# Patient Record
Sex: MALE | Race: WHITE | NOT HISPANIC OR LATINO | Employment: OTHER | URBAN - METROPOLITAN AREA
[De-identification: names, ages, dates, MRNs, and addresses within clinical notes are randomized per-mention and may not be internally consistent; named-entity substitution may affect disease eponyms.]

---

## 2018-06-25 ENCOUNTER — OFFICE VISIT (OUTPATIENT)
Dept: FAMILY MEDICINE CLINIC | Facility: CLINIC | Age: 63
End: 2018-06-25
Payer: COMMERCIAL

## 2018-06-25 VITALS
HEIGHT: 70 IN | BODY MASS INDEX: 45.1 KG/M2 | WEIGHT: 315 LBS | OXYGEN SATURATION: 95 % | HEART RATE: 77 BPM | TEMPERATURE: 97.1 F | DIASTOLIC BLOOD PRESSURE: 80 MMHG | SYSTOLIC BLOOD PRESSURE: 140 MMHG

## 2018-06-25 DIAGNOSIS — N28.89 KIDNEY MASS: ICD-10-CM

## 2018-06-25 DIAGNOSIS — I10 ESSENTIAL HYPERTENSION: ICD-10-CM

## 2018-06-25 DIAGNOSIS — N20.0 KIDNEY STONES: Primary | ICD-10-CM

## 2018-06-25 PROBLEM — M10.9 GOUT: Status: ACTIVE | Noted: 2018-06-25

## 2018-06-25 PROBLEM — E78.6 HDL DEFICIENCY: Status: ACTIVE | Noted: 2018-06-25

## 2018-06-25 PROBLEM — J30.9 ALLERGIC RHINITIS: Status: ACTIVE | Noted: 2018-06-25

## 2018-06-25 PROBLEM — N18.9 CRF (CHRONIC RENAL FAILURE): Status: ACTIVE | Noted: 2018-06-25

## 2018-06-25 PROBLEM — G47.33 OSA (OBSTRUCTIVE SLEEP APNEA): Status: ACTIVE | Noted: 2018-06-25

## 2018-06-25 PROCEDURE — 3008F BODY MASS INDEX DOCD: CPT | Performed by: FAMILY MEDICINE

## 2018-06-25 PROCEDURE — 99214 OFFICE O/P EST MOD 30 MIN: CPT | Performed by: FAMILY MEDICINE

## 2018-06-25 RX ORDER — LISINOPRIL 40 MG/1
40 TABLET ORAL
COMMUNITY
Start: 2017-08-17 | End: 2018-10-29 | Stop reason: SDUPTHER

## 2018-06-25 RX ORDER — FUROSEMIDE 20 MG/1
20 TABLET ORAL DAILY PRN
COMMUNITY
Start: 2017-09-11 | End: 2018-10-29 | Stop reason: SDUPTHER

## 2018-06-25 RX ORDER — METOPROLOL SUCCINATE 100 MG/1
100 TABLET, EXTENDED RELEASE ORAL
COMMUNITY
Start: 2017-07-19 | End: 2018-08-21 | Stop reason: SDUPTHER

## 2018-06-25 RX ORDER — TAMSULOSIN HYDROCHLORIDE 0.4 MG/1
0.4 CAPSULE ORAL EVERY 24 HOURS
COMMUNITY
Start: 2018-03-20 | End: 2019-04-05 | Stop reason: SDUPTHER

## 2018-06-25 RX ORDER — TRAVOPROST 0.004 %
1 DROPS OPHTHALMIC (EYE)
COMMUNITY
Start: 2018-06-01

## 2018-06-25 NOTE — PROGRESS NOTES
Assessment/Plan:    No problem-specific Assessment & Plan notes found for this encounter  Diagnoses and all orders for this visit:    Kidney stones    Kidney mass    Other orders  -     tamsulosin (FLOMAX) 0 4 mg; Take 0 4 mg by mouth every 24 hours  -     furosemide (LASIX) 20 mg tablet; Take 20 mg by mouth daily as needed  -     lisinopril (ZESTRIL) 40 mg tablet; Take 40 mg by mouth  -     metoprolol succinate (TOPROL-XL) 100 mg 24 hr tablet; Take 100 mg by mouth  -     aspirin 81 MG tablet; Take 81 mg by mouth once  -     TRAVATAN Z 0 004 % ophthalmic solution; 0 004 drops once          Subjective:      Patient ID: Schuyler Malone is a 59 y o  male  Here for f/u kidney / routine       Hypertension   Pertinent negatives include no headaches  The following portions of the patient's history were reviewed and updated as appropriate: allergies, current medications, past family history, past medical history, past social history, past surgical history and problem list     Review of Systems   Constitutional: Negative for activity change, appetite change, fatigue, fever and unexpected weight change  HENT: Negative for congestion, dental problem and sneezing  Eyes: Negative for discharge and visual disturbance  Respiratory: Negative for cough and wheezing  Gastrointestinal: Negative for abdominal pain, constipation, diarrhea, nausea and vomiting  Endocrine: Negative for polydipsia and polyuria  Genitourinary: Negative for dysuria and frequency  Musculoskeletal: Negative for arthralgias  Skin: Negative for rash  Allergic/Immunologic: Negative for environmental allergies and food allergies  Neurological: Negative for headaches  Hematological: Negative for adenopathy  Psychiatric/Behavioral: Negative for behavioral problems and sleep disturbance           Objective:      /80 (BP Location: Left arm, Patient Position: Sitting, Cuff Size: Large)   Pulse 77   Temp (!) 97 1 °F (36 2 °C)   Ht 5' 10" (1 778 m)   Wt (!) 171 kg (377 lb)   SpO2 95%   BMI 54 09 kg/m²          Physical Exam   Constitutional: He appears well-developed and well-nourished  HENT:   Head: Normocephalic and atraumatic  Eyes: Conjunctivae are normal    Neck: Neck supple  No thyromegaly present  Cardiovascular: Normal rate, regular rhythm, normal heart sounds and intact distal pulses  No murmur heard  Pulmonary/Chest: Effort normal and breath sounds normal  No respiratory distress  Lymphadenopathy:     He has no cervical adenopathy  Skin: Skin is warm and dry  Psychiatric: He has a normal mood and affect   His behavior is normal

## 2018-07-11 ENCOUNTER — TELEPHONE (OUTPATIENT)
Dept: FAMILY MEDICINE CLINIC | Facility: CLINIC | Age: 63
End: 2018-07-11

## 2018-08-21 DIAGNOSIS — I10 ESSENTIAL HYPERTENSION: Primary | ICD-10-CM

## 2018-08-21 RX ORDER — METOPROLOL SUCCINATE 100 MG/1
TABLET, EXTENDED RELEASE ORAL
Qty: 90 TABLET | Refills: 2 | Status: SHIPPED | OUTPATIENT
Start: 2018-08-21 | End: 2019-05-20 | Stop reason: SDUPTHER

## 2018-10-29 DIAGNOSIS — I10 ESSENTIAL HYPERTENSION: Primary | ICD-10-CM

## 2018-10-30 RX ORDER — LISINOPRIL 40 MG/1
TABLET ORAL
Qty: 90 TABLET | Refills: 3 | Status: SHIPPED | OUTPATIENT
Start: 2018-10-30 | End: 2019-10-23 | Stop reason: SDUPTHER

## 2018-10-30 RX ORDER — FUROSEMIDE 20 MG/1
TABLET ORAL
Qty: 90 TABLET | Refills: 2 | Status: SHIPPED | OUTPATIENT
Start: 2018-10-30 | End: 2019-07-30 | Stop reason: SDUPTHER

## 2018-12-27 ENCOUNTER — OFFICE VISIT (OUTPATIENT)
Dept: FAMILY MEDICINE CLINIC | Facility: CLINIC | Age: 63
End: 2018-12-27
Payer: COMMERCIAL

## 2018-12-27 VITALS
HEIGHT: 70 IN | BODY MASS INDEX: 45.1 KG/M2 | OXYGEN SATURATION: 96 % | SYSTOLIC BLOOD PRESSURE: 110 MMHG | DIASTOLIC BLOOD PRESSURE: 74 MMHG | HEART RATE: 82 BPM | WEIGHT: 315 LBS | TEMPERATURE: 97.3 F

## 2018-12-27 DIAGNOSIS — N20.0 KIDNEY STONES: ICD-10-CM

## 2018-12-27 DIAGNOSIS — IMO0001 COLD: ICD-10-CM

## 2018-12-27 DIAGNOSIS — I10 ESSENTIAL HYPERTENSION: ICD-10-CM

## 2018-12-27 DIAGNOSIS — N18.9 CHRONIC RENAL FAILURE, UNSPECIFIED CKD STAGE: ICD-10-CM

## 2018-12-27 DIAGNOSIS — C64.2 RENAL CELL CARCINOMA OF LEFT KIDNEY (HCC): Primary | Chronic | ICD-10-CM

## 2018-12-27 DIAGNOSIS — M10.9 GOUT, UNSPECIFIED CAUSE, UNSPECIFIED CHRONICITY, UNSPECIFIED SITE: ICD-10-CM

## 2018-12-27 PROCEDURE — 3074F SYST BP LT 130 MM HG: CPT | Performed by: FAMILY MEDICINE

## 2018-12-27 PROCEDURE — 90686 IIV4 VACC NO PRSV 0.5 ML IM: CPT

## 2018-12-27 PROCEDURE — 3078F DIAST BP <80 MM HG: CPT | Performed by: FAMILY MEDICINE

## 2018-12-27 PROCEDURE — 99214 OFFICE O/P EST MOD 30 MIN: CPT | Performed by: FAMILY MEDICINE

## 2018-12-27 PROCEDURE — 90471 IMMUNIZATION ADMIN: CPT

## 2018-12-27 RX ORDER — ALLOPURINOL 100 MG/1
100 TABLET ORAL DAILY
COMMUNITY
Start: 2018-12-04

## 2018-12-27 RX ORDER — POTASSIUM CITRATE 15 MEQ/1
1 TABLET, EXTENDED RELEASE ORAL DAILY
COMMUNITY
Start: 2018-12-04

## 2018-12-27 NOTE — ASSESSMENT & PLAN NOTE
Patient is status post cryoablation of left kidney mass in July of 2018  Subsequent CT scan in September demonstrated no change in the size  He continues to follow up with the various renal specialists

## 2018-12-27 NOTE — PROGRESS NOTES
Assessment/Plan:    Renal cell carcinoma of left kidney [facility])  Patient is status post cryoablation of left kidney mass in July of 2018  Subsequent CT scan in September demonstrated no change in the size  He continues to follow up with the various renal specialists  Diagnoses and all orders for this visit:    Renal cell carcinoma of left kidney (HCC)    Chronic renal failure, unspecified CKD stage  -     influenza vaccine, [id number], quadrivalent, 0 5 mL, preservative-free (SYRINGE, SINGLE-DOSE VIAL), for adult and pediatric patients 3 yr+ (AFLURIA, FLUARIX, FLULAVAL, FLUZONE)    Kidney stones    Essential hypertension    Gout, unspecified cause, unspecified chronicity, unspecified site    Cold    Other orders  -     allopurinol (ZYLOPRIM) 100 mg tablet;   -     Potassium Citrate ER 15 MEQ (1620 MG) TBCR;           Subjective:      Patient ID: Benigno Cade is a 59 y o  male  PATIENT RETURNS FOR FOLLOW-UP OF CHRONIC MEDICAL CONDITIONS  NO HOSPITAL STAYS OR EMERGENCY VISITS RECENTLY  MEDS WERE REVIEWED AND NO SIDE EFFECTS  NO NEW ISSUES  UNLESS NOTED BELOW  NO NEW MEDICAL PROVIDER REPORTED  The following portions of the patient's history were reviewed and updated as appropriate: allergies, current medications, past family history, past medical history, past social history, past surgical history and problem list     Review of Systems   Constitutional: Negative for activity change and appetite change  HENT: Negative for trouble swallowing  Eyes: Negative for visual disturbance  Respiratory: Negative for cough and shortness of breath  Cardiovascular: Negative for chest pain, palpitations and leg swelling  Gastrointestinal: Negative for abdominal pain and blood in stool  Endocrine: Negative for polyuria  Genitourinary: Negative for difficulty urinating and hematuria  Musculoskeletal: Positive for arthralgias and back pain  Skin: Negative for rash     Neurological: Negative for dizziness  Psychiatric/Behavioral: Negative for behavioral problems  Objective:  Vitals:    12/27/18 0931   BP: 110/74   BP Location: Left arm   Patient Position: Sitting   Cuff Size: Large   Pulse: 82   Temp: (!) 97 3 °F (36 3 °C)   TempSrc: Temporal   SpO2: 96%   Weight: (!) 174 kg (384 lb 1 6 oz)   Height: 5' 10" (1 778 m)      Physical Exam   Constitutional: He appears well-developed and well-nourished  HENT:   Head: Normocephalic and atraumatic  Eyes: Conjunctivae are normal    Neck: Neck supple  No thyromegaly present  Cardiovascular: Normal rate, regular rhythm, normal heart sounds and intact distal pulses  No murmur heard  Pulmonary/Chest: Effort normal and breath sounds normal  No respiratory distress  Musculoskeletal: He exhibits no edema  Lymphadenopathy:     He has no cervical adenopathy  Skin: Skin is warm and dry  Psychiatric: He has a normal mood and affect  His behavior is normal          Patient's chronic problems that were reviewed today are stable  Meds reviewed and no changes made  Appropriate labs and imaging were ordered  Preventive measures appropriate for age and sex were reviewed with patient  Immunizations were updated as appropriate

## 2019-04-05 DIAGNOSIS — N18.9 CHRONIC RENAL FAILURE, UNSPECIFIED CKD STAGE: Primary | ICD-10-CM

## 2019-04-05 RX ORDER — TAMSULOSIN HYDROCHLORIDE 0.4 MG/1
0.4 CAPSULE ORAL EVERY 24 HOURS
Qty: 90 CAPSULE | Refills: 3 | Status: SHIPPED | OUTPATIENT
Start: 2019-04-05 | End: 2020-04-03 | Stop reason: SDUPTHER

## 2019-05-20 DIAGNOSIS — I10 ESSENTIAL HYPERTENSION: ICD-10-CM

## 2019-05-20 RX ORDER — METOPROLOL SUCCINATE 100 MG/1
TABLET, EXTENDED RELEASE ORAL
Qty: 90 TABLET | Refills: 2 | Status: SHIPPED | OUTPATIENT
Start: 2019-05-20 | End: 2020-02-23

## 2019-06-27 ENCOUNTER — OFFICE VISIT (OUTPATIENT)
Dept: FAMILY MEDICINE CLINIC | Facility: CLINIC | Age: 64
End: 2019-06-27
Payer: COMMERCIAL

## 2019-06-27 VITALS
DIASTOLIC BLOOD PRESSURE: 80 MMHG | OXYGEN SATURATION: 92 % | TEMPERATURE: 97.5 F | HEART RATE: 74 BPM | SYSTOLIC BLOOD PRESSURE: 130 MMHG | HEIGHT: 70 IN | BODY MASS INDEX: 45.1 KG/M2 | WEIGHT: 315 LBS

## 2019-06-27 DIAGNOSIS — E78.6 HDL DEFICIENCY: ICD-10-CM

## 2019-06-27 DIAGNOSIS — N20.0 KIDNEY STONES: ICD-10-CM

## 2019-06-27 DIAGNOSIS — Z12.11 ENCOUNTER FOR SCREENING COLONOSCOPY: Primary | ICD-10-CM

## 2019-06-27 DIAGNOSIS — N18.9 CHRONIC RENAL FAILURE, UNSPECIFIED CKD STAGE: ICD-10-CM

## 2019-06-27 DIAGNOSIS — I10 ESSENTIAL HYPERTENSION: ICD-10-CM

## 2019-06-27 DIAGNOSIS — M10.9 GOUT, UNSPECIFIED CAUSE, UNSPECIFIED CHRONICITY, UNSPECIFIED SITE: ICD-10-CM

## 2019-06-27 DIAGNOSIS — C64.2 RENAL CELL CARCINOMA OF LEFT KIDNEY (HCC): Chronic | ICD-10-CM

## 2019-06-27 PROBLEM — IMO0001 COLD: Status: RESOLVED | Noted: 2018-12-27 | Resolved: 2019-06-27

## 2019-06-27 PROCEDURE — 3079F DIAST BP 80-89 MM HG: CPT | Performed by: FAMILY MEDICINE

## 2019-06-27 PROCEDURE — 3008F BODY MASS INDEX DOCD: CPT | Performed by: FAMILY MEDICINE

## 2019-06-27 PROCEDURE — 99214 OFFICE O/P EST MOD 30 MIN: CPT | Performed by: FAMILY MEDICINE

## 2019-06-27 PROCEDURE — 3075F SYST BP GE 130 - 139MM HG: CPT | Performed by: FAMILY MEDICINE

## 2019-07-29 DIAGNOSIS — I10 ESSENTIAL HYPERTENSION: ICD-10-CM

## 2019-07-30 ENCOUNTER — OFFICE VISIT (OUTPATIENT)
Dept: FAMILY MEDICINE CLINIC | Facility: CLINIC | Age: 64
End: 2019-07-30
Payer: COMMERCIAL

## 2019-07-30 VITALS
OXYGEN SATURATION: 96 % | SYSTOLIC BLOOD PRESSURE: 138 MMHG | HEIGHT: 70 IN | DIASTOLIC BLOOD PRESSURE: 68 MMHG | BODY MASS INDEX: 45.1 KG/M2 | TEMPERATURE: 97.3 F | WEIGHT: 315 LBS | RESPIRATION RATE: 20 BRPM | HEART RATE: 75 BPM

## 2019-07-30 DIAGNOSIS — C64.2 RENAL CELL CARCINOMA OF LEFT KIDNEY (HCC): Chronic | ICD-10-CM

## 2019-07-30 DIAGNOSIS — N18.30 CHRONIC RENAL FAILURE, STAGE 3 (MODERATE) (HCC): Primary | ICD-10-CM

## 2019-07-30 DIAGNOSIS — M10.9 GOUT, UNSPECIFIED CAUSE, UNSPECIFIED CHRONICITY, UNSPECIFIED SITE: ICD-10-CM

## 2019-07-30 DIAGNOSIS — M79.671 RIGHT FOOT PAIN: ICD-10-CM

## 2019-07-30 DIAGNOSIS — I10 ESSENTIAL HYPERTENSION: ICD-10-CM

## 2019-07-30 DIAGNOSIS — E66.01 CLASS 3 SEVERE OBESITY DUE TO EXCESS CALORIES WITH SERIOUS COMORBIDITY AND BODY MASS INDEX (BMI) OF 50.0 TO 59.9 IN ADULT (HCC): ICD-10-CM

## 2019-07-30 DIAGNOSIS — I89.0 LYMPHEDEMA OF BOTH LOWER EXTREMITIES: ICD-10-CM

## 2019-07-30 PROBLEM — E66.9 OBESITY: Status: ACTIVE | Noted: 2019-07-30

## 2019-07-30 PROBLEM — E66.813 CLASS 3 SEVERE OBESITY DUE TO EXCESS CALORIES WITH SERIOUS COMORBIDITY AND BODY MASS INDEX (BMI) OF 50.0 TO 59.9 IN ADULT (HCC): Status: ACTIVE | Noted: 2019-07-30

## 2019-07-30 PROCEDURE — 3008F BODY MASS INDEX DOCD: CPT | Performed by: FAMILY MEDICINE

## 2019-07-30 PROCEDURE — 3075F SYST BP GE 130 - 139MM HG: CPT | Performed by: FAMILY MEDICINE

## 2019-07-30 PROCEDURE — 99213 OFFICE O/P EST LOW 20 MIN: CPT | Performed by: FAMILY MEDICINE

## 2019-07-30 RX ORDER — FUROSEMIDE 20 MG/1
TABLET ORAL
Qty: 90 TABLET | Refills: 2 | Status: SHIPPED | OUTPATIENT
Start: 2019-07-30 | End: 2020-05-04

## 2019-07-30 NOTE — ASSESSMENT & PLAN NOTE
Followed by Oncology and Radiology at [facility]  Discovered on eval of kidney stones  Followed by CT scanning of the kidneys every 6 months with labs prior

## 2019-07-30 NOTE — PATIENT INSTRUCTIONS
AVOID THESE HIGH SALT FOODS:  SNACKS THAT TASTE SALTY: PRETZELS/CHIPSPOPCORN  CANNED SOUPS AND VEGGIES  FROZEN FOODS/ MICROWAVEABLE FOODS/ CANNED FOODS  AVOID ADDING EXTRA SALT TO THE MEALS     Will get an x-ray of the right foot and ankle and I will call you with those results  In order to control swelling of the lower extremities a little bit better tried elevate the legs above the level as much as possible and also avoidance of salt  For now continue present medications because this episode is not acute gouty arthritis  Can use acetaminophen for pain but the maximum should be 2000 mg over a 24 hour

## 2019-07-30 NOTE — PROGRESS NOTES
Assessment/Plan:    Chronic renal failure, stage 3 (moderate) ([facility])  1/15/19: creat = 1 5 and GFR = 49  Stable  Right foot pain  Started 4 days ago  Only hurts with weight-bearing  No injuries and no fever or chills  Has a past history of gout and remains on allopurinol 100 mg once a day  However the right ankle in the right foot has not been red or exquisitely tender  Pain is mostly on the lateral aspect of the foot both dorsally and ventrally  Physical exam is unremarkable today at on the day except for the lymphedema  Need to rule out an underlying stress fracture of the lateral aspect of the right foot  Will get an x-ray of the right foot and ankle and call him with the results  In the meantime tried elevate and rest the foot as much as possible  Renal cell carcinoma of left kidney ([facility])  Followed by Oncology and Radiology at [facility]  Discovered on eval of kidney stones  Followed by CT scanning of the kidneys every 6 months with labs prior  Gout  On allopurinol for prevention and can't remember when his last episode of acute gout occurred  This episode of right foot pain is not acute gout  Lymphedema of both lower extremities  Chronic problem  Told to avoid salt   Has stable stasis dermatitis of BLE   AVOID THESE HIGH SALT FOODS:  SNACKS THAT TASTE SALTY: PRETZELS/CHIPSPOPCORN  CANNED SOUPS AND VEGGIES  FROZEN FOODS/ MICROWAVEABLE FOODS/ CANNED FOODS  AVOID ADDING EXTRA SALT TO THE MEALS        Diagnoses and all orders for this visit:    Chronic renal failure, stage 3 (moderate) ([facility])  -     XR ankle 3+ vw right; Future  -     XR foot 3+ vw right; Future    Renal cell carcinoma of left kidney ([facility])    Essential hypertension    Class 3 severe obesity due to excess calories with serious comorbidity and body mass index (BMI) of 50 0 to 59 9 in adult ([facility])  -     XR ankle 3+ vw right; Future  -     XR foot 3+ vw right; Future    Gout, unspecified cause, unspecified chronicity, unspecified site    Lymphedema of both lower extremities    Right foot pain  -     XR ankle 3+ vw right; Future  -     XR foot 3+ vw right; Future          Subjective:     Chief Complaint   Patient presents with    Leg Swelling        Patient ID: Jaya Rothman is a 62 y o  male  HPI : swollen foot    The following portions of the patient's history were reviewed and updated as appropriate: allergies, current medications, past family history, past medical history, past social history, past surgical history and problem list     Review of Systems   Constitutional: Positive for activity change  Negative for appetite change and fever  Cardiovascular: Positive for leg swelling  Gastrointestinal: Negative for abdominal pain  Genitourinary: Negative for difficulty urinating  Musculoskeletal: Positive for gait problem  Right foot pain         Objective:  Vitals:    07/30/19 0933   BP: 138/68   BP Location: Left arm   Patient Position: Sitting   Cuff Size: Large   Pulse: 75   Resp: 20   Temp: (!) 97 3 °F (36 3 °C)   TempSrc: Temporal   SpO2: 96%   Weight: (!) 179 kg (393 lb 11 2 oz)   Height: 5' 10" (1 778 m)      Physical Exam   Constitutional: He is oriented to person, place, and time  He appears well-developed and well-nourished  HENT:   Head: Normocephalic  Cardiovascular: Normal rate and regular rhythm  Musculoskeletal: He exhibits edema and tenderness  Feet:    Neurological: He is alert and oriented to person, place, and time  Patient Instructions   AVOID THESE HIGH SALT FOODS:  SNACKS THAT TASTE SALTY: PRETZELS/CHIPSPOPCORN  CANNED SOUPS AND VEGGIES  FROZEN FOODS/ MICROWAVEABLE FOODS/ CANNED FOODS  AVOID ADDING EXTRA SALT TO THE MEALS     Will get an x-ray of the right foot and ankle and I will call you with those results  In order to control swelling of the lower extremities a little bit better tried elevate the legs above the level as much as possible and also avoidance of salt    For now continue present medications because this episode is not acute gouty arthritis  Can use acetaminophen for pain but the maximum should be 2000 mg over a 24 hour

## 2019-07-30 NOTE — ASSESSMENT & PLAN NOTE
Started 4 days ago  Only hurts with weight-bearing  No injuries and no fever or chills  Has a past history of gout and remains on allopurinol 100 mg once a day  However the right ankle in the right foot has not been red or exquisitely tender  Pain is mostly on the lateral aspect of the foot both dorsally and ventrally  Physical exam is unremarkable today at on the day except for the lymphedema  Need to rule out an underlying stress fracture of the lateral aspect of the right foot  Will get an x-ray of the right foot and ankle and call him with the results  In the meantime tried elevate and rest the foot as much as possible

## 2019-07-30 NOTE — ASSESSMENT & PLAN NOTE
On allopurinol for prevention and can't remember when his last episode of acute gout occurred  This episode of right foot pain is not acute gout

## 2019-07-30 NOTE — ASSESSMENT & PLAN NOTE
Chronic problem  Told to avoid salt   Has stable stasis dermatitis of BLE   AVOID THESE HIGH SALT FOODS:  SNACKS THAT TASTE SALTY: PRETZELS/CHIPSPOPCORN  CANNED SOUPS AND VEGGIES  FROZEN FOODS/ MICROWAVEABLE FOODS/ CANNED FOODS  AVOID ADDING EXTRA SALT TO THE MEALS

## 2019-07-31 ENCOUNTER — HOSPITAL ENCOUNTER (OUTPATIENT)
Dept: RADIOLOGY | Facility: HOSPITAL | Age: 64
Discharge: HOME | End: 2019-07-31
Payer: COMMERCIAL

## 2019-07-31 DIAGNOSIS — N18.30 CHRONIC RENAL FAILURE, STAGE 3 (MODERATE) (HCC): ICD-10-CM

## 2019-07-31 DIAGNOSIS — E66.01 CLASS 3 SEVERE OBESITY DUE TO EXCESS CALORIES WITH SERIOUS COMORBIDITY AND BODY MASS INDEX (BMI) OF 50.0 TO 59.9 IN ADULT (HCC): ICD-10-CM

## 2019-07-31 DIAGNOSIS — M79.671 RIGHT FOOT PAIN: ICD-10-CM

## 2019-07-31 PROCEDURE — 73630 X-RAY EXAM OF FOOT: CPT

## 2019-07-31 PROCEDURE — 73610 X-RAY EXAM OF ANKLE: CPT

## 2019-10-23 DIAGNOSIS — I10 ESSENTIAL HYPERTENSION: ICD-10-CM

## 2019-10-23 RX ORDER — LISINOPRIL 40 MG/1
TABLET ORAL
Qty: 90 TABLET | Refills: 0 | Status: SHIPPED | OUTPATIENT
Start: 2019-10-23 | End: 2020-01-27

## 2019-12-30 ENCOUNTER — OFFICE VISIT (OUTPATIENT)
Dept: FAMILY MEDICINE CLINIC | Facility: CLINIC | Age: 64
End: 2019-12-30
Payer: COMMERCIAL

## 2019-12-30 VITALS
DIASTOLIC BLOOD PRESSURE: 90 MMHG | HEART RATE: 73 BPM | WEIGHT: 315 LBS | HEIGHT: 69 IN | SYSTOLIC BLOOD PRESSURE: 150 MMHG | BODY MASS INDEX: 46.65 KG/M2 | TEMPERATURE: 96.7 F | OXYGEN SATURATION: 95 %

## 2019-12-30 DIAGNOSIS — M10.9 GOUT, UNSPECIFIED CAUSE, UNSPECIFIED CHRONICITY, UNSPECIFIED SITE: ICD-10-CM

## 2019-12-30 DIAGNOSIS — Z11.59 SCREENING FOR VIRAL DISEASE: ICD-10-CM

## 2019-12-30 DIAGNOSIS — Z12.11 ENCOUNTER FOR SCREENING COLONOSCOPY: Primary | ICD-10-CM

## 2019-12-30 DIAGNOSIS — E66.01 MORBID OBESITY WITH BMI OF 50.0-59.9, ADULT (HCC): ICD-10-CM

## 2019-12-30 DIAGNOSIS — Z23 IMMUNIZATION DUE: ICD-10-CM

## 2019-12-30 DIAGNOSIS — G47.33 OSA (OBSTRUCTIVE SLEEP APNEA): ICD-10-CM

## 2019-12-30 DIAGNOSIS — I89.0 LYMPHEDEMA OF BOTH LOWER EXTREMITIES: ICD-10-CM

## 2019-12-30 DIAGNOSIS — N18.30 CHRONIC RENAL FAILURE, STAGE 3 (MODERATE) (HCC): ICD-10-CM

## 2019-12-30 DIAGNOSIS — I10 ESSENTIAL HYPERTENSION: ICD-10-CM

## 2019-12-30 DIAGNOSIS — E66.01 CLASS 3 SEVERE OBESITY DUE TO EXCESS CALORIES WITH SERIOUS COMORBIDITY AND BODY MASS INDEX (BMI) OF 50.0 TO 59.9 IN ADULT (HCC): ICD-10-CM

## 2019-12-30 DIAGNOSIS — N20.0 KIDNEY STONES: ICD-10-CM

## 2019-12-30 PROCEDURE — 90682 RIV4 VACC RECOMBINANT DNA IM: CPT

## 2019-12-30 PROCEDURE — 90471 IMMUNIZATION ADMIN: CPT

## 2019-12-30 PROCEDURE — 99396 PREV VISIT EST AGE 40-64: CPT | Performed by: FAMILY MEDICINE

## 2019-12-30 NOTE — PROGRESS NOTES
Assessment/Plan:    No problem-specific Assessment & Plan notes found for this encounter  Diagnoses and all orders for this visit:    Encounter for screening colonoscopy  -     Ambulatory referral to Gastroenterology; Future    Screening for viral disease  -     Hepatitis C antibody  -     HIV 1/2 AG-AB combo    Immunization due  -     influenza vaccine, [id number], quadrivalent, recombinant, PF, 0 5 mL, for patients 18 yr+ (FLUBLOK)    Morbid obesity with BMI of 50 0-59 9, adult (HCC)    Essential hypertension    DELIA (obstructive sleep apnea)    Kidney stones    Gout, unspecified cause, unspecified chronicity, unspecified site    Class 3 severe obesity due to excess calories with serious comorbidity and body mass index (BMI) of 50 0 to 59 9 in adult [facility])    Lymphedema of both lower extremities    Chronic renal failure, stage 3 (moderate) (HCC)          Subjective:      Patient ID: Nader Cousin is a 62 y o  male  PATIENT RETURNS FOR FOLLOW-UP OF CHRONIC MEDICAL CONDITIONS  NO HOSPITAL STAYS OR EMERGENCY VISITS RECENTLY  MEDS WERE REVIEWED AND NO SIDE EFFECTS  NO NEW ISSUES  UNLESS NOTED BELOW  NO NEW MEDICAL PROVIDER REPORTED  THE CHRONIC DISEASES LISTED ABOVE ARE STABLE AND UNCHANGED/ THE PLAN OF CARE FOR THOSE WILL REMAIN UNCHANGED UNLESS NOTED BELOW  Wellness     Colon due next year   The following portions of the patient's history were reviewed and updated as appropriate: allergies, current medications, past family history, past medical history, past social history, past surgical history and problem list     Review of Systems   Constitutional: Negative for activity change and appetite change  HENT: Negative for trouble swallowing  Eyes: Negative for visual disturbance  Respiratory: Negative for cough and shortness of breath  Cardiovascular: Negative for chest pain, palpitations and leg swelling  Gastrointestinal: Negative for abdominal pain and blood in stool     Endocrine: Negative for polyuria  Genitourinary: Negative for difficulty urinating and hematuria  Skin: Negative for rash  Neurological: Negative for dizziness  Psychiatric/Behavioral: Negative for behavioral problems  Objective:  Vitals:    12/30/19 0928   BP: 150/90   BP Location: Left arm   Patient Position: Sitting   Cuff Size: Large   Pulse: 73   Temp: (!) 96 7 °F (35 9 °C)   TempSrc: Temporal   SpO2: 95%   Weight: (!) 174 kg (383 lb 8 oz)   Height: 5' 9 29" (1 76 m)      Physical Exam   Constitutional: He appears well-developed and well-nourished  HENT:   Head: Normocephalic and atraumatic  Eyes: Conjunctivae are normal    Neck: Neck supple  No thyromegaly present  Cardiovascular: Normal rate, regular rhythm, normal heart sounds and intact distal pulses  No murmur heard  Pulmonary/Chest: Effort normal and breath sounds normal  No respiratory distress  Musculoskeletal: He exhibits no edema  Lymphadenopathy:     He has no cervical adenopathy  Skin: Skin is warm and dry  Psychiatric: He has a normal mood and affect  His behavior is normal          Patient's chronic problems that were reviewed today are stable  Recent hospital stays reviewed  Recent labs and imaging reviewed  Recent visits to other providers reviewed  Meds reviewed and no changes made  Appropriate labs and imaging were ordered  Preventive measures appropriate for age and sex were reviewed with patient  Immunizations were updated as appropriate

## 2019-12-30 NOTE — PROGRESS NOTES
BMI Counseling: Body mass index is 56 16 kg/m²  The BMI is above normal  Nutrition recommendations include reducing portion sizes, decreasing overall calorie intake, 3-5 servings of fruits/vegetables daily, reducing fast food intake, consuming healthier snacks, decreasing soda and/or juice intake, moderation in carbohydrate intake, increasing intake of lean protein, reducing intake of saturated fat and trans fat and reducing intake of cholesterol  Exercise recommendations include exercising 3-5 times per week

## 2019-12-30 NOTE — PATIENT INSTRUCTIONS
Weight Management   AMBULATORY CARE:   Why it is important to manage your weight:  Being overweight increases your risk of health conditions such as heart disease, high blood pressure, type 2 diabetes, and certain types of cancer  It can also increase your risk for osteoarthritis, sleep apnea, and other respiratory problems  Aim for a slow, steady weight loss  Even a small amount of weight loss can lower your risk of health problems  How to lose weight safely:  A safe and healthy way to lose weight is to eat fewer calories and get regular exercise  You can lose up about 1 pound a week by decreasing the number of calories you eat by 500 calories each day  You can decrease calories by eating smaller portion sizes or by cutting out high-calorie foods  Read labels to find out how many calories are in the foods you eat  You can also burn calories with exercise such as walking, swimming, or biking  You will be more likely to keep weight off if you make these changes part of your lifestyle  Healthy meal plan for weight management:  A healthy meal plan includes a variety of foods, contains fewer calories, and helps you stay healthy  A healthy meal plan includes the following:  · Eat whole-grain foods more often  A healthy meal plan should contain fiber  Fiber is the part of grains, fruits, and vegetables that is not broken down by your body  Whole-grain foods are healthy and provide extra fiber in your diet  Some examples of whole-grain foods are whole-wheat breads and pastas, oatmeal, brown rice, and bulgur  · Eat a variety of vegetables every day  Include dark, leafy greens such as spinach, kale, kavya greens, and mustard greens  Eat yellow and orange vegetables such as carrots, sweet potatoes, and winter squash  · Eat a variety of fruits every day  Choose fresh or canned fruit (canned in its own juice or light syrup) instead of juice  Fruit juice has very little or no fiber  · Eat low-fat dairy foods  Drink fat-free (skim) milk or 1% milk  Eat fat-free yogurt and low-fat cottage cheese  Try low-fat cheeses such as mozzarella and other reduced-fat cheeses  · Choose meat and other protein foods that are low in fat  Choose beans or other legumes such as split peas or lentils  Choose fish, skinless poultry (chicken or turkey), or lean cuts of red meat (beef or pork)  Before you cook meat or poultry, cut off any visible fat  · Use less fat and oil  Try baking foods instead of frying them  Add less fat, such as margarine, sour cream, regular salad dressing and mayonnaise to foods  Eat fewer high-fat foods  Some examples of high-fat foods include french fries, doughnuts, ice cream, and cakes  · Eat fewer sweets  Limit foods and drinks that are high in sugar  This includes candy, cookies, regular soda, and sweetened drinks  Ways to decrease calories:   · Eat smaller portions  ¨ Use a small plate with smaller servings  ¨ Do not eat second helpings  ¨ When you eat at a restaurant, ask for a box and place half of your meal in the box before you eat  ¨ Share an entrée with someone else  · Replace high-calorie snacks with healthy, low-calorie snacks  ¨ Choose fresh fruit, vegetables, fat-free rice cakes, or air-popped popcorn instead of potato chips, nuts, or chocolate  ¨ Choose water or calorie-free drinks instead of soda or sweetened drinks  · Eat regular meals  Skipping meals can lead to overeating later in the day  Eat a healthy snack in place of a meal if you do not have time to eat a regular meal      · Do not shop for groceries when you are hungry  You may be more likely to make unhealthy food choices  Take a grocery list of healthy foods and shop after you have eaten  Exercise:  Exercise at least 30 minutes per day on most days of the week  Some examples of exercise include walking, biking, dancing, and swimming   You can also fit in more physical activity by taking the stairs instead of the elevator or parking farther away from stores  Ask your healthcare provider about the best exercise plan for you  Other things to consider as you try to lose weight:   · Be aware of situations that may give you the urge to overeat, such as eating while watching television  Find ways to avoid these situations  For example, read a book, go for a walk, or do crafts  · Meet with a weight loss support group or friends who are also trying to lose weight  This may help you stay motivated to continue working on your weight loss goals  © 2017 [facility] Information is for End User's use only and may not be sold, redistributed or otherwise used for commercial purposes  All illustrations and images included in CareNotes® are the copyrighted property of [employer]  or [employer]  The above information is an  only  It is not intended as medical advice for individual conditions or treatments  Talk to your doctor, nurse or pharmacist before following any medical regimen to see if it is safe and effective for you  Heart Healthy Diet   AMBULATORY CARE:   A heart healthy diet  is an eating plan low in total fat, unhealthy fats, and sodium (salt)  A heart healthy diet helps decrease your risk for heart disease and stroke  Limit the amount of fat you eat to 25% to 35% of your total daily calories  Limit sodium to less than 2,300 mg each day  Healthy fats:  Healthy fats can help improve cholesterol levels  The risk for heart disease is decreased when cholesterol levels are normal  Choose healthy fats, such as the following:  · Unsaturated fat  is found in foods such as soybean, canola, olive, corn, and safflower oils  It is also found in soft tub margarine that is made with liquid vegetable oil  · Omega-3 fat  is found in certain fish, such as salmon, tuna, and trout, and in walnuts and flaxseed    Unhealthy fats:  Unhealthy fats can cause unhealthy cholesterol levels in your blood and increase your risk of heart disease  Limit unhealthy fats, such as the following:  · Cholesterol  is found in animal foods, such as eggs and lobster, and in dairy products made from whole milk  Limit cholesterol to less than 300 milligrams (mg) each day  You may need to limit cholesterol to 200 mg each day if you have heart disease  · Saturated fat  is found in meats, such as roldan and hamburger  It is also found in chicken or turkey skin, whole milk, and butter  Limit saturated fat to less than 7% of your total daily calories  Limit saturated fat to less than 6% if you have heart disease or are at increased risk for it  · Trans fat  is found in packaged foods, such as potato chips and cookies  It is also in hard margarine, some fried foods, and shortening  Avoid trans fats as much as possible    Heart healthy foods and drinks to include:  Ask your dietitian or healthcare provider how many servings to have from each of the following food groups:  · Grains:      ¨ Whole-wheat breads, cereals, and pastas, and brown rice    ¨ Low-fat, low-sodium crackers and chips    · Vegetables:      ¨ Broccoli, green beans, green peas, and spinach    ¨ Collards, kale, and lima beans    ¨ Carrots, sweet potatoes, tomatoes, and peppers    ¨ Canned vegetables with no salt added    · Fruits:      ¨ Bananas, peaches, pears, and pineapple    ¨ Grapes, raisins, and dates    ¨ Oranges, tangerines, grapefruit, orange juice, and grapefruit juice    ¨ Apricots, mangoes, melons, and papaya    ¨ Raspberries and strawberries    ¨ Canned fruit with no added sugar    · Low-fat dairy products:      ¨ Nonfat (skim) milk, 1% milk, and low-fat almond, cashew, or soy milks fortified with calcium    ¨ Low-fat cheese, regular or frozen yogurt, and cottage cheese    · Meats and proteins , such as lean cuts of beef and pork (loin, leg, round), skinless chicken and turkey, legumes, soy products, egg whites, and nuts  Foods and drinks to limit or avoid:  Ask your dietitian or healthcare provider about these and other foods that are high in unhealthy fat, sodium, and sugar:  · Snack or packaged foods , such as frozen dinners, cookies, macaroni and cheese, and cereals with more than 300 mg of sodium per serving    · Canned or dry mixes  for cakes, soups, sauces, or gravies    · Vegetables with added sodium , such as instant potatoes, vegetables with added sauces, or regular canned vegetables    · Other foods high in sodium , such as ketchup, barbecue sauce, salad dressing, pickles, olives, soy sauce, and miso    · High-fat dairy foods  such as whole or 2% milk, cream cheese, or sour cream, and cheeses     · High-fat protein foods  such as high-fat cuts of beef (T-bone steaks, ribs), chicken or turkey with skin, and organ meats, such as liver    · Cured or smoked meats , such as hot dogs, roldan, and sausage    · Unhealthy fats and oils , such as butter, stick margarine, shortening, and cooking oils such as coconut or palm oil    · Food and drinks high in sugar , such as soft drinks (soda), sports drinks, sweetened tea, candy, cake, cookies, pies, and doughnuts  Other diet guidelines to follow:   · Eat more foods containing omega-3 fats  Eat fish high in omega-3 fats at least 2 times a week  · Limit alcohol  Too much alcohol can damage your heart and raise your blood pressure  Women should limit alcohol to 1 drink a day  Men should limit alcohol to 2 drinks a day  A drink of alcohol is 12 ounces of beer, 5 ounces of wine, or 1½ ounces of liquor  · Choose low-sodium foods  High-sodium foods can lead to high blood pressure  Add little or no salt to food you prepare  Use herbs and spices in place of salt  · Eat more fiber  to help lower cholesterol levels  Eat at least 5 servings of fruits and vegetables each day  Eat 3 ounces of whole-grain foods each day  Legumes (beans) are also a good source of fiber    Lifestyle guidelines: · Do not smoke  Nicotine and other chemicals in cigarettes and cigars can cause lung and heart damage  Ask your healthcare provider for information if you currently smoke and need help to quit  E-cigarettes or smokeless tobacco still contain nicotine  Talk to your healthcare provider before you use these products  · Exercise regularly  to help you maintain a healthy weight and improve your blood pressure and cholesterol levels  Ask your healthcare provider about the best exercise plan for you  Do not start an exercise program without asking your healthcare provider  Follow up with your healthcare provider as directed:  Write down your questions so you remember to ask them during your visits  © 2017 [address] [employer] Information is for End User's use only and may not be sold, redistributed or otherwise used for commercial purposes  All illustrations and images included in CareNotes® are the copyrighted property of [employer]  or [employer]  The above information is an  only  It is not intended as medical advice for individual conditions or treatments  Talk to your doctor, nurse or pharmacist before following any medical regimen to see if it is safe and effective for you  Calorie Counting Diet   WHAT YOU NEED TO KNOW:   What is a calorie counting diet? It is a meal plan based on counting calories each day to reach a healthy body weight  You will need to eat fewer calories if you are trying to lose weight  Weight loss may decrease your risk for certain health problems or improve your health if you have health problems  Some of these health problems include heart disease, high blood pressure, and diabetes  What foods should I avoid? Your dietitian will tell you if you need to avoid certain foods based on your body weight and health condition  You may need to avoid high-fat foods if you are at risk for or have heart disease   You may need to eat fewer foods from the breads and starches food group if you have diabetes  How many calories are in foods? The following is a list of foods and drinks with the approximate number of calories in each  Check the food label to find the exact number of calories  A dietitian can tell you how many calories you should have from each food group each day    · Carbohydrate:      ¨ ½ of a 3-inch bagel, 1 slice of bread, or ½ of a hamburger bun or hot dog bun (80)    ¨ 1 (8-inch) flour tortilla or ½ cup of cooked rice (100)    ¨ 1 (6-inch) corn tortilla (80)    ¨ 1 (6-inch) pancake or 1 cup of bran flakes cereal (110)    ¨ ½ cup of cooked cereal (80)    ¨ ½ cup of cooked pasta (85)    ¨ 1 ounce of pretzels (100)    ¨ 3 cups of air-popped popcorn without butter or oil (80)    · Dairy:      ¨ 1 cup of skim or 1% milk (90)    ¨ 1 cup of 2% milk (120)    ¨ 1 cup of whole milk (160)    ¨ 1 cup of 2% chocolate milk (220)    ¨ 1 ounce of low-fat cheese with 3 grams of fat per ounce (70)    ¨ 1 ounce of cheddar cheese (114)    ¨ ½ cup of 1% fat cottage cheese (80)    ¨ 1 cup of plain or sugar-free, fat-free yogurt (90)    · Protein foods:      ¨ 3 ounces of fish (not breaded or fried) (95)    ¨ 3 ounces of breaded, fried fish (195)    ¨ ¾ cup of tuna canned in water (105)    ¨ 3 ounces of chicken breast without skin (105)    ¨ 1 fried chicken breast with skin (350)    ¨ ¼ cup of fat free egg substitute (40)    ¨ 1 large egg (75)    ¨ 3 ounces of lean beef or pork (165)    ¨ 3 ounces of fried pork chop or ham (185)    ¨ ½ cup of cooked dried beans, such as kidney, grimaldo, lentils, or navy (115)    ¨ 3 ounces of bologna or lunch meat (225)    ¨ 2 links of breakfast sausage (140)    · Vegetables:      ¨ ½ cup of sliced mushrooms (10)    ¨ 1 cup of salad greens, such as lettuce, spinach, or katelyn (15)    ¨ ½ cup of steamed asparagus (20)    ¨ ½ cup of cooked summer squash, zucchini squash, or green or wax beans (25)    ¨ 1 cup of broccoli or cauliflower florets, or 1 medium tomato (25)    ¨ 1 large raw carrot or ½ cup of cooked carrots (40)    ¨ ? of a medium cucumber or 1 stalk of celery (5)    ¨ 1 small baked potato (160)    ¨ 1 cup of breaded, fried vegetables (230)    · Fruit:      ¨ 1 (6-inch) banana (55)     ¨ ½ of a 4-inch grapefruit (55)    ¨ 15 grapes (60)    ¨ 1 medium orange or apple (70)    ¨ 1 large peach (65)    ¨ 1 cup of fresh pineapple chunks (75)    ¨ 1 cup of melon cubes (50)    ¨ 1¼ cups of whole strawberries (45)    ¨ ½ cup of fruit canned in juice (55)    ¨ ½ cup of fruit canned in heavy syrup (110)    ¨ ?  cup of raisins (130)    ¨ ½ cup of unsweetened fruit juice (60)    ¨ ½ cup of grape, cranberry, or prune juice (90)    · Fat:      ¨ 10 peanuts or 2 teaspoons of peanut butter (55)    ¨ 2 tablespoons of avocado or 1 tablespoon of regular salad dressing (45)    ¨ 2 slices of roldan (90)    ¨ 1 teaspoon of oil, such as safflower, canola, corn, or olive oil (45)    ¨ 2 teaspoons of low-fat margarine, or 1 tablespoon of low-fat mayonnaise (50)    ¨ 1 teaspoon of regular margarine (40)    ¨ 1 tablespoon of regular mayonnaise (135)    ¨ 1 tablespoon of cream cheese or 2 tablespoons of low-fat cream cheese (45)    ¨ 2 tablespoons of vegetable shortening (215)    · Dessert and sweets:      ¨ 8 animal crackers or 5 vanilla wafers (80)    ¨ 1 frozen fruit juice bar (80)    ¨ ½ cup of ice milk or low-fat frozen yogurt (90)    ¨ ½ cup of sherbet or sorbet (125)    ¨ ½ cup of sugar-free pudding or custard (60)    ¨ ½ cup of ice cream (140)    ¨ ½ cup of pudding or custard (175)    ¨ 1 (2-inch) square chocolate brownie (185)    · Combination foods:      ¨ Bean burrito made with an 8-inch tortilla, without cheese (275)    ¨ Chicken breast sandwich with lettuce and tomato (325)    ¨ 1 cup of chicken noodle soup (60)    ¨ 1 beef taco (175)    ¨ Regular hamburger with lettuce and tomato (310)    ¨ Regular cheeseburger with lettuce and tomato (410)     ¨ ¼ of a 12-inch cheese pizza (280)    ¨ Fried fish sandwich with lettuce and tomato (425)    ¨ Hot dog and bun (275)    ¨ 1½ cups of macaroni and cheese (310)    ¨ Taco salad with a fried tortilla shell (870)    · Low-calorie foods:      ¨ 1 tablespoon of ketchup or 1 tablespoon of fat free sour cream (15)    ¨ 1 teaspoon of mustard (5)    ¨ ¼ cup of salsa (20)    ¨ 1 large dill pickle (15)    ¨ 1 tablespoon of fat free salad dressing (10)    ¨ 2 teaspoons of low-sugar, light jam or jelly, or 1 tablespoon of sugar-free syrup (15)    ¨ 1 sugar-free popsicle (15)    ¨ 1 cup of club soda, seltzer water, or diet soda (0)  CARE AGREEMENT:   You have the right to help plan your care  Discuss treatment options with your caregivers to decide what care you want to receive  You always have the right to refuse treatment  The above information is an  only  It is not intended as medical advice for individual conditions or treatments  Talk to your doctor, nurse or pharmacist before following any medical regimen to see if it is safe and effective for you  © 2017 [address] [employer] Information is for End User's use only and may not be sold, redistributed or otherwise used for commercial purposes  All illustrations and images included in CareNotes® are the copyrighted property of [employer]  or [employer]      IF YOU HAVE NEVER HAD A TDAP SHOT (TETANUS/DIPHTHERIA/PERTUSSIS)  TALK TO YOUR PHARMACIST ABOUT GETTING ONE : GOOD FOR 10 YRS:ESPECIALLY IMPORTATN IF YOU HAVE YOUNG CHILDREN OR GRANDCHILDREN    GET A YEARLY FLU SHOT IN THE FALL : IF OVER 65 GET "HIGH DOSE"     ASK PHARMACIST ABOUT "SHINGRIX" THE NEW SHINGLES VACCINE IF OVER AGE 50

## 2020-01-27 DIAGNOSIS — I10 ESSENTIAL HYPERTENSION: ICD-10-CM

## 2020-01-27 RX ORDER — LISINOPRIL 40 MG/1
TABLET ORAL
Qty: 90 TABLET | Refills: 0 | Status: SHIPPED | OUTPATIENT
Start: 2020-01-27 | End: 2020-04-27

## 2020-02-23 DIAGNOSIS — I10 ESSENTIAL HYPERTENSION: ICD-10-CM

## 2020-02-23 RX ORDER — METOPROLOL SUCCINATE 100 MG/1
TABLET, EXTENDED RELEASE ORAL
Qty: 90 TABLET | Refills: 4 | Status: SHIPPED | OUTPATIENT
Start: 2020-02-23 | End: 2021-03-09

## 2020-04-03 ENCOUNTER — TELEPHONE (OUTPATIENT)
Dept: FAMILY MEDICINE CLINIC | Facility: CLINIC | Age: 65
End: 2020-04-03

## 2020-04-03 ENCOUNTER — TELEMEDICINE (OUTPATIENT)
Dept: FAMILY MEDICINE CLINIC | Facility: CLINIC | Age: 65
End: 2020-04-03
Payer: COMMERCIAL

## 2020-04-03 DIAGNOSIS — N18.9 CHRONIC RENAL FAILURE, UNSPECIFIED CKD STAGE: ICD-10-CM

## 2020-04-03 DIAGNOSIS — R53.83 OTHER FATIGUE: Primary | ICD-10-CM

## 2020-04-03 PROBLEM — N39.0 URINARY TRACT INFECTION WITHOUT HEMATURIA: Status: ACTIVE | Noted: 2020-04-03

## 2020-04-03 PROCEDURE — 99213 OFFICE O/P EST LOW 20 MIN: CPT | Performed by: FAMILY MEDICINE

## 2020-04-03 RX ORDER — TAMSULOSIN HYDROCHLORIDE 0.4 MG/1
0.4 CAPSULE ORAL EVERY 24 HOURS
Qty: 180 CAPSULE | Refills: 3 | Status: SHIPPED | OUTPATIENT
Start: 2020-04-03 | End: 2020-05-08 | Stop reason: SDUPTHER

## 2020-04-08 ENCOUNTER — TREATMENT (OUTPATIENT)
Dept: FAMILY MEDICINE CLINIC | Facility: CLINIC | Age: 65
End: 2020-04-08

## 2020-04-08 ENCOUNTER — TELEPHONE (OUTPATIENT)
Dept: FAMILY MEDICINE CLINIC | Facility: CLINIC | Age: 65
End: 2020-04-08

## 2020-04-08 DIAGNOSIS — N39.0 URINARY TRACT INFECTION WITHOUT HEMATURIA, SITE UNSPECIFIED: Primary | ICD-10-CM

## 2020-04-08 RX ORDER — SULFAMETHOXAZOLE AND TRIMETHOPRIM 800; 160 MG/1; MG/1
1 TABLET ORAL EVERY 12 HOURS SCHEDULED
Qty: 20 TABLET | Refills: 0 | Status: SHIPPED | OUTPATIENT
Start: 2020-04-08 | End: 2020-04-18

## 2020-04-20 ENCOUNTER — TELEPHONE (OUTPATIENT)
Dept: FAMILY MEDICINE CLINIC | Facility: CLINIC | Age: 65
End: 2020-04-20

## 2020-04-27 DIAGNOSIS — I10 ESSENTIAL HYPERTENSION: ICD-10-CM

## 2020-04-27 RX ORDER — LISINOPRIL 40 MG/1
TABLET ORAL
Qty: 90 TABLET | Refills: 3 | Status: SHIPPED | OUTPATIENT
Start: 2020-04-27 | End: 2021-05-12

## 2020-05-04 DIAGNOSIS — I10 ESSENTIAL HYPERTENSION: ICD-10-CM

## 2020-05-04 RX ORDER — FUROSEMIDE 20 MG/1
TABLET ORAL
Qty: 90 TABLET | Refills: 3 | Status: SHIPPED | OUTPATIENT
Start: 2020-05-04 | End: 2021-05-26

## 2020-05-08 ENCOUNTER — OFFICE VISIT (OUTPATIENT)
Dept: FAMILY MEDICINE CLINIC | Facility: CLINIC | Age: 65
End: 2020-05-08
Payer: COMMERCIAL

## 2020-05-08 VITALS
SYSTOLIC BLOOD PRESSURE: 140 MMHG | BODY MASS INDEX: 46.65 KG/M2 | WEIGHT: 315 LBS | HEIGHT: 69 IN | DIASTOLIC BLOOD PRESSURE: 70 MMHG

## 2020-05-08 DIAGNOSIS — N39.0 URINARY TRACT INFECTION WITHOUT HEMATURIA, SITE UNSPECIFIED: ICD-10-CM

## 2020-05-08 DIAGNOSIS — I10 ESSENTIAL HYPERTENSION: Primary | ICD-10-CM

## 2020-05-08 DIAGNOSIS — M10.9 GOUT, UNSPECIFIED CAUSE, UNSPECIFIED CHRONICITY, UNSPECIFIED SITE: ICD-10-CM

## 2020-05-08 DIAGNOSIS — N18.30 CHRONIC RENAL FAILURE, STAGE 3 (MODERATE) (HCC): ICD-10-CM

## 2020-05-08 DIAGNOSIS — I89.0 LYMPHEDEMA OF BOTH LOWER EXTREMITIES: ICD-10-CM

## 2020-05-08 DIAGNOSIS — C64.2 RENAL CELL CARCINOMA OF LEFT KIDNEY (HCC): Chronic | ICD-10-CM

## 2020-05-08 DIAGNOSIS — E66.01 CLASS 3 SEVERE OBESITY DUE TO EXCESS CALORIES WITH SERIOUS COMORBIDITY AND BODY MASS INDEX (BMI) OF 50.0 TO 59.9 IN ADULT (HCC): ICD-10-CM

## 2020-05-08 DIAGNOSIS — N20.0 KIDNEY STONES: ICD-10-CM

## 2020-05-08 DIAGNOSIS — N18.9 CHRONIC RENAL FAILURE, UNSPECIFIED CKD STAGE: ICD-10-CM

## 2020-05-08 DIAGNOSIS — Z12.11 ENCOUNTER FOR SCREENING COLONOSCOPY: ICD-10-CM

## 2020-05-08 PROCEDURE — 3008F BODY MASS INDEX DOCD: CPT | Performed by: FAMILY MEDICINE

## 2020-05-08 PROCEDURE — 99214 OFFICE O/P EST MOD 30 MIN: CPT | Performed by: FAMILY MEDICINE

## 2020-05-08 PROCEDURE — 1036F TOBACCO NON-USER: CPT | Performed by: FAMILY MEDICINE

## 2020-05-08 PROCEDURE — 3078F DIAST BP <80 MM HG: CPT | Performed by: FAMILY MEDICINE

## 2020-05-08 PROCEDURE — 3077F SYST BP >= 140 MM HG: CPT | Performed by: FAMILY MEDICINE

## 2020-05-08 RX ORDER — TAMSULOSIN HYDROCHLORIDE 0.4 MG/1
0.4 CAPSULE ORAL 2 TIMES DAILY
Qty: 180 CAPSULE | Refills: 3 | Status: SHIPPED | OUTPATIENT
Start: 2020-05-08 | End: 2021-06-02

## 2020-06-03 ENCOUNTER — DOCUMENTATION (OUTPATIENT)
Dept: FAMILY MEDICINE CLINIC | Facility: CLINIC | Age: 65
End: 2020-06-03

## 2020-11-09 ENCOUNTER — OFFICE VISIT (OUTPATIENT)
Dept: FAMILY MEDICINE CLINIC | Facility: CLINIC | Age: 65
End: 2020-11-09
Payer: COMMERCIAL

## 2020-11-09 VITALS
HEIGHT: 69 IN | BODY MASS INDEX: 46.65 KG/M2 | SYSTOLIC BLOOD PRESSURE: 150 MMHG | DIASTOLIC BLOOD PRESSURE: 78 MMHG | TEMPERATURE: 97.8 F | HEART RATE: 78 BPM | WEIGHT: 315 LBS | OXYGEN SATURATION: 95 %

## 2020-11-09 DIAGNOSIS — Z12.11 SCREEN FOR COLON CANCER: ICD-10-CM

## 2020-11-09 DIAGNOSIS — I10 ESSENTIAL HYPERTENSION: Primary | ICD-10-CM

## 2020-11-09 DIAGNOSIS — N20.0 KIDNEY STONES: ICD-10-CM

## 2020-11-09 DIAGNOSIS — N18.31 CHRONIC RENAL FAILURE, STAGE 3A (HCC): ICD-10-CM

## 2020-11-09 DIAGNOSIS — E66.01 MORBID OBESITY WITH BMI OF 50.0-59.9, ADULT (HCC): ICD-10-CM

## 2020-11-09 DIAGNOSIS — E78.6 HDL DEFICIENCY: ICD-10-CM

## 2020-11-09 DIAGNOSIS — M10.9 GOUT, UNSPECIFIED CAUSE, UNSPECIFIED CHRONICITY, UNSPECIFIED SITE: ICD-10-CM

## 2020-11-09 DIAGNOSIS — C64.2 RENAL CELL CARCINOMA OF LEFT KIDNEY (HCC): Chronic | ICD-10-CM

## 2020-11-09 DIAGNOSIS — E66.01 CLASS 3 SEVERE OBESITY DUE TO EXCESS CALORIES WITH SERIOUS COMORBIDITY AND BODY MASS INDEX (BMI) OF 50.0 TO 59.9 IN ADULT (HCC): ICD-10-CM

## 2020-11-09 DIAGNOSIS — G47.33 OSA (OBSTRUCTIVE SLEEP APNEA): ICD-10-CM

## 2020-11-09 DIAGNOSIS — I89.0 LYMPHEDEMA OF BOTH LOWER EXTREMITIES: ICD-10-CM

## 2020-11-09 PROBLEM — N39.0 URINARY TRACT INFECTION WITHOUT HEMATURIA: Status: RESOLVED | Noted: 2020-04-03 | Resolved: 2020-11-09

## 2020-11-09 PROCEDURE — 99214 OFFICE O/P EST MOD 30 MIN: CPT | Performed by: FAMILY MEDICINE

## 2021-03-09 DIAGNOSIS — I10 ESSENTIAL HYPERTENSION: ICD-10-CM

## 2021-03-09 RX ORDER — METOPROLOL SUCCINATE 100 MG/1
TABLET, EXTENDED RELEASE ORAL
Qty: 90 TABLET | Refills: 3 | Status: SHIPPED | OUTPATIENT
Start: 2021-03-09 | End: 2022-04-21 | Stop reason: SDUPTHER

## 2021-05-07 ENCOUNTER — TELEPHONE (OUTPATIENT)
Dept: FAMILY MEDICINE CLINIC | Facility: CLINIC | Age: 66
End: 2021-05-07

## 2021-05-11 ENCOUNTER — OFFICE VISIT (OUTPATIENT)
Dept: FAMILY MEDICINE CLINIC | Facility: CLINIC | Age: 66
End: 2021-05-11
Payer: MEDICARE

## 2021-05-11 VITALS
OXYGEN SATURATION: 96 % | WEIGHT: 315 LBS | BODY MASS INDEX: 46.65 KG/M2 | SYSTOLIC BLOOD PRESSURE: 142 MMHG | HEART RATE: 62 BPM | DIASTOLIC BLOOD PRESSURE: 72 MMHG | TEMPERATURE: 97.8 F | HEIGHT: 69 IN

## 2021-05-11 DIAGNOSIS — I10 ESSENTIAL HYPERTENSION: ICD-10-CM

## 2021-05-11 DIAGNOSIS — Z23 ENCOUNTER FOR IMMUNIZATION: ICD-10-CM

## 2021-05-11 DIAGNOSIS — E66.01 MORBID OBESITY WITH BMI OF 60.0-69.9, ADULT (HCC): ICD-10-CM

## 2021-05-11 DIAGNOSIS — C64.2 RENAL CELL CARCINOMA OF LEFT KIDNEY (HCC): Primary | Chronic | ICD-10-CM

## 2021-05-11 DIAGNOSIS — N18.31 CHRONIC RENAL FAILURE, STAGE 3A (HCC): ICD-10-CM

## 2021-05-11 DIAGNOSIS — G47.33 OSA (OBSTRUCTIVE SLEEP APNEA): ICD-10-CM

## 2021-05-11 DIAGNOSIS — J30.1 ALLERGIC RHINITIS DUE TO POLLEN, UNSPECIFIED SEASONALITY: ICD-10-CM

## 2021-05-11 DIAGNOSIS — E78.6 HDL DEFICIENCY: ICD-10-CM

## 2021-05-11 DIAGNOSIS — N20.0 KIDNEY STONES: ICD-10-CM

## 2021-05-11 DIAGNOSIS — E83.10 DISORDER OF IRON METABOLISM, UNSPECIFIED: ICD-10-CM

## 2021-05-11 DIAGNOSIS — M10.9 GOUT, UNSPECIFIED CAUSE, UNSPECIFIED CHRONICITY, UNSPECIFIED SITE: ICD-10-CM

## 2021-05-11 DIAGNOSIS — E66.01 CLASS 3 SEVERE OBESITY DUE TO EXCESS CALORIES WITH SERIOUS COMORBIDITY AND BODY MASS INDEX (BMI) OF 50.0 TO 59.9 IN ADULT (HCC): ICD-10-CM

## 2021-05-11 PROBLEM — Z00.00 WELCOME TO MEDICARE PREVENTIVE VISIT: Status: ACTIVE | Noted: 2021-05-11

## 2021-05-11 LAB
CHOLEST SERPL-MCNC: 135 MG/DL (ref 50–200)
EST. AVERAGE GLUCOSE BLD GHB EST-MCNC: 114 MG/DL
HBA1C MFR BLD: 5.6 %
HDLC SERPL-MCNC: 31 MG/DL
LDLC SERPL CALC-MCNC: 62 MG/DL (ref 0–100)
NONHDLC SERPL-MCNC: 104 MG/DL
TRIGL SERPL-MCNC: 208 MG/DL

## 2021-05-11 PROCEDURE — 86803 HEPATITIS C AB TEST: CPT | Performed by: FAMILY MEDICINE

## 2021-05-11 PROCEDURE — 36415 COLL VENOUS BLD VENIPUNCTURE: CPT | Performed by: FAMILY MEDICINE

## 2021-05-11 PROCEDURE — G0403 EKG FOR INITIAL PREVENT EXAM: HCPCS | Performed by: FAMILY MEDICINE

## 2021-05-11 PROCEDURE — 99214 OFFICE O/P EST MOD 30 MIN: CPT | Performed by: FAMILY MEDICINE

## 2021-05-11 PROCEDURE — 90670 PCV13 VACCINE IM: CPT

## 2021-05-11 PROCEDURE — G0402 INITIAL PREVENTIVE EXAM: HCPCS | Performed by: FAMILY MEDICINE

## 2021-05-11 PROCEDURE — 1123F ACP DISCUSS/DSCN MKR DOCD: CPT | Performed by: FAMILY MEDICINE

## 2021-05-11 PROCEDURE — 83036 HEMOGLOBIN GLYCOSYLATED A1C: CPT | Performed by: FAMILY MEDICINE

## 2021-05-11 PROCEDURE — 80061 LIPID PANEL: CPT | Performed by: FAMILY MEDICINE

## 2021-05-11 PROCEDURE — G0009 ADMIN PNEUMOCOCCAL VACCINE: HCPCS

## 2021-05-11 NOTE — PROGRESS NOTES
Assessment/Plan:    No problem-specific Assessment & Plan notes found for this encounter  Diagnoses and all orders for this visit:    Renal cell carcinoma of left kidney (HCC)    Essential hypertension    DELIA (obstructive sleep apnea)    Kidney stones    Chronic renal failure, stage 3a (HCC)    Gout, unspecified cause, unspecified chronicity, unspecified site    HDL deficiency    Class 3 severe obesity due to excess calories with serious comorbidity and body mass index (BMI) of 50 0 to 59 9 in adult [facility])    Morbid obesity with BMI of 60 0-69 9, adult ([facility] )    Allergic rhinitis due to pollen, unspecified seasonality          Subjective:      Patient ID: Alexy Sharma is a 73 y o  male  PATIENT RETURNS FOR FOLLOW-UP OF CHRONIC MEDICAL CONDITIONS  NO HOSPITAL STAYS OR EMERGENCY VISITS RECENTLY  MEDS WERE REVIEWED AND NO SIDE EFFECTS  NO NEW ISSUES  UNLESS NOTED BELOW  NO NEW MEDICAL PROVIDER REPORTED  THE CHRONIC DISEASES LISTED ABOVE ARE STABLE AND UNCHANGED/ THE PLAN OF CARE FOR THOSE WILL REMAIN UNCHANGED UNLESS NOTED BELOW  AWV/ Welcome       The following portions of the patient's history were reviewed and updated as appropriate: allergies, current medications, past family history, past medical history, past social history, past surgical history and problem list     Review of Systems   Constitutional: Negative for activity change and appetite change  HENT: Negative for trouble swallowing  Eyes: Negative for visual disturbance  Respiratory: Negative for cough and shortness of breath  Cardiovascular: Positive for leg swelling  Negative for chest pain and palpitations  Gastrointestinal: Negative for abdominal pain and blood in stool  Endocrine: Negative for polyuria  Genitourinary: Negative for difficulty urinating and hematuria  Skin: Negative for rash  Neurological: Negative for dizziness  Psychiatric/Behavioral: Negative for behavioral problems  Objective:  Vitals:    05/11/21 0928   BP: 142/72   BP Location: Left arm   Patient Position: Sitting   Cuff Size: Large   Pulse: 62   Temp: 97 8 °F (36 6 °C)   TempSrc: Temporal   SpO2: 96%   Weight: (!) 187 kg (411 lb 9 6 oz)   Height: 5' 9" (1 753 m)      Physical Exam  Constitutional:       Appearance: He is well-developed  HENT:      Head: Normocephalic and atraumatic  Eyes:      Conjunctiva/sclera: Conjunctivae normal    Neck:      Musculoskeletal: Neck supple  Thyroid: No thyromegaly  Cardiovascular:      Rate and Rhythm: Normal rate and regular rhythm  Heart sounds: Normal heart sounds  No murmur  Pulmonary:      Effort: Pulmonary effort is normal  No respiratory distress  Breath sounds: Normal breath sounds  Musculoskeletal:      Right lower leg: Edema present  Left lower leg: Edema present  Lymphadenopathy:      Cervical: No cervical adenopathy  Skin:     General: Skin is warm and dry  Psychiatric:         Behavior: Behavior normal            Patient's chronic problems that were reviewed today are stable  Recent hospital stays reviewed  Recent labs and imaging reviewed  Recent visits to other providers reviewed  Meds reviewed and no changes made  Appropriate labs and imaging were ordered  Preventive measures appropriate for age and sex were reviewed with patient  Immunizations were updated as appropriate

## 2021-05-11 NOTE — PATIENT INSTRUCTIONS
Medicare Preventive Visit Patient Instructions  Thank you for completing your Welcome to Medicare Visit or Medicare Annual Wellness Visit today  Your next wellness visit will be due in one year (5/12/2022)  The screening/preventive services that you may require over the next 5-10 years are detailed below  Some tests may not apply to you based off risk factors and/or age  Screening tests ordered at today's visit but not completed yet may show as past due  Also, please note that scanned in results may not display below  Preventive Screenings:  Service Recommendations Previous Testing/Comments   Colorectal Cancer Screening  · Colonoscopy    · Fecal Occult Blood Test (FOBT)/Fecal Immunochemical Test (FIT)  · Fecal DNA/Cologuard Test  · Flexible Sigmoidoscopy Age: 54-65 years old   Colonoscopy: every 10 years (May be performed more frequently if at higher risk)  OR  FOBT/FIT: every 1 year  OR  Cologuard: every 3 years  OR  Sigmoidoscopy: every 5 years  Screening may be recommended earlier than age 48 if at higher risk for colorectal cancer  Also, an individualized decision between you and your healthcare provider will decide whether screening between the ages of 74-80 would be appropriate   Colonoscopy: Not on file  FOBT/FIT: Not on file  Cologuard: Not on file  Sigmoidoscopy: Not on file          Prostate Cancer Screening Individualized decision between patient and health care provider in men between ages of 53-78   Medicare will cover every 12 months beginning on the day after your 50th birthday PSA: No results in last 5 years           Hepatitis C Screening Once for adults born between [facility]  More frequently in patients at high risk for Hepatitis C Hep C Antibody: Not on file        Diabetes Screening 1-2 times per year if you're at risk for diabetes or have pre-diabetes Fasting glucose: No results in last 5 years   A1C: No results in last 5 years        Cholesterol Screening Once every 5 years if you don't have a lipid disorder  May order more often based on risk factors  Lipid panel: Not on file           Other Preventive Screenings Covered by Medicare:  1  Abdominal Aortic Aneurysm (AAA) Screening: covered once if your at risk  You're considered to be at risk if you have a family history of AAA or a male between the age of 73-68 who smoking at least 100 cigarettes in your lifetime  2  Lung Cancer Screening: covers low dose CT scan once per year if you meet all of the following conditions: (1) Age 50-69; (2) No signs or symptoms of lung cancer; (3) Current smoker or have quit smoking within the last 15 years; (4) You have a tobacco smoking history of at least 30 pack years (packs per day x number of years you smoked); (5) You get a written order from a healthcare provider  3  Glaucoma Screening: covered annually if you're considered high risk: (1) You have diabetes OR (2) Family history of glaucoma OR (3)  aged 48 and older OR (3)  American aged 72 and older  3  Osteoporosis Screening: covered every 2 years if you meet one of the following conditions: (1) Have a vertebral abnormality; (2) On glucocorticoid therapy for more than 3 months; (3) Have primary hyperparathyroidism; (4) On osteoporosis medications and need to assess response to drug therapy  5  HIV Screening: covered annually if you're between the age of 12-76  Also covered annually if you are younger than 13 and older than 72 with risk factors for HIV infection  For pregnant patients, it is covered up to 3 times per pregnancy      Immunizations:  Immunization Recommendations   Influenza Vaccine Annual influenza vaccination during flu season is recommended for all persons aged >= 6 months who do not have contraindications   Pneumococcal Vaccine (Prevnar and Pneumovax)  * Prevnar = PCV13  * Pneumovax = PPSV23 Adults 25-60 years old: 1-3 doses may be recommended based on certain risk factors  Adults 72 years old: Prevnar (PCV13) vaccine recommended followed by Pneumovax (PPSV23) vaccine  If already received PPSV23 since turning 65, then PCV13 recommended at least one year after PPSV23 dose  Hepatitis B Vaccine 3 dose series if at intermediate or high risk (ex: diabetes, end stage renal disease, liver disease)   Tetanus (Td) Vaccine - COST NOT COVERED BY MEDICARE PART B Following completion of primary series, a booster dose should be given every 10 years to maintain immunity against tetanus  Td may also be given as tetanus wound prophylaxis  Tdap Vaccine - COST NOT COVERED BY MEDICARE PART B Recommended at least once for all adults  For pregnant patients, recommended with each pregnancy  Shingles Vaccine (Shingrix) - COST NOT COVERED BY MEDICARE PART B  2 shot series recommended in those aged 48 and above     Health Maintenance Due:      Topic Date Due    Hepatitis C Screening  Never done    HIV Screening  Never done    Colorectal Cancer Screening  Never done     Immunizations Due:      Topic Date Due    COVID-19 Vaccine (1) Never done    DTaP,Tdap,and Td Vaccines (1 - Tdap) Never done    Pneumococcal Vaccine: 65+ Years (1 of 1 - PPSV23) Never done     Advance Directives   What are advance directives? Advance directives are legal documents that state your wishes and plans for medical care  These plans are made ahead of time in case you lose your ability to make decisions for yourself  Advance directives can apply to any medical decision, such as the treatments you want, and if you want to donate organs  What are the types of advance directives? There are many types of advance directives, and each state has rules about how to use them  You may choose a combination of any of the following:  · Living will: This is a written record of the treatment you want  You can also choose which treatments you do not want, which to limit, and which to stop at a certain time  This includes surgery, medicine, IV fluid, and tube feedings  · Durable power of  for healthcare [facility]): This is a written record that states who you want to make healthcare choices for you when you are unable to make them for yourself  This person, called a proxy, is usually a family member or a friend  You may choose more than 1 proxy  · Do not resuscitate (DNR) order:  A DNR order is used in case your heart stops beating or you stop breathing  It is a request not to have certain forms of treatment, such as CPR  A DNR order may be included in other types of advance directives  · Medical directive: This covers the care that you want if you are in a coma, near death, or unable to make decisions for yourself  You can list the treatments you want for each condition  Treatment may include pain medicine, surgery, blood transfusions, dialysis, IV or tube feedings, and a ventilator (breathing machine)  · Values history: This document has questions about your views, beliefs, and how you feel and think about life  This information can help others choose the care that you would choose  Why are advance directives important? An advance directive helps you control your care  Although spoken wishes may be used, it is better to have your wishes written down  Spoken wishes can be misunderstood, or not followed  Treatments may be given even if you do not want them  An advance directive may make it easier for your family to make difficult choices about your care  Weight Management   Why it is important to manage your weight:  Being overweight increases your risk of health conditions such as heart disease, high blood pressure, type 2 diabetes, and certain types of cancer  It can also increase your risk for osteoarthritis, sleep apnea, and other respiratory problems  Aim for a slow, steady weight loss  Even a small amount of weight loss can lower your risk of health problems    How to lose weight safely:  A safe and healthy way to lose weight is to eat fewer calories and get regular exercise  You can lose up about 1 pound a week by decreasing the number of calories you eat by 500 calories each day  Healthy meal plan for weight management:  A healthy meal plan includes a variety of foods, contains fewer calories, and helps you stay healthy  A healthy meal plan includes the following:  · Eat whole-grain foods more often  A healthy meal plan should contain fiber  Fiber is the part of grains, fruits, and vegetables that is not broken down by your body  Whole-grain foods are healthy and provide extra fiber in your diet  Some examples of whole-grain foods are whole-wheat breads and pastas, oatmeal, brown rice, and bulgur  · Eat a variety of vegetables every day  Include dark, leafy greens such as spinach, kale, kavya greens, and mustard greens  Eat yellow and orange vegetables such as carrots, sweet potatoes, and winter squash  · Eat a variety of fruits every day  Choose fresh or canned fruit (canned in its own juice or light syrup) instead of juice  Fruit juice has very little or no fiber  · Eat low-fat dairy foods  Drink fat-free (skim) milk or 1% milk  Eat fat-free yogurt and low-fat cottage cheese  Try low-fat cheeses such as mozzarella and other reduced-fat cheeses  · Choose meat and other protein foods that are low in fat  Choose beans or other legumes such as split peas or lentils  Choose fish, skinless poultry (chicken or turkey), or lean cuts of red meat (beef or pork)  Before you cook meat or poultry, cut off any visible fat  · Use less fat and oil  Try baking foods instead of frying them  Add less fat, such as margarine, sour cream, regular salad dressing and mayonnaise to foods  Eat fewer high-fat foods  Some examples of high-fat foods include french fries, doughnuts, ice cream, and cakes  · Eat fewer sweets  Limit foods and drinks that are high in sugar  This includes candy, cookies, regular soda, and sweetened drinks    Exercise:  Exercise at least 30 minutes per day on most days of the week  Some examples of exercise include walking, biking, dancing, and swimming  You can also fit in more physical activity by taking the stairs instead of the elevator or parking farther away from stores  Ask your healthcare provider about the best exercise plan for you  © Copyright [address] 2018 Information is for End User's use only and may not be sold, redistributed or otherwise used for commercial purposes  All illustrations and images included in CareNotes® are the copyrighted property of [employer]  or [facility] [place]     Call radiologist about CT scan for kidney cancer  Check with colonoscopy doctor if it is time   Pneumococcal 13-Valent Vaccine, Diphtheria Conjugate (By injection)   Pneumococcal 13-Valent Vaccine, Diphtheria Conjugate ([id number]-vgh-RGQ-al 13-VAY-lent VAX-een, dif-THEER-ee-a ELI-[id number]-hyln)  Prevents infections, such as pneumonia and meningitis  Brand Name(s): Prevnar 13   There may be other brand names for this medicine  When This Medicine Should Not Be Used: This vaccine is not right for everyone  You should not receive it if you had an allergic reaction to pneumococcal or diphtheria vaccine  How to Use This Medicine:   Injectable  · A nurse or other health provider will give you this medicine  This vaccine is usually given as a shot into a muscle in the thigh or upper arm  · The vaccine schedule is different for different people  ? Tell your doctor if your child was born prematurely  Children who were premature may need to follow a different schedule  ? Children younger than 10years of age: This vaccine is usually given as 3 or 4 separate shots over several months  Your child's doctor will tell you how many shots are needed and when to come back for the next one   ? Children older than 10years of age: This vaccine is given as a single shot   If your child recently received another pneumonia vaccine, this one should be given at least 8 weeks later  ? Adults older than 25years of age: This vaccine is given as a single dose  · It is very important for your child to receive all of the shots for the vaccine  · Missed dose: This vaccine must be given on a fixed schedule  If your child misses a dose, call your child's doctor for another appointment  Drugs and Foods to Avoid:   Ask your doctor or pharmacist before using any other medicine, including over-the-counter medicines, vitamins, and herbal products  · Some foods and medicines can affect how this vaccine works  Tell your doctor if you are receiving a treatment or medicine that causes a weak immune system  This includes radiation treatment, steroid medicine (including hydrocortisone, methylprednisolone, prednisolone, prednisone), or cancer medicine  Warnings While Using This Medicine:   · Tell your doctor if you are pregnant or breastfeeding  · Tell your doctor if you have a weak immune system  You may not be fully protected by this vaccine  Possible Side Effects While Using This Medicine:   Call your doctor right away if you notice any of these side effects:  · Allergic reaction: Itching or hives, swelling in your face or hands, swelling or tingling in your mouth or throat, chest tightness, trouble breathing  · High fever  If you notice these less serious side effects, talk with your doctor:   · Crying, irritability, or fussiness  · Joint or muscle pain  · Mild skin rash  · Pain, burning, redness, or swelling where the shot was given  · Poor appetite  · Sleep changes  If you notice other side effects that you think are caused by this medicine, tell your doctor  Call your doctor for medical advice about side effects  You may report side effects to FDA at 8-090-FDA-7482  © Copyright [employer] 2021 Information is for End User's use only and may not be sold, redistributed or otherwise used for commercial purposes  The above information is an  only   It is not intended as medical advice for individual conditions or treatments  Talk to your doctor, nurse or pharmacist before following any medical regimen to see if it is safe and effective for you

## 2021-05-11 NOTE — PROGRESS NOTES
Assessment and Plan:     Problem List Items Addressed This Visit        Respiratory    DELIA (obstructive sleep apnea)    Allergic rhinitis       Cardiovascular and Mediastinum    Essential hypertension       Genitourinary    Renal cell carcinoma of left kidney (HCC) - Primary (Chronic)    Kidney stones    Chronic renal failure, stage 3 (moderate) (HCC)       Other    HDL deficiency    Gout    Class 3 severe obesity due to excess calories with serious comorbidity and body mass index (BMI) of 50 0 to 59 9 in [facility])      Other Visit Diagnoses     Morbid obesity with BMI of 60 0-69 9, [facility])               Preventive health issues were discussed with patient, and age appropriate screening tests were ordered as noted in patient's After Visit Summary  Personalized health advice and appropriate referrals for health education or preventive services given if needed, as noted in patient's After Visit Summary  History of Present Illness:     Patient presents for Welcome to Medicare visit       Patient Care Team:  Kecia Smith MD as PCP - General (Family Medicine)     Review of Systems:     Review of Systems   Problem List:     Patient Active Problem List   Diagnosis    Essential hypertension    Kidney stones    Chronic renal failure, stage 3 (moderate) (HCC)    HDL deficiency    Gout    DELIA (obstructive sleep apnea)    Allergic rhinitis    Renal cell carcinoma of left kidney (HCC)    Right foot pain    Class 3 severe obesity due to excess calories with serious comorbidity and body mass index (BMI) of 50 0 to 59 9 in [facility])    Lymphedema of both lower extremities    Welcome to Medicare preventive visit      Past Medical and Surgical History:     Past Medical History:   Diagnosis Date    Chronic kidney disease     Hypertension     Obesity     Urinary tract infection without hematuria 4/3/2020     Past Surgical History:   Procedure Laterality Date    APPENDECTOMY      NASAL SEPTUM SURGERY Family History:     Family History   Problem Relation Age of Onset    Diabetes Father     Prostate cancer Brother       Social History:     E-Cigarette/Vaping    E-Cigarette Use Never User      E-Cigarette/Vaping Substances    Nicotine No     THC No     CBD No     Flavoring No     Other No     Unknown No      Social History     Socioeconomic History    Marital status: /Civil Union     Spouse name: None    Number of children: None    Years of education: None    Highest education level: None   Occupational History    None   Social Needs    Financial resource strain: None    Food insecurity     Worry: None     Inability: None    Transportation needs     Medical: None     Non-medical: None   Tobacco Use    Smoking status: Never Smoker    Smokeless tobacco: Never Used    Tobacco comment: no exposure to passive smoke   Substance and Sexual Activity    Alcohol use: No    Drug use: Not Currently    Sexual activity: None   Lifestyle    Physical activity     Days per week: None     Minutes per session: None    Stress: None   Relationships    Social connections     Talks on phone: None     Gets together: None     Attends Yarsanism service: None     Active member of club or organization: None     Attends meetings of clubs or organizations: None     Relationship status: None    Intimate partner violence     Fear of current or ex partner: None     Emotionally abused: None     Physically abused: None     Forced sexual activity: None   Other Topics Concern    None   Social History Narrative    None      Medications and Allergies:     Current Outpatient Medications   Medication Sig Dispense Refill    allopurinol (ZYLOPRIM) 100 mg tablet Take 100 mg by mouth daily       furosemide (LASIX) 20 mg tablet TAKE 1 TABLET DAILY AS NEEDED FOR SWELLING 90 tablet 3    lisinopril (ZESTRIL) 40 mg tablet TAKE 1 TABLET DAILY 90 tablet 3    metoprolol succinate (TOPROL-XL) 100 mg 24 hr tablet TAKE 1 TABLET DAILY 90 tablet 3    Potassium Citrate ER 15 MEQ (1620 MG) TBCR Take 1 tablet by mouth daily       tamsulosin (Flomax) 0 4 mg Take 1 capsule (0 4 mg total) by mouth 2 (two) times a day 180 capsule 3    TRAVATAN Z 0 004 % ophthalmic solution 0 004 drops once       No current facility-administered medications for this visit  Allergies   Allergen Reactions    Codeine Swelling     Other reaction(s): SWELLING OF FACE  Other reaction(s): SWELLING OF FACE  Other reaction(s): SWELLING OF FACE      Immunizations:     Immunization History   Administered Date(s) Administered    INFLUENZA 12/10/2015, 12/10/2015, 11/29/2016, 11/29/2016, 11/30/2017, 11/30/2017, 12/27/2018, 12/30/2019, 10/26/2020    Influenza Split 11/08/2010, 11/14/2011, 12/11/2012, 12/10/2013    Influenza, injectable, quadrivalent, preservative free 0 5 mL 12/27/2018    Influenza, recombinant, quadrivalent,injectable, preservative free 12/30/2019    Influenza, seasonal, injectable 12/10/2014    SARS-CoV-2 / COVID-19 mRNA IM (Alverna Seo) 02/03/2021, 02/28/2021      Health Maintenance:         Topic Date Due    Hepatitis C Screening  Never done    HIV Screening  Never done    Colorectal Cancer Screening  Never done         Topic Date Due    DTaP,Tdap,and Td Vaccines (1 - Tdap) Never done    Pneumococcal Vaccine: 65+ Years (1 of 1 - PPSV23) Never done      Medicare Screening Tests and Risk Assessments:     Xiomy Buckley is here for his Welcome to Medicare visit       Depression Screening:   PHQ-2 Score: 0      Previous Hospitalizations:   Any hospitalizations or ED visits within the last 12 months?: No      Advance Care Planning:   Living will: No    Durable POA for healthcare: No    Advanced directive: No      Cognitive Screening:   Provider or family/friend/caregiver concerned regarding cognition?: No    PREVENTIVE SCREENINGS      Cardiovascular Screening:    General: Risks and Benefits Discussed      Diabetes Screening:     General: Risks and Benefits Discussed      Colorectal Cancer Screening:     General: Risks and Benefits Discussed      Prostate Cancer Screening:    General: Screening Not Indicated      Osteoporosis Screening:    General: Screening Not Indicated      Abdominal Aortic Aneurysm (AAA) Screening:    Risk factors include: age between 73-69 yo        General: Screening Not Indicated      Lung Cancer Screening:     General: Screening Not Indicated      Hepatitis C Screening:    General: Risks and Benefits Discussed    No exam data present     Physical Exam:     /72 (BP Location: Left arm, Patient Position: Sitting, Cuff Size: Large)   Pulse 62   Temp 97 8 °F (36 6 °C) (Temporal)   Ht 5' 9" (1 753 m)   Wt (!) 187 kg (411 lb 9 6 oz)   SpO2 96%   BMI 60 78 kg/m²     Physical Exam     Ashtyn Henry MD

## 2021-05-12 DIAGNOSIS — I10 ESSENTIAL HYPERTENSION: ICD-10-CM

## 2021-05-12 LAB — HCV AB SER QL: NORMAL

## 2021-05-12 RX ORDER — LISINOPRIL 40 MG/1
TABLET ORAL
Qty: 90 TABLET | Refills: 0 | Status: SHIPPED | OUTPATIENT
Start: 2021-05-12 | End: 2021-07-19 | Stop reason: HOSPADM

## 2021-05-26 DIAGNOSIS — I10 ESSENTIAL HYPERTENSION: ICD-10-CM

## 2021-05-26 RX ORDER — FUROSEMIDE 20 MG/1
TABLET ORAL
Qty: 90 TABLET | Refills: 3 | Status: SHIPPED | OUTPATIENT
Start: 2021-05-26 | End: 2022-07-12 | Stop reason: SDUPTHER

## 2021-06-01 DIAGNOSIS — N18.9 CHRONIC RENAL FAILURE, UNSPECIFIED CKD STAGE: ICD-10-CM

## 2021-06-02 RX ORDER — TAMSULOSIN HYDROCHLORIDE 0.4 MG/1
CAPSULE ORAL
Qty: 180 CAPSULE | Refills: 3 | Status: SHIPPED | OUTPATIENT
Start: 2021-06-02 | End: 2022-07-27

## 2021-07-12 ENCOUNTER — APPOINTMENT (INPATIENT)
Dept: CT IMAGING | Facility: HOSPITAL | Age: 66
DRG: 871 | End: 2021-07-12
Payer: MEDICARE

## 2021-07-12 ENCOUNTER — HOSPITAL ENCOUNTER (INPATIENT)
Facility: HOSPITAL | Age: 66
LOS: 7 days | Discharge: HOME | DRG: 871 | End: 2021-07-19
Attending: EMERGENCY MEDICINE | Admitting: INTERNAL MEDICINE
Payer: MEDICARE

## 2021-07-12 ENCOUNTER — APPOINTMENT (EMERGENCY)
Dept: RADIOLOGY | Facility: HOSPITAL | Age: 66
DRG: 871 | End: 2021-07-12
Payer: MEDICARE

## 2021-07-12 DIAGNOSIS — A41.9 SIRS DUE TO INFECTIOUS PROCESS WITH ORGAN DYSFUNCTION (HCC): ICD-10-CM

## 2021-07-12 DIAGNOSIS — L03.90 CELLULITIS: ICD-10-CM

## 2021-07-12 DIAGNOSIS — I89.0 LYMPHEDEMA OF BOTH LOWER EXTREMITIES: ICD-10-CM

## 2021-07-12 DIAGNOSIS — I10 HTN (HYPERTENSION): ICD-10-CM

## 2021-07-12 DIAGNOSIS — A41.9 SEPSIS (HCC): ICD-10-CM

## 2021-07-12 DIAGNOSIS — N17.9 ACUTE KIDNEY INJURY SUPERIMPOSED ON CHRONIC KIDNEY DISEASE (HCC): ICD-10-CM

## 2021-07-12 DIAGNOSIS — R53.1 WEAKNESS: Primary | ICD-10-CM

## 2021-07-12 DIAGNOSIS — N18.9 ACUTE KIDNEY INJURY SUPERIMPOSED ON CHRONIC KIDNEY DISEASE (HCC): ICD-10-CM

## 2021-07-12 DIAGNOSIS — R65.20 SIRS DUE TO INFECTIOUS PROCESS WITH ORGAN DYSFUNCTION (HCC): ICD-10-CM

## 2021-07-12 DIAGNOSIS — G47.00 INSOMNIA: ICD-10-CM

## 2021-07-12 DIAGNOSIS — N17.9 ACUTE KIDNEY INJURY (HCC): ICD-10-CM

## 2021-07-12 PROBLEM — C64.2 RENAL CELL CARCINOMA OF LEFT KIDNEY (HCC): Status: ACTIVE | Noted: 2018-06-25

## 2021-07-12 LAB
ALBUMIN SERPL BCP-MCNC: 3.7 G/DL (ref 3.5–5)
ALP SERPL-CCNC: 70 U/L (ref 46–116)
ALT SERPL W P-5'-P-CCNC: 36 U/L (ref 12–78)
ANION GAP SERPL CALCULATED.3IONS-SCNC: 9 MMOL/L (ref 4–13)
APTT PPP: 29 SECONDS (ref 23–37)
AST SERPL W P-5'-P-CCNC: 24 U/L (ref 5–45)
ATRIAL RATE: 106 BPM
BACTERIA UR QL AUTO: ABNORMAL /HPF
BASE EX.OXY STD BLDV CALC-SCNC: 70.6 % (ref 60–80)
BASE EXCESS BLDV CALC-SCNC: 0.6 MMOL/L
BASOPHILS # BLD MANUAL: 0 THOUSAND/UL (ref 0–0.1)
BASOPHILS NFR MAR MANUAL: 0 % (ref 0–1)
BILIRUB DIRECT SERPL-MCNC: 0.37 MG/DL (ref 0–0.2)
BILIRUB SERPL-MCNC: 1.96 MG/DL (ref 0.2–1)
BILIRUB UR QL STRIP: NEGATIVE
BUN SERPL-MCNC: 23 MG/DL (ref 5–25)
CALCIUM SERPL-MCNC: 8.9 MG/DL (ref 8.3–10.1)
CHLORIDE SERPL-SCNC: 100 MMOL/L (ref 100–108)
CLARITY UR: CLEAR
CO2 SERPL-SCNC: 29 MMOL/L (ref 21–32)
COLOR UR: YELLOW
CREAT SERPL-MCNC: 2.16 MG/DL (ref 0.6–1.3)
EOSINOPHIL # BLD MANUAL: 0.22 THOUSAND/UL (ref 0–0.4)
EOSINOPHIL NFR BLD MANUAL: 1 % (ref 0–6)
ERYTHROCYTE [DISTWIDTH] IN BLOOD BY AUTOMATED COUNT: 13.2 % (ref 11.6–15.1)
GFR SERPL CREATININE-BSD FRML MDRD: 31 ML/MIN/1.73SQ M
GLUCOSE SERPL-MCNC: 110 MG/DL (ref 65–140)
GLUCOSE UR STRIP-MCNC: NEGATIVE MG/DL
HCO3 BLDV-SCNC: 25.7 MMOL/L (ref 24–30)
HCT VFR BLD AUTO: 44.5 % (ref 36.5–49.3)
HGB BLD-MCNC: 14.9 G/DL (ref 12–17)
HGB UR QL STRIP.AUTO: ABNORMAL
INR PPP: 1.36 (ref 0.84–1.19)
KETONES UR STRIP-MCNC: NEGATIVE MG/DL
LACTATE SERPL-SCNC: 2 MMOL/L (ref 0.5–2)
LEUKOCYTE ESTERASE UR QL STRIP: NEGATIVE
LYMPHOCYTES # BLD AUTO: 1.08 THOUSAND/UL (ref 0.6–4.47)
LYMPHOCYTES # BLD AUTO: 5 % (ref 14–44)
MAGNESIUM SERPL-MCNC: 1.5 MG/DL (ref 1.6–2.6)
MCH RBC QN AUTO: 31.8 PG (ref 26.8–34.3)
MCHC RBC AUTO-ENTMCNC: 33.5 G/DL (ref 31.4–37.4)
MCV RBC AUTO: 95 FL (ref 82–98)
METAMYELOCYTES NFR BLD MANUAL: 1 % (ref 0–1)
MONOCYTES # BLD AUTO: 0.65 THOUSAND/UL (ref 0–1.22)
MONOCYTES NFR BLD: 3 % (ref 4–12)
NEUTROPHILS # BLD MANUAL: 19.39 THOUSAND/UL (ref 1.85–7.62)
NEUTS BAND NFR BLD MANUAL: 14 % (ref 0–8)
NEUTS SEG NFR BLD AUTO: 76 % (ref 43–75)
NITRITE UR QL STRIP: NEGATIVE
NON-SQ EPI CELLS URNS QL MICRO: ABNORMAL /HPF
NRBC BLD AUTO-RTO: 0 /100 WBCS
NT-PROBNP SERPL-MCNC: 761 PG/ML
O2 CT BLDV-SCNC: 14.3 ML/DL
P AXIS: 66 DEGREES
PCO2 BLDV: 42.9 MM HG (ref 42–50)
PH BLDV: 7.39 [PH] (ref 7.3–7.4)
PH UR STRIP.AUTO: 7.5 [PH] (ref 4.5–8)
PLATELET # BLD AUTO: 210 THOUSANDS/UL (ref 149–390)
PLATELET BLD QL SMEAR: ADEQUATE
PMV BLD AUTO: 9.1 FL (ref 8.9–12.7)
PO2 BLDV: 35.4 MM HG (ref 35–45)
POTASSIUM SERPL-SCNC: 4.7 MMOL/L (ref 3.5–5.3)
PR INTERVAL: 138 MS
PROCALCITONIN SERPL-MCNC: 7.88 NG/ML
PROT SERPL-MCNC: 8.1 G/DL (ref 6.4–8.2)
PROT UR STRIP-MCNC: ABNORMAL MG/DL
PROTHROMBIN TIME: 16.5 SECONDS (ref 11.6–14.5)
QRS AXIS: -18 DEGREES
QRSD INTERVAL: 74 MS
QT INTERVAL: 304 MS
QTC INTERVAL: 403 MS
RBC # BLD AUTO: 4.68 MILLION/UL (ref 3.88–5.62)
RBC #/AREA URNS AUTO: ABNORMAL /HPF
RBC MORPH BLD: NORMAL
SARS-COV-2 RNA RESP QL NAA+PROBE: NEGATIVE
SODIUM SERPL-SCNC: 138 MMOL/L (ref 136–145)
SP GR UR STRIP.AUTO: 1.02 (ref 1–1.03)
T WAVE AXIS: 50 DEGREES
TOTAL CELLS COUNTED SPEC: 100
TROPONIN I SERPL-MCNC: <0.02 NG/ML
TSH SERPL DL<=0.05 MIU/L-ACNC: 1.31 UIU/ML (ref 0.36–3.74)
UROBILINOGEN UR QL STRIP.AUTO: 2 E.U./DL
VENTRICULAR RATE: 106 BPM
WBC # BLD AUTO: 21.54 THOUSAND/UL (ref 4.31–10.16)
WBC #/AREA URNS AUTO: ABNORMAL /HPF

## 2021-07-12 PROCEDURE — 99285 EMERGENCY DEPT VISIT HI MDM: CPT

## 2021-07-12 PROCEDURE — 85007 BL SMEAR W/DIFF WBC COUNT: CPT | Performed by: EMERGENCY MEDICINE

## 2021-07-12 PROCEDURE — 93005 ELECTROCARDIOGRAM TRACING: CPT

## 2021-07-12 PROCEDURE — 99223 1ST HOSP IP/OBS HIGH 75: CPT | Performed by: INTERNAL MEDICINE

## 2021-07-12 PROCEDURE — 84484 ASSAY OF TROPONIN QUANT: CPT | Performed by: EMERGENCY MEDICINE

## 2021-07-12 PROCEDURE — U0005 INFEC AGEN DETEC AMPLI PROBE: HCPCS | Performed by: EMERGENCY MEDICINE

## 2021-07-12 PROCEDURE — 87040 BLOOD CULTURE FOR BACTERIA: CPT | Performed by: EMERGENCY MEDICINE

## 2021-07-12 PROCEDURE — 80048 BASIC METABOLIC PNL TOTAL CA: CPT | Performed by: EMERGENCY MEDICINE

## 2021-07-12 PROCEDURE — 83605 ASSAY OF LACTIC ACID: CPT | Performed by: EMERGENCY MEDICINE

## 2021-07-12 PROCEDURE — 85730 THROMBOPLASTIN TIME PARTIAL: CPT | Performed by: EMERGENCY MEDICINE

## 2021-07-12 PROCEDURE — 81001 URINALYSIS AUTO W/SCOPE: CPT

## 2021-07-12 PROCEDURE — 85027 COMPLETE CBC AUTOMATED: CPT | Performed by: EMERGENCY MEDICINE

## 2021-07-12 PROCEDURE — G1004 CDSM NDSC: HCPCS

## 2021-07-12 PROCEDURE — 36415 COLL VENOUS BLD VENIPUNCTURE: CPT | Performed by: EMERGENCY MEDICINE

## 2021-07-12 PROCEDURE — 82805 BLOOD GASES W/O2 SATURATION: CPT | Performed by: EMERGENCY MEDICINE

## 2021-07-12 PROCEDURE — 93010 ELECTROCARDIOGRAM REPORT: CPT

## 2021-07-12 PROCEDURE — 71250 CT THORAX DX C-: CPT

## 2021-07-12 PROCEDURE — 80076 HEPATIC FUNCTION PANEL: CPT | Performed by: EMERGENCY MEDICINE

## 2021-07-12 PROCEDURE — 71045 X-RAY EXAM CHEST 1 VIEW: CPT

## 2021-07-12 PROCEDURE — 84145 PROCALCITONIN (PCT): CPT | Performed by: INTERNAL MEDICINE

## 2021-07-12 PROCEDURE — 96365 THER/PROPH/DIAG IV INF INIT: CPT

## 2021-07-12 PROCEDURE — 83880 ASSAY OF NATRIURETIC PEPTIDE: CPT | Performed by: EMERGENCY MEDICINE

## 2021-07-12 PROCEDURE — 99285 EMERGENCY DEPT VISIT HI MDM: CPT | Performed by: EMERGENCY MEDICINE

## 2021-07-12 PROCEDURE — 85025 COMPLETE CBC W/AUTO DIFF WBC: CPT | Performed by: EMERGENCY MEDICINE

## 2021-07-12 PROCEDURE — 85610 PROTHROMBIN TIME: CPT | Performed by: EMERGENCY MEDICINE

## 2021-07-12 PROCEDURE — 74176 CT ABD & PELVIS W/O CONTRAST: CPT

## 2021-07-12 PROCEDURE — 83735 ASSAY OF MAGNESIUM: CPT | Performed by: EMERGENCY MEDICINE

## 2021-07-12 PROCEDURE — 84443 ASSAY THYROID STIM HORMONE: CPT | Performed by: EMERGENCY MEDICINE

## 2021-07-12 PROCEDURE — U0003 INFECTIOUS AGENT DETECTION BY NUCLEIC ACID (DNA OR RNA); SEVERE ACUTE RESPIRATORY SYNDROME CORONAVIRUS 2 (SARS-COV-2) (CORONAVIRUS DISEASE [COVID-19]), AMPLIFIED PROBE TECHNIQUE, MAKING USE OF HIGH THROUGHPUT TECHNOLOGIES AS DESCRIBED BY CMS-2020-01-R: HCPCS | Performed by: EMERGENCY MEDICINE

## 2021-07-12 PROCEDURE — 96361 HYDRATE IV INFUSION ADD-ON: CPT

## 2021-07-12 RX ORDER — ACETAMINOPHEN 325 MG/1
650 TABLET ORAL EVERY 6 HOURS PRN
Status: DISCONTINUED | OUTPATIENT
Start: 2021-07-12 | End: 2021-07-13

## 2021-07-12 RX ORDER — HEPARIN SODIUM 5000 [USP'U]/ML
5000 INJECTION, SOLUTION INTRAVENOUS; SUBCUTANEOUS EVERY 8 HOURS SCHEDULED
Status: DISCONTINUED | OUTPATIENT
Start: 2021-07-12 | End: 2021-07-19 | Stop reason: HOSPADM

## 2021-07-12 RX ORDER — TAMSULOSIN HYDROCHLORIDE 0.4 MG/1
0.4 CAPSULE ORAL
Status: DISCONTINUED | OUTPATIENT
Start: 2021-07-12 | End: 2021-07-19 | Stop reason: HOSPADM

## 2021-07-12 RX ORDER — POTASSIUM CITRATE 10 MEQ/1
10 TABLET, EXTENDED RELEASE ORAL DAILY
Status: DISCONTINUED | OUTPATIENT
Start: 2021-07-13 | End: 2021-07-19 | Stop reason: HOSPADM

## 2021-07-12 RX ORDER — METOPROLOL SUCCINATE 50 MG/1
100 TABLET, EXTENDED RELEASE ORAL DAILY
Status: DISCONTINUED | OUTPATIENT
Start: 2021-07-13 | End: 2021-07-19 | Stop reason: HOSPADM

## 2021-07-12 RX ORDER — TRAVOPROST OPHTHALMIC SOLUTION 0.04 MG/ML
1 SOLUTION OPHTHALMIC
Status: DISCONTINUED | OUTPATIENT
Start: 2021-07-12 | End: 2021-07-19 | Stop reason: HOSPADM

## 2021-07-12 RX ORDER — ACETAMINOPHEN 325 MG/1
975 TABLET ORAL ONCE
Status: COMPLETED | OUTPATIENT
Start: 2021-07-12 | End: 2021-07-12

## 2021-07-12 RX ADMIN — CEFTRIAXONE SODIUM 2000 MG: 10 INJECTION, POWDER, FOR SOLUTION INTRAVENOUS at 14:01

## 2021-07-12 RX ADMIN — ACETAMINOPHEN 975 MG: 325 TABLET, FILM COATED ORAL at 13:21

## 2021-07-12 RX ADMIN — ACETAMINOPHEN 650 MG: 325 TABLET, FILM COATED ORAL at 18:01

## 2021-07-12 RX ADMIN — SODIUM CHLORIDE 1200 ML: 0.9 INJECTION, SOLUTION INTRAVENOUS at 14:39

## 2021-07-12 RX ADMIN — SODIUM CHLORIDE 1000 ML: 0.9 INJECTION, SOLUTION INTRAVENOUS at 13:39

## 2021-07-12 RX ADMIN — TRAVOPROST 1 DROP: 0.04 SOLUTION OPHTHALMIC at 21:18

## 2021-07-12 RX ADMIN — TAMSULOSIN HYDROCHLORIDE 0.4 MG: 0.4 CAPSULE ORAL at 18:01

## 2021-07-12 NOTE — ED PROVIDER NOTES
History  Chief Complaint   Patient presents with    Weakness - Generalized     weak, dizzy, disoriented x3days difficulty ambulating  chills  74 YO male presents with general weakness, difficulty with ambulation, fevers  Wife helps with Hx  States he has seemed confused, having difficulty with finishing sentences, he has not been able to get up out of bed  She notes Pt did not have any specific complaints last night  He has no taken anything for fevers  Pt denies any pain, states she has had some exertional dyspnea, no chest pain  He denies coughing, no known sick contacts  Pt did receive COVID vaccinations  He has has some urinary incontinence, denies burning with urination  Pt denies CP/N/V/D/C, no dysuria, burning on urination or blood in urine  History provided by:  Patient   used: No    Fever - 9 weeks to 75 years  Max temp prior to arrival:  102 9  Temp source:  Oral  Severity:  Moderate  Onset quality:  Gradual  Duration:  7 hours  Timing:  Constant  Progression:  Unchanged  Chronicity:  New  Relieved by:  Nothing  Worsened by:  Nothing  Ineffective treatments:  None tried  Associated symptoms: chills, confusion and somnolence    Associated symptoms: no chest pain, no cough, no dysuria, no headaches, no myalgias, no nausea, no rash and no vomiting        Prior to Admission Medications   Prescriptions Last Dose Informant Patient Reported? Taking?    Potassium Citrate ER 15 MEQ (1620 MG) TBCR  Self Yes Yes   Sig: Take 1 tablet by mouth daily    TRAVATAN Z 0 004 % ophthalmic solution  Self Yes Yes   Sig: Administer 1 drop to both eyes daily at bedtime    allopurinol (ZYLOPRIM) 100 mg tablet  Self Yes Yes   Sig: Take 100 mg by mouth daily    furosemide (LASIX) 20 mg tablet   No Yes   Sig: TAKE 1 TABLET DAILY AS NEEDED FOR SWELLING   lisinopril (ZESTRIL) 40 mg tablet   No Yes   Sig: TAKE 1 TABLET DAILY   metoprolol succinate (TOPROL-XL) 100 mg 24 hr tablet  Self No Yes   Sig: TAKE 1 TABLET DAILY   tamsulosin (FLOMAX) 0 4 mg   No Yes   Sig: TAKE 1 CAPSULE TWICE A DAY   Patient taking differently: 0 4 mg daily with dinner       Facility-Administered Medications: None       Past Medical History:   Diagnosis Date    Chronic kidney disease     Hypertension     Obesity     Urinary tract infection without hematuria 4/3/2020       Past Surgical History:   Procedure Laterality Date    APPENDECTOMY      NASAL SEPTUM SURGERY         Family History   Problem Relation Age of Onset    Diabetes Father     Prostate cancer Brother      I have reviewed and agree with the history as documented  E-Cigarette/Vaping    E-Cigarette Use Never User      E-Cigarette/Vaping Substances    Nicotine No     THC No     CBD No     Flavoring No     Other No     Unknown No      Social History     Tobacco Use    Smoking status: Never Smoker    Smokeless tobacco: Never Used    Tobacco comment: no exposure to passive smoke   Vaping Use    Vaping Use: Never used   Substance Use Topics    Alcohol use: No    Drug use: Not Currently       Review of Systems   Constitutional: Positive for chills, fatigue and fever  HENT: Negative for dental problem  Eyes: Negative for visual disturbance  Respiratory: Positive for shortness of breath  Negative for cough and wheezing  Cardiovascular: Negative for chest pain  Gastrointestinal: Negative for abdominal pain, nausea and vomiting  Genitourinary: Negative for dysuria and frequency  Musculoskeletal: Negative for myalgias, neck pain and neck stiffness  Skin: Negative for rash  Neurological: Positive for dizziness and weakness  Negative for tremors, light-headedness, numbness and headaches  Psychiatric/Behavioral: Positive for confusion  Negative for agitation and behavioral problems  All other systems reviewed and are negative  Physical Exam  Physical Exam  Vitals and nursing note reviewed     Constitutional:       Appearance: He is well-developed  HENT:      Head: Normocephalic and atraumatic  Eyes:      Extraocular Movements: Extraocular movements intact  Pupils: Pupils are equal, round, and reactive to light  Cardiovascular:      Rate and Rhythm: Regular rhythm  Pulmonary:      Effort: No respiratory distress  Comments: Tachypnea  Abdominal:      General: There is no distension  Musculoskeletal:         General: Normal range of motion  Cervical back: Normal range of motion  Skin:     Findings: No rash  Neurological:      General: No focal deficit present  Mental Status: He is alert     Psychiatric:         Behavior: Behavior normal          Vital Signs  ED Triage Vitals   Temperature Pulse Respirations Blood Pressure SpO2   07/12/21 1138 07/12/21 1135 07/12/21 1135 07/12/21 1135 07/12/21 1135   (!) 102 9 °F (39 4 °C) 101 20 156/71 93 %      Temp Source Heart Rate Source Patient Position - Orthostatic VS BP Location FiO2 (%)   07/12/21 1138 07/12/21 1415 07/12/21 1135 07/12/21 1135 --   Oral Monitor Sitting Right arm       Pain Score       07/12/21 1415       No Pain           Vitals:    07/12/21 1617 07/12/21 1658 07/12/21 1927 07/12/21 2108   BP: 112/57 120/51  126/67   Pulse: 91 93 97 98   Patient Position - Orthostatic VS: Lying   Lying         Visual Acuity  Visual Acuity      Most Recent Value   L Pupil Size (mm)  3   R Pupil Size (mm)  3          ED Medications  Medications   metoprolol succinate (TOPROL-XL) 24 hr tablet 100 mg (has no administration in time range)   potassium citrate (UROCIT-K) CR tablet 10 mEq (has no administration in time range)   tamsulosin (FLOMAX) capsule 0 4 mg (0 4 mg Oral Given 7/12/21 1801)   travoprost (TRAVATAN-Z) 0 004 % ophthalmic solution 1 drop (1 drop Both Eyes Given 7/12/21 2118)   cefTRIAXone (ROCEPHIN) 2,000 mg in dextrose 5 % 50 mL IVPB (has no administration in time range)   heparin (porcine) subcutaneous injection 5,000 Units (5,000 Units Subcutaneous Not Given 7/12/21 1802)   acetaminophen (TYLENOL) tablet 650 mg (650 mg Oral Given 7/12/21 1801)   sodium chloride 0 9 % bolus 1,000 mL (0 mL Intravenous Stopped 7/12/21 1747)   acetaminophen (TYLENOL) tablet 975 mg (975 mg Oral Given 7/12/21 1321)   ceftriaxone (ROCEPHIN) 2 g/50 mL in dextrose IVPB (0 mg Intravenous Stopped 7/12/21 1441)   sodium chloride 0 9 % bolus 1,200 mL (0 mL Intravenous Stopped 7/12/21 1748)       Diagnostic Studies  Results Reviewed     Procedure Component Value Units Date/Time    Blood culture #1 [[id number]] Collected: 07/12/21 1325    Lab Status: Preliminary result Specimen: Blood from Arm, Left Updated: 07/12/21 2001     Blood Culture Received in Microbiology Lab  Culture in Progress  Blood culture #2 [[id number]] Collected: 07/12/21 1333    Lab Status: Preliminary result Specimen: Blood from Arm, Right Updated: 07/12/21 2001     Blood Culture Received in Microbiology Lab  Culture in Progress  Urine Microscopic [[id number]]  (Abnormal) Collected: 07/12/21 1538    Lab Status: Final result Specimen: Urine, Clean Catch Updated: 07/12/21 1629     RBC, UA 0-1 /hpf      WBC, UA None Seen /hpf      Epithelial Cells Occasional /hpf      Bacteria, UA None Seen /hpf     Urine Macroscopic, POC [[id number]]  (Abnormal) Collected: 07/12/21 1538    Lab Status: Final result Specimen: Urine Updated: 07/12/21 1540     Color, UA Yellow     Clarity, UA Clear     pH, UA 7 5     Leukocytes, UA Negative     Nitrite, UA Negative     Protein,  (2+) mg/dl      Glucose, UA Negative mg/dl      Ketones, UA Negative mg/dl      Urobilinogen, UA 2 0 E U /dl      Bilirubin, UA Negative     Blood, UA Trace     Specific Avery, UA 1 020    Narrative:      CLINITEK RESULT    Novel Coronavirus (Covid-19),PCR [facility] - 2 Hour Stat [[id number]]  (Normal) Collected: 07/12/21 1341    Lab Status: Final result Specimen: Nares from Nasopharyngeal Swab Updated: 07/12/21 1515     SARS-CoV-2 Negative    Narrative:       The specimen collection materials, transport medium, and/or testing methodology utilized in the production of these test results have been proven to be reliable in a limited validation with an abbreviated program under the Emergency Utilization Authorization provided by the FDA  Testing reported as "Presumptive positive" will be confirmed with secondary testing to ensure result accuracy  Clinical caution and judgement should be used with the interpretation of these results with consideration of the clinical impression and other laboratory testing  Testing reported as "Positive" or "Negative" has been proven to be accurate according to standard laboratory validation requirements  All testing is performed with control materials showing appropriate reactivity at standard intervals  NT-BNP PRO [[id number]]  (Abnormal) Collected: 07/12/21 1333    Lab Status: Final result Specimen: Blood from Arm, Right Updated: 07/12/21 1510     NT-proBNP 761 pg/mL     Lactic acid [[id number]]  (Normal) Collected: 07/12/21 1333    Lab Status: Final result Specimen: Blood from Arm, Right Updated: 07/12/21 1447     LACTIC ACID 2 0 mmol/L     Narrative:      Result may be elevated if tourniquet was used during collection  Hepatic function panel [[id number]]  (Abnormal) Collected: 07/12/21 1333    Lab Status: Final result Specimen: Blood from Arm, Right Updated: 07/12/21 1447     Total Bilirubin 1 96 mg/dL      Bilirubin, Direct 0 37 mg/dL      Alkaline Phosphatase 70 U/L      AST 24 U/L      ALT 36 U/L      Total Protein 8 1 g/dL      Albumin 3 7 g/dL     TSH [[id number]]  (Normal) Collected: 07/12/21 1333    Lab Status: Final result Specimen: Blood from Arm, Right Updated: 07/12/21 1447     TSH 3RD GENERATON 1 312 uIU/mL     Narrative:      Patients undergoing fluorescein dye angiography may retain small amounts of fluorescein in the body for 48-72 hours post procedure  Samples containing fluorescein can produce falsely depressed TSH values   If the patient had this procedure,a specimen should be resubmitted post fluorescein clearance        Magnesium [[id number]]  (Abnormal) Collected: 07/12/21 1333    Lab Status: Final result Specimen: Blood from Arm, Right Updated: 07/12/21 1447     Magnesium 1 5 mg/dL     APTT [[id number]]  (Normal) Collected: 07/12/21 1412    Lab Status: Final result Specimen: Blood from Arm, Right Updated: 07/12/21 1433     PTT 29 seconds     Protime-INR [[id number]]  (Abnormal) Collected: 07/12/21 1412    Lab Status: Final result Specimen: Blood from Arm, Right Updated: 07/12/21 1433     Protime 16 5 seconds      INR 1 36    Blood gas, venous [[id number]] Collected: 07/12/21 1412    Lab Status: Final result Specimen: Blood from Arm, Right Updated: 07/12/21 1420     pH, Addi 7 395     pCO2, Addi 42 9 mm Hg      pO2, Addi 35 4 mm Hg      HCO3, Addi 25 7 mmol/L      Base Excess, Addi 0 6 mmol/L      O2 Content, Addi 14 3 ml/dL      O2 HGB, VENOUS 70 6 %     Basic metabolic panel [[id number]]  (Abnormal) Collected: 07/12/21 1333    Lab Status: Final result Specimen: Blood from Arm, Right Updated: 07/12/21 1415     Sodium 138 mmol/L      Potassium 4 7 mmol/L      Chloride 100 mmol/L      CO2 29 mmol/L      ANION GAP 9 mmol/L      BUN 23 mg/dL      Creatinine 2 16 mg/dL      Glucose 110 mg/dL      Calcium 8 9 mg/dL      eGFR 31 ml/min/1 73sq m     Narrative:      [facility] guidelines for Chronic Kidney Disease (CKD):     Stage 1 with normal or high GFR (GFR > 90 mL/min/1 73 square meters)    Stage 2 Mild CKD (GFR = 60-89 mL/min/1 73 square meters)    Stage 3A Moderate CKD (GFR = 45-59 mL/min/1 73 square meters)    Stage 3B Moderate CKD (GFR = 30-44 mL/min/1 73 square meters)    Stage 4 Severe CKD (GFR = 15-29 mL/min/1 73 square meters)    Stage 5 End Stage CKD (GFR <15 mL/min/1 73 square meters)  Note: GFR calculation is accurate only with a steady state creatinine    Manual Differential(PHLEBS Do Not Order) [[id number]] (Abnormal) Collected: 07/12/21 1333    Lab Status: Final result Specimen: Blood from Arm, Right Updated: 07/12/21 1414     Segmented % 76 %      Bands % 14 %      Lymphocytes % 5 %      Monocytes % 3 %      Eosinophils, % 1 %      Basophils % 0 %      Metamyelocytes% 1 %      Absolute Neutrophils 19 39 Thousand/uL      Lymphocytes Absolute 1 08 Thousand/uL      Monocytes Absolute 0 65 Thousand/uL      Eosinophils Absolute 0 22 Thousand/uL      Basophils Absolute 0 00 Thousand/uL      Total Counted 100     RBC Morphology Normal     Platelet Estimate Adequate    Troponin I [[id number]]  (Normal) Collected: 07/12/21 1333    Lab Status: Final result Specimen: Blood from Arm, Right Updated: 07/12/21 1411     Troponin I <0 02 ng/mL     CBC and differential [[id number]]  (Abnormal) Collected: 07/12/21 1333    Lab Status: Final result Specimen: Blood from Arm, Right Updated: 07/12/21 1349     WBC 21 54 Thousand/uL      RBC 4 68 Million/uL      Hemoglobin 14 9 g/dL      Hematocrit 44 5 %      MCV 95 fL      MCH 31 8 pg      MCHC 33 5 g/dL      RDW 13 2 %      MPV 9 1 fL      Platelets 236 Thousands/uL      nRBC 0 /100 WBCs     Narrative: This is an appended report  These results have been appended to a previously verified report  CT chest abdomen pelvis wo contrast   Final Result by Olegario Homans, MD (07/12 1816)         1  Large right lateral abdominal hernia and pannus, partially outside the field of view  Within this limitation, no evidence of bowel obstruction, colitis or diverticulitis  2   New left renal exophytic 3 4 cm lesion suggestive of an angiomyolipoma, however malignancy not entirely excluded  Recommend nonemergent MRI of the abdomen with gadolinium for further evaluation  2   No acute cardiopulmonary process                    Workstation performed: [id number]         XR chest 1 view portable    (Results Pending)              Procedures  Procedures         ED Course  ED Course as of Jul 12 2157 Mon Jul 12, 2021   1418 1 48 on 5/26/2021   Creatinine(!): 2 16                         Initial Sepsis Screening     Row Name 07/12/21 1456                Is the patient's history suggestive of a new or worsening infection? (!) Yes (Proceed)  -DH        Suspected source of infection  urinary tract infection;pneumonia  -DH        Are two or more of the following signs & symptoms of infection both present and new to the patient? (!) Yes (Proceed)  -DH        Indicate SIRS criteria  Hyperthemia > 38 3C (100 9F); Tachycardia > 90 bpm;Leukocytosis (WBC > 68103 IJL);WBC > 10% bands  -        If the answer is yes to both questions, suspicion of sepsis is present  --        If severe sepsis is present AND tissue hypoperfusion perists in the hour after fluid resuscitation or lactate > 4, the patient meets criteria for SEPTIC SHOCK  --        Are any of the following organ dysfunction criteria present within 6 hours of suspected infection and SIRS criteria that are NOT considered to be chronic conditions? (!) Yes  -        Organ dysfunction  Creatinine > 0 5 mg/dl ABOVE BASELINE  -        Date of presentation of severe sepsis  07/12/21  -        Time of presentation of severe sepsis  1457  -        Tissue hypoperfusion persists in the hour after crystalloid fluid administration, evidenced, by either:  --        Was hypotension present within one hour of the conclusion of crystalloid fluid administration?  --        Date of presentation of septic shock  --        Time of presentation of septic shock  --          User Key  (r) = Recorded By, (t) = Taken By, (c) = Cosigned By    [address] Name Provider Type    Veronique Marcelino MD Physician          SBIRT 20yo+      Most Recent Value   SBIRT (25 yo +)   In order to provide better care to our patients, we are screening all of our patients for alcohol and drug use  Would it be okay to ask you these screening questions?   Unable to answer at this time Filed at: 07/12/2021 1542                    MDM  Number of Diagnoses or Management Options  Acute kidney injury [facility]): new and requires workup  SIRS due to infectious process with organ dysfunction [facility]): new and requires workup  Weakness: new and requires workup  Diagnosis management comments: 1  Fever - Pt with fever of 102 9 on arrival, having chills, generalized weakness  He does have difficulty finishing thoughts, some increased work of breathing  Will check CBC for leukocytosis, metabolic panel for electrolyte abnormalities and dehydration,  LFT's to assess GB dysfunction, lipase for pancreatitis, urine for infection, CXR, blood cultures, lactic acid and VBG, likely bacterial infection and concern for sepsis  Will give appropriate IV hydration, Pt's ideal body weight is 70 7kg         Amount and/or Complexity of Data Reviewed  Clinical lab tests: reviewed and ordered  Tests in the radiology section of CPT®: ordered and reviewed  Obtain history from someone other than the patient: yes  Review and summarize past medical records: yes  Discuss the patient with other providers: yes  Independent visualization of images, tracings, or specimens: yes    Patient Progress  Patient progress: stable      Disposition  Final diagnoses:   Weakness   SIRS due to infectious process with organ dysfunction ([facility] [address] )   Acute kidney injury ([facility] )     Time reflects when diagnosis was documented in both MDM as applicable and the Disposition within this note     Time User Action Codes Description Comment    7/12/2021  3:57 PM Ascencion Queta E Add [R53 1] Weakness     7/12/2021  3:57 PM Negar Fortunato Guaman E Add [A41 9,  R65 20] SIRS due to infectious process with organ dysfunction ([facility] )     7/12/2021  3:58 PM Ascencion Queta E Add [N17 9] Acute kidney injury [facility])       ED Disposition     ED Disposition Condition Date/Time Comment    Admit Stable Mon Jul 12, 2021  3:57 PM Case was discussed with Dr Bryson Shah and the patient's admission status was agreed to be Admission Status: inpatient status to the service of Dr Josef Guerreor   Follow-up Information    None         Current Discharge Medication List      CONTINUE these medications which have NOT CHANGED    Details   allopurinol (ZYLOPRIM) 100 mg tablet Take 100 mg by mouth daily       furosemide (LASIX) 20 mg tablet TAKE 1 TABLET DAILY AS NEEDED FOR SWELLING  Qty: 90 tablet, Refills: 3    Associated Diagnoses: Essential hypertension      lisinopril (ZESTRIL) 40 mg tablet TAKE 1 TABLET DAILY  Qty: 90 tablet, Refills: 0    Associated Diagnoses: Essential hypertension      metoprolol succinate (TOPROL-XL) 100 mg 24 hr tablet TAKE 1 TABLET DAILY  Qty: 90 tablet, Refills: 3    Associated Diagnoses: Essential hypertension      Potassium Citrate ER 15 MEQ (1620 MG) TBCR Take 1 tablet by mouth daily       tamsulosin (FLOMAX) 0 4 mg TAKE 1 CAPSULE TWICE A DAY  Qty: 180 capsule, Refills: 3    Associated Diagnoses: Chronic renal failure, unspecified CKD stage      TRAVATAN Z 0 004 % ophthalmic solution Administer 1 drop to both eyes daily at bedtime            No discharge procedures on file      PDMP Review     None          ED Provider  Electronically Signed by           Kayleen Sanchez MD  07/12/21 2398

## 2021-07-12 NOTE — ASSESSMENT & PLAN NOTE
Lab Results   Component Value Date    EGFR 31 07/12/2021    CREATININE 2 16 (H) 07/12/2021     Known chronic kidney disease with baseline creatinine 1 5-1 7  Hold Lasix  Hold lisinopril  Take IV fluids given  Repeat labs in a m    Avoid hypotension/nephrotoxic agents

## 2021-07-12 NOTE — NURSING NOTE
Patients temp gone up from 102 2 to 102 9 after tylenol given at 1800  Ice applied  Messaged KAREN Rodriguez, unable to give NSAIDS d/t [place]/CKD  Will continue to monitor temperature and administer tylenol q6 as needed       Minda Hirsch RN, 07/12/21, 7:46 PM

## 2021-07-12 NOTE — ASSESSMENT & PLAN NOTE
· He met sepsis criteria with leukocytosis, tachycardia and fever  · Cause is unclear  Possible pneumonia versus complicated UTI/pyelonephritis, rule out gallbladder distension/inflammation  · Check CT of the chest abdomen and pelvis status  WITHOUT CONTRAST  · Continue ceftriaxone 2 g Q 24  · Sepsis protocol IV fluids administered    · Discussed plan with patient and wife  · Questions answered

## 2021-07-12 NOTE — ASSESSMENT & PLAN NOTE
· Morbidly obese with suspected DELIA  · Patient was placed on oxygen at 2 L due to on O2 sat reading of 88% while in the ED  · The patient acknowledges that he should have a sleep study done  · He has not had this done before and he is not on any equipment for this    · Continue oxygen for now

## 2021-07-12 NOTE — PLAN OF CARE
Problem: MOBILITY - ADULT  Goal: Maintain or return to baseline ADL function  Description: INTERVENTIONS:  -  Assess patient's ability to carry out ADLs; assess patient's baseline for ADL function and identify physical deficits which impact ability to perform ADLs (bathing, care of mouth/teeth, toileting, grooming, dressing, etc )  - Assess/evaluate cause of self-care deficits   - Assess range of motion  - Assess patient's mobility; develop plan if impaired  - Assess patient's need for assistive devices and provide as appropriate  - Encourage maximum independence but intervene and supervise when necessary  - Involve family in performance of ADLs  - Assess for home care needs following discharge   - Consider OT consult to assist with ADL evaluation and planning for discharge  - Provide patient education as appropriate  Outcome: Progressing  Goal: Maintains/Returns to pre admission functional level  Description: INTERVENTIONS:  - Perform BMAT or MOVE assessment daily    - Set and communicate daily mobility goal to care team and patient/family/caregiver     - Collaborate with rehabilitation services on mobility goals if consulted  - Out of bed for toileting  - Record patient progress and toleration of activity level   Outcome: Progressing     Problem: PAIN - ADULT  Goal: Verbalizes/displays adequate comfort level or baseline comfort level  Description: Interventions:  - Encourage patient to monitor pain and request assistance  - Assess pain using appropriate pain scale  - Administer analgesics based on type and severity of pain and evaluate response  - Implement non-pharmacological measures as appropriate and evaluate response  - Consider cultural and social influences on pain and pain management  - Notify physician/advanced practitioner if interventions unsuccessful or patient reports new pain  Outcome: Progressing     Problem: INFECTION - ADULT  Goal: Absence or prevention of progression during hospitalization  Description: INTERVENTIONS:  - Assess and monitor for signs and symptoms of infection  - Monitor lab/diagnostic results  - Monitor all insertion sites, i e  indwelling lines, tubes, and drains  - Monitor endotracheal if appropriate and nasal secretions for changes in amount and color  - [place] appropriate cooling/warming therapies per order  - Administer medications as ordered  - Instruct and encourage patient and family to use good hand hygiene technique  - Identify and instruct in appropriate isolation precautions for identified infection/condition  Outcome: Progressing     Problem: SAFETY ADULT  Goal: Maintain or return to baseline ADL function  Description: INTERVENTIONS:  -  Assess patient's ability to carry out ADLs; assess patient's baseline for ADL function and identify physical deficits which impact ability to perform ADLs (bathing, care of mouth/teeth, toileting, grooming, dressing, etc )  - Assess/evaluate cause of self-care deficits   - Assess range of motion  - Assess patient's mobility; develop plan if impaired  - Assess patient's need for assistive devices and provide as appropriate  - Encourage maximum independence but intervene and supervise when necessary  - Involve family in performance of ADLs  - Assess for home care needs following discharge   - Consider OT consult to assist with ADL evaluation and planning for discharge  - Provide patient education as appropriate  Outcome: Progressing  Goal: Maintains/Returns to pre admission functional level  Description: INTERVENTIONS:  - Perform BMAT or MOVE assessment daily    - Set and communicate daily mobility goal to care team and patient/family/caregiver     - Collaborate with rehabilitation services on mobility goals if consulted  - Out of bed for toileting  - Record patient progress and toleration of activity level   Outcome: Progressing  Goal: Patient will remain free of falls  Description: INTERVENTIONS:  - Educate patient/family on patient safety including physical limitations  - Instruct patient to call for assistance with activity   - Consult OT/PT to assist with strengthening/mobility   - Keep Call bell within reach  - Keep bed low and locked with side rails adjusted as appropriate  - Keep care items and personal belongings within reach  - Initiate and maintain comfort rounds  - Make Fall Risk Sign visible to staff  - Apply yellow socks and bracelet for high fall risk patients  - Consider moving patient to room near nurses station  Outcome: Progressing     Problem: DISCHARGE PLANNING  Goal: Discharge to home or other facility with appropriate resources  Description: INTERVENTIONS:  - Identify barriers to discharge w/patient and caregiver  - Arrange for needed discharge resources and transportation as appropriate  - Identify discharge learning needs (meds, wound care, etc )  - Arrange for interpretive services to assist at discharge as needed  - Refer to Case Management Department for coordinating discharge planning if the patient needs post-hospital services based on physician/advanced practitioner order or complex needs related to functional status, cognitive ability, or social support system  Outcome: Progressing     Problem: Knowledge Deficit  Goal: Patient/family/caregiver demonstrates understanding of disease process, treatment plan, medications, and discharge instructions  Description: Complete learning assessment and assess knowledge base    Interventions:  - Provide teaching at level of understanding  - Provide teaching via preferred learning methods  Outcome: Progressing     Problem: CARDIOVASCULAR - ADULT  Goal: Maintains optimal cardiac output and hemodynamic stability  Description: INTERVENTIONS:  - Monitor I/O, vital signs and rhythm  - Monitor for S/S and trends of decreased cardiac output  - Administer and titrate ordered vasoactive medications to optimize hemodynamic stability  - Assess quality of pulses, skin color and temperature  - Assess for signs of decreased coronary artery perfusion  - Instruct patient to report change in severity of symptoms  Outcome: Progressing     Problem: RESPIRATORY - ADULT  Goal: Achieves optimal ventilation and oxygenation  Description: INTERVENTIONS:  - Assess for changes in respiratory status  - Assess for changes in mentation and behavior  - Position to facilitate oxygenation and minimize respiratory effort  - Oxygen administered by appropriate delivery if ordered  - Initiate smoking cessation education as indicated  - Encourage broncho-pulmonary hygiene including cough, deep breathe, Incentive Spirometry  - Assess the need for suctioning and aspirate as needed  - Assess and instruct to report SOB or any respiratory difficulty  - Respiratory Therapy support as indicated  Outcome: Progressing     Problem: METABOLIC, FLUID AND ELECTROLYTES - ADULT  Goal: Electrolytes maintained within normal limits  Description: INTERVENTIONS:  - Monitor labs and assess patient for signs and symptoms of electrolyte imbalances  - Administer electrolyte replacement as ordered  - Monitor response to electrolyte replacements, including repeat lab results as appropriate  - Instruct patient on fluid and nutrition as appropriate  Outcome: Progressing  Goal: Fluid balance maintained  Description: INTERVENTIONS:  - Monitor labs   - Monitor I/O and WT  - Instruct patient on fluid and nutrition as appropriate  - Assess for signs & symptoms of volume excess or deficit  Outcome: Progressing     Problem: SKIN/TISSUE INTEGRITY - ADULT  Goal: Skin Integrity remains intact(Skin Breakdown Prevention)  Description: Assess:    -Inspect skin when repositioning, toileting, and assisting with ADLS  -Assess extremities for adequate circulation and sensation     Bed Management:  -Have minimal linens on bed & keep smooth, unwrinkled  -Change linens as needed when moist or perspiring      Toileting:  -Offer bedside commode      Activity:  -Encourage activity and walks on unit  -Encourage or provide ROM exercises     Skin Care:  -Avoid use of baby powder, tape, friction and shearing, hot water or constrictive clothing  -Do not massage red bony areas    Next Steps:    Outcome: Progressing     Problem: HEMATOLOGIC - ADULT  Goal: Maintains hematologic stability  Description: INTERVENTIONS  - Assess for signs and symptoms of bleeding or hemorrhage  - Monitor labs  - Administer supportive blood products/factors as ordered and appropriate  Outcome: Progressing     Problem: MUSCULOSKELETAL - ADULT  Goal: Maintain or return mobility to safest level of function  Description: INTERVENTIONS:  - Assess patient's ability to carry out ADLs; assess patient's baseline for ADL function and identify physical deficits which impact ability to perform ADLs (bathing, care of mouth/teeth, toileting, grooming, dressing, etc )  - Assess/evaluate cause of self-care deficits   - Assess range of motion  - Assess patient's mobility  - Assess patient's need for assistive devices and provide as appropriate  - Encourage maximum independence but intervene and supervise when necessary  - Involve family in performance of ADLs  - Assess for home care needs following discharge   - Consider OT consult to assist with ADL evaluation and planning for discharge  - Provide patient education as appropriate  Outcome: Progressing

## 2021-07-12 NOTE — H&P
[facility]  H&P- Nikole Matos 1955, 73 y o  male MRN: [id number]  Unit/Bed#: [facility] Encounter: [id number]  Primary Care Provider: Carolyn Barker MD   Date and time admitted to hospital: 7/12/2021 12:29 PM    * Sepsis [facility])  Assessment & Plan  · He met sepsis criteria with leukocytosis, tachycardia and fever  · Cause is unclear  Possible pneumonia versus complicated UTI/pyelonephritis, rule out gallbladder distension/inflammation  · Check CT of the chest abdomen and pelvis status  WITHOUT CONTRAST  · Continue ceftriaxone 2 g Q 24  · Sepsis protocol IV fluids administered  · Discussed plan with patient and wife  · Questions answered    Acute kidney injury superimposed on chronic kidney disease [facility])  Assessment & Plan  Lab Results   Component Value Date    EGFR 31 07/12/2021    CREATININE 2 16 (H) 07/12/2021     Known chronic kidney disease with baseline creatinine 1 5-1 7  Hold Lasix  Hold lisinopril  Take IV fluids given  Repeat labs in a m  Avoid hypotension/nephrotoxic agents    DELIA (obstructive sleep apnea)  Assessment & Plan  · Morbidly obese with suspected DELIA  · Patient was placed on oxygen at 2 L due to on O2 sat reading of 88% while in the ED  · The patient acknowledges that he should have a sleep study done  · He has not had this done before and he is not on any equipment for this  · Continue oxygen for now    Gout  Assessment & Plan  · Hold allopurinol for now due to acute kidney injury    Essential hypertension  Assessment & Plan  · Hold Lasix and lisinopril due to MILTON  · Continue metoprolol with hold parameters    VTE Prophylaxis: Heparin     Code Status:  Full code  POLST: There is no POLST form on file for this patient (pre-hospital)  Discussion with family:  Discussed with wife    Anticipated Length of Stay:  Patient will be admitted on an Inpatient basis with an anticipated length of stay of  at least 2 midnights     Justification for Hospital Stay: Sepsis    Total Time for Visit, including Counseling / Coordination of Care: 45 minutes  Greater than 50% of this total time spent on direct patient counseling and coordination of care  Chief Complaint:   Fever chills    History of Present Illness:    Bindu Mercado is a 73 y o  male who presents with fever and chills since last night  He was at his usual state of health and was having a normal day yesterday  However last night, as he was about to go to sleep he started having high fever and chills  He recalled chattering of his teeth and had to stay under the covers  According to his wife when he woke up today he still had fever and chills and was disoriented  The patient has on and off occasional cough which is not unusual for him  He denied burning on urination/difficulty urinating or pain when urinating  He denied abdominal pain, difficulty eating, change in bowel habits or diarrhea  He has known history of renal cell cancer status post cryoablation in 2018  He had no recent interventions  While in the ED he was noted to have fever and tachycardia  His white cell count was 21  IV fluid and antibiotic for sepsis protocol were administered  After these were given, he acknowledged that he felt better  He and his wife had their COVID vaccines in February of this year  Review of Systems:    Review of Systems   Constitutional: Positive for chills and fever  HENT: Negative  Eyes: Negative  Respiratory: Positive for cough  Cardiovascular:        Chronic leg swelling   Gastrointestinal: Negative  Genitourinary: Negative  Musculoskeletal: Negative  Skin: Negative  Neurological:        Disorientation   Psychiatric/Behavioral: Positive for confusion  All other systems reviewed and are negative        Past Medical and Surgical History:     Past Medical History:   Diagnosis Date    Chronic kidney disease     Hypertension     Obesity     Urinary tract infection without hematuria 4/3/2020       Past Surgical History:   Procedure Laterality Date    APPENDECTOMY      NASAL SEPTUM SURGERY         Meds/Allergies:    Prior to Admission medications    Medication Sig Start Date End Date Taking? Authorizing Provider   allopurinol (ZYLOPRIM) 100 mg tablet Take 100 mg by mouth daily  12/4/18  Yes Historical Provider, MD   furosemide (LASIX) 20 mg tablet TAKE 1 TABLET DAILY AS NEEDED FOR SWELLING 5/26/21  Yes Je Breen MD   lisinopril (ZESTRIL) 40 mg tablet TAKE 1 TABLET DAILY 5/12/21  Yes KELSEY Wiggins   metoprolol succinate (TOPROL-XL) 100 mg 24 hr tablet TAKE 1 TABLET DAILY 3/9/21  Yes Je Breen MD   Potassium Citrate ER 15 MEQ (1620 MG) TBCR Take 1 tablet by mouth daily  12/4/18  Yes Historical Provider, MD   tamsulosin (FLOMAX) 0 4 mg TAKE 1 CAPSULE TWICE A DAY  Patient taking differently: 0 4 mg daily with dinner  6/2/21  Yes KELSEY Rivera   TRAVATAN Z 0 004 % ophthalmic solution Administer 1 drop to both eyes daily at bedtime  6/1/18  Yes Historical Provider, MD     I have reviewed home medications with a medical source (PCP, Pharmacy, other)  Allergies: Allergies   Allergen Reactions    Codeine Swelling     Other reaction(s): SWELLING OF FACE  Other reaction(s): SWELLING OF FACE  Other reaction(s): SWELLING OF FACE       Social History:     Marital Status: /Civil Union   Occupation:  Retired    Used to work for [employer]  Patient Pre-hospital Living Situation:  Lives with wife  Patient Pre-hospital Level of Mobility:  Independent  Patient Pre-hospital Diet Restrictions:  None  Substance Use History:   Social History     Substance and Sexual Activity   Alcohol Use No     Social History     Tobacco Use   Smoking Status Never Smoker   Smokeless Tobacco Never Used   Tobacco Comment    no exposure to passive smoke     Social History     Substance and Sexual Activity   Drug Use Not Currently       Family History:    Family History   Problem Relation Age of Onset    Diabetes Father     Prostate cancer Brother        Physical Exam:     Vitals:   Blood Pressure: 112/57 (07/12/21 1617)  Pulse: 91 (07/12/21 1617)  Temperature: (!) 102 9 °F (39 4 °C) (07/12/21 1138)  Temp Source: Oral (07/12/21 1138)  Respirations: (!) 28 (07/12/21 1617)  SpO2: 97 % (07/12/21 1617)    Physical Exam  Vitals reviewed  Constitutional:       Appearance: He is obese  He is ill-appearing  HENT:      Head: Normocephalic and atraumatic  Nose: No rhinorrhea  Mouth/Throat:      Pharynx: No oropharyngeal exudate or posterior oropharyngeal erythema  Eyes:      General: No scleral icterus  Cardiovascular:      Rate and Rhythm: Regular rhythm  Heart sounds: No murmur heard  Pulmonary:      Effort: Pulmonary effort is normal  No respiratory distress  Breath sounds: No wheezing or rales  Abdominal:      General: There is no distension  Palpations: There is no mass  Tenderness: There is no abdominal tenderness  Comments: Large abdomen with large abdominal pannus  With lower abdominal ventral hernia   Musculoskeletal:      Cervical back: Neck supple  Right lower leg: Edema present  Left lower leg: Edema present  Skin:     Comments: Thickened skin on both lower legs and feet  Cracked skin of the heels  Neurological:      Mental Status: He is alert  Comments: Fluent speech  Symmetrical motor movements  Psychiatric:         Mood and Affect: Mood normal          Behavior: Behavior normal        Additional Data:     Lab Results: I have personally reviewed pertinent reports        Results from last 7 days   Lab Units 07/12/21  1333   WBC Thousand/uL 21 54*   HEMOGLOBIN g/dL 14 9   HEMATOCRIT % 44 5   PLATELETS Thousands/uL 210   BANDS PCT % 14*   LYMPHO PCT % 5*   MONO PCT % 3*   EOS PCT % 1     Results from last 7 days   Lab Units 07/12/21  1333   SODIUM mmol/L 138   POTASSIUM mmol/L 4 7   CHLORIDE mmol/L 100   CO2 mmol/L 29   BUN mg/dL 23 CREATININE mg/dL 2 16*   ANION GAP mmol/L 9   CALCIUM mg/dL 8 9   ALBUMIN g/dL 3 7   TOTAL BILIRUBIN mg/dL 1 96*   ALK PHOS U/L 70   ALT U/L 36   AST U/L 24   GLUCOSE RANDOM mg/dL 110     Results from last 7 days   Lab Units 07/12/21  1412   INR  1 36*             Results from last 7 days   Lab Units 07/12/21  1333   LACTIC ACID mmol/L 2 0       Imaging: I have personally reviewed pertinent films in PACS    XR chest 1 view portable    (Results Pending)       EKG, Pathology, and Other Studies Reviewed on Admission:   · EKG:  Unable to access muse strips    Allscripts / Epic Records Reviewed: Yes     ** Please Note: This note has been constructed using a voice recognition system   **

## 2021-07-12 NOTE — NURSING NOTE
Received patient as admission from ED  Patient diaphoretic and has fever  RN messaged Dr Tami Magaña for PRN Tylenol  Patient given PRN Tylenol  Patient ordered STAT CT that was not completed in ED  RN and PCA took patient to CT which was limited due to patient's size  Patient now resting in bed with wife at bedside  Will continue to monitor for duration for shift       Bobbi Woody RN 7/12/2021 7:12 PM

## 2021-07-13 ENCOUNTER — APPOINTMENT (INPATIENT)
Dept: ULTRASOUND IMAGING | Facility: HOSPITAL | Age: 66
DRG: 871 | End: 2021-07-13
Payer: MEDICARE

## 2021-07-13 ENCOUNTER — APPOINTMENT (INPATIENT)
Dept: RADIOLOGY | Facility: HOSPITAL | Age: 66
DRG: 871 | End: 2021-07-13
Payer: MEDICARE

## 2021-07-13 LAB
ALBUMIN SERPL BCP-MCNC: 3.2 G/DL (ref 3.5–5)
ALP SERPL-CCNC: 59 U/L (ref 46–116)
ALT SERPL W P-5'-P-CCNC: 37 U/L (ref 12–78)
ANION GAP SERPL CALCULATED.3IONS-SCNC: 11 MMOL/L (ref 4–13)
AST SERPL W P-5'-P-CCNC: 47 U/L (ref 5–45)
BILIRUB SERPL-MCNC: 1.99 MG/DL (ref 0.2–1)
BUN SERPL-MCNC: 25 MG/DL (ref 5–25)
CALCIUM ALBUM COR SERPL-MCNC: 9 MG/DL (ref 8.3–10.1)
CALCIUM SERPL-MCNC: 8.4 MG/DL (ref 8.3–10.1)
CHLORIDE SERPL-SCNC: 101 MMOL/L (ref 100–108)
CO2 SERPL-SCNC: 24 MMOL/L (ref 21–32)
CREAT SERPL-MCNC: 2.15 MG/DL (ref 0.6–1.3)
ERYTHROCYTE [DISTWIDTH] IN BLOOD BY AUTOMATED COUNT: 13.5 % (ref 11.6–15.1)
GFR SERPL CREATININE-BSD FRML MDRD: 31 ML/MIN/1.73SQ M
GLUCOSE SERPL-MCNC: 106 MG/DL (ref 65–140)
HCT VFR BLD AUTO: 45.9 % (ref 36.5–49.3)
HGB BLD-MCNC: 14.8 G/DL (ref 12–17)
MCH RBC QN AUTO: 31.4 PG (ref 26.8–34.3)
MCHC RBC AUTO-ENTMCNC: 32.2 G/DL (ref 31.4–37.4)
MCV RBC AUTO: 97 FL (ref 82–98)
NRBC BLD AUTO-RTO: 0 /100 WBCS
PLATELET # BLD AUTO: 163 THOUSANDS/UL (ref 149–390)
PMV BLD AUTO: 9.1 FL (ref 8.9–12.7)
POTASSIUM SERPL-SCNC: 4.3 MMOL/L (ref 3.5–5.3)
PROCALCITONIN SERPL-MCNC: 10.69 NG/ML
PROT SERPL-MCNC: 7.5 G/DL (ref 6.4–8.2)
RBC # BLD AUTO: 4.72 MILLION/UL (ref 3.88–5.62)
SODIUM SERPL-SCNC: 136 MMOL/L (ref 136–145)
WBC # BLD AUTO: 17.8 THOUSAND/UL (ref 4.31–10.16)

## 2021-07-13 PROCEDURE — 85027 COMPLETE CBC AUTOMATED: CPT | Performed by: INTERNAL MEDICINE

## 2021-07-13 PROCEDURE — 99223 1ST HOSP IP/OBS HIGH 75: CPT | Performed by: INTERNAL MEDICINE

## 2021-07-13 PROCEDURE — 99232 SBSQ HOSP IP/OBS MODERATE 35: CPT | Performed by: INTERNAL MEDICINE

## 2021-07-13 PROCEDURE — 80053 COMPREHEN METABOLIC PANEL: CPT | Performed by: INTERNAL MEDICINE

## 2021-07-13 PROCEDURE — 76705 ECHO EXAM OF ABDOMEN: CPT

## 2021-07-13 PROCEDURE — 84145 PROCALCITONIN (PCT): CPT | Performed by: INTERNAL MEDICINE

## 2021-07-13 PROCEDURE — 73630 X-RAY EXAM OF FOOT: CPT

## 2021-07-13 RX ORDER — ACETAMINOPHEN 325 MG/1
975 TABLET ORAL EVERY 6 HOURS SCHEDULED
Status: DISCONTINUED | OUTPATIENT
Start: 2021-07-13 | End: 2021-07-19 | Stop reason: HOSPADM

## 2021-07-13 RX ORDER — ACETAMINOPHEN 325 MG/1
975 TABLET ORAL EVERY 6 HOURS SCHEDULED
Status: DISCONTINUED | OUTPATIENT
Start: 2021-07-13 | End: 2021-07-13

## 2021-07-13 RX ORDER — IBUPROFEN 600 MG/1
600 TABLET ORAL ONCE
Status: COMPLETED | OUTPATIENT
Start: 2021-07-13 | End: 2021-07-13

## 2021-07-13 RX ORDER — LORAZEPAM 2 MG/ML
0.5 INJECTION INTRAMUSCULAR ONCE
Status: COMPLETED | OUTPATIENT
Start: 2021-07-13 | End: 2021-07-13

## 2021-07-13 RX ORDER — ACETAMINOPHEN 325 MG/1
325 TABLET ORAL ONCE
Status: COMPLETED | OUTPATIENT
Start: 2021-07-13 | End: 2021-07-13

## 2021-07-13 RX ORDER — SODIUM CHLORIDE 9 MG/ML
150 INJECTION, SOLUTION INTRAVENOUS CONTINUOUS
Status: DISCONTINUED | OUTPATIENT
Start: 2021-07-13 | End: 2021-07-14

## 2021-07-13 RX ADMIN — HEPARIN SODIUM 5000 UNITS: 5000 INJECTION INTRAVENOUS; SUBCUTANEOUS at 05:32

## 2021-07-13 RX ADMIN — SODIUM CHLORIDE 150 ML/HR: 0.9 INJECTION, SOLUTION INTRAVENOUS at 11:40

## 2021-07-13 RX ADMIN — TAMSULOSIN HYDROCHLORIDE 0.4 MG: 0.4 CAPSULE ORAL at 15:45

## 2021-07-13 RX ADMIN — CEFEPIME HYDROCHLORIDE 2000 MG: 2 INJECTION, POWDER, FOR SOLUTION INTRAVENOUS at 11:49

## 2021-07-13 RX ADMIN — VANCOMYCIN HYDROCHLORIDE 2000 MG: 1 INJECTION, POWDER, LYOPHILIZED, FOR SOLUTION INTRAVENOUS at 12:39

## 2021-07-13 RX ADMIN — METOPROLOL SUCCINATE 100 MG: 50 TABLET, EXTENDED RELEASE ORAL at 08:20

## 2021-07-13 RX ADMIN — SODIUM CHLORIDE 150 ML/HR: 0.9 INJECTION, SOLUTION INTRAVENOUS at 19:43

## 2021-07-13 RX ADMIN — ACETAMINOPHEN 975 MG: 325 TABLET, FILM COATED ORAL at 23:11

## 2021-07-13 RX ADMIN — ACETAMINOPHEN 975 MG: 325 TABLET, FILM COATED ORAL at 15:45

## 2021-07-13 RX ADMIN — TRAVOPROST 1 DROP: 0.04 SOLUTION OPHTHALMIC at 23:11

## 2021-07-13 RX ADMIN — POTASSIUM CITRATE 10 MEQ: 10 TABLET, EXTENDED RELEASE ORAL at 08:20

## 2021-07-13 RX ADMIN — CEFEPIME HYDROCHLORIDE 2000 MG: 2 INJECTION, POWDER, FOR SOLUTION INTRAVENOUS at 22:26

## 2021-07-13 RX ADMIN — IBUPROFEN 600 MG: 600 TABLET, FILM COATED ORAL at 22:20

## 2021-07-13 RX ADMIN — ACETAMINOPHEN 325 MG: 325 TABLET, FILM COATED ORAL at 04:42

## 2021-07-13 RX ADMIN — HEPARIN SODIUM 5000 UNITS: 5000 INJECTION INTRAVENOUS; SUBCUTANEOUS at 13:34

## 2021-07-13 RX ADMIN — LORAZEPAM 0.5 MG: 2 INJECTION INTRAMUSCULAR; INTRAVENOUS at 04:42

## 2021-07-13 RX ADMIN — HEPARIN SODIUM 5000 UNITS: 5000 INJECTION INTRAVENOUS; SUBCUTANEOUS at 22:21

## 2021-07-13 RX ADMIN — SODIUM CHLORIDE 1000 ML: 0.9 INJECTION, SOLUTION INTRAVENOUS at 04:42

## 2021-07-13 RX ADMIN — ACETAMINOPHEN 650 MG: 325 TABLET, FILM COATED ORAL at 00:02

## 2021-07-13 RX ADMIN — ACETAMINOPHEN 975 MG: 325 TABLET, FILM COATED ORAL at 09:40

## 2021-07-13 NOTE — OCCUPATIONAL THERAPY NOTE
Occupational Therapy Note:    Patient Name: Kalee Angel  [id number] Date: 7/13/2021    OT consult received  Chart reviewed  RN requested to defer therapy evaluations this AM 2* current medical status  Will cx and attempt at later time as medically appropriate      LAURA Fuchs/APOLLO

## 2021-07-13 NOTE — CONSULTS
Consultation - Infectious Disease   Yaquelin Fisher 73 y o  male MRN: [id number]  Unit/Bed#: [place] [address] -01 Encounter: [id number]    IMPRESSION & RECOMMENDATIONS:   1  Sepsis  Fever, tachycardia, tachypnea, and leukocytosis  Suspect source originated in patient's left foot as he recently lost a toenail and there is hazy erythema on the left dorsal foot and distal left extremity  Concern for bacteremia, blood cultures are pending  No other clear source appreciated  He has no respiratory, GI, or  complaints  Patient unfortunately remains persistently afebrile  He is currently on a cooling blanket  It does appear this afternoon his fever curve is trending down   -antibiotics as below  -check CBCD and BMP tomorrow  -follow up blood cultures  -monitor vitals  -supportive care     2  Left foot cellulitis  Per patient's wife he recently lost the toenail on his 2nd toe  She noticed gooey discharge coming from the nail bed and has been soaking his foot in water and applying bandaids  There is faint erythema on the left dorsal foot and distal left extremity  Patient also with lymphedema and skin thickening of the lower legs  The patient has been started on broad-spectrum antibiotic coverage with vancomycin cefepime  I recommend continuing this for now  Would recommend assessment by Podiatry   -continue IV cefepime  -continue IV vancomycin  -continue pharmacy consult for guidance on vancomycin dosage  -check CBCD and BMP tomorrow  -monitor vitals  -serial left foot exams  -recommend Podiatry consult    3  Acute kidney injury  On CKD stage 3  Upon review of patient's available past medical records it appears his baseline creatinine is approximately 1 5-1 7  His creatinine was 2 16 upon admission  He is receiving IV fluids at this time   -check creatinine tomorrow  -dose adjust antibiotics for renal function as needed  -avoid nephrotoxins  -consider Nephrology consult if creatinine fails to improve    4  Lymphedema  Likely contributing to left foot cellulitis above  Patient's wife asked about compression  Patient would benefit from compression and elevation of the extremities to promote venous and lymphatic return  He would benefit from elevating the pannus to take pressure off the groin  He may benefit from outpatient lymphedema pumps and could be seen in a lymphedema Clinic for further assessment after discharge   -serial lower extremity exams  -recommend elevation of the lower extremities to promote venous and lymphatic return    5  Morbid obesity  BMI = 60 87  Thank you for the consult  We will continue to follow along  Above plan was discussed in detail with the patient and his wife at the bedside  Above plan was discussed in detail with [facility] attending, Dr Ancelmo Ronquillo  HISTORY OF PRESENT ILLNESS:  Reason for Consult:  SIRS due to infectious process with organ dysfunction, sepsis  HPI: Jaya Rothman is a 73y o  year old male who presented to the [place] [address] Emergency Department on 07/12/2021 with weakness, difficulty ambulating, dizziness, and chills  Patient's wife felt patient seemed confused and she noticed he was having difficulty finishing sentences  She also reports she has recently had some urinary incontinence  Upon arrival to the ED the patient's temperature was 102 9° F  Heart rate is 101  Respiratory rate was 20  O2 saturation was 93% on room air  BP was 156/71  WBC count was 21 54 with 14% bands  Lactic acid was 2  His creatinine was 2 16 with a GFR of 31  LFTs were normal   He was taken for CT of the chest/abdomen/pelvis which showed a large right lateral abdominal hernia within the pannus  No obvious bowel obstruction, colitis, or diverticulitis  There was also a new left renal lesion  No acute infectious cardiopulmonary findings noted  The patient was started on ceftriaxone  He was admitted for additional medical management      After his admission the patient was continued on ceftriaxone and his antibiotic regimen  He was placed on nasal cannula O2 due to a low O2 saturation reading of 88%  Overnight patient had worsening fever, T-max 104 4°  He was placed on a cooling blanket  His antibiotic regimen was broadened to cefepime and vancomycin  We have been asked formal consult for SIRS due to infectious process with organ dysfunction, sepsis  The patient has CKD stage III, hypertension, obesity, lymphedema, obesity, gout, allergic rhinitis, HDL deficiency, and left kidney renal cell carcinoma  He has a history of foot pain, kidney stones, appendectomy, and nasal septum surgery  He has an allergy to codeine  REVIEW OF SYSTEMS:  Patient reports he is feeling exhausted  Has periods of feeling hot and  The feeling like he is freezing  Understands that he needs to stay on the colon blanket right now  He denies sore throat, runny nose, and sinus congestion  He denies cough, chest pain, shortness of breath, and difficulty breathing  He denies abdominal pain, nausea, vomiting, and diarrhea  He denies dysuria  He tells me that he has a hernia on the right side of his abdomen but that does not hurt  His wife tells me this was a complication of previous appendectomy  He also tells me he had kidney cancer on the left but has been cancer free for years  A complete 12 point system-based review of systems is otherwise negative      PAST MEDICAL HISTORY:  Past Medical History:   Diagnosis Date    Chronic kidney disease     Hypertension     Obesity     Urinary tract infection without hematuria 4/3/2020     Past Surgical History:   Procedure Laterality Date    APPENDECTOMY      NASAL SEPTUM SURGERY       FAMILY HISTORY:  Non-contributory    SOCIAL HISTORY:  Social History   Social History     Substance and Sexual Activity   Alcohol Use No     Social History     Substance and Sexual Activity   Drug Use Not Currently     Social History     Tobacco Use   Smoking Status Never Smoker   Smokeless Tobacco Never Used   Tobacco Comment    no exposure to passive smoke     ALLERGIES:  Allergies   Allergen Reactions    Codeine Swelling     Other reaction(s): SWELLING OF FACE  Other reaction(s): SWELLING OF FACE  Other reaction(s): SWELLING OF FACE     MEDICATIONS:  All current active medications have been reviewed  ANTIBIOTICS:  Cefepime 1  Vancomycin 1  Antibiotics 2    PHYSICAL EXAM:  Temp:  [100 3 °F (37 9 °C)-104 5 °F (40 3 °C)] 103 8 °F (39 9 °C)  HR:  [] 91  Resp:  [20-31] 27  BP: (105-156)/(51-71) 127/70  SpO2:  [91 %-98 %] 93 %  Temp (24hrs), Av 4 °F (39 7 °C), Min:100 3 °F (37 9 °C), Max:104 5 °F (40 3 °C)  Current: Temperature: (!) 103 8 °F (39 9 °C)    Intake/Output Summary (Last 24 hours) at 2021 1132  Last data filed at 2021 0101  Gross per 24 hour   Intake 50 ml   Output 250 ml   Net -200 ml     General Appearance:  Appearing tired, nontoxic, and in no acute distress he is wearing his glasses  Head:  Normocephalic, without obvious abnormality, atraumatic  Eyes:  Conjunctiva pink and sclera anicteric, both eyes  Nose: Nares normal, mucosa normal, no drainage  Throat: Oropharynx moist without lesions  Neck: Supple, symmetrical, no adenopathy, no tenderness/mass/nodules  Lungs:   Clear to auscultation bilaterally, respirations unlabored on room air  Chest Wall:  No tenderness or deformity  Heart:  Tachycardic, irregular; no murmur, rub or gallop  Abdomen:   Soft, morbidly obese, non-tender, non-distended, hyperactive bowel sounds throughout  Extremities: No cyanosis or clubbing; bilateral lower extremity lymphedema; left 2nd toenail is gone, wound bed appears as dried yellow scab, there is mild surrounding erythema on the left 2nd toe which extends to the left dorsal foot and the left distal shin  Skin: No rashes noted on exposed skin  Thick plaques on the bilateral lower extremities     Lymph nodes: Cervical, supraclavicular nodes normal    Neurologic: Alert and oriented times 3, follows commands, speech is organized and appropriate, symmetric facial movement  LABS, IMAGING, & OTHER STUDIES:  Lab Results:  I have personally reviewed pertinent labs  Results from last 7 days   Lab Units 07/13/21  0620 07/12/21  1333   WBC Thousand/uL 17 80* 21 54*   HEMOGLOBIN g/dL 14 8 14 9   PLATELETS Thousands/uL 163 210     Results from last 7 days   Lab Units 07/13/21  0620 07/12/21  1333   POTASSIUM mmol/L 4 3 4 7   CHLORIDE mmol/L 101 100   CO2 mmol/L 24 29   BUN mg/dL 25 23   CREATININE mg/dL 2 15* 2 16*   EGFR ml/min/1 73sq m 31 31   CALCIUM mg/dL 8 4 8 9   AST U/L 47* 24   ALT U/L 37 36   ALK PHOS U/L 59 70     Results from last 7 days   Lab Units 07/12/21  1333 07/12/21  1325   BLOOD CULTURE  Received in Microbiology Lab  Culture in Progress  Received in Microbiology Lab  Culture in Progress  Results from last 7 days   Lab Units 07/12/21  1835   PROCALCITONIN ng/ml 7 88*     Imaging Studies:   I have personally reviewed pertinent imaging study reports and images in PACS  CT CHEST ABDOMEN PELVIS WO CONTRAST 7/12/2021 - I personally reviewed this image which showed no acute cardiopulmonary findings  No infectious abdominal pelvic etiology noted  Patient has a large right lateral abdominal hernia  Other Studies:   Blood cultures are pending    I have personally reviewed pertinent reports:    07/12/2021 1341 07/12/2021 1515 Novel Coronavirus (Covid-19),PCR [facility] - 2 Hour Stat [[id number]]    Nares from Nasopharyngeal Swab    Final result 2420 [facility] The specimen collection materials, transport medium, and/or testing methodology utilized in the production of these test results have been proven to be reliable in a limited validation with an abbreviated program under the Emergency Utilization Authorization provided by the [facility] 6 reported as "Presumptive positive" will be confirmed with secondary testing to ensure result accuracy   Clinical caution and judgement should be used with the interpretation of these results with consideration of the clinical impression and other laboratory testing   Testing reported as "Positive" or "Negative" has been proven to be accurate according to standard laboratory validation requirements   All testing is performed with control materials showing appropriate reactivity at standard intervals   Component Value   SARS-CoV-2 Negative

## 2021-07-13 NOTE — ASSESSMENT & PLAN NOTE
· He met sepsis criteria with leukocytosis, tachycardia and fever  · Cause is unclear    Presented with WBC count of 21 K  · And high-grade fever which has remained persistent  · CT abdomen pelvis negative  · Urinalysis negative  · Will broaden antibiotics for now with cefepime and vancomycin  · ID consult  · Follow-up blood cultures

## 2021-07-13 NOTE — PHYSICAL THERAPY NOTE
PHYSICAL THERAPY NOTE          Patient Name: April Castañeda  SSHKO'Z Date: 7/13/2021     PT consult received  RN requested to defer PT/OT evals at this time 2* to pt's medical status  Will continue to follow as appropriate   Elizabeth Gonzales PT

## 2021-07-13 NOTE — ASSESSMENT & PLAN NOTE
Lab Results   Component Value Date    EGFR 31 07/13/2021    EGFR 31 07/12/2021    CREATININE 2 15 (H) 07/13/2021    CREATININE 2 16 (H) 07/12/2021     Known chronic kidney disease with baseline creatinine 1 5-1 7  Hold Lasix  Hold lisinopril    Creatinine currently 2 1  Will provide IVF

## 2021-07-13 NOTE — NURSING NOTE
Patients fever ranging from 103 8- 104 4 since the beginning of this shift  Dr Ancelmo Ronquillo made aware 1838, will round and see patient this morning  Patient with right antecubital IV access starting to leak  The attending MD was down to see patient this morning around 0920 and aware of same  Multiple nurses have attempted to replace IV with no success  The doctor was aware of delay in IV antibiotic therapy until IV access obtained  Another nurse arrived and was able to place new IV access at 1145  IV antibiotics administered when IV access obtained  Temp decreasing,102 0 at 1146   Will continue to monitor

## 2021-07-13 NOTE — NURSING NOTE
Patient spiked fever of 103 8  Tylenol administered, ice applied to neck, forehead, groin, armpits and chest per KAREN Elias  Will continue to monitor temperature       Darlene Funk RN, 07/13/21, 12:27 AM

## 2021-07-13 NOTE — PLAN OF CARE
Problem: PAIN - ADULT  Goal: Verbalizes/displays adequate comfort level or baseline comfort level  Description: Interventions:  - Encourage patient to monitor pain and request assistance  - Assess pain using appropriate pain scale  - Administer analgesics based on type and severity of pain and evaluate response  - Implement non-pharmacological measures as appropriate and evaluate response  - Consider cultural and social influences on pain and pain management  - Notify physician/advanced practitioner if interventions unsuccessful or patient reports new pain  Outcome: Progressing     Problem: INFECTION - ADULT  Goal: Absence or prevention of progression during hospitalization  Description: INTERVENTIONS:  - Assess and monitor for signs and symptoms of infection  - Monitor lab/diagnostic results  - Monitor all insertion sites, i e  indwelling lines, tubes, and drains  - Monitor endotracheal if appropriate and nasal secretions for changes in amount and color  - [place] appropriate cooling/warming therapies per order  - Administer medications as ordered  - Instruct and encourage patient and family to use good hand hygiene technique  - Identify and instruct in appropriate isolation precautions for identified infection/condition  Outcome: Progressing     Problem: SAFETY ADULT  Goal: Maintain or return to baseline ADL function  Description: INTERVENTIONS:  -  Assess patient's ability to carry out ADLs; assess patient's baseline for ADL function and identify physical deficits which impact ability to perform ADLs (bathing, care of mouth/teeth, toileting, grooming, dressing, etc )  - Assess/evaluate cause of self-care deficits   - Assess range of motion  - Assess patient's mobility; develop plan if impaired  - Assess patient's need for assistive devices and provide as appropriate  - Encourage maximum independence but intervene and supervise when necessary  - Involve family in performance of ADLs  - Assess for home care needs following discharge   - Consider OT consult to assist with ADL evaluation and planning for discharge  - Provide patient education as appropriate  Outcome: Progressing

## 2021-07-13 NOTE — PROGRESS NOTES
Vancomycin Assessment    Ashia Eden is a 73 y o  male who is currently receiving vancomycin IV 2000mg as a loading dose for bacteremia    Relevant clinical data and objective history reviewed:  Creatinine   Date Value Ref Range Status   07/13/2021 2 15 (H) 0 60 - 1 30 mg/dL Final     Comment:     Standardized to IDMS reference method   07/12/2021 2 16 (H) 0 60 - 1 30 mg/dL Final     Comment:     Standardized to IDMS reference method     /71   Pulse 91   Temp (!) 104 4 °F (40 2 °C) (Rectal)   Resp (!) 25   SpO2 94%   I/O last 3 completed shifts: In: 48 [IV Piggyback:50]  Out: 250 [Urine:250]  Lab Results   Component Value Date/Time    BUN 25 07/13/2021 06:20 AM    WBC 17 80 (H) 07/13/2021 06:20 AM    HGB 14 8 07/13/2021 06:20 AM    HCT 45 9 07/13/2021 06:20 AM    MCV 97 07/13/2021 06:20 AM     07/13/2021 06:20 AM     Temp Readings from Last 3 Encounters:   07/13/21 (!) 104 4 °F (40 2 °C) (Rectal)   05/11/21 97 8 °F (36 6 °C) (Temporal)   11/09/20 97 8 °F (36 6 °C) (Temporal)     Vancomycin Days of Therapy: 1    Assessment/Plan  The patient is currently on vancomycin utilizing scheduled dosing based on adjusted body weight (due to obesity)  The patient is currently receiving vancomycin 2000mg IV as a loading dose and will be started on vancomycin 1500mg IV every 12 hours thereafter  Pharmacy will also follow closely for s/sx of nephrotoxicity, infusion reactions, and appropriateness of therapy  BMP and CBC will be ordered per protocol  Plan for trough as patient approaches steady state, prior to the 5th dose at approximately 1145 on 7/15/21  Due to infection severity, will target a trough of 15-20  Pharmacy will continue to follow the patients culture results and clinical progress daily      Adan Gomes, Pharmacist

## 2021-07-14 LAB
ANION GAP SERPL CALCULATED.3IONS-SCNC: 7 MMOL/L (ref 4–13)
BUN SERPL-MCNC: 25 MG/DL (ref 5–25)
CALCIUM SERPL-MCNC: 7.6 MG/DL (ref 8.3–10.1)
CHLORIDE SERPL-SCNC: 104 MMOL/L (ref 100–108)
CO2 SERPL-SCNC: 26 MMOL/L (ref 21–32)
CREAT SERPL-MCNC: 1.83 MG/DL (ref 0.6–1.3)
ERYTHROCYTE [DISTWIDTH] IN BLOOD BY AUTOMATED COUNT: 13.7 % (ref 11.6–15.1)
GFR SERPL CREATININE-BSD FRML MDRD: 38 ML/MIN/1.73SQ M
GLUCOSE SERPL-MCNC: 125 MG/DL (ref 65–140)
HCT VFR BLD AUTO: 38.7 % (ref 36.5–49.3)
HGB BLD-MCNC: 12.5 G/DL (ref 12–17)
MAGNESIUM SERPL-MCNC: 1.7 MG/DL (ref 1.6–2.6)
MCH RBC QN AUTO: 30.8 PG (ref 26.8–34.3)
MCHC RBC AUTO-ENTMCNC: 32.3 G/DL (ref 31.4–37.4)
MCV RBC AUTO: 95 FL (ref 82–98)
PLATELET # BLD AUTO: 121 THOUSANDS/UL (ref 149–390)
PMV BLD AUTO: 9.5 FL (ref 8.9–12.7)
POTASSIUM SERPL-SCNC: 4.1 MMOL/L (ref 3.5–5.3)
RBC # BLD AUTO: 4.06 MILLION/UL (ref 3.88–5.62)
SODIUM SERPL-SCNC: 137 MMOL/L (ref 136–145)
WBC # BLD AUTO: 11.04 THOUSAND/UL (ref 4.31–10.16)

## 2021-07-14 PROCEDURE — 97163 PT EVAL HIGH COMPLEX 45 MIN: CPT

## 2021-07-14 PROCEDURE — 99233 SBSQ HOSP IP/OBS HIGH 50: CPT | Performed by: INTERNAL MEDICINE

## 2021-07-14 PROCEDURE — 85027 COMPLETE CBC AUTOMATED: CPT | Performed by: STUDENT IN AN ORGANIZED HEALTH CARE EDUCATION/TRAINING PROGRAM

## 2021-07-14 PROCEDURE — 83735 ASSAY OF MAGNESIUM: CPT | Performed by: STUDENT IN AN ORGANIZED HEALTH CARE EDUCATION/TRAINING PROGRAM

## 2021-07-14 PROCEDURE — 97166 OT EVAL MOD COMPLEX 45 MIN: CPT

## 2021-07-14 PROCEDURE — 99232 SBSQ HOSP IP/OBS MODERATE 35: CPT | Performed by: STUDENT IN AN ORGANIZED HEALTH CARE EDUCATION/TRAINING PROGRAM

## 2021-07-14 PROCEDURE — 87040 BLOOD CULTURE FOR BACTERIA: CPT | Performed by: NURSE PRACTITIONER

## 2021-07-14 PROCEDURE — 80048 BASIC METABOLIC PNL TOTAL CA: CPT | Performed by: STUDENT IN AN ORGANIZED HEALTH CARE EDUCATION/TRAINING PROGRAM

## 2021-07-14 RX ORDER — NYSTATIN 100000 [USP'U]/G
POWDER TOPICAL 2 TIMES DAILY
Status: DISCONTINUED | OUTPATIENT
Start: 2021-07-14 | End: 2021-07-19 | Stop reason: HOSPADM

## 2021-07-14 RX ORDER — AMMONIUM LACTATE 12 G/100G
LOTION TOPICAL 2 TIMES DAILY
Status: DISCONTINUED | OUTPATIENT
Start: 2021-07-14 | End: 2021-07-19 | Stop reason: HOSPADM

## 2021-07-14 RX ADMIN — Medication: at 13:22

## 2021-07-14 RX ADMIN — POTASSIUM CITRATE 10 MEQ: 10 TABLET, EXTENDED RELEASE ORAL at 09:19

## 2021-07-14 RX ADMIN — TRAVOPROST 1 DROP: 0.04 SOLUTION OPHTHALMIC at 21:19

## 2021-07-14 RX ADMIN — HEPARIN SODIUM 5000 UNITS: 5000 INJECTION INTRAVENOUS; SUBCUTANEOUS at 13:22

## 2021-07-14 RX ADMIN — METOPROLOL SUCCINATE 100 MG: 50 TABLET, EXTENDED RELEASE ORAL at 09:19

## 2021-07-14 RX ADMIN — ACETAMINOPHEN 975 MG: 325 TABLET, FILM COATED ORAL at 05:47

## 2021-07-14 RX ADMIN — ACETAMINOPHEN 975 MG: 325 TABLET, FILM COATED ORAL at 17:04

## 2021-07-14 RX ADMIN — HEPARIN SODIUM 5000 UNITS: 5000 INJECTION INTRAVENOUS; SUBCUTANEOUS at 05:47

## 2021-07-14 RX ADMIN — SODIUM CHLORIDE 150 ML/HR: 0.9 INJECTION, SOLUTION INTRAVENOUS at 04:23

## 2021-07-14 RX ADMIN — VANCOMYCIN HYDROCHLORIDE 1500 MG: 1 INJECTION, POWDER, LYOPHILIZED, FOR SOLUTION INTRAVENOUS at 13:22

## 2021-07-14 RX ADMIN — CEFEPIME HYDROCHLORIDE 2000 MG: 2 INJECTION, POWDER, FOR SOLUTION INTRAVENOUS at 21:20

## 2021-07-14 RX ADMIN — NYSTATIN: 100000 POWDER TOPICAL at 16:57

## 2021-07-14 RX ADMIN — HEPARIN SODIUM 5000 UNITS: 5000 INJECTION INTRAVENOUS; SUBCUTANEOUS at 21:19

## 2021-07-14 RX ADMIN — VANCOMYCIN HYDROCHLORIDE 1500 MG: 1 INJECTION, POWDER, LYOPHILIZED, FOR SOLUTION INTRAVENOUS at 01:00

## 2021-07-14 RX ADMIN — CEFEPIME HYDROCHLORIDE 2000 MG: 2 INJECTION, POWDER, FOR SOLUTION INTRAVENOUS at 09:19

## 2021-07-14 RX ADMIN — TAMSULOSIN HYDROCHLORIDE 0.4 MG: 0.4 CAPSULE ORAL at 17:04

## 2021-07-14 RX ADMIN — SODIUM CHLORIDE 150 ML/HR: 0.9 INJECTION, SOLUTION INTRAVENOUS at 11:50

## 2021-07-14 RX ADMIN — ACETAMINOPHEN 975 MG: 325 TABLET, FILM COATED ORAL at 11:49

## 2021-07-14 NOTE — DISCHARGE INSTR - OTHER ORDERS
Wound Care Plan:   1-Lac-hydrin lotion to lower legs and feet twice daily  2-Elevate heels off of bed/chair surface to offload pressure--prevalon boots per podiatry  3-Bariatric offloading air cushion in chair when out of bed  4-Moisturize skin daily with lotion  5-Hydraguard lotion to bilateral buttocks and sacrum twice daily and as needed  6-Skin folds--cleanse gently with soap and water using disposable wash cloths, dry well  Dust nystatin powder into axilla, breast, abdominal, and groin folds twice daily  Keep skin folds clean and dry  7-Turn and reposition every 2 hours while in bed and weight shift frequently while in chair for pressure re-distribution on skin  Follow-up with podiatry and call for appointment at lymphedema clinic--[place] [phone] [place] [address]  [phone]

## 2021-07-14 NOTE — PROGRESS NOTES
Progress Note - Infectious Disease   Renee Fabian 73 y o  male MRN: [id number]  Unit/Bed#: [place] [address] -01 Encounter: [id number]    Impression/Plan:  1  Sepsis  Fever, tachycardia, tachypnea, and leukocytosis  Suspect source originated in patient's left foot as he recently lost a toenail and there is hazy erythema on the left dorsal foot and distal left extremity  Concern for bacteremia as one set of his initial cultures are positive for GPC in clusters  No other clear source appreciated  He has no respiratory, GI, or  complaints  Despite being systemically ill he has remained hemodynamically stable  Patient's fevers have been persistent but appear to be trending down this morning  WBC count has trended down   -antibiotics as below  -check CBCD and BMP tomorrow  -follow up blood cultures  -monitor vitals  -supportive care     2  Positive blood culture  Patient showing GPC in clusters in one set of initial blood cultures  This may be a skin contaminant but may represent active bacteremia in the setting of left foot cellulitis  Will await final pathogen  Will send repeat blood cultures now  He may require TTE   -antibiotics as below  -check CBCD and BMP tomorrow  -follow up blood cultures  -check repeat blood cultures now  -monitor vitals    3  Left foot cellulitis  Per patient's wife he recently lost the toenail on his 2nd toe  She noticed gooey discharge coming from the nail bed and has been soaking his foot in water and applying bandaids  There is faint erythema on the left dorsal foot and distal left extremity  Patient also with lymphedema and skin thickening of the lower legs  The patient is currently receiving vancomycin and cefepime which he has tolerated without difficulty  I recommend continuing this for now   A podiatry consult is pending   -continue IV cefepime  -continue IV vancomycin  -continue pharmacy consult for guidance on vancomycin dosage  -check CBCD and BMP tomorrow  -monitor vitals  -serial left foot exams  -followup podiatry consult     4  Acute kidney injury  On CKD stage 3  Upon review of patient's available past medical records it appears his baseline creatinine is approximately 1 5-1 7  His creatinine was 2 16 upon admission  He is receiving IV fluids at this time    -check creatinine tomorrow  -dose adjust antibiotics for renal function as needed  -avoid nephrotoxins  -consider Nephrology consult if creatinine fails to improve     5  Lymphedema  Likely contributing to left foot cellulitis above  Patient's wife asked about compression  Patient would benefit from compression and elevation of the extremities to promote venous and lymphatic return  He would benefit from elevating the pannus to take pressure off the groin  He may benefit from outpatient lymphedema pumps and could be seen in a lymphedema Clinic for further assessment after discharge   -serial lower extremity exams  -recommend elevation of the lower extremities to promote venous and lymphatic return     6  Morbid obesity  BMI = 60 78  Above plan was discussed in detail with patient at the bedside  Above plan was discussed in detail with [facility] attending, Dr Kacy Aponte     Antibiotics:  Vancomycin 2  Cefepime 2  Antibiotics 3    Subjective:  Patient reports he is pleased that he is feeling better today  He tells me his fever broke last night and he has been able to be out of bed in the chair off of the cooling blanket  He denies chills, sweats, shakes, nausea, vomiting, abdominal pain, diarrhea, dysuria, cough, shortness of breath, or chest pain  Patient reports he required oxygen for a short while in the middle the night, believes it was while he was rolling around in toileting  RN confirmed this and reports he has not required any O2 so far this morning  No new symptoms      Objective:  Vitals:  Temp:  [98 °F (36 7 °C)-104 4 °F (40 2 °C)] 98 °F (36 7 °C)  HR:  [] 86  Resp:  [18-32] 18  BP: (100-135)/(62-70) 116/68  SpO2:  [87 %-96 %] 96 %  Temp (24hrs), Av °F (38 9 °C), Min:98 °F (36 7 °C), Max:104 4 °F (40 2 °C)  Current: Temperature: 98 °F (36 7 °C)    Physical Exam:   General Appearance:  Alert, interactive, nontoxic, no acute distress  He appears comfortable sitting out of bed in his chair  Throat: Oropharynx moist without lesions  Lungs:   Decreased to auscultation bilaterally; no wheezes, rhonchi or rales; respirations unlabored on room air  Heart:  RRR; no murmur, rub or gallop  Abdomen:   Soft, morbidly obese, non-tender, non-distended, positive bowel sounds  Extremities: No clubbing or cyanosis; bilateral lower extremity lymphedema with chronic overlying venous stasis dermatitis and thick skin plaques; trace erythema noted over the left dorsal foot extending to the left distal shin, no warmth to touch or pain with palpation  No active drainage coming from patient's left 2nd toe where toenail fell off  Skin: No new rashes or lesions noted on exposed skin       Labs, Imaging, & Other studies:   All pertinent labs and imaging studies were personally reviewed  Results from last 7 days   Lab Units 21  0620 21  1333   WBC Thousand/uL 17 80* 21 54*   HEMOGLOBIN g/dL 14 8 14 9   PLATELETS Thousands/uL 163 210     Results from last 7 days   Lab Units 21  0620 21  1333   POTASSIUM mmol/L 4 3 4 7   CHLORIDE mmol/L 101 100   CO2 mmol/L 24 29   BUN mg/dL 25 23   CREATININE mg/dL 2 15* 2 16*   EGFR ml/min/1 73sq m 31 31   CALCIUM mg/dL 8 4 8 9   AST U/L 47* 24   ALT U/L 37 36   ALK PHOS U/L 59 70     Results from last 7 days   Lab Units 21  1333 21  1325   BLOOD CULTURE  No Growth at 24 hrs   --    GRAM STAIN RESULT   --  Gram positive cocci in clusters*     Results from last 7 days   Lab Units 21  0620 21  1835   PROCALCITONIN ng/ml 10 69* 7 88*

## 2021-07-14 NOTE — ASSESSMENT & PLAN NOTE
73year old male presenting with sepsis possibly due to left foot cellulitis  However, podiatry feels otherwise  Cultures growing 1/2+ for GPC  Currently on broad spectrum antibiotics until repeat cultures come back  - Appreciate ID and podiatry recommendations

## 2021-07-14 NOTE — PLAN OF CARE
Problem: OCCUPATIONAL THERAPY ADULT  Goal: Performs self-care activities at highest level of function for planned discharge setting  See evaluation for individualized goals  Description: Treatment Interventions: ADL retraining, Functional transfer training, UE strengthening/ROM, Endurance training, Patient/family training, Equipment evaluation/education, Compensatory technique education, Energy conservation, Activityengagement          See flowsheet documentation for full assessment, interventions and recommendations  Note: Limitation: Decreased ADL status, Decreased UE strength, Decreased endurance, Decreased self-care trans, Decreased high-level ADLs  Prognosis: Good  Assessment: Pt is a 73 y o  male seen for OT evaluation s/p adm to [facility] on 7/12/2021 w/ fever and chills and admitted w/ Sepsis, MILTON superimposed on CKD, and Gout  Comorbidities affecting pts functional performance include a significant PMH of CKD, HTN, and Obesity  Pt with active OT orders and activity orders for Up and OOB as tolerated   Pt lives with spouse and son in a two level house with 4-5 SANDRA  Pt reports sleeping on 1st floor in recliner with access to full bathroom on 1st  At baseline, pt was I w/ ADLs and functional mobility/transfers w/o use of AD, required assist w/ IADLs, (+) , and (+) falls PTA  Upon evaluation, pt currently requires Supervision for UB ADLs, Mod-Min A for LB ADLs, Min A for toileting, Supervision for transfers, and Min A for functional mobility 2* the following deficits impacting occupational performance: decreased strength, decreased balance and decreased tolerance  These impairments, as well at pts steps to enter environment and difficulty performing ADLS limit pts ability to safely engage in all baseline areas of occupation  The patient's raw score on the AM-PAC Daily Activity inpatient short form is 19, standardized score is 40 22, greater than 39 4   Patients at this level are likely to benefit from discharge to home  Please refer to the recommendation of the Occupational Therapist for safe discharge planning  Based on the aforementioned OT evaluation, functional performance deficits, and assessments, pt has been identified as a Moderate complexity evaluation  Pt to continue to benefit from continued acute OT services during hospital stay to address defined deficits and to maximize level of functional independence in the following Occupational Performance areas: grooming, bathing/shower, toilet hygiene, dressing, medication management, health maintenance, functional mobility, community mobility, clothing management and social participation  From OT standpoint, recommend Home OT upon D/C   OT will continue to follow pt 2-3x/wk to address the following goals to  w/in 10-14 days:     OT Discharge Recommendation: Home with home health rehabilitation  OT - OK to Discharge: Yes (when medically cleared)

## 2021-07-14 NOTE — PLAN OF CARE
Problem: PHYSICAL THERAPY ADULT  Goal: Performs mobility at highest level of function for planned discharge setting  See evaluation for individualized goals  Description: Treatment/Interventions: Functional transfer training, LE strengthening/ROM, Elevations, Therapeutic exercise, Endurance training, Patient/family training, Bed mobility, Gait training, Spoke to nursing, OT  Equipment Recommended: Teri Yuen (bariatric RW)       See flowsheet documentation for full assessment, interventions and recommendations  Note: Prognosis: Fair  Problem List: Decreased strength, Decreased endurance, Impaired balance, Decreased mobility, Obesity, Decreased skin integrity  Assessment: Pt  66 y  o male presented w/ fever & chills  Pt admitted for Sepsis [facility]) w/ bacteremia, MILTON, L foot cellulitis & L 2nd toe traumatic nail avulsion POA  Pt referred to PT for mobility assessment & D/C planning w/ orders of up & OOB as tolerated & WBAT BLE  PTA, pt reports being I w/o AD  On eval, pt demonstrate dec mobility, balance, endurance & amb  Pt require S for transfers & minAx1 for amb w/ bariatric RW + cues for techniques  Gait deviations as above, slow w/ WBOS, dec foot clearance & inc lateral sway 2* to body habitus but no gross LOB noted  (+) fatigue w/ amb but relieved w/ rest  No dizziness reported t/o session  Nsg staff most recent vital signs as follows: /61   Pulse 84   Temp 98 6 °F (37 °C)   Resp 22   SpO2 96%   At end of session, pt remain OOB in chair in stable condition, call bell & phone in reach, all lines intact  Fall precautions reinforced w/ good understanding  Pt functioning below baseline hence will continue skilled PT to improve function & safety  The patient's AM-PAC Basic Mobility Inpatient Short Form Raw Score is 18, Standardized Score is 41 05  A standardized score less than 42 9 suggests the patient may benefit from discharge to post-acute rehabilitation services   From PT standpoint, despite AM-PAC score, will anticipate good progress in PT for safe D/C to home  Will recommend HHPT, bariatric RW & family support at D/C  CM to follow  Nsg staff to continue to mobilized pt (OOB in chair for all meals & ambulate in room/unit) as tolerated to prevent further decline in function  Nsg notified  Barriers to Discharge: Inaccessible home environment  Barriers to Discharge Comments: (+) SANDRA     PT Discharge Recommendation: Home with home health rehabilitation (home w/ HHPT)          See flowsheet documentation for full assessment

## 2021-07-14 NOTE — OCCUPATIONAL THERAPY NOTE
Occupational Therapy Evaluation     Patient Name: Javier Villagran  HUUWU'B Date: 7/14/2021  Problem List  Principal Problem:    Sepsis ([address] )  Active Problems:    Essential hypertension    Gout    DELIA (obstructive sleep apnea)    Acute kidney injury superimposed on chronic kidney disease [facility])    Past Medical History  Past Medical History:   Diagnosis Date    Chronic kidney disease     Hypertension     Obesity     Urinary tract infection without hematuria 4/3/2020     Past Surgical History  Past Surgical History:   Procedure Laterality Date    APPENDECTOMY      NASAL SEPTUM SURGERY             07/14/21 1039   Note Type   Note type Evaluation   Restrictions/Precautions   Weight Bearing Precautions Per Order No   Other Precautions Multiple lines; Fall Risk  (Morbid obesity)   Pain Assessment   Pain Assessment Tool Pain Assessment not indicated - pt denies pain   Pain Score No Pain   Home Living   Type of Home House   Home Layout Two level; Able to live on main level with bedroom/bathroom; Performs ADLs on one level;Stairs to enter with rails  (4-5 SANDRA)   Bathroom Shower/Tub Walk-in shower   Bathroom Toilet Standard   Bathroom Equipment Shower chair   Bathroom Accessibility Accessible   Home Equipment Other (Comment)  (no personal DME; has access to scooter, RW, W/C and SPC)   Additional Comments Pt lives with spouse and son in a two level house with 4-5 SANDRA  Pt reports sleeping on 1st floor in recliner with access to full bathroom on 1st    Prior Function   Level of [place] Independent with ADLs and functional mobility; Needs assistance with IADLs   Lives With Spouse;Son  (Son has Autism per pt)   Receives Help From Family   ADL Assistance Independent   IADLs Needs assistance   Falls in the last 6 months 1 to 4  (1 per pt report)   Vocational Retired   Comments At baseline, pt was I w/ ADLs and functional mobility/transfers w/o use of AD, required assist w/ IADLs, (+) , and (+) falls PTA     Lifestyle Autonomy At baseline, pt was I w/ ADLs and functional mobility/transfers w/o use of AD, required assist w/ IADLs, (+) , and (+) falls PTA  Reciprocal Relationships Spouse, son   Service to Others Retired-     Intrinsic Gratification Watching TV   Psychosocial   Psychosocial ([address]) [facility]   Where Assessed Chair   Eating Assistance 5  Supervision/Setup   Grooming Assistance 5  Supervision/Setup   [address] 5  Supervision/Setup   LB [address] 4  Minimal Assistance   [address] 5  Supervision/Setup    [facility] 3  Moderate Assistance   [address]  4  [address] 4  Minimal Assistance   Bed Mobility   Supine to Sit Unable to assess   Sit to Supine Unable to assess   Additional Comments N/A  Pt seated OOB in chair at start/end of session w/ call bell and phone within reach  All needs met and pt reports no further questions for OT at this time   Transfers   Sit to Stand 5  Supervision   Additional items Armrests; Increased time required;Verbal cues   Stand to Sit 5  Supervision   Additional items Armrests; Increased time required;Verbal cues   Additional Comments Cues for safe technique and hand placement   Functional Mobility   Functional Mobility 4  Minimal assistance   Additional Comments Assist x1   Additional items Rolling walker   Balance   Static Sitting Fair +   Dynamic Sitting Fair   Static Standing Fair -   Dynamic Standing Poor +   Ambulatory Poor +   Activity Tolerance   Activity Tolerance Patient limited by fatigue;Treatment limited secondary to medical complications (Comment)   Medical Staff Made Aware VERNA Cordova   Nurse Made Aware yes; Selin Gonzalez, ELIZA   RUE Assessment   RUE Assessment [facility]   RUE Strength   RUE Overall Strength Within Functional Limits - able to perform ADL tasks with strength  (4/5 throughout)   LUE Assessment   LUE Assessment WFL   LUE Strength   LUE Overall Strength Within Functional Limits - able to perform ADL tasks with strength  (4/5 throughout)   Hand Function   Gross Motor Coordination Functional   Fine Motor Coordination Functional   Sensation   Light Touch No apparent deficits   Proprioception   Proprioception No apparent deficits   Vision-Basic Assessment   Current Vision Wears glasses all the time   Vision - Complex Assessment   Ocular Range of Motion [facility]   Acuity Able to read clock/calendar on wall without difficulty; Able to read employee name badge without difficulty   Perception   Inattention/Neglect Appears intact   Cognition   Overall Cognitive Status [facility]   Arousal/Participation Alert; Cooperative   Attention Within functional limits   Orientation Level Oriented X4   Memory Within functional limits   Following Commands Follows one step commands without difficulty   Comments Pleasant and cooperative   Assessment   Limitation Decreased ADL status; Decreased UE strength;Decreased endurance;Decreased self-care trans;Decreased high-level ADLs   Prognosis Good   Assessment Pt is a 73 y o  male seen for OT evaluation s/p adm to [facility] on 7/12/2021 w/ fever and chills and admitted w/ Sepsis, MILTON superimposed on CKD, and Gout  Comorbidities affecting pts functional performance include a significant PMH of CKD, HTN, and Obesity  Pt with active OT orders and activity orders for Up and OOB as tolerated   Pt lives with spouse and son in a two level house with 4-5 SANDRA  Pt reports sleeping on 1st floor in recliner with access to full bathroom on 1st  At baseline, pt was I w/ ADLs and functional mobility/transfers w/o use of AD, required assist w/ IADLs, (+) , and (+) falls PTA  Upon evaluation, pt currently requires Supervision for UB ADLs, Mod-Min A for LB ADLs, Min A for toileting, Supervision for transfers, and Min A for functional mobility 2* the following deficits impacting occupational performance: decreased strength, decreased balance and decreased tolerance   These impairments, as well at pts steps to enter environment and difficulty performing ADLS limit pts ability to safely engage in all baseline areas of occupation  The patient's raw score on the AM-PAC Daily Activity inpatient short form is 19, standardized score is 40 22, greater than 39 4  Patients at this level are likely to benefit from discharge to home  Please refer to the recommendation of the Occupational Therapist for safe discharge planning  Based on the aforementioned OT evaluation, functional performance deficits, and assessments, pt has been identified as a Moderate complexity evaluation  Pt to continue to benefit from continued acute OT services during hospital stay to address defined deficits and to maximize level of functional independence in the following Occupational Performance areas: grooming, bathing/shower, toilet hygiene, dressing, medication management, health maintenance, functional mobility, community mobility, clothing management and social participation  From OT standpoint, recommend Home OT upon D/C  OT will continue to follow pt 2-3x/wk to address the following goals to  w/in 10-14 days:   Goals   Patient Goals To feel better   LTG Time Frame 10-14   Long Term Goal Please refer to LTGs listed below   Plan   Treatment Interventions ADL retraining;Functional transfer training;UE strengthening/ROM; Endurance training;Patient/family training;Equipment evaluation/education; Compensatory technique education; Energy conservation; Activityengagement   Goal Expiration Date 21   OT Treatment Day 0   OT Frequency 2-3x/wk   Recommendation   OT Discharge Recommendation Home with home health rehabilitation   OT - OK to Discharge Yes  (when medically cleared)   AM-[facility] Daily Activity Inpatient   Lower Body Dressing 2   Bathing 3   Toileting 3   Upper Body Dressing 3   Grooming 4   Eating 4   Daily Activity Raw Score 19   Daily Activity Standardized Score (Calc for Raw Score >=11) 40 22   AM-PAC Applied Cognition Inpatient   Following a Speech/Presentation 4   Understanding Ordinary Conversation 4   Taking Medications 4   Remembering Where Things Are Placed or Put Away 4   Remembering List of 4-5 Errands 4   Taking Care of Complicated Tasks 4   Applied Cognition Raw Score 24   Applied Cognition Standardized Score 62 21       GOALS    1  Pt will improve activity tolerance to G for min 30 min txment sessions for increase engagement in functional tasks    2  Pt will complete bed mobility at a Mod I level w/ G balance/safety demonstrated to decrease caregiver assistance required     3  Pt will complete UB dressing/self care w/ mod I using adaptive device and DME as needed     4  Pt will complete LB dressing/self care w/ mod I using adaptive device and DME as needed    5  Pt will complete toileting w/ mod I w/ G hygiene/thoroughness using DME as needed    6  Pt will improve functional transfers to Mod I on/off all surfaces using DME as needed w/ G balance/safety     7  Pt will improve functional mobility during ADL/IADL/leisure tasks to Mod I using DME as needed w/ G balance/safety     8  Pt will be attentive 100% of the time during ongoing cognitive assessment w/ G participation to assist w/ safe d/c planning/recommendations    9  Pt will demonstrate G carryover of pt/caregiver education and training as appropriate w/o cues w/ good tolerance to increase safety during functional tasks    10  Pt will demonstrate 100% carryover of energy conservation techniques t/o functional I/ADL/leisure tasks w/o cues s/p skilled education to increase endurance during functional tasks    11  Pt will increase BUE strength by 1MM grade via AROM exercises to increase independence in ADLs and transfers    12  Pt will verbalize 3 potential fall hazards and identify appropriate compensatory techniques to decrease fall risk in home environment     13   Pt will increase standing tolerance to 8-10 mins with Fair+ dynamic standing balance to increase safety during participation in ADLs           LAURA Fuchs/APOLLO

## 2021-07-14 NOTE — PROGRESS NOTES
Vancomycin IV Pharmacy-to-Dose Consultation    Kaley Hill is a 73 y o  male who is currently receiving Vancomycin IV with management by the Pharmacy Consult service  Assessment/Plan:  The patient was reviewed  Renal function is stable and no signs or symptoms of nephrotoxicity and/or infusion reactions were documented in the chart  Based on todays assessment, continue current vancomycin (day # 2) dosing of 1500mg IV every 12 hours, with a plan for trough to be drawn at 1245 on 7/15/21    We will continue to follow the patients culture results and clinical progress daily      Bisi Quiroz, Pharmacist

## 2021-07-14 NOTE — CONSULTS
Podiatry - Consultation    Patient Information:   Ashia Eden 73 y o  male MRN: [id number]  Unit/Bed#: [address] 87 213-01 Encounter: [id number]  PCP: Kristin Adams MD  Date of Admission:  7/12/2021  Date of Consultation: 07/14/21  Requesting Physician: Grace Herrera MD      ASSESSMENT:    Ashia Eden is a 73 y o  male with:    1  Left 2nd digit traumatic nail avulsion - POA  2  Bilateral lymphedema  3  Sepsis - POA  4  GPC bacteremia - POA    PLAN:    · No podiatric intervention indicated at this time  Recommend moisturizer, compression and elevation for edema control 2/2 lymphedema  Ban-daid to left 2nd digit to protect healing nail bed  · Left foot XR reviewed: no signs of acute bony pathology or SANDRA  · Elevation on green foam wedges or pillows when non-ambulatory  · Rest of care per primary team   · Will discuss this plan with my attending and update as needed  Weightbearing status: as tolerated    SUBJECTIVE:    History of Present Illness:    Ashia Eden is a 73 y o  male who is originally admitted 7/12/2021 due to sepsis  Patient has a past medical history of lymphedema, CKD, left kidney RCC  We are consulted for evaluation of bilateral LE  Patient states that recently his bilateral 2nd digit nails became thickened and began to lift off so he cut them with pliers  He reports pulling his left 2nd digit nail off  States "I saw the stuff underneath which scared me at first but I think it looks fine now " States his wife was concerned 2/2 drainage from the nail bed  He reports his family medicine provider instructed him to soak and scrub his foot and toes with a brush  States his legs are edematous at baseline but denies regular compression or lymphedema therapy  Endorses fever overnight  Patient denies nausea, vomiting, chest pain, shortness of breath differing from baseline  Review of Systems:    Constitutional: Negative  HENT: Negative  Eyes: Negative  Respiratory: Negative  Cardiovascular: Negative  Gastrointestinal: Negative  Musculoskeletal: Negative  Skin: L 2nd nail avulsion, lymphedema   Neurological: Negative  Psych: Negative  Past Medical and Surgical History:     Past Medical History:   Diagnosis Date    Chronic kidney disease     Hypertension     Obesity     Urinary tract infection without hematuria 4/3/2020       Past Surgical History:   Procedure Laterality Date    APPENDECTOMY      NASAL SEPTUM SURGERY         Meds/Allergies:    Medications Prior to Admission   Medication    allopurinol (ZYLOPRIM) 100 mg tablet    furosemide (LASIX) 20 mg tablet    lisinopril (ZESTRIL) 40 mg tablet    metoprolol succinate (TOPROL-XL) 100 mg 24 hr tablet    Potassium Citrate ER 15 MEQ (1620 MG) TBCR    tamsulosin (FLOMAX) 0 4 mg    TRAVATAN Z 0 004 % ophthalmic solution       Allergies   Allergen Reactions    Codeine Swelling     Other reaction(s): SWELLING OF FACE  Other reaction(s): SWELLING OF FACE  Other reaction(s): SWELLING OF FACE       Social History:     Marital Status: /Civil Union    Substance Use History:   Social History     Substance and Sexual Activity   Alcohol Use No     Social History     Tobacco Use   Smoking Status Never Smoker   Smokeless Tobacco Never Used   Tobacco Comment    no exposure to passive smoke     Social History     Substance and Sexual Activity   Drug Use Not Currently       Family History:    Family History   Problem Relation Age of Onset    Diabetes Father     Prostate cancer Brother          OBJECTIVE:    Vitals:   Blood Pressure: 116/68 (07/14/21 0725)  Pulse: 86 (07/14/21 0725)  Temperature: 98 °F (36 7 °C) (07/14/21 0725)  Temp Source: Oral (07/14/21 0725)  Respirations: 18 (07/14/21 0725)  SpO2: 96 % (07/14/21 0725)    Physical Exam:    General Appearance: Alert, cooperative, no distress  HEENT: Head normocephalic, atraumatic, without obvious abnormality  Heart: Normal rate and rhythm    Lungs: Non-labored breathing  No respiratory distress  Abdomen: Without distension  Psychiatric: AAOx3  Lower Extremity:  Vascular:   Right DP and PT pulses are absent 2/2 edema  Left DP and PT pulses are absent 2/2 edema  CRT < 3 seconds at the digits  +4/4 edema noted at bilateral lower extremities  Pedal hair is absent  Skin temperature is WNL bilaterally  Musculoskeletal:  MMT is 4/5 in all muscle compartments bilaterally  ROM at the 1st MPJ and ankle joint are decreased bilaterally with the leg extended  No Pain on palpation of global b/l LE  Dermatological:  Bilateral LE with significant lymphedema and associated keratotic, scaly skin from toes to knees with skin appearing hyperpigmented along the entire b/l LE  No erythema, warmth, open wounds or lesions to the b/l lower legs or areas of weeping  Hyperkeratosis and fissuring of heel without open wound  Left 2nd digit nail bed dry and epithelialized without signs of active infection: no purulence, no malodor, no ascending erythema, no crepitus, no fluctuance 2/2 traumatic nail avulsion  Neurological:  Gross sensation is intact  Protective sensation is intact  Patient Denies numbness and/or paresthesias      Clinical Images 07/14/21:                  Additional data:     Lab Results: I have personally reviewed pertinent labs including:    Results from last 7 days   Lab Units 07/13/21  0620 07/12/21  1333   WBC Thousand/uL 17 80* 21 54*   HEMOGLOBIN g/dL 14 8 14 9   HEMATOCRIT % 45 9 44 5   PLATELETS Thousands/uL 163 210   LYMPHO PCT %  --  5*   MONO PCT %  --  3*   EOS PCT %  --  1     Results from last 7 days   Lab Units 07/13/21  0620   POTASSIUM mmol/L 4 3   CHLORIDE mmol/L 101   CO2 mmol/L 24   BUN mg/dL 25   CREATININE mg/dL 2 15*   CALCIUM mg/dL 8 4   ALK PHOS U/L 59   ALT U/L 37   AST U/L 47*     Results from last 7 days   Lab Units 07/12/21  1412   INR  1 36*       Cultures: I have personally reviewed pertinent cultures including:    Results from last 7 days Lab Units 07/12/21  1333 07/12/21  1325   BLOOD CULTURE  No Growth at 24 hrs   --    GRAM STAIN RESULT   --  Gram positive cocci in clusters*           Imaging: I have personally reviewed pertinent reports in PACS  EKG, Pathology, and Other Studies: I have personally reviewed pertinent reports  ** Please Note: Portions of the record may have been created with voice recognition software  Occasional wrong word or "sound a like" substitutions may have occurred due to the inherent limitations of voice recognition software  Read the chart carefully and recognize, using context, where substitutions have occurred   **

## 2021-07-14 NOTE — PHYSICAL THERAPY NOTE
PT EVALUATION    Pt  Name: Mic Westbrook  Pt  Age: 73 y o  MRN: [id number]  LENGTH OF STAY: 2    Patient Active Problem List   Diagnosis    Essential hypertension    Kidney stones    Chronic renal failure, stage 3 (moderate) (HCC)    HDL deficiency    Gout    DELIA (obstructive sleep apnea)    Allergic rhinitis    Renal cell carcinoma of left kidney (HCC)    Right foot pain    Class 3 severe obesity due to excess calories with serious comorbidity and body mass index (BMI) of 50 0 to 59 9 in adult [facility])    Lymphedema of both lower extremities    Welcome to Medicare preventive visit    Sepsis ([facility] [address] )    Acute kidney injury superimposed on chronic kidney disease ([facility] [address] )       Admitting Diagnoses:   Weakness [R53 1]  SIRS due to infectious process with organ dysfunction ([facility] [address] ) [A41 9, R65 20]  Acute kidney injury ([facility] [address] ) [N17 9]    Past Medical History:   Diagnosis Date    Chronic kidney disease     Hypertension     Obesity     Urinary tract infection without hematuria 4/3/2020       Past Surgical History:   Procedure Laterality Date    APPENDECTOMY      NASAL SEPTUM SURGERY         Imaging Studies:  US right upper quadrant   Final Result by Richard Alexander MD (07/13 1704)      Gallstones and fatty liver  No evidence of acute pathology  Workstation performed: [id number]         CT chest abdomen pelvis wo contrast   Final Result by Douglas Raman MD (07/12 1816)         1  Large right lateral abdominal hernia and pannus, partially outside the field of view  Within this limitation, no evidence of bowel obstruction, colitis or diverticulitis  2   New left renal exophytic 3 4 cm lesion suggestive of an angiomyolipoma, however malignancy not entirely excluded  Recommend nonemergent MRI of the abdomen with gadolinium for further evaluation  2   No acute cardiopulmonary process                    Workstation performed: [id number]         XR chest 1 view portable   Final Result by Joshua Henson Caitlyn Hanna MD (07/13 3408)      No acute cardiopulmonary disease  Workstation performed: [id number]         XR foot 3+ vw left    (Results Pending)        07/14/21 1037   PT Last Visit   PT Visit Date 07/14/21   Note Type   Note type Evaluation   Pain Assessment   Pain Score No Pain   Home Living   Type of [address] [facility] Two level; Able to live on main level with bedroom/bathroom; Performs ADLs on one level;Stairs to enter with rails  (4-5STE w/ HR)   Bathroom Shower/Tub Walk-in shower   Bathroom Toilet Standard   Bathroom Equipment Shower chair   Home Equipment Other (Comment)  (has no DME but have access to electric scooter, , [facility])   Additional Comments Pt reports he has 1st floor set up & sleeps in his recliner  Has full bath on the 1st floor as well  Prior Function   Level of [place] Independent with ADLs and functional mobility  (w/o AD)   Lives With Spouse; Son   Receives Help From Family   ADL Assistance Independent   Falls in the last 6 months 1 to 4  (1x)   Vocational Retired   Comments (+)    Restrictions/Precautions   Wells Letha Bearing Precautions Per Order No   Other Precautions Multiple lines; Fall Risk  (morbid obesity)   General   Family/Caregiver Present No   Cognition   Overall Cognitive Status WFL   Arousal/Participation Alert   Orientation Level Oriented to person;Oriented to place;Oriented to time   Following Commands Follows one step commands without difficulty   Comments pt pleasant & cooperative   RUE Assessment   RUE Assessment   (refer to OT)   LUE Assessment   LUE Assessment   (refer to OT)   RLE Assessment   RLE Assessment WFL  (4/5 grossly; (+) lymphedema)   LLE Assessment   LLE Assessment WFL  (4/5 grossly; (+) lymphedema)   Coordination   Sensation WFL   Bed Mobility   Supine to Sit Unable to assess   Sit to Supine Unable to assess   Additional Comments pt OOB in chair pre & post session   Transfers   Sit to Stand 5  Supervision   Additional items Armrests; Increased time required;Verbal cues   Stand to Sit 5  Supervision   Additional items Armrests; Increased time required;Verbal cues   Additional Comments cues for techniques & safety   Ambulation/Elevation   Gait pattern Wide MAXINE; Decreased foot clearance; Short stride; Excessively slow  (inc lateral sway 2* to body habitus)   Gait Assistance 4  Minimal assist   Additional items Assist x 1;Verbal cues; Tactile cues   Assistive Device Bariatric Rolling walker   Distance 20'x1   Balance   Static Sitting Fair +   Dynamic Sitting Fair   Static Standing Fair -  (w/ RW)   Dynamic Standing Poor +  (w/ RW)   Ambulatory Poor +  (w/ RW)   Endurance Deficit   Endurance Deficit Yes   Endurance Deficit Description fatigue   Activity Tolerance   Activity Tolerance Patient limited by fatigue;Treatment limited secondary to medical complications (Comment)   Medical Staff Made Aware SANDOVAL Nieto   Nurse Made Aware ELIZA Summers   Assessment   Prognosis Fair   Problem List Decreased strength;Decreased endurance; Impaired balance;Decreased mobility;Obesity; Decreased skin integrity   Assessment Pt  66 y  o male presented w/ fever & chills  Pt admitted for Sepsis [facility]) w/ bacteremia, MILTON, L foot cellulitis & L 2nd toe traumatic nail avulsion POA  Pt referred to PT for mobility assessment & D/C planning w/ orders of up & OOB as tolerated & WBAT BLE  PTA, pt reports being I w/o AD  On eval, pt demonstrate dec mobility, balance, endurance & amb  Pt require S for transfers & minAx1 for amb w/ bariatric RW + cues for techniques  Gait deviations as above, slow w/ WBOS, dec foot clearance & inc lateral sway 2* to body habitus but no gross LOB noted  (+) fatigue w/ amb but relieved w/ rest  No dizziness reported t/o session  Nsg staff most recent vital signs as follows: /61   Pulse 84   Temp 98 6 °F (37 °C)   Resp 22   SpO2 96%   At end of session, pt remain OOB in chair in stable condition, call bell & phone in reach, all lines intact   Fall precautions reinforced w/ good understanding  Pt functioning below baseline hence will continue skilled PT to improve function & safety  The patient's AM-PAC Basic Mobility Inpatient Short Form Raw Score is 18, Standardized Score is 41 05  A standardized score less than 42 9 suggests the patient may benefit from discharge to post-acute rehabilitation services  From PT standpoint, despite AM-PAC score,  will anticipate good progress in PT for safe D/C to home  Will recommend HHPT, bariatric RW & family support at D/C  CM to follow  Nsg staff to continue to mobilized pt (OOB in chair for all meals & ambulate in room/unit) as tolerated to prevent further decline in function  Nsg notified  Barriers to Discharge Inaccessible home environment   Barriers to Discharge Comments (+) SANDRA   Goals   Patient Goals to get better   STG Expiration Date 07/24/21   Short Term Goal #1 Goals to be met in 10 days; pt will be able to: 1) inc strength & balance by 1/2 grade to improve overall functional mobility & dec fall risk; 2) inc bed mobility to modified I for pt to be able to get in/OOB safely w/ proper techniques 100% of the time, to dec caregiver burden & safely function at home; 3) inc transfers to modified I for pt to transition safely from one surface to another w/o % of the time, to dec caregiver burden & safely function at home; 4) inc amb w/ bariatric RW approx  >80' w/ S for pt to ambulate household distances w/o any % of the time, to dec caregiver burden & safely function at home; 5) negotiate stairs w/ S for inc safety during stair mgt inside/outside of home & dec caregiver burden; 6) pt/caregiver ed   PT Treatment Day 0   Plan   Treatment/Interventions Functional transfer training;LE strengthening/ROM; Elevations; Therapeutic exercise; Endurance training;Patient/family training;Bed mobility;Gait training;Spoke to nursing;OT   PT Frequency Other (Comment)  (3-5x/wk)   Recommendation   PT Discharge Recommendation Home with home health rehabilitation  (home w/ HHPT)   Equipment Recommended Mckenna Breath  (bariatric RW)   [email or web address] Recommended HD Bariatric wheeled walker   Jodie 8 in Bed Without Bedrails 3   Lying on Back to Sitting on Edge of Flat Bed 3   Moving Bed to Chair 3   Standing Up From Chair 3   Walk in Room 3   Climb 3-5 Stairs 3   Basic Mobility Inpatient Raw Score 18   Basic Mobility Standardized Score 41 05   Hx/personal factors: co-morbidities, inaccessible home, mutliple lines, use of AD, h/o of falls, fall risk, assist w/ ADL's and obesity  Examination: dec mobility, dec balance, dec endurance, dec amb, risk for falls  Clinical: unpredictable (ongoing medical status, abnormal lab values and risk for falls)  Complexity: high     Trude Halsted, PT

## 2021-07-14 NOTE — WOUND OSTOMY CARE
Consult Note - Wound   Loletta Kin 73 y o  male MRN: [id number]  Unit/Bed#: [facility] 68 2 -01 Encounter: [id number]      History and Present Illness:  73year old male presented to the hospital with weakness, dizzy, and disoriented x 3 days prior to admission with difficulty ambulating and chills/fever  Patient's history significant for obesity, lymphedema, occasional urinary incontinence  Assessment Findings:   Patient agreeable to assessment and photography  He is able to stand with walker and assist x 1 for assessment  Patient has been continent during this admission  However, has occasional urinary incontinence  Bilateral buttocks and sacrum intact with pink, blanchable gluteal cleft  1   Intertriginous dermatitis with candidiasis to bilateral axilla, breast, abdominal, and groin folds--no open areas at this time  Pink moist rash with surrounding hyperpigmentation, strong yeast odor, and maceration  2   Bilateral lower extremity lymphedema with scaling--left dorsal foot erythema  Podiatry evaluation of left 2nd toe  3   Left heel fissure--thick, callused heel with significant fissure  No drainage at this time  Area proximal to callus with blanchable light purple coloration  Wound Care Plan:   1-Lac-hydrin lotion to lower legs and feet twice daily  2-Elevate heels off of bed/chair surface to offload pressure--prevalon boots per podiatry  3-Bariatric offloading air cushion in chair when out of bed  4-Moisturize skin daily with skin nourishing cream   5-Hydraguard lotion to bilateral buttocks and sacrum twice daily and as needed  6-Skin folds--cleanse gently with soap and water using disposable wash cloths, dry well  Dust nystatin powder into axilla, breast, abdominal, and groin folds twice daily  7-Encourage/remind patient to turn and reposition every 2 hours while in bed and weight shift frequently while in chair for pressure re-distribution on skin      Wound care team to follow  Plan of care reviewed with primary RN  Patient should follow-up with podiatry as an outpatient and consider going to the lymphedema clinic for outpatient compression        Vivien RIVAS, RN, [employer]

## 2021-07-14 NOTE — PLAN OF CARE
Problem: MOBILITY - ADULT  Goal: Maintain or return to baseline ADL function  Description: INTERVENTIONS:  -  Assess patient's ability to carry out ADLs; assess patient's baseline for ADL function and identify physical deficits which impact ability to perform ADLs (bathing, care of mouth/teeth, toileting, grooming, dressing, etc )  - Assess/evaluate cause of self-care deficits   - Assess range of motion  - Assess patient's mobility; develop plan if impaired  - Assess patient's need for assistive devices and provide as appropriate  - Encourage maximum independence but intervene and supervise when necessary  - Involve family in performance of ADLs  - Assess for home care needs following discharge   - Consider OT consult to assist with ADL evaluation and planning for discharge  - Provide patient education as appropriate  Outcome: Progressing  Goal: Maintains/Returns to pre admission functional level  Description: INTERVENTIONS:  - Perform BMAT or MOVE assessment daily    - Set and communicate daily mobility goal to care team and patient/family/caregiver  - Collaborate with rehabilitation services on mobility goals if consulted  - Perform Range of Motion 2 times a day  - Reposition patient every 2 hours    - Record patient progress and toleration of activity level   Outcome: Progressing     Problem: PAIN - ADULT  Goal: Verbalizes/displays adequate comfort level or baseline comfort level  Description: Interventions:  - Encourage patient to monitor pain and request assistance  - Assess pain using appropriate pain scale  - Administer analgesics based on type and severity of pain and evaluate response  - Implement non-pharmacological measures as appropriate and evaluate response  - Consider cultural and social influences on pain and pain management  - Notify physician/advanced practitioner if interventions unsuccessful or patient reports new pain  Outcome: Progressing     Problem: INFECTION - ADULT  Goal: Absence or prevention of progression during hospitalization  Description: INTERVENTIONS:  - Assess and monitor for signs and symptoms of infection  - Monitor lab/diagnostic results  - Monitor all insertion sites, i e  indwelling lines, tubes, and drains  - Monitor endotracheal if appropriate and nasal secretions for changes in amount and color  - [place] appropriate cooling/warming therapies per order  - Administer medications as ordered  - Instruct and encourage patient and family to use good hand hygiene technique  - Identify and instruct in appropriate isolation precautions for identified infection/condition  Outcome: Progressing     Problem: SAFETY ADULT  Goal: Maintain or return to baseline ADL function  Description: INTERVENTIONS:  -  Assess patient's ability to carry out ADLs; assess patient's baseline for ADL function and identify physical deficits which impact ability to perform ADLs (bathing, care of mouth/teeth, toileting, grooming, dressing, etc )  - Assess/evaluate cause of self-care deficits   - Assess range of motion  - Assess patient's mobility; develop plan if impaired  - Assess patient's need for assistive devices and provide as appropriate  - Encourage maximum independence but intervene and supervise when necessary  - Involve family in performance of ADLs  - Assess for home care needs following discharge   - Consider OT consult to assist with ADL evaluation and planning for discharge  - Provide patient education as appropriate  Outcome: Progressing  Goal: Maintains/Returns to pre admission functional level  Description: INTERVENTIONS:  - Perform BMAT or MOVE assessment daily    - Set and communicate daily mobility goal to care team and patient/family/caregiver  - Collaborate with rehabilitation services on mobility goals if consulted  - Perform Range of Motion 2 times a day  - Reposition patient every 2 hours    - Record patient progress and toleration of activity level   Outcome: Progressing  Goal: Patient will remain free of falls  Description: INTERVENTIONS:  - Educate patient/family on patient safety including physical limitations  - Instruct patient to call for assistance with activity   - Consult OT/PT to assist with strengthening/mobility   - Keep Call bell within reach  - Keep bed low and locked with side rails adjusted as appropriate  - Keep care items and personal belongings within reach  - Initiate and maintain comfort rounds  - Make Fall Risk Sign visible to staff  - Offer Toileting every 2 Hours, in advance of need  - Initiate/Maintain bed alarm  - Obtain necessary fall risk management equipment  - Apply yellow socks and bracelet for high fall risk patients  - Consider moving patient to room near nurses station  Outcome: Progressing     Problem: DISCHARGE PLANNING  Goal: Discharge to home or other facility with appropriate resources  Description: INTERVENTIONS:  - Identify barriers to discharge w/patient and caregiver  - Arrange for needed discharge resources and transportation as appropriate  - Identify discharge learning needs (meds, wound care, etc )  - Arrange for interpretive services to assist at discharge as needed  - Refer to Case Management Department for coordinating discharge planning if the patient needs post-hospital services based on physician/advanced practitioner order or complex needs related to functional status, cognitive ability, or social support system  Outcome: Progressing     Problem: Knowledge Deficit  Goal: Patient/family/caregiver demonstrates understanding of disease process, treatment plan, medications, and discharge instructions  Description: Complete learning assessment and assess knowledge base    Interventions:  - Provide teaching at level of understanding  - Provide teaching via preferred learning methods  Outcome: Progressing     Problem: CARDIOVASCULAR - ADULT  Goal: Maintains optimal cardiac output and hemodynamic stability  Description: INTERVENTIONS:  - Monitor I/O, vital signs and rhythm  - Monitor for S/S and trends of decreased cardiac output  - Administer and titrate ordered vasoactive medications to optimize hemodynamic stability  - Assess quality of pulses, skin color and temperature  - Assess for signs of decreased coronary artery perfusion  - Instruct patient to report change in severity of symptoms  Outcome: Progressing     Problem: RESPIRATORY - ADULT  Goal: Achieves optimal ventilation and oxygenation  Description: INTERVENTIONS:  - Assess for changes in respiratory status  - Assess for changes in mentation and behavior  - Position to facilitate oxygenation and minimize respiratory effort  - Oxygen administered by appropriate delivery if ordered  - Initiate smoking cessation education as indicated  - Encourage broncho-pulmonary hygiene including cough, deep breathe, Incentive Spirometry  - Assess the need for suctioning and aspirate as needed  - Assess and instruct to report SOB or any respiratory difficulty  - Respiratory Therapy support as indicated  Outcome: Progressing     Problem: METABOLIC, FLUID AND ELECTROLYTES - ADULT  Goal: Electrolytes maintained within normal limits  Description: INTERVENTIONS:  - Monitor labs and assess patient for signs and symptoms of electrolyte imbalances  - Administer electrolyte replacement as ordered  - Monitor response to electrolyte replacements, including repeat lab results as appropriate  - Instruct patient on fluid and nutrition as appropriate  Outcome: Progressing  Goal: Fluid balance maintained  Description: INTERVENTIONS:  - Monitor labs   - Monitor I/O and WT  - Instruct patient on fluid and nutrition as appropriate  - Assess for signs & symptoms of volume excess or deficit  Outcome: Progressing     Problem: SKIN/TISSUE INTEGRITY - ADULT  Goal: Skin Integrity remains intact(Skin Breakdown Prevention)  Description: Assess:  -Perform Mic assessment every shift  -Clean and moisturize skin as needed  -Inspect skin when repositioning, toileting, and assisting with ADLS  -Assess under medical devices   -Assess extremities for adequate circulation and sensation     Bed Management:  -Have minimal linens on bed & keep smooth, unwrinkled  -Change linens as needed when moist or perspiring  -Avoid sitting or lying in one position for more than 2 hours while in bed  -Keep HOB at or above 30 degrees     Toileting:  -Offer bedside commode  -Assess for incontinence every 2 hours  -Use incontinent care products after each incontinent episode   Activity:  --Encourage activity -Encourage or provide ROM exercises   -Turn and reposition patient every 2 Hours  -Use appropriate equipment to lift or move patient in bed  -Instruct/ Assist with weight shifting every 2 hours when out of bed in chair  -Consider limitation of chair time 2 hour intervals    Skin Care:  -Avoid use of baby powder, tape, friction and shearing, hot water or constrictive clothing  -Relieve pressure over bony prominences using cushions, pillows  -Do not massage red bony areas    Next Steps:  -Teach patient strategies to minimize risks   -Consider consults to  interdisciplinary teams   Outcome: Progressing     Problem: HEMATOLOGIC - ADULT  Goal: Maintains hematologic stability  Description: INTERVENTIONS  - Assess for signs and symptoms of bleeding or hemorrhage  - Monitor labs  - Administer supportive blood products/factors as ordered and appropriate  Outcome: Progressing     Problem: MUSCULOSKELETAL - ADULT  Goal: Maintain or return mobility to safest level of function  Description: INTERVENTIONS:  - Assess patient's ability to carry out ADLs; assess patient's baseline for ADL function and identify physical deficits which impact ability to perform ADLs (bathing, care of mouth/teeth, toileting, grooming, dressing, etc )  - Assess/evaluate cause of self-care deficits   - Assess range of motion  - Assess patient's mobility  - Assess patient's need for assistive devices and provide as appropriate  - Encourage maximum independence but intervene and supervise when necessary  - Involve family in performance of ADLs  - Assess for home care needs following discharge   - Consider OT consult to assist with ADL evaluation and planning for discharge  - Provide patient education as appropriate  Outcome: Progressing     Problem: Prexisting or High Potential for Compromised Skin Integrity  Goal: Skin integrity is maintained or improved  Description: INTERVENTIONS:  - Identify patients at risk for skin breakdown  - Assess and monitor skin integrity  - Assess and monitor nutrition and hydration status  - Monitor labs   - Assess for incontinence   - Turn and reposition patient  - Assist with mobility/ambulation  - Relieve pressure over bony prominences  - Avoid friction and shearing  - Provide appropriate hygiene as needed including keeping skin clean and dry  - Evaluate need for skin moisturizer/barrier cream  - Collaborate with interdisciplinary team   - Patient/family teaching  - Consider wound care consult   Outcome: Progressing     Problem: Potential for Falls  Goal: Patient will remain free of falls  Description: INTERVENTIONS:  - Educate patient/family on patient safety including physical limitations  - Instruct patient to call for assistance with activity   - Consult OT/PT to assist with strengthening/mobility   - Keep Call bell within reach  - Keep bed low and locked with side rails adjusted as appropriate  - Keep care items and personal belongings within reach  - Initiate and maintain comfort rounds  - Make Fall Risk Sign visible to staff  - Offer Toileting every 2 Hours, in advance of need  - Initiate/Maintain bed alarm  - Obtain necessary fall risk management equipment  - Apply yellow socks and bracelet for high fall risk patients  - Consider moving patient to room near nurses station  Outcome: Progressing     Problem: Nutrition/Hydration-ADULT  Goal: Nutrient/Hydration intake appropriate for improving, restoring or maintaining nutritional needs  Description: Monitor and assess patient's nutrition/hydration status for malnutrition  Collaborate with interdisciplinary team and initiate plan and interventions as ordered  Monitor patient's weight and dietary intake as ordered or per policy  Utilize nutrition screening tool and intervene as necessary  Determine patient's food preferences and provide high-protein, high-caloric foods as appropriate       INTERVENTIONS:  - Monitor oral intake, urinary output, labs, and treatment plans  - Assess nutrition and hydration status and recommend course of action  - Evaluate amount of meals eaten  - Assist patient with eating if necessary   - Allow adequate time for meals  - Recommend/ encourage appropriate diets, oral nutritional supplements, and vitamin/mineral supplements  - Order, calculate, and assess calorie counts as needed  - Recommend, monitor, and adjust tube feedings and TPN/PPN based on assessed needs  - Assess need for intravenous fluids  - Provide specific nutrition/hydration education as appropriate  - Include patient/family/caregiver in decisions related to nutrition  Outcome: Progressing

## 2021-07-14 NOTE — PROGRESS NOTES
[facility]  Progress Note - Nikole Matos 1955, 73 y o  male MRN: [id number]  Unit/Bed#: Wilfredo Saenz 2 -01 Encounter: [id number]  Primary Care Provider: Carolyn Barker MD   Date and time admitted to hospital: 7/12/2021 12:29 PM    * Sepsis [facility])  Assessment & Plan  73year old male presenting with sepsis possibly due to left foot cellulitis  However, podiatry feels otherwise  Cultures growing 1/2+ for GPC  Currently on broad spectrum antibiotics until repeat cultures come back  - Appreciate ID and podiatry recommendations  Acute kidney injury superimposed on chronic kidney disease [facility])  Assessment & Plan  Lab Results   Component Value Date    EGFR 38 07/14/2021    EGFR 31 07/13/2021    EGFR 31 07/12/2021    CREATININE 1 83 (H) 07/14/2021    CREATININE 2 15 (H) 07/13/2021    CREATININE 2 16 (H) 07/12/2021     MILTON on CKD  Baseline at 1 5-1 7  Possibly pre-renal with sepsis  Clinically improved  Discontinue fluids await renal recovery  - Hold lasix / lisinopril     Gout  Assessment & Plan  Allopurinol held due to MILTON    Essential hypertension  Assessment & Plan  Lisinopril and lasix held due to MILTON  · Continue metoprolol with hold parameters    VTE Pharmacologic Prophylaxis:   Pharmacologic: Heparin  Mechanical VTE Prophylaxis in Place: Yes    Patient Centered Rounds: I have performed bedside rounds with nursing staff today  Discussions with Specialists or Other Care Team Provider: Nursing    Education and Discussions with Family / Patient: patient    Time Spent for Care: 30 minutes  More than 50% of total time spent on counseling and coordination of care as described above      Current Length of Stay: 2 day(s)    Current Patient Status: Inpatient   Certification Statement: The patient will continue to require additional inpatient hospital stay due to iv antibiotics    Discharge Plan: active    Code Status: Level 1 - Full Code      Subjective:   Patient seen and examined at bedside  Comfortable  Febrile episode overnight  Addressed all their questions to his and his wife's satisfaction  Objective:     Vitals:   Temp (24hrs), Av 3 °F (38 5 °C), Min:98 °F (36 7 °C), Max:104 °F (40 °C)    Temp:  [98 °F (36 7 °C)-104 °F (40 °C)] 98 6 °F (37 °C)  HR:  [] 84  Resp:  [18-32] 22  BP: (100-135)/(61-69) 106/61  SpO2:  [87 %-96 %] 96 %  There is no height or weight on file to calculate BMI  Input and Output Summary (last 24 hours): Intake/Output Summary (Last 24 hours) at 2021 1300  Last data filed at 2021 1229  Gross per 24 hour   Intake 3255 ml   Output 1200 ml   Net 2055 ml       Physical Exam:     Physical Exam  Vitals reviewed  Constitutional:       General: He is not in acute distress  Appearance: He is obese  HENT:      Head: Normocephalic  Nose: Nose normal       Mouth/Throat:      Mouth: Mucous membranes are moist    Eyes:      General: No scleral icterus  Cardiovascular:      Rate and Rhythm: Normal rate and regular rhythm  Pulmonary:      Effort: Pulmonary effort is normal  No respiratory distress  Breath sounds: No wheezing or rales  Abdominal:      General: There is no distension  Palpations: Abdomen is soft  Tenderness: There is no abdominal tenderness  Musculoskeletal:      Comments: Bilateral lower extremity chronic lymphedema with thick plaques   Skin:     General: Skin is warm  Neurological:      Mental Status: Mental status is at baseline     Psychiatric:         Mood and Affect: Mood normal          Behavior: Behavior normal        Additional Data:     Labs:    Results from last 7 days   Lab Units 21  0958 21  1333   WBC Thousand/uL 11 04* 21 54*   HEMOGLOBIN g/dL 12 5 14 9   HEMATOCRIT % 38 7 44 5   PLATELETS Thousands/uL 121* 210   BANDS PCT %  --  14*   LYMPHO PCT %  --  5*   MONO PCT %  --  3*   EOS PCT %  --  1     Results from last 7 days   Lab Units 21  0958 21  3359 SODIUM mmol/L 137 136   POTASSIUM mmol/L 4 1 4 3   CHLORIDE mmol/L 104 101   CO2 mmol/L 26 24   BUN mg/dL 25 25   CREATININE mg/dL 1 83* 2 15*   ANION GAP mmol/L 7 11   CALCIUM mg/dL 7 6* 8 4   ALBUMIN g/dL  --  3 2*   TOTAL BILIRUBIN mg/dL  --  1 99*   ALK PHOS U/L  --  59   ALT U/L  --  37   AST U/L  --  47*   GLUCOSE RANDOM mg/dL 125 106     Results from last 7 days   Lab Units 07/12/21  1412   INR  1 36*             Results from last 7 days   Lab Units 07/13/21  0620 07/12/21  1835 07/12/21  1333   LACTIC ACID mmol/L  --   --  2 0   PROCALCITONIN ng/ml 10 69* 7 88*  --            * I Have Reviewed All Lab Data Listed Above  * Additional Pertinent Lab Tests Reviewed:  Marcelina Cosme Admission Reviewed    Imaging:    Imaging Reports Reviewed Today Include: xr chest, ct chest, us ruq    Recent Cultures (last 7 days):     Results from last 7 days   Lab Units 07/12/21  1333 07/12/21  1325   BLOOD CULTURE  No Growth at 24 hrs   --    GRAM STAIN RESULT   --  Gram positive cocci in clusters*       Last 24 Hours Medication List:   Current Facility-Administered Medications   Medication Dose Route Frequency Provider Last Rate    acetaminophen  975 mg Oral Q6H [address] José Chapa MD      ammonium lactate   Topical BID Sean Renae MD      cefepime  2,000 mg Intravenous Q12H José Chapa MD 2,000 mg (07/14/21 0919)    heparin (porcine)  5,000 Units Subcutaneous [facility] Cabrera Kennedy MD      metoprolol succinate  100 mg Oral Daily Cabrera Kennedy MD      nystatin   Topical BID Sean Renae MD      potassium citrate  10 mEq Oral Daily Cabrera Kennedy MD      sodium chloride  150 mL/hr Intravenous Continuous José Chapa  mL/hr (07/14/21 1150)    tamsulosin  0 4 mg Oral Daily With Sandra Kamara MD      travoprost  1 drop Both Eyes HS Cabrera Kennedy MD      vancomycin  12 5 mg/kg (Adjusted) Intravenous Q12H Erinn Sharma MD 1,500 mg (07/14/21 0100)        Today, Patient Was Seen By: Marylen Cobbs MD Ellis    ** Please Note: Dictation voice to text software may have been used in the creation of this document   **

## 2021-07-14 NOTE — ASSESSMENT & PLAN NOTE
Lab Results   Component Value Date    EGFR 38 07/14/2021    EGFR 31 07/13/2021    EGFR 31 07/12/2021    CREATININE 1 83 (H) 07/14/2021    CREATININE 2 15 (H) 07/13/2021    CREATININE 2 16 (H) 07/12/2021     MILTON on CKD  Baseline at 1 5-1 7  Possibly pre-renal with sepsis  Clinically improved  Discontinue fluids await renal recovery    - Hold lasix / lisinopril

## 2021-07-14 NOTE — DISCHARGE INSTRUCTIONS
Discharge Instructions - Podiatry    Weight Bearing Status: Weight bearing as tolerated                    Follow-up appointment instructions: Please make an appointment within one week of discharge with Dr Beauchamp [place]  Contact sooner if any increase in pain, or signs of infection occur  Elevate lower extremity while non weight bearing      Apply lotion to lower extremity

## 2021-07-14 NOTE — NURSING NOTE
Contacted SLIM at 2204 as pt temp while on cooling mat increased to 104  1  Nurse placed ice packs on groin, axillary B/L, behind neck, placed cool cloth on pt head and gave ice water to drink  SLIM ordered 1 time dose of motrin to be followed by scheduled tylenol at 0000  Will continue to monitor and update SLIM

## 2021-07-15 LAB
ANION GAP SERPL CALCULATED.3IONS-SCNC: 6 MMOL/L (ref 4–13)
BASOPHILS NFR BLD AUTO: 0 % (ref 0–1)
BUN SERPL-MCNC: 22 MG/DL (ref 5–25)
CALCIUM SERPL-MCNC: 8.4 MG/DL (ref 8.3–10.1)
CHLORIDE SERPL-SCNC: 107 MMOL/L (ref 100–108)
CO2 SERPL-SCNC: 27 MMOL/L (ref 21–32)
CREAT SERPL-MCNC: 1.74 MG/DL (ref 0.6–1.3)
EOSINOPHIL NFR BLD AUTO: 1 % (ref 0–6)
ERYTHROCYTE [DISTWIDTH] IN BLOOD BY AUTOMATED COUNT: 13.8 % (ref 11.6–15.1)
GFR SERPL CREATININE-BSD FRML MDRD: 40 ML/MIN/1.73SQ M
GLUCOSE SERPL-MCNC: 104 MG/DL (ref 65–140)
GLUCOSE SERPL-MCNC: 111 MG/DL (ref 65–140)
HCT VFR BLD AUTO: 38.5 % (ref 36.5–49.3)
HGB BLD-MCNC: 12.4 G/DL (ref 12–17)
IMM GRANULOCYTES NFR BLD AUTO: 1 % (ref 0–2)
LYMPHOCYTES NFR BLD AUTO: 7 % (ref 14–44)
MAGNESIUM SERPL-MCNC: 1.8 MG/DL (ref 1.6–2.6)
MCH RBC QN AUTO: 30.9 PG (ref 26.8–34.3)
MCHC RBC AUTO-ENTMCNC: 32.2 G/DL (ref 31.4–37.4)
MCV RBC AUTO: 96 FL (ref 82–98)
MONOCYTES NFR BLD AUTO: 6 % (ref 4–12)
NEUTS SEG NFR BLD AUTO: 85 % (ref 43–75)
NRBC BLD AUTO-RTO: 0 /100 WBCS
PLATELET # BLD AUTO: 135 THOUSANDS/UL (ref 149–390)
PMV BLD AUTO: 9.8 FL (ref 8.9–12.7)
POTASSIUM SERPL-SCNC: 4.8 MMOL/L (ref 3.5–5.3)
RBC # BLD AUTO: 4.01 MILLION/UL (ref 3.88–5.62)
SODIUM SERPL-SCNC: 140 MMOL/L (ref 136–145)
WBC # BLD AUTO: 9.31 THOUSAND/UL (ref 4.31–10.16)

## 2021-07-15 PROCEDURE — 85027 COMPLETE CBC AUTOMATED: CPT | Performed by: STUDENT IN AN ORGANIZED HEALTH CARE EDUCATION/TRAINING PROGRAM

## 2021-07-15 PROCEDURE — 99232 SBSQ HOSP IP/OBS MODERATE 35: CPT | Performed by: STUDENT IN AN ORGANIZED HEALTH CARE EDUCATION/TRAINING PROGRAM

## 2021-07-15 PROCEDURE — 82948 REAGENT STRIP/BLOOD GLUCOSE: CPT

## 2021-07-15 PROCEDURE — 80048 BASIC METABOLIC PNL TOTAL CA: CPT | Performed by: STUDENT IN AN ORGANIZED HEALTH CARE EDUCATION/TRAINING PROGRAM

## 2021-07-15 PROCEDURE — 83735 ASSAY OF MAGNESIUM: CPT | Performed by: STUDENT IN AN ORGANIZED HEALTH CARE EDUCATION/TRAINING PROGRAM

## 2021-07-15 PROCEDURE — 99233 SBSQ HOSP IP/OBS HIGH 50: CPT | Performed by: INTERNAL MEDICINE

## 2021-07-15 RX ORDER — LANOLIN ALCOHOL/MO/W.PET/CERES
3 CREAM (GRAM) TOPICAL
Status: DISCONTINUED | OUTPATIENT
Start: 2021-07-15 | End: 2021-07-19 | Stop reason: HOSPADM

## 2021-07-15 RX ORDER — CEFAZOLIN SODIUM 2 G/50ML
2000 SOLUTION INTRAVENOUS EVERY 8 HOURS
Status: DISCONTINUED | OUTPATIENT
Start: 2021-07-15 | End: 2021-07-19

## 2021-07-15 RX ADMIN — Medication: at 09:57

## 2021-07-15 RX ADMIN — ACETAMINOPHEN 975 MG: 325 TABLET, FILM COATED ORAL at 05:49

## 2021-07-15 RX ADMIN — ACETAMINOPHEN 975 MG: 325 TABLET, FILM COATED ORAL at 17:23

## 2021-07-15 RX ADMIN — TRAVOPROST 1 DROP: 0.04 SOLUTION OPHTHALMIC at 21:05

## 2021-07-15 RX ADMIN — ACETAMINOPHEN 975 MG: 325 TABLET, FILM COATED ORAL at 11:32

## 2021-07-15 RX ADMIN — Medication: at 17:24

## 2021-07-15 RX ADMIN — VANCOMYCIN HYDROCHLORIDE 1500 MG: 1 INJECTION, POWDER, LYOPHILIZED, FOR SOLUTION INTRAVENOUS at 00:05

## 2021-07-15 RX ADMIN — CEFAZOLIN SODIUM 2000 MG: 2 SOLUTION INTRAVENOUS at 13:11

## 2021-07-15 RX ADMIN — CEFEPIME HYDROCHLORIDE 2000 MG: 2 INJECTION, POWDER, FOR SOLUTION INTRAVENOUS at 10:01

## 2021-07-15 RX ADMIN — HEPARIN SODIUM 5000 UNITS: 5000 INJECTION INTRAVENOUS; SUBCUTANEOUS at 05:49

## 2021-07-15 RX ADMIN — NYSTATIN: 100000 POWDER TOPICAL at 10:00

## 2021-07-15 RX ADMIN — POTASSIUM CITRATE 10 MEQ: 10 TABLET, EXTENDED RELEASE ORAL at 09:58

## 2021-07-15 RX ADMIN — NYSTATIN: 100000 POWDER TOPICAL at 17:27

## 2021-07-15 RX ADMIN — ACETAMINOPHEN 975 MG: 325 TABLET, FILM COATED ORAL at 00:05

## 2021-07-15 RX ADMIN — METOPROLOL SUCCINATE 100 MG: 50 TABLET, EXTENDED RELEASE ORAL at 09:58

## 2021-07-15 RX ADMIN — CEFAZOLIN SODIUM 2000 MG: 2 SOLUTION INTRAVENOUS at 20:16

## 2021-07-15 RX ADMIN — MELATONIN 3 MG: at 21:05

## 2021-07-15 RX ADMIN — HEPARIN SODIUM 5000 UNITS: 5000 INJECTION INTRAVENOUS; SUBCUTANEOUS at 13:11

## 2021-07-15 RX ADMIN — TAMSULOSIN HYDROCHLORIDE 0.4 MG: 0.4 CAPSULE ORAL at 17:23

## 2021-07-15 RX ADMIN — ACETAMINOPHEN 975 MG: 325 TABLET, FILM COATED ORAL at 23:18

## 2021-07-15 RX ADMIN — HEPARIN SODIUM 5000 UNITS: 5000 INJECTION INTRAVENOUS; SUBCUTANEOUS at 21:05

## 2021-07-15 NOTE — ASSESSMENT & PLAN NOTE
Lab Results   Component Value Date    EGFR 40 07/15/2021    EGFR 38 07/14/2021    EGFR 31 07/13/2021    CREATININE 1 74 (H) 07/15/2021    CREATININE 1 83 (H) 07/14/2021    CREATININE 2 15 (H) 07/13/2021     MILTON on CKD  Baseline at 1 5-1 7  Possibly pre-renal with sepsis  Clinically improved with IV fluids  - Approaching baseline, likely restart lasix benito if improved

## 2021-07-15 NOTE — CONSULTS
Antibiotics de-escalated to high dose IV ancef by Infectious diseases  Thank you for pharmacy consult   Pharmacy will sign off now

## 2021-07-15 NOTE — PROGRESS NOTES
Progress Note - Infectious Disease   Percell Mask 73 y o  male MRN: [id number]  Unit/Bed#: [place] [address] -01 Encounter: [id number]    Impression/Plan:  1  Sepsis   Fever, tachycardia, tachypnea, and leukocytosis   Suspect source originated in patient's left foot as he recently lost a toenail  He also has a deep left heel fissure   Concern for bacteremia as one set of his initial cultures are positive for GPC in clusters  No other clear source appreciated   He has no respiratory, GI, or  complaints  Despite being systemically ill he has remained hemodynamically stable  He is clinically improving  His fever curve and WBC count are trending down   -antibiotics as below  -monitor CBC and BMP  -follow up blood cultures  -monitor vitals  -supportive care     2  Positive blood culture  Patient showing GPC in clusters in one set of initial blood cultures  This may be a skin contaminant but may represent active bacteremia in the setting of left foot cellulitis  Will await final pathogen  Repeat blood cultures are pending  He may require TTE   -antibiotics as below  -monitor CBC and BMP  -follow up blood cultures  -follow up repeat blood cultures  -monitor vitals     3  Left lower extremity cellulitis   Per patient's wife he recently lost the toenail on his 2nd toe   She noticed gooey discharge coming from the nail bed and has been soaking his foot in water and applying bandaids   There is also a deep left heel fissure   He presented with faint erythema on the left dorsal foot and distal left extremity however this has become more dense and the lower extremity is more swollen today  The patient is currently receiving vancomycin and cefepime which he has tolerated without difficulty   I recommend continuing this for now   He is following with Podiatry   -continue IV cefepime  -continue IV vancomycin  -continue pharmacy consult for guidance on vancomycin dosage  -monitor CBC and BMP  -monitor vitals  -serial left foot exams  -continue follow-up with Podiatry     4  Acute kidney injury   On CKD stage 3  Upon review of patient's available past medical records it appears his baseline creatinine is approximately 1 5-1 7   His creatinine was 2 16 upon admission  [facility] is receiving IV fluids at this time  His creatinine has trended down   -monitor creatinine  -dose adjust antibiotics for renal function as needed  -avoid nephrotoxins  -consider Nephrology consult if creatinine fails to improve     5  Lymphedema   Likely contributing to left foot cellulitis above   Patient's wife asked about compression   Patient would benefit from compression and elevation of the extremities to promote venous and lymphatic return  [facility] would benefit from elevating the pannus to take pressure off the groin   He may benefit from outpatient lymphedema pumps and could be seen in [place] for further assessment after discharge   -serial lower extremity exams  -recommend elevation of the lower extremities to promote venous and lymphatic return     6  Morbid obesity   BMI = 60 78  Above plan was discussed in detail with patient at the bedside  Above plan was discussed in detail with [facility]IM attending, Dr Rodolfo Wang     Antibiotics:  Vancomycin 3  Cefepime 3  Antibiotics 4    Subjective:  Patient reports his left leg is looking worse today  He tells me it is much more swollen than his right leg and now looks dark red  He tells me podiatry took some dry skin office heel yesterday, he has no pain at the site  He tells me the dressing they applied has remained clean and intact  He has no fever, chills, sweats, shakes; no nausea, vomiting, abdominal pain, diarrhea, or dysuria; no cough, shortness of breath, or chest pain  No new symptoms      Objective:  Vitals:  Temp:  [98 6 °F (37 °C)-99 7 °F (37 6 °C)] 99 7 °F (37 6 °C)  HR:  [82-91] 89  Resp:  [20-22] 22  BP: (106-140)/(61-75) 137/74  SpO2:  [93 %-96 %] 94 %  Temp (24hrs), Av 1 °F (37 3 °C), Min:98 6 °F (37 °C), Max:99 7 °F (37 6 °C)  Current: Temperature: 99 7 °F (37 6 °C)    Physical Exam:   General Appearance:  Alert, interactive, nontoxic, no acute distress  He appears comfortable sitting on the edge of his bed  Throat: Oropharynx moist without lesions  Lungs:   Clear to auscultation bilaterally; no wheezes, rhonchi or rales; respirations unlabored on room air  Heart:  RRR; no murmur, rub or gallop  Abdomen:   Soft, morbidly obese, non-tender, non-distended, positive bowel sounds  Extremities: No clubbing or cyanosis; left lower extremity with nonpitting edema and overlying erythema, is slightly warm to touch especially along the distal aspect of the left shin; left foot with bulky kerlix dressing, no breakthrough drainage  Skin: No new rashes noted on exposed skin  Labs, Imaging, & Other studies:   All pertinent labs and imaging studies were personally reviewed  Results from last 7 days   Lab Units 07/15/21  0600 07/14/21  0958 07/13/21  0620   WBC Thousand/uL 9 31 11 04* 17 80*   HEMOGLOBIN g/dL 12 4 12 5 14 8   PLATELETS Thousands/uL 135* 121* 163     Results from last 7 days   Lab Units 07/15/21  0600 07/13/21  0620 07/12/21  1333   POTASSIUM mmol/L 4 8 4 3 4 7   CHLORIDE mmol/L 107 101 100   CO2 mmol/L 27 24 29   BUN mg/dL 22 25 23   CREATININE mg/dL 1 74* 2 15* 2 16*   EGFR ml/min/1 73sq m 40 31 31   CALCIUM mg/dL 8 4 8 4 8 9   AST U/L  --  47* 24   ALT U/L  --  37 36   ALK PHOS U/L  --  59 70     Results from last 7 days   Lab Units 07/14/21  1054 07/14/21  0950 07/12/21  1333 07/12/21  1325   BLOOD CULTURE  Received in Microbiology Lab  Culture in Progress  Received in Microbiology Lab  Culture in Progress   No Growth at 48 hrs   --    GRAM STAIN RESULT   --   --   --  Gram positive cocci in clusters*     Results from last 7 days   Lab Units 07/13/21  0620 07/12/21  1835   PROCALCITONIN ng/ml 10 69* 7 88*

## 2021-07-15 NOTE — PROGRESS NOTES
[facility] [facility]  Progress Note - Ronne Foil 1955, 73 y o  male MRN: [id number]  Unit/Bed#: [facility] -01 Encounter: [id number]  Primary Care Provider: Fatuma Coleman MD   Date and time admitted to hospital: 7/12/2021 12:29 PM    * Sepsis [facility])  Assessment & Plan  73year old male presenting with sepsis possibly due to left foot cellulitis  Initial cultures likely contaminant  - Appreciate ID and podiatry recommendations  - Continue Cefazolin per ID  Acute kidney injury superimposed on chronic kidney disease [facility])  Assessment & Plan  Lab Results   Component Value Date    EGFR 40 07/15/2021    EGFR 38 07/14/2021    EGFR 31 07/13/2021    CREATININE 1 74 (H) 07/15/2021    CREATININE 1 83 (H) 07/14/2021    CREATININE 2 15 (H) 07/13/2021     MILTON on CKD  Baseline at 1 5-1 7  Possibly pre-renal with sepsis  Clinically improved with IV fluids  - Approaching baseline, likely restart lasix benito if improved  Gout  Assessment & Plan  Allopurinol held due to MILTON    Essential hypertension  Assessment & Plan  Lisinopril and lasix held due to MILTON  · Continue metoprolol with hold parameters    VTE Pharmacologic Prophylaxis:   Pharmacologic: Heparin  Mechanical VTE Prophylaxis in Place: Yes    Patient Centered Rounds: I have performed bedside rounds with nursing staff today  Discussions with Specialists or Other Care Team Provider: Nursing    Education and Discussions with Family / Patient: patient    Time Spent for Care: 30 minutes  More than 50% of total time spent on counseling and coordination of care as described above  Current Length of Stay: 3 day(s)    Current Patient Status: Inpatient   Certification Statement: The patient will continue to require additional inpatient hospital stay due to iv antibiotics    Discharge Plan: 48-72 hours    Code Status: Level 1 - Full Code      Subjective:   Patient seen and examined at bedside  He is complaining of LLE swelling    He was concerned that this might be due to fluid since he was out of bed the entire day due to bed discomfort  Objective:     Vitals:   Temp (24hrs), Av 4 °F (37 4 °C), Min:98 °F (36 7 °C), Max:100 7 °F (38 2 °C)    Temp:  [98 °F (36 7 °C)-100 7 °F (38 2 °C)] 99 8 °F (37 7 °C)  HR:  [80-95] 95  Resp:  [16-22] 20  BP: (115-142)/(63-75) 142/65  SpO2:  [93 %-96 %] 95 %  There is no height or weight on file to calculate BMI  Input and Output Summary (last 24 hours): Intake/Output Summary (Last 24 hours) at 7/15/2021 1626  Last data filed at 7/15/2021 1341  Gross per 24 hour   Intake 1222 5 ml   Output --   Net 1222 5 ml       Physical Exam:     Physical Exam  Vitals reviewed  Constitutional:       General: He is not in acute distress  Appearance: He is obese  He is not ill-appearing  HENT:      Nose: Nose normal       Mouth/Throat:      Mouth: Mucous membranes are moist    Eyes:      General: No scleral icterus  Cardiovascular:      Rate and Rhythm: Normal rate and regular rhythm  Pulmonary:      Effort: Pulmonary effort is normal  No respiratory distress  Breath sounds: No wheezing or rales  Abdominal:      General: There is no distension  Palpations: Abdomen is soft  Tenderness: There is no abdominal tenderness  Musculoskeletal:      Comments: Bilateral nonpitting edema   Skin:     General: Skin is warm  Findings: Erythema (Left lower extremity) present  Neurological:      Mental Status: He is alert  Mental status is at baseline     Psychiatric:         Mood and Affect: Mood normal          Behavior: Behavior normal        Additional Data:     Labs:    Results from last 7 days   Lab Units 07/15/21  0600 21  1333   WBC Thousand/uL 9 31 21 54*   HEMOGLOBIN g/dL 12 4 14 9   HEMATOCRIT % 38 5 44 5   PLATELETS Thousands/uL 135* 210   BANDS PCT %  --  14*   NEUTROS PCT % 85*  --    LYMPHS PCT % 7*  --    LYMPHO PCT %  --  5*   MONOS PCT % 6  --    MONO PCT %  --  3*   EOS PCT % 1 1     Results from last 7 days   Lab Units 07/15/21  0600 07/13/21  0620   SODIUM mmol/L 140 136   POTASSIUM mmol/L 4 8 4 3   CHLORIDE mmol/L 107 101   CO2 mmol/L 27 24   BUN mg/dL 22 25   CREATININE mg/dL 1 74* 2 15*   ANION GAP mmol/L 6 11   CALCIUM mg/dL 8 4 8 4   ALBUMIN g/dL  --  3 2*   TOTAL BILIRUBIN mg/dL  --  1 99*   ALK PHOS U/L  --  59   ALT U/L  --  37   AST U/L  --  47*   GLUCOSE RANDOM mg/dL 111 106     Results from last 7 days   Lab Units 07/12/21  1412   INR  1 36*     Results from last 7 days   Lab Units 07/15/21  1130   POC GLUCOSE mg/dl 104         Results from last 7 days   Lab Units 07/13/21  0620 07/12/21  1835 07/12/21  1333   LACTIC ACID mmol/L  --   --  2 0   PROCALCITONIN ng/ml 10 69* 7 88*  --            * I Have Reviewed All Lab Data Listed Above  * Additional Pertinent Lab Tests Reviewed: Marcelina 66 Admission Reviewed    Imaging:    Imaging Reports Reviewed Today Include: xr chest, ct chest, us ruq, xr foot left    Recent Cultures (last 7 days):     Results from last 7 days   Lab Units 07/14/21  1054 07/14/21  0950 07/12/21  1333 07/12/21  1325   BLOOD CULTURE  Received in Microbiology Lab  Culture in Progress  No Growth at 24 hrs  No Growth at 48 hrs   Staphylococcus coagulase negative*   GRAM STAIN RESULT   --   --   --  Gram positive cocci in clusters*       Last 24 Hours Medication List:   Current Facility-Administered Medications   Medication Dose Route Frequency Provider Last Rate    acetaminophen  975 mg Oral Q6H [facility] José Chapa MD      ammonium lactate   Topical BID Cosmo Knight MD      cefazolin  2,000 mg Intravenous [facility] Stopped (07/15/21 1341)    heparin (porcine)  5,000 Units Subcutaneous Q8H [facility] Sara Elias PA-C      metoprolol succinate  100 mg Oral Daily Sara Elias PA-C      nystatin   Topical BID Cosmo Knight MD      potassium citrate  10 mEq Oral Daily Sara Elias PA-C      tamsulosin  0 4 mg Oral Daily With [employer] JOSE CARLOS Elias PA-C      travoprost  1 drop Both Eyes HS Sara Rivas PA-C          Today, Patient Was Seen By: Saritha Kolb MD    ** Please Note: Dictation voice to text software may have been used in the creation of this document   **

## 2021-07-15 NOTE — ASSESSMENT & PLAN NOTE
73year old male presenting with sepsis possibly due to left foot cellulitis  Initial cultures likely contaminant  - Appreciate ID and podiatry recommendations  - Continue Cefazolin per ID

## 2021-07-15 NOTE — CASE MANAGEMENT
LOS: 3 Days  Bundle: No  Unplanned Readmission Score: 13  30 Day Readmission: No     CM met with patient at bedside  Explained the role of CM  Obtained the following information from patient  Home: 2-story home but lives on first floor only  4 steps to enter  Lives With: Wife and son  ADL's: Independent  DME: Shower stool  Ambulation: Independent  Transportation: Drives   Pharmacy: [facility]  PCP: Mel Dillard MD  [facility] AT [facility] Hx: 5 years ago had [facility] following appendectomy which required a wound vac  Does not remember the name of the agency  Rehab Hx: None  Mental Health Hx: Denies  Substance Abuse Hx: Denies  Employment: Retired  POA/LW/AD: Does not have documentation  Declines information  States wife is emergency contact  Transport at D/C: Wife to transport home  Reviewed PT/OT's recommendation of home with [facility] and bariatric walker  Patient reports that his father had several walkers and he is checking with his sibling to see if they still have one  He reports that he isn't using the walker at all times in the hospital and isn't sure if he will even need one  Explained to patient that most likely insurance will cover a walker at 100% and he still declined  Discussed HH options  Patient also declines [facility] services stating that he is independent and does not feel it is necessary  Has supportive wife and son at home  He will contact Case Managment if he decides he needs a walker or would like [facility] services

## 2021-07-16 LAB
ANION GAP SERPL CALCULATED.3IONS-SCNC: 7 MMOL/L (ref 4–13)
BACTERIA BLD CULT: ABNORMAL
BASOPHILS # BLD AUTO: 0.05 THOUSANDS/ΜL (ref 0–0.1)
BASOPHILS NFR BLD AUTO: 1 % (ref 0–1)
BUN SERPL-MCNC: 22 MG/DL (ref 5–25)
CALCIUM SERPL-MCNC: 8.1 MG/DL (ref 8.3–10.1)
CHLORIDE SERPL-SCNC: 105 MMOL/L (ref 100–108)
CO2 SERPL-SCNC: 26 MMOL/L (ref 21–32)
CREAT SERPL-MCNC: 1.58 MG/DL (ref 0.6–1.3)
EOSINOPHIL # BLD AUTO: 0.06 THOUSAND/ΜL (ref 0–0.61)
EOSINOPHIL NFR BLD AUTO: 1 % (ref 0–6)
ERYTHROCYTE [DISTWIDTH] IN BLOOD BY AUTOMATED COUNT: 13.9 % (ref 11.6–15.1)
GFR SERPL CREATININE-BSD FRML MDRD: 45 ML/MIN/1.73SQ M
GLUCOSE SERPL-MCNC: 107 MG/DL (ref 65–140)
GRAM STN SPEC: ABNORMAL
HCT VFR BLD AUTO: 35.7 % (ref 36.5–49.3)
HGB BLD-MCNC: 11.3 G/DL (ref 12–17)
IMM GRANULOCYTES # BLD AUTO: 0.12 THOUSAND/UL (ref 0–0.2)
IMM GRANULOCYTES NFR BLD AUTO: 1 % (ref 0–2)
LYMPHOCYTES # BLD AUTO: 1.05 THOUSANDS/ΜL (ref 0.6–4.47)
LYMPHOCYTES NFR BLD AUTO: 10 % (ref 14–44)
MAGNESIUM SERPL-MCNC: 1.9 MG/DL (ref 1.6–2.6)
MCH RBC QN AUTO: 31 PG (ref 26.8–34.3)
MCHC RBC AUTO-ENTMCNC: 31.7 G/DL (ref 31.4–37.4)
MCV RBC AUTO: 98 FL (ref 82–98)
MONOCYTES # BLD AUTO: 0.8 THOUSAND/ΜL (ref 0.17–1.22)
MONOCYTES NFR BLD AUTO: 8 % (ref 4–12)
NEUTROPHILS # BLD AUTO: 8.08 THOUSANDS/ΜL (ref 1.85–7.62)
NEUTS SEG NFR BLD AUTO: 79 % (ref 43–75)
NRBC BLD AUTO-RTO: 0 /100 WBCS
PLATELET # BLD AUTO: 146 THOUSANDS/UL (ref 149–390)
PMV BLD AUTO: 9.9 FL (ref 8.9–12.7)
POTASSIUM SERPL-SCNC: 4 MMOL/L (ref 3.5–5.3)
RBC # BLD AUTO: 3.65 MILLION/UL (ref 3.88–5.62)
SODIUM SERPL-SCNC: 138 MMOL/L (ref 136–145)
WBC # BLD AUTO: 10.16 THOUSAND/UL (ref 4.31–10.16)

## 2021-07-16 PROCEDURE — 97530 THERAPEUTIC ACTIVITIES: CPT

## 2021-07-16 PROCEDURE — 83735 ASSAY OF MAGNESIUM: CPT | Performed by: STUDENT IN AN ORGANIZED HEALTH CARE EDUCATION/TRAINING PROGRAM

## 2021-07-16 PROCEDURE — 99223 1ST HOSP IP/OBS HIGH 75: CPT | Performed by: INTERNAL MEDICINE

## 2021-07-16 PROCEDURE — 97116 GAIT TRAINING THERAPY: CPT

## 2021-07-16 PROCEDURE — 97110 THERAPEUTIC EXERCISES: CPT

## 2021-07-16 PROCEDURE — 85025 COMPLETE CBC W/AUTO DIFF WBC: CPT | Performed by: STUDENT IN AN ORGANIZED HEALTH CARE EDUCATION/TRAINING PROGRAM

## 2021-07-16 PROCEDURE — 99232 SBSQ HOSP IP/OBS MODERATE 35: CPT | Performed by: INTERNAL MEDICINE

## 2021-07-16 PROCEDURE — 80048 BASIC METABOLIC PNL TOTAL CA: CPT | Performed by: STUDENT IN AN ORGANIZED HEALTH CARE EDUCATION/TRAINING PROGRAM

## 2021-07-16 PROCEDURE — 97535 SELF CARE MNGMENT TRAINING: CPT

## 2021-07-16 PROCEDURE — 99232 SBSQ HOSP IP/OBS MODERATE 35: CPT | Performed by: STUDENT IN AN ORGANIZED HEALTH CARE EDUCATION/TRAINING PROGRAM

## 2021-07-16 RX ORDER — FUROSEMIDE 20 MG/1
20 TABLET ORAL DAILY
Status: DISCONTINUED | OUTPATIENT
Start: 2021-07-16 | End: 2021-07-19 | Stop reason: HOSPADM

## 2021-07-16 RX ORDER — ALLOPURINOL 100 MG/1
100 TABLET ORAL DAILY
Status: DISCONTINUED | OUTPATIENT
Start: 2021-07-17 | End: 2021-07-19 | Stop reason: HOSPADM

## 2021-07-16 RX ADMIN — ACETAMINOPHEN 975 MG: 325 TABLET, FILM COATED ORAL at 05:23

## 2021-07-16 RX ADMIN — HEPARIN SODIUM 5000 UNITS: 5000 INJECTION INTRAVENOUS; SUBCUTANEOUS at 21:40

## 2021-07-16 RX ADMIN — TAMSULOSIN HYDROCHLORIDE 0.4 MG: 0.4 CAPSULE ORAL at 17:42

## 2021-07-16 RX ADMIN — HEPARIN SODIUM 5000 UNITS: 5000 INJECTION INTRAVENOUS; SUBCUTANEOUS at 14:08

## 2021-07-16 RX ADMIN — MELATONIN 3 MG: at 21:40

## 2021-07-16 RX ADMIN — CEFAZOLIN SODIUM 2000 MG: 2 SOLUTION INTRAVENOUS at 21:40

## 2021-07-16 RX ADMIN — POTASSIUM CITRATE 10 MEQ: 10 TABLET, EXTENDED RELEASE ORAL at 08:43

## 2021-07-16 RX ADMIN — TRAVOPROST 1 DROP: 0.04 SOLUTION OPHTHALMIC at 21:40

## 2021-07-16 RX ADMIN — NYSTATIN: 100000 POWDER TOPICAL at 17:42

## 2021-07-16 RX ADMIN — CEFAZOLIN SODIUM 2000 MG: 2 SOLUTION INTRAVENOUS at 14:06

## 2021-07-16 RX ADMIN — ACETAMINOPHEN 975 MG: 325 TABLET, FILM COATED ORAL at 17:42

## 2021-07-16 RX ADMIN — NYSTATIN: 100000 POWDER TOPICAL at 08:44

## 2021-07-16 RX ADMIN — FUROSEMIDE 20 MG: 20 TABLET ORAL at 18:12

## 2021-07-16 RX ADMIN — CEFAZOLIN SODIUM 2000 MG: 2 SOLUTION INTRAVENOUS at 04:34

## 2021-07-16 RX ADMIN — HEPARIN SODIUM 5000 UNITS: 5000 INJECTION INTRAVENOUS; SUBCUTANEOUS at 05:23

## 2021-07-16 RX ADMIN — METOPROLOL SUCCINATE 100 MG: 50 TABLET, EXTENDED RELEASE ORAL at 08:42

## 2021-07-16 RX ADMIN — Medication: at 08:44

## 2021-07-16 RX ADMIN — Medication: at 17:42

## 2021-07-16 RX ADMIN — ACETAMINOPHEN 975 MG: 325 TABLET, FILM COATED ORAL at 14:07

## 2021-07-16 NOTE — PLAN OF CARE
Problem: OCCUPATIONAL THERAPY ADULT  Goal: Performs self-care activities at highest level of function for planned discharge setting  See evaluation for individualized goals  Description: Treatment Interventions: ADL retraining, Functional transfer training, UE strengthening/ROM, Endurance training, Patient/family training, Equipment evaluation/education, Compensatory technique education, Energy conservation, Activityengagement          See flowsheet documentation for full assessment, interventions and recommendations  Outcome: Progressing  Note: Limitation: Decreased ADL status, Decreased UE strength, Decreased endurance, Decreased self-care trans, Decreased high-level ADLs  Prognosis: Good  Assessment: Pt was seen for skilled OT with focus on completion of functional transfers, review of energy conservation techs, self toileting, home set up and review of current plan of care  Pt reports having a smaller bathroom but will have his wife measure to assess if bariatric commode with drop arm will fit over their standard toilet  Mod I overall for functional transfers with and without use of RW  Encouraged Pt to take seated rest breaks prior to becoming increasingly fatigue  Pt reports having good understanding of all information  Recommending toilet aid and bariatric commode with return to home environment  The patient's raw score on the AM-PAC Daily Activity inpatient short form is 19, standardized score is 40 22, greater than and less than 39 4  Patients at this level are likely to benefit from discharge to home with home therapies to ease transition upon discharge as well as continued family support          OT Discharge Recommendation: Home with home health rehabilitation  OT - OK to Discharge: Yes (when medically cleared  )

## 2021-07-16 NOTE — PHYSICAL THERAPY NOTE
PT PROGRESS NOTE    Name: Vignesh Daley  AGE: 73 y o  MRN: [id number]  LENGTH OF STAY: 4    Admitting Dx:  Weakness [R53 1]  SIRS due to infectious process with organ dysfunction ([facility] ) [A41 9, R65 20]  Acute kidney injury ([facility] ) [N17 9]      Patient Active Problem List   Diagnosis    Essential hypertension    Kidney stones    Chronic renal failure, stage 3 (moderate) ([facility] )    HDL deficiency    Gout    DELIA (obstructive sleep apnea)    Allergic rhinitis    Renal cell carcinoma of left kidney (HCC)    Right foot pain    Class 3 severe obesity due to excess calories with serious comorbidity and body mass index (BMI) of 50 0 to 59 9 in adult [facility])    Lymphedema of both lower extremities    Welcome to Medicare preventive visit    Sepsis ([facility] )    Acute kidney injury superimposed on chronic kidney disease ([facility] )              07/16/21 1200   PT Last Visit   PT Visit Date 07/16/21   Note Type   Note Type Treatment   Pain Assessment   Pain Score No Pain   Restrictions/Precautions   Weight Bearing Precautions Per Order No   Other Precautions Fall Risk  (masimo; morbid obesity)   General   Chart Reviewed Yes   Response to Previous Treatment Patient reporting fatigue but able to participate  Family/Caregiver Present No   Cognition   Overall Cognitive Status WFL   Arousal/Participation Alert; Cooperative   Attention Within functional limits   Orientation Level Oriented X4   Following Commands Follows one step commands without difficulty   Subjective   Subjective Pt reports lack of sleep hence fatigued but willing to participate in therapy  Bed Mobility   Supine to Sit Unable to assess   Sit to Supine Unable to assess   Additional Comments Pt OOB in chair pre & post session   Transfers   Sit to Stand 6  Modified independent   Additional items Armrests; Increased time required   Stand to Sit 6  Modified independent   Additional items Armrests; Increased time required   Ambulation/Elevation   Gait pattern Wide MAXINE;Decreased foot clearance; Excessively slow   Gait Assistance 5  Supervision   Additional items Verbal cues   Assistive Device Bariatric Rolling walker   Distance 150'x1   Balance   Static Sitting Good   Dynamic Sitting Fair +   Static Standing Fair  (w/ RW)   Dynamic Standing Fair -  (w/ RW)   Ambulatory Fair -  (w/ RW)   Endurance Deficit   Endurance Deficit Yes   Endurance Deficit Description fatigue   Activity Tolerance   Activity Tolerance Patient limited by fatigue   Medical Staff Made Aware Salome Felty   Nurse Made Aware ELIZA Chiu   Exercises   Hip Flexion Sitting;10 reps;AROM; Bilateral   Hip Abduction Sitting;10 reps;AROM; Bilateral   Hip Adduction Sitting;10 reps;AROM; Bilateral   Knee AROM Long Arc Quad Sitting;10 reps;AROM; Bilateral   Ankle Pumps Sitting;10 reps;AROM; Bilateral   Assessment   Prognosis Good   Problem List Decreased strength;Decreased endurance; Impaired balance;Decreased mobility;Obesity; Decreased skin integrity   Assessment Pt seen for PT per POC  Improved mobility & activity tolerance noted this tx session  Pt progressed to modified I w/ transfers & S for amb w/ bariatric RW  See above levels of assistance required for all functional tasks  Gait deviations as above, slow w/ WBOS & dec foot clearance but no gross LOB noted  Improved amb tolerance compared to previous PT session  Pt reports that he has been ambulating in room w/o the RW w/o difficulty  Pt tolerated above mentioned thera  ex well, AROM  No SOB & dizziness reported t/o session  SpO2 95% at RA after amb  Nsg staff most recent vital signs as follows: /69   Pulse 72   Temp 98 9 °F (37 2 °C)   Resp 16   SpO2 96%   Will continue PT per POC  At end of session, pt OOB in chair in stable condition, call bell & phone in reach  Fall precautions reinforced w/ good understanding  The patient's AM-PAC Basic Mobility Inpatient Short Form Raw Score is 20, Standardized Score is 43 99   A standardized score greater than 42 9 suggests the patient may benefit from discharge to home  From PT standpoint, will continue to anticipate safe return to home w/ family support when medically cleared  Pt will benefit from OPPT at D/C  CM to follow  Nsg staff to continue to mobilized pt (OOB in chair for all meals & ambulate in room/unit) as tolerated to prevent decline in function  Nsg notified  Barriers to Discharge None   Goals   Patient Goals to go home   STG Expiration Date 07/24/21   PT Treatment Day 1   Plan   Treatment/Interventions Functional transfer training;LE strengthening/ROM; Elevations; Therapeutic exercise; Endurance training;Patient/family training;Bed mobility;Gait training;Spoke to nursing;OT   Progress Progressing toward goals   PT Frequency Other (Comment)  (3-5x/wk)   Recommendation   PT Discharge Recommendation Home with outpatient rehabilitation   Equipment Recommended Walker  (bariatric)   [employer] Recommended HD Bariatric wheeled walker   [address] 435   Turning in Bed Without Bedrails 4   Lying on Back to Sitting on Edge of Flat Bed 3   Moving Bed to Chair 3   Standing Up From Chair 4   Walk in Room 3   Climb 3-5 Stairs 3   Basic Mobility Inpatient Raw Score 20   Basic Mobility Standardized Score 43 99   Roscoe Ocampo, PT

## 2021-07-16 NOTE — PROGRESS NOTES
[address] [place] [address]  Progress Note - Vignesh Daley 1955, 73 y o  male MRN: [id number]  Unit/Bed#: Elis [facility] 2 -01 Encounter: [id number]  Primary Care Provider: Adolph Esquivel MD   Date and time admitted to hospital: 7/12/2021 12:29 PM    * Sepsis [facility])  Assessment & Plan  73year old male presenting with sepsis possibly due to left foot cellulitis  Initial cultures likely contaminant  - Appreciate ID and podiatry recommendations  - Continue High dose Cefazolin per ID  Continue IV antibiotic management for now  They will reassess patient again and Monday and hope to transition him to PO by then if clinically better  Acute kidney injury superimposed on chronic kidney disease [facility])  Assessment & Plan  Lab Results   Component Value Date    EGFR 45 07/16/2021    EGFR 40 07/15/2021    EGFR 38 07/14/2021    CREATININE 1 58 (H) 07/16/2021    CREATININE 1 74 (H) 07/15/2021    CREATININE 1 83 (H) 07/14/2021     MILTON on CKD  Baseline at 1 5-1 7  Possibly pre-renal with sepsis  Clinically improved with IV fluids  Back to baseline levels  - Hold ACE, lasix restarted by renal  - Appreciate renal recommendations  - Approaching baseline, likely restart lasix benito if improved  Gout  Assessment & Plan  Continue allopurinol    Essential hypertension  Assessment & Plan  Lisinopril held due to MILTON  · Continue metoprolol with hold parameters      VTE Pharmacologic Prophylaxis:   Pharmacologic: Heparin  Mechanical VTE Prophylaxis in Place: Yes    Patient Centered Rounds: I have performed bedside rounds with nursing staff today  Discussions with Specialists or Other Care Team Provider: Nursing    Education and Discussions with Family / Patient: patient    Time Spent for Care: 30 minutes  More than 50% of total time spent on counseling and coordination of care as described above      Current Length of Stay: 4 day(s)    Current Patient Status: Inpatient   Certification Statement: The patient will continue to require additional inpatient hospital stay due to iv antibiotics, id reevaluation    Discharge Plan: active    Code Status: Level 1 - Full Code      Subjective:   Patient seen examined at bedside  Some improvement with his edema today  No new acute complaints at this time  Objective:     Vitals:   Temp (24hrs), Av 1 °F (37 3 °C), Min:98 4 °F (36 9 °C), Max:100 1 °F (37 8 °C)    Temp:  [98 4 °F (36 9 °C)-100 1 °F (37 8 °C)] 98 5 °F (36 9 °C)  HR:  [72-85] 77  Resp:  [16-20] 18  BP: (126-166)/(56-83) 126/56  SpO2:  [92 %-96 %] 95 %  There is no height or weight on file to calculate BMI  Input and Output Summary (last 24 hours):     No intake or output data in the 24 hours ending 21 1743    Physical Exam:     Physical Exam  Vitals reviewed  HENT:      Head: Normocephalic  Nose: Nose normal       Mouth/Throat:      Mouth: Mucous membranes are moist    Eyes:      General: No scleral icterus  Cardiovascular:      Rate and Rhythm: Normal rate and regular rhythm  Pulmonary:      Effort: Pulmonary effort is normal  No respiratory distress  Abdominal:      General: There is no distension  Palpations: Abdomen is soft  Tenderness: There is no abdominal tenderness  Musculoskeletal:      Left lower leg: Edema present  Comments: LLE dressing intact   Skin:     General: Skin is warm  Neurological:      Mental Status: He is alert  Mental status is at baseline     Psychiatric:         Mood and Affect: Mood normal          Behavior: Behavior normal        Additional Data:     Labs:    Results from last 7 days   Lab Units 21  0533 21  0620 21  1333   WBC Thousand/uL 10   --   54*   HEMOGLOBIN g/dL 11 3*  --  14 9   HEMATOCRIT % 35 7*  --  44 5   PLATELETS Thousands/uL 146*  --  210   BANDS PCT %  --   --  14*   NEUTROS PCT % 79*   < >  --    LYMPHS PCT % 10*   < >  --    LYMPHO PCT %  --   --  5*   MONOS PCT % 8   < >  --    MONO PCT %  --   --  3*   EOS PCT % 1   < > 1    < > = values in this interval not displayed  Results from last 7 days   Lab Units 07/16/21  0533 07/13/21  0620   SODIUM mmol/L 138 136   POTASSIUM mmol/L 4 0 4 3   CHLORIDE mmol/L 105 101   CO2 mmol/L 26 24   BUN mg/dL 22 25   CREATININE mg/dL 1 58* 2 15*   ANION GAP mmol/L 7 11   CALCIUM mg/dL 8 1* 8 4   ALBUMIN g/dL  --  3 2*   TOTAL BILIRUBIN mg/dL  --  1 99*   ALK PHOS U/L  --  59   ALT U/L  --  37   AST U/L  --  47*   GLUCOSE RANDOM mg/dL 107 106     Results from last 7 days   Lab Units 07/12/21  1412   INR  1 36*     Results from last 7 days   Lab Units 07/15/21  1130   POC GLUCOSE mg/dl 104         Results from last 7 days   Lab Units 07/13/21  0620 07/12/21  1835 07/12/21  1333   LACTIC ACID mmol/L  --   --  2 0   PROCALCITONIN ng/ml 10 69* 7 88*  --            * I Have Reviewed All Lab Data Listed Above  * Additional Pertinent Lab Tests Reviewed: Marcelina 66 Admission Reviewed    Imaging:    Imaging Reports Reviewed Today Include: No new imaging    Recent Cultures (last 7 days):     Results from last 7 days   Lab Units 07/14/21  1054 07/14/21  0950 07/12/21  1333 07/12/21  1325   BLOOD CULTURE  No Growth at 24 hrs  No Growth at 48 hrs  No Growth at 72 hrs   Staphylococcus coagulase negative*   GRAM STAIN RESULT   --   --   --  Gram positive cocci in clusters*       Last 24 Hours Medication List:   Current Facility-Administered Medications   Medication Dose Route Frequency Provider Last Rate    acetaminophen  975 mg Oral Q6H [facility] José Chapa MD      [START ON 7/17/2021] allopurinol  100 mg Oral Daily Kory Randolph MD      ammonium lactate   Topical BID Kory Randolph MD      cefazolin  2,000 mg Intravenous [facility], CRNP 2,000 mg (07/16/21 1406)    heparin (porcine)  5,000 Units Subcutaneous Q8H 3 [facility] JOSE CARLOS Elias PA-C      melatonin  3 mg Oral HS Kory Randolph MD      metoprolol succinate  100 mg Oral Daily [place] Crystal PA-C      nystatin   Topical BID Chris Robledo MD      potassium citrate  10 mEq Oral Daily Sara Elias PA-C      tamsulosin  0 4 mg Oral Daily With [employer] JOSE CARLOS Elias PA-C      travoprost  1 drop Both Eyes HS Sofia Viveros PA-C          Today, Patient Was Seen By: Sean Garcia MD    ** Please Note: Dictation voice to text software may have been used in the creation of this document   **

## 2021-07-16 NOTE — PROGRESS NOTES
Podiatry - Progress Note  Patient: Damir Mejia 73 y o  male   MRN: [id number]  PCP: Eri Gonzalez MD  Unit/Bed#: [place] 2 [facility] 87 213-01 Encounter: [id number]  Date Of Visit: 21    ASSESSMENT:    Damir Mejia is a 73 y o  male with:    1  Left 2nd digit traumatic nail avulsion - POA  2  Left heel fissure  3  Bilateral lymphedema  4  Sepsis - POA  5  GPC bacteremia - POA      PLAN:    · Heel fissure and left second toe continue to appear with no active signs of infection  ACE wrap was applied to left leg  Appreciate nursing assistance with dressing changes  · Elevation on green foam wedges or pillows when non-ambulatory  · Rest of care per primary team      Weightbearing status: as tolerated    SUBJECTIVE:     The patient was seen, evaluated, and assessed at bedside today  The patient was awake, alert, and in no acute distress  No acute events overnight  The patient reports he is feeling ok  Pt denies pain to LE b/l  Patient denies N/V/F/chills/SOB/CP  OBJECTIVE:     Vitals:   /56   Pulse 77   Temp 98 5 °F (36 9 °C)   Resp 18   SpO2 95%     Temp (24hrs), Av 5 °F (37 5 °C), Min:98 4 °F (36 9 °C), Max:102 1 °F (38 9 °C)      Physical Exam:     General: Alert, cooperative and no distress  Lungs: Non labored breathing  Abdomen: Soft, non-tender  Lower extremity exam:  Cardiovascular status at baseline  Neurological status at baseline  Musculoskeletal status at baseline  No calf tenderness noted  Left heel fissure no gross signs of infection  No crepitus, no drainage, no purulence  Edema noted b/l L>R    Left 2nd digit nail bed dry and epithelialized without signs of active infection       Additional Data:     Labs:    Results from last 7 days   Lab Units 21  0533   WBC Thousand/uL 10 16   HEMOGLOBIN g/dL 11 3*   HEMATOCRIT % 35 7*   PLATELETS Thousands/uL 146*   NEUTROS PCT % 79*   LYMPHS PCT % 10*   MONOS PCT % 8   EOS PCT % 1     Results from last 7 days   Lab Units 07/16/21  0533 07/13/21  0620   POTASSIUM mmol/L 4 0 4 3   CHLORIDE mmol/L 105 101   CO2 mmol/L 26 24   BUN mg/dL 22 25   CREATININE mg/dL 1 58* 2 15*   CALCIUM mg/dL 8 1* 8 4   ALK PHOS U/L  --  59   ALT U/L  --  37   AST U/L  --  47*     Results from last 7 days   Lab Units 07/12/21  1412   INR  1 36*       * I Have Reviewed All Lab Data Listed Above  Recent Cultures (last 7 days):     Results from last 7 days   Lab Units 07/14/21  1054 07/14/21  0950 07/12/21  1333 07/12/21  1325   BLOOD CULTURE  No Growth at 24 hrs  No Growth at 48 hrs  No Growth at 72 hrs  Staphylococcus coagulase negative*   GRAM STAIN RESULT   --   --   --  Gram positive cocci in clusters*           Imaging: I have personally reviewed pertinent films in PACS  EKG, Pathology, and Other Studies: I have personally reviewed pertinent reports  ** Please Note: Portions of the record may have been created with voice recognition software  Occasional wrong word or "sound a like" substitutions may have occurred due to the inherent limitations of voice recognition software  Read the chart carefully and recognize, using context, where substitutions have occurred   **

## 2021-07-16 NOTE — CONSULTS
Consultation - Nephrology   Mckenna Luisa 73 y o  male MRN: [id number]  Unit/Bed#: [place] 2 -01 Encounter: [id number]    ASSESSMENT AND PLAN:  Patient is 52-year-old male morbidly obese, CKD stage 3, history of nephrolithiasis, hypertension, left renal mass, status post cryoablation in 2018, gout, presented with fever, chills, lower extremity cellulitis  We are consulted for MILTON/CKD management  MILTON POA on CKD stage 3, baseline creatinine 1 4 to 1 7, follows with [facility]  -creatinine 2 1 on admission now has improved to 1 5 closer to baseline  -MILTON suspect in the setting of cellulitis, septic ATN, use of ACE-inhibitor, Lasix   -UA this admission shows 2+ proteinuria, no significant hematuria, concentrated sample  Will need eventual repeat UA with microscopy once creatinine remains stable   -check bladder scan for PVR  -avoid nephrotoxins or NSAIDs  -continue to hold ACE-inhibitor for time being, Lasix as below    History of left renal mass, status post cryoablation, CT scan this admission shows new left renal exophytic 3 4 cm lesion suggestive of angiomyolipoma although malignancy could not be excluded  This needs further evaluation  Consider Urology input  Hypertension  -blood pressure somewhat fluctuating with occasional high readings   -goal SBP 130s  -currently on metoprolol, continue to hold ACE-inhibitor  -will start Lasix 20 mg daily    Lower extremity cellulitis, currently on antibiotic as per ID  Lower extremity edema  -has been on Lasix 20 mg daily as outpatient  Volume status somewhat difficult to ascertain given morbid obesity  -will restart Lasix 20 mg daily given improvement in renal function    -currently remains on room air, denies any respiratory distress    Discussed above plan in detail with primary team    HISTORY OF PRESENT ILLNESS:  Requesting Physician: Gay Kim MD  Reason for Consult:  Debby Kennedy is a 73y o  year old male who was admitted to [place] [address] after presenting with fever, chills, lower extremity cellulitis  A renal consultation is requested today for assistance in the management of MILTON, CKD  Old medical records reviewed including prior creatinine values from [facility] records and Care everywhere  Outpatient nephrology notes were reviewed  Patient presented with fever, chills and found to have lower extremity cellulitis and now remains on IV antibiotic as per ID  Initially found to also have MILTON and all offending medications were held with improvement in creatinine  At the time of my encounter, he denies any nausea, vomiting, chest pain or any worsening shortness of breath  He does take Lasix at home  No recent NSAID use at home  Denies any urinary complaint  Denies any lightheadedness or dizziness      PAST MEDICAL HISTORY:  Past Medical History:   Diagnosis Date    Chronic kidney disease     Hypertension     Obesity     Urinary tract infection without hematuria 4/3/2020       PAST SURGICAL HISTORY:  Past Surgical History:   Procedure Laterality Date    APPENDECTOMY      NASAL SEPTUM SURGERY         ALLERGIES:  Allergies   Allergen Reactions    Codeine Swelling     Other reaction(s): SWELLING OF FACE  Other reaction(s): SWELLING OF FACE  Other reaction(s): SWELLING OF FACE       SOCIAL HISTORY:  Social History     Substance and Sexual Activity   Alcohol Use No     Social History     Substance and Sexual Activity   Drug Use Not Currently     Social History     Tobacco Use   Smoking Status Never Smoker   Smokeless Tobacco Never Used   Tobacco Comment    no exposure to passive smoke       FAMILY HISTORY:  Family History   Problem Relation Age of Onset    Diabetes Father     Prostate cancer Brother        MEDICATIONS:    Current Facility-Administered Medications:     acetaminophen (TYLENOL) tablet 975 mg, 975 mg, Oral, Q6H [address], José Chapa MD, 975 mg at 07/16/21 0523    ammonium lactate (LAC-HYDRIN) 12 % lotion, , Topical, BID, Odebolt Shine MD Ellis, Given at 07/16/21 0844    ceFAZolin (ANCEF) IVPB (premix in dextrose) 2,000 mg 50 mL, 2,000 mg, Intravenous, Q8H, KELSEY Fuentes, Last Rate: 100 mL/hr at 07/16/21 0434, 2,000 mg at 07/16/21 0434    heparin (porcine) subcutaneous injection 5,000 Units, 5,000 Units, Subcutaneous, Q8H [facility], Sara Elias PA-C, 5,000 Units at 07/16/21 0523    melatonin tablet 3 mg, 3 mg, Oral, HS, Chris Robledo MD, 3 mg at 07/15/21 2105    metoprolol succinate (TOPROL-XL) 24 hr tablet 100 mg, 100 mg, Oral, Daily, Sara Elias PA-C, 100 mg at 07/16/21 9426    nystatin (MYCOSTATIN) powder, , Topical, BID, Chris Robledo MD, Given at 07/16/21 0844    potassium citrate (UROCIT-K) CR tablet 10 mEq, 10 mEq, Oral, Daily, Sara Elias PA-C, 10 mEq at 07/16/21 0843    tamsulosin (FLOMAX) capsule 0 4 mg, 0 4 mg, Oral, Daily With Chay Elias PA-C, 0 4 mg at 07/15/21 1723    travoprost (TRAVATAN-Z) 0 004 % ophthalmic solution 1 drop, 1 drop, Both Eyes, HS, Sara Elias PA-C, 1 drop at 07/15/21 2105    REVIEW OF SYSTEMS:  Complete 10 point review of systems were obtained and discussed in length with the patient  Complete review of systems were negative / unremarkable except mentioned in the HPI section       PHYSICAL EXAM:  Current Weight:    First Weight:    Vitals:    07/16/21 0705   BP: 132/71   Pulse: 78   Resp: 16   Temp: 98 4 °F (36 9 °C)   SpO2: 92%       Intake/Output Summary (Last 24 hours) at 7/16/2021 0934  Last data filed at 7/15/2021 1341  Gross per 24 hour   Intake 200 ml   Output --   Net 200 ml     Wt Readings from Last 3 Encounters:   05/11/21 (!) 187 kg (411 lb 9 6 oz)   11/09/20 (!) 182 kg (401 lb 1 6 oz)   05/08/20 (!) 181 kg (398 lb 4 8 oz)     Temp Readings from Last 3 Encounters:   07/16/21 98 4 °F (36 9 °C) (Oral)   05/11/21 97 8 °F (36 6 °C) (Temporal)   11/09/20 97 8 °F (36 6 °C) (Temporal)     BP Readings from Last 3 Encounters:   07/16/21 132/71   05/11/21 142/72   11/09/20 150/78     Pulse Readings from Last 3 Encounters:   07/16/21 78   05/11/21 62   11/09/20 78        Physical Examination:  General:  Sitting in chair, no acute distress  Eyes:  Mild conjunctival pallor present  ENT:  External examination of ears and nose unremarkable  Neck:  No obvious lymphadenopathy appreciated  Respiratory:  Bilateral air entry present  CVS:  S1, S2 present  GI:  Soft, nondistended, morbidly obese  CNS:  Active alert oriented x3  Extremities:  Trace to 1+ edema in legs, significant nonpitting component  Psych:  Conscious, coherent, oriented  Skin:  Left Lower extremity cellulitis with mild erythema present    Invasive Devices:      Lab Results:   Results from last 7 days   Lab Units 07/16/21  0533 07/15/21  0600 07/14/21  0958 07/13/21  0620 07/12/21  1333   WBC Thousand/uL 10 16 9 31 11 04* 17 80* 21 54*   HEMOGLOBIN g/dL 11 3* 12 4 12 5 14 8 14 9   HEMATOCRIT % 35 7* 38 5 38 7 45 9 44 5   PLATELETS Thousands/uL 146* 135* 121* 163 210   POTASSIUM mmol/L 4 0 4 8 4 1 4 3 4 7   CHLORIDE mmol/L 105 107 104 101 100   CO2 mmol/L 26 27 26 24 29   BUN mg/dL 22 22 25 25 23   CREATININE mg/dL 1 58* 1 74* 1 83* 2 15* 2 16*   CALCIUM mg/dL 8 1* 8 4 7 6* 8 4 8 9   MAGNESIUM mg/dL 1 9 1 8 1 7  --  1 5*       Other Studies:   XR foot 3+ vw left   Final Result by Mary Anne Meneses DO (07/14 1518)      Extensive soft tissue swelling of the dorsum of the forefoot  Moderate degenerative change of the midfoot and 1st MTP joint  Workstation performed: [id number]         US right upper quadrant   Final Result by Aida Raman MD (07/13 1704)      Gallstones and fatty liver  No evidence of acute pathology  Workstation performed: [id number]         CT chest abdomen pelvis wo contrast   Final Result by Yo Andre MD (07/12 1816)         1  Large right lateral abdominal hernia and pannus, partially outside the field of view    Within this limitation, no evidence of bowel obstruction, colitis or diverticulitis  2   New left renal exophytic 3 4 cm lesion suggestive of an angiomyolipoma, however malignancy not entirely excluded  Recommend nonemergent MRI of the abdomen with gadolinium for further evaluation  2   No acute cardiopulmonary process  Workstation performed: [id number]         XR chest 1 view portable   Final Result by Mel Coy MD (07/13 1847)      No acute cardiopulmonary disease  Workstation performed: [id number]         Chest x-ray images personally reviewed which shows clear lungs    Portions of the record may have been created with voice recognition software  Occasional wrong word or "sound a like" substitutions may have occurred due to the inherent limitations of voice recognition software  Read the chart carefully and recognize, using context, where substitutions have occurred

## 2021-07-16 NOTE — OCCUPATIONAL THERAPY NOTE
Occupational Therapy Treatment Note:         07/16/21 1242   OT Last Visit   OT Visit Date 07/16/21   Note Type   Note Type Treatment   Restrictions/Precautions   Weight Bearing Precautions Per Order No   Other Precautions Fall Risk;Pain  (morbid obesity  verenice)   Pain Assessment   Pain Assessment Tool 0-10   Pain Score No Pain   ADL   Where Assessed Chair   Grooming Assistance 5  Supervision/Setup   Grooming Deficit Setup   UB Dressing Assistance 5  Supervision/Setup   UB Dressing Deficit Setup;Verbal cueing; Increased time to complete;Supervision/safety   LB Dressing Assistance 3  Moderate Assistance   LB Dressing Deficit Setup;Verbal cueing;Supervision/safety; Increased time to complete; Don/doff L sock; Don/doff R sock   LB Dressing Comments Pt reports his wife helps him at home with LE bathing/dressing  Toileting Assistance  4  Minimal Assistance   Toileting Deficit Setup;Verbal cueing; Increased time to complete; Bedside commode;Perineal hygiene   Toileting Comments Pt may benefit from use of bariatic commode and toilet aid for optimal independence at home  Functional Standing Tolerance   Time 5 mins   Activity dynamic stand balance  Comments Pt with increased fatigue with SOB with increased distance/time  Bed Mobility   Supine to Sit Unable to assess   Additional Comments Pt OOB in bedside chair upon greeting this tx session  Transfers   Sit to Stand 6  Modified independent   Additional items Armrests; Increased time required   Stand to Sit 6  Modified independent   Additional items Increased time required;Armrests   Stand pivot 6  Modified independent   Additional items Increased time required;Armrests; Bedrails   Toilet transfer 6  Modified independent   Additional items Verbal cues; Commode;Armrests; Increased time required   Additional Comments Pt will benefit from continued use of bariatric commodes      Functional Mobility   Functional Mobility 5  Supervision   Additional Comments x1   Additional items Rolling walker   Cognition   Overall Cognitive Status WFL   Arousal/Participation Alert; Responsive; Cooperative   Attention Within functional limits   Orientation Level Oriented X4   Memory Within functional limits   Following Commands Follows multistep commands with increased time or repetition   Comments Pt able to follow commands with good carry over  Additional Activities   Additional Activities Other (Comment)  (reviewed current plan of care  )   Additional Activities Comments Pt reports having good understanding  Activity Tolerance   Activity Tolerance Patient limited by fatigue   Medical Staff Made Aware reported all findings to nursing staff  Assessment   Assessment Pt was seen for skilled OT with focus on completion of functional transfers, review of energy conservation techs, self toileting, home set up and review of current plan of care  Pt reports having a smaller bathroom but will have his wife measure to assess if bariatric commode with drop arm will fit over their standard toilet  Mod I overall for functional transfers with and without use of RW  Encouraged Pt to take seated rest breaks prior to becoming increasingly fatigue  Pt reports having good understanding of all information  Recommending toilet aid and bariatric commode with return to home environment  The patient's raw score on the AM-PAC Daily Activity inpatient short form is 19, standardized score is 40 22, greater than and less than 39 4  Patients at this level are likely to benefit from discharge to home with home therapies to ease transition upon discharge as well as continued family support       Plan   Treatment Interventions ADL retraining;Functional transfer training;UE strengthening/ROM; Endurance training;Cognitive reorientation;Patient/family training   Goal Expiration Date 07/28/21   OT Treatment Day 1   OT Frequency 2-3x/wk   Recommendation   OT Discharge Recommendation Home with home health rehabilitation   OT - OK to Discharge Yes  (when medically cleared   )   AM-PAC Daily Activity Inpatient   Lower Body Dressing 2   Bathing 3   Toileting 3   Upper Body Dressing 3   Grooming 4   Eating 4   Daily Activity Raw Score 19   Daily Activity Standardized Score (Calc for Raw Score >=11) 40 22   AM-PAC Applied Cognition Inpatient   Following a Speech/Presentation 4   Understanding Ordinary Conversation 4   Taking Medications 4   Remembering Where Things Are Placed or Put Away 4   Remembering List of 4-5 Errands 4   Taking Care of Complicated Tasks 4   Applied Cognition Raw Score 24   Applied Cognition Standardized Score 62 21   Yareli Heck, [address]

## 2021-07-16 NOTE — PLAN OF CARE
Problem: MOBILITY - ADULT  Goal: Maintain or return to baseline ADL function  Description: INTERVENTIONS:  -  Assess patient's ability to carry out ADLs; assess patient's baseline for ADL function and identify physical deficits which impact ability to perform ADLs (bathing, care of mouth/teeth, toileting, grooming, dressing, etc )  - Assess/evaluate cause of self-care deficits   - Assess range of motion  - Assess patient's mobility; develop plan if impaired  - Assess patient's need for assistive devices and provide as appropriate  - Encourage maximum independence but intervene and supervise when necessary  - Involve family in performance of ADLs  - Assess for home care needs following discharge   - Consider OT consult to assist with ADL evaluation and planning for discharge  - Provide patient education as appropriate  Outcome: Progressing  Goal: Maintains/Returns to pre admission functional level  Description: INTERVENTIONS:  - Perform BMAT or MOVE assessment daily    - Set and communicate daily mobility goal to care team and patient/family/caregiver  - Collaborate with rehabilitation services on mobility goals if consulted  - Perform Range of Motion  times a day  - Reposition patient every  hours    - Dangle patient  times a day  - Stand patient  times a day  - Ambulate patient  times a day  - Out of bed to chair  times a day   - Out of bed for meals  times a day  - Out of bed for toileting  - Record patient progress and toleration of activity level   Outcome: Progressing     Problem: PAIN - ADULT  Goal: Verbalizes/displays adequate comfort level or baseline comfort level  Description: Interventions:  - Encourage patient to monitor pain and request assistance  - Assess pain using appropriate pain scale  - Administer analgesics based on type and severity of pain and evaluate response  - Implement non-pharmacological measures as appropriate and evaluate response  - Consider cultural and social influences on pain and pain management  - Notify physician/advanced practitioner if interventions unsuccessful or patient reports new pain  Outcome: Progressing     Problem: INFECTION - ADULT  Goal: Absence or prevention of progression during hospitalization  Description: INTERVENTIONS:  - Assess and monitor for signs and symptoms of infection  - Monitor lab/diagnostic results  - Monitor all insertion sites, i e  indwelling lines, tubes, and drains  - Monitor endotracheal if appropriate and nasal secretions for changes in amount and color  - [place] appropriate cooling/warming therapies per order  - Administer medications as ordered  - Instruct and encourage patient and family to use good hand hygiene technique  - Identify and instruct in appropriate isolation precautions for identified infection/condition  Outcome: Progressing     Problem: SAFETY ADULT  Goal: Maintain or return to baseline ADL function  Description: INTERVENTIONS:  -  Assess patient's ability to carry out ADLs; assess patient's baseline for ADL function and identify physical deficits which impact ability to perform ADLs (bathing, care of mouth/teeth, toileting, grooming, dressing, etc )  - Assess/evaluate cause of self-care deficits   - Assess range of motion  - Assess patient's mobility; develop plan if impaired  - Assess patient's need for assistive devices and provide as appropriate  - Encourage maximum independence but intervene and supervise when necessary  - Involve family in performance of ADLs  - Assess for home care needs following discharge   - Consider OT consult to assist with ADL evaluation and planning for discharge  - Provide patient education as appropriate  Outcome: Progressing  Goal: Maintains/Returns to pre admission functional level  Description: INTERVENTIONS:  - Perform BMAT or MOVE assessment daily    - Set and communicate daily mobility goal to care team and patient/family/caregiver     - Collaborate with rehabilitation services on mobility goals if consulted  - Perform Range of Motion  times a day  - Reposition patient every  hours    - Dangle patient  times a day  - Stand patient  times a day  - Ambulate patient  times a day  - Out of bed to chair  times a day   - Out of bed for meals  times a day  - Out of bed for toileting  - Record patient progress and toleration of activity level   Outcome: Progressing  Goal: Patient will remain free of falls  Description: INTERVENTIONS:  - Educate patient/family on patient safety including physical limitations  - Instruct patient to call for assistance with activity   - Consult OT/PT to assist with strengthening/mobility   - Keep Call bell within reach  - Keep bed low and locked with side rails adjusted as appropriate  - Keep care items and personal belongings within reach  - Initiate and maintain comfort rounds  - Make Fall Risk Sign visible to staff  - Offer Toileting every  Hours, in advance of need  - Initiate/Maintain alarm  - Obtain necessary fall risk management equipment:   - Apply yellow socks and bracelet for high fall risk patients  - Consider moving patient to room near nurses station  Outcome: Progressing     Problem: DISCHARGE PLANNING  Goal: Discharge to home or other facility with appropriate resources  Description: INTERVENTIONS:  - Identify barriers to discharge w/patient and caregiver  - Arrange for needed discharge resources and transportation as appropriate  - Identify discharge learning needs (meds, wound care, etc )  - Arrange for interpretive services to assist at discharge as needed  - Refer to Case Management Department for coordinating discharge planning if the patient needs post-hospital services based on physician/advanced practitioner order or complex needs related to functional status, cognitive ability, or social support system  Outcome: Progressing     Problem: Knowledge Deficit  Goal: Patient/family/caregiver demonstrates understanding of disease process, treatment plan, medications, and discharge instructions  Description: Complete learning assessment and assess knowledge base    Interventions:  - Provide teaching at level of understanding  - Provide teaching via preferred learning methods  Outcome: Progressing     Problem: CARDIOVASCULAR - ADULT  Goal: Maintains optimal cardiac output and hemodynamic stability  Description: INTERVENTIONS:  - Monitor I/O, vital signs and rhythm  - Monitor for S/S and trends of decreased cardiac output  - Administer and titrate ordered vasoactive medications to optimize hemodynamic stability  - Assess quality of pulses, skin color and temperature  - Assess for signs of decreased coronary artery perfusion  - Instruct patient to report change in severity of symptoms  Outcome: Progressing     Problem: RESPIRATORY - ADULT  Goal: Achieves optimal ventilation and oxygenation  Description: INTERVENTIONS:  - Assess for changes in respiratory status  - Assess for changes in mentation and behavior  - Position to facilitate oxygenation and minimize respiratory effort  - Oxygen administered by appropriate delivery if ordered  - Initiate smoking cessation education as indicated  - Encourage broncho-pulmonary hygiene including cough, deep breathe, Incentive Spirometry  - Assess the need for suctioning and aspirate as needed  - Assess and instruct to report SOB or any respiratory difficulty  - Respiratory Therapy support as indicated  Outcome: Progressing     Problem: METABOLIC, FLUID AND ELECTROLYTES - ADULT  Goal: Electrolytes maintained within normal limits  Description: INTERVENTIONS:  - Monitor labs and assess patient for signs and symptoms of electrolyte imbalances  - Administer electrolyte replacement as ordered  - Monitor response to electrolyte replacements, including repeat lab results as appropriate  - Instruct patient on fluid and nutrition as appropriate  Outcome: Progressing  Goal: Fluid balance maintained  Description: INTERVENTIONS:  - Monitor labs - Monitor I/O and WT  - Instruct patient on fluid and nutrition as appropriate  - Assess for signs & symptoms of volume excess or deficit  Outcome: Progressing     Problem: SKIN/TISSUE INTEGRITY - ADULT  Goal: Skin Integrity remains intact(Skin Breakdown Prevention)  Description: Assess:  -Perform Mic assessment every   -Clean and moisturize skin every   -Inspect skin when repositioning, toileting, and assisting with ADLS  -Assess under medical devices such as  every   -Assess extremities for adequate circulation and sensation     Bed Management:  -Have minimal linens on bed & keep smooth, unwrinkled  -Change linens as needed when moist or perspiring  -Avoid sitting or lying in one position for more than  hours while in bed  -Keep HOB at degrees     Toileting:  -Offer bedside commode  -Assess for incontinence every   -Use incontinent care products after each incontinent episode such as     Activity:  -Mobilize patient  times a day  -Encourage activity and walks on unit  -Encourage or provide ROM exercises   -Turn and reposition patient every  Hours  -Use appropriate equipment to lift or move patient in bed  -Instruct/ Assist with weight shifting every  when out of bed in chair  -Consider limitation of chair time  hour intervals    Skin Care:  -Avoid use of baby powder, tape, friction and shearing, hot water or constrictive clothing  -Relieve pressure over bony prominences using   -Do not massage red bony areas    Next Steps:  -Teach patient strategies to minimize risks such as    -Consider consults to  interdisciplinary teams such as   Outcome: Progressing     Problem: HEMATOLOGIC - ADULT  Goal: Maintains hematologic stability  Description: INTERVENTIONS  - Assess for signs and symptoms of bleeding or hemorrhage  - Monitor labs  - Administer supportive blood products/factors as ordered and appropriate  Outcome: Progressing     Problem: MUSCULOSKELETAL - ADULT  Goal: Maintain or return mobility to safest level of function  Description: INTERVENTIONS:  - Assess patient's ability to carry out ADLs; assess patient's baseline for ADL function and identify physical deficits which impact ability to perform ADLs (bathing, care of mouth/teeth, toileting, grooming, dressing, etc )  - Assess/evaluate cause of self-care deficits   - Assess range of motion  - Assess patient's mobility  - Assess patient's need for assistive devices and provide as appropriate  - Encourage maximum independence but intervene and supervise when necessary  - Involve family in performance of ADLs  - Assess for home care needs following discharge   - Consider OT consult to assist with ADL evaluation and planning for discharge  - Provide patient education as appropriate  Outcome: Progressing     Problem: Prexisting or High Potential for Compromised Skin Integrity  Goal: Skin integrity is maintained or improved  Description: INTERVENTIONS:  - Identify patients at risk for skin breakdown  - Assess and monitor skin integrity  - Assess and monitor nutrition and hydration status  - Monitor labs   - Assess for incontinence   - Turn and reposition patient  - Assist with mobility/ambulation  - Relieve pressure over bony prominences  - Avoid friction and shearing  - Provide appropriate hygiene as needed including keeping skin clean and dry  - Evaluate need for skin moisturizer/barrier cream  - Collaborate with interdisciplinary team   - Patient/family teaching  - Consider wound care consult   Outcome: Progressing     Problem: Potential for Falls  Goal: Patient will remain free of falls  Description: INTERVENTIONS:  - Educate patient/family on patient safety including physical limitations  - Instruct patient to call for assistance with activity   - Consult OT/PT to assist with strengthening/mobility   - Keep Call bell within reach  - Keep bed low and locked with side rails adjusted as appropriate  - Keep care items and personal belongings within reach  - Initiate and maintain comfort rounds  - Make Fall Risk Sign visible to staff  - Offer Toileting every  Hours, in advance of need  - Initiate/Maintain alarm  - Obtain necessary fall risk management equipment:  - Apply yellow socks and bracelet for high fall risk patients  - Consider moving patient to room near nurses station  Outcome: Progressing     Problem: Nutrition/Hydration-ADULT  Goal: Nutrient/Hydration intake appropriate for improving, restoring or maintaining nutritional needs  Description: Monitor and assess patient's nutrition/hydration status for malnutrition  Collaborate with interdisciplinary team and initiate plan and interventions as ordered  Monitor patient's weight and dietary intake as ordered or per policy  Utilize nutrition screening tool and intervene as necessary  Determine patient's food preferences and provide high-protein, high-caloric foods as appropriate       INTERVENTIONS:  - Monitor oral intake, urinary output, labs, and treatment plans  - Assess nutrition and hydration status and recommend course of action  - Evaluate amount of meals eaten  - Assist patient with eating if necessary   - Allow adequate time for meals  - Recommend/ encourage appropriate diets, oral nutritional supplements, and vitamin/mineral supplements  - Order, calculate, and assess calorie counts as needed  - Recommend, monitor, and adjust tube feedings and TPN/PPN based on assessed needs  - Assess need for intravenous fluids  - Provide specific nutrition/hydration education as appropriate  - Include patient/family/caregiver in decisions related to nutrition  Outcome: Progressing

## 2021-07-16 NOTE — ASSESSMENT & PLAN NOTE
Lab Results   Component Value Date    EGFR 45 07/16/2021    EGFR 40 07/15/2021    EGFR 38 07/14/2021    CREATININE 1 58 (H) 07/16/2021    CREATININE 1 74 (H) 07/15/2021    CREATININE 1 83 (H) 07/14/2021     MILTON on CKD  Baseline at 1 5-1 7  Possibly pre-renal with sepsis  Clinically improved with IV fluids  Back to baseline levels  - Hold ACE, lasix restarted by renal  - Appreciate renal recommendations  - Approaching baseline, likely restart lasix benito if improved

## 2021-07-16 NOTE — ASSESSMENT & PLAN NOTE
73year old male presenting with sepsis possibly due to left foot cellulitis  Initial cultures likely contaminant  - Appreciate ID and podiatry recommendations  - Continue High dose Cefazolin per ID  Continue IV antibiotic management for now  They will reassess patient again and Monday and hope to transition him to PO by then if clinically better

## 2021-07-16 NOTE — PROGRESS NOTES
Progress Note - Infectious Disease   Mireille Hancock 73 y o  male MRN: [id number]  Unit/Bed#: [place] [address] -01 Encounter: [id number]    Impression/Plan:  1  Sepsis   Fever, tachycardia, tachypnea, and leukocytosis   Suspect source originated in patient's left foot as he recently lost a toenail  He also has a deep left heel fissure  No other clear source appreciated  Blood cultures showed coag negative staph in one set however I suspect this is a skin contaminant and not representative of active bacteremia  He has no respiratory, GI, or  complaints   Despite being systemically ill he has remained hemodynamically stable  He is clinically improving  He continues to have intermittent fevers but his WBC count has improved   -antibiotics as below  -monitor CBC and BMP  -follow up blood cultures  -monitor vitals  -supportive care     2  Positive blood culture   Patient showing coag negative staph in one set of initial blood cultures  I suspect this is a skin contaminant and not active bacteremia  Repeat blood cultures are negative after 24 hours  No indication for antibiotic for this issue   -no indication for antibiotics for this issue     3  Left lower extremity cellulitis   Per patient's wife he recently lost the toenail on his 2nd toe   She noticed gooey discharge coming from the nail bed and has been soaking his foot in water and applying bandaids   There is also a deep left heel fissure   He presented with faint erythema on the left dorsal foot and distal left extremity however this has become more dense and the lower extremity is more swollen today  The patient is currently receiving IV cefazolin which he has tolerated without difficulty   I recommend continuing this for now   Instructed patient to take time this morning to get back into bed and get his leg elevated as high as possible, preferably above heart level    If patient does not see improvement in his swelling and redness with gravity we should obtain CT to rule out underlying abscess He is following with Podiatry   -continue IV cefazolin  -monitor CBC and BMP  -monitor vitals  -serial left foot exams  -recommend elevation of left lower extremity to promote venous and lymphatic return  -continue follow-up with Podiatry     4  Acute kidney injury   On CKD stage 3  Upon review of patient's available past medical records it appears his baseline creatinine is approximately 1 5-1 7   His creatinine was 2 16 upon admission  David Cameron is receiving IV fluids at this time  His creatinine has trended down   -monitor creatinine  -dose adjust antibiotics for renal function as needed  -avoid nephrotoxins  -consider Nephrology consult if creatinine fails to improve     5  Lymphedema   Likely contributing to left foot cellulitis above   Patient's wife asked about compression   Patient would benefit from compression and elevation of the extremities to promote venous and lymphatic return  David Cameron would benefit from elevating the pannus to take pressure off the groin   He may benefit from outpatient lymphedema pumps and could be seen in [place] for further assessment after discharge   -serial lower extremity exams  -recommend elevation of the lower extremities to promote venous and lymphatic return     6  Morbid obesity   BMI = 60 78  Above plan was discussed in detail with patient at the bedside  Above plan was discussed in detail with [facility] attending, Dr Sarah How     Antibiotics:  Cefazolin 2  Antibiotics 5    Subjective:  Patient reports he is feeling quite a bit better  He was able to sleep last night now that he has a new hospital bed  He feels that the redness and swelling in his left leg is not looking better today but he has no pain in his left lower extremity or foot  He has no chills, sweats, or shakes; no nausea, vomiting, abdominal pain, diarrhea, or dysuria; no cough, shortness of breath, or chest pain  No new symptoms      Objective:  Vitals:  Temp: [98 °F (36 7 °C)-102 1 °F (38 9 °C)] 98 4 °F (36 9 °C)  HR:  [78-95] 78  Resp:  [16-20] 16  BP: (116-166)/(63-83) 132/71  SpO2:  [92 %-96 %] 92 %  Temp (24hrs), Av 6 °F (37 6 °C), Min:98 °F (36 7 °C), Max:102 1 °F (38 9 °C)  Current: Temperature: 98 4 °F (36 9 °C)    Physical Exam:   General Appearance:  Alert, interactive, nontoxic, no acute distress  He appears comfortable sitting out of bed in his chair  He is wearing his glasses  Throat: Oropharynx moist without lesions  Lungs:   Clear to auscultation bilaterally; no wheezes, rhonchi or rales; respirations unlabored on room air  Heart:  RRR; no murmur, rub or gallop  Abdomen:   Soft, morbidly obese, non-tender, non-distended, positive bowel sounds  Extremities: No clubbing or cyanosis; left lower extremity with edema and overlying erythema in the mid/distal shin to the tips of the toes; left foot bulky dressing is clean/dry/intact  Skin: No new rashes noted on exposed skin  Labs, Imaging, & Other studies:   All pertinent labs and imaging studies were personally reviewed  Results from last 7 days   Lab Units 21  0533 07/15/21  0600 21  0958   WBC Thousand/uL 10 16 9 31 11 04*   HEMOGLOBIN g/dL 11 3* 12 4 12 5   PLATELETS Thousands/uL 146* 135* 121*     Results from last 7 days   Lab Units 21  0533 21  0620 21  1333   POTASSIUM mmol/L 4 0 4 3 4 7   CHLORIDE mmol/L 105 101 100   CO2 mmol/L 26 24 29   BUN mg/dL 22 25 23   CREATININE mg/dL 1 58* 2 15* 2 16*   EGFR ml/min/1 73sq m 45 31 31   CALCIUM mg/dL 8 1* 8 4 8 9   AST U/L  --  47* 24   ALT U/L  --  37 36   ALK PHOS U/L  --  59 70     Results from last 7 days   Lab Units 21  1054 21  0950 21  1333 21  1325   BLOOD CULTURE  No Growth at 24 hrs  No Growth at 24 hrs  No Growth at 72 hrs   Staphylococcus coagulase negative*   GRAM STAIN RESULT   --   --   --  Gram positive cocci in clusters*     Results from last 7 days   Lab Units 07/13/21  0620 07/12/21  1835   PROCALCITONIN ng/ml 10 69* 7 88*

## 2021-07-16 NOTE — PLAN OF CARE
Problem: PHYSICAL THERAPY ADULT  Goal: Performs mobility at highest level of function for planned discharge setting  See evaluation for individualized goals  Description: Treatment/Interventions: Functional transfer training, LE strengthening/ROM, Elevations, Therapeutic exercise, Endurance training, Patient/family training, Bed mobility, Gait training, Spoke to nursing, OT  Equipment Recommended: Chayito Allan (bariatric)       See flowsheet documentation for full assessment, interventions and recommendations  Outcome: Progressing  Note: Prognosis: Good  Problem List: Decreased strength, Decreased endurance, Impaired balance, Decreased mobility, Obesity, Decreased skin integrity  Assessment: Pt seen for PT per POC  Improved mobility & activity tolerance noted this tx session  Pt progressed to modified I w/ transfers & S for amb w/ bariatric RW  See above levels of assistance required for all functional tasks  Gait deviations as above, slow w/ WBOS & dec foot clearance but no gross LOB noted  Improved amb tolerance compared to previous PT session  Pt reports that he has been ambulating in room w/o the RW w/o difficulty  Pt tolerated above mentioned thera  ex well, AROM  No SOB & dizziness reported t/o session  SpO2 95% at RA after amb  Nsg staff most recent vital signs as follows: /69   Pulse 72   Temp 98 9 °F (37 2 °C)   Resp 16   SpO2 96%   Will continue PT per POC  At end of session, pt OOB in chair in stable condition, call bell & phone in reach  Fall precautions reinforced w/ good understanding  The patient's AM-PAC Basic Mobility Inpatient Short Form Raw Score is 20, Standardized Score is 43 99  A standardized score greater than 42 9 suggests the patient may benefit from discharge to home  From PT standpoint, will continue to anticipate safe return to home w/ family support when medically cleared  Pt will benefit from OPPT at D/C  CM to follow   Nsg staff to continue to mobilized pt (OOB in chair for all meals & ambulate in room/unit) as tolerated to prevent decline in function  Nsg notified  Barriers to Discharge: None  Barriers to Discharge Comments: (+) SANDRA     PT Discharge Recommendation: Home with outpatient rehabilitation          See flowsheet documentation for full assessment

## 2021-07-16 NOTE — PLAN OF CARE
Problem: MOBILITY - ADULT  Goal: Maintain or return to baseline ADL function  Description: INTERVENTIONS:  -  Assess patient's ability to carry out ADLs; assess patient's baseline for ADL function and identify physical deficits which impact ability to perform ADLs (bathing, care of mouth/teeth, toileting, grooming, dressing, etc )  - Assess/evaluate cause of self-care deficits   - Assess range of motion  - Assess patient's mobility; develop plan if impaired  - Assess patient's need for assistive devices and provide as appropriate  - Encourage maximum independence but intervene and supervise when necessary  - Involve family in performance of ADLs  - Assess for home care needs following discharge   - Consider OT consult to assist with ADL evaluation and planning for discharge  - Provide patient education as appropriate  Outcome: Progressing  Goal: Maintains/Returns to pre admission functional level  Description: INTERVENTIONS:  - Perform BMAT or MOVE assessment daily    - Set and communicate daily mobility goal to care team and patient/family/caregiver     - Collaborate with rehabilitation services on mobility goals if consulted  - Perform Range of Motion   - Reposition patient   - Dangle patient   - Stand patient   - Ambulate patient   - Out of bed to chair   - Out of bed for meals   - Out of bed for toileting  - Record patient progress and toleration of activity level   Outcome: Progressing     Problem: PAIN - ADULT  Goal: Verbalizes/displays adequate comfort level or baseline comfort level  Description: Interventions:  - Encourage patient to monitor pain and request assistance  - Assess pain using appropriate pain scale  - Administer analgesics based on type and severity of pain and evaluate response  - Implement non-pharmacological measures as appropriate and evaluate response  - Consider cultural and social influences on pain and pain management  - Notify physician/advanced practitioner if interventions unsuccessful or patient reports new pain  Outcome: Progressing     Problem: INFECTION - ADULT  Goal: Absence or prevention of progression during hospitalization  Description: INTERVENTIONS:  - Assess and monitor for signs and symptoms of infection  - Monitor lab/diagnostic results  - Monitor all insertion sites, i e  indwelling lines, tubes, and drains  - Monitor endotracheal if appropriate and nasal secretions for changes in amount and color  - Julian appropriate cooling/warming therapies per order  - Administer medications as ordered  - Instruct and encourage patient and family to use good hand hygiene technique  - Identify and instruct in appropriate isolation precautions for identified infection/condition  Outcome: Progressing     Problem: SAFETY ADULT  Goal: Maintain or return to baseline ADL function  Description: INTERVENTIONS:  -  Assess patient's ability to carry out ADLs; assess patient's baseline for ADL function and identify physical deficits which impact ability to perform ADLs (bathing, care of mouth/teeth, toileting, grooming, dressing, etc )  - Assess/evaluate cause of self-care deficits   - Assess range of motion  - Assess patient's mobility; develop plan if impaired  - Assess patient's need for assistive devices and provide as appropriate  - Encourage maximum independence but intervene and supervise when necessary  - Involve family in performance of ADLs  - Assess for home care needs following discharge   - Consider OT consult to assist with ADL evaluation and planning for discharge  - Provide patient education as appropriate  Outcome: Progressing  Goal: Maintains/Returns to pre admission functional level  Description: INTERVENTIONS:  - Perform BMAT or MOVE assessment daily    - Set and communicate daily mobility goal to care team and patient/family/caregiver     - Collaborate with rehabilitation services on mobility goals if consulted  - Perform Range of Motion   - Reposition patient   - Dangle patient   - Stand patient   - Ambulate patient   - Out of bed to chair   - Out of bed for meals   - Out of bed for toileting  - Record patient progress and toleration of activity level   Outcome: Progressing  Goal: Patient will remain free of falls  Description: INTERVENTIONS:  - Educate patient/family on patient safety including physical limitations  - Instruct patient to call for assistance with activity   - Consult OT/PT to assist with strengthening/mobility   - Keep Call bell within reach  - Keep bed low and locked with side rails adjusted as appropriate  - Keep care items and personal belongings within reach  - Initiate and maintain comfort rounds  - Make Fall Risk Sign visible to staff  - Offer Toileting   - Obtain necessary fall risk management equipment:   - Apply yellow socks and bracelet for high fall risk patients  - Consider moving patient to room near nurses station  Outcome: Progressing     Problem: DISCHARGE PLANNING  Goal: Discharge to home or other facility with appropriate resources  Description: INTERVENTIONS:  - Identify barriers to discharge w/patient and caregiver  - Arrange for needed discharge resources and transportation as appropriate  - Identify discharge learning needs (meds, wound care, etc )  - Arrange for interpretive services to assist at discharge as needed  - Refer to Case Management Department for coordinating discharge planning if the patient needs post-hospital services based on physician/advanced practitioner order or complex needs related to functional status, cognitive ability, or social support system  Outcome: Progressing     Problem: Knowledge Deficit  Goal: Patient/family/caregiver demonstrates understanding of disease process, treatment plan, medications, and discharge instructions  Description: Complete learning assessment and assess knowledge base    Interventions:  - Provide teaching at level of understanding  - Provide teaching via preferred learning methods  Outcome: Progressing     Problem: CARDIOVASCULAR - ADULT  Goal: Maintains optimal cardiac output and hemodynamic stability  Description: INTERVENTIONS:  - Monitor I/O, vital signs and rhythm  - Monitor for S/S and trends of decreased cardiac output  - Administer and titrate ordered vasoactive medications to optimize hemodynamic stability  - Assess quality of pulses, skin color and temperature  - Assess for signs of decreased coronary artery perfusion  - Instruct patient to report change in severity of symptoms  Outcome: Progressing     Problem: RESPIRATORY - ADULT  Goal: Achieves optimal ventilation and oxygenation  Description: INTERVENTIONS:  - Assess for changes in respiratory status  - Assess for changes in mentation and behavior  - Position to facilitate oxygenation and minimize respiratory effort  - Oxygen administered by appropriate delivery if ordered  - Initiate smoking cessation education as indicated  - Encourage broncho-pulmonary hygiene including cough, deep breathe, Incentive Spirometry  - Assess the need for suctioning and aspirate as needed  - Assess and instruct to report SOB or any respiratory difficulty  - Respiratory Therapy support as indicated  Outcome: Progressing     Problem: METABOLIC, FLUID AND ELECTROLYTES - ADULT  Goal: Electrolytes maintained within normal limits  Description: INTERVENTIONS:  - Monitor labs and assess patient for signs and symptoms of electrolyte imbalances  - Administer electrolyte replacement as ordered  - Monitor response to electrolyte replacements, including repeat lab results as appropriate  - Instruct patient on fluid and nutrition as appropriate  Outcome: Progressing  Goal: Fluid balance maintained  Description: INTERVENTIONS:  - Monitor labs   - Monitor I/O and WT  - Instruct patient on fluid and nutrition as appropriate  - Assess for signs & symptoms of volume excess or deficit  Outcome: Progressing     Problem: SKIN/TISSUE INTEGRITY - ADULT  Goal: Skin Integrity remains intact(Skin Breakdown Prevention)  Description: Assess:  -Perform Mic assessment   -Clean and moisturize skin   -Inspect skin when repositioning, toileting, and assisting with ADLS  -Assess under medical devices   -Assess extremities for adequate circulation and sensation     Bed Management:  -Have minimal linens on bed & keep smooth, unwrinkled  -Change linens as needed when moist or perspiring  -Avoid sitting or lying in one position     Toileting:  -Offer bedside commode  -Assess for incontinence   -Use incontinent care products after each incontinent episode     Activity:  -Mobilize patient   -Encourage activity and walks on unit  -Encourage or provide ROM exercises   -Turn and reposition patient   -Use appropriate equipment to lift or move patient in bed  -Instruct/ Assist with weight shifting   -Consider limitation of chair time   Skin Care:  -Avoid use of baby powder, tape, friction and shearing, hot water or constrictive clothing  -Relieve pressure over bony prominences  -Do not massage red bony areas    Next Steps:  -Teach patient strategies to minimize risks   -Consider consults to  interdisciplinary teams   Outcome: Progressing     Problem: HEMATOLOGIC - ADULT  Goal: Maintains hematologic stability  Description: INTERVENTIONS  - Assess for signs and symptoms of bleeding or hemorrhage  - Monitor labs  - Administer supportive blood products/factors as ordered and appropriate  Outcome: Progressing     Problem: MUSCULOSKELETAL - ADULT  Goal: Maintain or return mobility to safest level of function  Description: INTERVENTIONS:  - Assess patient's ability to carry out ADLs; assess patient's baseline for ADL function and identify physical deficits which impact ability to perform ADLs (bathing, care of mouth/teeth, toileting, grooming, dressing, etc )  - Assess/evaluate cause of self-care deficits   - Assess range of motion  - Assess patient's mobility  - Assess patient's need for assistive devices and provide as appropriate  - Encourage maximum independence but intervene and supervise when necessary  - Involve family in performance of ADLs  - Assess for home care needs following discharge   - Consider OT consult to assist with ADL evaluation and planning for discharge  - Provide patient education as appropriate  Outcome: Progressing     Problem: Prexisting or High Potential for Compromised Skin Integrity  Goal: Skin integrity is maintained or improved  Description: INTERVENTIONS:  - Identify patients at risk for skin breakdown  - Assess and monitor skin integrity  - Assess and monitor nutrition and hydration status  - Monitor labs   - Assess for incontinence   - Turn and reposition patient  - Assist with mobility/ambulation  - Relieve pressure over bony prominences  - Avoid friction and shearing  - Provide appropriate hygiene as needed including keeping skin clean and dry  - Evaluate need for skin moisturizer/barrier cream  - Collaborate with interdisciplinary team   - Patient/family teaching  - Consider wound care consult   Outcome: Progressing     Problem: Potential for Falls  Goal: Patient will remain free of falls  Description: INTERVENTIONS:  - Educate patient/family on patient safety including physical limitations  - Instruct patient to call for assistance with activity   - Consult OT/PT to assist with strengthening/mobility   - Keep Call bell within reach  - Keep bed low and locked with side rails adjusted as appropriate  - Keep care items and personal belongings within reach  - Initiate and maintain comfort rounds  - Make Fall Risk Sign visible to staff  - Apply yellow socks and bracelet for high fall risk patients  - Consider moving patient to room near nurses station  Outcome: Progressing     Problem: Nutrition/Hydration-ADULT  Goal: Nutrient/Hydration intake appropriate for improving, restoring or maintaining nutritional needs  Description: Monitor and assess patient's nutrition/hydration status for malnutrition  Collaborate with interdisciplinary team and initiate plan and interventions as ordered  Monitor patient's weight and dietary intake as ordered or per policy  Utilize nutrition screening tool and intervene as necessary  Determine patient's food preferences and provide high-protein, high-caloric foods as appropriate       INTERVENTIONS:  - Monitor oral intake, urinary output, labs, and treatment plans  - Assess nutrition and hydration status and recommend course of action  - Evaluate amount of meals eaten  - Assist patient with eating if necessary   - Allow adequate time for meals  - Recommend/ encourage appropriate diets, oral nutritional supplements, and vitamin/mineral supplements  - Order, calculate, and assess calorie counts as needed  - Recommend, monitor, and adjust tube feedings and TPN/PPN based on assessed needs  - Assess need for intravenous fluids  - Provide specific nutrition/hydration education as appropriate  - Include patient/family/caregiver in decisions related to nutrition  Outcome: Progressing

## 2021-07-17 LAB
ANION GAP SERPL CALCULATED.3IONS-SCNC: 8 MMOL/L (ref 4–13)
BACTERIA BLD CULT: NORMAL
BUN SERPL-MCNC: 20 MG/DL (ref 5–25)
CALCIUM SERPL-MCNC: 8 MG/DL (ref 8.3–10.1)
CHLORIDE SERPL-SCNC: 106 MMOL/L (ref 100–108)
CO2 SERPL-SCNC: 26 MMOL/L (ref 21–32)
CREAT SERPL-MCNC: 1.47 MG/DL (ref 0.6–1.3)
GFR SERPL CREATININE-BSD FRML MDRD: 49 ML/MIN/1.73SQ M
GLUCOSE SERPL-MCNC: 104 MG/DL (ref 65–140)
POTASSIUM SERPL-SCNC: 4 MMOL/L (ref 3.5–5.3)
SODIUM SERPL-SCNC: 140 MMOL/L (ref 136–145)

## 2021-07-17 PROCEDURE — 99232 SBSQ HOSP IP/OBS MODERATE 35: CPT | Performed by: STUDENT IN AN ORGANIZED HEALTH CARE EDUCATION/TRAINING PROGRAM

## 2021-07-17 PROCEDURE — 99232 SBSQ HOSP IP/OBS MODERATE 35: CPT | Performed by: INTERNAL MEDICINE

## 2021-07-17 PROCEDURE — 80048 BASIC METABOLIC PNL TOTAL CA: CPT | Performed by: STUDENT IN AN ORGANIZED HEALTH CARE EDUCATION/TRAINING PROGRAM

## 2021-07-17 RX ADMIN — TRAVOPROST 1 DROP: 0.04 SOLUTION OPHTHALMIC at 22:02

## 2021-07-17 RX ADMIN — NYSTATIN: 100000 POWDER TOPICAL at 16:57

## 2021-07-17 RX ADMIN — FUROSEMIDE 20 MG: 20 TABLET ORAL at 08:21

## 2021-07-17 RX ADMIN — Medication: at 16:57

## 2021-07-17 RX ADMIN — HEPARIN SODIUM 5000 UNITS: 5000 INJECTION INTRAVENOUS; SUBCUTANEOUS at 05:33

## 2021-07-17 RX ADMIN — HEPARIN SODIUM 5000 UNITS: 5000 INJECTION INTRAVENOUS; SUBCUTANEOUS at 13:17

## 2021-07-17 RX ADMIN — NYSTATIN: 100000 POWDER TOPICAL at 08:22

## 2021-07-17 RX ADMIN — ACETAMINOPHEN 975 MG: 325 TABLET, FILM COATED ORAL at 13:17

## 2021-07-17 RX ADMIN — ACETAMINOPHEN 975 MG: 325 TABLET, FILM COATED ORAL at 05:32

## 2021-07-17 RX ADMIN — POTASSIUM CITRATE 10 MEQ: 10 TABLET, EXTENDED RELEASE ORAL at 08:21

## 2021-07-17 RX ADMIN — TAMSULOSIN HYDROCHLORIDE 0.4 MG: 0.4 CAPSULE ORAL at 16:51

## 2021-07-17 RX ADMIN — Medication: at 08:22

## 2021-07-17 RX ADMIN — ALLOPURINOL 100 MG: 100 TABLET ORAL at 08:21

## 2021-07-17 RX ADMIN — ACETAMINOPHEN 975 MG: 325 TABLET, FILM COATED ORAL at 16:53

## 2021-07-17 RX ADMIN — CEFAZOLIN SODIUM 2000 MG: 2 SOLUTION INTRAVENOUS at 13:16

## 2021-07-17 RX ADMIN — MELATONIN 3 MG: at 21:57

## 2021-07-17 RX ADMIN — METOPROLOL SUCCINATE 100 MG: 50 TABLET, EXTENDED RELEASE ORAL at 08:21

## 2021-07-17 RX ADMIN — ACETAMINOPHEN 975 MG: 325 TABLET, FILM COATED ORAL at 00:10

## 2021-07-17 RX ADMIN — HEPARIN SODIUM 5000 UNITS: 5000 INJECTION INTRAVENOUS; SUBCUTANEOUS at 21:57

## 2021-07-17 RX ADMIN — CEFAZOLIN SODIUM 2000 MG: 2 SOLUTION INTRAVENOUS at 04:26

## 2021-07-17 RX ADMIN — CEFAZOLIN SODIUM 2000 MG: 2 SOLUTION INTRAVENOUS at 21:57

## 2021-07-17 NOTE — CASE MANAGEMENT
CM spoke to both pt and pt's wife re: therapy recommendation for bariatric RW- initially pt was hesitant as has many items at home (RW, Scooter etc) that were his father's- CM discussed difference between regular and bariatric items with pt's wife believing those at home are "regular"- pt accepting of parker RW so long as covered by insurance- informed could be delivered prior to d/c home  Discussed further should he use one of many scooters at home to check for weight limits as scooters have a 350# weight limit unless specified able to handle larger weights- pt/wife appreciative of information and to look into same prior to using if it becomes necessary  No further questions/concerns voiced at present  Will continue to follow to assist with dc poc

## 2021-07-17 NOTE — ASSESSMENT & PLAN NOTE
Lab Results   Component Value Date    EGFR 49 07/17/2021    EGFR 45 07/16/2021    EGFR 40 07/15/2021    CREATININE 1 47 (H) 07/17/2021    CREATININE 1 58 (H) 07/16/2021    CREATININE 1 74 (H) 07/15/2021     MILTON on CKD  Baseline at 1 5-1 7  Possibly pre-renal with sepsis  Clinically improved with IV fluids  Back to baseline levels  - Hold ACE  - Continue lasix  - Appreciate renal recommendations

## 2021-07-17 NOTE — PROGRESS NOTES
NEPHROLOGY PROGRESS NOTE   Kb Briscoe 73 y o  male MRN: [id number]  Unit/Bed#: [place] [address] -01 Encounter: [id number]      ASSESSMENT/PLAN:  1  Acute kidney injury (POA) on chronic kidney disease, stage III:  - etiology suspect multifactorial secondary to cellulitis, failure to auto regulate in the presence of Ace inhibitor, diuretic use, septic ATN  - upon review of medical records, baseline creatinine 1 4-1 7 mg/dL  - patient follows with [facility] kidney specialist as an outpatient  - creatinine 2 1 mg/dL upon admission on 7/12/21   - most recent creatinine 1 47 mg/dL today  - continue to hold ACE-inhibitor; furosemide 20 mg daily restarted yesterday, 7/16/21    - UA, +2 protein, no significant hematuria, concentrated sample  - imaging, CT scan revealed left renal exophytic macro Gage PICC fat containing lesion  Soft tissue component also present within the lesion  Possible angiomyolipoma  Nonobstructing 2 mm right renal calculi   - maintain urinary retention protocol   - monitor volume status with strict intake/output, daily weight   - adjust medications to appropriate GFR   - avoid NSAIDs, nephrotoxic agents, IV contrast     2  History of left renal mass:  - status post cryoablation, CT scan above  - consider Urology input  3  Hypertension:  - currently on Toprol  mg daily, furosemide 20 mg daily  - optimize hemodynamics, avoid hypotension and fluctuations of blood pressure   - maintain MAP > 65      4  H/H, anemia:  - most recent hemoglobin 11 3 grams/deciliter   - goal hemoglobin greater than 8 grams/deciliter  - recommend PRBC transfusion for hemoglobin 7, per primary team     5  Lower extremity edema:  - furosemide 20 mg daily restarted yesterday, 7/16/21   - continue to monitor volume status closely with strict intake/output, daily weight  6  Sepsis/ Left Lower extremity cellulitis, currently on antibiotics per Infectious Disease      SUBJECTIVE:  Patient resting in chair comfortably  Patient is without acute shortness of breath or chest pain  Patient states his bilateral lower extremities do not feel as tight      OBJECTIVE:  Current Weight:    Vitals:    07/17/21 1056   BP: 138/65   Pulse: 66   Resp: 18   Temp: 98 °F (36 7 °C)   SpO2: 98%     No intake or output data in the 24 hours ending 07/17/21 1347    General:  No acute distress  Skin:  Warm, no rash  Eyes:  Sclerae anicteric  ENT:  Moist lips and mucous membranes  Neck:  Supple trachea midline  Chest:  Diminished breath sounds bilateral bases   CVS:  Regular rate regular rhythm  Abdomen:  Obese, rounded, normoactive bowel sounds  Extremities:  Possibly trace bilateral lower extremity edema, wraps in place  Neuro:  Awake and interactive  Psych:  Appropriate affect      Medications:  Scheduled Meds:  Current Facility-Administered Medications   Medication Dose Route Frequency Provider Last Rate    acetaminophen  975 mg Oral Q6H [facility] José Chapa MD      allopurinol  100 mg Oral Daily Mary Schneider MD      ammonium lactate   Topical BID Mary Schneider MD      cefazolin  2,000 mg Intravenous Briantu Quincy, CRNP 2,000 mg (07/17/21 1316)    furosemide  20 mg Oral Daily Mary Schneider MD      heparin (porcine)  5,000 Units Subcutaneous [facility] Sara Elias PA-C      melatonin  3 mg Oral HS Mary Schneider MD      metoprolol succinate  100 mg Oral Daily Sara Elias PA-C      nystatin   Topical BID Mary Schneider MD      potassium citrate  10 mEq Oral Daily Sara Elias PA-C      tamsulosin  0 4 mg Oral Daily With [employer] JOSE CARLOS Elias PA-C      travoprost  1 drop Both Eyes HS Sara Elias PA-C         PRN Meds:     Continuous Infusions:     Laboratory Results:  Results from last 7 days   Lab Units 07/17/21  0624 07/16/21  0533 07/15/21  0600 07/14/21  0958 07/13/21  0620 07/12/21  1333   WBC Thousand/uL  --  10 16 9 31 11 04* 17 80* 21 54*   HEMOGLOBIN g/dL  --  11 3* 12 4 12 5 14 8 14 9 HEMATOCRIT %  --  35 7* 38 5 38 7 45 9 44 5   PLATELETS Thousands/uL  --  146* 135* 121* 163 210   SODIUM mmol/L 140 138 140 137 136 138   POTASSIUM mmol/L 4 0 4 0 4 8 4 1 4 3 4 7   CHLORIDE mmol/L 106 105 107 104 101 100   CO2 mmol/L 26 26 27 26 24 29   BUN mg/dL 20 22 22 25 25 23   CREATININE mg/dL 1 47* 1 58* 1 74* 1 83* 2 15* 2 16*   CALCIUM mg/dL 8 0* 8 1* 8 4 7 6* 8 4 8 9   MAGNESIUM mg/dL  --  1 9 1 8 1 7  --  1 5*

## 2021-07-17 NOTE — PLAN OF CARE
Problem: MOBILITY - ADULT  Goal: Maintain or return to baseline ADL function  Description: INTERVENTIONS:  -  Assess patient's ability to carry out ADLs; assess patient's baseline for ADL function and identify physical deficits which impact ability to perform ADLs (bathing, care of mouth/teeth, toileting, grooming, dressing, etc )  - Assess/evaluate cause of self-care deficits   - Assess range of motion  - Assess patient's mobility; develop plan if impaired  - Assess patient's need for assistive devices and provide as appropriate  - Encourage maximum independence but intervene and supervise when necessary  - Involve family in performance of ADLs  - Assess for home care needs following discharge   - Consider OT consult to assist with ADL evaluation and planning for discharge  - Provide patient education as appropriate  Outcome: Progressing  Goal: Maintains/Returns to pre admission functional level  Description: INTERVENTIONS:  - Perform BMAT or MOVE assessment daily    - Set and communicate daily mobility goal to care team and patient/family/caregiver  - Collaborate with rehabilitation services on mobility goals if consulted  - Perform Range of Motion  times a day  - Reposition patient every  hours    - Dangle patient  times a day  - Stand patient  times a day  - Ambulate patient  times a day  - Out of bed to chair  times a day   - Out of bed for meals  times a day  - Out of bed for toileting  - Record patient progress and toleration of activity level   Outcome: Progressing     Problem: PAIN - ADULT  Goal: Verbalizes/displays adequate comfort level or baseline comfort level  Description: Interventions:  - Encourage patient to monitor pain and request assistance  - Assess pain using appropriate pain scale  - Administer analgesics based on type and severity of pain and evaluate response  - Implement non-pharmacological measures as appropriate and evaluate response  - Consider cultural and social influences on pain and pain management  - Notify physician/advanced practitioner if interventions unsuccessful or patient reports new pain  Outcome: Progressing     Problem: INFECTION - ADULT  Goal: Absence or prevention of progression during hospitalization  Description: INTERVENTIONS:  - Assess and monitor for signs and symptoms of infection  - Monitor lab/diagnostic results  - Monitor all insertion sites, i e  indwelling lines, tubes, and drains  - Monitor endotracheal if appropriate and nasal secretions for changes in amount and color  - [place] appropriate cooling/warming therapies per order  - Administer medications as ordered  - Instruct and encourage patient and family to use good hand hygiene technique  - Identify and instruct in appropriate isolation precautions for identified infection/condition  Outcome: Progressing     Problem: SAFETY ADULT  Goal: Maintain or return to baseline ADL function  Description: INTERVENTIONS:  -  Assess patient's ability to carry out ADLs; assess patient's baseline for ADL function and identify physical deficits which impact ability to perform ADLs (bathing, care of mouth/teeth, toileting, grooming, dressing, etc )  - Assess/evaluate cause of self-care deficits   - Assess range of motion  - Assess patient's mobility; develop plan if impaired  - Assess patient's need for assistive devices and provide as appropriate  - Encourage maximum independence but intervene and supervise when necessary  - Involve family in performance of ADLs  - Assess for home care needs following discharge   - Consider OT consult to assist with ADL evaluation and planning for discharge  - Provide patient education as appropriate  Outcome: Progressing  Goal: Maintains/Returns to pre admission functional level  Description: INTERVENTIONS:  - Perform BMAT or MOVE assessment daily    - Set and communicate daily mobility goal to care team and patient/family/caregiver     - Collaborate with rehabilitation services on mobility goals if consulted  - Perform Range of Motion  times a day  - Reposition patient every  hours    - Dangle patient  times a day  - Stand patient  times a day  - Ambulate patient  times a day  - Out of bed to chair  times a day   - Out of bed for meals  times a day  - Out of bed for toileting  - Record patient progress and toleration of activity level   Outcome: Progressing  Goal: Patient will remain free of falls  Description: INTERVENTIONS:  - Educate patient/family on patient safety including physical limitations  - Instruct patient to call for assistance with activity   - Consult OT/PT to assist with strengthening/mobility   - Keep Call bell within reach  - Keep bed low and locked with side rails adjusted as appropriate  - Keep care items and personal belongings within reach  - Initiate and maintain comfort rounds  - Make Fall Risk Sign visible to staff  - Offer Toileting every  Hours, in advance of need  - Initiate/Maintain alarm  - Obtain necessary fall risk management equipment:   - Apply yellow socks and bracelet for high fall risk patients  - Consider moving patient to room near nurses station  Outcome: Progressing     Problem: DISCHARGE PLANNING  Goal: Discharge to home or other facility with appropriate resources  Description: INTERVENTIONS:  - Identify barriers to discharge w/patient and caregiver  - Arrange for needed discharge resources and transportation as appropriate  - Identify discharge learning needs (meds, wound care, etc )  - Arrange for interpretive services to assist at discharge as needed  - Refer to Case Management Department for coordinating discharge planning if the patient needs post-hospital services based on physician/advanced practitioner order or complex needs related to functional status, cognitive ability, or social support system  Outcome: Progressing     Problem: Knowledge Deficit  Goal: Patient/family/caregiver demonstrates understanding of disease process, treatment plan, medications, and discharge instructions  Description: Complete learning assessment and assess knowledge base    Interventions:  - Provide teaching at level of understanding  - Provide teaching via preferred learning methods  Outcome: Progressing     Problem: CARDIOVASCULAR - ADULT  Goal: Maintains optimal cardiac output and hemodynamic stability  Description: INTERVENTIONS:  - Monitor I/O, vital signs and rhythm  - Monitor for S/S and trends of decreased cardiac output  - Administer and titrate ordered vasoactive medications to optimize hemodynamic stability  - Assess quality of pulses, skin color and temperature  - Assess for signs of decreased coronary artery perfusion  - Instruct patient to report change in severity of symptoms  Outcome: Progressing     Problem: RESPIRATORY - ADULT  Goal: Achieves optimal ventilation and oxygenation  Description: INTERVENTIONS:  - Assess for changes in respiratory status  - Assess for changes in mentation and behavior  - Position to facilitate oxygenation and minimize respiratory effort  - Oxygen administered by appropriate delivery if ordered  - Initiate smoking cessation education as indicated  - Encourage broncho-pulmonary hygiene including cough, deep breathe, Incentive Spirometry  - Assess the need for suctioning and aspirate as needed  - Assess and instruct to report SOB or any respiratory difficulty  - Respiratory Therapy support as indicated  Outcome: Progressing     Problem: METABOLIC, FLUID AND ELECTROLYTES - ADULT  Goal: Electrolytes maintained within normal limits  Description: INTERVENTIONS:  - Monitor labs and assess patient for signs and symptoms of electrolyte imbalances  - Administer electrolyte replacement as ordered  - Monitor response to electrolyte replacements, including repeat lab results as appropriate  - Instruct patient on fluid and nutrition as appropriate  Outcome: Progressing  Goal: Fluid balance maintained  Description: INTERVENTIONS:  - Monitor labs - Monitor I/O and WT  - Instruct patient on fluid and nutrition as appropriate  - Assess for signs & symptoms of volume excess or deficit  Outcome: Progressing     Problem: SKIN/TISSUE INTEGRITY - ADULT  Goal: Skin Integrity remains intact(Skin Breakdown Prevention)  Description: Assess:  -Perform Mic assessment every   -Clean and moisturize skin every   -Inspect skin when repositioning, toileting, and assisting with ADLS  -Assess under medical devices such as  every   -Assess extremities for adequate circulation and sensation     Bed Management:  -Have minimal linens on bed & keep smooth, unwrinkled  -Change linens as needed when moist or perspiring  -Avoid sitting or lying in one position for more than  hours while in bed  -Keep HOB at degrees     Toileting:  -Offer bedside commode  -Assess for incontinence every   -Use incontinent care products after each incontinent episode such as     Activity:  -Mobilize patient  times a day  -Encourage activity and walks on unit  -Encourage or provide ROM exercises   -Turn and reposition patient every  Hours  -Use appropriate equipment to lift or move patient in bed  -Instruct/ Assist with weight shifting every  when out of bed in chair  -Consider limitation of chair time  hour intervals    Skin Care:  -Avoid use of baby powder, tape, friction and shearing, hot water or constrictive clothing  -Relieve pressure over bony prominences using   -Do not massage red bony areas    Next Steps:  -Teach patient strategies to minimize risks such as    -Consider consults to  interdisciplinary teams such as   Outcome: Progressing     Problem: HEMATOLOGIC - ADULT  Goal: Maintains hematologic stability  Description: INTERVENTIONS  - Assess for signs and symptoms of bleeding or hemorrhage  - Monitor labs  - Administer supportive blood products/factors as ordered and appropriate  Outcome: Progressing     Problem: MUSCULOSKELETAL - ADULT  Goal: Maintain or return mobility to safest level of function  Description: INTERVENTIONS:  - Assess patient's ability to carry out ADLs; assess patient's baseline for ADL function and identify physical deficits which impact ability to perform ADLs (bathing, care of mouth/teeth, toileting, grooming, dressing, etc )  - Assess/evaluate cause of self-care deficits   - Assess range of motion  - Assess patient's mobility  - Assess patient's need for assistive devices and provide as appropriate  - Encourage maximum independence but intervene and supervise when necessary  - Involve family in performance of ADLs  - Assess for home care needs following discharge   - Consider OT consult to assist with ADL evaluation and planning for discharge  - Provide patient education as appropriate  Outcome: Progressing     Problem: Prexisting or High Potential for Compromised Skin Integrity  Goal: Skin integrity is maintained or improved  Description: INTERVENTIONS:  - Identify patients at risk for skin breakdown  - Assess and monitor skin integrity  - Assess and monitor nutrition and hydration status  - Monitor labs   - Assess for incontinence   - Turn and reposition patient  - Assist with mobility/ambulation  - Relieve pressure over bony prominences  - Avoid friction and shearing  - Provide appropriate hygiene as needed including keeping skin clean and dry  - Evaluate need for skin moisturizer/barrier cream  - Collaborate with interdisciplinary team   - Patient/family teaching  - Consider wound care consult   Outcome: Progressing     Problem: Potential for Falls  Goal: Patient will remain free of falls  Description: INTERVENTIONS:  - Educate patient/family on patient safety including physical limitations  - Instruct patient to call for assistance with activity   - Consult OT/PT to assist with strengthening/mobility   - Keep Call bell within reach  - Keep bed low and locked with side rails adjusted as appropriate  - Keep care items and personal belongings within reach  - Initiate and maintain comfort rounds  - Make Fall Risk Sign visible to staff  - Offer Toileting every  Hours, in advance of need  - Initiate/Maintain alarm  - Obtain necessary fall risk management equipment  - Apply yellow socks and bracelet for high fall risk patients  - Consider moving patient to room near nurses station  Outcome: Progressing     Problem: Nutrition/Hydration-ADULT  Goal: Nutrient/Hydration intake appropriate for improving, restoring or maintaining nutritional needs  Description: Monitor and assess patient's nutrition/hydration status for malnutrition  Collaborate with interdisciplinary team and initiate plan and interventions as ordered  Monitor patient's weight and dietary intake as ordered or per policy  Utilize nutrition screening tool and intervene as necessary  Determine patient's food preferences and provide high-protein, high-caloric foods as appropriate       INTERVENTIONS:  - Monitor oral intake, urinary output, labs, and treatment plans  - Assess nutrition and hydration status and recommend course of action  - Evaluate amount of meals eaten  - Assist patient with eating if necessary   - Allow adequate time for meals  - Recommend/ encourage appropriate diets, oral nutritional supplements, and vitamin/mineral supplements  - Order, calculate, and assess calorie counts as needed  - Recommend, monitor, and adjust tube feedings and TPN/PPN based on assessed needs  - Assess need for intravenous fluids  - Provide specific nutrition/hydration education as appropriate  - Include patient/family/caregiver in decisions related to nutrition  Outcome: Progressing

## 2021-07-17 NOTE — PROGRESS NOTES
[facility]  Progress Note - Emelia Luther 1955, 73 y o  male MRN: [id number]  Unit/Bed#: [place] -01 Encounter: [id number]  Primary Care Provider: Sammi Parker MD   Date and time admitted to hospital: 7/12/2021 12:29 PM    * Sepsis [facility])  Assessment & Plan  73year old male presenting with sepsis possibly due to left foot cellulitis  Initial cultures likely contaminant  - Appreciate ID and podiatry recommendations  - Continue High dose Cefazolin per ID  Continue IV antibiotic management for now  They will reassess patient again and Monday and hope to transition him to PO by then if clinically better  Acute kidney injury superimposed on chronic kidney disease [facility])  Assessment & Plan  Lab Results   Component Value Date    EGFR 49 07/17/2021    EGFR 45 07/16/2021    EGFR 40 07/15/2021    CREATININE 1 47 (H) 07/17/2021    CREATININE 1 58 (H) 07/16/2021    CREATININE 1 74 (H) 07/15/2021     MILTON on CKD  Baseline at 1 5-1 7  Possibly pre-renal with sepsis  Clinically improved with IV fluids  Back to baseline levels  - Hold ACE  - Continue lasix  - Appreciate renal recommendations  Gout  Assessment & Plan  Continue allopurinol    Essential hypertension  Assessment & Plan  Lisinopril held due to MILTON  · Continue metoprolol with hold parameters      VTE Pharmacologic Prophylaxis:   Pharmacologic: Heparin  Mechanical VTE Prophylaxis in Place: Yes    Patient Centered Rounds: I have performed bedside rounds with nursing staff today  Discussions with Specialists or Other Care Team Provider: Nursing    Education and Discussions with Family / Patient: patient    Time Spent for Care: 30 minutes  More than 50% of total time spent on counseling and coordination of care as described above      Current Length of Stay: 5 day(s)    Current Patient Status: Inpatient   Certification Statement: The patient will continue to require additional inpatient hospital stay due to iv antibiotics, id reevaluation    Discharge Plan: monday    Code Status: Level 1 - Full Code      Subjective:   Patient seen and examined at bedside  Comfortable  He is happy to hear that he was cleared for discharge by renal  He understands the need to stay for iv antibiotic therapy and ID reevaluation  Objective:     Vitals:   Temp (24hrs), Av 1 °F (36 7 °C), Min:98 °F (36 7 °C), Max:98 4 °F (36 9 °C)    Temp:  [98 °F (36 7 °C)-98 4 °F (36 9 °C)] 98 1 °F (36 7 °C)  HR:  [66-75] 67  Resp:  [18] 18  BP: (136-149)/(63-77) 141/63  SpO2:  [92 %-98 %] 93 %  There is no height or weight on file to calculate BMI  Input and Output Summary (last 24 hours):     No intake or output data in the 24 hours ending 21 4542    Physical Exam:     Physical Exam  Vitals reviewed  HENT:      Head: Normocephalic  Nose: Nose normal       Mouth/Throat:      Mouth: Mucous membranes are moist    Eyes:      General: No scleral icterus  Cardiovascular:      Rate and Rhythm: Normal rate and regular rhythm  Pulmonary:      Effort: Pulmonary effort is normal  No respiratory distress  Abdominal:      Tenderness: There is no abdominal tenderness  Musculoskeletal:      Comments: LLE dressing intact, improvement with edema and erythema   Skin:     General: Skin is warm  Neurological:      Mental Status: He is alert  Mental status is at baseline  Psychiatric:         Mood and Affect: Mood normal          Behavior: Behavior normal        Additional Data:     Labs:    Results from last 7 days   Lab Units 21  0533 21  0620 21  1333   WBC Thousand/uL 10   --   54*   HEMOGLOBIN g/dL 11 3*  --  14 9   HEMATOCRIT % 35 7*  --  44 5   PLATELETS Thousands/uL 146*  --  210   BANDS PCT %  --   --  14*   NEUTROS PCT % 79*   < >  --    LYMPHS PCT % 10*   < >  --    LYMPHO PCT %  --   --  5*   MONOS PCT % 8   < >  --    MONO PCT %  --   --  3*   EOS PCT % 1   < > 1    < > = values in this interval not displayed  Results from last 7 days   Lab Units 07/17/21  0624 07/13/21  0620   SODIUM mmol/L 140 136   POTASSIUM mmol/L 4 0 4 3   CHLORIDE mmol/L 106 101   CO2 mmol/L 26 24   BUN mg/dL 20 25   CREATININE mg/dL 1 47* 2 15*   ANION GAP mmol/L 8 11   CALCIUM mg/dL 8 0* 8 4   ALBUMIN g/dL  --  3 2*   TOTAL BILIRUBIN mg/dL  --  1 99*   ALK PHOS U/L  --  59   ALT U/L  --  37   AST U/L  --  47*   GLUCOSE RANDOM mg/dL 104 106     Results from last 7 days   Lab Units 07/12/21  1412   INR  1 36*     Results from last 7 days   Lab Units 07/15/21  1130   POC GLUCOSE mg/dl 104         Results from last 7 days   Lab Units 07/13/21  0620 07/12/21  1835 07/12/21  1333   LACTIC ACID mmol/L  --   --  2 0   PROCALCITONIN ng/ml 10 69* 7 88*  --            * I Have Reviewed All Lab Data Listed Above  * Additional Pertinent Lab Tests Reviewed: Marcelina 66 Admission Reviewed    Imaging:    Imaging Reports Reviewed Today Include: no new imaging    Recent Cultures (last 7 days):     Results from last 7 days   Lab Units 07/14/21  1054 07/14/21  0950 07/12/21  1333 07/12/21  1325   BLOOD CULTURE  No Growth at 48 hrs  No Growth at 72 hrs  No Growth After 4 Days   Staphylococcus coagulase negative*   GRAM STAIN RESULT   --   --   --  Gram positive cocci in clusters*       Last 24 Hours Medication List:   Current Facility-Administered Medications   Medication Dose Route Frequency Provider Last Rate    acetaminophen  975 mg Oral Q6H [address] José Chapa MD      allopurinol  100 mg Oral Daily Gay Kim MD      ammonium lactate   Topical BID Gay Kim MD      cefazolin  2,000 mg Intravenous Brbernard dumont, CRNP 2,000 mg (07/17/21 1316)    furosemide  20 mg Oral Daily Gay Kim MD      heparin (porcine)  5,000 Units Subcutaneous [facility] Sara Elias PA-C      melatonin  3 mg Oral HS Gay Kim MD      metoprolol succinate  100 mg Oral Daily Sara Lay PA-C      nystatin   Topical BID Gay Kim MD      potassium citrate  10 mEq Oral Daily Sara Elias PA-C      tamsulosin  0 4 mg Oral Daily With [employer] JOSE CARLOS Elias PA-C      travoprost  1 drop Both Eyes  Jose Oneal PA-C          Today, Patient Was Seen By: Cassie Michelle MD    ** Please Note: Dictation voice to text software may have been used in the creation of this document   **

## 2021-07-18 LAB
ANION GAP SERPL CALCULATED.3IONS-SCNC: 5 MMOL/L (ref 4–13)
BUN SERPL-MCNC: 20 MG/DL (ref 5–25)
CALCIUM SERPL-MCNC: 8.7 MG/DL (ref 8.3–10.1)
CHLORIDE SERPL-SCNC: 105 MMOL/L (ref 100–108)
CO2 SERPL-SCNC: 29 MMOL/L (ref 21–32)
CREAT SERPL-MCNC: 1.45 MG/DL (ref 0.6–1.3)
GFR SERPL CREATININE-BSD FRML MDRD: 50 ML/MIN/1.73SQ M
GLUCOSE SERPL-MCNC: 145 MG/DL (ref 65–140)
MAGNESIUM SERPL-MCNC: 1.9 MG/DL (ref 1.6–2.6)
POTASSIUM SERPL-SCNC: 3.8 MMOL/L (ref 3.5–5.3)
SODIUM SERPL-SCNC: 139 MMOL/L (ref 136–145)

## 2021-07-18 PROCEDURE — 99232 SBSQ HOSP IP/OBS MODERATE 35: CPT | Performed by: STUDENT IN AN ORGANIZED HEALTH CARE EDUCATION/TRAINING PROGRAM

## 2021-07-18 PROCEDURE — 80048 BASIC METABOLIC PNL TOTAL CA: CPT | Performed by: NURSE PRACTITIONER

## 2021-07-18 PROCEDURE — 83735 ASSAY OF MAGNESIUM: CPT | Performed by: NURSE PRACTITIONER

## 2021-07-18 PROCEDURE — 99232 SBSQ HOSP IP/OBS MODERATE 35: CPT | Performed by: INTERNAL MEDICINE

## 2021-07-18 RX ADMIN — HEPARIN SODIUM 5000 UNITS: 5000 INJECTION INTRAVENOUS; SUBCUTANEOUS at 21:00

## 2021-07-18 RX ADMIN — NYSTATIN: 100000 POWDER TOPICAL at 16:35

## 2021-07-18 RX ADMIN — Medication: at 08:12

## 2021-07-18 RX ADMIN — MELATONIN 3 MG: at 21:00

## 2021-07-18 RX ADMIN — CEFAZOLIN SODIUM 2000 MG: 2 SOLUTION INTRAVENOUS at 21:00

## 2021-07-18 RX ADMIN — TAMSULOSIN HYDROCHLORIDE 0.4 MG: 0.4 CAPSULE ORAL at 16:34

## 2021-07-18 RX ADMIN — POTASSIUM CITRATE 10 MEQ: 10 TABLET, EXTENDED RELEASE ORAL at 08:09

## 2021-07-18 RX ADMIN — ALLOPURINOL 100 MG: 100 TABLET ORAL at 08:09

## 2021-07-18 RX ADMIN — ACETAMINOPHEN 975 MG: 325 TABLET, FILM COATED ORAL at 13:22

## 2021-07-18 RX ADMIN — CEFAZOLIN SODIUM 2000 MG: 2 SOLUTION INTRAVENOUS at 13:21

## 2021-07-18 RX ADMIN — NYSTATIN: 100000 POWDER TOPICAL at 08:12

## 2021-07-18 RX ADMIN — TRAVOPROST 1 DROP: 0.04 SOLUTION OPHTHALMIC at 21:00

## 2021-07-18 RX ADMIN — ACETAMINOPHEN 975 MG: 325 TABLET, FILM COATED ORAL at 23:15

## 2021-07-18 RX ADMIN — METOPROLOL SUCCINATE 100 MG: 50 TABLET, EXTENDED RELEASE ORAL at 08:09

## 2021-07-18 RX ADMIN — ACETAMINOPHEN 975 MG: 325 TABLET, FILM COATED ORAL at 05:08

## 2021-07-18 RX ADMIN — CEFAZOLIN SODIUM 2000 MG: 2 SOLUTION INTRAVENOUS at 05:09

## 2021-07-18 RX ADMIN — FUROSEMIDE 20 MG: 20 TABLET ORAL at 08:09

## 2021-07-18 RX ADMIN — Medication: at 16:35

## 2021-07-18 RX ADMIN — ACETAMINOPHEN 975 MG: 325 TABLET, FILM COATED ORAL at 17:00

## 2021-07-18 RX ADMIN — HEPARIN SODIUM 5000 UNITS: 5000 INJECTION INTRAVENOUS; SUBCUTANEOUS at 05:09

## 2021-07-18 RX ADMIN — HEPARIN SODIUM 5000 UNITS: 5000 INJECTION INTRAVENOUS; SUBCUTANEOUS at 13:21

## 2021-07-18 NOTE — CASE MANAGEMENT
Bariatric RW delivered to pt's room  OPPT locations provided to pt  IMM#2 signed and to be scanned into EHR  No further d/c needs identified at present

## 2021-07-18 NOTE — ASSESSMENT & PLAN NOTE
Lab Results   Component Value Date    EGFR 50 07/18/2021    EGFR 49 07/17/2021    EGFR 45 07/16/2021    CREATININE 1 45 (H) 07/18/2021    CREATININE 1 47 (H) 07/17/2021    CREATININE 1 58 (H) 07/16/2021     MILTON on CKD  Baseline at 1 5-1 7  Possibly pre-renal with sepsis  Clinically improved with IV fluids  Back to baseline levels    - Renal following   - Continue Holding ACE  - Continue lasix

## 2021-07-18 NOTE — ASSESSMENT & PLAN NOTE
73year old male presenting with sepsis possibly due to left foot cellulitis  Initial cultures likely contaminant  - Appreciate ID and podiatry recommendations  - As per infectious disease, continue High dose Cefazolin per ID  They will reassess him again benito and hopefully transition him to po if clinically better  7/18/2021 image I took below  Improved  Possible discharge benito if cleared by ID
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
## 2021-07-18 NOTE — PROGRESS NOTES
[address] [place] [address]  Progress Note - Gerardo Donaldson 1955, 73 y o  male MRN: [id number]  Unit/Bed#: [place] 2 -01 Encounter: [id number]  Primary Care Provider: Corey Rosario MD   Date and time admitted to hospital: 7/12/2021 12:29 PM    * Sepsis [facility])  Assessment & Plan  73year old male presenting with sepsis possibly due to left foot cellulitis  Initial cultures likely contaminant  - Appreciate ID and podiatry recommendations  - As per infectious disease, continue High dose Cefazolin per ID  They will reassess him again benito and hopefully transition him to po if clinically better  7/18/2021 image I took below  Improved  Possible discharge benito if cleared by ID  Acute kidney injury superimposed on chronic kidney disease [facility])  Assessment & Plan  Lab Results   Component Value Date    EGFR 50 07/18/2021    EGFR 49 07/17/2021    EGFR 45 07/16/2021    CREATININE 1 45 (H) 07/18/2021    CREATININE 1 47 (H) 07/17/2021    CREATININE 1 58 (H) 07/16/2021     MILTON on CKD  Baseline at 1 5-1 7  Possibly pre-renal with sepsis  Clinically improved with IV fluids  Back to baseline levels  - Renal following   - Continue Holding ACE  - Continue lasix    Gout  Assessment & Plan  Continue allopurinol    Essential hypertension  Assessment & Plan  Lisinopril held due to MILTON  · Continue metoprolol with hold parameters    VTE Pharmacologic Prophylaxis:   Pharmacologic: Heparin  Mechanical VTE Prophylaxis in Place: Yes    Patient Centered Rounds: I have performed bedside rounds with nursing staff today  Discussions with Specialists or Other Care Team Provider: Nursing    Education and Discussions with Family / Patient: patient    Time Spent for Care: 30 minutes  More than 50% of total time spent on counseling and coordination of care as described above      Current Length of Stay: 6 day(s)    Current Patient Status: Inpatient   Certification Statement: The patient will continue to require additional inpatient hospital stay due to iv antibiotics, ID reevaluation    Discharge Plan: 24 hours    Code Status: Level 1 - Full Code      Subjective:   Patient seen and examined at bedside  LLE erythema and swelling improved  He has no new acute issues at this time and is looking forward to his possible discharge benito  Objective:     Vitals:   Temp (24hrs), Av 1 °F (36 7 °C), Min:97 7 °F (36 5 °C), Max:98 5 °F (36 9 °C)    Temp:  [97 7 °F (36 5 °C)-98 5 °F (36 9 °C)] 98 2 °F (36 8 °C)  HR:  [66-76] 66  Resp:  [18-19] 19  BP: (131-141)/(53-65) 134/62  SpO2:  [93 %-95 %] 95 %  There is no height or weight on file to calculate BMI  Input and Output Summary (last 24 hours):     No intake or output data in the 24 hours ending 21 1305    Physical Exam:     Physical Exam  Vitals reviewed  Constitutional:       General: He is not in acute distress  Appearance: He is obese  HENT:      Head: Normocephalic  Nose: Nose normal       Mouth/Throat:      Mouth: Mucous membranes are moist    Eyes:      General: No scleral icterus  Cardiovascular:      Rate and Rhythm: Normal rate and regular rhythm  Pulmonary:      Effort: Pulmonary effort is normal  No respiratory distress  Abdominal:      Tenderness: There is no abdominal tenderness  Musculoskeletal:      Right lower leg: Right lower leg edema: improved  Left lower leg: Edema present  Skin:     General: Skin is warm  Findings: Erythema (improved LLE) present  Neurological:      Mental Status: He is alert  Mental status is at baseline     Psychiatric:         Mood and Affect: Mood normal          Behavior: Behavior normal        Additional Data:     Labs:    Results from last 7 days   Lab Units 21  0533 21  0620 21  1333   WBC Thousand/uL 10 16  --  21 54*   HEMOGLOBIN g/dL 11 3*  --  14 9   HEMATOCRIT % 35 7*  --  44 5   PLATELETS Thousands/uL 146*  --  210   BANDS PCT %  --   --  14*   NEUTROS PCT % 79*   < > --    LYMPHS PCT % 10*   < >  --    LYMPHO PCT %  --   --  5*   MONOS PCT % 8   < >  --    MONO PCT %  --   --  3*   EOS PCT % 1   < > 1    < > = values in this interval not displayed  Results from last 7 days   Lab Units 07/18/21  0915 07/13/21  0620   SODIUM mmol/L 139 136   POTASSIUM mmol/L 3 8 4 3   CHLORIDE mmol/L 105 101   CO2 mmol/L 29 24   BUN mg/dL 20 25   CREATININE mg/dL 1 45* 2 15*   ANION GAP mmol/L 5 11   CALCIUM mg/dL 8 7 8 4   ALBUMIN g/dL  --  3 2*   TOTAL BILIRUBIN mg/dL  --  1 99*   ALK PHOS U/L  --  59   ALT U/L  --  37   AST U/L  --  47*   GLUCOSE RANDOM mg/dL 145* 106     Results from last 7 days   Lab Units 07/12/21  1412   INR  1 36*     Results from last 7 days   Lab Units 07/15/21  1130   POC GLUCOSE mg/dl 104         Results from last 7 days   Lab Units 07/13/21  0620 07/12/21  1835 07/12/21  1333   LACTIC ACID mmol/L  --   --  2 0   PROCALCITONIN ng/ml 10 69* 7 88*  --            * I Have Reviewed All Lab Data Listed Above  * Additional Pertinent Lab Tests Reviewed: Marcelina 67 Admission Reviewed    Imaging:    Imaging Reports Reviewed Today Include: No new imaging    Recent Cultures (last 7 days):     Results from last 7 days   Lab Units 07/14/21  1054 07/14/21  0950 07/12/21  1333 07/12/21  1325   BLOOD CULTURE  No Growth at 72 hrs  No Growth After 4 Days  No Growth After 5 Days   Staphylococcus coagulase negative*   GRAM STAIN RESULT   --   --   --  Gram positive cocci in clusters*       Last 24 Hours Medication List:   Current Facility-Administered Medications   Medication Dose Route Frequency Provider Last Rate    acetaminophen  975 mg Oral Q6H [facility] José Chapa MD      allopurinol  100 mg Oral Daily Martina Sanchez MD      ammonium lactate   Topical BID Martina Sanchez MD      cefazolin  2,000 mg Intravenous HERMAN WhittingtonNP 2,000 mg (07/18/21 0509)    furosemide  20 mg Oral Daily Martina Sanchez MD      heparin (porcine)  5,000 Units [facility]      melatonin  3 mg Oral HS Sean Renae MD      metoprolol succinate  100 mg Oral Daily Sara Elias PA-C      nystatin   Topical BID Sean Renae MD      potassium citrate  10 mEq Oral Daily Sara Elias PA-C      tamsulosin  0 4 mg Oral Daily With [employer] JOSE CARLOS Elias PA-C      travoprost  1 drop Both Eyes HS Sara Whiting PA-C          Today, Patient Was Seen By: Felisha Ashton MD    ** Please Note: Dictation voice to text software may have been used in the creation of this document   **

## 2021-07-18 NOTE — PLAN OF CARE
Problem: MOBILITY - ADULT  Goal: Maintain or return to baseline ADL function  Description: INTERVENTIONS:  -  Assess patient's ability to carry out ADLs; assess patient's baseline for ADL function and identify physical deficits which impact ability to perform ADLs (bathing, care of mouth/teeth, toileting, grooming, dressing, etc )  - Assess/evaluate cause of self-care deficits   - Assess range of motion  - Assess patient's mobility; develop plan if impaired  - Assess patient's need for assistive devices and provide as appropriate  - Encourage maximum independence but intervene and supervise when necessary  - Involve family in performance of ADLs  - Assess for home care needs following discharge   - Consider OT consult to assist with ADL evaluation and planning for discharge  - Provide patient education as appropriate  Outcome: Progressing  Goal: Maintains/Returns to pre admission functional level  Description: INTERVENTIONS:  - Perform BMAT or MOVE assessment daily    - Set and communicate daily mobility goal to care team and patient/family/caregiver  - Collaborate with rehabilitation services on mobility goals if consulted  - Perform Range of Motion  times a day  - Reposition patient every  hours    - Dangle patient  times a day  - Stand patient  times a day  - Ambulate patient  times a day  - Out of bed to chair  times a day   - Out of bed for meals  times a day  - Out of bed for toileting  - Record patient progress and toleration of activity level   Outcome: Progressing     Problem: PAIN - ADULT  Goal: Verbalizes/displays adequate comfort level or baseline comfort level  Description: Interventions:  - Encourage patient to monitor pain and request assistance  - Assess pain using appropriate pain scale  - Administer analgesics based on type and severity of pain and evaluate response  - Implement non-pharmacological measures as appropriate and evaluate response  - Consider cultural and social influences on pain and pain management  - Notify physician/advanced practitioner if interventions unsuccessful or patient reports new pain  Outcome: Progressing     Problem: INFECTION - ADULT  Goal: Absence or prevention of progression during hospitalization  Description: INTERVENTIONS:  - Assess and monitor for signs and symptoms of infection  - Monitor lab/diagnostic results  - Monitor all insertion sites, i e  indwelling lines, tubes, and drains  - Monitor endotracheal if appropriate and nasal secretions for changes in amount and color  - [place] appropriate cooling/warming therapies per order  - Administer medications as ordered  - Instruct and encourage patient and family to use good hand hygiene technique  - Identify and instruct in appropriate isolation precautions for identified infection/condition  Outcome: Progressing     Problem: SAFETY ADULT  Goal: Maintain or return to baseline ADL function  Description: INTERVENTIONS:  -  Assess patient's ability to carry out ADLs; assess patient's baseline for ADL function and identify physical deficits which impact ability to perform ADLs (bathing, care of mouth/teeth, toileting, grooming, dressing, etc )  - Assess/evaluate cause of self-care deficits   - Assess range of motion  - Assess patient's mobility; develop plan if impaired  - Assess patient's need for assistive devices and provide as appropriate  - Encourage maximum independence but intervene and supervise when necessary  - Involve family in performance of ADLs  - Assess for home care needs following discharge   - Consider OT consult to assist with ADL evaluation and planning for discharge  - Provide patient education as appropriate  Outcome: Progressing  Goal: Maintains/Returns to pre admission functional level  Description: INTERVENTIONS:  - Perform BMAT or MOVE assessment daily    - Set and communicate daily mobility goal to care team and patient/family/caregiver     - Collaborate with rehabilitation services on mobility goals if consulted  - Perform Range of Motion  times a day  - Reposition patient every  hours    - Dangle patient  times a day  - Stand patient  times a day  - Ambulate patient  times a day  - Out of bed to chair  times a day   - Out of bed for meals  times a day  - Out of bed for toileting  - Record patient progress and toleration of activity level   Outcome: Progressing  Goal: Patient will remain free of falls  Description: INTERVENTIONS:  - Educate patient/family on patient safety including physical limitations  - Instruct patient to call for assistance with activity   - Consult OT/PT to assist with strengthening/mobility   - Keep Call bell within reach  - Keep bed low and locked with side rails adjusted as appropriate  - Keep care items and personal belongings within reach  - Initiate and maintain comfort rounds  - Make Fall Risk Sign visible to staff  - Offer Toileting every  Hours, in advance of need  - Initiate/Maintain alarm  - Obtain necessary fall risk management equipment:   - Apply yellow socks and bracelet for high fall risk patients  - Consider moving patient to room near nurses station  Outcome: Progressing     Problem: DISCHARGE PLANNING  Goal: Discharge to home or other facility with appropriate resources  Description: INTERVENTIONS:  - Identify barriers to discharge w/patient and caregiver  - Arrange for needed discharge resources and transportation as appropriate  - Identify discharge learning needs (meds, wound care, etc )  - Arrange for interpretive services to assist at discharge as needed  - Refer to Case Management Department for coordinating discharge planning if the patient needs post-hospital services based on physician/advanced practitioner order or complex needs related to functional status, cognitive ability, or social support system  Outcome: Progressing     Problem: Knowledge Deficit  Goal: Patient/family/caregiver demonstrates understanding of disease process, treatment plan, medications, and discharge instructions  Description: Complete learning assessment and assess knowledge base    Interventions:  - Provide teaching at level of understanding  - Provide teaching via preferred learning methods  Outcome: Progressing     Problem: CARDIOVASCULAR - ADULT  Goal: Maintains optimal cardiac output and hemodynamic stability  Description: INTERVENTIONS:  - Monitor I/O, vital signs and rhythm  - Monitor for S/S and trends of decreased cardiac output  - Administer and titrate ordered vasoactive medications to optimize hemodynamic stability  - Assess quality of pulses, skin color and temperature  - Assess for signs of decreased coronary artery perfusion  - Instruct patient to report change in severity of symptoms  Outcome: Progressing     Problem: RESPIRATORY - ADULT  Goal: Achieves optimal ventilation and oxygenation  Description: INTERVENTIONS:  - Assess for changes in respiratory status  - Assess for changes in mentation and behavior  - Position to facilitate oxygenation and minimize respiratory effort  - Oxygen administered by appropriate delivery if ordered  - Initiate smoking cessation education as indicated  - Encourage broncho-pulmonary hygiene including cough, deep breathe, Incentive Spirometry  - Assess the need for suctioning and aspirate as needed  - Assess and instruct to report SOB or any respiratory difficulty  - Respiratory Therapy support as indicated  Outcome: Progressing     Problem: METABOLIC, FLUID AND ELECTROLYTES - ADULT  Goal: Electrolytes maintained within normal limits  Description: INTERVENTIONS:  - Monitor labs and assess patient for signs and symptoms of electrolyte imbalances  - Administer electrolyte replacement as ordered  - Monitor response to electrolyte replacements, including repeat lab results as appropriate  - Instruct patient on fluid and nutrition as appropriate  Outcome: Progressing  Goal: Fluid balance maintained  Description: INTERVENTIONS:  - Monitor labs - Monitor I/O and WT  - Instruct patient on fluid and nutrition as appropriate  - Assess for signs & symptoms of volume excess or deficit  Outcome: Progressing     Problem: SKIN/TISSUE INTEGRITY - ADULT  Goal: Skin Integrity remains intact(Skin Breakdown Prevention)  Description: Assess:  -Perform Mic assessment every   -Clean and moisturize skin every   -Inspect skin when repositioning, toileting, and assisting with ADLS  -Assess under medical devices such as  every   -Assess extremities for adequate circulation and sensation     Bed Management:  -Have minimal linens on bed & keep smooth, unwrinkled  -Change linens as needed when moist or perspiring  -Avoid sitting or lying in one position for more than hours while in bed  -Keep HOB at degrees     Toileting:  -Offer bedside commode  -Assess for incontinence every   -Use incontinent care products after each incontinent episode such as     Activity:  -Mobilize patient  times a day  -Encourage activity and walks on unit  -Encourage or provide ROM exercises   -Turn and reposition patient every  Hours  -Use appropriate equipment to lift or move patient in bed  -Instruct/ Assist with weight shifting every  when out of bed in chair  -Consider limitation of chair time  hour intervals    Skin Care:  -Avoid use of baby powder, tape, friction and shearing, hot water or constrictive clothing  -Relieve pressure over bony prominences using   -Do not massage red bony areas    Next Steps:  -Teach patient strategies to minimize risks such as    -Consider consults to  interdisciplinary teams such as   Outcome: Progressing     Problem: HEMATOLOGIC - ADULT  Goal: Maintains hematologic stability  Description: INTERVENTIONS  - Assess for signs and symptoms of bleeding or hemorrhage  - Monitor labs  - Administer supportive blood products/factors as ordered and appropriate  Outcome: Progressing     Problem: MUSCULOSKELETAL - ADULT  Goal: Maintain or return mobility to safest level of function  Description: INTERVENTIONS:  - Assess patient's ability to carry out ADLs; assess patient's baseline for ADL function and identify physical deficits which impact ability to perform ADLs (bathing, care of mouth/teeth, toileting, grooming, dressing, etc )  - Assess/evaluate cause of self-care deficits   - Assess range of motion  - Assess patient's mobility  - Assess patient's need for assistive devices and provide as appropriate  - Encourage maximum independence but intervene and supervise when necessary  - Involve family in performance of ADLs  - Assess for home care needs following discharge   - Consider OT consult to assist with ADL evaluation and planning for discharge  - Provide patient education as appropriate  Outcome: Progressing     Problem: Prexisting or High Potential for Compromised Skin Integrity  Goal: Skin integrity is maintained or improved  Description: INTERVENTIONS:  - Identify patients at risk for skin breakdown  - Assess and monitor skin integrity  - Assess and monitor nutrition and hydration status  - Monitor labs   - Assess for incontinence   - Turn and reposition patient  - Assist with mobility/ambulation  - Relieve pressure over bony prominences  - Avoid friction and shearing  - Provide appropriate hygiene as needed including keeping skin clean and dry  - Evaluate need for skin moisturizer/barrier cream  - Collaborate with interdisciplinary team   - Patient/family teaching  - Consider wound care consult   Outcome: Progressing     Problem: Potential for Falls  Goal: Patient will remain free of falls  Description: INTERVENTIONS:  - Educate patient/family on patient safety including physical limitations  - Instruct patient to call for assistance with activity   - Consult OT/PT to assist with strengthening/mobility   - Keep Call bell within reach  - Keep bed low and locked with side rails adjusted as appropriate  - Keep care items and personal belongings within reach  - Initiate and maintain comfort rounds  - Make Fall Risk Sign visible to staff  - Offer Toileting every  Hours, in advance of need  - Initiate/Maintain alarm  - Obtain necessary fall risk management equipment:   - Apply yellow socks and bracelet for high fall risk patients  - Consider moving patient to room near nurses station  Outcome: Progressing     Problem: Nutrition/Hydration-ADULT  Goal: Nutrient/Hydration intake appropriate for improving, restoring or maintaining nutritional needs  Description: Monitor and assess patient's nutrition/hydration status for malnutrition  Collaborate with interdisciplinary team and initiate plan and interventions as ordered  Monitor patient's weight and dietary intake as ordered or per policy  Utilize nutrition screening tool and intervene as necessary  Determine patient's food preferences and provide high-protein, high-caloric foods as appropriate       INTERVENTIONS:  - Monitor oral intake, urinary output, labs, and treatment plans  - Assess nutrition and hydration status and recommend course of action  - Evaluate amount of meals eaten  - Assist patient with eating if necessary   - Allow adequate time for meals  - Recommend/ encourage appropriate diets, oral nutritional supplements, and vitamin/mineral supplements  - Order, calculate, and assess calorie counts as needed  - Recommend, monitor, and adjust tube feedings and TPN/PPN based on assessed needs  - Assess need for intravenous fluids  - Provide specific nutrition/hydration education as appropriate  - Include patient/family/caregiver in decisions related to nutrition  Outcome: Progressing     Problem: MOBILITY - ADULT  Goal: Maintain or return to baseline ADL function  Description: INTERVENTIONS:  -  Assess patient's ability to carry out ADLs; assess patient's baseline for ADL function and identify physical deficits which impact ability to perform ADLs (bathing, care of mouth/teeth, toileting, grooming, dressing, etc )  - Assess/evaluate cause of self-care deficits   - Assess range of motion  - Assess patient's mobility; develop plan if impaired  - Assess patient's need for assistive devices and provide as appropriate  - Encourage maximum independence but intervene and supervise when necessary  - Involve family in performance of ADLs  - Assess for home care needs following discharge   - Consider OT consult to assist with ADL evaluation and planning for discharge  - Provide patient education as appropriate  Outcome: Progressing  Goal: Maintains/Returns to pre admission functional level  Description: INTERVENTIONS:  - Perform BMAT or MOVE assessment daily    - Set and communicate daily mobility goal to care team and patient/family/caregiver  - Collaborate with rehabilitation services on mobility goals if consulted  - Perform Range of Motion  times a day  - Reposition patient every  hours    - Dangle patient  times a day  - Stand patient  times a day  - Ambulate patient  times a day  - Out of bed to chair  times a day   - Out of bed for meals  times a day  - Out of bed for toileting  - Record patient progress and toleration of activity level   Outcome: Progressing     Problem: PAIN - ADULT  Goal: Verbalizes/displays adequate comfort level or baseline comfort level  Description: Interventions:  - Encourage patient to monitor pain and request assistance  - Assess pain using appropriate pain scale  - Administer analgesics based on type and severity of pain and evaluate response  - Implement non-pharmacological measures as appropriate and evaluate response  - Consider cultural and social influences on pain and pain management  - Notify physician/advanced practitioner if interventions unsuccessful or patient reports new pain  Outcome: Progressing     Problem: INFECTION - ADULT  Goal: Absence or prevention of progression during hospitalization  Description: INTERVENTIONS:  - Assess and monitor for signs and symptoms of infection  - Monitor lab/diagnostic results  - Monitor all insertion sites, i e  indwelling lines, tubes, and drains  - Monitor endotracheal if appropriate and nasal secretions for changes in amount and color  - [place] appropriate cooling/warming therapies per order  - Administer medications as ordered  - Instruct and encourage patient and family to use good hand hygiene technique  - Identify and instruct in appropriate isolation precautions for identified infection/condition  Outcome: Progressing     Problem: SAFETY ADULT  Goal: Maintain or return to baseline ADL function  Description: INTERVENTIONS:  -  Assess patient's ability to carry out ADLs; assess patient's baseline for ADL function and identify physical deficits which impact ability to perform ADLs (bathing, care of mouth/teeth, toileting, grooming, dressing, etc )  - Assess/evaluate cause of self-care deficits   - Assess range of motion  - Assess patient's mobility; develop plan if impaired  - Assess patient's need for assistive devices and provide as appropriate  - Encourage maximum independence but intervene and supervise when necessary  - Involve family in performance of ADLs  - Assess for home care needs following discharge   - Consider OT consult to assist with ADL evaluation and planning for discharge  - Provide patient education as appropriate  Outcome: Progressing  Goal: Maintains/Returns to pre admission functional level  Description: INTERVENTIONS:  - Perform BMAT or MOVE assessment daily    - Set and communicate daily mobility goal to care team and patient/family/caregiver  - Collaborate with rehabilitation services on mobility goals if consulted  - Perform Range of Motion  times a day  - Reposition patient every  hours    - Dangle patient  times a day  - Stand patient  times a day  - Ambulate patient  times a day  - Out of bed to chair  times a day   - Out of bed for meals  times a day  - Out of bed for toileting  - Record patient progress and toleration of activity level Outcome: Progressing  Goal: Patient will remain free of falls  Description: INTERVENTIONS:  - Educate patient/family on patient safety including physical limitations  - Instruct patient to call for assistance with activity   - Consult OT/PT to assist with strengthening/mobility   - Keep Call bell within reach  - Keep bed low and locked with side rails adjusted as appropriate  - Keep care items and personal belongings within reach  - Initiate and maintain comfort rounds  - Make Fall Risk Sign visible to staff  - Offer Toileting every  Hours, in advance of need  - Initiate/Maintain alarm  - Obtain necessary fall risk management equipment:   - Apply yellow socks and bracelet for high fall risk patients  - Consider moving patient to room near nurses station  Outcome: Progressing     Problem: DISCHARGE PLANNING  Goal: Discharge to home or other facility with appropriate resources  Description: INTERVENTIONS:  - Identify barriers to discharge w/patient and caregiver  - Arrange for needed discharge resources and transportation as appropriate  - Identify discharge learning needs (meds, wound care, etc )  - Arrange for interpretive services to assist at discharge as needed  - Refer to Case Management Department for coordinating discharge planning if the patient needs post-hospital services based on physician/advanced practitioner order or complex needs related to functional status, cognitive ability, or social support system  Outcome: Progressing     Problem: Knowledge Deficit  Goal: Patient/family/caregiver demonstrates understanding of disease process, treatment plan, medications, and discharge instructions  Description: Complete learning assessment and assess knowledge base    Interventions:  - Provide teaching at level of understanding  - Provide teaching via preferred learning methods  Outcome: Progressing     Problem: CARDIOVASCULAR - ADULT  Goal: Maintains optimal cardiac output and hemodynamic stability  Description: INTERVENTIONS:  - Monitor I/O, vital signs and rhythm  - Monitor for S/S and trends of decreased cardiac output  - Administer and titrate ordered vasoactive medications to optimize hemodynamic stability  - Assess quality of pulses, skin color and temperature  - Assess for signs of decreased coronary artery perfusion  - Instruct patient to report change in severity of symptoms  Outcome: Progressing     Problem: RESPIRATORY - ADULT  Goal: Achieves optimal ventilation and oxygenation  Description: INTERVENTIONS:  - Assess for changes in respiratory status  - Assess for changes in mentation and behavior  - Position to facilitate oxygenation and minimize respiratory effort  - Oxygen administered by appropriate delivery if ordered  - Initiate smoking cessation education as indicated  - Encourage broncho-pulmonary hygiene including cough, deep breathe, Incentive Spirometry  - Assess the need for suctioning and aspirate as needed  - Assess and instruct to report SOB or any respiratory difficulty  - Respiratory Therapy support as indicated  Outcome: Progressing     Problem: METABOLIC, FLUID AND ELECTROLYTES - ADULT  Goal: Electrolytes maintained within normal limits  Description: INTERVENTIONS:  - Monitor labs and assess patient for signs and symptoms of electrolyte imbalances  - Administer electrolyte replacement as ordered  - Monitor response to electrolyte replacements, including repeat lab results as appropriate  - Instruct patient on fluid and nutrition as appropriate  Outcome: Progressing  Goal: Fluid balance maintained  Description: INTERVENTIONS:  - Monitor labs   - Monitor I/O and WT  - Instruct patient on fluid and nutrition as appropriate  - Assess for signs & symptoms of volume excess or deficit  Outcome: Progressing     Problem: SKIN/TISSUE INTEGRITY - ADULT  Goal: Skin Integrity remains intact(Skin Breakdown Prevention)  Description: Assess:  -Perform Mic assessment every   -Clean and moisturize skin every   -Inspect skin when repositioning, toileting, and assisting with ADLS  -Assess under medical devices such as  every   -Assess extremities for adequate circulation and sensation     Bed Management:  -Have minimal linens on bed & keep smooth, unwrinkled  -Change linens as needed when moist or perspiring  -Avoid sitting or lying in one position for more than  hours while in bed  -Keep HOB at degrees     Toileting:  -Offer bedside commode  -Assess for incontinence every   -Use incontinent care products after each incontinent episode such as     Activity:  -Mobilize patient  times a day  -Encourage activity and walks on unit  -Encourage or provide ROM exercises   -Turn and reposition patient every  Hours  -Use appropriate equipment to lift or move patient in bed  -Instruct/ Assist with weight shifting every  when out of bed in chair  -Consider limitation of chair time  hour intervals    Skin Care:  -Avoid use of baby powder, tape, friction and shearing, hot water or constrictive clothing  -Relieve pressure over bony prominences using   -Do not massage red bony areas    Next Steps:  -Teach patient strategies to minimize risks such as    -Consider consults to  interdisciplinary teams such as   Outcome: Progressing     Problem: HEMATOLOGIC - ADULT  Goal: Maintains hematologic stability  Description: INTERVENTIONS  - Assess for signs and symptoms of bleeding or hemorrhage  - Monitor labs  - Administer supportive blood products/factors as ordered and appropriate  Outcome: Progressing     Problem: MUSCULOSKELETAL - ADULT  Goal: Maintain or return mobility to safest level of function  Description: INTERVENTIONS:  - Assess patient's ability to carry out ADLs; assess patient's baseline for ADL function and identify physical deficits which impact ability to perform ADLs (bathing, care of mouth/teeth, toileting, grooming, dressing, etc )  - Assess/evaluate cause of self-care deficits   - Assess range of motion  - Assess patient's mobility  - Assess patient's need for assistive devices and provide as appropriate  - Encourage maximum independence but intervene and supervise when necessary  - Involve family in performance of ADLs  - Assess for home care needs following discharge   - Consider OT consult to assist with ADL evaluation and planning for discharge  - Provide patient education as appropriate  Outcome: Progressing     Problem: Prexisting or High Potential for Compromised Skin Integrity  Goal: Skin integrity is maintained or improved  Description: INTERVENTIONS:  - Identify patients at risk for skin breakdown  - Assess and monitor skin integrity  - Assess and monitor nutrition and hydration status  - Monitor labs   - Assess for incontinence   - Turn and reposition patient  - Assist with mobility/ambulation  - Relieve pressure over bony prominences  - Avoid friction and shearing  - Provide appropriate hygiene as needed including keeping skin clean and dry  - Evaluate need for skin moisturizer/barrier cream  - Collaborate with interdisciplinary team   - Patient/family teaching  - Consider wound care consult   Outcome: Progressing     Problem: Potential for Falls  Goal: Patient will remain free of falls  Description: INTERVENTIONS:  - Educate patient/family on patient safety including physical limitations  - Instruct patient to call for assistance with activity   - Consult OT/PT to assist with strengthening/mobility   - Keep Call bell within reach  - Keep bed low and locked with side rails adjusted as appropriate  - Keep care items and personal belongings within reach  - Initiate and maintain comfort rounds  - Make Fall Risk Sign visible to staff  - Offer Toileting every  Hours, in advance of need  - Initiate/Maintain alarm  - Obtain necessary fall risk management equipment:   - Apply yellow socks and bracelet for high fall risk patients  - Consider moving patient to room near nurses station  Outcome: Progressing     Problem: Nutrition/Hydration-ADULT  Goal: Nutrient/Hydration intake appropriate for improving, restoring or maintaining nutritional needs  Description: Monitor and assess patient's nutrition/hydration status for malnutrition  Collaborate with interdisciplinary team and initiate plan and interventions as ordered  Monitor patient's weight and dietary intake as ordered or per policy  Utilize nutrition screening tool and intervene as necessary  Determine patient's food preferences and provide high-protein, high-caloric foods as appropriate       INTERVENTIONS:  - Monitor oral intake, urinary output, labs, and treatment plans  - Assess nutrition and hydration status and recommend course of action  - Evaluate amount of meals eaten  - Assist patient with eating if necessary   - Allow adequate time for meals  - Recommend/ encourage appropriate diets, oral nutritional supplements, and vitamin/mineral supplements  - Order, calculate, and assess calorie counts as needed  - Recommend, monitor, and adjust tube feedings and TPN/PPN based on assessed needs  - Assess need for intravenous fluids  - Provide specific nutrition/hydration education as appropriate  - Include patient/family/caregiver in decisions related to nutrition  Outcome: Progressing

## 2021-07-19 ENCOUNTER — TRANSITIONAL CARE MANAGEMENT (OUTPATIENT)
Dept: FAMILY MEDICINE CLINIC | Facility: CLINIC | Age: 66
End: 2021-07-19

## 2021-07-19 VITALS
DIASTOLIC BLOOD PRESSURE: 66 MMHG | OXYGEN SATURATION: 93 % | SYSTOLIC BLOOD PRESSURE: 139 MMHG | TEMPERATURE: 98.7 F | HEART RATE: 64 BPM | RESPIRATION RATE: 16 BRPM

## 2021-07-19 PROBLEM — N18.9 ACUTE KIDNEY INJURY SUPERIMPOSED ON CHRONIC KIDNEY DISEASE (HCC): Status: RESOLVED | Noted: 2021-07-12 | Resolved: 2021-07-19

## 2021-07-19 PROBLEM — A41.9 SEPSIS (HCC): Status: RESOLVED | Noted: 2021-07-12 | Resolved: 2021-07-19

## 2021-07-19 PROBLEM — N17.9 ACUTE KIDNEY INJURY SUPERIMPOSED ON CHRONIC KIDNEY DISEASE (HCC): Status: RESOLVED | Noted: 2021-07-12 | Resolved: 2021-07-19

## 2021-07-19 LAB
ANION GAP SERPL CALCULATED.3IONS-SCNC: 9 MMOL/L (ref 4–13)
BACTERIA BLD CULT: NORMAL
BASOPHILS # BLD MANUAL: 0 THOUSAND/UL (ref 0–0.1)
BASOPHILS NFR MAR MANUAL: 0 % (ref 0–1)
BUN SERPL-MCNC: 19 MG/DL (ref 5–25)
CALCIUM SERPL-MCNC: 8.7 MG/DL (ref 8.3–10.1)
CHLORIDE SERPL-SCNC: 105 MMOL/L (ref 100–108)
CO2 SERPL-SCNC: 28 MMOL/L (ref 21–32)
CREAT SERPL-MCNC: 1.3 MG/DL (ref 0.6–1.3)
EOSINOPHIL # BLD MANUAL: 0.29 THOUSAND/UL (ref 0–0.4)
EOSINOPHIL NFR BLD MANUAL: 3 % (ref 0–6)
ERYTHROCYTE [DISTWIDTH] IN BLOOD BY AUTOMATED COUNT: 13.8 % (ref 11.6–15.1)
GFR SERPL CREATININE-BSD FRML MDRD: 57 ML/MIN/1.73SQ M
GLUCOSE SERPL-MCNC: 101 MG/DL (ref 65–140)
HCT VFR BLD AUTO: 36.1 % (ref 36.5–49.3)
HGB BLD-MCNC: 11.6 G/DL (ref 12–17)
LYMPHOCYTES # BLD AUTO: 2.3 THOUSAND/UL (ref 0.6–4.47)
LYMPHOCYTES # BLD AUTO: 24 % (ref 14–44)
MAGNESIUM SERPL-MCNC: 2 MG/DL (ref 1.6–2.6)
MCH RBC QN AUTO: 31.2 PG (ref 26.8–34.3)
MCHC RBC AUTO-ENTMCNC: 32.1 G/DL (ref 31.4–37.4)
MCV RBC AUTO: 97 FL (ref 82–98)
MONOCYTES # BLD AUTO: 0.19 THOUSAND/UL (ref 0–1.22)
MONOCYTES NFR BLD: 2 % (ref 4–12)
NEUTROPHILS # BLD MANUAL: 6.81 THOUSAND/UL (ref 1.85–7.62)
NEUTS SEG NFR BLD AUTO: 71 % (ref 43–75)
NRBC BLD AUTO-RTO: 0 /100 WBCS
PLATELET # BLD AUTO: 282 THOUSANDS/UL (ref 149–390)
PLATELET BLD QL SMEAR: ADEQUATE
PMV BLD AUTO: 9.5 FL (ref 8.9–12.7)
POTASSIUM SERPL-SCNC: 4 MMOL/L (ref 3.5–5.3)
RBC # BLD AUTO: 3.72 MILLION/UL (ref 3.88–5.62)
RBC MORPH BLD: NORMAL
SODIUM SERPL-SCNC: 142 MMOL/L (ref 136–145)
TOTAL CELLS COUNTED SPEC: 100
WBC # BLD AUTO: 9.59 THOUSAND/UL (ref 4.31–10.16)

## 2021-07-19 PROCEDURE — 99239 HOSP IP/OBS DSCHRG MGMT >30: CPT | Performed by: INTERNAL MEDICINE

## 2021-07-19 PROCEDURE — 80048 BASIC METABOLIC PNL TOTAL CA: CPT | Performed by: STUDENT IN AN ORGANIZED HEALTH CARE EDUCATION/TRAINING PROGRAM

## 2021-07-19 PROCEDURE — 99232 SBSQ HOSP IP/OBS MODERATE 35: CPT | Performed by: PHYSICIAN ASSISTANT

## 2021-07-19 PROCEDURE — 83735 ASSAY OF MAGNESIUM: CPT | Performed by: STUDENT IN AN ORGANIZED HEALTH CARE EDUCATION/TRAINING PROGRAM

## 2021-07-19 PROCEDURE — 85027 COMPLETE CBC AUTOMATED: CPT | Performed by: STUDENT IN AN ORGANIZED HEALTH CARE EDUCATION/TRAINING PROGRAM

## 2021-07-19 PROCEDURE — 99232 SBSQ HOSP IP/OBS MODERATE 35: CPT | Performed by: NURSE PRACTITIONER

## 2021-07-19 PROCEDURE — 85007 BL SMEAR W/DIFF WBC COUNT: CPT | Performed by: STUDENT IN AN ORGANIZED HEALTH CARE EDUCATION/TRAINING PROGRAM

## 2021-07-19 RX ORDER — LANOLIN ALCOHOL/MO/W.PET/CERES
3 CREAM (GRAM) TOPICAL
Qty: 30 TABLET | Refills: 0 | Status: SHIPPED | OUTPATIENT
Start: 2021-07-19 | End: 2021-08-18

## 2021-07-19 RX ORDER — CEPHALEXIN 500 MG/1
1000 CAPSULE ORAL EVERY 6 HOURS SCHEDULED
Refills: 0 | Status: CANCELLED | OUTPATIENT
Start: 2021-07-19

## 2021-07-19 RX ORDER — LISINOPRIL 20 MG/1
20 TABLET ORAL DAILY
Qty: 30 TABLET | Refills: 0 | Status: SHIPPED | OUTPATIENT
Start: 2021-07-19 | End: 2021-08-04 | Stop reason: SDUPTHER

## 2021-07-19 RX ORDER — CEPHALEXIN 500 MG/1
1000 CAPSULE ORAL EVERY 6 HOURS SCHEDULED
Qty: 16 CAPSULE | Refills: 0 | Status: SHIPPED | OUTPATIENT
Start: 2021-07-19 | End: 2021-07-21

## 2021-07-19 RX ORDER — CEPHALEXIN 500 MG/1
1000 CAPSULE ORAL EVERY 6 HOURS SCHEDULED
Status: DISCONTINUED | OUTPATIENT
Start: 2021-07-19 | End: 2021-07-19 | Stop reason: HOSPADM

## 2021-07-19 RX ORDER — AMMONIUM LACTATE 12 G/100G
LOTION TOPICAL 2 TIMES DAILY
Qty: 400 G | Refills: 0 | Status: SHIPPED | OUTPATIENT
Start: 2021-07-19

## 2021-07-19 RX ORDER — NYSTATIN 100000 [USP'U]/G
POWDER TOPICAL 2 TIMES DAILY
Qty: 15 G | Refills: 0 | Status: SHIPPED | OUTPATIENT
Start: 2021-07-19 | End: 2021-07-20 | Stop reason: SDUPTHER

## 2021-07-19 RX ORDER — TAMSULOSIN HYDROCHLORIDE 0.4 MG/1
0.4 CAPSULE ORAL
COMMUNITY
End: 2021-08-18

## 2021-07-19 RX ORDER — ASPIRIN 81 MG/1
81 TABLET ORAL DAILY
COMMUNITY
End: 2021-07-20

## 2021-07-19 RX ADMIN — CEPHALEXIN 1000 MG: 500 CAPSULE ORAL at 11:33

## 2021-07-19 RX ADMIN — METOPROLOL SUCCINATE 100 MG: 50 TABLET, EXTENDED RELEASE ORAL at 09:36

## 2021-07-19 RX ADMIN — ACETAMINOPHEN 975 MG: 325 TABLET, FILM COATED ORAL at 05:20

## 2021-07-19 RX ADMIN — NYSTATIN: 100000 POWDER TOPICAL at 09:36

## 2021-07-19 RX ADMIN — POTASSIUM CITRATE 10 MEQ: 10 TABLET, EXTENDED RELEASE ORAL at 09:36

## 2021-07-19 RX ADMIN — FUROSEMIDE 20 MG: 20 TABLET ORAL at 09:36

## 2021-07-19 RX ADMIN — ALLOPURINOL 100 MG: 100 TABLET ORAL at 09:36

## 2021-07-19 RX ADMIN — Medication: at 09:36

## 2021-07-19 RX ADMIN — HEPARIN SODIUM 5000 UNITS: 5000 INJECTION INTRAVENOUS; SUBCUTANEOUS at 05:20

## 2021-07-19 RX ADMIN — CEFAZOLIN SODIUM 2000 MG: 2 SOLUTION INTRAVENOUS at 05:20

## 2021-07-19 NOTE — DISCHARGE SUMMARY
Discharge Summary - [facility] Internal Medicine    Patient Information: More Whitley 73 y o  male MRN: [id number]  Unit/Bed#: [address] 87 213-01 Encounter: [id number]    Discharging Physician / Practitioner: Heather Harrington DO  PCP: Rose Mary Doyle MD  Admission Date: 7/12/2021  Discharge Date: 07/19/21    Disposition:     Home     Reason for Admission: sepsis     Discharge Diagnoses:     Principal Problem:    Sepsis ([facility] )  Active Problems:    Essential hypertension    Gout    DELIA (obstructive sleep apnea)    Acute kidney injury superimposed on chronic kidney disease ([facility] )  Resolved Problems:    * No resolved hospital problems  *      Consultations During Hospital Stay:  · Podiatry  · Infectious disease  · Nephrology     Procedures Performed:     · none    Significant Findings / Test Results:   CT chest abdomen pelvis wo contrast  Result Date: 7/12/2021  Impression: 1  Large right lateral abdominal hernia and pannus, partially outside the field of view  Within this limitation, no evidence of bowel obstruction, colitis or diverticulitis  2   New left renal exophytic 3 4 cm lesion suggestive of an angiomyolipoma, however malignancy not entirely excluded  Recommend nonemergent MRI of the abdomen with gadolinium for further evaluation  2   No acute cardiopulmonary process  XR chest 1 view portable  Result Date: 7/13/2021  Impression: No acute cardiopulmonary disease  XR foot 3+ vw left  Result Date: 7/14/2021  Impression: Extensive soft tissue swelling of the dorsum of the forefoot  Moderate degenerative change of the midfoot and 1st MTP joint  US right upper quadrant  Result Date: 7/13/2021  Impression: Gallstones and fatty liver  No evidence of acute pathology       Incidental Findings:   · none    Test Results Pending at Discharge (will require follow up):   · none     Outpatient Tests Requested:  none    Hospital Course:     More Whitley is a 73 y o  male patient who originally presented to the hospital on 7/12/2021 due to [facility] and sepsis due to left foot cellulitis  [facility]  His creatinine was 2 16 on admission and improved to 1 3 at discharge  His lisinopril was decreased to 20mg daily and he will follow up with nephrology outpt   For his left foot cellulitis both podiatry and ID evaluated him  He was treated with high dose ancef and was switched to keflex to finish course  He was discharged to home with outpt physical therapy  Discharge Day Visit / Exam:     * Please refer to separate progress note for these details *    Discharge instructions/Information to patient and family:   See after visit summary for information provided to patient and family  Provisions for Follow-Up Care:  See after visit summary for information related to follow-up care and any pertinent home health orders  Discharge Statement:  I spent 32 minutes discharging the patient  This time was spent on the day of discharge  I had direct contact with the patient on the day of discharge  Greater than 50% of the total time was spent examining patient, answering all patient questions, arranging and discussing plan of care with patient as well as directly providing post-discharge instructions  Additional time then spent on discharge activities  Discharge Medications:  See after visit summary for reconciled discharge medications provided to patient and family

## 2021-07-19 NOTE — PROGRESS NOTES
NEPHROLOGY PROGRESS NOTE   Gerardo Donaldson 73 y o  male MRN: [id number]  Unit/Bed#: [address] 87 213-01 Encounter: [id number]  Reason for Consult: MILTON/CKD    ASSESSMENT/PLAN:  1  Acute Kidney Injury- RESOLVED  2  Chronic Kidney Disease stage III- Baseline creatinine 1 4-1 7  Creatinine currently below baseline at 1 3  Follows with [address] [facility]  3  Electrolytes- stable and within normal limits  4  History of left renal mass- status post cryoablation  - recommend outpatient follow up  5  Hypertension- BP acceptable currently  - continue metoprolol and lasix  - okay to restart low dose lisinopril on discharge  6  Anemia- stable  7  LE edema- Continue oral lasix  8  Sepsis / LLE cellulitis- per ID    Disposition:  Okay to discharge from renal standpoint  Recommend reduced dose of lisinopril on discharge  Needs to follow up with outpatient nephrologist with [facility]  SUBJECTIVE:  Patient without acute complaints  Denies SOB, N/V/D      OBJECTIVE:  Current Weight:    Vitals:    07/18/21 0738 07/18/21 1517 07/18/21 2143 07/19/21 0650   BP: 134/62 131/59 137/66 139/66   BP Location:    Right arm   Pulse: 66 80 65 64   Resp: 19 19 19 16   Temp: 98 2 °F (36 8 °C)  97 8 °F (36 6 °C) 98 7 °F (37 1 °C)   TempSrc:    Oral   SpO2: 95% 95% 98% 93%     No intake or output data in the 24 hours ending 07/19/21 0930  General: NAD  Skin: chronic LE venous stasis changes  Eyes: anicteric  ENMT: mm moist  Neck: supple, no masses no JVD noted  Respiratory: CTAB  Cardiac: RRR  Extremities: + bilateral edema  GI: soft nt nd obese  Neuro: alert awake  Psych: mood and affect appropriate    Medications:    Current Facility-Administered Medications:     acetaminophen (TYLENOL) tablet 975 mg, 975 mg, Oral, Q6H [address], José Chapa MD, 975 mg at 07/19/21 0520    allopurinol (ZYLOPRIM) tablet 100 mg, 100 mg, Oral, Daily, Howie Kelley MD, 100 mg at 07/18/21 0809    ammonium lactate (LAC-HYDRIN) 12 % lotion, , Topical, BID, Tomi Galeano MD, Given at 07/18/21 1635    ceFAZolin (ANCEF) IVPB (premix in dextrose) 2,000 mg 50 mL, 2,000 mg, Intravenous, Q8H, KELSEY Fuentes, Last Rate: 100 mL/hr at 07/19/21 0520, 2,000 mg at 07/19/21 0520    furosemide (LASIX) tablet 20 mg, 20 mg, Oral, Daily, Tomi Galeano MD, 20 mg at 07/18/21 0809    heparin (porcine) subcutaneous injection 5,000 Units, 5,000 Units, Subcutaneous, Q8H [facility], Sara Elias PA-C, 5,000 Units at 07/19/21 0520    melatonin tablet 3 mg, 3 mg, Oral, HS, Tomi Galeano MD, 3 mg at 07/18/21 2100    metoprolol succinate (TOPROL-XL) 24 hr tablet 100 mg, 100 mg, Oral, Daily, Sara Elias PA-C, 100 mg at 07/18/21 0809    nystatin (MYCOSTATIN) powder, , Topical, BID, Tomi Galeano MD, Given at 07/18/21 1635    potassium citrate (UROCIT-K) CR tablet 10 mEq, 10 mEq, Oral, Daily, Sara Elias PA-C, 10 mEq at 07/18/21 0809    tamsulosin (FLOMAX) capsule 0 4 mg, 0 4 mg, Oral, Daily With COURTNEY Ac, 0 4 mg at 07/18/21 1634    travoprost (TRAVATAN-Z) 0 004 % ophthalmic solution 1 drop, 1 drop, Both Eyes, HS, Sara Elias PA-C, 1 drop at 07/18/21 2100    Laboratory Results:  Results from last 7 days   Lab Units 07/19/21  0449 07/18/21  0915 07/17/21  0624 07/16/21  0533 07/15/21  0600 07/14/21  0958 07/13/21  0620 07/12/21  1333   WBC Thousand/uL 9 59  --   --  10 16 9 31 11 04* 17 80* 21 54*   HEMOGLOBIN g/dL 11 6*  --   --  11 3* 12 4 12 5 14 8 14 9   HEMATOCRIT % 36 1*  --   --  35 7* 38 5 38 7 45 9 44 5   PLATELETS Thousands/uL 282  --   --  146* 135* 121* 163 210   POTASSIUM mmol/L 4 0 3 8 4 0 4 0 4 8 4 1 4 3 4 7   CHLORIDE mmol/L 105 105 106 105 107 104 101 100   CO2 mmol/L 28 29 26 26 27 26 24 29   BUN mg/dL 19 20 20 22 22 25 25 23   CREATININE mg/dL 1 30 1 45* 1 47* 1 58* 1 74* 1 83* 2 15* 2 16*   CALCIUM mg/dL 8 7 8 7 8 0* 8 1* 8 4 7 6* 8 4 8 9   MAGNESIUM mg/dL 2 0 1 9  --  1 9 1 8 1 7  --  1 5*     I have personally reviewed the blood work as stated above and in my note  I have personally reviewed the hospitalist and renal note

## 2021-07-19 NOTE — ASSESSMENT & PLAN NOTE
· bp is stable on metoprolol  · His [facility]  · Nephrology cleared to restart lisinopril at lower dose

## 2021-07-19 NOTE — PLAN OF CARE
Problem: MOBILITY - ADULT  Goal: Maintain or return to baseline ADL function  Description: INTERVENTIONS:  -  Assess patient's ability to carry out ADLs; assess patient's baseline for ADL function and identify physical deficits which impact ability to perform ADLs (bathing, care of mouth/teeth, toileting, grooming, dressing, etc )  - Assess/evaluate cause of self-care deficits   - Assess range of motion  - Assess patient's mobility; develop plan if impaired  - Assess patient's need for assistive devices and provide as appropriate  - Encourage maximum independence but intervene and supervise when necessary  - Involve family in performance of ADLs  - Assess for home care needs following discharge   - Consider OT consult to assist with ADL evaluation and planning for discharge  - Provide patient education as appropriate  Outcome: Progressing  Goal: Maintains/Returns to pre admission functional level  Description: INTERVENTIONS:  - Perform BMAT or MOVE assessment daily    - Set and communicate daily mobility goal to care team and patient/family/caregiver     - Collaborate with rehabilitation services on mobility goals if consulted  - Out of bed for toileting  - Record patient progress and toleration of activity level   Outcome: Progressing     Problem: PAIN - ADULT  Goal: Verbalizes/displays adequate comfort level or baseline comfort level  Description: Interventions:  - Encourage patient to monitor pain and request assistance  - Assess pain using appropriate pain scale  - Administer analgesics based on type and severity of pain and evaluate response  - Implement non-pharmacological measures as appropriate and evaluate response  - Consider cultural and social influences on pain and pain management  - Notify physician/advanced practitioner if interventions unsuccessful or patient reports new pain  Outcome: Progressing     Problem: INFECTION - ADULT  Goal: Absence or prevention of progression during hospitalization  Description: INTERVENTIONS:  - Assess and monitor for signs and symptoms of infection  - Monitor lab/diagnostic results  - Monitor all insertion sites, i e  indwelling lines, tubes, and drains  - Monitor endotracheal if appropriate and nasal secretions for changes in amount and color  - Kennard appropriate cooling/warming therapies per order  - Administer medications as ordered  - Instruct and encourage patient and family to use good hand hygiene technique  - Identify and instruct in appropriate isolation precautions for identified infection/condition  Outcome: Progressing     Problem: SAFETY ADULT  Goal: Maintain or return to baseline ADL function  Description: INTERVENTIONS:  -  Assess patient's ability to carry out ADLs; assess patient's baseline for ADL function and identify physical deficits which impact ability to perform ADLs (bathing, care of mouth/teeth, toileting, grooming, dressing, etc )  - Assess/evaluate cause of self-care deficits   - Assess range of motion  - Assess patient's mobility; develop plan if impaired  - Assess patient's need for assistive devices and provide as appropriate  - Encourage maximum independence but intervene and supervise when necessary  - Involve family in performance of ADLs  - Assess for home care needs following discharge   - Consider OT consult to assist with ADL evaluation and planning for discharge  - Provide patient education as appropriate  Outcome: Progressing  Goal: Maintains/Returns to pre admission functional level  Description: INTERVENTIONS:  - Perform BMAT or MOVE assessment daily    - Set and communicate daily mobility goal to care team and patient/family/caregiver     - Collaborate with rehabilitation services on mobility goals if consulted  - Out of bed for toileting  - Record patient progress and toleration of activity level   Outcome: Progressing  Goal: Patient will remain free of falls  Description: INTERVENTIONS:  - Educate patient/family on patient safety including physical limitations  - Instruct patient to call for assistance with activity   - Consult OT/PT to assist with strengthening/mobility   - Keep Call bell within reach  - Keep bed low and locked with side rails adjusted as appropriate  - Keep care items and personal belongings within reach  - Initiate and maintain comfort rounds  - Apply yellow socks and bracelet for high fall risk patients  - Consider moving patient to room near nurses station  Outcome: Progressing     Problem: DISCHARGE PLANNING  Goal: Discharge to home or other facility with appropriate resources  Description: INTERVENTIONS:  - Identify barriers to discharge w/patient and caregiver  - Arrange for needed discharge resources and transportation as appropriate  - Identify discharge learning needs (meds, wound care, etc )  - Arrange for interpretive services to assist at discharge as needed  - Refer to Case Management Department for coordinating discharge planning if the patient needs post-hospital services based on physician/advanced practitioner order or complex needs related to functional status, cognitive ability, or social support system  Outcome: Progressing     Problem: Knowledge Deficit  Goal: Patient/family/caregiver demonstrates understanding of disease process, treatment plan, medications, and discharge instructions  Description: Complete learning assessment and assess knowledge base    Interventions:  - Provide teaching at level of understanding  - Provide teaching via preferred learning methods  Outcome: Progressing     Problem: CARDIOVASCULAR - ADULT  Goal: Maintains optimal cardiac output and hemodynamic stability  Description: INTERVENTIONS:  - Monitor I/O, vital signs and rhythm  - Monitor for S/S and trends of decreased cardiac output  - Administer and titrate ordered vasoactive medications to optimize hemodynamic stability  - Assess quality of pulses, skin color and temperature  - Assess for signs of decreased coronary artery perfusion  - Instruct patient to report change in severity of symptoms  Outcome: Progressing     Problem: RESPIRATORY - ADULT  Goal: Achieves optimal ventilation and oxygenation  Description: INTERVENTIONS:  - Assess for changes in respiratory status  - Assess for changes in mentation and behavior  - Position to facilitate oxygenation and minimize respiratory effort  - Oxygen administered by appropriate delivery if ordered  - Initiate smoking cessation education as indicated  - Encourage broncho-pulmonary hygiene including cough, deep breathe, Incentive Spirometry  - Assess the need for suctioning and aspirate as needed  - Assess and instruct to report SOB or any respiratory difficulty  - Respiratory Therapy support as indicated  Outcome: Progressing     Problem: METABOLIC, FLUID AND ELECTROLYTES - ADULT  Goal: Electrolytes maintained within normal limits  Description: INTERVENTIONS:  - Monitor labs and assess patient for signs and symptoms of electrolyte imbalances  - Administer electrolyte replacement as ordered  - Monitor response to electrolyte replacements, including repeat lab results as appropriate  - Instruct patient on fluid and nutrition as appropriate  Outcome: Progressing  Goal: Fluid balance maintained  Description: INTERVENTIONS:  - Monitor labs   - Monitor I/O and WT  - Instruct patient on fluid and nutrition as appropriate  - Assess for signs & symptoms of volume excess or deficit  Outcome: Progressing     Problem: SKIN/TISSUE INTEGRITY - ADULT  Goal: Skin Integrity remains intact(Skin Breakdown Prevention)  Description: Assess:  -Assess extremities for adequate circulation and sensation     Bed Management:  -Have minimal linens on bed & keep smooth, unwrinkled  -Change linens as needed when moist or perspiring      Toileting:  -Offer bedside commode      Activity:  -Encourage activity and walks on unit  -Encourage or provide ROM exercises       Skin Care:  -Avoid use of baby powder, tape, friction and shearing, hot water or constrictive clothing  Outcome: Progressing     Problem: HEMATOLOGIC - ADULT  Goal: Maintains hematologic stability  Description: INTERVENTIONS  - Assess for signs and symptoms of bleeding or hemorrhage  - Monitor labs  - Administer supportive blood products/factors as ordered and appropriate  Outcome: Progressing     Problem: MUSCULOSKELETAL - ADULT  Goal: Maintain or return mobility to safest level of function  Description: INTERVENTIONS:  - Assess patient's ability to carry out ADLs; assess patient's baseline for ADL function and identify physical deficits which impact ability to perform ADLs (bathing, care of mouth/teeth, toileting, grooming, dressing, etc )  - Assess/evaluate cause of self-care deficits   - Assess range of motion  - Assess patient's mobility  - Assess patient's need for assistive devices and provide as appropriate  - Encourage maximum independence but intervene and supervise when necessary  - Involve family in performance of ADLs  - Assess for home care needs following discharge   - Consider OT consult to assist with ADL evaluation and planning for discharge  - Provide patient education as appropriate  Outcome: Progressing     Problem: Prexisting or High Potential for Compromised Skin Integrity  Goal: Skin integrity is maintained or improved  Description: INTERVENTIONS:  - Identify patients at risk for skin breakdown  - Assess and monitor skin integrity  - Assess and monitor nutrition and hydration status  - Monitor labs   - Assess for incontinence   - Turn and reposition patient  - Assist with mobility/ambulation  - Relieve pressure over bony prominences  - Avoid friction and shearing  - Provide appropriate hygiene as needed including keeping skin clean and dry  - Evaluate need for skin moisturizer/barrier cream  - Collaborate with interdisciplinary team   - Patient/family teaching  - Consider wound care consult   Outcome: Progressing Problem: Potential for Falls  Goal: Patient will remain free of falls  Description: INTERVENTIONS:  - Educate patient/family on patient safety including physical limitations  - Instruct patient to call for assistance with activity   - Consult OT/PT to assist with strengthening/mobility   - Keep Call bell within reach  - Keep bed low and locked with side rails adjusted as appropriate  - Keep care items and personal belongings within reach  - Initiate and maintain comfort rounds  - Make Fall Risk Sign visible to staff  - Apply yellow socks and bracelet for high fall risk patients  - Consider moving patient to room near nurses station  Outcome: Progressing     Problem: Nutrition/Hydration-ADULT  Goal: Nutrient/Hydration intake appropriate for improving, restoring or maintaining nutritional needs  Description: Monitor and assess patient's nutrition/hydration status for malnutrition  Collaborate with interdisciplinary team and initiate plan and interventions as ordered  Monitor patient's weight and dietary intake as ordered or per policy  Utilize nutrition screening tool and intervene as necessary  Determine patient's food preferences and provide high-protein, high-caloric foods as appropriate       INTERVENTIONS:  - Monitor oral intake, urinary output, labs, and treatment plans  - Assess nutrition and hydration status and recommend course of action  - Evaluate amount of meals eaten  - Assist patient with eating if necessary   - Allow adequate time for meals  - Recommend/ encourage appropriate diets, oral nutritional supplements, and vitamin/mineral supplements  - Order, calculate, and assess calorie counts as needed  - Recommend, monitor, and adjust tube feedings and TPN/PPN based on assessed needs  - Assess need for intravenous fluids  - Provide specific nutrition/hydration education as appropriate  - Include patient/family/caregiver in decisions related to nutrition  Outcome: Progressing

## 2021-07-19 NOTE — PROGRESS NOTES
Progress Note - Infectious Disease   Rexann Band 73 y o  male MRN: [id number]  Unit/Bed#: [place] [address] -01 Encounter: [id number]    Impression/Plan:  1  Sepsis   Fever, tachycardia, tachypnea, and leukocytosis   Suspect source originated in patient's left foot as he recently lost a toenail   He also has a deep left heel fissure  No other clear source appreciated  Blood cultures showed coag negative staph in one set however I suspect this is a skin contaminant and not representative of active bacteremia  repeat blood cultures show no growth after four days  He has no respiratory, GI, or  complaints   Despite being systemically ill he has remained hemodynamically stable   He is clinically improving  His fever curve has trended down and his WBC count has normalized  -antibiotics as below  -monitor CBC and BMP  -monitor vitals  -supportive care     2  Positive blood culture   Patient showing coag negative staph in one set of initial blood cultures  I suspect this is a skin contaminant and not active bacteremia  Repeat blood cultures are negative after four days  No indication for antibiotic for this issue   -no indication for antibiotics for this issue     3  Left lower extremity cellulitis   Per patient's wife he recently lost the toenail on his 2nd toe   She noticed gooey discharge coming from the nail bed and has been soaking his foot in water and applying bandaids   There is also a deep left heel fissure   He presented with faint erythema on the left dorsal foot and distal left extremity however this has become more dense and the lower extremity is more swollen today  Patient would benefit from aggressive compression and elevation including keeping his leg above heart level if possible  He is following with Podiatry  He is currently receiving IV cefazolin which he has tolerated without difficulty  Given his clinical improvement I will transition him to high-dose oral Keflex now    He should complete 10 days total of antibiotic treatment   -stop IV cefazolin  -start oral Keflex, 1g q6 hours, through 07/21/2021  -monitor CBC and BMP  -monitor vitals  -serial left foot exams  -recommend elevation of left lower extremity to promote venous and lymphatic return  -continue follow-up with Podiatry     4  Acute kidney injury   On CKD stage 3  Upon review of patient's available past medical records it appears his baseline creatinine is approximately 1 5-1 7   His creatinine was 2 16 upon admission  Alysa Rowe is receiving IV fluids at this time   His creatinine has significantly improved  -monitor creatinine  -dose adjust antibiotics for renal function as needed  -avoid nephrotoxins  -consider Nephrology consult if creatinine fails to improve     5  Lymphedema   Likely contributing to left foot cellulitis above  Patient would benefit from compression and elevation of the extremities to promote venous and lymphatic return  He would benefit from elevating the pannus to take pressure off the groin   He may benefit from outpatient lymphedema pumps and could be seen in [place] for further assessment after discharge   -serial lower extremity exams  -recommend elevation of the lower extremities to promote venous and lymphatic return     6  Morbid obesity   BMI = 60 78  Patient is stable for discharge from ID standpoint  Above plan was discussed in detail with patient at the bedside  Above plan was discussed in detail with [facility] attending, Dr Keron Payne  Antibiotics:  Cefazolin - stop  Keflex 1  Antibiotics 8    Subjective:  Patient reports he is feeling much better  He is hopeful that he will be allowed to be discharged home  He has no pain in his left lower leg or foot  He has no concerns with his current dressing  He has no fever, chills, sweats, shakes; no nausea, vomiting, abdominal pain, diarrhea, or dysuria; no cough, shortness of breath, or chest pain  No new symptoms      Objective:  Vitals:  Temp:  [97 8 °F (36 6 °C)-98 7 °F (37 1 °C)] 98 7 °F (37 1 °C)  HR:  [64-80] 64  Resp:  [16-19] 16  BP: (131-139)/(59-66) 139/66  SpO2:  [93 %-98 %] 93 %  Temp (24hrs), Av 3 °F (36 8 °C), Min:97 8 °F (36 6 °C), Max:98 7 °F (37 1 °C)  Current: Temperature: 98 7 °F (37 1 °C)    Physical Exam:   General Appearance:  Alert, interactive, nontoxic, no acute distress  He appears comfortable sitting out of bed in his chair  Throat: Oropharynx moist without lesions  Lungs:   Clear to auscultation bilaterally; no wheezes, rhonchi or rales; respirations unlabored on room air  Heart:  RRR; no murmur, rub or gallop  Abdomen:   Soft, morbidly obese, non-tender, non-distended, positive bowel sounds  Extremities: No clubbing or cyanosis; reduced left lower extremity edema; left foot dressing remains clean/dry/intact without breakthrough drainage  Left 2nd toe is exposed from dressing, nail bed is dry without active drainage, no spreading erythema from site  Skin: No new rashes noted on exposed skin  Labs, Imaging, & Other studies:   All pertinent labs and imaging studies were personally reviewed  Results from last 7 days   Lab Units 21  0449 21  0533 07/15/21  0600   WBC Thousand/uL 9 59 10 16 9 31   HEMOGLOBIN g/dL 11 6* 11 3* 12 4   PLATELETS Thousands/uL 282 146* 135*     Results from last 7 days   Lab Units 21  0449 21  0620 21  1333   POTASSIUM mmol/L 4 0 4 3 4 7   CHLORIDE mmol/L 105 101 100   CO2 mmol/L 28 24 29   BUN mg/dL 19 25 23   CREATININE mg/dL 1 30 2 15* 2 16*   EGFR ml/min/1 73sq m 57 31 31   CALCIUM mg/dL 8 7 8 4 8 9   AST U/L  --  47* 24   ALT U/L  --  37 36   ALK PHOS U/L  --  59 70     Results from last 7 days   Lab Units 21  1054 21  0950 21  1333 21  1325   BLOOD CULTURE  No Growth After 4 Days  No Growth After 4 Days  No Growth After 5 Days   Staphylococcus coagulase negative*   GRAM STAIN RESULT   --   --   --  Gram positive cocci in clusters* Results from last 7 days   Lab Units 07/13/21  0620 07/12/21  1835   PROCALCITONIN ng/ml 10 69* 7 88*

## 2021-07-19 NOTE — ASSESSMENT & PLAN NOTE
· Morbidly obese with suspected DELIA  · He will require outpt sleep study  · His oxygen sat was stable off of supplemental oxygen

## 2021-07-19 NOTE — NURSING NOTE
Patient's verenice and IV were removed  Patient received verbal review of discharge information  Patient was sent home with a copy of discharge information and with all belongings  Patient was discharged via walking with his walker and accompanied by Cr Marrero RN to his awaiting wife for transportation home      Cr Marrero RN

## 2021-07-19 NOTE — ASSESSMENT & PLAN NOTE
73year old male presenting with sepsis possibly due to left foot cellulitis  Initial cultures likely contaminant  - Appreciate ID and podiatry recommendations  - As per infectious disease, continue High dose Cefazolin per ID  He was cleared for discharge by ID this am  He will continue keflex outpt most likely high dose

## 2021-07-19 NOTE — ASSESSMENT & PLAN NOTE
Lab Results   Component Value Date    EGFR 57 07/19/2021    EGFR 50 07/18/2021    EGFR 49 07/17/2021    CREATININE 1 30 07/19/2021    CREATININE 1 45 (H) 07/18/2021    CREATININE 1 47 (H) 07/17/2021     MILTON on CKD  Baseline at 1 5-1 7  Possibly pre-renal with sepsis  Clinically improved with IV fluids  Back to baseline levels  - Renal following   - He will restart lisinopril at lower dose of 20mg and follow up with nephrology outpt

## 2021-07-19 NOTE — PROGRESS NOTES
[facility]  Progress Note - Otha Gowers 1955, 73 y o  male MRN: [id number]  Unit/Bed#: [place] [address] -01 Encounter: [id number]  Primary Care Provider: Jorge Todd MD   Date and time admitted to hospital: 2021 12:29 PM    Acute kidney injury superimposed on chronic kidney disease [facility])  Assessment & Plan  Lab Results   Component Value Date    EGFR 57 2021    EGFR 50 2021    EGFR 49 2021    CREATININE 1 30 2021    CREATININE 1 45 (H) 2021    CREATININE 1 47 (H) 2021     MILTON on CKD  Baseline at 1 5-1 7  Possibly pre-renal with sepsis  Clinically improved with IV fluids  Back to baseline levels  - Renal following   - He will restart lisinopril at lower dose of 20mg and follow up with nephrology outpt  DELIA (obstructive sleep apnea)  Assessment & Plan  · Morbidly obese with suspected DELIA  · He will require outpt sleep study  · His oxygen sat was stable off of supplemental oxygen  Gout  Assessment & Plan  Continue allopurinol    Essential hypertension  Assessment & Plan  · bp is stable on metoprolol  · His lisinopril had been held due to HOSP [facility]  · Nephrology cleared to restart lisinopril at lower dose  * Sepsis [facility])  Assessment & Plan  73year old male presenting with sepsis possibly due to left foot cellulitis  Initial cultures likely contaminant  - Appreciate ID and podiatry recommendations  - As per infectious disease, continue High dose Cefazolin per ID  He was cleared for discharge by ID this am  He will continue keflex outpt most likely high dose  Discharge Plan / Estimated Discharge Date: d/c to home  Code Status: Level 1 - Full Code      Subjective:   Pt seen and examined  Pt doing well  No problems reported by pt or by nursing  He is excited to go home       Objective:     Vitals:   Temp (24hrs), Av 3 °F (36 8 °C), Min:97 8 °F (36 6 °C), Max:98 7 °F (37 1 °C)    Temp:  [97 8 °F (36 6 °C)-98 7 °F (37 1 °C)] 98 7 °F (37 1 °C)  HR:  [64-80] 64  Resp:  [16-19] 16  BP: (131-139)/(59-66) 139/66  SpO2:  [93 %-98 %] 93 %  There is no height or weight on file to calculate BMI  Input and Output Summary (last 24 hours):     No intake or output data in the 24 hours ending 07/19/21 1013    Physical Exam:   Physical Exam  Constitutional:       Appearance: Normal appearance  HENT:      Head: Normocephalic and atraumatic  Eyes:      Extraocular Movements: Extraocular movements intact  Pupils: Pupils are equal, round, and reactive to light  Cardiovascular:      Rate and Rhythm: Normal rate and regular rhythm  Heart sounds: No murmur heard  No friction rub  No gallop  Pulmonary:      Effort: Pulmonary effort is normal  No respiratory distress  Breath sounds: Normal breath sounds  No wheezing or rales  Abdominal:      General: Bowel sounds are normal  There is no distension  Palpations: Abdomen is soft  Tenderness: There is no abdominal tenderness  There is no guarding  Musculoskeletal:      Right lower leg: Edema present  Left lower leg: Edema present  Skin:     Comments: Decrease erythema   Neurological:      Mental Status: He is alert and oriented to person, place, and time  Additional Data:     Labs:  Results from last 7 days   Lab Units 07/19/21  0449 07/16/21  0533 07/13/21  0620 07/12/21  1333 07/12/21  1333   WBC Thousand/uL 9 59 10 16  --    < > 21 54*   HEMOGLOBIN g/dL 11 6* 11 3*  --    < > 14 9   HEMATOCRIT % 36 1* 35 7*  --    < > 44 5   PLATELETS Thousands/uL 282 146*  --    < > 210   BANDS PCT %  --   --   --   --  14*   NEUTROS PCT %  --  79*   < >  --   --    LYMPHS PCT %  --  10*   < >  --   --    LYMPHO PCT % 24  --   --   --  5*   MONOS PCT %  --  8   < >  --   --    MONO PCT % 2*  --   --   --  3*   EOS PCT % 3 1   < >  --  1    < > = values in this interval not displayed       Results from last 7 days   Lab Units 07/19/21  0449 07/13/21  0620   SODIUM mmol/L 142 136   POTASSIUM mmol/L 4 0 4 3   CHLORIDE mmol/L 105 101   CO2 mmol/L 28 24   BUN mg/dL 19 25   CREATININE mg/dL 1 30 2 15*   ANION GAP mmol/L 9 11   CALCIUM mg/dL 8 7 8 4   ALBUMIN g/dL  --  3 2*   TOTAL BILIRUBIN mg/dL  --  1 99*   ALK PHOS U/L  --  59   ALT U/L  --  37   AST U/L  --  47*   GLUCOSE RANDOM mg/dL 101 106     Results from last 7 days   Lab Units 07/12/21  1412   INR  1 36*     Results from last 7 days   Lab Units 07/15/21  1130   POC GLUCOSE mg/dl 104         Results from last 7 days   Lab Units 07/13/21  0620 07/12/21  1835 07/12/21  1333   LACTIC ACID mmol/L  --   --  2 0   PROCALCITONIN ng/ml 10 69* 7 88*  --      Recent Cultures (last 7 days):     Results from last 7 days   Lab Units 07/14/21  1054 07/14/21  0950 07/12/21  1333 07/12/21  1325   BLOOD CULTURE  No Growth After 4 Days  No Growth After 4 Days  No Growth After 5 Days   Staphylococcus coagulase negative*   GRAM STAIN RESULT   --   --   --  Gram positive cocci in clusters*       Lines/Drains:  Invasive Devices     Peripheral Intravenous Line            Peripheral IV 07/17/21 Left Antecubital 2 days                Last 24 Hours Medication List:   Current Facility-Administered Medications   Medication Dose Route Frequency Provider Last Rate    acetaminophen  975 mg Oral Q6H [address] José Chapa MD      allopurinol  100 mg Oral Daily Emery Chan MD      ammonium lactate   Topical BID Emery Chan MD      cefazolin  2,000 mg Intravenous [facility] CR[facility] 2,000 mg (07/19/21 0520)    furosemide  20 mg Oral Daily Emery Chan MD      heparin (porcine)  5,000 Units Subcutaneous Q8H [facility]      melatonin  3 mg Oral HS Emery Chan MD      metoprolol succinate  100 mg Oral Daily Sara Elias PA-C      nystatin   Topical BID Emery Chan MD      potassium citrate  10 mEq Oral Daily Sara Elias PA-C      tamsulosin  0 4 mg Oral Daily With [employer] Brittanie Trimble PA-C      travoprost  1 drop Both Eyes HS Madelyn Earnesteen Meckel, PA-C          Today, Patient Was Seen By: Cailin Gomes DO

## 2021-07-20 ENCOUNTER — OFFICE VISIT (OUTPATIENT)
Dept: FAMILY MEDICINE CLINIC | Facility: CLINIC | Age: 66
End: 2021-07-20
Payer: MEDICARE

## 2021-07-20 VITALS
HEIGHT: 69 IN | HEART RATE: 61 BPM | OXYGEN SATURATION: 94 % | SYSTOLIC BLOOD PRESSURE: 140 MMHG | BODY MASS INDEX: 46.65 KG/M2 | DIASTOLIC BLOOD PRESSURE: 78 MMHG | WEIGHT: 315 LBS | TEMPERATURE: 97.5 F

## 2021-07-20 DIAGNOSIS — I10 ESSENTIAL HYPERTENSION: Primary | ICD-10-CM

## 2021-07-20 DIAGNOSIS — I89.0 LYMPHEDEMA OF BOTH LOWER EXTREMITIES: ICD-10-CM

## 2021-07-20 PROBLEM — C64.2 RENAL CELL CARCINOMA OF LEFT KIDNEY (HCC): Status: RESOLVED | Noted: 2018-06-25 | Resolved: 2021-07-20

## 2021-07-20 LAB
BACTERIA BLD CULT: NORMAL
DME PARACHUTE DELIVERY DATE ACTUAL: NORMAL
DME PARACHUTE DELIVERY DATE REQUESTED: NORMAL
DME PARACHUTE ITEM DESCRIPTION: NORMAL
DME PARACHUTE ORDER STATUS: NORMAL
DME PARACHUTE SUPPLIER NAME: NORMAL
DME PARACHUTE SUPPLIER PHONE: NORMAL

## 2021-07-20 PROCEDURE — 99496 TRANSJ CARE MGMT HIGH F2F 7D: CPT | Performed by: FAMILY MEDICINE

## 2021-07-20 RX ORDER — NYSTATIN 100000 [USP'U]/G
POWDER TOPICAL 2 TIMES DAILY
Qty: 60 G | Refills: 12 | Status: SHIPPED | OUTPATIENT
Start: 2021-07-20 | End: 2021-08-18 | Stop reason: SDUPTHER

## 2021-07-20 NOTE — PROGRESS NOTES
Assessment/Plan:    No problem-specific Assessment & Plan notes found for this encounter  Diagnoses and all orders for this visit:    Essential hypertension          Subjective:      Patient ID: Mic Westbrook is a 73 y o  male  TCM : Admit w/ sepsis thought 2/2 foot cellulitis ; had trauma to toe w/ nail avulsion; possible source   Home on antibx ; initially had M/S changes and MILTON; GFR improved w/ usual treatment ; Ankle and foot still swollen but less redness/ pain   Some med changes reviewed w/ pt/ family        The following portions of the patient's history were reviewed and updated as appropriate: allergies, current medications, past family history, past medical history, past social history, past surgical history and problem list     Review of Systems   Constitutional: Positive for fatigue  Negative for activity change and appetite change  HENT: Negative for trouble swallowing  Eyes: Negative for visual disturbance  Respiratory: Negative for cough and shortness of breath  Cardiovascular: Negative for chest pain, palpitations and leg swelling  Gastrointestinal: Negative for abdominal pain and blood in stool  Endocrine: Negative for polyuria  Genitourinary: Negative for difficulty urinating and hematuria  Skin: Negative for rash  Neurological: Negative for dizziness  Psychiatric/Behavioral: Negative for behavioral problems  Objective:  Vitals:    07/20/21 0909   BP: 140/78   BP Location: Left arm   Patient Position: Sitting   Cuff Size: Large   Pulse: 61   Temp: 97 5 °F (36 4 °C)   TempSrc: Temporal   SpO2: 94%   Weight: (!) 186 kg (411 lb)   Height: 5' 9" (1 753 m)      Physical Exam  Constitutional:       Appearance: He is well-developed  HENT:      Head: Normocephalic and atraumatic  Eyes:      Conjunctiva/sclera: Conjunctivae normal    Neck:      Thyroid: No thyromegaly  Cardiovascular:      Rate and Rhythm: Normal rate and regular rhythm        Heart sounds: Normal heart sounds  No murmur heard  Pulmonary:      Effort: Pulmonary effort is normal  No respiratory distress  Breath sounds: Normal breath sounds  Musculoskeletal:      Cervical back: Neck supple  Lymphadenopathy:      Cervical: No cervical adenopathy  Skin:     General: Skin is warm and dry  Psychiatric:         Behavior: Behavior normal            Cellulitis improving : has few days of antibx left ; local leg and foot care

## 2021-07-20 NOTE — PROGRESS NOTES
TCM Call (since 6/19/2021)     Date and time call was made  7/19/2021  2:04 PM    Hospital care reviewed  Records reviewed    Patient was hospitialized at  [address] [place] [address]        Date of Admission  07/12/21    Date of discharge  07/19/21    Diagnosis  Weakness; Sepsis    Disposition  Home    Were the patients medications reviewed and updated  Yes    Current Symptoms  Fatigue    Fatigue severity  Mild      TCM Call (since 6/19/2021)     Post hospital issues  None    Should patient be enrolled in anticoag monitoring? No    Scheduled for follow up? Yes    Did you obtain your prescribed medications  Yes    Do you need help managing your prescriptions or medications  No    Is transportation to your appointment needed  No    I have advised the patient to call PCP with any new or worsening symptoms  ad finn cma    Living Arrangements  Family members    Support System  None    Are you recieving any outpatient services  No    Are you recieving home care services  No    Are you using any community resources  No    Current waiver services  No    Have you fallen in the last 12 months  No    Interperter language line needed  No    Counseling  Family    Counseling topics  Activities of daily living;  Importance of RX compliance; Home health agency benefits

## 2021-08-04 ENCOUNTER — APPOINTMENT (OUTPATIENT)
Dept: LAB | Age: 66
End: 2021-08-04
Payer: MEDICARE

## 2021-08-04 ENCOUNTER — EVALUATION (OUTPATIENT)
Dept: PHYSICAL THERAPY | Age: 66
End: 2021-08-04
Payer: MEDICARE

## 2021-08-04 DIAGNOSIS — I89.0 LYMPHEDEMA OF BOTH LOWER EXTREMITIES: ICD-10-CM

## 2021-08-04 DIAGNOSIS — I10 ESSENTIAL HYPERTENSION: ICD-10-CM

## 2021-08-04 DIAGNOSIS — I10 HTN (HYPERTENSION): ICD-10-CM

## 2021-08-04 DIAGNOSIS — I89.0 LYMPHEDEMA OF BOTH LOWER EXTREMITIES: Primary | ICD-10-CM

## 2021-08-04 LAB
ANION GAP SERPL CALCULATED.3IONS-SCNC: 1 MMOL/L (ref 4–13)
BASOPHILS # BLD AUTO: 0.08 THOUSANDS/ΜL (ref 0–0.1)
BASOPHILS NFR BLD AUTO: 1 % (ref 0–1)
BUN SERPL-MCNC: 20 MG/DL (ref 5–25)
CALCIUM SERPL-MCNC: 9.4 MG/DL (ref 8.3–10.1)
CHLORIDE SERPL-SCNC: 104 MMOL/L (ref 100–108)
CO2 SERPL-SCNC: 28 MMOL/L (ref 21–32)
CREAT SERPL-MCNC: 1.4 MG/DL (ref 0.6–1.3)
EOSINOPHIL # BLD AUTO: 0.22 THOUSAND/ΜL (ref 0–0.61)
EOSINOPHIL NFR BLD AUTO: 3 % (ref 0–6)
ERYTHROCYTE [DISTWIDTH] IN BLOOD BY AUTOMATED COUNT: 13.2 % (ref 11.6–15.1)
GFR SERPL CREATININE-BSD FRML MDRD: 52 ML/MIN/1.73SQ M
GLUCOSE SERPL-MCNC: 93 MG/DL (ref 65–140)
HCT VFR BLD AUTO: 39.9 % (ref 36.5–49.3)
HGB BLD-MCNC: 12.7 G/DL (ref 12–17)
IMM GRANULOCYTES # BLD AUTO: 0.02 THOUSAND/UL (ref 0–0.2)
IMM GRANULOCYTES NFR BLD AUTO: 0 % (ref 0–2)
LYMPHOCYTES # BLD AUTO: 2.04 THOUSANDS/ΜL (ref 0.6–4.47)
LYMPHOCYTES NFR BLD AUTO: 27 % (ref 14–44)
MCH RBC QN AUTO: 30.6 PG (ref 26.8–34.3)
MCHC RBC AUTO-ENTMCNC: 31.8 G/DL (ref 31.4–37.4)
MCV RBC AUTO: 96 FL (ref 82–98)
MONOCYTES # BLD AUTO: 0.67 THOUSAND/ΜL (ref 0.17–1.22)
MONOCYTES NFR BLD AUTO: 9 % (ref 4–12)
NEUTROPHILS # BLD AUTO: 4.51 THOUSANDS/ΜL (ref 1.85–7.62)
NEUTS SEG NFR BLD AUTO: 60 % (ref 43–75)
NRBC BLD AUTO-RTO: 0 /100 WBCS
PLATELET # BLD AUTO: 305 THOUSANDS/UL (ref 149–390)
PMV BLD AUTO: 9.2 FL (ref 8.9–12.7)
POTASSIUM SERPL-SCNC: 4.8 MMOL/L (ref 3.5–5.3)
RBC # BLD AUTO: 4.15 MILLION/UL (ref 3.88–5.62)
SODIUM SERPL-SCNC: 133 MMOL/L (ref 136–145)
WBC # BLD AUTO: 7.54 THOUSAND/UL (ref 4.31–10.16)

## 2021-08-04 PROCEDURE — 97162 PT EVAL MOD COMPLEX 30 MIN: CPT

## 2021-08-04 PROCEDURE — 80048 BASIC METABOLIC PNL TOTAL CA: CPT

## 2021-08-04 PROCEDURE — 97110 THERAPEUTIC EXERCISES: CPT

## 2021-08-04 PROCEDURE — 85025 COMPLETE CBC W/AUTO DIFF WBC: CPT

## 2021-08-04 PROCEDURE — 36415 COLL VENOUS BLD VENIPUNCTURE: CPT

## 2021-08-05 RX ORDER — LISINOPRIL 20 MG/1
20 TABLET ORAL DAILY
Qty: 90 TABLET | Refills: 3 | Status: SHIPPED | OUTPATIENT
Start: 2021-08-05 | End: 2021-08-16 | Stop reason: SDUPTHER

## 2021-08-05 NOTE — PROGRESS NOTES
PT Evaluation     Today's date: 2021  Patient name: More Whitley  : 1955  MRN: [id number]  Referring provider: Cherylene Curd, MD  Dx:   Encounter Diagnosis     ICD-10-CM    1  Lymphedema of both lower extremities  I89 0 Ambulatory referral to PT/OT lymphedema therapy   2  Essential hypertension  I10 Ambulatory referral to PT/OT lymphedema therapy     21 left greater than right le +2 Left le Right le reev left  Right     mtp 32cm 34cm      Lat malleolus 35 5 38 5      4cm prox to lat mal 31 5 35 5      8 32 36 5      12 37 5 40 5      16 43 45 5      20 49 50 5      24 51 51 5      28 51 53      32 47 50      36 46 5 47      40 49 5 52      44 54 57      48 60 5 59      52 63 5 64      56 68 69      60 71 5cm 73cm      + dorsal left foot fibrosis                      Assessment  Assessment details: Caterina Galaviz is a 73y o  year old male referred to outpt Physical therapy with diagnoses of bilateral le mixed lymphedema stage 2 for + 5 years with exacerbation of symptoms noted on 2021 s/p left le cellulitis and subsequent sepsis with hospitalization until 2021 and antibiotic treatment required  Charlie Gonzalez presents with decreased ankle range of motion,increased pain / tightness in both le's , increased girth , + tissue fibrosis and decreased tolerance to functional activity  PT is warranted to address these deficits in efforts to reduce girth, maximize function, and return to prior level of activity   Treatment shall include complex decongestive physical therapy, manual lymphatic drainage massage and soft tissue mobilization, there exer to increase lymphatic circulation, home exer programming, lymphedema education and skin care management, compression wrapping prn , rom exer, trial home compression pump usage with bilateral  le elevation 40 MMHG, fitting for compression garments versus compression wraps ( ready wraps or juxta fit circaid /farrow wraps ) knee high versus thigh high (  4 refills )  20-30  MMHG   Impairments: abnormal or restricted ROM, activity intolerance, impaired physical strength, lacks appropriate home exercise program and pain with function  Other impairment: +2 lymphedema left greater than right el with + tissue fibrosis  Functional limitations: decreased tolerance to bending , standing, walking,   Symptom irritability: moderateUnderstanding of Dx/Px/POC: good   Prognosis: good    Goals  STG 1  Independent in there exer program in two weeks           2  Reduction of girth by 2 cm in two weeks      LTG 1 Reduction of girth by 4 cm in 4 weeks  2 The pt shall be independent in donning and doffing compression garments in 4 weeks        3 achieve bilateral ankle dorsiflexion to 20 degrees in two weeks     Plan  Patient would benefit from: PT eval, lymphedema eval and skilled physical therapy  Referral necessary: Yes  Planned therapy interventions: manual therapy, massage, compression, patient education, neuromuscular re-education, muscle pump exercises, strengthening, stretching, therapeutic activities, therapeutic exercise and home exercise program  Frequency: 2x week  Duration in weeks: 8  Plan of Care beginning date: 8/4/2021  Plan of Care expiration date: 10/4/2021  Treatment plan discussed with: patient        Subjective Evaluation    History of Present Illness  Onset date: 7/12/2021 left le cellulitis f/b sepsis and hosp admit  Mechanism of injury: Caterina Munroe is a 73year old male presenting to outpt PT with a dx of bilateral le mixed lymphedema for +5 years with recent exacerbation noted on 7/12/2021 s/p left le cellulitis f/b sepsis requiring hospitalization and antibiotic treatment with discharge noted on 7/19/2021 from [facility]  He presents to PT with left calcaneal skin dryness with cracked skin noted in addition to significant dead skin present on the right le greater than left    He reports he will be seeing the podiatrist in the near future to address nail and skincare   He reports never having lymphedema care in the past             Recurrent probem    Quality of life: fair    Pain  Current pain ratin  At best pain ratin  At worst pain ratin  Location: left greater than right tightness reported  Quality: dull ache, pressure and tight  Relieving factors: change in position and rest  Aggravating factors: walking, standing, sitting, stair climbing and lifting  Progression: improved    Social Support  Steps to enter house: yes (3 steps bilateral railings + 1 step)  Stairs in house: yes (pt living on 1st floor)   Lives in: multiple-level home  Lives with: spouse and adult children    Employment status: not working  Exercise history: sedentary lifestyle   ( shopping at [employer] is pt's walking exer )    Treatments  Current treatment: physical therapy  Patient Goals  Patient goals for therapy: decreased edema, independence with ADLs/IADLs, increased strength, return to sport/leisure activities, increased motion and decreased pain  Patient goal: reduction in le girth by 2cm in 4 weeks         Objective     Active Range of Motion   Left Hip   Flexion: WFL  Extension: 10 degrees   Abduction: WFL  Adduction: WFL    Right Hip   Flexion: WFL  Extension: 10 degrees   Abduction: WFL  Left Knee   Flexion: WFL  Extension: WFL    Right Knee   Flexion: WFL  Extension: WFL  Left Ankle/Foot   Dorsiflexion (ke): 10 degrees     Right Ankle/Foot   Dorsiflexion (ke): 10 degrees     Additional Active Range of Motion Details  slr left and right le 0-70   Mod quad tightness noted   Goal dorsiflexion 20 degrees    Strength/Myotome Testing     Left Hip   Planes of Motion   Flexion: 5  Extension: 3+  Abduction: 4+  Adduction: 3+    Right Hip   Planes of Motion   Flexion: 4+  Extension: 3+  Abduction: 4+  Adduction: 3+    Left Knee   Flexion: 5  Extension: 5    Right Knee   Flexion: 5  Extension: 5    Left Ankle/Foot   Dorsiflexion: 5  Plantar flexion: 4-    Right Ankle/Foot   Dorsiflexion: 5  Plantar flexion: 4-    Ambulation     Ambulation: Level Surfaces   Ambulation without assistive device: independent    Additional Level Surfaces Ambulation Details  100ft level surface             Precautions: allergies : codeine PMH: DELIA, HTN, kidney stones, chronic renal failure stage 3, HDL deficiency, gout, s/p appendectomy, lymphedema both le's + 5 years, cataracts both eyes present, cryoablasion on left kidney to address mass, recent hospitalization for sepsis 7/12/2021 cellulitis left le d/c'd 7/19/2021, right inguinal hernia , pt reports weighing 410 lbs currently, pt given rolling walker from the hospital on 7/19/2021 but does not utilize, pt has pool at home       Manuals 8/4/21             I eval            Mld massage and soft tissue mobilization bilateral le's with elevation             Fit for ready wraps 8/17 versus juxta fit compression wraps                          Neuro Re-Ed/ there exer              Lymphedema le exer packet  10 reps each            Nu step                                                                               Ther Ex                                                                                                                     Ther Activity                                                                              Modalities             Compression pump 40 mmHG with bilateral  le  elevation

## 2021-08-06 ENCOUNTER — OFFICE VISIT (OUTPATIENT)
Dept: PHYSICAL THERAPY | Age: 66
End: 2021-08-06
Payer: MEDICARE

## 2021-08-06 DIAGNOSIS — I89.0 LYMPHEDEMA OF BOTH LOWER EXTREMITIES: Primary | ICD-10-CM

## 2021-08-06 PROCEDURE — 97110 THERAPEUTIC EXERCISES: CPT

## 2021-08-06 PROCEDURE — 97140 MANUAL THERAPY 1/> REGIONS: CPT

## 2021-08-06 NOTE — PROGRESS NOTES
Daily Note     Today's date: 2021  Patient name: Ruth Guevara  : 1955  MRN: [id number]  Referring provider: Joselyn Delaney MD  Dx:   Encounter Diagnosis     ICD-10-CM    1  Lymphedema of both lower extremities  I89 0                   Subjective: "I did do the exercises at home once and then I did them again "      Objective: See treatment diary below      Assessment: Tolerated treatment2 well  Patient would benefit from continued PT      Plan: Continue per plan of care        Precautions: allergies : codeine PMH: DELIA, HTN, kidney stones, chronic renal failure stage 3, HDL deficiency, gout, s/p appendectomy, lymphedema both le's + 5 years, cataracts both eyes present, cryoablasion on left kidney to address mass, recent hospitalization for sepsis 2021 cellulitis left le d/c'd 2021, right inguinal hernia , pt reports weighing 410 lbs currently, pt given rolling walker from the hospital on 2021 but does not utilize, pt has pool at home       Manuals 21            I eval            Mld massage and soft tissue mobilization bilateral le's with elevation  45 min man           Fit for ready wraps  versus juxta fit compression wraps                          Neuro Re-Ed/ there exer              Lymphedema le exer packet  10 reps each            Nu step   15 min r 2           Squats at hi lo table  2 sets of 10                                                                Ther Ex                                                                                                                     Ther Activity                                                                              Modalities             Compression pump 40 mmHG with bilateral  le  elevation

## 2021-08-11 ENCOUNTER — OFFICE VISIT (OUTPATIENT)
Dept: PHYSICAL THERAPY | Age: 66
End: 2021-08-11
Payer: MEDICARE

## 2021-08-11 DIAGNOSIS — I89.0 LYMPHEDEMA OF BOTH LOWER EXTREMITIES: Primary | ICD-10-CM

## 2021-08-11 PROCEDURE — 97140 MANUAL THERAPY 1/> REGIONS: CPT

## 2021-08-11 PROCEDURE — 97110 THERAPEUTIC EXERCISES: CPT

## 2021-08-11 NOTE — PROGRESS NOTES
Daily Note     Today's date: 2021  Patient name: Schuyler Malone  : 1955  MRN: [id number]  Referring provider: Kaur Dimas MD  Dx:   Encounter Diagnosis     ICD-10-CM    1  Lymphedema of both lower extremities  I89 0                   Subjective: "The podiatrist cleaned my left heel and scraped it and I will see her every 10 weeks "      Objective: See treatment diary below      Assessment: Tolerated treatment well  Patient would benefit from continued PT      Plan: Continue per plan of care        Precautions: allergies : codeine PMH: DELIA, HTN, kidney stones, chronic renal failure stage 3, HDL deficiency, gout, s/p appendectomy, lymphedema both le's + 5 years, cataracts both eyes present, cryoablasion on left kidney to address mass, recent hospitalization for sepsis 2021 cellulitis left le d/c'd 2021, right inguinal hernia , pt reports weighing 410 lbs currently, pt given rolling walker from the hospital on 2021 but does not utilize, pt has pool at home       Manuals 21           I eval            Mld massage and soft tissue mobilization bilateral le's with elevation  45 min man 39 min man          Fit for ready wraps  versus juxta fit compression wraps                          Neuro Re-Ed/ there exer              Lymphedema le exer packet  10 reps each            Nu step   15 min r 2 15 min r 4          Squats at hi lo table  2 sets of 10                                                                Ther Ex                                                                                                                     Ther Activity                                                                              Modalities             Compression pump 40 mmHG with bilateral  le  elevation

## 2021-08-13 ENCOUNTER — OFFICE VISIT (OUTPATIENT)
Dept: PHYSICAL THERAPY | Age: 66
End: 2021-08-13
Payer: MEDICARE

## 2021-08-13 DIAGNOSIS — I89.0 LYMPHEDEMA OF BOTH LOWER EXTREMITIES: Primary | ICD-10-CM

## 2021-08-13 PROCEDURE — 97110 THERAPEUTIC EXERCISES: CPT

## 2021-08-13 PROCEDURE — 97016 VASOPNEUMATIC DEVICE THERAPY: CPT

## 2021-08-13 PROCEDURE — 97140 MANUAL THERAPY 1/> REGIONS: CPT

## 2021-08-13 NOTE — PROGRESS NOTES
Daily Note     Today's date: 2021  Patient name: Boni Snell  : 1955  MRN: [id number]  Referring provider: Doug Guillaume MD  Dx:   Encounter Diagnosis     ICD-10-CM    1  Lymphedema of both lower extremities  I89 0                   Subjective: Pt observed with legs presenting as softer and reduced in size " jakub HERNDON  Supervising  Objective: See treatment diary below      Assessment: Tolerated treatment well  Patient would benefit from continued PT      Plan: Continue per plan of care        Precautions: allergies : codeine PMH: DELIA, HTN, kidney stones, chronic renal failure stage 3, HDL deficiency, gout, s/p appendectomy, lymphedema both le's + 5 years, cataracts both eyes present, cryoablasion on left kidney to address mass, recent hospitalization for sepsis 2021 cellulitis left le d/c'd 2021, right inguinal hernia , pt reports weighing 410 lbs currently, pt given rolling walker from the hospital on 2021 but does not utilize, pt has pool at home       Manuals 21          I eval            Mld massage and soft tissue mobilization bilateral le's with elevation  45 min man 39 min man          Fit for ready wraps  versus juxta fit compression wraps                          Neuro Re-Ed/ there exer              Lymphedema le exer packet  10 reps each            Nu step   15 min r 2 15 min r 4          Squats at hi lo table  2 sets of 10            Girth measurements     15 min man                                                Ther Ex             Nu step    15 min r 5                                                                                                    Ther Activity                                                                              Modalities             Compression pump 40 mmHG with bilateral  le  elevation     30 min

## 2021-08-16 DIAGNOSIS — I10 HTN (HYPERTENSION): ICD-10-CM

## 2021-08-16 RX ORDER — LISINOPRIL 20 MG/1
20 TABLET ORAL DAILY
Qty: 90 TABLET | Refills: 3 | Status: SHIPPED | OUTPATIENT
Start: 2021-08-16 | End: 2022-07-15

## 2021-08-17 ENCOUNTER — OFFICE VISIT (OUTPATIENT)
Dept: PHYSICAL THERAPY | Age: 66
End: 2021-08-17
Payer: MEDICARE

## 2021-08-17 DIAGNOSIS — I89.0 LYMPHEDEMA OF BOTH LOWER EXTREMITIES: Primary | ICD-10-CM

## 2021-08-17 PROCEDURE — 97140 MANUAL THERAPY 1/> REGIONS: CPT

## 2021-08-17 PROCEDURE — 97110 THERAPEUTIC EXERCISES: CPT

## 2021-08-18 ENCOUNTER — OFFICE VISIT (OUTPATIENT)
Dept: FAMILY MEDICINE CLINIC | Facility: CLINIC | Age: 66
End: 2021-08-18
Payer: MEDICARE

## 2021-08-18 VITALS
HEIGHT: 69 IN | DIASTOLIC BLOOD PRESSURE: 86 MMHG | SYSTOLIC BLOOD PRESSURE: 142 MMHG | TEMPERATURE: 97.1 F | BODY MASS INDEX: 60.69 KG/M2 | HEART RATE: 66 BPM | OXYGEN SATURATION: 97 % | RESPIRATION RATE: 18 BRPM

## 2021-08-18 DIAGNOSIS — I10 ESSENTIAL HYPERTENSION: ICD-10-CM

## 2021-08-18 DIAGNOSIS — I89.0 LYMPHEDEMA OF BOTH LOWER EXTREMITIES: Primary | ICD-10-CM

## 2021-08-18 PROCEDURE — 99213 OFFICE O/P EST LOW 20 MIN: CPT | Performed by: FAMILY MEDICINE

## 2021-08-18 RX ORDER — NYSTATIN 100000 [USP'U]/G
POWDER TOPICAL 2 TIMES DAILY
Qty: 60 G | Refills: 12 | Status: SHIPPED | OUTPATIENT
Start: 2021-08-18

## 2021-08-18 NOTE — PROGRESS NOTES
Daily Note     Today's date: 2021  Patient name: Angela Cueto  : 1955  MRN: [id number]  Referring provider: Tatiana Shaikh MD  Dx:   Encounter Diagnosis     ICD-10-CM    1  Lymphedema of both lower extremities  I89 0                   Subjective: Shobha LEAL PTA supervising 1030- 1100am      Objective: See treatment diary below      Assessment: Tolerated treatment well  Patient would benefit from continued PT      Plan: Continue per plan of care        Precautions: allergies : codeine PMH: DELIA, HTN, kidney stones, chronic renal failure stage 3, HDL deficiency, gout, s/p appendectomy, lymphedema both le's + 5 years, cataracts both eyes present, cryoablasion on left kidney to address mass, recent hospitalization for sepsis 2021 cellulitis left le d/c'd 2021, right inguinal hernia , pt reports weighing 410 lbs currently, pt given rolling walker from the hospital on 2021 but does not utilize, pt has pool at home       Manuals 21         I eval            Mld massage and soft tissue mobilization bilateral le's with elevation  45 min man 39 min man          Fit for ready wraps  versus juxta fit compression wraps     Man perf 15 min                     Neuro Re-Ed/ there exer              Lymphedema le exer packet  10 reps each            Nu step   15 min r 2 15 min r 4  15 min r 4        Squats at hi lo table  2 sets of 10    3 x 10        Girth measurements     15 min man                                                Ther Ex             Nu step    15 min r 5                                                                                                    Ther Activity                                                                              Modalities             Compression pump 40 mmHG with bilateral  le  elevation     30 min

## 2021-08-18 NOTE — PROGRESS NOTES
Assessment/Plan:    No problem-specific Assessment & Plan notes found for this encounter  Diagnoses and all orders for this visit:    Lymphedema of both lower extremities          Subjective:      Patient ID: Marc Banks is a 73 y o  male  F/U BLE edema / ichthyosis: doing much better  Completing treatment @ lymphedema clinic ; BP at home to goal;       The following portions of the patient's history were reviewed and updated as appropriate: allergies, current medications, past family history, past medical history, past social history, past surgical history and problem list     Review of Systems   Constitutional: Negative for chills and fever  HENT: Negative for congestion, ear pain, rhinorrhea, sinus pain and sore throat  Eyes: Negative for visual disturbance  Respiratory: Negative for cough, shortness of breath and wheezing  Cardiovascular: Negative for chest pain  Gastrointestinal: Negative for constipation and diarrhea  Skin: Negative for rash  Objective:  Vitals:    08/18/21 0903   BP: 142/86   BP Location: Left arm   Patient Position: Sitting   Cuff Size: Large   Pulse: 66   Resp: 18   Temp: (!) 97 1 °F (36 2 °C)   TempSrc: Tympanic   SpO2: 97%   Height: 5' 9" (1 753 m)      Physical Exam  Constitutional:       Appearance: He is well-developed  He is not diaphoretic  Comments: Edema and scaling much improved : no  Cellulitis   HENT:      Head: Normocephalic and atraumatic  Eyes:      Conjunctiva/sclera: Conjunctivae normal    Skin:     General: Skin is warm and dry  Neurological:      Mental Status: He is alert and oriented to person, place, and time

## 2021-08-19 ENCOUNTER — OFFICE VISIT (OUTPATIENT)
Dept: PHYSICAL THERAPY | Age: 66
End: 2021-08-19
Payer: MEDICARE

## 2021-08-19 DIAGNOSIS — I89.0 LYMPHEDEMA OF BOTH LOWER EXTREMITIES: Primary | ICD-10-CM

## 2021-08-19 PROCEDURE — 97016 VASOPNEUMATIC DEVICE THERAPY: CPT

## 2021-08-19 PROCEDURE — 97110 THERAPEUTIC EXERCISES: CPT

## 2021-08-19 NOTE — PROGRESS NOTES
Daily Note     Today's date: 2021  Patient name: Shea Shafer  : 1955  MRN: [id number]  Referring provider: Isai Butts MD  Dx:   Encounter Diagnosis     ICD-10-CM    1  Lymphedema of both lower extremities  I89 0                   Subjective: Improved skin integrity  Objective: See treatment diary below      Assessment: Tolerated treatment well  Patient would benefit from continued PT      Plan: Continue per plan of care        Precautions: allergies : codeine PMH: DELIA, HTN, kidney stones, chronic renal failure stage 3, HDL deficiency, gout, s/p appendectomy, lymphedema both le's + 5 years, cataracts both eyes present, cryoablasion on left kidney to address mass, recent hospitalization for sepsis 2021 cellulitis left le d/c'd 2021, right inguinal hernia , pt reports weighing 410 lbs currently, pt given rolling walker from the hospital on 2021 but does not utilize, pt has pool at home       Manuals 21        I eval            Mld massage and soft tissue mobilization bilateral le's with elevation  45 min man 39 min man          Fit for ready wraps  versus juxta fit compression wraps     Man perf 15 min                     Neuro Re-Ed/ there exer              Lymphedema le exer packet  10 reps each     15 min       Nu step   15 min r 2 15 min r 4  15 min r 4 15 min       Squats at hi lo table  2 sets of 10    3 x 10        Girth measurements     15 min man                                                Ther Ex             Nu step    15 min r 5                                                                                                    Ther Activity                                                                              Modalities             Compression pump 40 mmHG with bilateral  le  elevation     30 min   30 min 40mmHg

## 2021-08-23 ENCOUNTER — APPOINTMENT (OUTPATIENT)
Dept: PHYSICAL THERAPY | Age: 66
End: 2021-08-23
Payer: MEDICARE

## 2021-08-27 ENCOUNTER — APPOINTMENT (OUTPATIENT)
Dept: PHYSICAL THERAPY | Age: 66
End: 2021-08-27
Payer: MEDICARE

## 2021-09-02 ENCOUNTER — OFFICE VISIT (OUTPATIENT)
Dept: PHYSICAL THERAPY | Age: 66
End: 2021-09-02
Payer: MEDICARE

## 2021-09-02 DIAGNOSIS — I89.0 LYMPHEDEMA OF BOTH LOWER EXTREMITIES: Primary | ICD-10-CM

## 2021-09-02 PROCEDURE — 97140 MANUAL THERAPY 1/> REGIONS: CPT

## 2021-09-02 PROCEDURE — 97110 THERAPEUTIC EXERCISES: CPT

## 2021-09-02 NOTE — PROGRESS NOTES
Daily Note     Today's date: 2021  Patient name: Lexy Burnham  : 1955  MRN: [id number]  Referring provider: Jill Dunaway MD  Dx:   Encounter Diagnosis     ICD-10-CM    1  Lymphedema of both lower extremities  I89 0                   Subjective: Pt  Now has compression wraps  Demonstrated proper doffing and donning  Objective: See treatment diary below      Assessment: Tolerated treatment well  Patient would benefit from continued PT      Plan: Continue per plan of care        Precautions: allergies : codeine PMH: DELIA, HTN, kidney stones, chronic renal failure stage 3, HDL deficiency, gout, s/p appendectomy, lymphedema both le's + 5 years, cataracts both eyes present, cryoablasion on left kidney to address mass, recent hospitalization for sepsis 2021 cellulitis left le d/c'd 2021, right inguinal hernia , pt reports weighing 410 lbs currently, pt given rolling walker from the hospital on 2021 but does not utilize, pt has pool at home       Manuals 21       I eval            Mld massage and soft tissue mobilization bilateral le's with elevation  45 min man 45 min man    20 min      Fit for ready wraps  versus juxta fit compression wraps     Man perf 15 min                     Neuro Re-Ed/ there exer              Lymphedema le exer packet  10 reps each     15 min 40min      Nu step   15 min r 2 15 min r 4  15 min r 4 15 min       Squats at hi lo table  2 sets of 10    3 x 10        Girth measurements     15 min man                                                Ther Ex             Nu step    15 min r 5                                                                                                    Ther Activity                                                                              Modalities             Compression pump 40 mmHG with bilateral  le  elevation     30 min   30 min 40mmHg

## 2021-12-20 ENCOUNTER — OFFICE VISIT (OUTPATIENT)
Dept: FAMILY MEDICINE CLINIC | Facility: CLINIC | Age: 66
End: 2021-12-20
Payer: MEDICARE

## 2021-12-20 VITALS
TEMPERATURE: 97.7 F | WEIGHT: 315 LBS | BODY MASS INDEX: 59.22 KG/M2 | OXYGEN SATURATION: 96 % | HEART RATE: 74 BPM | DIASTOLIC BLOOD PRESSURE: 90 MMHG | SYSTOLIC BLOOD PRESSURE: 164 MMHG | RESPIRATION RATE: 18 BRPM

## 2021-12-20 DIAGNOSIS — Z12.11 SCREEN FOR COLON CANCER: Primary | ICD-10-CM

## 2021-12-20 DIAGNOSIS — N18.31 CHRONIC RENAL FAILURE, STAGE 3A (HCC): ICD-10-CM

## 2021-12-20 DIAGNOSIS — G47.33 OSA (OBSTRUCTIVE SLEEP APNEA): ICD-10-CM

## 2021-12-20 DIAGNOSIS — E78.6 HDL DEFICIENCY: ICD-10-CM

## 2021-12-20 DIAGNOSIS — E66.01 CLASS 3 SEVERE OBESITY DUE TO EXCESS CALORIES WITH SERIOUS COMORBIDITY AND BODY MASS INDEX (BMI) OF 50.0 TO 59.9 IN ADULT (HCC): ICD-10-CM

## 2021-12-20 DIAGNOSIS — N20.0 KIDNEY STONES: ICD-10-CM

## 2021-12-20 DIAGNOSIS — I89.0 LYMPHEDEMA OF BOTH LOWER EXTREMITIES: ICD-10-CM

## 2021-12-20 DIAGNOSIS — Z23 NEED FOR INFLUENZA VACCINATION: ICD-10-CM

## 2021-12-20 DIAGNOSIS — M10.9 GOUT, UNSPECIFIED CAUSE, UNSPECIFIED CHRONICITY, UNSPECIFIED SITE: ICD-10-CM

## 2021-12-20 DIAGNOSIS — I10 ESSENTIAL HYPERTENSION: ICD-10-CM

## 2021-12-20 PROCEDURE — 99214 OFFICE O/P EST MOD 30 MIN: CPT | Performed by: FAMILY MEDICINE

## 2021-12-20 PROCEDURE — 90662 IIV NO PRSV INCREASED AG IM: CPT

## 2021-12-20 PROCEDURE — G0008 ADMIN INFLUENZA VIRUS VAC: HCPCS

## 2021-12-20 RX ORDER — POTASSIUM CITRATE 15 MEQ/1
15 TABLET, EXTENDED RELEASE ORAL
COMMUNITY
Start: 2021-11-05 | End: 2021-12-20 | Stop reason: SDUPTHER

## 2022-02-17 ENCOUNTER — TELEPHONE (OUTPATIENT)
Dept: PREADMISSION TESTING | Facility: HOSPITAL | Age: 67
End: 2022-02-17

## 2022-03-03 ENCOUNTER — TELEPHONE (OUTPATIENT)
Dept: GASTROENTEROLOGY | Facility: HOSPITAL | Age: 67
End: 2022-03-03

## 2022-03-04 ENCOUNTER — ANESTHESIA (OUTPATIENT)
Dept: GASTROENTEROLOGY | Facility: HOSPITAL | Age: 67
End: 2022-03-04

## 2022-03-04 ENCOUNTER — HOSPITAL ENCOUNTER (OUTPATIENT)
Dept: GASTROENTEROLOGY | Facility: HOSPITAL | Age: 67
Setting detail: OUTPATIENT SURGERY
Discharge: HOME | End: 2022-03-04
Attending: COLON & RECTAL SURGERY | Admitting: COLON & RECTAL SURGERY
Payer: MEDICARE

## 2022-03-04 ENCOUNTER — ANESTHESIA EVENT (OUTPATIENT)
Dept: GASTROENTEROLOGY | Facility: HOSPITAL | Age: 67
End: 2022-03-04

## 2022-03-04 VITALS
BODY MASS INDEX: 46.65 KG/M2 | TEMPERATURE: 96.9 F | RESPIRATION RATE: 20 BRPM | DIASTOLIC BLOOD PRESSURE: 69 MMHG | WEIGHT: 315 LBS | OXYGEN SATURATION: 96 % | HEART RATE: 68 BPM | SYSTOLIC BLOOD PRESSURE: 157 MMHG | HEIGHT: 69 IN

## 2022-03-04 DIAGNOSIS — Z12.11 ENCOUNTER FOR SCREENING FOR MALIGNANT NEOPLASM OF COLON: ICD-10-CM

## 2022-03-04 PROBLEM — Z00.00 WELCOME TO MEDICARE PREVENTIVE VISIT: Status: RESOLVED | Noted: 2021-05-11 | Resolved: 2022-03-04

## 2022-03-04 PROCEDURE — 88305 TISSUE EXAM BY PATHOLOGIST: CPT | Performed by: SPECIALIST

## 2022-03-04 RX ORDER — PROPOFOL 10 MG/ML
INJECTION, EMULSION INTRAVENOUS AS NEEDED
Status: DISCONTINUED | OUTPATIENT
Start: 2022-03-04 | End: 2022-03-04

## 2022-03-04 RX ORDER — SODIUM CHLORIDE 9 MG/ML
125 INJECTION, SOLUTION INTRAVENOUS CONTINUOUS
Status: DISCONTINUED | OUTPATIENT
Start: 2022-03-04 | End: 2022-03-08 | Stop reason: HOSPADM

## 2022-03-04 RX ADMIN — PROPOFOL 50 MG: 10 INJECTION, EMULSION INTRAVENOUS at 08:33

## 2022-03-04 RX ADMIN — PROPOFOL 50 MG: 10 INJECTION, EMULSION INTRAVENOUS at 08:46

## 2022-03-04 RX ADMIN — SODIUM CHLORIDE 125 ML/HR: 0.9 INJECTION, SOLUTION INTRAVENOUS at 07:15

## 2022-03-04 RX ADMIN — PROPOFOL 50 MG: 10 INJECTION, EMULSION INTRAVENOUS at 08:49

## 2022-03-04 RX ADMIN — PROPOFOL 150 MG: 10 INJECTION, EMULSION INTRAVENOUS at 08:31

## 2022-03-04 RX ADMIN — PROPOFOL 50 MG: 10 INJECTION, EMULSION INTRAVENOUS at 08:34

## 2022-03-04 RX ADMIN — PROPOFOL 50 MG: 10 INJECTION, EMULSION INTRAVENOUS at 08:37

## 2022-03-04 RX ADMIN — PROPOFOL 50 MG: 10 INJECTION, EMULSION INTRAVENOUS at 08:43

## 2022-03-04 RX ADMIN — PROPOFOL 50 MG: 10 INJECTION, EMULSION INTRAVENOUS at 08:40

## 2022-03-04 NOTE — ANESTHESIA POSTPROCEDURE EVALUATION
Post-Op Assessment Note    CV Status:  Stable  Pain Score: 0    Pain management: adequate     Mental Status:  Alert and awake   Hydration Status:  Euvolemic   PONV Controlled:  Controlled   Airway Patency:  Patent      Post Op Vitals Reviewed: Yes      Staff: CRNA         No complications documented      BP   132/62   Temp 97 5   Pulse 82   Resp 16   SpO2 98

## 2022-03-04 NOTE — INTERVAL H&P NOTE
H&P reviewed  After examining the patient I find no changes in the patients condition since the H&P had been written      Vitals:    03/04/22 0703   BP: 144/72   Pulse: 71   Resp: 20   Temp: (!) 96 9 °F (36 1 °C)   SpO2: 96%

## 2022-03-04 NOTE — ANESTHESIA PREPROCEDURE EVALUATION
Procedure:  COLONOSCOPY    Relevant Problems   ANESTHESIA (within normal limits)   (-) History of anesthesia complications      CARDIO   (+) Essential hypertension   (-) Chest pain   (-) WALLACE (dyspnea on exertion)      /RENAL   (+) Chronic renal failure, stage 3 (moderate) (HCC)   (+) Kidney stones      MUSCULOSKELETAL   (+) Gout      PULMONARY   (+) DELIA (obstructive sleep apnea)   (-) Shortness of breath   (-) URI (upper respiratory infection)      Other   (+) Class 3 severe obesity due to excess calories with serious comorbidity and body mass index (BMI) of 50 0 to 59 9 in adult [facility])        Physical Exam    Airway    Mallampati score: II  TM Distance: >3 FB  Neck ROM: full     Dental       Cardiovascular      Pulmonary      Other Findings        Anesthesia Plan  ASA Score- 4     Anesthesia Type- IV sedation with anesthesia with ASA Monitors  Additional Monitors:   Airway Plan:           Plan Factors-Exercise tolerance (METS): >4 METS  Chart reviewed  EKG reviewed  Existing labs reviewed  Induction- intravenous  Postoperative Plan-     Informed Consent- Anesthetic plan and risks discussed with patient  I personally reviewed this patient with the CRNA  Discussed and agreed on the Anesthesia Plan with the VALENTINA Skelton

## 2022-04-21 DIAGNOSIS — I10 ESSENTIAL HYPERTENSION: ICD-10-CM

## 2022-04-21 RX ORDER — METOPROLOL SUCCINATE 100 MG/1
100 TABLET, EXTENDED RELEASE ORAL DAILY
Qty: 90 TABLET | Refills: 3 | Status: SHIPPED | OUTPATIENT
Start: 2022-04-21

## 2022-06-01 ENCOUNTER — RA CDI HCC (OUTPATIENT)
Dept: OTHER | Facility: HOSPITAL | Age: 67
End: 2022-06-01

## 2022-06-01 NOTE — PROGRESS NOTES
[address]  coding opportunities       Chart reviewed, no opportunity found: CHART REVIEWED, NO OPPORTUNITY FOUND        Patients Insurance     Medicare Insurance: Medicare

## 2022-06-13 ENCOUNTER — OFFICE VISIT (OUTPATIENT)
Dept: FAMILY MEDICINE CLINIC | Facility: CLINIC | Age: 67
End: 2022-06-13
Payer: MEDICARE

## 2022-06-13 VITALS
BODY MASS INDEX: 57.56 KG/M2 | RESPIRATION RATE: 20 BRPM | TEMPERATURE: 98.4 F | SYSTOLIC BLOOD PRESSURE: 140 MMHG | WEIGHT: 315 LBS | DIASTOLIC BLOOD PRESSURE: 80 MMHG | OXYGEN SATURATION: 97 % | HEART RATE: 67 BPM

## 2022-06-13 DIAGNOSIS — L08.9 LOCAL SKIN INFECTION: Primary | ICD-10-CM

## 2022-06-13 DIAGNOSIS — E66.01 MORBID OBESITY (HCC): ICD-10-CM

## 2022-06-13 DIAGNOSIS — N18.31 STAGE 3A CHRONIC KIDNEY DISEASE (HCC): ICD-10-CM

## 2022-06-13 DIAGNOSIS — C64.2 CLEAR CELL CARCINOMA OF LEFT KIDNEY (HCC): ICD-10-CM

## 2022-06-13 PROCEDURE — 99214 OFFICE O/P EST MOD 30 MIN: CPT | Performed by: NURSE PRACTITIONER

## 2022-06-13 RX ORDER — CEPHALEXIN 500 MG/1
500 CAPSULE ORAL EVERY 6 HOURS SCHEDULED
Qty: 28 CAPSULE | Refills: 0 | Status: SHIPPED | OUTPATIENT
Start: 2022-06-13 | End: 2022-06-17 | Stop reason: SDUPTHER

## 2022-06-13 NOTE — PROGRESS NOTES
Subjective:   Chief Complaint   Patient presents with    Rash     Red silver dollar sized flat area   red has turned darker in color  x few days  not itchy  no fever         Patient ID: Ed Willa is a 78 y o  male  Patient reports a red [place] on the underside of his hernia which has turned a darker color  He states that the area has remained the same size  Patient states the spot was first noticed about 5 days ago when his wife was applying nystatin for his yeast infection  He denies pain or itching at this time  The following portions of the patient's history were reviewed and updated as appropriate: allergies, current medications, past family history, past medical history, past social history, past surgical history and problem list     Review of Systems   Constitutional: Negative for chills, fatigue and fever  Respiratory: Negative for cough, chest tightness, shortness of breath and wheezing  Cardiovascular: Negative for chest pain, palpitations and leg swelling  Skin: Negative for color change  Psychiatric/Behavioral: Negative for dysphoric mood  The patient is not nervous/anxious  Objective:  Vitals:    06/13/22 0953   BP: 140/80   BP Location: Left arm   Patient Position: Sitting   Cuff Size: Large   Pulse: 67   Resp: 20   Temp: 98 4 °F (36 9 °C)   TempSrc: Temporal   SpO2: 97%   Weight: (!) 177 kg (389 lb 12 8 oz)      Physical Exam  Vitals reviewed  Constitutional:       Appearance: Normal appearance  He is obese  Cardiovascular:      Rate and Rhythm: Normal rate and regular rhythm  Pulses: Normal pulses  Heart sounds: Normal heart sounds  Pulmonary:      Effort: Pulmonary effort is normal       Breath sounds: Normal breath sounds  Skin:     Findings: Erythema present  Neurological:      Mental Status: He is alert  Assessment/Plan:    No problem-specific Assessment & Plan notes found for this encounter         Diagnoses and all orders for this visit:    Local skin infection  -     cephalexin (KEFLEX) 500 mg capsule; Take 1 capsule (500 mg total) by mouth every 6 (six) hours for 7 days    Morbid obesity ([address] )  Comments:  Discussed diet and exercise    Stage 3a chronic kidney disease ([address] )  Comments: Followed by Nephrology    Clear cell carcinoma of left kidney ([address] )  Comments:   Followed by nephrology

## 2022-06-17 ENCOUNTER — TELEPHONE (OUTPATIENT)
Dept: FAMILY MEDICINE CLINIC | Facility: CLINIC | Age: 67
End: 2022-06-17

## 2022-06-17 DIAGNOSIS — L08.9 LOCAL SKIN INFECTION: ICD-10-CM

## 2022-06-17 RX ORDER — CEPHALEXIN 500 MG/1
500 CAPSULE ORAL EVERY 6 HOURS SCHEDULED
Qty: 12 CAPSULE | Refills: 0 | Status: SHIPPED | OUTPATIENT
Start: 2022-06-17 | End: 2022-06-20

## 2022-06-17 NOTE — TELEPHONE ENCOUNTER
I will send in 3 more days   We treat these things for 7-10 days and if helping should take care of it

## 2022-06-17 NOTE — TELEPHONE ENCOUNTER
Theodore Phillips  6/13/2022  Given antibiotic for  Infection on stomach getting better but they are concerned because they will run out before they go to the beach Monday  Requesting more antibiotic so they are covered for beach  Monday to Thursday    Do you want them to send picture?   Please advise

## 2022-06-27 ENCOUNTER — OFFICE VISIT (OUTPATIENT)
Dept: FAMILY MEDICINE CLINIC | Facility: CLINIC | Age: 67
End: 2022-06-27
Payer: MEDICARE

## 2022-06-27 VITALS
SYSTOLIC BLOOD PRESSURE: 130 MMHG | RESPIRATION RATE: 20 BRPM | DIASTOLIC BLOOD PRESSURE: 80 MMHG | OXYGEN SATURATION: 96 % | WEIGHT: 315 LBS | BODY MASS INDEX: 56.91 KG/M2 | TEMPERATURE: 97.3 F | HEART RATE: 76 BPM

## 2022-06-27 DIAGNOSIS — L08.9 LOCAL SKIN INFECTION: Primary | ICD-10-CM

## 2022-06-27 PROCEDURE — 87070 CULTURE OTHR SPECIMN AEROBIC: CPT | Performed by: NURSE PRACTITIONER

## 2022-06-27 PROCEDURE — 99214 OFFICE O/P EST MOD 30 MIN: CPT | Performed by: NURSE PRACTITIONER

## 2022-06-27 PROCEDURE — 87205 SMEAR GRAM STAIN: CPT | Performed by: NURSE PRACTITIONER

## 2022-06-27 RX ORDER — DOXYCYCLINE HYCLATE 100 MG/1
100 CAPSULE ORAL EVERY 12 HOURS SCHEDULED
Qty: 14 CAPSULE | Refills: 0 | Status: SHIPPED | OUTPATIENT
Start: 2022-06-27 | End: 2022-06-27 | Stop reason: CLARIF

## 2022-06-27 RX ORDER — DOXYCYCLINE HYCLATE 100 MG/1
100 CAPSULE ORAL EVERY 12 HOURS SCHEDULED
Qty: 14 CAPSULE | Refills: 0 | Status: SHIPPED | OUTPATIENT
Start: 2022-06-27 | End: 2022-07-04

## 2022-06-27 NOTE — PROGRESS NOTES
Subjective:   Chief Complaint   Patient presents with    Follow-up     Skin infection  still red   finished antibiotic         Patient ID: William Schuster is a 78 y o  male  Patient presents today for follow-up on abdominal wound  He reports taking antibiotics as prescribed without seeing any improvement at the wound site  He states he thinks the wound appears to be getting bigger  Patient denies pain, pruritis, or discomfort at the site  The following portions of the patient's history were reviewed and updated as appropriate: allergies, current medications, past family history, past medical history, past social history, past surgical history and problem list     Review of Systems   Constitutional: Negative for chills, fatigue and fever  Respiratory: Negative for chest tightness, shortness of breath and wheezing  Cardiovascular: Negative for chest pain, palpitations and leg swelling  Skin: Positive for wound (abdominal)  Psychiatric/Behavioral: Negative for dysphoric mood  The patient is not nervous/anxious  Objective:  Vitals:    06/27/22 0805   BP: 130/80   BP Location: Left arm   Patient Position: Sitting   Cuff Size: Large   Pulse: 76   Resp: 20   Temp: (!) 97 3 °F (36 3 °C)   TempSrc: Temporal   SpO2: 96%   Weight: (!) 175 kg (385 lb 6 4 oz)      Physical Exam  Constitutional:       Appearance: Normal appearance  Cardiovascular:      Rate and Rhythm: Normal rate and regular rhythm  Pulses: Normal pulses  Heart sounds: Normal heart sounds  Pulmonary:      Effort: Pulmonary effort is normal  No respiratory distress  Breath sounds: Normal breath sounds  Skin:     General: Skin is warm  Findings: Wound present  Neurological:      Mental Status: He is alert     Psychiatric:         Mood and Affect: Mood normal          Behavior: Behavior normal            Assessment/Plan:    No problem-specific Assessment & Plan notes found for this encounter  Diagnoses and all orders for this visit:    Local skin infection  -     Wound culture and Gram stain  -     Ambulatory Referral to Wound Care; Future  -     Discontinue: doxycycline hyclate (VIBRAMYCIN) 100 mg capsule;  Take 1 capsule (100 mg total) by mouth every 12 (twelve) hours for 7 days

## 2022-06-29 LAB
BACTERIA WND AEROBE CULT: NORMAL
GRAM STN SPEC: NORMAL

## 2022-07-07 ENCOUNTER — OFFICE VISIT (OUTPATIENT)
Dept: FAMILY MEDICINE CLINIC | Facility: CLINIC | Age: 67
End: 2022-07-07
Payer: MEDICARE

## 2022-07-07 VITALS
OXYGEN SATURATION: 96 % | DIASTOLIC BLOOD PRESSURE: 80 MMHG | HEART RATE: 77 BPM | WEIGHT: 315 LBS | TEMPERATURE: 99.5 F | RESPIRATION RATE: 18 BRPM | SYSTOLIC BLOOD PRESSURE: 128 MMHG | HEIGHT: 69 IN | BODY MASS INDEX: 46.65 KG/M2

## 2022-07-07 DIAGNOSIS — M10.9 GOUT, UNSPECIFIED CAUSE, UNSPECIFIED CHRONICITY, UNSPECIFIED SITE: ICD-10-CM

## 2022-07-07 DIAGNOSIS — E78.6 HDL DEFICIENCY: ICD-10-CM

## 2022-07-07 DIAGNOSIS — N18.31 CHRONIC RENAL FAILURE, STAGE 3A (HCC): ICD-10-CM

## 2022-07-07 DIAGNOSIS — Z23 NEED FOR VACCINATION: ICD-10-CM

## 2022-07-07 DIAGNOSIS — N20.0 KIDNEY STONES: ICD-10-CM

## 2022-07-07 DIAGNOSIS — U07.1 COVID-19: Chronic | ICD-10-CM

## 2022-07-07 DIAGNOSIS — N18.31 STAGE 3A CHRONIC KIDNEY DISEASE (HCC): ICD-10-CM

## 2022-07-07 DIAGNOSIS — I10 ESSENTIAL HYPERTENSION: Primary | ICD-10-CM

## 2022-07-07 PROCEDURE — 90677 PCV20 VACCINE IM: CPT

## 2022-07-07 PROCEDURE — G0439 PPPS, SUBSEQ VISIT: HCPCS | Performed by: FAMILY MEDICINE

## 2022-07-07 PROCEDURE — 99214 OFFICE O/P EST MOD 30 MIN: CPT | Performed by: FAMILY MEDICINE

## 2022-07-07 PROCEDURE — G0009 ADMIN PNEUMOCOCCAL VACCINE: HCPCS

## 2022-07-07 NOTE — PROGRESS NOTES
Assessment and Plan:     Problem List Items Addressed This Visit    None          Preventive health issues were discussed with patient, and age appropriate screening tests were ordered as noted in patient's After Visit Summary  Personalized health advice and appropriate referrals for health education or preventive services given if needed, as noted in patient's After Visit Summary  History of Present Illness:     Patient presents for a Medicare Wellness Visit    HPI   Patient Care Team:  Katia Soto MD as PCP - General (Family Medicine)     Review of Systems:     Review of Systems     Problem List:     Patient Active Problem List   Diagnosis    Essential hypertension    Kidney stones    Chronic renal failure, stage 3 (moderate) (HCC)    HDL deficiency    Gout    DELIA (obstructive sleep apnea)    Allergic rhinitis    Right foot pain    Class 3 severe obesity due to excess calories with serious comorbidity and body mass index (BMI) of 50 0 to 59 9 in adult ([facility] )    Lymphedema of both lower extremities      Past Medical and Surgical History:     Past Medical History:   Diagnosis Date    Chronic kidney disease     Hypertension     Obesity     Sepsis ([facility] )     Urinary tract infection without hematuria 4/3/2020     Past Surgical History:   Procedure Laterality Date    APPENDECTOMY      NASAL SEPTUM SURGERY        Family History:     Family History   Problem Relation Age of Onset    Diabetes Father     Prostate cancer Brother       Social History:     Social History     Socioeconomic History    Marital status: /Civil Union     Spouse name: None    Number of children: None    Years of education: None    Highest education level: None   Occupational History    None   Tobacco Use    Smoking status: Never Smoker    Smokeless tobacco: Never Used    Tobacco comment: no exposure to passive smoke   Vaping Use    Vaping Use: Never used   Substance and Sexual Activity    Alcohol use:  No  Drug use: Not Currently    Sexual activity: Not Currently   Other Topics Concern    None   Social History Narrative    None     Social Determinants of Health     Financial Resource Strain: Not on file   Food Insecurity: Not on file   Transportation Needs: Not on file   Physical Activity: Not on file   Stress: Not on file   Social Connections: Not on file   Intimate Partner Violence: Not on file   Housing Stability: Not on file      Medications and Allergies:     Current Outpatient Medications   Medication Sig Dispense Refill    allopurinol (ZYLOPRIM) 100 mg tablet Take 100 mg by mouth daily       ammonium lactate (LAC-HYDRIN) 12 % lotion Apply topically 2 (two) times a day 400 g 0    furosemide (LASIX) 20 mg tablet TAKE 1 TABLET DAILY AS NEEDED FOR SWELLING 90 tablet 3    metoprolol succinate (TOPROL-XL) 100 mg 24 hr tablet Take 1 tablet (100 mg total) by mouth daily 90 tablet 3    nystatin (MYCOSTATIN) powder Apply topically 2 (two) times a day 60 g 12    Potassium Citrate ER 15 MEQ (1620 MG) TBCR Take 1 tablet by mouth daily       tamsulosin (FLOMAX) 0 4 mg TAKE 1 CAPSULE TWICE A DAY (Patient taking differently: 0 4 mg daily with dinner) 180 capsule 3    TRAVATAN Z 0 004 % ophthalmic solution Administer 1 drop to both eyes daily at bedtime       hydrocortisone 2 5 % cream Apply topically 2 (two) times a day (Patient not taking: No sig reported) 453 g 6    lisinopril (ZESTRIL) 20 mg tablet Take 1 tablet (20 mg total) by mouth daily 90 tablet 3     No current facility-administered medications for this visit       Allergies   Allergen Reactions    Codeine Swelling     Other reaction(s): SWELLING OF FACE  Other reaction(s): SWELLING OF FACE  Other reaction(s): SWELLING OF FACE  Swollen eyes/swelling    Seasonal Ic [Cholestatin] Allergic Rhinitis      Immunizations:     Immunization History   Administered Date(s) Administered    COVID-19 MODERNA VACC 0 5 ML IM 02/03/2021, 02/28/2021, 12/20/2021    INFLUENZA 12/10/2014, 12/10/2015, 12/10/2015, 11/29/2016, 11/29/2016, 11/30/2017, 11/30/2017, 12/27/2018, 12/30/2019, 10/26/2020    Influenza Split 11/08/2010, 11/14/2011, 12/11/2012, 12/10/2013    Influenza, high dose seasonal 0 7 mL 12/20/2021    Influenza, injectable, quadrivalent, preservative free 0 5 mL 12/27/2018    Influenza, recombinant, quadrivalent,injectable, preservative free 12/30/2019    Influenza, seasonal, injectable 12/10/2014    Pneumococcal Conjugate 13-Valent 05/11/2021      Health Maintenance:         Topic Date Due    Colorectal Cancer Screening  03/04/2032    Hepatitis C Screening  Completed         Topic Date Due    DTaP,Tdap,and Td Vaccines (1 - Tdap) Never done    COVID-19 Vaccine (4 - Booster for Moderna series) 04/20/2022    Pneumococcal Vaccine: 65+ Years (2 - PPSV23 or PCV20) 05/11/2022    Influenza Vaccine (1) 09/01/2022      Medicare Screening Tests and Risk Assessments:     Christianne Sotelo is here for his Subsequent Wellness visit  Health Risk Assessment:   Patient rates overall health as good  Patient feels that their physical health rating is same  Patient is satisfied with their life  Eyesight was rated as slightly worse  Hearing was rated as same  Patient feels that their emotional and mental health rating is same  Patients states they are sometimes angry  Patient states they are sometimes unusually tired/fatigued  Pain experienced in the last 7 days has been some  Patient's pain rating has been 3/10  Patient states that he has experienced weight loss or gain in last 6 months  Depression Screening:   PHQ-2 Score: 0      Fall Risk Screening: In the past year, patient has experienced: no history of falling in past year      Home Safety:  Patient has trouble with stairs inside or outside of their home  Patient has working smoke alarms and has no working carbon monoxide detector  Home safety hazards include: none  Nutrition:   Current diet is No Added Salt  Medications:   Patient is not currently taking any over-the-counter supplements  Patient is able to manage medications  Activities of Daily Living (ADLs)/Instrumental Activities of Daily Living (IADLs):   Walk and transfer into and out of bed and chair?: Yes  Dress and groom yourself?: Yes    Bathe or shower yourself?: Yes    Feed yourself? Yes  Do your laundry/housekeeping?: Yes  Manage your money, pay your bills and track your expenses?: Yes  Make your own meals?: Yes    Do your own shopping?: Yes    Previous Hospitalizations:   Any hospitalizations or ED visits within the last 12 months?: Yes    How many hospitalizations have you had in the last year?: 1-2    Advance Care Planning:   Living will: No    Durable POA for healthcare: No    Advanced directive: No    Five wishes given: Yes      PREVENTIVE SCREENINGS      Cardiovascular Screening:    General: Screening Current      Diabetes Screening:     General: Screening Current      Colorectal Cancer Screening:     General: Screening Current      Abdominal Aortic Aneurysm (AAA) Screening:    Risk factors include: age between 73-67 yo        Lung Cancer Screening:     General: Screening Not Indicated      Hepatitis C Screening:    General: Screening Current    Screening, Brief Intervention, and Referral to Treatment (SBIRT)    Screening  Typical number of drinks in a day: 0  Typical number of drinks in a week: 0  Interpretation: Low risk drinking behavior      AUDIT-C Screenin) How often did you have a drink containing alcohol in the past year? never  2) How many drinks did you have on a typical day when you were drinking in the past year? 0  3) How often did you have 6 or more drinks on one occasion in the past year? never    AUDIT-C Score: 0  Interpretation: Score 0-3 (male): Negative screen for alcohol misuse    Single Item Drug Screening:  How often have you used an illegal drug (including marijuana) or a prescription medication for non-medical reasons in the past year? never    Single Item Drug Screen Score: 0  Interpretation: Negative screen for possible drug use disorder    No exam data present     Physical Exam:     Resp 18   Ht 5' 9" (1 753 m)   Wt (!) 173 kg (381 lb)   BMI 56 26 kg/m²     Physical Exam     Mirtha Lopez MD

## 2022-07-07 NOTE — PROGRESS NOTES
Assessment/Plan:    No problem-specific Assessment & Plan notes found for this encounter  Diagnoses and all orders for this visit:    Essential hypertension    Chronic renal failure, stage 3a ([facility] [address] )    Kidney stones    Gout, unspecified cause, unspecified chronicity, unspecified site          Subjective:      Patient ID: Stephania Hunt is a 78 y o  male  PATIENT RETURNS FOR FOLLOW-UP OF CHRONIC MEDICAL CONDITIONS  ANY HOSPITAL VISITS, EMERGENCY VISITS AND OTHER PROVIDER VISITS SINCE LAST TIME WERE REVIEWED  MEDS WERE REVIEWED AND NO SIDE EFFECTS  NO NEW ISSUES  UNLESS NOTED BELOW  NO NEW MEDICAL PROVIDER REPORTED  THE CHRONIC DISEASES LISTED ABOVE ARE STABLE AND UNCHANGED/ THE PLAN OF CARE FOR THOSE WILL REMAIN UNCHANGED UNLESS NOTED BELOW  Open area abd  Is improving ; Had Covid in May ; mild sx   The following portions of the patient's history were reviewed and updated as appropriate: allergies, current medications, past family history, past medical history, past social history, past surgical history and problem list     Review of Systems   Constitutional: Negative for activity change and appetite change  HENT: Negative for trouble swallowing  Eyes: Negative for visual disturbance  Respiratory: Negative for cough and shortness of breath  Cardiovascular: Negative for chest pain, palpitations and leg swelling  Gastrointestinal: Negative for abdominal pain and blood in stool  Endocrine: Negative for polyuria  Genitourinary: Negative for difficulty urinating and hematuria  Skin: Negative for rash  Neurological: Negative for dizziness  Psychiatric/Behavioral: Negative for behavioral problems           Objective:  Vitals:    07/07/22 1403   BP: 128/80   BP Location: Right arm   Patient Position: Sitting   Cuff Size: Large   Pulse: 77   Resp: 18   Temp: 99 5 °F (37 5 °C)   TempSrc: Tympanic   SpO2: 96%   Weight: (!) 173 kg (381 lb)   Height: 5' 9" (1 753 m)      Physical Exam  Constitutional:       Appearance: He is well-developed  He is obese  HENT:      Head: Normocephalic and atraumatic  Eyes:      Conjunctiva/sclera: Conjunctivae normal    Neck:      Thyroid: No thyromegaly  Cardiovascular:      Rate and Rhythm: Normal rate and regular rhythm  Heart sounds: Normal heart sounds  No murmur heard  Pulmonary:      Effort: Pulmonary effort is normal  No respiratory distress  Breath sounds: Normal breath sounds  Musculoskeletal:      Cervical back: Neck supple  Lymphadenopathy:      Cervical: No cervical adenopathy  Skin:     General: Skin is warm and dry  Comments: 1 cm X 1 25 cm eschar of abd wall ; no activ einfection    Psychiatric:         Behavior: Behavior normal          Answers for HPI/ROS submitted by the patient on 7/6/2022  How would you rate your overall health?: good  Compared to last year, how is your physical health?: same  In general, how satisfied are you with your life?: satisfied  Compared to last year, how is your eyesight?: slightly worse  Compared to last year, how is your hearing?: same  Compared to last year, how is your emotional/mental health?: same  How often is anger a problem for you?: sometimes  How often do you feel unusually tired/fatigued?: sometimes  In the past 7 days, how much pain have you experienced?: some  If you answered "some" or "a lot", please rate the severity of your pain on a scale of 1 to 10 (1 being the least severe pain and 10 being the most intense pain)  : 3/10  In the past 6 months, have you lost or gained 10 pounds without trying?: Yes  One or more falls in the last year: No  Do you have trouble with the stairs inside or outside your home?: Yes  Does your home have working smoke alarms?: Yes  Does your home have a carbon monoxide monitor?: No  Which safety hazards (if any) have you experienced in your home? Please select all that apply : none  How would you describe your current diet?  Please select all that apply : No Added Salt  In addition to prescription medications, are you taking any over-the-counter supplements?: No  Can you manage your medications?: Yes  Are you currently taking any opioid medications?: No  Can you walk and transfer into and out of your bed and chair?: Yes  Can you dress and groom yourself?: Yes  Can you bathe or shower yourself?: Yes  Can you feed yourself?: Yes  Can you do your laundry/ housekeeping?: Yes  Can you manage your money, pay your bills, and track your expenses?: Yes  Can you make your own meals?: Yes  Can you do your own shopping?: Yes  Within the last 12 months, have you had any hospitalizations or Emergency Department visits?: Yes  If yes, how many times have you been hospitalized within the past year?: 1-2  Do you have a living will?: No  Do you have a Durable POA (Power of ) for healthcare decisions?: No  Do you have an Advanced Directive for end of life decisions?: No  How often have you used an illegal drug (including marijuana) or a prescription medication for non-medical reasons in the past year?: never  What is the typical number of drinks you consume in a day?: 0  What is the typical number of drinks you consume in a week?: 0  How often did you have a drink containing alcohol in the past year?: never  How many drinks did you have on a typical day  when you were drinking in the past year?: 0  How often did you have 6 or more drinks on one occasion in the past year?: never

## 2022-07-07 NOTE — PROGRESS NOTES
Assessment and Plan:     Problem List Items Addressed This Visit        Cardiovascular and Mediastinum    Essential hypertension - Primary       Genitourinary    Kidney stones    Chronic renal failure, stage 3 (moderate) (HCC)       Other    COVID-19 (Chronic)    Gout      Other Visit Diagnoses     Stage 3a chronic kidney disease ([address] )        Relevant Medications    pneumococcal 20-storm conj vacc (PREVNAR 20) 0 5 ML KALEB           Preventive health issues were discussed with patient, and age appropriate screening tests were ordered as noted in patient's After Visit Summary  Personalized health advice and appropriate referrals for health education or preventive services given if needed, as noted in patient's After Visit Summary       History of Present Illness:     Patient presents for a Medicare Wellness Visit    HPI   Patient Care Team:  Daniela Fajardo MD as PCP - General (Family Medicine)     Review of Systems:     Review of Systems     Problem List:     Patient Active Problem List   Diagnosis    Essential hypertension    Kidney stones    Chronic renal failure, stage 3 (moderate) (HCC)    HDL deficiency    Gout    DELIA (obstructive sleep apnea)    Allergic rhinitis    Right foot pain    Class 3 severe obesity due to excess calories with serious comorbidity and body mass index (BMI) of 50 0 to 59 9 in adult ([address] )    Lymphedema of both lower extremities    COVID-19      Past Medical and Surgical History:     Past Medical History:   Diagnosis Date    Chronic kidney disease     Hypertension     Obesity     [facility]     Urinary tract infection without hematuria 4/3/2020     Past Surgical History:   Procedure Laterality Date    APPENDECTOMY      NASAL SEPTUM SURGERY        Family History:     Family History   Problem Relation Age of Onset    Diabetes Father     Prostate cancer Brother       Social History:     Social History     Socioeconomic History    Marital status: /Civil Union Spouse name: None    Number of children: None    Years of education: None    Highest education level: None   Occupational History    None   Tobacco Use    Smoking status: Never Smoker    Smokeless tobacco: Never Used    Tobacco comment: no exposure to passive smoke   Vaping Use    Vaping Use: Never used   Substance and Sexual Activity    Alcohol use: No    Drug use: Not Currently    Sexual activity: Not Currently   Other Topics Concern    None   Social History Narrative    None     Social Determinants of Health     Financial Resource Strain: Not on file   Food Insecurity: Not on file   Transportation Needs: Not on file   Physical Activity: Not on file   Stress: Not on file   Social Connections: Not on file   Intimate Partner Violence: Not on file   Housing Stability: Not on file      Medications and Allergies:     Current Outpatient Medications   Medication Sig Dispense Refill    allopurinol (ZYLOPRIM) 100 mg tablet Take 100 mg by mouth daily       ammonium lactate (LAC-HYDRIN) 12 % lotion Apply topically 2 (two) times a day 400 g 0    furosemide (LASIX) 20 mg tablet TAKE 1 TABLET DAILY AS NEEDED FOR SWELLING 90 tablet 3    metoprolol succinate (TOPROL-XL) 100 mg 24 hr tablet Take 1 tablet (100 mg total) by mouth daily 90 tablet 3    nystatin (MYCOSTATIN) powder Apply topically 2 (two) times a day 60 g 12    pneumococcal 20-storm conj vacc (PREVNAR 20) 0 5 ML KALEB Inject 0 5 mL into a muscle 1 (one) time for 1 dose 0 5 mL 0    Potassium Citrate ER 15 MEQ (1620 MG) TBCR Take 1 tablet by mouth daily       tamsulosin (FLOMAX) 0 4 mg TAKE 1 CAPSULE TWICE A DAY (Patient taking differently: 0 4 mg daily with dinner) 180 capsule 3    TRAVATAN Z 0 004 % ophthalmic solution Administer 1 drop to both eyes daily at bedtime       hydrocortisone 2 5 % cream Apply topically 2 (two) times a day (Patient not taking: No sig reported) 453 g 6    lisinopril (ZESTRIL) 20 mg tablet Take 1 tablet (20 mg total) by mouth daily 90 tablet 3     No current facility-administered medications for this visit  Allergies   Allergen Reactions    Codeine Swelling     Other reaction(s): SWELLING OF FACE  Other reaction(s): SWELLING OF FACE  Other reaction(s): SWELLING OF FACE  Swollen eyes/swelling    Seasonal Ic [Cholestatin] Allergic Rhinitis      Immunizations:     Immunization History   Administered Date(s) Administered    COVID-19 MODERNA VACC 0 5 ML IM 02/03/2021, 02/28/2021, 12/20/2021    INFLUENZA 12/10/2014, 12/10/2015, 12/10/2015, 11/29/2016, 11/29/2016, 11/30/2017, 11/30/2017, 12/27/2018, 12/30/2019, 10/26/2020    Influenza Split 11/08/2010, 11/14/2011, 12/11/2012, 12/10/2013    Influenza, high dose seasonal 0 7 mL 12/20/2021    Influenza, injectable, quadrivalent, preservative free 0 5 mL 12/27/2018    Influenza, recombinant, quadrivalent,injectable, preservative free 12/30/2019    Influenza, seasonal, injectable 12/10/2014    Pneumococcal Conjugate 13-Valent 05/11/2021      Health Maintenance:         Topic Date Due    Colorectal Cancer Screening  03/04/2032    Hepatitis C Screening  Completed         Topic Date Due    DTaP,Tdap,and Td Vaccines (1 - Tdap) Never done    COVID-19 Vaccine (4 - Booster for Moderna series) 04/20/2022    Pneumococcal Vaccine: 65+ Years (2 - PPSV23 or PCV20) 05/11/2022    Influenza Vaccine (1) 09/01/2022      Medicare Screening Tests and Risk Assessments:     Karen Garcia is here for his Subsequent Wellness visit  Last Medicare Wellness visit information reviewed, patient interviewed, no change since last AWV  Health Risk Assessment:   Patient rates overall health as good  Patient feels that their physical health rating is same  Patient is satisfied with their life  Eyesight was rated as slightly worse  Hearing was rated as same  Patient feels that their emotional and mental health rating is same  Patients states they are sometimes angry   Patient states they are sometimes unusually tired/fatigued  Pain experienced in the last 7 days has been some  Patient's pain rating has been 3/10  Patient states that he has experienced weight loss or gain in last 6 months  Depression Screening:   PHQ-2 Score: 0      Fall Risk Screening: In the past year, patient has experienced: no history of falling in past year      Home Safety:  Patient has trouble with stairs inside or outside of their home  Patient has working smoke alarms and has no working carbon monoxide detector  Home safety hazards include: none  Nutrition:   Current diet is No Added Salt  Medications:   Patient is not currently taking any over-the-counter supplements  Patient is able to manage medications  Activities of Daily Living (ADLs)/Instrumental Activities of Daily Living (IADLs):   Walk and transfer into and out of bed and chair?: Yes  Dress and groom yourself?: Yes    Bathe or shower yourself?: Yes    Feed yourself?  Yes  Do your laundry/housekeeping?: Yes  Manage your money, pay your bills and track your expenses?: Yes  Make your own meals?: Yes    Do your own shopping?: Yes    Previous Hospitalizations:   Any hospitalizations or ED visits within the last 12 months?: Yes    How many hospitalizations have you had in the last year?: 1-2    Advance Care Planning:   Living will: No    Durable POA for healthcare: No    Advanced directive: No      Comments: Papers given    Cognitive Screening:   Provider or family/friend/caregiver concerned regarding cognition?: No    PREVENTIVE SCREENINGS      Cardiovascular Screening:    General: Screening Current      Diabetes Screening:     General: Screening Current      Colorectal Cancer Screening:     General: Screening Current      Prostate Cancer Screening:    General: Screening Not Indicated      Osteoporosis Screening:    General: Screening Not Indicated      Abdominal Aortic Aneurysm (AAA) Screening:    Risk factors include: age between 73-69 yo        Lung Cancer Screening: General: Screening Not Indicated      Hepatitis C Screening:    General: Screening Current    Screening, Brief Intervention, and Referral to Treatment (SBIRT)    Screening  Typical number of drinks in a day: 0  Typical number of drinks in a week: 0  Interpretation: Low risk drinking behavior  AUDIT-C Screenin) How often did you have a drink containing alcohol in the past year? never  2) How many drinks did you have on a typical day when you were drinking in the past year? 0  3) How often did you have 6 or more drinks on one occasion in the past year? never    AUDIT-C Score: 0  Interpretation: Score 0-3 (male): Negative screen for alcohol misuse    Single Item Drug Screening:  How often have you used an illegal drug (including marijuana) or a prescription medication for non-medical reasons in the past year? never    Single Item Drug Screen Score: 0  Interpretation: Negative screen for possible drug use disorder    Brief Intervention  Alcohol & drug use screenings were reviewed  No concerns regarding substance use disorder identified       No exam data present     Physical Exam:     /80 (BP Location: Right arm, Patient Position: Sitting, Cuff Size: Large)   Pulse 77   Temp 99 5 °F (37 5 °C) (Tympanic)   Resp 18   Ht 5' 9" (1 753 m)   Wt (!) 173 kg (381 lb)   SpO2 96%   BMI 56 26 kg/m²     Physical Exam     Natalee Bello MD

## 2022-07-07 NOTE — PATIENT INSTRUCTIONS
Current measurement of the scabbed area is 1 in x 1-1/4 inches  Soapy water wash twice a day pat dry and cover with a large nonstick dressing  No ointments or salves at this point  Try to keep any pressure from the elastic in your clothing off of the area if you can  Medicare Preventive Visit Patient Instructions  Thank you for completing your Welcome to Medicare Visit or Medicare Annual Wellness Visit today  Your next wellness visit will be due in one year (7/8/2023)  The screening/preventive services that you may require over the next 5-10 years are detailed below  Some tests may not apply to you based off risk factors and/or age  Screening tests ordered at today's visit but not completed yet may show as past due  Also, please note that scanned in results may not display below  Preventive Screenings:  Service Recommendations Previous Testing/Comments   Colorectal Cancer Screening  · Colonoscopy    · Fecal Occult Blood Test (FOBT)/Fecal Immunochemical Test (FIT)  · Fecal DNA/Cologuard Test  · Flexible Sigmoidoscopy Age: 54-65 years old   Colonoscopy: every 10 years (May be performed more frequently if at higher risk)  OR  FOBT/FIT: every 1 year  OR  Cologuard: every 3 years  OR  Sigmoidoscopy: every 5 years  Screening may be recommended earlier than age 48 if at higher risk for colorectal cancer  Also, an individualized decision between you and your healthcare provider will decide whether screening between the ages of 74-80 would be appropriate   Colonoscopy: 03/04/2022  FOBT/FIT: Not on file  Cologuard: Not on file  Sigmoidoscopy: Not on file    Screening Current  Screening Current     Prostate Cancer Screening Individualized decision between patient and health care provider in men between ages of 53-78   Medicare will cover every 12 months beginning on the day after your 50th birthday PSA: No results in last 5 years     Screening Not Indicated     Hepatitis C Screening Once for adults born between 1945 and 1965  More frequently in patients at high risk for Hepatitis C Hep C Antibody: 05/11/2021    Screening Current  Screening Current   Diabetes Screening 1-2 times per year if you're at risk for diabetes or have pre-diabetes Fasting glucose: No results in last 5 years   A1C: 5 6 %    Screening Current  Screening Current   Cholesterol Screening Once every 5 years if you don't have a lipid disorder  May order more often based on risk factors  Lipid panel: 05/11/2021    Screening Current  Screening Current      Other Preventive Screenings Covered by Medicare:  1  Abdominal Aortic Aneurysm (AAA) Screening: covered once if your at risk  You're considered to be at risk if you have a family history of AAA or a male between the age of 73-68 who smoking at least 100 cigarettes in your lifetime  2  Lung Cancer Screening: covers low dose CT scan once per year if you meet all of the following conditions: (1) Age 50-69; (2) No signs or symptoms of lung cancer; (3) Current smoker or have quit smoking within the last 15 years; (4) You have a tobacco smoking history of at least 30 pack years (packs per day x number of years you smoked); (5) You get a written order from a healthcare provider  3  Glaucoma Screening: covered annually if you're considered high risk: (1) You have diabetes OR (2) Family history of glaucoma OR (3)  aged 48 and older OR (3)  American aged 72 and older  3  Osteoporosis Screening: covered every 2 years if you meet one of the following conditions: (1) Have a vertebral abnormality; (2) On glucocorticoid therapy for more than 3 months; (3) Have primary hyperparathyroidism; (4) On osteoporosis medications and need to assess response to drug therapy  5  HIV Screening: covered annually if you're between the age of 12-76  Also covered annually if you are younger than 13 and older than 72 with risk factors for HIV infection   For pregnant patients, it is covered up to 3 times per pregnancy  Immunizations:  Immunization Recommendations   Influenza Vaccine Annual influenza vaccination during flu season is recommended for all persons aged >= 6 months who do not have contraindications   Pneumococcal Vaccine (Prevnar and Pneumovax)  * Prevnar = PCV13  * Pneumovax = PPSV23 Adults 25-60 years old: 1-3 doses may be recommended based on certain risk factors  Adults 72 years old: Prevnar (PCV13) vaccine recommended followed by Pneumovax (PPSV23) vaccine  If already received PPSV23 since turning 65, then PCV13 recommended at least one year after PPSV23 dose  Hepatitis B Vaccine 3 dose series if at intermediate or high risk (ex: diabetes, end stage renal disease, liver disease)   Tetanus (Td) Vaccine - COST NOT COVERED BY MEDICARE PART B Following completion of primary series, a booster dose should be given every 10 years to maintain immunity against tetanus  Td may also be given as tetanus wound prophylaxis  Tdap Vaccine - COST NOT COVERED BY MEDICARE PART B Recommended at least once for all adults  For pregnant patients, recommended with each pregnancy  Shingles Vaccine (Shingrix) - COST NOT COVERED BY MEDICARE PART B  2 shot series recommended in those aged 48 and above     Health Maintenance Due:      Topic Date Due    Colorectal Cancer Screening  03/04/2032    Hepatitis C Screening  Completed     Immunizations Due:      Topic Date Due    DTaP,Tdap,and Td Vaccines (1 - Tdap) Never done    COVID-19 Vaccine (4 - Booster for Moderna series) 04/20/2022    Pneumococcal Vaccine: 65+ Years (2 - PPSV23 or PCV20) 05/11/2022    Influenza Vaccine (1) 09/01/2022     Advance Directives   What are advance directives? Advance directives are legal documents that state your wishes and plans for medical care  These plans are made ahead of time in case you lose your ability to make decisions for yourself   Advance directives can apply to any medical decision, such as the treatments you want, and if you want to donate organs  What are the types of advance directives? There are many types of advance directives, and each state has rules about how to use them  You may choose a combination of any of the following:  · Living will: This is a written record of the treatment you want  You can also choose which treatments you do not want, which to limit, and which to stop at a certain time  This includes surgery, medicine, IV fluid, and tube feedings  · Durable power of  for healthcare [facility]): This is a written record that states who you want to make healthcare choices for you when you are unable to make them for yourself  This person, called a proxy, is usually a family member or a friend  You may choose more than 1 proxy  · Do not resuscitate (DNR) order:  A DNR order is used in case your heart stops beating or you stop breathing  It is a request not to have certain forms of treatment, such as CPR  A DNR order may be included in other types of advance directives  · Medical directive: This covers the care that you want if you are in a coma, near death, or unable to make decisions for yourself  You can list the treatments you want for each condition  Treatment may include pain medicine, surgery, blood transfusions, dialysis, IV or tube feedings, and a ventilator (breathing machine)  · Values history: This document has questions about your views, beliefs, and how you feel and think about life  This information can help others choose the care that you would choose  Why are advance directives important? An advance directive helps you control your care  Although spoken wishes may be used, it is better to have your wishes written down  Spoken wishes can be misunderstood, or not followed  Treatments may be given even if you do not want them  An advance directive may make it easier for your family to make difficult choices about your care     Weight Management   Why it is important to manage your weight: Being overweight increases your risk of health conditions such as heart disease, high blood pressure, type 2 diabetes, and certain types of cancer  It can also increase your risk for osteoarthritis, sleep apnea, and other respiratory problems  Aim for a slow, steady weight loss  Even a small amount of weight loss can lower your risk of health problems  How to lose weight safely:  A safe and healthy way to lose weight is to eat fewer calories and get regular exercise  You can lose up about 1 pound a week by decreasing the number of calories you eat by 500 calories each day  Healthy meal plan for weight management:  A healthy meal plan includes a variety of foods, contains fewer calories, and helps you stay healthy  A healthy meal plan includes the following:  · Eat whole-grain foods more often  A healthy meal plan should contain fiber  Fiber is the part of grains, fruits, and vegetables that is not broken down by your body  Whole-grain foods are healthy and provide extra fiber in your diet  Some examples of whole-grain foods are whole-wheat breads and pastas, oatmeal, brown rice, and bulgur  · Eat a variety of vegetables every day  Include dark, leafy greens such as spinach, kale, kavya greens, and mustard greens  Eat yellow and orange vegetables such as carrots, sweet potatoes, and winter squash  · Eat a variety of fruits every day  Choose fresh or canned fruit (canned in its own juice or light syrup) instead of juice  Fruit juice has very little or no fiber  · Eat low-fat dairy foods  Drink fat-free (skim) milk or 1% milk  Eat fat-free yogurt and low-fat cottage cheese  Try low-fat cheeses such as mozzarella and other reduced-fat cheeses  · Choose meat and other protein foods that are low in fat  Choose beans or other legumes such as split peas or lentils  Choose fish, skinless poultry (chicken or turkey), or lean cuts of red meat (beef or pork)   Before you cook meat or poultry, cut off any visible fat    · Use less fat and oil  Try baking foods instead of frying them  Add less fat, such as margarine, sour cream, regular salad dressing and mayonnaise to foods  Eat fewer high-fat foods  Some examples of high-fat foods include french fries, doughnuts, ice cream, and cakes  · Eat fewer sweets  Limit foods and drinks that are high in sugar  This includes candy, cookies, regular soda, and sweetened drinks  Exercise:  Exercise at least 30 minutes per day on most days of the week  Some examples of exercise include walking, biking, dancing, and swimming  You can also fit in more physical activity by taking the stairs instead of the elevator or parking farther away from stores  Ask your healthcare provider about the best exercise plan for you  © Copyright [employer] 2018 Information is for End User's use only and may not be sold, redistributed or otherwise used for commercial purposes   All illustrations and images included in CareNotes® are the copyrighted property of [employer]  or [address] [place] [address]

## 2022-07-12 DIAGNOSIS — I10 ESSENTIAL HYPERTENSION: ICD-10-CM

## 2022-07-12 RX ORDER — FUROSEMIDE 20 MG/1
20 TABLET ORAL DAILY
Qty: 90 TABLET | Refills: 3 | Status: SHIPPED | OUTPATIENT
Start: 2022-07-12

## 2022-07-15 ENCOUNTER — OFFICE VISIT (OUTPATIENT)
Dept: WOUND CARE | Facility: CLINIC | Age: 67
End: 2022-07-15
Payer: MEDICARE

## 2022-07-15 VITALS
RESPIRATION RATE: 18 BRPM | HEART RATE: 72 BPM | DIASTOLIC BLOOD PRESSURE: 82 MMHG | TEMPERATURE: 98.9 F | SYSTOLIC BLOOD PRESSURE: 122 MMHG

## 2022-07-15 DIAGNOSIS — K43.9 ABDOMINAL WALL HERNIA: ICD-10-CM

## 2022-07-15 DIAGNOSIS — E66.01 CLASS 3 SEVERE OBESITY DUE TO EXCESS CALORIES WITH SERIOUS COMORBIDITY AND BODY MASS INDEX (BMI) OF 50.0 TO 59.9 IN ADULT (HCC): ICD-10-CM

## 2022-07-15 DIAGNOSIS — S31.109A OPEN WOUND OF ANTERIOR ABDOMINAL WALL, INITIAL ENCOUNTER: Primary | ICD-10-CM

## 2022-07-15 PROCEDURE — 99203 OFFICE O/P NEW LOW 30 MIN: CPT | Performed by: FAMILY MEDICINE

## 2022-07-15 PROCEDURE — 11042 DBRDMT SUBQ TIS 1ST 20SQCM/<: CPT | Performed by: FAMILY MEDICINE

## 2022-07-15 PROCEDURE — 99213 OFFICE O/P EST LOW 20 MIN: CPT | Performed by: FAMILY MEDICINE

## 2022-07-15 RX ORDER — LIDOCAINE 40 MG/G
CREAM TOPICAL ONCE
Status: COMPLETED | OUTPATIENT
Start: 2022-07-15 | End: 2022-07-15

## 2022-07-15 RX ADMIN — LIDOCAINE: 40 CREAM TOPICAL at 15:14

## 2022-07-15 NOTE — PATIENT INSTRUCTIONS
Orders Placed This Encounter   Procedures    Wound cleansing and dressings     Wash your hands with soap and water  Remove old dressing, discard into plastic bag and place in trash  Cleanse the wound with mild soap and water prior to applying a clean dressing  Do not use tissue or cotton balls  Do not scrub the wound  Pat dry using gauze  Shower YES  Abdominal Wound  Apply silver alginate to the wound      Cover with allevyn life bordered foam dressing  Change dressing 3 times per week    Consume 3-4 servings of protein daily  Discussed interdry for abdominal folds    Follow up with Dr Alex Kimbrough in 1 week     Standing Status:   Future     Standing Expiration Date:   7/15/2023

## 2022-07-15 NOTE — PROGRESS NOTES
Patient ID: Geoffrey Mckinney is a 78 y o  male Date of Birth 1955       Chief Complaint   Patient presents with    New Patient Visit     Lower abdominal wound       Allergies:  Codeine and Seasonal ic [cholestatin]    Diagnosis:      Diagnosis ICD-10-CM Associated Orders   1  Open wound of anterior abdominal wall, initial encounter  S31 109A lidocaine (LMX) 4 % cream     Wound cleansing and dressings     Debridement   2  Abdominal wall hernia  K43 9    3  Class 3 severe obesity due to excess calories with serious comorbidity and body mass index (BMI) of 50 0 to 59 9 in adult [facility])  E66 01     Z68 43            Assessment & Plan:   Open wound of the right lower abdominal wall  This is over a large abdominal hernia  Etiology unknown of the wound  No history of any trauma   Surgical debridement   Will use silver alginate and bordered foam to be changed 3 times a week  Subjective:   7/15/22: This is 1st visit for the 78-year-old male referred by his primary care physician for nonhealing wound of the right lower abdomen  Approximately three weeks ago the patient noticed some erythema and a few small draining openings  He denies any trauma  He had no fever or chills at the time  He went to see his physician and had cephalexin prescribed  After 10 day course, there is no significant improvement and followup visit assessment was made  Culture was obtained which only revealed skin opal but the patient had been empirically started on doxycycline  Patient states that he has had no significant improvement but has a large scab  No recent fever or chills  Patient has no history of MRSA  Past medical history is negative for diabetes  Patient has morbid obesity with a large right sided abdominal wall hernia  Lymphedema of both lower extremities        The following portions of the patient's history were reviewed and updated as appropriate:   Patient Active Problem List   Diagnosis    Essential hypertension    Kidney stones    Chronic renal failure, stage 3 (moderate) (HCC)    HDL deficiency    Gout    DELIA (obstructive sleep apnea)    Allergic rhinitis    Right foot pain    Class 3 severe obesity due to excess calories with serious comorbidity and body mass index (BMI) of 50 0 to 59 9 in adult ([facility] )    Lymphedema of both lower extremities    COVID-19     Past Medical History:   Diagnosis Date    Chronic kidney disease     Hypertension     Obesity     Sepsis ([facility] )     Urinary tract infection without hematuria 4/3/2020     Past Surgical History:   Procedure Laterality Date    APPENDECTOMY      NASAL SEPTUM SURGERY       Family History   Problem Relation Age of Onset    Diabetes Father     Prostate cancer Brother       Social History     Socioeconomic History    Marital status: /Civil Union     Spouse name: None    Number of children: None    Years of education: None    Highest education level: None   Occupational History    None   Tobacco Use    Smoking status: Never Smoker    Smokeless tobacco: Never Used    Tobacco comment: no exposure to passive smoke   Vaping Use    Vaping Use: Never used   Substance and Sexual Activity    Alcohol use: No    Drug use: Not Currently    Sexual activity: Not Currently   Other Topics Concern    None   Social History Narrative    None     Social Determinants of Health     Financial Resource Strain: Not on file   Food Insecurity: Not on file   Transportation Needs: Not on file   Physical Activity: Not on file   Stress: Not on file   Social Connections: Not on file   Intimate Partner Violence: Not on file   Housing Stability: Not on file        Current Outpatient Medications:     allopurinol (ZYLOPRIM) 100 mg tablet, Take 100 mg by mouth daily , Disp: , Rfl:     ammonium lactate (LAC-HYDRIN) 12 % lotion, Apply topically 2 (two) times a day, Disp: 400 g, Rfl: 0    furosemide (LASIX) 20 mg tablet, Take 1 tablet (20 mg total) by mouth daily For swelling, Disp: 90 tablet, Rfl: 3    lisinopril (ZESTRIL) 20 mg tablet, Take 1 tablet (20 mg total) by mouth daily, Disp: 90 tablet, Rfl: 3    metoprolol succinate (TOPROL-XL) 100 mg 24 hr tablet, Take 1 tablet (100 mg total) by mouth daily, Disp: 90 tablet, Rfl: 3    nystatin (MYCOSTATIN) powder, Apply topically 2 (two) times a day, Disp: 60 g, Rfl: 12    Potassium Citrate ER 15 MEQ (1620 MG) TBCR, Take 1 tablet by mouth daily , Disp: , Rfl:     psyllium (METAMUCIL) 58 6 % packet, Take 1 packet by mouth daily, Disp: , Rfl:     tamsulosin (FLOMAX) 0 4 mg, TAKE 1 CAPSULE TWICE A DAY (Patient taking differently: 0 4 mg daily with dinner), Disp: 180 capsule, Rfl: 3    TRAVATAN Z 0 004 % ophthalmic solution, Administer 1 drop to both eyes daily at bedtime , Disp: , Rfl:     hydrocortisone 2 5 % cream, Apply topically 2 (two) times a day (Patient not taking: No sig reported), Disp: 453 g, Rfl: 6  No current facility-administered medications for this visit  Review of Systems   Constitutional: Negative for appetite change, chills, fatigue, fever and unexpected weight change  HENT: Negative for congestion, hearing loss, postnasal drip and sinus pressure  Eyes: Negative for discharge and visual disturbance  Respiratory: Negative for cough and shortness of breath  Cardiovascular: Positive for leg swelling (Lymphedema)  Negative for chest pain and palpitations  Gastrointestinal: Negative for abdominal pain, blood in stool, constipation, diarrhea and nausea  Abdominal wall hernia   Endocrine: Negative  Genitourinary: Negative for difficulty urinating, dysuria and urgency  Musculoskeletal: Negative for back pain and gait problem  Skin: Positive for wound (Lower abdomen)  Negative for rash  Allergic/Immunologic: Negative  Neurological: Negative for dizziness, tremors, seizures, weakness, numbness and headaches  Hematological: Does not bruise/bleed easily  Psychiatric/Behavioral: Negative  Negative for dysphoric mood  The patient is not nervous/anxious  Objective:  /82   Pulse 72   Temp 98 9 °F (37 2 °C)   Resp 18   Pain Score: 0-No pain     Physical Exam  Vitals and nursing note reviewed  Constitutional:       Appearance: Normal appearance  He is well-developed  He is morbidly obese  HENT:      Head: Normocephalic and atraumatic  Right Ear: External ear normal       Left Ear: External ear normal    Eyes:      General: Lids are normal          Right eye: No discharge  Left eye: No discharge  Conjunctiva/sclera: Conjunctivae normal    Cardiovascular:      Rate and Rhythm: Normal rate and regular rhythm  Heart sounds: Normal heart sounds  No murmur heard  No friction rub  No gallop  Pulmonary:      Effort: Pulmonary effort is normal       Breath sounds: Normal air entry  Decreased breath sounds present  Abdominal:      General: Abdomen is protuberant  Hernia: A hernia is present  Hernia is present in the ventral area  Comments: Round open wound with dry eschar  Minimal surrounding erythema  No for underlying fluctuance or significant induration  The wound is within a large abdominal wall hernia wall protrusion  Musculoskeletal:      Cervical back: Neck supple  Right lower leg: Edema present  Left lower leg: Edema present  Lymphadenopathy:      Cervical: No cervical adenopathy  Skin:     General: Skin is warm and dry  Findings: Wound present  Neurological:      Mental Status: He is alert and oriented to person, place, and time  Gait: Gait is intact  Psychiatric:         Attention and Perception: Attention normal          Mood and Affect: Mood and affect normal          Speech: Speech normal          Behavior: Behavior is cooperative  Cognition and Memory: Cognition normal      No photo available    Wound 07/15/22 Other (comment) Abdomen Right; Lower (Active)   Wound Description Eschar 07/15/22 1401   Lis-wound Assessment Erythema 07/15/22 1401   Wound Length (cm) 2 3 cm 07/15/22 1401   Wound Width (cm) 2 5 cm 07/15/22 1401   Wound Depth (cm) 0 1 cm 07/15/22 1401   Wound Surface Area (cm^2) 5 75 cm^2 07/15/22 1401   Wound Volume (cm^3) 0 575 cm^3 07/15/22 1401   Calculated Wound Volume (cm^3) 0 58 cm^3 07/15/22 1401   Drainage Amount None 07/15/22 1401   Patient Tolerance Tolerated well 07/15/22 1401                            Debridement   Wound 07/15/22 Other (comment) Abdomen Right; Lower    Universal Protocol:  Consent: Verbal consent obtained  Written consent obtained  Consent given by: patient  Time out: Immediately prior to procedure a "time out" was called to verify the correct patient, procedure, equipment, support staff and site/side marked as required  Patient understanding: patient states understanding of the procedure being performed  Patient identity confirmed: verbally with patient      Performed by: physician  Debridement type: surgical  Level of debridement: subcutaneous tissue  Pain control: lidocaine 4%  Post-debridement measurements  Length (cm): 2 4  Width (cm): 2 6  Depth (cm): 0 3  Percent debrided: 100%  Surface Area (cm^2): 6 24  Area debrided (cm^2): 6 24  Volume (cm^3): 1 87  Tissue and other material debrided: dermis and subcutaneous tissue  Devitalized tissue debrided: necrotic debris, slough and eschar  Instrument(s) utilized: curette, blade and forceps  Bleeding: medium  Hemostasis obtained with: pressure  Procedural pain (0-10): 0  Post-procedural pain: 0   Response to treatment: procedure was tolerated well  Debridement Comments: Eschar was entirely excised  Some underlying necrotic tissue was also excised  Edges were curetted of slough  No purulence was encountered  No tunneling or undermining         Photo not available    Results from last 6 Months   Lab Units 06/27/22  0833   WOUND CULTURE  1+ Growth of        Wound Instructions:  Orders Placed This Encounter   Procedures    Wound cleansing and dressings     Wash your hands with soap and water  Remove old dressing, discard into plastic bag and place in trash  Cleanse the wound with mild soap and water prior to applying a clean dressing  Do not use tissue or cotton balls  Do not scrub the wound  Pat dry using gauze  Shower YES  Abdominal Wound  Apply silver alginate to the wound  Cover with allevyn life bordered foam dressing  Change dressing 3 times per week    Consume 3-4 servings of protein daily  Discussed interdry for abdominal folds    Follow up with Dr Poornima Robles in 1 week     Standing Status:   Future     Standing Expiration Date:   7/15/2023    Debridement     This order was created via procedure documentation       Total time spent today:  25 minutes  This includes reviewing the patient's chart, pertinent physician records including reviewing primary care office record from 06/27 and 7/7/22  Laboratory data reviewed including BMP, wound culture and CT scan of the abdomen, all from 6/27 and 7/5/22  Claire Reece MD, CHT, CWS    Portions of the record may have been created with voice recognition software  Occasional wrong word or "sound alike" substitutions may have occurred due to the inherent limitations of voice recognition software  Read the chart carefully and recognize, using context, where substitutions have occurred

## 2022-07-20 ENCOUNTER — TELEPHONE (OUTPATIENT)
Dept: FAMILY MEDICINE CLINIC | Facility: CLINIC | Age: 67
End: 2022-07-20

## 2022-07-20 NOTE — TELEPHONE ENCOUNTER
Patient called stating that he received Prevnar 20 from [facility]  Patient was seen in the office 7/7/22 and he received Prevnar 20  Patient wants to know what he will do with the vaccine? Wants to know if he can bring the vaccine here in the office?  Please advise

## 2022-07-22 ENCOUNTER — OFFICE VISIT (OUTPATIENT)
Dept: WOUND CARE | Facility: CLINIC | Age: 67
End: 2022-07-22
Payer: MEDICARE

## 2022-07-22 VITALS
TEMPERATURE: 100 F | DIASTOLIC BLOOD PRESSURE: 70 MMHG | RESPIRATION RATE: 18 BRPM | HEART RATE: 96 BPM | SYSTOLIC BLOOD PRESSURE: 120 MMHG

## 2022-07-22 DIAGNOSIS — S31.109A OPEN WOUND OF ANTERIOR ABDOMINAL WALL, INITIAL ENCOUNTER: Primary | ICD-10-CM

## 2022-07-22 PROCEDURE — 99213 OFFICE O/P EST LOW 20 MIN: CPT | Performed by: FAMILY MEDICINE

## 2022-07-22 RX ORDER — LIDOCAINE 40 MG/G
CREAM TOPICAL ONCE
Status: COMPLETED | OUTPATIENT
Start: 2022-07-22 | End: 2022-07-22

## 2022-07-22 RX ADMIN — LIDOCAINE: 40 CREAM TOPICAL at 14:18

## 2022-07-22 NOTE — PROGRESS NOTES
Patient ID: Pooja Vernon is a 78 y o  male Date of Birth 1955       Chief Complaint   Patient presents with    Follow Up Wound Care Visit     Open wound of anterior abdominal wall, initial encounter       Allergies:  Codeine and Seasonal ic [cholestatin]    Diagnosis:      Diagnosis ICD-10-CM Associated Orders   1  Open wound of anterior abdominal wall, initial encounter  S31 109A lidocaine (LMX) 4 % cream     Wound cleansing and dressings           Assessment & Plan:   Open wound of the abdominal wall  No changes   Continue with silver alginate and bordered foam    Follow up in two weeks  Subjective:   7/15/22: This is 1st visit for the 80-year-old male referred by his primary care physician for nonhealing wound of the right lower abdomen  Approximately three weeks ago the patient noticed some erythema and a few small draining openings  He denies any trauma  He had no fever or chills at the time  He went to see his physician and had cephalexin prescribed  After 10 day course, there is no significant improvement and followup visit assessment was made  Culture was obtained which only revealed skin opal but the patient had been empirically started on doxycycline  Patient states that he has had no significant improvement but has a large scab  No recent fever or chills  Patient has no history of MRSA  Past medical history is negative for diabetes  Patient has morbid obesity with a large right sided abdominal wall hernia  Lymphedema of both lower extremities  7/22/22: Followup abdominal wall wound  No complaints  Using silver alginate and bordered foam   No pain, fever or chills        The following portions of the patient's history were reviewed and updated as appropriate:   Patient Active Problem List   Diagnosis    Essential hypertension    Kidney stones    Chronic renal failure, stage 3 (moderate) ([address] )    HDL deficiency    Gout    DELIA (obstructive sleep apnea)    Allergic rhinitis    Right foot pain    Class 3 severe obesity due to excess calories with serious comorbidity and body mass index (BMI) of 50 0 to 59 9 in adult ([facility] )    Lymphedema of both lower extremities    COVID-19     Past Medical History:   Diagnosis Date    Chronic kidney disease     Hypertension     Obesity     Sepsis ([facility] )     Urinary tract infection without hematuria 4/3/2020     Past Surgical History:   Procedure Laterality Date    APPENDECTOMY      NASAL SEPTUM SURGERY       Family History   Problem Relation Age of Onset    Diabetes Father     Prostate cancer Brother       Social History     Socioeconomic History    Marital status: /Civil Union     Spouse name: None    Number of children: None    Years of education: None    Highest education level: None   Occupational History    None   Tobacco Use    Smoking status: Never Smoker    Smokeless tobacco: Never Used    Tobacco comment: no exposure to passive smoke   Vaping Use    Vaping Use: Never used   Substance and Sexual Activity    Alcohol use: No    Drug use: Not Currently    Sexual activity: Not Currently   Other Topics Concern    None   Social History Narrative    None     Social Determinants of Health     Financial Resource Strain: Not on file   Food Insecurity: Not on file   Transportation Needs: Not on file   Physical Activity: Not on file   Stress: Not on file   Social Connections: Not on file   Intimate Partner Violence: Not on file   Housing Stability: Not on file        Current Outpatient Medications:     allopurinol (ZYLOPRIM) 100 mg tablet, Take 100 mg by mouth daily , Disp: , Rfl:     ammonium lactate (LAC-HYDRIN) 12 % lotion, Apply topically 2 (two) times a day, Disp: 400 g, Rfl: 0    furosemide (LASIX) 20 mg tablet, Take 1 tablet (20 mg total) by mouth daily For swelling, Disp: 90 tablet, Rfl: 3    hydrocortisone 2 5 % cream, Apply topically 2 (two) times a day (Patient not taking: No sig reported), Disp: 453 g, Rfl: 6    lisinopril (ZESTRIL) 20 mg tablet, Take 1 tablet (20 mg total) by mouth daily, Disp: 90 tablet, Rfl: 3    metoprolol succinate (TOPROL-XL) 100 mg 24 hr tablet, Take 1 tablet (100 mg total) by mouth daily, Disp: 90 tablet, Rfl: 3    nystatin (MYCOSTATIN) powder, Apply topically 2 (two) times a day, Disp: 60 g, Rfl: 12    Potassium Citrate ER 15 MEQ (1620 MG) TBCR, Take 1 tablet by mouth daily , Disp: , Rfl:     psyllium (METAMUCIL) 58 6 % packet, Take 1 packet by mouth daily, Disp: , Rfl:     tamsulosin (FLOMAX) 0 4 mg, TAKE 1 CAPSULE TWICE A DAY (Patient taking differently: 0 4 mg daily with dinner), Disp: 180 capsule, Rfl: 3    TRAVATAN Z 0 004 % ophthalmic solution, Administer 1 drop to both eyes daily at bedtime , Disp: , Rfl:   No current facility-administered medications for this visit  Review of Systems   Constitutional: Negative for appetite change, chills, fatigue, fever and unexpected weight change  HENT: Negative for congestion, hearing loss and postnasal drip  Respiratory: Negative for cough and shortness of breath  Cardiovascular: Positive for leg swelling  Musculoskeletal: Negative for gait problem  Skin: Positive for wound (Abdomen)  Negative for rash  Neurological: Negative for numbness  Hematological: Does not bruise/bleed easily  Psychiatric/Behavioral: Negative  Objective:  /70   Pulse 96   Temp 100 °F (37 8 °C)   Resp 18   Pain Score: 0-No pain     Physical Exam  Vitals and nursing note reviewed  Constitutional:       Appearance: Normal appearance  He is well-developed  He is morbidly obese  HENT:      Head: Normocephalic and atraumatic  Right Ear: External ear normal       Left Ear: External ear normal    Eyes:      General: Lids are normal          Right eye: No discharge  Left eye: No discharge  Conjunctiva/sclera: Conjunctivae normal    Cardiovascular:      Rate and Rhythm: Normal rate and regular rhythm  Heart sounds: Normal heart sounds  No murmur heard  No friction rub  No gallop  Pulmonary:      Effort: Pulmonary effort is normal       Breath sounds: Normal breath sounds and air entry  Abdominal:      General: Abdomen is protuberant  Comments: Full-thickness wound of the right abdomen  Mostly granular  Minimal fibrin  No signs of infection  Large right-sided abdominal hernia  Musculoskeletal:      Cervical back: Neck supple  Right lower leg: No edema  Left lower leg: No edema  Lymphadenopathy:      Cervical: No cervical adenopathy  Skin:     General: Skin is warm and dry  Findings: Wound present  Neurological:      Mental Status: He is alert and oriented to person, place, and time  Gait: Gait is intact  Psychiatric:         Attention and Perception: Attention normal          Mood and Affect: Mood and affect normal          Speech: Speech normal          Behavior: Behavior is cooperative  Cognition and Memory: Cognition normal              Wound 07/15/22 Other (comment) Abdomen Right; Lower (Active)   Wound Image Images linked 07/22/22 1359   Wound Description Beefy red;Yellow;Pink 07/22/22 1401   Lis-wound Assessment Scar Tissue; Induration; Hyperpigmented 07/22/22 1401   Wound Length (cm) 2 4 cm 07/22/22 1401   Wound Width (cm) 2 5 cm 07/22/22 1401   Wound Depth (cm) 0 1 cm 07/22/22 1401   Wound Surface Area (cm^2) 6 cm^2 07/22/22 1401   Wound Volume (cm^3) 0 6 cm^3 07/22/22 1401   Calculated Wound Volume (cm^3) 0 6 cm^3 07/22/22 1401   Change in Wound Size % -3 45 07/22/22 1401   Drainage Amount Moderate 07/22/22 1401   Drainage Description Bloody 07/22/22 1401   Non-staged Wound Description Full thickness 07/22/22 1401   Treatments Irrigation with NSS 07/22/22 1401           Results from last 6 Months   Lab Units 06/27/22  0833   WOUND CULTURE  1+ Growth of        Wound Instructions:  Orders Placed This Encounter   Procedures    Wound cleansing and dressings     Wash your hands with soap and water  Remove old dressing, discard into plastic bag and place in trash  Cleanse the wound with mild soap and water prior to applying a clean dressing  Do not use tissue or cotton balls  Do not scrub the wound  Pat dry using gauze  Shower YES, with a gloved hand  Do not submerge in water (tubs or pools)  Abdominal Wound  Apply silver alginate to the wound  Cover with allevyn life bordered foam dressing  Change dressing 3 times per week     Consume 3-4 servings of protein daily     Follow up with Dr Poornima Robles in 2 weeks     Standing Status:   Future     Standing Expiration Date:   7/22/2023             Claire Reece MD, CHT, CWS    Portions of the record may have been created with voice recognition software  Occasional wrong word or "sound alike" substitutions may have occurred due to the inherent limitations of voice recognition software  Read the chart carefully and recognize, using context, where substitutions have occurred

## 2022-07-22 NOTE — PATIENT INSTRUCTIONS
Orders Placed This Encounter   Procedures    Wound cleansing and dressings     Wash your hands with soap and water  Remove old dressing, discard into plastic bag and place in trash  Cleanse the wound with mild soap and water prior to applying a clean dressing  Do not use tissue or cotton balls  Do not scrub the wound  Pat dry using gauze  Shower YES, with a gloved hand  Do not submerge in water (tubs or pools)  Abdominal Wound  Apply silver alginate to the wound      Cover with allevyn life bordered foam dressing  Change dressing 3 times per week     Consume 3-4 servings of protein daily     Follow up with Dr Padmini Molina in 2 weeks     Standing Status:   Future     Standing Expiration Date:   7/22/2023

## 2022-07-26 DIAGNOSIS — N18.9 CHRONIC RENAL FAILURE, UNSPECIFIED CKD STAGE: ICD-10-CM

## 2022-07-27 RX ORDER — TAMSULOSIN HYDROCHLORIDE 0.4 MG/1
CAPSULE ORAL
Qty: 180 CAPSULE | Refills: 3 | Status: SHIPPED | OUTPATIENT
Start: 2022-07-27

## 2022-08-05 ENCOUNTER — OFFICE VISIT (OUTPATIENT)
Dept: WOUND CARE | Facility: CLINIC | Age: 67
End: 2022-08-05
Payer: MEDICARE

## 2022-08-05 VITALS
TEMPERATURE: 100.3 F | HEART RATE: 88 BPM | DIASTOLIC BLOOD PRESSURE: 62 MMHG | RESPIRATION RATE: 18 BRPM | SYSTOLIC BLOOD PRESSURE: 124 MMHG

## 2022-08-05 DIAGNOSIS — S31.109A OPEN WOUND OF ANTERIOR ABDOMINAL WALL, INITIAL ENCOUNTER: Primary | ICD-10-CM

## 2022-08-05 PROCEDURE — 99213 OFFICE O/P EST LOW 20 MIN: CPT | Performed by: FAMILY MEDICINE

## 2022-08-05 NOTE — LETTER
[facility] WOUND CARE  [facility] [address] [address]  Phone#  [phone]  Fax#  [phone]    Patient:  Alexis Galaviz  YOB: 1955  Phone:  [phone]  Date of Visit:  8/5/2022    Orders Placed This Encounter   Procedures    Wound cleansing and dressings     Wash your hands with soap and water  Remove old dressing, discard into plastic bag and place in trash  Cleanse the wound with mild soap and water prior to applying a clean dressing  Do not use tissue or cotton balls  Do not scrub the wound  Pat dry using gauze  Shower YES, with a gloved hand  Do not submerge in water (tubs or pools)  Abdominal Wound  Apply silver alginate to the wound  Cover with allevyn life bordered foam dressing  Change dressing 3 times per week     Consume 3-4 servings of protein daily     Follow up with Dr Poornima Robles in 2 weeks    Today's wound treatment note:  Cleansed with normal saline and redressed as above       Standing Status:   Future     Standing Expiration Date:   8/5/2023         Electronically signed by Claire Reece MD

## 2022-08-05 NOTE — PROGRESS NOTES
Patient ID: Geoffrey Mckinney is a 78 y o  male Date of Birth 1955       Chief Complaint   Patient presents with    Follow Up Wound Care Visit     Open wound of anterior abdominal wall       Allergies:  Codeine and Seasonal ic [cholestatin]    Diagnosis:      Diagnosis ICD-10-CM Associated Orders   1  Open wound of anterior abdominal wall, initial encounter  S31 109A Wound cleansing and dressings           Assessment & Plan:   Improving abdominal wound   Continue same wound care  Silver alginate  Subjective:   7/15/22: This is 1st visit for the 21-year-old male referred by his primary care physician for nonhealing wound of the right lower abdomen  Approximately three weeks ago the patient noticed some erythema and a few small draining openings  He denies any trauma  He had no fever or chills at the time  He went to see his physician and had cephalexin prescribed  After 10 day course, there is no significant improvement and followup visit assessment was made  Culture was obtained which only revealed skin opal but the patient had been empirically started on doxycycline  Patient states that he has had no significant improvement but has a large scab  No recent fever or chills  Patient has no history of MRSA  Past medical history is negative for diabetes  Patient has morbid obesity with a large right sided abdominal wall hernia  Lymphedema of both lower extremities  7/22/22: Followup abdominal wall wound  No complaints  Using silver alginate and bordered foam   No pain, fever or chills  8/5/22: Followup abdominal wall wound with massive abdominal hernia  Silver alginate and bordered foam   No complaints        The following portions of the patient's history were reviewed and updated as appropriate:   Patient Active Problem List   Diagnosis    Essential hypertension    Kidney stones    Chronic renal failure, stage 3 (moderate) ([address] )    HDL deficiency    Gout    DELIA (obstructive sleep apnea)    Allergic rhinitis    Right foot pain    Class 3 severe obesity due to excess calories with serious comorbidity and body mass index (BMI) of 50 0 to 59 9 in adult ([facility] )    Lymphedema of both lower extremities    COVID-19     Past Medical History:   Diagnosis Date    Chronic kidney disease     Hypertension     Obesity     Sepsis ([facility] )     Urinary tract infection without hematuria 4/3/2020     Past Surgical History:   Procedure Laterality Date    APPENDECTOMY      NASAL SEPTUM SURGERY       Family History   Problem Relation Age of Onset    Diabetes Father     Prostate cancer Brother       Social History     Socioeconomic History    Marital status: /Civil Union     Spouse name: Not on file    Number of children: Not on file    Years of education: Not on file    Highest education level: Not on file   Occupational History    Not on file   Tobacco Use    Smoking status: Never Smoker    Smokeless tobacco: Never Used    Tobacco comment: no exposure to passive smoke   Vaping Use    Vaping Use: Never used   Substance and Sexual Activity    Alcohol use: No    Drug use: Not Currently    Sexual activity: Not Currently   Other Topics Concern    Not on file   Social History Narrative    Not on file     Social Determinants of Health     Financial Resource Strain: Not on file   Food Insecurity: Not on file   Transportation Needs: Not on file   Physical Activity: Not on file   Stress: Not on file   Social Connections: Not on file   Intimate Partner Violence: Not on file   Housing Stability: Not on file        Current Outpatient Medications:     tamsulosin (FLOMAX) 0 4 mg, 1-2 daily as directed, Disp: 180 capsule, Rfl: 3    allopurinol (ZYLOPRIM) 100 mg tablet, Take 100 mg by mouth daily , Disp: , Rfl:     ammonium lactate (LAC-HYDRIN) 12 % lotion, Apply topically 2 (two) times a day, Disp: 400 g, Rfl: 0    furosemide (LASIX) 20 mg tablet, Take 1 tablet (20 mg total) by mouth daily For swelling, Disp: 90 tablet, Rfl: 3    hydrocortisone 2 5 % cream, Apply topically 2 (two) times a day (Patient not taking: No sig reported), Disp: 453 g, Rfl: 6    lisinopril (ZESTRIL) 20 mg tablet, Take 1 tablet (20 mg total) by mouth daily, Disp: 90 tablet, Rfl: 3    metoprolol succinate (TOPROL-XL) 100 mg 24 hr tablet, Take 1 tablet (100 mg total) by mouth daily, Disp: 90 tablet, Rfl: 3    nystatin (MYCOSTATIN) powder, Apply topically 2 (two) times a day, Disp: 60 g, Rfl: 12    Potassium Citrate ER 15 MEQ (1620 MG) TBCR, Take 1 tablet by mouth daily , Disp: , Rfl:     psyllium (METAMUCIL) 58 6 % packet, Take 1 packet by mouth daily, Disp: , Rfl:     TRAVATAN Z 0 004 % ophthalmic solution, Administer 1 drop to both eyes daily at bedtime , Disp: , Rfl:     Review of Systems   Constitutional: Negative for appetite change, chills, fatigue, fever and unexpected weight change  HENT: Negative for congestion, hearing loss and postnasal drip  Respiratory: Negative for cough and shortness of breath  Cardiovascular: Positive for leg swelling  Musculoskeletal: Negative for gait problem  Skin: Positive for wound (Abdomen)  Negative for rash  Neurological: Negative for numbness  Hematological: Does not bruise/bleed easily  Psychiatric/Behavioral: Negative  Objective:  /62   Pulse 88   Temp 100 3 °F (37 9 °C)   Resp 18   Pain Score: 0-No pain     Physical Exam  Vitals and nursing note reviewed  Constitutional:       Appearance: Normal appearance  He is well-developed  He is morbidly obese  HENT:      Head: Normocephalic and atraumatic  Right Ear: External ear normal       Left Ear: External ear normal    Eyes:      General: Lids are normal    Cardiovascular:      Rate and Rhythm: Normal rate  Pulmonary:      Effort: Pulmonary effort is normal       Breath sounds: Normal air entry  Abdominal:      General: Abdomen is protuberant            Comments: Full-thickness wound of the right abdomen  Mostly granular  Granulation tissue  No signs of infection  Increasing edge epithelialization  Large right-sided abdominal hernia  Musculoskeletal:      Right lower leg: No edema  Left lower leg: No edema  Skin:     General: Skin is warm and dry  Findings: Wound present  Neurological:      Mental Status: He is alert and oriented to person, place, and time  Gait: Gait is intact  Psychiatric:         Attention and Perception: Attention normal          Mood and Affect: Mood and affect normal          Speech: Speech normal          Behavior: Behavior is cooperative  Cognition and Memory: Cognition normal                       Results from last 6 Months   Lab Units 06/27/22  0833   WOUND CULTURE  1+ Growth of        Wound Instructions:  Orders Placed This Encounter   Procedures    Wound cleansing and dressings     Wash your hands with soap and water  Remove old dressing, discard into plastic bag and place in trash  Cleanse the wound with mild soap and water prior to applying a clean dressing  Do not use tissue or cotton balls  Do not scrub the wound  Pat dry using gauze  Shower YES, with a gloved hand  Do not submerge in water (tubs or pools)  Abdominal Wound  Apply silver alginate to the wound  Cover with allevyn life bordered foam dressing  Change dressing 3 times per week     Consume 3-4 servings of protein daily     Follow up with Dr Tari Currie in 2 weeks    Today's wound treatment note:  Cleansed with normal saline and redressed as above  Standing Status:   Future     Standing Expiration Date:   8/5/2023             Bety Garcia MD, CHT, CWS    Portions of the record may have been created with voice recognition software  Occasional wrong word or "sound alike" substitutions may have occurred due to the inherent limitations of voice recognition software   Read the chart carefully and recognize, using context, where substitutions have occurred

## 2022-08-05 NOTE — PATIENT INSTRUCTIONS
Orders Placed This Encounter   Procedures    Wound cleansing and dressings     Wash your hands with soap and water  Remove old dressing, discard into plastic bag and place in trash  Cleanse the wound with mild soap and water prior to applying a clean dressing  Do not use tissue or cotton balls  Do not scrub the wound  Pat dry using gauze  Shower YES, with a gloved hand  Do not submerge in water (tubs or pools)  Abdominal Wound  Apply silver alginate to the wound  Cover with allevyn life bordered foam dressing  Change dressing 3 times per week     Consume 3-4 servings of protein daily     Follow up with Dr Antoinette Noble in 2 weeks    Today's wound treatment note:  Cleansed with normal saline and redressed as above       Standing Status:   Future     Standing Expiration Date:   8/5/2023

## 2022-08-19 ENCOUNTER — OFFICE VISIT (OUTPATIENT)
Dept: WOUND CARE | Facility: CLINIC | Age: 67
End: 2022-08-19
Payer: MEDICARE

## 2022-08-19 VITALS
SYSTOLIC BLOOD PRESSURE: 118 MMHG | DIASTOLIC BLOOD PRESSURE: 78 MMHG | TEMPERATURE: 96.3 F | RESPIRATION RATE: 18 BRPM | HEART RATE: 72 BPM

## 2022-08-19 DIAGNOSIS — S31.109A OPEN WOUND OF ANTERIOR ABDOMINAL WALL, INITIAL ENCOUNTER: Primary | ICD-10-CM

## 2022-08-19 PROCEDURE — 99213 OFFICE O/P EST LOW 20 MIN: CPT | Performed by: FAMILY MEDICINE

## 2022-08-19 RX ORDER — LIDOCAINE 40 MG/G
CREAM TOPICAL ONCE
Status: COMPLETED | OUTPATIENT
Start: 2022-08-19 | End: 2022-08-19

## 2022-08-19 RX ADMIN — LIDOCAINE: 40 CREAM TOPICAL at 14:21

## 2022-08-19 NOTE — PROGRESS NOTES
Patient ID: Pooja Vernon is a 78 y o  male Date of Birth 1955     Chief Complaint  Chief Complaint   Patient presents with    Follow Up Wound Care Visit     Open wound of anterior abdominal wall       Allergies  Codeine and Seasonal ic [cholestatin]    Assessment:    No problem-specific Assessment & Plan notes found for this encounter  Diagnoses and all orders for this visit:    Open wound of anterior abdominal wall, initial encounter  -     lidocaine (LMX) 4 % cream  -     Wound cleansing and dressings; Future              Procedures    Plan:   Wound is improved  No clinical signs infection  No debridement today  Continue wound management with silver alginate, Ok to decrease dressing change frequency to twice weekly, see wound orders below  Followup in 2 weeks or call sooner with questions or concerns    Wound 07/15/22 Other (comment) Abdomen Right; Lower (Active)   Wound Image Images linked 08/19/22 1410   Wound Description Pink; Beefy red;Granulation tissue 08/19/22 1411   Lis-wound Assessment Scar Tissue; Hyperpigmented 08/19/22 1411   Wound Length (cm) 1 2 cm 08/19/22 1411   Wound Width (cm) 1 4 cm 08/19/22 1411   Wound Depth (cm) 0 1 cm 08/19/22 1411   Wound Surface Area (cm^2) 1 68 cm^2 08/19/22 1411   Wound Volume (cm^3) 0 168 cm^3 08/19/22 1411   Calculated Wound Volume (cm^3) 0 17 cm^3 08/19/22 1411   Change in Wound Size % 70 69 08/19/22 1411   Drainage Amount Moderate 08/19/22 1411   Drainage Description Serosanguineous 08/19/22 1411   Patient Tolerance Tolerated well 08/19/22 1411   Dressing Status Removed 08/19/22 1411       Wound 07/15/22 Other (comment) Abdomen Right; Lower (Active)   Date First Assessed/Time First Assessed: 07/15/22 1401   Primary Wound Type: Other (comment)  Location: Abdomen  Wound Location Orientation: Right; Lower       [REMOVED] Wound 07/14/21 Dermatologic/Rash Groin Left;Right (Removed)   Resolved Date/Resolved Time: 07/15/22 1359  Date First Assessed/Time First Assessed: 07/14/21 1034   Pre-Existing Wound: Yes  Traumatic Wound Type: Dermatologic/Rash  Location: Groin  Wound Location Orientation: Left;Right  Wound Outcome: Other (Comment)       [REMOVED] Wound 07/14/21 Dermatologic/Rash Axilla Lateral;Right (Removed)   Resolved Date/Resolved Time: 07/15/22 1359  Date First Assessed/Time First Assessed: 07/14/21 1035   Pre-Existing Wound: Yes  Traumatic Wound Type: Dermatologic/Rash  Location: Axilla  Wound Location Orientation: Lateral;Right  Wound Outcome: Other (C  [REMOVED] Wound 07/14/21 Dermatologic/Rash Chest Right;Left (Removed)   Resolved Date/Resolved Time: 07/15/22 1359  Date First Assessed/Time First Assessed: 07/14/21 1035   Pre-Existing Wound: Yes  Traumatic Wound Type: Dermatologic/Rash  Location: Chest  Wound Location Orientation: Right;Left  Wound Outcome: Other (Comment)       Subjective:           7/15/22: This is 1st visit for the 62-year-old male referred by his primary care physician for nonhealing wound of the right lower abdomen  Approximately three weeks ago the patient noticed some erythema and a few small draining openings  He denies any trauma  He had no fever or chills at the time  He went to see his physician and had cephalexin prescribed  After 10 day course, there is no significant improvement and followup visit assessment was made  Culture was obtained which only revealed skin opal but the patient had been empirically started on doxycycline  Patient states that he has had no significant improvement but has a large scab  No recent fever or chills  Patient has no history of MRSA  Past medical history is negative for diabetes  Patient has morbid obesity with a large right sided abdominal wall hernia  Lymphedema of both lower extremities      7/22/22: Followup abdominal wall wound  No complaints  Using silver alginate and bordered foam   No pain, fever or chills      8/5/22:   Followup abdominal wall wound with massive abdominal hernia  Silver alginate and bordered foam   No complaints  8/19:  Followup wound of the abdominal wall  No increased pain or drainage  Drainage seems to have decreased  No new complaints  Has been using Silver alginate on the wound  The following portions of the patient's history were reviewed and updated as appropriate:   He  has a past medical history of Chronic kidney disease, Hypertension, Obesity, Sepsis ([address] ), and Urinary tract infection without hematuria (4/3/2020)  He   Patient Active Problem List    Diagnosis Date Noted    COVID-19 07/07/2022    Right foot pain 07/30/2019    Class 3 severe obesity due to excess calories with serious comorbidity and body mass index (BMI) of 50 0 to 59 9 in adult [facility]) 07/30/2019    Lymphedema of both lower extremities 07/30/2019    Chronic renal failure, stage 3 (moderate) ([address] ) 06/25/2018    HDL deficiency 06/25/2018    Gout 06/25/2018    DELIA (obstructive sleep apnea) 06/25/2018    Allergic rhinitis 06/25/2018    Essential hypertension 06/18/2018    Kidney stones 06/18/2018     He  reports that he has never smoked  He has never used smokeless tobacco  He reports previous drug use  He reports that he does not drink alcohol  He is allergic to codeine and seasonal ic [cholestatin]       Review of Systems   Constitutional: Negative for chills and fever  HENT: Negative for congestion and sneezing  Respiratory: Negative for cough  Skin: Positive for wound  Psychiatric/Behavioral: Negative for agitation  Objective:       Wound 07/15/22 Other (comment) Abdomen Right; Lower (Active)   Wound Image Images linked 08/19/22 1410   Wound Description Pink; Beefy red;Granulation tissue 08/19/22 1411   Lis-wound Assessment Scar Tissue; Hyperpigmented 08/19/22 1411   Wound Length (cm) 1 2 cm 08/19/22 1411   Wound Width (cm) 1 4 cm 08/19/22 1411   Wound Depth (cm) 0 1 cm 08/19/22 1411   Wound Surface Area (cm^2) 1 68 cm^2 08/19/22 1411   Wound Volume (cm^3) 0 168 cm^3 08/19/22 1411   Calculated Wound Volume (cm^3) 0 17 cm^3 08/19/22 1411   Change in Wound Size % 70 69 08/19/22 1411   Drainage Amount Moderate 08/19/22 1411   Drainage Description Serosanguineous 08/19/22 1411   Patient Tolerance Tolerated well 08/19/22 1411   Dressing Status Removed 08/19/22 1411       /78   Pulse 72   Temp (!) 96 3 °F (35 7 °C)   Resp 18     Physical Exam  Constitutional:       General: He is not in acute distress  Appearance: Normal appearance  He is obese  He is not ill-appearing, toxic-appearing or diaphoretic  HENT:      Head: Normocephalic and atraumatic  Right Ear: External ear normal       Left Ear: External ear normal    Eyes:      Conjunctiva/sclera: Conjunctivae normal    Pulmonary:      Effort: Pulmonary effort is normal  No respiratory distress  Musculoskeletal:      Cervical back: Neck supple  Skin:     Comments: See wound assessment   Neurological:      Mental Status: He is alert  Psychiatric:         Mood and Affect: Mood normal          Behavior: Behavior normal            Wound 07/15/22 Other (comment) Abdomen Right; Lower (Active)   Wound Image   08/19/22 1410   Wound Description Pink; Beefy red;Granulation tissue 08/19/22 1411   Lis-wound Assessment Scar Tissue; Hyperpigmented 08/19/22 1411   Wound Length (cm) 1 2 cm 08/19/22 1411   Wound Width (cm) 1 4 cm 08/19/22 1411   Wound Depth (cm) 0 1 cm 08/19/22 1411   Wound Surface Area (cm^2) 1 68 cm^2 08/19/22 1411   Wound Volume (cm^3) 0 168 cm^3 08/19/22 1411   Calculated Wound Volume (cm^3) 0 17 cm^3 08/19/22 1411   Change in Wound Size % 70 69 08/19/22 1411   Drainage Amount Moderate 08/19/22 1411   Drainage Description Serosanguineous 08/19/22 1411   Non-staged Wound Description Full thickness 08/05/22 1440   Treatments Irrigation with NSS 07/22/22 1401   Patient Tolerance Tolerated well 08/19/22 1411   Dressing Status Removed 08/19/22 1411                         Wound Instructions:  Orders Placed This Encounter   Procedures    Wound cleansing and dressings     Wash your hands with soap and water  Remove old dressing, discard into plastic bag and place in trash  Cleanse the wound with mild soap and water prior to applying a clean dressing  Do not use tissue or cotton balls  Do not scrub the wound  Pat dry using gauze  Shower YES, with a gloved hand  Do not submerge in water (tubs or pools)  Abdominal Wound  Apply silver alginate to the wound  Cover with allevyn life bordered foam dressing  Change dressing 2 times per week     Consume 3-4 servings of protein daily     Follow up with Dr Alex Kimbrough in 2 weeks     Today's wound treatment note:  Cleansed with normal saline and redressed as above  Standing Status:   Future     Standing Expiration Date:   8/19/2023        Diagnosis ICD-10-CM Associated Orders   1   Open wound of anterior abdominal wall, initial encounter  S31 109A lidocaine (LMX) 4 % cream     Wound cleansing and dressings

## 2022-08-19 NOTE — PATIENT INSTRUCTIONS
Orders Placed This Encounter   Procedures    Wound cleansing and dressings     Wash your hands with soap and water  Remove old dressing, discard into plastic bag and place in trash  Cleanse the wound with mild soap and water prior to applying a clean dressing  Do not use tissue or cotton balls  Do not scrub the wound  Pat dry using gauze  Shower YES, with a gloved hand  Do not submerge in water (tubs or pools)  Abdominal Wound  Apply silver alginate to the wound  Cover with allevyn life bordered foam dressing  Change dressing 2 times per week     Consume 3-4 servings of protein daily     Follow up with Dr Erum Araujo in 2 weeks     Today's wound treatment note:  Cleansed with normal saline and redressed as above       Standing Status:   Future     Standing Expiration Date:   8/19/2023

## 2022-09-02 ENCOUNTER — OFFICE VISIT (OUTPATIENT)
Dept: WOUND CARE | Facility: CLINIC | Age: 67
End: 2022-09-02
Payer: MEDICARE

## 2022-09-02 VITALS
TEMPERATURE: 98.2 F | SYSTOLIC BLOOD PRESSURE: 130 MMHG | HEART RATE: 86 BPM | DIASTOLIC BLOOD PRESSURE: 72 MMHG | RESPIRATION RATE: 20 BRPM

## 2022-09-02 DIAGNOSIS — S31.109A OPEN WOUND OF ANTERIOR ABDOMINAL WALL, INITIAL ENCOUNTER: Primary | ICD-10-CM

## 2022-09-02 PROCEDURE — 99212 OFFICE O/P EST SF 10 MIN: CPT | Performed by: FAMILY MEDICINE

## 2022-09-02 NOTE — PATIENT INSTRUCTIONS
Orders Placed This Encounter   Procedures    Wound cleansing and dressings     Abdominal wound is healed  Patient is discharged  Shower YES  Do not scrub  Pat dry  Keep dressing on for three day  Then remove and leave open to air  Do not go into a pool until scab falls off  Moisturize as needed once bandage is removed  Contact the wound center if the wound reopens         Treatment in the wound center today: Cleansed with NSS, and applied allevyn life bordered foam      Standing Status:   Future     Standing Expiration Date:   9/2/2023

## 2022-09-02 NOTE — PROGRESS NOTES
Patient ID: Benigno Cade is a 78 y o  male Date of Birth 1955       Chief Complaint   Patient presents with    Follow Up Wound Care Visit     Abdominal wound       Allergies:  Codeine and Seasonal ic [cholestatin]    Diagnosis:      Diagnosis ICD-10-CM Associated Orders   1  Open wound of anterior abdominal wall, initial encounter  S31 109A Wound cleansing and dressings           Assessment & Plan:   Abdominal wound is now closed  Dry eschar   Protect with bordered foam for another couple days and then removed   Moisturize   Discharge from wound center  Subjective:   7/15/22: This is 1st visit for the 27-year-old male referred by his primary care physician for nonhealing wound of the right lower abdomen  Approximately three weeks ago the patient noticed some erythema and a few small draining openings  He denies any trauma  He had no fever or chills at the time  He went to see his physician and had cephalexin prescribed  After 10 day course, there is no significant improvement and followup visit assessment was made  Culture was obtained which only revealed skin opal but the patient had been empirically started on doxycycline  Patient states that he has had no significant improvement but has a large scab  No recent fever or chills  Patient has no history of MRSA  Past medical history is negative for diabetes  Patient has morbid obesity with a large right sided abdominal wall hernia  Lymphedema of both lower extremities      7/22/22: Followup abdominal wall wound  No complaints  Using silver alginate and bordered foam   No pain, fever or chills      8/5/22: Followup abdominal wall wound with massive abdominal hernia  Silver alginate and bordered foam   No complaints  8/19:  Followup wound of the abdominal wall  No increased pain or drainage  Drainage seems to have decreased  No new complaints  Has been using Silver alginate on the wound  9/2/22:  Followup open abdominal wound secondary massive abdominal hernia  Patient has not noticed any drainage for the last four days  No pain        The following portions of the patient's history were reviewed and updated as appropriate:   Patient Active Problem List   Diagnosis    Essential hypertension    Kidney stones    Chronic renal failure, stage 3 (moderate) (HCC)    HDL deficiency    Gout    DELIA (obstructive sleep apnea)    Allergic rhinitis    Right foot pain    Class 3 severe obesity due to excess calories with serious comorbidity and body mass index (BMI) of 50 0 to 59 9 in adult ([facility] )    Lymphedema of both lower extremities    COVID-19     Past Medical History:   Diagnosis Date    Chronic kidney disease     Hypertension     Obesity     Sepsis ([facility] )     Urinary tract infection without hematuria 4/3/2020     Past Surgical History:   Procedure Laterality Date    APPENDECTOMY      NASAL SEPTUM SURGERY       Family History   Problem Relation Age of Onset    Diabetes Father     Prostate cancer Brother       Social History     Socioeconomic History    Marital status: /Civil Union     Spouse name: None    Number of children: None    Years of education: None    Highest education level: None   Occupational History    None   Tobacco Use    Smoking status: Never Smoker    Smokeless tobacco: Never Used    Tobacco comment: no exposure to passive smoke   Vaping Use    Vaping Use: Never used   Substance and Sexual Activity    Alcohol use: No    Drug use: Not Currently    Sexual activity: Not Currently   Other Topics Concern    None   Social History Narrative    None     Social Determinants of Health     Financial Resource Strain: Not on file   Food Insecurity: Not on file   Transportation Needs: Not on file   Physical Activity: Not on file   Stress: Not on file   Social Connections: Not on file   Intimate Partner Violence: Not on file   Housing Stability: Not on file        Current Outpatient Medications:     tamsulosin (FLOMAX) 0 4 mg, 1-2 daily as directed, Disp: 180 capsule, Rfl: 3    allopurinol (ZYLOPRIM) 100 mg tablet, Take 100 mg by mouth daily , Disp: , Rfl:     ammonium lactate (LAC-HYDRIN) 12 % lotion, Apply topically 2 (two) times a day, Disp: 400 g, Rfl: 0    furosemide (LASIX) 20 mg tablet, Take 1 tablet (20 mg total) by mouth daily For swelling, Disp: 90 tablet, Rfl: 3    hydrocortisone 2 5 % cream, Apply topically 2 (two) times a day (Patient not taking: No sig reported), Disp: 453 g, Rfl: 6    lisinopril (ZESTRIL) 20 mg tablet, Take 1 tablet (20 mg total) by mouth daily, Disp: 90 tablet, Rfl: 3    metoprolol succinate (TOPROL-XL) 100 mg 24 hr tablet, Take 1 tablet (100 mg total) by mouth daily, Disp: 90 tablet, Rfl: 3    nystatin (MYCOSTATIN) powder, Apply topically 2 (two) times a day, Disp: 60 g, Rfl: 12    Potassium Citrate ER 15 MEQ (1620 MG) TBCR, Take 1 tablet by mouth daily , Disp: , Rfl:     psyllium (METAMUCIL) 58 6 % packet, Take 1 packet by mouth daily, Disp: , Rfl:     TRAVATAN Z 0 004 % ophthalmic solution, Administer 1 drop to both eyes daily at bedtime , Disp: , Rfl:     Review of Systems   Constitutional: Negative for appetite change, chills, fatigue, fever and unexpected weight change  HENT: Negative for congestion, hearing loss, postnasal drip and sneezing  Respiratory: Negative for cough and shortness of breath  Cardiovascular: Positive for leg swelling  Musculoskeletal: Negative for gait problem  Skin: Positive for wound  Negative for rash  Neurological: Negative for numbness  Hematological: Does not bruise/bleed easily  Psychiatric/Behavioral: Negative  Negative for agitation  Objective:  /72   Pulse 86   Temp 98 2 °F (36 8 °C)   Resp 20   Pain Score: 0-No pain     Physical Exam  Constitutional:       General: He is not in acute distress  Appearance: Normal appearance  He is obese   He is not ill-appearing, toxic-appearing or diaphoretic  HENT:      Head: Normocephalic and atraumatic  Cardiovascular:      Rate and Rhythm: Normal rate  Pulmonary:      Effort: Pulmonary effort is normal    Abdominal:      General: Abdomen is protuberant  Neurological:      Mental Status: He is alert  Psychiatric:         Mood and Affect: Mood normal          Behavior: Behavior normal              Wound 07/15/22 Other (comment) Abdomen Right; Lower (Active)   Wound Image Images linked 09/02/22 1457   Wound Description Epithelialization 09/02/22 1458   Lis-wound Assessment Pink;Dry;Scaly 09/02/22 1458   Wound Length (cm) 0 cm 09/02/22 1458   Wound Width (cm) 0 cm 09/02/22 1458   Wound Depth (cm) 0 cm 09/02/22 1458   Wound Surface Area (cm^2) 0 cm^2 09/02/22 1458   Wound Volume (cm^3) 0 cm^3 09/02/22 1458   Calculated Wound Volume (cm^3) 0 cm^3 09/02/22 1458   Change in Wound Size % 100 09/02/22 1458   Drainage Amount None 09/02/22 1458   Treatments Irrigation with NSS 09/02/22 1458            Results from last 6 Months   Lab Units 06/27/22  0833   WOUND CULTURE  1+ Growth of        Wound Instructions:  Orders Placed This Encounter   Procedures    Wound cleansing and dressings     Abdominal wound is healed  Patient is discharged  Shower YES  Do not scrub  Pat dry  Keep dressing on for three day  Then remove and leave open to air  Do not go into a pool until scab falls off  Moisturize as needed once bandage is removed        Contact the wound center if the wound reopens  Treatment in the wound center today: Cleansed with NSS, and applied allevyn life bordered foam      Standing Status:   Future     Standing Expiration Date:   9/2/2023             Flaquito Quinn MD, CHT, CWS    Portions of the record may have been created with voice recognition software  Occasional wrong word or "sound alike" substitutions may have occurred due to the inherent limitations of voice recognition software   Read the chart carefully and recognize, using context, where substitutions have occurred

## 2022-09-14 ENCOUNTER — APPOINTMENT (EMERGENCY)
Dept: CT IMAGING | Facility: HOSPITAL | Age: 67
DRG: 853 | End: 2022-09-14
Payer: MEDICARE

## 2022-09-14 ENCOUNTER — ANESTHESIA EVENT (EMERGENCY)
Dept: PERIOP | Facility: HOSPITAL | Age: 67
DRG: 853 | End: 2022-09-14
Payer: MEDICARE

## 2022-09-14 ENCOUNTER — HOSPITAL ENCOUNTER (INPATIENT)
Facility: HOSPITAL | Age: 67
LOS: 14 days | Discharge: HOME HEALTH | DRG: 853 | End: 2022-09-28
Attending: EMERGENCY MEDICINE | Admitting: INTERNAL MEDICINE
Payer: MEDICARE

## 2022-09-14 ENCOUNTER — TELEMEDICINE (OUTPATIENT)
Dept: FAMILY MEDICINE CLINIC | Facility: CLINIC | Age: 67
End: 2022-09-14
Payer: MEDICARE

## 2022-09-14 ENCOUNTER — APPOINTMENT (OUTPATIENT)
Dept: RADIOLOGY | Facility: HOSPITAL | Age: 67
DRG: 853 | End: 2022-09-14
Payer: MEDICARE

## 2022-09-14 ENCOUNTER — ANESTHESIA (EMERGENCY)
Dept: PERIOP | Facility: HOSPITAL | Age: 67
DRG: 853 | End: 2022-09-14
Payer: MEDICARE

## 2022-09-14 DIAGNOSIS — K43.6 STRANGULATED VENTRAL HERNIA: Primary | ICD-10-CM

## 2022-09-14 DIAGNOSIS — R50.9 FEVER, UNSPECIFIED FEVER CAUSE: Primary | ICD-10-CM

## 2022-09-14 DIAGNOSIS — K43.6 STRANGULATED HERNIA OF ABDOMINAL WALL: ICD-10-CM

## 2022-09-14 DIAGNOSIS — I89.0 LYMPHEDEMA OF BOTH LOWER EXTREMITIES: ICD-10-CM

## 2022-09-14 DIAGNOSIS — I26.94 MULTIPLE SUBSEGMENTAL PULMONARY EMBOLI WITHOUT ACUTE COR PULMONALE (HCC): ICD-10-CM

## 2022-09-14 DIAGNOSIS — I26.99 ACUTE PULMONARY EMBOLISM, UNSPECIFIED PULMONARY EMBOLISM TYPE, UNSPECIFIED WHETHER ACUTE COR PULMONALE PRESENT (HCC): ICD-10-CM

## 2022-09-14 DIAGNOSIS — R10.84 GENERALIZED ABDOMINAL PAIN: ICD-10-CM

## 2022-09-14 DIAGNOSIS — A41.9 SEPSIS, DUE TO UNSPECIFIED ORGANISM, UNSPECIFIED WHETHER ACUTE ORGAN DYSFUNCTION PRESENT (HCC): ICD-10-CM

## 2022-09-14 DIAGNOSIS — I10 ESSENTIAL HYPERTENSION: ICD-10-CM

## 2022-09-14 DIAGNOSIS — N18.31 STAGE 3A CHRONIC KIDNEY DISEASE (HCC): ICD-10-CM

## 2022-09-14 DIAGNOSIS — D64.9 ANEMIA, UNSPECIFIED TYPE: ICD-10-CM

## 2022-09-14 PROBLEM — N17.9 ACUTE KIDNEY INJURY SUPERIMPOSED ON CKD (HCC): Status: ACTIVE | Noted: 2018-06-25

## 2022-09-14 PROBLEM — R06.89 RESPIRATORY INSUFFICIENCY: Status: ACTIVE | Noted: 2022-09-14

## 2022-09-14 LAB
ABO GROUP BLD: NORMAL
ALBUMIN SERPL BCP-MCNC: 2.6 G/DL (ref 3.5–5)
ALP SERPL-CCNC: 55 U/L (ref 46–116)
ALT SERPL W P-5'-P-CCNC: 15 U/L (ref 12–78)
ANION GAP SERPL CALCULATED.3IONS-SCNC: 8 MMOL/L (ref 4–13)
APTT PPP: 39 SECONDS (ref 23–37)
ARTERIAL PATENCY WRIST A: YES
AST SERPL W P-5'-P-CCNC: 13 U/L (ref 5–45)
ATRIAL RATE: 98 BPM
BASE EXCESS BLDA CALC-SCNC: -6.9 MMOL/L
BASOPHILS # BLD AUTO: 0.03 THOUSANDS/ΜL (ref 0–0.1)
BASOPHILS NFR BLD AUTO: 0 % (ref 0–1)
BILIRUB SERPL-MCNC: 1.25 MG/DL (ref 0.2–1)
BLD GP AB SCN SERPL QL: NEGATIVE
BUN SERPL-MCNC: 21 MG/DL (ref 5–25)
CALCIUM ALBUM COR SERPL-MCNC: 9.9 MG/DL (ref 8.3–10.1)
CALCIUM SERPL-MCNC: 8.8 MG/DL (ref 8.3–10.1)
CHLORIDE SERPL-SCNC: 99 MMOL/L (ref 96–108)
CO2 SERPL-SCNC: 27 MMOL/L (ref 21–32)
CREAT SERPL-MCNC: 1.64 MG/DL (ref 0.6–1.3)
EOSINOPHIL # BLD AUTO: 0.02 THOUSAND/ΜL (ref 0–0.61)
EOSINOPHIL NFR BLD AUTO: 0 % (ref 0–6)
ERYTHROCYTE [DISTWIDTH] IN BLOOD BY AUTOMATED COUNT: 13.4 % (ref 11.6–15.1)
GFR SERPL CREATININE-BSD FRML MDRD: 42 ML/MIN/1.73SQ M
GLUCOSE SERPL-MCNC: 115 MG/DL (ref 65–140)
GLUCOSE SERPL-MCNC: 141 MG/DL (ref 65–140)
HCO3 BLDA-SCNC: 21.1 MMOL/L (ref 22–28)
HCT VFR BLD AUTO: 37.1 % (ref 36.5–49.3)
HGB BLD-MCNC: 12.1 G/DL (ref 12–17)
HOROWITZ INDEX BLDA+IHG-RTO: 100 MM[HG]
IMM GRANULOCYTES # BLD AUTO: 0.05 THOUSAND/UL (ref 0–0.2)
IMM GRANULOCYTES NFR BLD AUTO: 0 % (ref 0–2)
INR PPP: 1.19 (ref 0.84–1.19)
LACTATE SERPL-SCNC: 1.1 MMOL/L (ref 0.5–2)
LACTATE SERPL-SCNC: 1.1 MMOL/L (ref 0.5–2)
LYMPHOCYTES # BLD AUTO: 1.09 THOUSANDS/ΜL (ref 0.6–4.47)
LYMPHOCYTES NFR BLD AUTO: 8 % (ref 14–44)
MCH RBC QN AUTO: 30.6 PG (ref 26.8–34.3)
MCHC RBC AUTO-ENTMCNC: 32.6 G/DL (ref 31.4–37.4)
MCV RBC AUTO: 94 FL (ref 82–98)
MONOCYTES # BLD AUTO: 0.93 THOUSAND/ΜL (ref 0.17–1.22)
MONOCYTES NFR BLD AUTO: 7 % (ref 4–12)
NEUTROPHILS # BLD AUTO: 10.78 THOUSANDS/ΜL (ref 1.85–7.62)
NEUTS SEG NFR BLD AUTO: 85 % (ref 43–75)
NRBC BLD AUTO-RTO: 0 /100 WBCS
O2 CT BLDA-SCNC: 18.3 ML/DL (ref 16–23)
OXYHGB MFR BLDA: 96.5 % (ref 94–97)
P AXIS: 76 DEGREES
PCO2 BLDA: 52.5 MM HG (ref 36–44)
PEEP RESPIRATORY: 6 CM[H2O]
PH BLDA: 7.22 [PH] (ref 7.35–7.45)
PLATELET # BLD AUTO: 416 THOUSANDS/UL (ref 149–390)
PMV BLD AUTO: 8.4 FL (ref 8.9–12.7)
PO2 BLDA: 131.9 MM HG (ref 75–129)
POTASSIUM SERPL-SCNC: 3.9 MMOL/L (ref 3.5–5.3)
PR INTERVAL: 138 MS
PROCALCITONIN SERPL-MCNC: 0.75 NG/ML
PROT SERPL-MCNC: 8 G/DL (ref 6.4–8.4)
PROTHROMBIN TIME: 15.1 SECONDS (ref 11.6–14.5)
QRS AXIS: 0 DEGREES
QRSD INTERVAL: 70 MS
QT INTERVAL: 328 MS
QTC INTERVAL: 418 MS
RBC # BLD AUTO: 3.95 MILLION/UL (ref 3.88–5.62)
RH BLD: NEGATIVE
SODIUM SERPL-SCNC: 134 MMOL/L (ref 135–147)
SPECIMEN EXPIRATION DATE: NORMAL
SPECIMEN SOURCE: ABNORMAL
T WAVE AXIS: 36 DEGREES
VENT AC: 16
VENT- AC: AC
VENTRICULAR RATE: 98 BPM
VT SETTING VENT: 450 ML
WBC # BLD AUTO: 12.9 THOUSAND/UL (ref 4.31–10.16)

## 2022-09-14 PROCEDURE — 84145 PROCALCITONIN (PCT): CPT | Performed by: EMERGENCY MEDICINE

## 2022-09-14 PROCEDURE — 93010 ELECTROCARDIOGRAM REPORT: CPT

## 2022-09-14 PROCEDURE — 99285 EMERGENCY DEPT VISIT HI MDM: CPT

## 2022-09-14 PROCEDURE — 93005 ELECTROCARDIOGRAM TRACING: CPT

## 2022-09-14 PROCEDURE — 99221 1ST HOSP IP/OBS SF/LOW 40: CPT | Performed by: SURGERY

## 2022-09-14 PROCEDURE — 74177 CT ABD & PELVIS W/CONTRAST: CPT

## 2022-09-14 PROCEDURE — 99283 EMERGENCY DEPT VISIT LOW MDM: CPT | Performed by: EMERGENCY MEDICINE

## 2022-09-14 PROCEDURE — 86850 RBC ANTIBODY SCREEN: CPT | Performed by: SURGERY

## 2022-09-14 PROCEDURE — 11043 DBRDMT MUSC&/FSCA 1ST 20/<: CPT | Performed by: SURGERY

## 2022-09-14 PROCEDURE — 96365 THER/PROPH/DIAG IV INF INIT: CPT

## 2022-09-14 PROCEDURE — 44005 FREEING OF BOWEL ADHESION: CPT | Performed by: SURGERY

## 2022-09-14 PROCEDURE — 82805 BLOOD GASES W/O2 SATURATION: CPT | Performed by: NURSE PRACTITIONER

## 2022-09-14 PROCEDURE — 87040 BLOOD CULTURE FOR BACTERIA: CPT | Performed by: EMERGENCY MEDICINE

## 2022-09-14 PROCEDURE — 49568 PR IMPLANT MESH HERNIA REPAIR/DEBRIDEMENT CLOSURE: CPT | Performed by: SURGERY

## 2022-09-14 PROCEDURE — 99291 CRITICAL CARE FIRST HOUR: CPT | Performed by: NURSE PRACTITIONER

## 2022-09-14 PROCEDURE — 85730 THROMBOPLASTIN TIME PARTIAL: CPT | Performed by: EMERGENCY MEDICINE

## 2022-09-14 PROCEDURE — 85610 PROTHROMBIN TIME: CPT | Performed by: EMERGENCY MEDICINE

## 2022-09-14 PROCEDURE — 82803 BLOOD GASES ANY COMBINATION: CPT

## 2022-09-14 PROCEDURE — 82948 REAGENT STRIP/BLOOD GLUCOSE: CPT

## 2022-09-14 PROCEDURE — 87205 SMEAR GRAM STAIN: CPT | Performed by: SURGERY

## 2022-09-14 PROCEDURE — 96361 HYDRATE IV INFUSION ADD-ON: CPT

## 2022-09-14 PROCEDURE — 36600 WITHDRAWAL OF ARTERIAL BLOOD: CPT

## 2022-09-14 PROCEDURE — 87077 CULTURE AEROBIC IDENTIFY: CPT | Performed by: SURGERY

## 2022-09-14 PROCEDURE — 82947 ASSAY GLUCOSE BLOOD QUANT: CPT

## 2022-09-14 PROCEDURE — 0DN80ZZ RELEASE SMALL INTESTINE, OPEN APPROACH: ICD-10-PCS | Performed by: SURGERY

## 2022-09-14 PROCEDURE — 85014 HEMATOCRIT: CPT

## 2022-09-14 PROCEDURE — 88305 TISSUE EXAM BY PATHOLOGIST: CPT | Performed by: PATHOLOGY

## 2022-09-14 PROCEDURE — 36556 INSERT NON-TUNNEL CV CATH: CPT | Performed by: SURGERY

## 2022-09-14 PROCEDURE — 0KDK0ZZ EXTRACTION OF RIGHT ABDOMEN MUSCLE, OPEN APPROACH: ICD-10-PCS | Performed by: SURGERY

## 2022-09-14 PROCEDURE — 71045 X-RAY EXAM CHEST 1 VIEW: CPT

## 2022-09-14 PROCEDURE — 36415 COLL VENOUS BLD VENIPUNCTURE: CPT | Performed by: EMERGENCY MEDICINE

## 2022-09-14 PROCEDURE — 87076 CULTURE ANAEROBE IDENT EACH: CPT | Performed by: SURGERY

## 2022-09-14 PROCEDURE — 86901 BLOOD TYPING SEROLOGIC RH(D): CPT | Performed by: SURGERY

## 2022-09-14 PROCEDURE — 82330 ASSAY OF CALCIUM: CPT

## 2022-09-14 PROCEDURE — 87075 CULTR BACTERIA EXCEPT BLOOD: CPT | Performed by: SURGERY

## 2022-09-14 PROCEDURE — 87070 CULTURE OTHR SPECIMN AEROBIC: CPT | Performed by: SURGERY

## 2022-09-14 PROCEDURE — 83605 ASSAY OF LACTIC ACID: CPT | Performed by: EMERGENCY MEDICINE

## 2022-09-14 PROCEDURE — 05HY33Z INSERTION OF INFUSION DEVICE INTO UPPER VEIN, PERCUTANEOUS APPROACH: ICD-10-PCS | Performed by: SURGERY

## 2022-09-14 PROCEDURE — 77001 FLUOROGUIDE FOR VEIN DEVICE: CPT | Performed by: SURGERY

## 2022-09-14 PROCEDURE — 81001 URINALYSIS AUTO W/SCOPE: CPT | Performed by: NURSE PRACTITIONER

## 2022-09-14 PROCEDURE — 86900 BLOOD TYPING SEROLOGIC ABO: CPT | Performed by: SURGERY

## 2022-09-14 PROCEDURE — 85025 COMPLETE CBC W/AUTO DIFF WBC: CPT | Performed by: EMERGENCY MEDICINE

## 2022-09-14 PROCEDURE — 83605 ASSAY OF LACTIC ACID: CPT | Performed by: ANESTHESIOLOGY

## 2022-09-14 PROCEDURE — 94002 VENT MGMT INPAT INIT DAY: CPT

## 2022-09-14 PROCEDURE — 87185 SC STD ENZYME DETCJ PER NZM: CPT | Performed by: SURGERY

## 2022-09-14 PROCEDURE — 80053 COMPREHEN METABOLIC PANEL: CPT | Performed by: EMERGENCY MEDICINE

## 2022-09-14 PROCEDURE — 87186 SC STD MICRODIL/AGAR DIL: CPT | Performed by: SURGERY

## 2022-09-14 PROCEDURE — 99214 OFFICE O/P EST MOD 30 MIN: CPT | Performed by: NURSE PRACTITIONER

## 2022-09-14 PROCEDURE — 97605 NEG PRS WND THER DME<=50SQCM: CPT | Performed by: SURGERY

## 2022-09-14 PROCEDURE — NC001 PR NO CHARGE: Performed by: SURGERY

## 2022-09-14 PROCEDURE — 84295 ASSAY OF SERUM SODIUM: CPT

## 2022-09-14 PROCEDURE — 0WJG0ZZ INSPECTION OF PERITONEAL CAVITY, OPEN APPROACH: ICD-10-PCS | Performed by: SURGERY

## 2022-09-14 PROCEDURE — 84132 ASSAY OF SERUM POTASSIUM: CPT

## 2022-09-14 RX ORDER — CHLORHEXIDINE GLUCONATE 0.12 MG/ML
15 RINSE ORAL EVERY 12 HOURS SCHEDULED
Status: DISCONTINUED | OUTPATIENT
Start: 2022-09-14 | End: 2022-09-17

## 2022-09-14 RX ORDER — ALBUMIN, HUMAN INJ 5% 5 %
SOLUTION INTRAVENOUS CONTINUOUS PRN
Status: DISCONTINUED | OUTPATIENT
Start: 2022-09-14 | End: 2022-09-14

## 2022-09-14 RX ORDER — HEPARIN SODIUM 5000 [USP'U]/ML
7500 INJECTION, SOLUTION INTRAVENOUS; SUBCUTANEOUS EVERY 8 HOURS SCHEDULED
Status: DISCONTINUED | OUTPATIENT
Start: 2022-09-15 | End: 2022-09-15 | Stop reason: CLARIF

## 2022-09-14 RX ORDER — PROPOFOL 10 MG/ML
5-50 INJECTION, EMULSION INTRAVENOUS
Status: DISCONTINUED | OUTPATIENT
Start: 2022-09-14 | End: 2022-09-16

## 2022-09-14 RX ORDER — SODIUM CHLORIDE 9 MG/ML
100 INJECTION, SOLUTION INTRAVENOUS CONTINUOUS
Status: DISCONTINUED | OUTPATIENT
Start: 2022-09-14 | End: 2022-09-14

## 2022-09-14 RX ORDER — MIDAZOLAM HYDROCHLORIDE 2 MG/2ML
INJECTION, SOLUTION INTRAMUSCULAR; INTRAVENOUS AS NEEDED
Status: DISCONTINUED | OUTPATIENT
Start: 2022-09-14 | End: 2022-09-14

## 2022-09-14 RX ORDER — ACETAMINOPHEN 325 MG/1
975 TABLET ORAL ONCE
Status: COMPLETED | OUTPATIENT
Start: 2022-09-14 | End: 2022-09-14

## 2022-09-14 RX ORDER — SODIUM CHLORIDE, SODIUM LACTATE, POTASSIUM CHLORIDE, CALCIUM CHLORIDE 600; 310; 30; 20 MG/100ML; MG/100ML; MG/100ML; MG/100ML
INJECTION, SOLUTION INTRAVENOUS CONTINUOUS PRN
Status: DISCONTINUED | OUTPATIENT
Start: 2022-09-14 | End: 2022-09-14

## 2022-09-14 RX ORDER — FENTANYL CITRATE 50 UG/ML
INJECTION, SOLUTION INTRAMUSCULAR; INTRAVENOUS AS NEEDED
Status: DISCONTINUED | OUTPATIENT
Start: 2022-09-14 | End: 2022-09-14

## 2022-09-14 RX ORDER — CHLORHEXIDINE GLUCONATE 0.12 MG/ML
15 RINSE ORAL EVERY 12 HOURS SCHEDULED
Status: DISCONTINUED | OUTPATIENT
Start: 2022-09-14 | End: 2022-09-14 | Stop reason: SDUPTHER

## 2022-09-14 RX ORDER — METRONIDAZOLE 500 MG/100ML
500 INJECTION, SOLUTION INTRAVENOUS ONCE
Status: COMPLETED | OUTPATIENT
Start: 2022-09-14 | End: 2022-09-14

## 2022-09-14 RX ORDER — ROCURONIUM BROMIDE 10 MG/ML
INJECTION, SOLUTION INTRAVENOUS AS NEEDED
Status: DISCONTINUED | OUTPATIENT
Start: 2022-09-14 | End: 2022-09-14

## 2022-09-14 RX ORDER — MAGNESIUM HYDROXIDE 1200 MG/15ML
LIQUID ORAL AS NEEDED
Status: DISCONTINUED | OUTPATIENT
Start: 2022-09-14 | End: 2022-09-14 | Stop reason: HOSPADM

## 2022-09-14 RX ORDER — PROPOFOL 10 MG/ML
INJECTION, EMULSION INTRAVENOUS AS NEEDED
Status: DISCONTINUED | OUTPATIENT
Start: 2022-09-14 | End: 2022-09-14

## 2022-09-14 RX ORDER — LIDOCAINE HYDROCHLORIDE 20 MG/ML
INJECTION, SOLUTION EPIDURAL; INFILTRATION; INTRACAUDAL; PERINEURAL AS NEEDED
Status: DISCONTINUED | OUTPATIENT
Start: 2022-09-14 | End: 2022-09-14

## 2022-09-14 RX ORDER — FENTANYL CITRATE 50 UG/ML
50 INJECTION, SOLUTION INTRAMUSCULAR; INTRAVENOUS
Status: DISCONTINUED | OUTPATIENT
Start: 2022-09-14 | End: 2022-09-16

## 2022-09-14 RX ORDER — SODIUM CHLORIDE 9 MG/ML
INJECTION, SOLUTION INTRAVENOUS CONTINUOUS PRN
Status: DISCONTINUED | OUTPATIENT
Start: 2022-09-14 | End: 2022-09-14

## 2022-09-14 RX ORDER — SODIUM CHLORIDE, SODIUM GLUCONATE, SODIUM ACETATE, POTASSIUM CHLORIDE, MAGNESIUM CHLORIDE, SODIUM PHOSPHATE, DIBASIC, AND POTASSIUM PHOSPHATE .53; .5; .37; .037; .03; .012; .00082 G/100ML; G/100ML; G/100ML; G/100ML; G/100ML; G/100ML; G/100ML
125 INJECTION, SOLUTION INTRAVENOUS CONTINUOUS
Status: DISCONTINUED | OUTPATIENT
Start: 2022-09-14 | End: 2022-09-16

## 2022-09-14 RX ADMIN — PROPOFOL 10 MCG/KG/MIN: 10 INJECTION, EMULSION INTRAVENOUS at 21:19

## 2022-09-14 RX ADMIN — PIPERACILLIN SODIUM AND TAZOBACTAM SODIUM 3.38 G: 36; 4.5 INJECTION, POWDER, LYOPHILIZED, FOR SOLUTION INTRAVENOUS at 12:24

## 2022-09-14 RX ADMIN — CEFAZOLIN SODIUM 3000 MG: 1 INJECTION, POWDER, FOR SOLUTION INTRAMUSCULAR; INTRAVENOUS at 18:38

## 2022-09-14 RX ADMIN — ROCURONIUM BROMIDE 50 MG: 50 INJECTION, SOLUTION INTRAVENOUS at 17:57

## 2022-09-14 RX ADMIN — FENTANYL CITRATE 100 MCG: 50 INJECTION INTRAMUSCULAR; INTRAVENOUS at 17:57

## 2022-09-14 RX ADMIN — LIDOCAINE HYDROCHLORIDE 100 MG: 20 INJECTION, SOLUTION EPIDURAL; INFILTRATION; INTRACAUDAL at 17:57

## 2022-09-14 RX ADMIN — SODIUM CHLORIDE, SODIUM LACTATE, POTASSIUM CHLORIDE, AND CALCIUM CHLORIDE: .6; .31; .03; .02 INJECTION, SOLUTION INTRAVENOUS at 19:06

## 2022-09-14 RX ADMIN — PROPOFOL 200 MG: 10 INJECTION, EMULSION INTRAVENOUS at 17:57

## 2022-09-14 RX ADMIN — SODIUM CHLORIDE 1000 ML: 0.9 INJECTION, SOLUTION INTRAVENOUS at 13:27

## 2022-09-14 RX ADMIN — SODIUM CHLORIDE, SODIUM GLUCONATE, SODIUM ACETATE, POTASSIUM CHLORIDE, MAGNESIUM CHLORIDE, SODIUM PHOSPHATE, DIBASIC, AND POTASSIUM PHOSPHATE 125 ML/HR: .53; .5; .37; .037; .03; .012; .00082 INJECTION, SOLUTION INTRAVENOUS at 21:16

## 2022-09-14 RX ADMIN — IOHEXOL 100 ML: 350 INJECTION, SOLUTION INTRAVENOUS at 13:52

## 2022-09-14 RX ADMIN — HEPARIN SODIUM 7500 UNITS: 5000 INJECTION INTRAVENOUS; SUBCUTANEOUS at 23:44

## 2022-09-14 RX ADMIN — ROCURONIUM BROMIDE 50 MG: 50 INJECTION, SOLUTION INTRAVENOUS at 20:10

## 2022-09-14 RX ADMIN — FENTANYL CITRATE 50 MCG: 50 INJECTION INTRAMUSCULAR; INTRAVENOUS at 19:09

## 2022-09-14 RX ADMIN — ROCURONIUM BROMIDE 50 MG: 50 INJECTION, SOLUTION INTRAVENOUS at 18:41

## 2022-09-14 RX ADMIN — FENTANYL CITRATE 50 MCG: 50 INJECTION INTRAMUSCULAR; INTRAVENOUS at 23:38

## 2022-09-14 RX ADMIN — ROCURONIUM BROMIDE 40 MG: 50 INJECTION, SOLUTION INTRAVENOUS at 19:27

## 2022-09-14 RX ADMIN — ACETAMINOPHEN 975 MG: 325 TABLET ORAL at 14:23

## 2022-09-14 RX ADMIN — ALBUMIN (HUMAN): 12.5 INJECTION, SOLUTION INTRAVENOUS at 20:04

## 2022-09-14 RX ADMIN — METRONIDAZOLE: 500 INJECTION, SOLUTION INTRAVENOUS at 18:38

## 2022-09-14 RX ADMIN — SODIUM CHLORIDE 1000 ML: 0.9 INJECTION, SOLUTION INTRAVENOUS at 14:43

## 2022-09-14 RX ADMIN — MIDAZOLAM 2 MG: 1 INJECTION INTRAMUSCULAR; INTRAVENOUS at 20:14

## 2022-09-14 RX ADMIN — SODIUM CHLORIDE: 0.9 INJECTION, SOLUTION INTRAVENOUS at 19:59

## 2022-09-14 RX ADMIN — SODIUM CHLORIDE: 0.9 INJECTION, SOLUTION INTRAVENOUS at 18:47

## 2022-09-14 RX ADMIN — SODIUM CHLORIDE: 0.9 INJECTION, SOLUTION INTRAVENOUS at 17:42

## 2022-09-14 RX ADMIN — CHLORHEXIDINE GLUCONATE 0.12% ORAL RINSE 15 ML: 1.2 LIQUID ORAL at 21:17

## 2022-09-14 RX ADMIN — FENTANYL CITRATE 50 MCG: 50 INJECTION INTRAMUSCULAR; INTRAVENOUS at 20:14

## 2022-09-14 NOTE — ED PROVIDER NOTES
Pt Name: Sophy Pan  MRN: [id number]  [place] 1955  Age/Sex: 78 y o  male  Date of evaluation: 9/14/2022  PCP: Katia Soto MD    [address] [place] [address]    Chief Complaint   Patient presents with    Abdominal Pain     Pt reports right lower abdominal pain with hernia ongoing for years  Pt reports wound appearing over hernia 2 months ago  Reports fever x5 days         HPI    Ermalinda Olp presents to the Emergency Department complaining of abdominal pain and fever  He has a known ventral hernia that is large but it has gotten a lot more firm and painful  He has been taking tylenol for his fever but feels like he is getting worse  No other complaints  HPI      Past Medical and Surgical History    Past Medical History:   Diagnosis Date    Chronic kidney disease     Hypertension     Obesity     Sepsis ([address] )     Urinary tract infection without hematuria 4/3/2020       Past Surgical History:   Procedure Laterality Date    APPENDECTOMY      NASAL SEPTUM SURGERY         Family History   Problem Relation Age of Onset    Diabetes Father     Prostate cancer Brother        Social History     Tobacco Use    Smoking status: Never Smoker    Smokeless tobacco: Never Used    Tobacco comment: no exposure to passive smoke   Vaping Use    Vaping Use: Never used   Substance Use Topics    Alcohol use: No    Drug use: Not Currently           Allergies    Allergies   Allergen Reactions    Codeine Swelling     Other reaction(s): SWELLING OF FACE  Other reaction(s): SWELLING OF FACE  Other reaction(s): SWELLING OF FACE  Swollen eyes/swelling    Seasonal Ic [Cholestatin] Allergic Rhinitis       Home Medications    Prior to Admission medications    Medication Sig Start Date End Date Taking?  Authorizing Provider   allopurinol (ZYLOPRIM) 100 mg tablet Take 100 mg by mouth daily  12/4/18   Historical Provider, MD   ammonium lactate (LAC-HYDRIN) 12 % lotion Apply topically 2 (two) times a day 7/19/21   Sandra Mason DO   furosemide (LASIX) 20 mg tablet Take 1 tablet (20 mg total) by mouth daily For swelling 7/12/22   KELSEY Finney   hydrocortisone 2 5 % cream Apply topically 2 (two) times a day  Patient not taking: No sig reported 8/18/21   Janae Reynoso MD   lisinopril (ZESTRIL) 20 mg tablet Take 1 tablet (20 mg total) by mouth daily 8/16/21 7/15/22  Janae Reynoso MD   metoprolol succinate (TOPROL-XL) 100 mg 24 hr tablet Take 1 tablet (100 mg total) by mouth daily 4/21/22   Janae Reynoso MD   nystatin (MYCOSTATIN) powder Apply topically 2 (two) times a day 8/18/21   Janae Reynoso MD   Potassium Citrate ER 15 MEQ (1620 MG) TBCR Take 1 tablet by mouth daily  12/4/18   Historical Provider, MD   psyllium (METAMUCIL) 58 6 % packet Take 1 packet by mouth daily    Historical Provider, MD   tamsulosin (FLOMAX) 0 4 mg 1-2 daily as directed 7/27/22   Janae Reynoso MD   TRAVATAN Z 0 004 % ophthalmic solution Administer 1 drop to both eyes daily at bedtime  6/1/18   Historical Provider, MD           Review of Systems    Review of Systems   Constitutional: Positive for chills and fever  HENT: Negative for ear pain and sore throat  Eyes: Negative for pain and visual disturbance  Respiratory: Negative for cough and shortness of breath  Cardiovascular: Negative for chest pain and palpitations  Gastrointestinal: Positive for abdominal distention and abdominal pain  Negative for vomiting  Genitourinary: Negative for dysuria and hematuria  Musculoskeletal: Negative for arthralgias and back pain  Skin: Positive for color change and wound  Negative for rash  Neurological: Negative for seizures and syncope  All other systems reviewed and are negative          Physical Exam      ED Triage Vitals   Temperature Pulse Respirations Blood Pressure SpO2   09/14/22 1135 09/14/22 1128 09/14/22 1128 09/14/22 1128 09/14/22 1128   98 3 °F (36 8 °C) 102 20 125/71 97 %      Temp Source Heart Rate Source Patient Position - Orthostatic VS BP Location FiO2 (%)   09/14/22 1135 09/14/22 1128 09/14/22 1128 09/14/22 1128 --   Oral Monitor Sitting Right arm       Pain Score       09/14/22 1128       4               Physical Exam  Vitals and nursing note reviewed  Constitutional:       Appearance: He is ill-appearing  HENT:      Head: Normocephalic  Cardiovascular:      Rate and Rhythm: Tachycardia present  Pulmonary:      Effort: Pulmonary effort is normal       Breath sounds: Normal breath sounds  Abdominal:      General: Abdomen is protuberant  There is distension  Palpations: Abdomen is rigid  There is mass  Tenderness: There is abdominal tenderness  Hernia: A hernia is present  Hernia is present in the ventral area  Neurological:      General: No focal deficit present  Mental Status: He is alert and oriented to person, place, and time  Assessment and Plan    Ailin Sharpe is a 78 y o  male who presents with abdominal pain and fever  Physical examination remarkable for same  Plan will be to perform diagnostic testing and treat symptomatically  MDM    Diagnostic Results      EKG INTERPRETATION  EKG Interpretation    EKG interpreted by me  Interpretation by Baltazar Chatterjee DO  EKG reviewed and interpreted independently      Labs:    Results for orders placed or performed during the hospital encounter of 09/14/22   CBC and differential   Result Value Ref Range    WBC 12 90 (H) 4 31 - 10 16 Thousand/uL    RBC 3 95 3 88 - 5 62 Million/uL    Hemoglobin 12 1 12 0 - 17 0 g/dL    Hematocrit 37 1 36 5 - 49 3 %    MCV 94 82 - 98 fL    MCH 30 6 26 8 - 34 3 pg    MCHC 32 6 31 4 - 37 4 g/dL    RDW 13 4 11 6 - 15 1 %    MPV 8 4 (L) 8 9 - 12 7 fL    Platelets 962 (H) 482 - 390 Thousands/uL    nRBC 0 /100 WBCs    Neutrophils Relative 85 (H) 43 - 75 %    Immat GRANS % 0 0 - 2 %    Lymphocytes Relative 8 (L) 14 - 44 %    Monocytes Relative 7 4 - 12 %    Eosinophils Relative 0 0 - 6 % Basophils Relative 0 0 - 1 %    Neutrophils Absolute 10 78 (H) 1 85 - 7 62 Thousands/µL    Immature Grans Absolute 0 05 0 00 - 0 20 Thousand/uL    Lymphocytes Absolute 1 09 0 60 - 4 47 Thousands/µL    Monocytes Absolute 0 93 0 17 - 1 22 Thousand/µL    Eosinophils Absolute 0 02 0 00 - 0 61 Thousand/µL    Basophils Absolute 0 03 0 00 - 0 10 Thousands/µL   Comprehensive metabolic panel   Result Value Ref Range    Sodium 134 (L) 135 - 147 mmol/L    Potassium 3 9 3 5 - 5 3 mmol/L    Chloride 99 96 - 108 mmol/L    CO2 27 21 - 32 mmol/L    ANION GAP 8 4 - 13 mmol/L    BUN 21 5 - 25 mg/dL    Creatinine 1 64 (H) 0 60 - 1 30 mg/dL    Glucose 115 65 - 140 mg/dL    Calcium 8 8 8 3 - 10 1 mg/dL    Corrected Calcium 9 9 8 3 - 10 1 mg/dL    AST 13 5 - 45 U/L    ALT 15 12 - 78 U/L    Alkaline Phosphatase 55 46 - 116 U/L    Total Protein 8 0 6 4 - 8 4 g/dL    Albumin 2 6 (L) 3 5 - 5 0 g/dL    Total Bilirubin 1 25 (H) 0 20 - 1 00 mg/dL    eGFR 42 ml/min/1 73sq m   Lactic acid   Result Value Ref Range    LACTIC ACID 1 1 0 5 - 2 0 mmol/L   Procalcitonin   Result Value Ref Range    Procalcitonin 0 75 (H) <=0 25 ng/ml   Protime-INR   Result Value Ref Range    Protime 15 1 (H) 11 6 - 14 5 seconds    INR 1 19 0 84 - 1 19   APTT   Result Value Ref Range    PTT 39 (H) 23 - 37 seconds   ECG 12 lead   Result Value Ref Range    Ventricular Rate 98 BPM    Atrial Rate 98 BPM    DC Interval 138 ms    QRSD Interval 70 ms    QT Interval 328 ms    QTC Interval 418 ms    P Axis 76 degrees    QRS Axis 0 degrees    T Wave Axis 36 degrees       All labs reviewed and utilized in the medical decision making process    Radiology:    CT abdomen pelvis with contrast   Final Result      1  Findings concerning for high-grade small bowel obstruction within a large right lateral abdominal wall hernia with transition point in the distal ileum as it exits the hernia sac        2   Additional, more upstream short segment small bowel obstruction with likely 2 transition points as the upstream and downstream segments of bowel enter and exit the hernia, suggesting closed-loop obstruction  3  Unchanged 3 4 cm fat-containing left renal lesion, suggesting an angiomyolipoma  Right hepatic lobe hypodense lesion measuring 1 4 cm, not significantly changed since 7/12/2021, but not seen in 2009  Both of these lesions can be further evaluated    with nonemergent MRI abdomen with and without contrast        I personally discussed this study with DEANNA FU on 9/14/2022 at 2:24 PM                Workstation performed: [id number]             All radiology studies independently viewed by me and interpreted by the radiologist     Procedure    Procedures      ED Course of Care and Re-Assessments    I reached out to surgical team who evaluated the patient in the ER         Medications   sodium chloride 0 9 % infusion (has no administration in time range)   ceFAZolin (ANCEF) 3,000 mg in dextrose 5 % 100 mL IVPB ( Intravenous Dose Auto Held 9/15/22 0600)   metroNIDAZOLE (FLAGYL) IVPB (premix) 500 mg 100 mL ( Intravenous MAR Hold 9/14/22 1719)   piperacillin-tazobactam (ZOSYN) IVPB 3 375 g (0 g Intravenous Stopped 9/14/22 1254)   sodium chloride 0 9 % bolus 1,000 mL (0 mL Intravenous Stopped 9/14/22 1427)   acetaminophen (TYLENOL) tablet 975 mg (975 mg Oral Given 9/14/22 1423)   iohexol (OMNIPAQUE) 350 MG/ML injection (MULTI-DOSE) 100 mL (100 mL Intravenous Given 9/14/22 1352)   sodium chloride 0 9 % bolus 1,000 mL (0 mL Intravenous Stopped 9/14/22 1543)           FINAL IMPRESSION    Final diagnoses:   Strangulated hernia of abdominal wall         DISPOSITION/PLAN    Time reflects when diagnosis was documented in both MDM as applicable and the Disposition within this note     Time User Action Codes Description Comment    9/14/2022  4:26 PM Jay Fothergill Add [K43 6] Strangulated hernia of abdominal wall       ED Disposition     None      Follow-up Information None           PATIENT REFERRED TO:    No follow-up provider specified  DISCHARGE MEDICATIONS:    Current Discharge Medication List      CONTINUE these medications which have NOT CHANGED    Details   allopurinol (ZYLOPRIM) 100 mg tablet Take 100 mg by mouth daily       ammonium lactate (LAC-HYDRIN) 12 % lotion Apply topically 2 (two) times a day  Qty: 400 g, Refills: 0    Associated Diagnoses: Lymphedema of both lower extremities      furosemide (LASIX) 20 mg tablet Take 1 tablet (20 mg total) by mouth daily For swelling  Qty: 90 tablet, Refills: 3    Associated Diagnoses: Essential hypertension      lisinopril (ZESTRIL) 20 mg tablet Take 1 tablet (20 mg total) by mouth daily  Qty: 90 tablet, Refills: 3    Associated Diagnoses: HTN (hypertension)      metoprolol succinate (TOPROL-XL) 100 mg 24 hr tablet Take 1 tablet (100 mg total) by mouth daily  Qty: 90 tablet, Refills: 3    Associated Diagnoses: Essential hypertension      nystatin (MYCOSTATIN) powder Apply topically 2 (two) times a day  Qty: 60 g, Refills: 12    Associated Diagnoses: Lymphedema of both lower extremities      Potassium Citrate ER 15 MEQ (1620 MG) TBCR Take 1 tablet by mouth daily       tamsulosin (FLOMAX) 0 4 mg 1-2 daily as directed  Qty: 180 capsule, Refills: 3    Associated Diagnoses: Chronic renal failure, unspecified CKD stage      TRAVATAN Z 0 004 % ophthalmic solution Administer 1 drop to both eyes daily at bedtime       psyllium (METAMUCIL) 58 6 % packet Take 1 packet by mouth daily             No discharge procedures on file           Woodrow Bruno, [address] [place] [address], DO  09/14/22 4363

## 2022-09-14 NOTE — ANESTHESIA PREPROCEDURE EVALUATION
Procedure:  LAPAROTOMY EXPLORATORY W/ BOWEL RESECTION (N/A Abdomen)    Relevant Problems   CARDIO   (+) Essential hypertension      /RENAL   (+) Chronic renal failure, stage 3 (moderate) (HCC)   (+) Kidney stones      MUSCULOSKELETAL   (+) Gout      PULMONARY   (+) DELIA (obstructive sleep apnea)        Physical Exam    Airway    Mallampati score: III  TM Distance: >3 FB  Neck ROM: full     Dental   No notable dental hx     Cardiovascular  Rhythm: regular, Rate: normal, Cardiovascular exam normal    Pulmonary  Pulmonary exam normal Breath sounds clear to auscultation,     Other Findings        Anesthesia Plan  ASA Score- 3     Anesthesia Type- general with ASA Monitors  Additional Monitors:   Airway Plan: ETT  Plan Factors-Exercise tolerance (METS): >4 METS  Chart reviewed  Patient summary reviewed  Patient is not a current smoker  Induction- intravenous  Postoperative Plan-     Informed Consent- Anesthetic plan and risks discussed with patient

## 2022-09-14 NOTE — H&P
H&P: General Surgery  Benigno Cade 78 y o  male MRN: [id number]  Unit/Bed#: ED 09 Encounter: [id number]        Assessment/Plan     Assessment:  Patient is a 78 y o  male with pmhx of morbid obesity, hypertension, CKD, left RCC status post cryoablation, and a large chronically incarcerated incisional hernia at the site of open remote appendectomy who presented a small-bowel obstruction within the hernia and possible strangulation  Afebrile 101 4, persistent tachycardia to 110 despite fluid resuscitation, no hypotension and on room air  WBC:  12 9; Hb 1; LA:  1; procalcitonin:  0 75, Crt; 1 64 (baseline 1 4)  CTAP showed Findings concerning for high-grade small bowel obstruction within a large right lateral abdominal wall hernia with transition point in the distal ileum as it exits the hernia sac  Additional, more upstream short segment small bowel obstruction with likely 2 transition points as the upstream and downstream segments of bowel enter and exit the hernia, suggesting closed-loop obstruction  Plan:  -urgent exploratory laparotomy  -postoperative ICU admission  -NPO  -IV antibiotics; Ancef/Flagyl  -fluid resuscitation  -pain and nausea control  -DVT prophylaxis    History of Present Illness     HPI:  Benigno Cade is a 78 y o  male with pmhx of morbid obesity, hypertension, CKD, left RCC status post cryoablation, and a large chronically incarcerated incisional hernia at the site of open remote appendectomy who presented with 5 days of progressive fever and malaise as well as progressive bloating  Additionally he reports that over the past 9 months he has had chronic diarrhea are and that since the beginning of his symptoms are he has are only been having small volume watery bowel movements  His wife does report that she has noticed some increased redness over the site of the hernia  He denies nausea and vomiting, chest pain, he shortness of breath, urinary symptoms    This hernia has been present for many years and based on counseling from his PCP history does not to undergo repair  The hernia has been progressively enlarging but he denies any episodes of similar symptoms      Review of Systems   As above, otherwise negative  Consults    Historical Information   Past Medical History:   Diagnosis Date    Chronic kidney disease     Hypertension     Obesity     Sepsis ([address] )     Urinary tract infection without hematuria 4/3/2020     Past Surgical History:   Procedure Laterality Date    APPENDECTOMY      NASAL SEPTUM SURGERY       Social History   Social History     Substance and Sexual Activity   Alcohol Use No     Social History     Substance and Sexual Activity   Drug Use Not Currently     Social History     Tobacco Use   Smoking Status Never Smoker   Smokeless Tobacco Never Used   Tobacco Comment    no exposure to passive smoke     Family History: non-contributory    Meds/Allergies   all medications and allergies reviewed  Allergies   Allergen Reactions    Codeine Swelling     Other reaction(s): SWELLING OF FACE  Other reaction(s): SWELLING OF FACE  Other reaction(s): SWELLING OF FACE  Swollen eyes/swelling    Seasonal Ic [Cholestatin] Allergic Rhinitis       Objective   First Vitals:   Blood Pressure: 125/71 (09/14/22 1128)  Pulse: 102 (09/14/22 1128)  Temperature: 98 3 °F (36 8 °C) (09/14/22 1135)  Temp Source: Oral (09/14/22 1135)  Respirations: 20 (09/14/22 1128)  Weight - Scale: (!) 167 kg (368 lb 2 7 oz) (09/14/22 1125)  SpO2: 97 % (09/14/22 1128)    Current Vitals:   Blood Pressure: 136/61 (09/14/22 1316)  Pulse: (!) 108 (09/14/22 1316)  Temperature: (!) 101 4 °F (38 6 °C) (09/14/22 1316)  Temp Source: Oral (09/14/22 1316)  Respirations: 20 (09/14/22 1316)  Weight - Scale: (!) 167 kg (368 lb 2 7 oz) (09/14/22 1125)  SpO2: 97 % (09/14/22 1316)    No intake or output data in the 24 hours ending 09/14/22 1650    Invasive Devices  Report    Peripheral Intravenous Line  Duration Peripheral IV 09/14/22 Left Antecubital <1 day                Physical Exam   Gen:  Obese, ill-appearing male   HEENT: EOMI  CV:  Tachycardic   Lungs: Normal respiratory effort  Abd:  Obese abdomen, large right-sided ventral hernia at the site of open appendectomy scar, tense over the hernia otherwise moderately distended, moderate tenderness over the hernia with blanching erythema of the the overlying skin the   Neuro: AxO x3      Tino Squires MD      Lab Results: I have personally reviewed pertinent lab results  Imaging: I have personally reviewed pertinent reports  EKG, Pathology, and Other Studies: I have personally reviewed pertinent reports        Code Status: Prior  Advance Directive and Living Will:      Power of :    POLST:

## 2022-09-14 NOTE — PROGRESS NOTES
Virtual Regular Visit    Verification of patient location:    Patient is located in the following state in which I hold an active license [place]      Assessment/Plan:    Problem List Items Addressed This Visit    None     Visit Diagnoses     Fever, unspecified fever cause    -  Primary    Relevant Orders    Transfer to other facility (Completed)    Generalized abdominal pain        Relevant Orders    Transfer to other facility (Completed)               Reason for visit is   Chief Complaint   Patient presents with    Fever    Diarrhea     X 5 days         Encounter provider KELSEY Murray    Provider located at [facility] AT [address] [address]      Recent Visits  No visits were found meeting these conditions  Showing recent visits within past 7 days and meeting all other requirements  Today's Visits  Date Type Provider Dept   09/14/22 Telemedicine Timmy Paulino, [address]   Showing today's visits and meeting all other requirements  Future Appointments  No visits were found meeting these conditions  Showing future appointments within next 150 days and meeting all other requirements       The patient was identified by name and date of birth  Vinay Keith was informed that this is a telemedicine visit and that the visit is being conducted through [address] [place] [address] Now and patient was informed that this is a secure, HIPAA-compliant platform  He agrees to proceed     My office door was closed  The patient was notified the following individuals were present in the room [employer], [employer]  He acknowledged consent and understanding of privacy and security of the video platform  The patient has agreed to participate and understands they can discontinue the visit at any time  Patient is aware this is a billable service  Subjective  Vinay Keith is a 78 y o  male with fever and abdominal pain         Presents today via telemedicine for complaints of fever since Saturday 9/10/2022  Highest at home 102 0 and today's temperature prior to tylenol is 101 7  Fever has consistently run over 101 without tylenol and with tylenol will go down into the 100 range but never lower than 100 4  He does complain that his abdomen is hard and is painful as well  He has chronic diarrhea of sometime as well  Discussed going to ER for evaluation since a fever for this long is not normal          Past Medical History:   Diagnosis Date    Chronic kidney disease     Hypertension     Obesity     Sepsis ([address] )     Urinary tract infection without hematuria 4/3/2020       Past Surgical History:   Procedure Laterality Date    APPENDECTOMY      NASAL SEPTUM SURGERY         Current Outpatient Medications   Medication Sig Dispense Refill    allopurinol (ZYLOPRIM) 100 mg tablet Take 100 mg by mouth daily       ammonium lactate (LAC-HYDRIN) 12 % lotion Apply topically 2 (two) times a day 400 g 0    furosemide (LASIX) 20 mg tablet Take 1 tablet (20 mg total) by mouth daily For swelling 90 tablet 3    metoprolol succinate (TOPROL-XL) 100 mg 24 hr tablet Take 1 tablet (100 mg total) by mouth daily 90 tablet 3    nystatin (MYCOSTATIN) powder Apply topically 2 (two) times a day 60 g 12    Potassium Citrate ER 15 MEQ (1620 MG) TBCR Take 1 tablet by mouth daily       psyllium (METAMUCIL) 58 6 % packet Take 1 packet by mouth daily      tamsulosin (FLOMAX) 0 4 mg 1-2 daily as directed 180 capsule 3    TRAVATAN Z 0 004 % ophthalmic solution Administer 1 drop to both eyes daily at bedtime       hydrocortisone 2 5 % cream Apply topically 2 (two) times a day (Patient not taking: No sig reported) 453 g 6    lisinopril (ZESTRIL) 20 mg tablet Take 1 tablet (20 mg total) by mouth daily 90 tablet 3     No current facility-administered medications for this visit          Allergies   Allergen Reactions    Codeine Swelling     Other reaction(s): SWELLING OF FACE  Other reaction(s): SWELLING OF FACE  Other reaction(s): SWELLING OF FACE  Swollen eyes/swelling    Seasonal Ic [Cholestatin] Allergic Rhinitis       Review of Systems   Constitutional: Positive for chills, fatigue and fever  Respiratory: Negative for cough and shortness of breath  Cardiovascular: Negative for chest pain and palpitations  Gastrointestinal: Positive for abdominal pain (when up but not when laying down) and diarrhea  Genitourinary: Negative for difficulty urinating and dysuria  Psychiatric/Behavioral: Negative for dysphoric mood  The patient is not nervous/anxious  Video Exam    There were no vitals filed for this visit  Physical Exam  Constitutional:       Appearance: He is obese  He is ill-appearing  Pulmonary:      Effort: Pulmonary effort is normal  No respiratory distress (No dyspnea or cough noted while conversing)  Neurological:      Mental Status: He is alert and oriented to person, place, and time     Psychiatric:         Mood and Affect: Mood normal          Behavior: Behavior normal           I spent 15 minutes with patient today in which greater than 50% of the time was spent in counseling/coordination of care regarding fever and abdominal pain

## 2022-09-15 ENCOUNTER — ANESTHESIA (INPATIENT)
Dept: PERIOP | Facility: HOSPITAL | Age: 67
DRG: 853 | End: 2022-09-15
Payer: MEDICARE

## 2022-09-15 ENCOUNTER — TELEPHONE (OUTPATIENT)
Dept: SURGERY | Facility: CLINIC | Age: 67
End: 2022-09-15

## 2022-09-15 ENCOUNTER — ANESTHESIA EVENT (INPATIENT)
Dept: PERIOP | Facility: HOSPITAL | Age: 67
DRG: 853 | End: 2022-09-15
Payer: MEDICARE

## 2022-09-15 LAB
ALBUMIN SERPL BCP-MCNC: 1.8 G/DL (ref 3.5–5)
ALBUMIN SERPL BCP-MCNC: 2.1 G/DL (ref 3.5–5)
ALP SERPL-CCNC: 47 U/L (ref 46–116)
ALP SERPL-CCNC: 47 U/L (ref 46–116)
ALT SERPL W P-5'-P-CCNC: 14 U/L (ref 12–78)
ALT SERPL W P-5'-P-CCNC: 20 U/L (ref 12–78)
ANION GAP SERPL CALCULATED.3IONS-SCNC: 10 MMOL/L (ref 4–13)
ANION GAP SERPL CALCULATED.3IONS-SCNC: 13 MMOL/L (ref 4–13)
AST SERPL W P-5'-P-CCNC: 14 U/L (ref 5–45)
AST SERPL W P-5'-P-CCNC: 23 U/L (ref 5–45)
BACTERIA UR QL AUTO: ABNORMAL /HPF
BASE EX.OXY STD BLDV CALC-SCNC: 64.3 % (ref 60–80)
BASE EXCESS BLDA CALC-SCNC: -4 MMOL/L (ref -2–3)
BASE EXCESS BLDA CALC-SCNC: -4.3 MMOL/L
BASE EXCESS BLDV CALC-SCNC: -7.4 MMOL/L
BASOPHILS # BLD MANUAL: 0 THOUSAND/UL (ref 0–0.1)
BASOPHILS # BLD MANUAL: 0 THOUSAND/UL (ref 0–0.1)
BASOPHILS NFR MAR MANUAL: 0 % (ref 0–1)
BASOPHILS NFR MAR MANUAL: 0 % (ref 0–1)
BILIRUB SERPL-MCNC: 0.7 MG/DL (ref 0.2–1)
BILIRUB SERPL-MCNC: 1.02 MG/DL (ref 0.2–1)
BILIRUB UR QL STRIP: NEGATIVE
BUN SERPL-MCNC: 23 MG/DL (ref 5–25)
BUN SERPL-MCNC: 24 MG/DL (ref 5–25)
CA-I BLD-SCNC: 0.97 MMOL/L (ref 1.12–1.32)
CA-I BLD-SCNC: 1.14 MMOL/L (ref 1.12–1.32)
CALCIUM ALBUM COR SERPL-MCNC: 9.3 MG/DL (ref 8.3–10.1)
CALCIUM ALBUM COR SERPL-MCNC: 9.7 MG/DL (ref 8.3–10.1)
CALCIUM SERPL-MCNC: 7.5 MG/DL (ref 8.3–10.1)
CALCIUM SERPL-MCNC: 8.2 MG/DL (ref 8.3–10.1)
CHLORIDE SERPL-SCNC: 104 MMOL/L (ref 96–108)
CHLORIDE SERPL-SCNC: 104 MMOL/L (ref 96–108)
CLARITY UR: CLEAR
CO2 SERPL-SCNC: 20 MMOL/L (ref 21–32)
CO2 SERPL-SCNC: 21 MMOL/L (ref 21–32)
COLOR UR: ABNORMAL
CREAT SERPL-MCNC: 1.48 MG/DL (ref 0.6–1.3)
CREAT SERPL-MCNC: 1.91 MG/DL (ref 0.6–1.3)
EOSINOPHIL # BLD MANUAL: 0 THOUSAND/UL (ref 0–0.4)
EOSINOPHIL # BLD MANUAL: 0 THOUSAND/UL (ref 0–0.4)
EOSINOPHIL NFR BLD MANUAL: 0 % (ref 0–6)
EOSINOPHIL NFR BLD MANUAL: 0 % (ref 0–6)
ERYTHROCYTE [DISTWIDTH] IN BLOOD BY AUTOMATED COUNT: 13.6 % (ref 11.6–15.1)
ERYTHROCYTE [DISTWIDTH] IN BLOOD BY AUTOMATED COUNT: 13.8 % (ref 11.6–15.1)
GFR SERPL CREATININE-BSD FRML MDRD: 35 ML/MIN/1.73SQ M
GFR SERPL CREATININE-BSD FRML MDRD: 48 ML/MIN/1.73SQ M
GLUCOSE SERPL-MCNC: 128 MG/DL (ref 65–140)
GLUCOSE SERPL-MCNC: 129 MG/DL (ref 65–140)
GLUCOSE SERPL-MCNC: 142 MG/DL (ref 65–140)
GLUCOSE SERPL-MCNC: 145 MG/DL (ref 65–140)
GLUCOSE SERPL-MCNC: 146 MG/DL (ref 65–140)
GLUCOSE SERPL-MCNC: 155 MG/DL (ref 65–140)
GLUCOSE UR STRIP-MCNC: NEGATIVE MG/DL
HCO3 BLDA-SCNC: 20 MMOL/L (ref 22–28)
HCO3 BLDA-SCNC: 22.9 MMOL/L (ref 24–30)
HCO3 BLDV-SCNC: 19.3 MMOL/L (ref 24–30)
HCT VFR BLD AUTO: 37.3 % (ref 36.5–49.3)
HCT VFR BLD AUTO: 39.1 % (ref 36.5–49.3)
HCT VFR BLD CALC: 33 % (ref 36.5–49.3)
HGB BLD-MCNC: 11.6 G/DL (ref 12–17)
HGB BLD-MCNC: 12.5 G/DL (ref 12–17)
HGB BLDA-MCNC: 11.2 G/DL (ref 12–17)
HGB UR QL STRIP.AUTO: ABNORMAL
HOROWITZ INDEX BLDA+IHG-RTO: 80 MM[HG]
HOROWITZ INDEX BLDA+IHG-RTO: 80 MM[HG]
HYALINE CASTS #/AREA URNS LPF: ABNORMAL /LPF
KETONES UR STRIP-MCNC: NEGATIVE MG/DL
LACTATE SERPL-SCNC: 1.4 MMOL/L (ref 0.5–2)
LEUKOCYTE ESTERASE UR QL STRIP: NEGATIVE
LG PLATELETS BLD QL SMEAR: PRESENT
LYMPHOCYTES # BLD AUTO: 0.63 THOUSAND/UL (ref 0.6–4.47)
LYMPHOCYTES # BLD AUTO: 0.65 THOUSAND/UL (ref 0.6–4.47)
LYMPHOCYTES # BLD AUTO: 4 % (ref 14–44)
LYMPHOCYTES # BLD AUTO: 4 % (ref 14–44)
MAGNESIUM SERPL-MCNC: 1.6 MG/DL (ref 1.6–2.6)
MAGNESIUM SERPL-MCNC: 1.8 MG/DL (ref 1.6–2.6)
MCH RBC QN AUTO: 30.4 PG (ref 26.8–34.3)
MCH RBC QN AUTO: 30.6 PG (ref 26.8–34.3)
MCHC RBC AUTO-ENTMCNC: 31.1 G/DL (ref 31.4–37.4)
MCHC RBC AUTO-ENTMCNC: 32 G/DL (ref 31.4–37.4)
MCV RBC AUTO: 96 FL (ref 82–98)
MCV RBC AUTO: 98 FL (ref 82–98)
METAMYELOCYTES NFR BLD MANUAL: 2 % (ref 0–1)
MONOCYTES # BLD AUTO: 0.63 THOUSAND/UL (ref 0–1.22)
MONOCYTES # BLD AUTO: 1.14 THOUSAND/UL (ref 0–1.22)
MONOCYTES NFR BLD: 4 % (ref 4–12)
MONOCYTES NFR BLD: 7 % (ref 4–12)
NEUTROPHILS # BLD MANUAL: 14.26 THOUSAND/UL (ref 1.85–7.62)
NEUTROPHILS # BLD MANUAL: 14.5 THOUSAND/UL (ref 1.85–7.62)
NEUTS BAND NFR BLD MANUAL: 12 % (ref 0–8)
NEUTS BAND NFR BLD MANUAL: 7 % (ref 0–8)
NEUTS SEG NFR BLD AUTO: 78 % (ref 43–75)
NEUTS SEG NFR BLD AUTO: 82 % (ref 43–75)
NITRITE UR QL STRIP: NEGATIVE
NON-SQ EPI CELLS URNS QL MICRO: ABNORMAL /HPF
O2 CT BLDA-SCNC: 18.7 ML/DL (ref 16–23)
O2 CT BLDV-SCNC: 11.4 ML/DL
OXYHGB MFR BLDA: 98.4 % (ref 94–97)
PCO2 BLD: 24 MMOL/L (ref 21–32)
PCO2 BLD: 49.4 MM HG (ref 42–50)
PCO2 BLDA: 34.4 MM HG (ref 36–44)
PCO2 BLDV: 43.6 MM HG (ref 42–50)
PEEP RESPIRATORY: 6 CM[H2O]
PEEP RESPIRATORY: 6 CM[H2O]
PH BLD: 7.27 [PH] (ref 7.3–7.4)
PH BLDA: 7.38 [PH] (ref 7.35–7.45)
PH BLDV: 7.26 [PH] (ref 7.3–7.4)
PH UR STRIP.AUTO: 5 [PH]
PHOSPHATE SERPL-MCNC: 3.5 MG/DL (ref 2.3–4.1)
PHOSPHATE SERPL-MCNC: 5.6 MG/DL (ref 2.3–4.1)
PLATELET # BLD AUTO: 367 THOUSANDS/UL (ref 149–390)
PLATELET # BLD AUTO: 380 THOUSANDS/UL (ref 149–390)
PLATELET BLD QL SMEAR: ADEQUATE
PLATELET BLD QL SMEAR: ADEQUATE
PMV BLD AUTO: 8.5 FL (ref 8.9–12.7)
PMV BLD AUTO: 8.5 FL (ref 8.9–12.7)
PO2 BLD: 48 MM HG (ref 35–45)
PO2 BLDA: 237.7 MM HG (ref 75–129)
PO2 BLDV: 37.8 MM HG (ref 35–45)
POTASSIUM BLD-SCNC: 4.1 MMOL/L (ref 3.5–5.3)
POTASSIUM SERPL-SCNC: 4.4 MMOL/L (ref 3.5–5.3)
POTASSIUM SERPL-SCNC: 4.8 MMOL/L (ref 3.5–5.3)
PROT SERPL-MCNC: 6.2 G/DL (ref 6.4–8.4)
PROT SERPL-MCNC: 6.9 G/DL (ref 6.4–8.4)
PROT UR STRIP-MCNC: ABNORMAL MG/DL
RBC # BLD AUTO: 3.81 MILLION/UL (ref 3.88–5.62)
RBC # BLD AUTO: 4.09 MILLION/UL (ref 3.88–5.62)
RBC #/AREA URNS AUTO: ABNORMAL /HPF
RBC MORPH BLD: NORMAL
RBC MORPH BLD: NORMAL
SAO2 % BLD FROM PO2: 77 % (ref 60–85)
SODIUM BLD-SCNC: 137 MMOL/L (ref 136–145)
SODIUM SERPL-SCNC: 135 MMOL/L (ref 135–147)
SODIUM SERPL-SCNC: 137 MMOL/L (ref 135–147)
SP GR UR STRIP.AUTO: >=1.03 (ref 1–1.03)
SPECIMEN SOURCE: ABNORMAL
SPECIMEN SOURCE: ABNORMAL
TOXIC GRANULES BLD QL SMEAR: PRESENT
UROBILINOGEN UR QL STRIP.AUTO: 1 E.U./DL
VENT AC: 18
VENT AC: 18
VENT- AC: AC
VENT- AC: AC
VT SETTING VENT: 500 ML
VT SETTING VENT: 500 ML
WBC # BLD AUTO: 15.84 THOUSAND/UL (ref 4.31–10.16)
WBC # BLD AUTO: 16.29 THOUSAND/UL (ref 4.31–10.16)
WBC #/AREA URNS AUTO: ABNORMAL /HPF

## 2022-09-15 PROCEDURE — 4A133J1 MONITORING OF ARTERIAL PULSE, PERIPHERAL, PERCUTANEOUS APPROACH: ICD-10-PCS | Performed by: INTERNAL MEDICINE

## 2022-09-15 PROCEDURE — 85025 COMPLETE CBC W/AUTO DIFF WBC: CPT

## 2022-09-15 PROCEDURE — 85027 COMPLETE CBC AUTOMATED: CPT

## 2022-09-15 PROCEDURE — 84100 ASSAY OF PHOSPHORUS: CPT

## 2022-09-15 PROCEDURE — 80053 COMPREHEN METABOLIC PANEL: CPT | Performed by: NURSE PRACTITIONER

## 2022-09-15 PROCEDURE — C1781 MESH (IMPLANTABLE): HCPCS | Performed by: SURGERY

## 2022-09-15 PROCEDURE — NC001 PR NO CHARGE: Performed by: SURGERY

## 2022-09-15 PROCEDURE — 03HY32Z INSERTION OF MONITORING DEVICE INTO UPPER ARTERY, PERCUTANEOUS APPROACH: ICD-10-PCS | Performed by: INTERNAL MEDICINE

## 2022-09-15 PROCEDURE — 85007 BL SMEAR W/DIFF WBC COUNT: CPT | Performed by: NURSE PRACTITIONER

## 2022-09-15 PROCEDURE — 84100 ASSAY OF PHOSPHORUS: CPT | Performed by: NURSE PRACTITIONER

## 2022-09-15 PROCEDURE — 82805 BLOOD GASES W/O2 SATURATION: CPT

## 2022-09-15 PROCEDURE — 83605 ASSAY OF LACTIC ACID: CPT

## 2022-09-15 PROCEDURE — 80053 COMPREHEN METABOLIC PANEL: CPT

## 2022-09-15 PROCEDURE — 82948 REAGENT STRIP/BLOOD GLUCOSE: CPT

## 2022-09-15 PROCEDURE — 94003 VENT MGMT INPAT SUBQ DAY: CPT

## 2022-09-15 PROCEDURE — 49561 PR REPAIR INCISIONAL HERNIA,STRANG: CPT | Performed by: SURGERY

## 2022-09-15 PROCEDURE — 88304 TISSUE EXAM BY PATHOLOGIST: CPT | Performed by: PATHOLOGY

## 2022-09-15 PROCEDURE — NC001 PR NO CHARGE: Performed by: PHYSICIAN ASSISTANT

## 2022-09-15 PROCEDURE — 82330 ASSAY OF CALCIUM: CPT | Performed by: NURSE PRACTITIONER

## 2022-09-15 PROCEDURE — 85027 COMPLETE CBC AUTOMATED: CPT | Performed by: NURSE PRACTITIONER

## 2022-09-15 PROCEDURE — 83735 ASSAY OF MAGNESIUM: CPT

## 2022-09-15 PROCEDURE — 5A1935Z RESPIRATORY VENTILATION, LESS THAN 24 CONSECUTIVE HOURS: ICD-10-PCS | Performed by: INTERNAL MEDICINE

## 2022-09-15 PROCEDURE — 4A133B1 MONITORING OF ARTERIAL PRESSURE, PERIPHERAL, PERCUTANEOUS APPROACH: ICD-10-PCS | Performed by: INTERNAL MEDICINE

## 2022-09-15 PROCEDURE — 99024 POSTOP FOLLOW-UP VISIT: CPT | Performed by: SURGERY

## 2022-09-15 PROCEDURE — 85007 BL SMEAR W/DIFF WBC COUNT: CPT

## 2022-09-15 PROCEDURE — 83735 ASSAY OF MAGNESIUM: CPT | Performed by: NURSE PRACTITIONER

## 2022-09-15 PROCEDURE — 99291 CRITICAL CARE FIRST HOUR: CPT | Performed by: INTERNAL MEDICINE

## 2022-09-15 PROCEDURE — 0WUF0JZ SUPPLEMENT ABDOMINAL WALL WITH SYNTHETIC SUBSTITUTE, OPEN APPROACH: ICD-10-PCS | Performed by: SURGERY

## 2022-09-15 PROCEDURE — 82805 BLOOD GASES W/O2 SATURATION: CPT | Performed by: NURSE PRACTITIONER

## 2022-09-15 PROCEDURE — NC001 PR NO CHARGE: Performed by: NURSE PRACTITIONER

## 2022-09-15 PROCEDURE — 36620 INSERTION CATHETER ARTERY: CPT | Performed by: NURSE PRACTITIONER

## 2022-09-15 DEVICE — PHASIX ST MESH, RECTANGLE
Type: IMPLANTABLE DEVICE | Site: ABDOMEN | Status: FUNCTIONAL
Brand: PHASIX ST MESH

## 2022-09-15 DEVICE — UNDYED KNITTED (POLYGLACTIN 910), SYNTHETIC ABSORBABLE MESH
Type: IMPLANTABLE DEVICE | Site: ABDOMEN | Status: FUNCTIONAL
Brand: VICRYL

## 2022-09-15 RX ORDER — SODIUM CHLORIDE 9 MG/ML
INJECTION, SOLUTION INTRAVENOUS CONTINUOUS PRN
Status: DISCONTINUED | OUTPATIENT
Start: 2022-09-15 | End: 2022-09-15

## 2022-09-15 RX ORDER — FENTANYL CITRATE 50 UG/ML
INJECTION, SOLUTION INTRAMUSCULAR; INTRAVENOUS AS NEEDED
Status: DISCONTINUED | OUTPATIENT
Start: 2022-09-15 | End: 2022-09-15

## 2022-09-15 RX ORDER — ALBUTEROL SULFATE 90 UG/1
AEROSOL, METERED RESPIRATORY (INHALATION) AS NEEDED
Status: DISCONTINUED | OUTPATIENT
Start: 2022-09-15 | End: 2022-09-15

## 2022-09-15 RX ORDER — CALCIUM GLUCONATE 20 MG/ML
2 INJECTION, SOLUTION INTRAVENOUS ONCE
Status: COMPLETED | OUTPATIENT
Start: 2022-09-15 | End: 2022-09-16

## 2022-09-15 RX ORDER — MAGNESIUM HYDROXIDE 1200 MG/15ML
LIQUID ORAL AS NEEDED
Status: DISCONTINUED | OUTPATIENT
Start: 2022-09-15 | End: 2022-09-15 | Stop reason: HOSPADM

## 2022-09-15 RX ORDER — ROCURONIUM BROMIDE 10 MG/ML
INJECTION, SOLUTION INTRAVENOUS AS NEEDED
Status: DISCONTINUED | OUTPATIENT
Start: 2022-09-15 | End: 2022-09-15

## 2022-09-15 RX ORDER — ALBUMIN, HUMAN INJ 5% 5 %
25 SOLUTION INTRAVENOUS ONCE
Status: COMPLETED | OUTPATIENT
Start: 2022-09-15 | End: 2022-09-16

## 2022-09-15 RX ORDER — FENTANYL CITRATE-0.9 % NACL/PF 10 MCG/ML
75 PLASTIC BAG, INJECTION (ML) INTRAVENOUS CONTINUOUS
Status: DISCONTINUED | OUTPATIENT
Start: 2022-09-15 | End: 2022-09-16

## 2022-09-15 RX ORDER — ONDANSETRON 2 MG/ML
INJECTION INTRAMUSCULAR; INTRAVENOUS AS NEEDED
Status: DISCONTINUED | OUTPATIENT
Start: 2022-09-15 | End: 2022-09-15

## 2022-09-15 RX ORDER — HEPARIN SODIUM 5000 [USP'U]/ML
7500 INJECTION, SOLUTION INTRAVENOUS; SUBCUTANEOUS EVERY 8 HOURS SCHEDULED
Status: DISCONTINUED | OUTPATIENT
Start: 2022-09-15 | End: 2022-09-19

## 2022-09-15 RX ORDER — SODIUM CHLORIDE, SODIUM GLUCONATE, SODIUM ACETATE, POTASSIUM CHLORIDE, MAGNESIUM CHLORIDE, SODIUM PHOSPHATE, DIBASIC, AND POTASSIUM PHOSPHATE .53; .5; .37; .037; .03; .012; .00082 G/100ML; G/100ML; G/100ML; G/100ML; G/100ML; G/100ML; G/100ML
500 INJECTION, SOLUTION INTRAVENOUS ONCE
Status: COMPLETED | OUTPATIENT
Start: 2022-09-15 | End: 2022-09-16

## 2022-09-15 RX ORDER — METRONIDAZOLE 500 MG/100ML
500 INJECTION, SOLUTION INTRAVENOUS ONCE
Status: COMPLETED | OUTPATIENT
Start: 2022-09-15 | End: 2022-09-15

## 2022-09-15 RX ORDER — MAGNESIUM SULFATE HEPTAHYDRATE 40 MG/ML
2 INJECTION, SOLUTION INTRAVENOUS ONCE
Status: COMPLETED | OUTPATIENT
Start: 2022-09-15 | End: 2022-09-16

## 2022-09-15 RX ADMIN — SODIUM CHLORIDE, SODIUM LACTATE, POTASSIUM CHLORIDE, AND CALCIUM CHLORIDE 500 ML: 600; 310; 30; 20 INJECTION, SOLUTION INTRAVENOUS at 06:20

## 2022-09-15 RX ADMIN — PROPOFOL 25 MCG/KG/MIN: 10 INJECTION, EMULSION INTRAVENOUS at 17:39

## 2022-09-15 RX ADMIN — FLUORESCEIN SODIUM 500 MG: 100 INJECTION INTRAVENOUS at 12:08

## 2022-09-15 RX ADMIN — MAGNESIUM SULFATE HEPTAHYDRATE 2 G: 40 INJECTION, SOLUTION INTRAVENOUS at 17:45

## 2022-09-15 RX ADMIN — HEPARIN SODIUM 7500 UNITS: 5000 INJECTION INTRAVENOUS; SUBCUTANEOUS at 06:05

## 2022-09-15 RX ADMIN — ROCURONIUM BROMIDE 20 MG: 50 INJECTION, SOLUTION INTRAVENOUS at 12:45

## 2022-09-15 RX ADMIN — SODIUM CHLORIDE, SODIUM LACTATE, POTASSIUM CHLORIDE, AND CALCIUM CHLORIDE 500 ML: .6; .31; .03; .02 INJECTION, SOLUTION INTRAVENOUS at 07:44

## 2022-09-15 RX ADMIN — METRONIDAZOLE: 500 INJECTION, SOLUTION INTRAVENOUS at 11:05

## 2022-09-15 RX ADMIN — ALBUTEROL SULFATE 2 PUFF: 90 AEROSOL, METERED RESPIRATORY (INHALATION) at 11:35

## 2022-09-15 RX ADMIN — FENTANYL CITRATE 100 MCG: 50 INJECTION INTRAMUSCULAR; INTRAVENOUS at 11:00

## 2022-09-15 RX ADMIN — Medication 50 MCG/HR: at 22:05

## 2022-09-15 RX ADMIN — SODIUM CHLORIDE, SODIUM LACTATE, POTASSIUM CHLORIDE, AND CALCIUM CHLORIDE 1000 ML: .6; .31; .03; .02 INJECTION, SOLUTION INTRAVENOUS at 00:16

## 2022-09-15 RX ADMIN — ONDANSETRON 8 MG: 2 INJECTION INTRAMUSCULAR; INTRAVENOUS at 13:35

## 2022-09-15 RX ADMIN — FENTANYL CITRATE 50 MCG: 50 INJECTION INTRAMUSCULAR; INTRAVENOUS at 07:13

## 2022-09-15 RX ADMIN — HEPARIN SODIUM 7500 UNITS: 5000 INJECTION INTRAVENOUS; SUBCUTANEOUS at 22:40

## 2022-09-15 RX ADMIN — SODIUM CHLORIDE, SODIUM GLUCONATE, SODIUM ACETATE, POTASSIUM CHLORIDE, MAGNESIUM CHLORIDE, SODIUM PHOSPHATE, DIBASIC, AND POTASSIUM PHOSPHATE 125 ML/HR: .53; .5; .37; .037; .03; .012; .00082 INJECTION, SOLUTION INTRAVENOUS at 09:12

## 2022-09-15 RX ADMIN — PROPOFOL 25 MCG/KG/MIN: 10 INJECTION, EMULSION INTRAVENOUS at 14:53

## 2022-09-15 RX ADMIN — FENTANYL CITRATE 50 MCG: 50 INJECTION INTRAMUSCULAR; INTRAVENOUS at 09:17

## 2022-09-15 RX ADMIN — SODIUM CHLORIDE, SODIUM GLUCONATE, SODIUM ACETATE, POTASSIUM CHLORIDE, MAGNESIUM CHLORIDE, SODIUM PHOSPHATE, DIBASIC, AND POTASSIUM PHOSPHATE 125 ML/HR: .53; .5; .37; .037; .03; .012; .00082 INJECTION, SOLUTION INTRAVENOUS at 15:31

## 2022-09-15 RX ADMIN — PROPOFOL 25 MCG/KG/MIN: 10 INJECTION, EMULSION INTRAVENOUS at 09:33

## 2022-09-15 RX ADMIN — CHLORHEXIDINE GLUCONATE 0.12% ORAL RINSE 15 ML: 1.2 LIQUID ORAL at 21:14

## 2022-09-15 RX ADMIN — SODIUM CHLORIDE, SODIUM GLUCONATE, SODIUM ACETATE, POTASSIUM CHLORIDE, MAGNESIUM CHLORIDE, SODIUM PHOSPHATE, DIBASIC, AND POTASSIUM PHOSPHATE 500 ML: .53; .5; .37; .037; .03; .012; .00082 INJECTION, SOLUTION INTRAVENOUS at 17:54

## 2022-09-15 RX ADMIN — PROPOFOL 20 MCG/KG/MIN: 10 INJECTION, EMULSION INTRAVENOUS at 05:42

## 2022-09-15 RX ADMIN — ROCURONIUM BROMIDE 50 MG: 50 INJECTION, SOLUTION INTRAVENOUS at 11:00

## 2022-09-15 RX ADMIN — ROCURONIUM BROMIDE 50 MG: 50 INJECTION, SOLUTION INTRAVENOUS at 11:35

## 2022-09-15 RX ADMIN — CHLORHEXIDINE GLUCONATE 0.12% ORAL RINSE 15 ML: 1.2 LIQUID ORAL at 09:13

## 2022-09-15 RX ADMIN — PIPERACILLIN AND TAZOBACTAM 4.5 G: 4; .5 INJECTION, POWDER, FOR SOLUTION INTRAVENOUS at 15:47

## 2022-09-15 RX ADMIN — PROPOFOL 20 MCG/KG/MIN: 10 INJECTION, EMULSION INTRAVENOUS at 00:28

## 2022-09-15 RX ADMIN — FENTANYL CITRATE 50 MCG: 50 INJECTION INTRAMUSCULAR; INTRAVENOUS at 00:40

## 2022-09-15 RX ADMIN — ROCURONIUM BROMIDE 50 MG: 50 INJECTION, SOLUTION INTRAVENOUS at 12:09

## 2022-09-15 RX ADMIN — PHENYLEPHRINE HYDROCHLORIDE 50 MCG/MIN: 10 INJECTION INTRAVENOUS at 12:02

## 2022-09-15 RX ADMIN — CALCIUM GLUCONATE 2 G: 20 INJECTION, SOLUTION INTRAVENOUS at 21:14

## 2022-09-15 RX ADMIN — PIPERACILLIN AND TAZOBACTAM 4.5 G: 4; .5 INJECTION, POWDER, FOR SOLUTION INTRAVENOUS at 20:26

## 2022-09-15 RX ADMIN — SODIUM CHLORIDE, SODIUM GLUCONATE, SODIUM ACETATE, POTASSIUM CHLORIDE, MAGNESIUM CHLORIDE, SODIUM PHOSPHATE, DIBASIC, AND POTASSIUM PHOSPHATE 125 ML/HR: .53; .5; .37; .037; .03; .012; .00082 INJECTION, SOLUTION INTRAVENOUS at 19:15

## 2022-09-15 RX ADMIN — ALBUMIN (HUMAN) 25 G: 12.5 INJECTION, SOLUTION INTRAVENOUS at 21:14

## 2022-09-15 RX ADMIN — FENTANYL CITRATE 50 MCG: 50 INJECTION INTRAMUSCULAR; INTRAVENOUS at 19:17

## 2022-09-15 RX ADMIN — SODIUM CHLORIDE: 0.9 INJECTION, SOLUTION INTRAVENOUS at 11:00

## 2022-09-15 RX ADMIN — HEPARIN SODIUM 7500 UNITS: 5000 INJECTION INTRAVENOUS; SUBCUTANEOUS at 15:37

## 2022-09-15 RX ADMIN — Medication 3000 MG: at 11:05

## 2022-09-15 NOTE — PROGRESS NOTES
General Surgery  Progress Note   Alexis Galaviz 78 y o  male MRN: [id number]  Unit/Bed#: ICU 07 Encounter: [id number]    Assessment:  Patient is a 78 y o  male who presented with a strangulated incisional hernia, now status post exploratory laparotomy, lysis of adhesions, and ABThera placement  Received 2 L boluses overnight    Afebrile, tachycardia improved from 130s to low 100s, SBP in the 120s    Vent setting, ACVC:  6, 50%, 18    WBC:  15 8 from 12 9; Hb 5 from 12 1; pH:  7 38 from 7 26; LA:  1 4 from 1 1; Crt:  1 48 from 1 6    UOP:  650 CC(0 3 mL/kg/hr)  ABThera:  550 cc serosanguineous  NGT:  100 cc bilious    Plan:  -take back for reexploration today  -remained intubated; titrate sedation  -NG tube; low continuous suction  -IVF  -Arias  -ABThera VAC; monitor output in character  -DVT prophylaxis  -rest of care per primary    Subjective/Objective     Subjective:  Patient intubated and sedated; arousable and follows directions  No acute events overnight  Objective:     Vitals:Blood pressure 106/55, pulse 102, temperature 99 6 °F (37 6 °C), temperature source Tympanic, resp  rate 22, height 5' 9" (1 753 m), weight (!) 174 kg (383 lb 13 1 oz), SpO2 99 %  ,Body mass index is 56 68 kg/m²       Temp (24hrs), Av 2 °F (37 3 °C), Min:98 2 °F (36 8 °C), Max:101 4 °F (38 6 °C)  Current: Temperature: 99 6 °F (37 6 °C)      Intake/Output Summary (Last 24 hours) at 9/15/2022 0624  Last data filed at 9/15/2022 0600  Gross per 24 hour   Intake 6861 34 ml   Output 1350 ml   Net 5511 34 ml       Invasive Devices  Report    Central Venous Catheter Line  Duration           CVC Central Lines 22 Triple 20cm <1 day          Peripheral Intravenous Line  Duration           Peripheral IV 22 Left Antecubital <1 day          Arterial Line  Duration           Arterial Line 09/15/22 Radial <1 day          Drain  Duration           NG/OG/Enteral Tube Nasogastric 18 Fr Right nare <1 day    Urethral Catheter Non-latex 16 Fr  <1 day          Airway  Duration           ETT  Cuffed;Oral 8 mm <1 day                Physical Exam:  General: No acute distress, alert and oriented  CV: Well perfused, regular rate and rhythm  Lungs: Normal work of breathing, no increased respiratory effort  Abdomen:   ABThera VAC in place as above  Extremities: No edema, clubbing or cyanosis  Skin: Warm, dry    Lab Results: Results: I have personally reviewed all pertinent laboratory/tests results  VTE Prophylaxis: Sequential compression device (Venodyne)  and Heparin    Jacqueline Paiz MD  9/15/2022

## 2022-09-15 NOTE — ANESTHESIA POSTPROCEDURE EVALUATION
Post-Op Assessment Note    CV Status:  Stable  Pain Score: 1    Pain management: adequate     Mental Status:  Alert and awake   Hydration Status:  Euvolemic   PONV Controlled:  Controlled   Airway Patency:  Patent  Airway: intubated      Post Op Vitals Reviewed: Yes      Staff: Anesthesiologist         No complications documented      BP     Temp      Pulse     Resp      SpO2      BP (!) 104/45   Pulse 98   Temp 99 6 °F (37 6 °C) (Tympanic)   Resp 21   Ht 5' 9" (1 753 m)   Wt (!) 174 kg (383 lb 13 1 oz)   SpO2 99%   BMI 56 68 kg/m²

## 2022-09-15 NOTE — OP NOTE
OPERATIVE REPORT  PATIENT NAME: Starlette Scheuermann    :  1955  MRN: [id number]  Pt Location: [facility] OR ROOM 03    SURGERY DATE: 9/15/2022    Surgeon(s) and Role:     * Mahesh Castillo MD - Primary     * Agustin Wise MD - Assisting first      * Sonia Frye PA-C - Assisting    Preop Diagnosis:  Strangulated hernia of abdominal wall [K43 6]    Post-Op Diagnosis Codes:     * Strangulated hernia of abdominal wall [K43 6]    Procedure(s) (LRB):  LAPAROTOMY EXPLORATORY  REPAIR RIGHT LQ HERNIA WITH MESH, REPAIR ANTERIOR WALL HERNIA (N/A)  LYSIS ADHESIONS (N/A)     Wood's lamp and fluorescein evaluation    Complex anterior abdominal wall repair requiring at least 3 times longer than the average abdominal wall repair, approximately 3 hours  Specimen(s):  ID Type Source Tests Collected by Time Destination   1 : Abdominal Debridement Tissue Tissue Soft Tissue, Other TISSUE EXAM Mahesh Castillo MD 9/15/2022 1151        Estimated Blood Loss:   Minimal    Drains:  NG/OG/Enteral Tube Nasogastric 18 Fr Right nare (Active)   Placement Reverification Auscultation 09/15/22 0954   Site Assessment Clean;Dry; Intact 09/15/22 0954   Status Suction-low intermittent 09/15/22 0954   Drainage Appearance Brown;Green 09/15/22 0954   Output (mL) 100 mL 09/15/22 0954   Number of days: 1       Urethral Catheter Non-latex 16 Fr   (Active)   Reasons to continue Urinary Catheter  Accurate I&O assessment in critically ill patients (48 hr  max) 09/15/22 0720   Goal for Removal Voiding trial when ambulation improves 09/15/22 0720   Site Assessment Clean;Skin intact 09/15/22 0720   Arias Care Done 09/15/22 0900   Collection Container Standard drainage bag 09/15/22 0720   Securement Method Securing device (Describe) 09/15/22 07   Output (mL) 50 mL 09/15/22 0933   Number of days: 1       Anesthesia Type:   Choice    Operative Indications:  Strangulated hernia of abdominal wall [K43 6]  Open abdomen    Operative Findings:  30 cm Phasix mesh used, coated, on the anterior abdominal wall to close the defect  This measured approximately 30 x 20 cm    Right lower quadrant abdominal wall defect closed using Vicryl mesh approximately 10 by 10 cm  Wound VAC in the right lower quadrant abdominal wall defect  Mass skin closure over the Vicryl mesh and fascia on the midline  12 retention sutures       Complications:   None    Procedure and Technique:  The patient was seen again in the Holding Room  The risks, benefits, complications, treatment options, and expected outcomes were discussed with the patient  The patient and/or family concurred with the proposed plan, giving informed consent  Informed consent was personally discussed with the patient and reviewed  The site of surgery properly noted/marked  The patient was taken to Operating Room, identified as N[facility]  and the procedure verified  A Time Out was held after prepping and draping in sterile fashion  The above information was confirmed  Prior to the induction of general anesthesia, antibiotic prophylaxis was administered  General endotracheal anesthesia was then administered and tolerated well  After the induction, the surgical area was prepped in the usual sterile fashion  Patient was already intubated  Came from the ICU  Under sterile conditions the previous wound VAC and sponges were all removed from the right lower quadrant and the midline  All sponges were accounted for including a white sponge and a black sponge in the right lower quadrant and 2 blue sponges in the midline  The ABThera was removed  The abdomen was explored  The bowel was all viable in fact much pinker and healthier than seen on the previous laparotomy 24 hours earlier  Fluorescein Wood's lamp test was used and there was appropriate and adequate fluorescein of all the loops of bowel involved as well as the mesentery  The bowel was deemed viable    The wound was irrigated the bowel returned to its anatomic location  There was still chronic dilatation of the proximal bowel but it was much less dilated than the per day prior and there was no evidence of purple or dusky tissue  The right lower quadrant wound was approached using a Vicryl mesh measuring approximately 10 x 10 cm and sewn in using Vicryl sutures to contain the abdominal wall  Fascial edges were used to sew the Vicryl mesh  The extremely large 40 cm hernia sac was partially closed down using interrupted Vicryl sutures in order to make this a manageable wound with a wound VAC  Omentum was freed up and placed over the wound  The bowel was reinspected and again viable and placed in the abdomen without tension  A 25 x 30 cm Phasix ST was used to cover the wound and the bowel and the omentum  The omentum covered most of the wound over the bowel  This was secured using 0 PDS in a circumferential onlay type fashion  This allowed no tension on the bowel but secured within most of the abdomen  There was some loss of domain  Extensive flaps were carried out prior to this closure at least 15 cm on each side of the abdomen in order to gain length for the skin closure  This was used to achieve using electric cautery and blunt dissection  The subcutaneous tissues and skin edges were closed using mass retention sutures times 12  The wound was left open between the mass retention sutures  The wound was irrigated  A wound VAC was placed into the right lower quadrant wound  ABDs and a binder were placed over the midline wound  The patient tolerated the procedure in stable condition  Sponge and needle count were correct and RFA count was correct     I was present for the entire procedure    Patient Disposition:  PACU      Signature:   Jennifer Garza MD  Date: 9/15/2022 Time: 1:50 PM     DATE: September 15, 2022  TIME: 1:45 PM

## 2022-09-15 NOTE — ASSESSMENT & PLAN NOTE
Lab Results   Component Value Date    EGFR 48 09/15/2022    EGFR 42 09/14/2022    EGFR 52 08/04/2021    CREATININE 1 48 (H) 09/15/2022    CREATININE 1 64 (H) 09/14/2022    CREATININE 1 40 (H) 08/04/2021   Baseline creatinine is 1 4-1 7; OR I/Os 3L/375 ml  Avoid nephrotoxins, avoid hypotension  IV hydration [id number]/hr  Monitor renal indices closely

## 2022-09-15 NOTE — PROGRESS NOTES
09/15/22 1300   Clinical Encounter Type   Visited With Family   Routine Visit Introduction   Continue Visiting Yes   Surgical Visit Pre-op

## 2022-09-15 NOTE — PROGRESS NOTES
Central Line Insertion    Date/Time: 9/14/2022 10:26 PM  Performed by: Patricio Villeda MD  Authorized by: Patricio Villeda MD     Patient location:  Other (comment) (OR)  Consent:     Consent obtained:  Emergent situation  Universal protocol:     Patient identity confirmed:  Hospital-assigned identification number and arm band  Pre-procedure details:     Hand hygiene: Hand hygiene performed prior to insertion      Sterile barrier technique: All elements of maximal sterile technique followed      Skin preparation:  ChloraPrep    Skin preparation agent: Skin preparation agent completely dried prior to procedure    Indications:     Central line indications: medications requiring central line, hemodynamic monitoring and no peripheral vascular access    Procedure details:     Location:  Right internal jugular    Vessel type: vein      Laterality:  Right    Approach: percutaneous technique used      Patient position:  Flat    Catheter type:  Triple lumen 20cm    Catheter size:  7 Fr    Landmarks identified: yes      Ultrasound guidance: yes      Sterile ultrasound techniques: Sterile gel and sterile probe covers were used      Number of attempts:  1    Successful placement: yes    Post-procedure details:     Post-procedure:  Dressing applied and line sutured    Assessment:  Blood return through all ports and free fluid flow    Patient tolerance of procedure:   Tolerated well, no immediate complications

## 2022-09-15 NOTE — PLAN OF CARE
Problem: PAIN - ADULT  Goal: Verbalizes/displays adequate comfort level or baseline comfort level  Description: Interventions:  - Encourage patient to monitor pain and request assistance  - Assess pain using appropriate pain scale  - Administer analgesics based on type and severity of pain and evaluate response  - Implement non-pharmacological measures as appropriate and evaluate response  - Consider cultural and social influences on pain and pain management  - Notify physician/advanced practitioner if interventions unsuccessful or patient reports new pain  Outcome: Progressing     Problem: INFECTION - ADULT  Goal: Absence or prevention of progression during hospitalization  Description: INTERVENTIONS:  - Assess and monitor for signs and symptoms of infection  - Monitor lab/diagnostic results  - Monitor all insertion sites, i e  indwelling lines, tubes, and drains  - Monitor endotracheal if appropriate and nasal secretions for changes in amount and color  - [place] appropriate cooling/warming therapies per order  - Administer medications as ordered  - Instruct and encourage patient and family to use good hand hygiene technique  - Identify and instruct in appropriate isolation precautions for identified infection/condition  Outcome: Progressing  Goal: Absence of fever/infection during neutropenic period  Description: INTERVENTIONS:  - Monitor WBC    Outcome: Progressing     Problem: SAFETY ADULT  Goal: Patient will remain free of falls  Description: INTERVENTIONS:  - Educate patient/family on patient safety including physical limitations  - Instruct patient to call for assistance with activity   - Consult OT/PT to assist with strengthening/mobility   - Keep Call bell within reach  - Keep bed low and locked with side rails adjusted as appropriate  - Keep care items and personal belongings within reach  - Initiate and maintain comfort rounds  - Make Fall Risk Sign visible to staff  - Offer Toileting every 2 Problem: DISCHARGE PLANNING  Goal: Discharge to home or other facility with appropriate resources  Description: INTERVENTIONS:  - Identify barriers to discharge w/patient and caregiver  - Arrange for needed discharge resources and transportation as appropriate  - Identify discharge learning needs (meds, wound care, etc )  - Arrange for interpretive services to assist at discharge as needed  - Refer to Case Management Department for coordinating discharge planning if the patient needs post-hospital services based on physician/advanced practitioner order or complex needs related to functional status, cognitive ability, or social support system  Outcome: Progressing     Problem: Knowledge Deficit  Goal: Patient/family/caregiver demonstrates understanding of disease process, treatment plan, medications, and discharge instructions  Description: Complete learning assessment and assess knowledge base    Interventions:  - Provide teaching at level of understanding  - Provide teaching via preferred learning methods  Outcome: Progressing     Problem: SAFETY,RESTRAINT: NV/NON-SELF DESTRUCTIVE BEHAVIOR  Goal: Remains free of harm/injury (restraint for non violent/non self-detsructive behavior)  Description: INTERVENTIONS:  - Instruct patient/family regarding restraint use   - Assess and monitor physiologic and psychological status   - Provide interventions and comfort measures to meet assessed patient needs   - Identify and implement measures to help patient regain control  - Assess readiness for release of restraint   Outcome: Progressing  Goal: Returns to optimal restraint-free functioning  Description: INTERVENTIONS:  - Assess the patient's behavior and symptoms that indicate continued need for restraint  - Identify and implement measures to help patient regain control  - Assess readiness for release of restraint   Outcome: Progressing   Hours, in advance of need  - Initiate/Maintain bed alarm  - Obtain necessary fall risk management equipment:   - Apply yellow socks and bracelet for high fall risk patients  - Consider moving patient to room near nurses station  Outcome: Progressing  Goal: Maintain or return to baseline ADL function  Description: INTERVENTIONS:  -  Assess patient's ability to carry out ADLs; assess patient's baseline for ADL function and identify physical deficits which impact ability to perform ADLs (bathing, care of mouth/teeth, toileting, grooming, dressing, etc )  - Assess/evaluate cause of self-care deficits   - Assess range of motion  - Assess patient's mobility; develop plan if impaired  - Assess patient's need for assistive devices and provide as appropriate  - Encourage maximum independence but intervene and supervise when necessary  - Involve family in performance of ADLs  - Assess for home care needs following discharge   - Consider OT consult to assist with ADL evaluation and planning for discharge  - Provide patient education as appropriate  Outcome: Progressing  Goal: Maintains/Returns to pre admission functional level  Description: INTERVENTIONS:  - Perform BMAT or MOVE assessment daily    - Set and communicate daily mobility goal to care team and patient/family/caregiver  - Collaborate with rehabilitation services on mobility goals if consulted  - Perform Range of Motion  times a day  - Reposition patient every 2 hours    - Dangle patient  times a day  - Stand patient  times a day  - Ambulate patient  times a day  - Out of bed to chair  times a day   - Out of bed for meals  times a day  - Out of bed for toileting  - Record patient progress and toleration of activity level   Outcome: Progressing

## 2022-09-15 NOTE — ASSESSMENT & PLAN NOTE
Postoperatively  Will remain intubated due to need for aggressive pain control, obesity, large abdominal wound and ongoing plan to take back to surgery today  Mechanically ventilated - AC/VC 18/500/50%/6  RASS -1  Pulmonary hygiene

## 2022-09-15 NOTE — ASSESSMENT & PLAN NOTE
S/p exp lap, complex lysis of adhesions, reduction of strangulated hernia, debridement RLQ muscle with counter incision, abthera placement x 2  Intraoperative course was uneventful  Pain management with fentanyl prn  Plan for surgery tomorrow, will remain intubated  NPO   NGT to LIS

## 2022-09-15 NOTE — CASE MANAGEMENT
Case Management Assessment & Discharge Planning Note    Patient name Gage Richey  Location ICU 07/ICU 07 MRN [id number]  : 1955 Date 9/15/2022       Current Admission Date: 2022  Current Admission Diagnosis:Strangulated hernia of abdominal wall   Patient Active Problem List    Diagnosis Date Noted    Strangulated hernia of abdominal wall 2022    Respiratory insufficiency 2022    COVID-19 2022    Sepsis ([facility] [address] ) 2021    Right foot pain 2019    Class 3 severe obesity due to excess calories with serious comorbidity and body mass index (BMI) of 50 0 to 59 9 in adult [facility]) 2019    Lymphedema of both lower extremities 2019    CKD (chronic kidney disease), stage III ([facility] [address] ) 2018    HDL deficiency 2018    Gout 2018    DELIA (obstructive sleep apnea) 2018    Allergic rhinitis 2018    Essential hypertension 2018    Kidney stones 2018      LOS (days): 1  Geometric Mean LOS (GMLOS) (days): 5 90  Days to GMLOS:5 1     OBJECTIVE:    Risk of Unplanned Readmission Score: 13 05         Current admission status: Inpatient  Referral Reason: Other (CM consulted for disposition planning)    [facility] [address] [place] [address]  Phone: [phone] Fax: [phone]    Primary Care Provider: Janae Reynoso MD    Primary Insurance: MEDICARE  [facility]    ASSESSMENT:  [address],   [facility]   Primary Phone: [phone] (Mobile)  Home Phone: [phone]               Advance Directives  Does patient have a [address]?: No  Was patient offered paperwork?: Yes (Wife declined)  Does patient currently have a Health Care decision maker?: Yes, please see Health Care Proxy section  Does patient have Advance Directives?: No  Was patient offered paperwork?: Yes (wife declined)  Primary Contact: Wife         Readmission Root Cause  30 Day Readmission: No    Patient Information  Admitted from[de-identified] Home  Mental Status: Intubated  During Assessment patient was accompanied by: Not accompanied during assessment  Assessment information provided by[de-identified] Spouse  Primary Caregiver: Self  Support Systems: Spouse/significant other, [address] of Residence: [facility] do you live in?: [place] entry access options   Select all that apply : Stairs  Number of steps to enter home : 3  Do the steps have railings?: Yes  Type of Current Residence: [address] home  Upon entering residence, is there a bedroom on the main floor (no further steps)?: Yes (Has "den" room that patient has been sleeping in prior to admission)  Upon entering residence, is there a bathroom on the main floor (no further steps)?: Yes  In the last 12 months, was there a time when you were not able to pay the mortgage or rent on time?: No  In the last 12 months, how many places have you lived?: 1  In the last 12 months, was there a time when you did not have a steady place to sleep or slept in a shelter (including now)?: No  Homeless/housing insecurity resource given?: N/A  Living Arrangements: Lives w/ Spouse/significant other, Lives w/ Son  Is patient a ?: No    Activities of Daily Living Prior to Admission  Functional Status: Independent  Completes ADLs independently?: Yes  Ambulates independently?: Yes  Does patient use assisted devices?: No  Does patient currently own DME?: Yes  What DME does the patient currently own?: Asia Santana  Does patient have a history of Outpatient Therapy (PT/OT)?: No  Does the patient have a history of Short-Term Rehab?: No  Does patient have a history of HHC?: No  Does patient currently have [address]?: No         Patient Information Continued  Income Source: SSI/SSD  Does patient have prescription coverage?: Yes  Within the past 12 months, you worried that your food would run out before you got the money to buy more : Never true  Within the past 12 months, the food you bought just didn't last and you didn't have money to get more : Never true  Food insecurity resource given?: N/A  Does patient receive dialysis treatments?: No  Does patient have a history of substance abuse?: No  Does patient have a history of Mental Health Diagnosis?: No         Means of Transportation  Means of Transport to Appts[de-identified] Drives Self  In the past 12 months, has lack of transportation kept you from medical appointments or from getting medications?: No  In the past 12 months, has lack of transportation kept you from meetings, work, or from getting things needed for daily living?: No  Was application for public transport provided?: N/A        DISCHARGE DETAILS:    Discharge planning discussed with[de-identified] Patient's wife  Freedom of Choice: Yes  Comments - Freedom of Choice: Patient's wife agreeable to STR if recommended by PT/OT at dc  Preference would be [place] if possible  CM contacted family/caregiver?: Yes             Contacts  Patient Contacts: Vaibhav Garcia  Relationship to Patient[de-identified] Family (wife)  Contact Method: In Person  Reason/Outcome: Other (Comment) (Complete CM assessment)              Other Referral/Resources/Interventions Provided:  Interventions: None Indicated    Would you like to participate in our [address] service program?  : No - Declined                                                 Additional Comments: Wife stated patient is vaccinated x3 - per wife, they were going to get the 4th booster but got COVID in May and were told they could not get the next booster until October

## 2022-09-15 NOTE — PROCEDURES
Arterial Line Insertion    Date/Time: 9/15/2022 1:02 AM  Performed by: KELSEY Jones  Authorized by: KELSEY Jones     Patient location:  ICU  Consent:     Consent obtained:  Emergent situation  Universal protocol:     Immediately prior to procedure a time out was called: yes      Patient identity confirmed:  Arm band  Indications:     Indications: hemodynamic monitoring and frequent labs / infusion    Pre-procedure details:     Skin preparation:  Chlorhexidine    Preparation: Patient was prepped and draped in sterile fashion    Sedation:     Sedation type: Anxiolysis  Anesthesia (see MAR for exact dosages): Anesthesia method:  Local infiltration    Local anesthetic:  Lidocaine 1% w/o epi  Procedure details:     Location / Tip of Catheter:  Radial    Laterality:  Left    Mina's test performed: yes      Mina's test abnormal: no      Needle gauge:  18 G    Placement technique:  Ultrasound guided    Ultrasound image availability:  Not saved    Sterile ultrasound techniques: Sterile gel and sterile probe covers were used      Number of attempts:  1    Successful placement: yes      Transducer: waveform confirmed    Post-procedure details:     Post-procedure:  Sterile dressing applied and sutured    CMS:  Unchanged    Patient tolerance of procedure:   Tolerated well, no immediate complications

## 2022-09-15 NOTE — PROGRESS NOTES
[facility]  Progress Note - Urvashi Myers 1955, 78 y o  male MRN: [id number]  Unit/Bed#: ICU 07 Encounter: [id number]  Primary Care Provider: Ashtyn Henry MD   Date and time admitted to hospital: 9/14/2022 11:29 AM    * Strangulated ventral hernia  Assessment & Plan  S/p exp lap, complex lysis of adhesions, reduction of strangulated hernia, debridement RLQ muscle with counter incision, abthera placement x 2  Intraoperative course was uneventful  Pain management with fentanyl prn  Plan for surgery tomorrow, will remain intubated  NPO   NGT to LIS    Respiratory insufficiency  Assessment & Plan  Postoperatively  Will remain intubated due to need for aggressive pain control, obesity, large abdominal wound and ongoing plan to take back to surgery tomorrow    Mechanical ventilation, check ABG and adjust settings as needed  Pulmonary hygiene    Class 3 severe obesity due to excess calories with serious comorbidity and body mass index (BMI) of 50 0 to 59 9 in adult [facility])  Assessment & Plan  Encourage lifestyle modifications    DELIA (obstructive sleep apnea)  Assessment & Plan  Currently intubated will address following extubation    Acute kidney injury superimposed on CKD [facility])  Assessment & Plan  Lab Results   Component Value Date    EGFR 42 09/14/2022    EGFR 52 08/04/2021    EGFR 57 07/19/2021    CREATININE 1 64 (H) 09/14/2022    CREATININE 1 40 (H) 08/04/2021    CREATININE 1 30 07/19/2021   Baseline creatinine is 1 4-1 7; OR I/Os 3L/375 ml  Avoid nephrotoxins  IV hydration [id number]  Monitor renal indices closely      Essential hypertension  Assessment & Plan  Will hold PTA meds for now, toprol XL, lisinopril, lasix  BP normotensive postoperatively    ----------------------------------------------------------------------------------------  HPI/24hr events: Patient with PMH of HTN, morbid obesity, CKD 3, chronically incarcerated incisional hernia at the site of open appendectomy was admitted to hospital today for abdominal pain and fever, he was found to have a strangulated abdominal hernia  He has a 5 day history of fever, malaise and bloating with liquid bowel movements for the past 9 months, abdominal pain off and on for the last few years, abdominal wound over hernia developed in the last 2 months,   He was admitted to the ICU following surgical intervention with open abdomen and plan to reassess bowel tomorrow  Postoperatively he was stable and remained intubated and sedated     Patient appropriate for transfer out of the ICU today?: No  Disposition: Admit to Critical Care   Code Status: Level 1 - Full Code  ---------------------------------------------------------------------------------------  SUBJECTIVE  Family updated by phone    Review of Systems  Review of systems was unable to be performed secondary to patient is intubated and sedated  ---------------------------------------------------------------------------------------  OBJECTIVE    Vitals   Vitals:    22 2054 22 2105 22 2155 22 2205   BP:  (!) 192/74 154/51 (!) 139/45   BP Location:  Right leg     Pulse:  (!) 132 (!) 128 (!) 128   Resp:  16  21   Temp:  98 2 °F (36 8 °C)     TempSrc:  Tympanic     SpO2:  97% 98% 96%   Weight: (!) 174 kg (383 lb 13 1 oz)      Height:  5' 9" (1 753 m)       Temp (24hrs), Av 6 °F (37 6 °C), Min:98 2 °F (36 8 °C), Max:101 4 °F (38 6 °C)  Current: Temperature: 98 2 °F (36 8 °C)          Respiratory:  SpO2: SpO2: 96 %       Invasive/non-invasive ventilation settings   Respiratory  Report   Lab Data (Last 4 hours)       2139            pH, Arterial       7 221             pCO2, Arterial       52 5             pO2, Arterial       131 9             HCO3, Arterial       21 1             Base Excess, Arterial       -6 9                  O2/Vent Data       2047   Most Recent         Vent Mode AC/VC  AC/VC      Resp Rate (BPM) (BPM) 16  18      Vt (mL) (mL) 450  500      FIO2 (%) (%) 100  80      PEEP (cmH2O) (cmH2O) 6  6      MV 7 5  7 5                  Physical Exam  Vitals and nursing note reviewed  Constitutional:       Appearance: He is obese  Comments: Intubated, sedated   HENT:      Head: Normocephalic and atraumatic  Right Ear: External ear normal       Left Ear: External ear normal       Nose: Nose normal       Mouth/Throat:      Mouth: Mucous membranes are moist       Pharynx: Oropharynx is clear  Comments: ETT intact  Eyes:      Pupils: Pupils are equal, round, and reactive to light  Cardiovascular:      Pulses: Normal pulses  Heart sounds: Normal heart sounds  Pulmonary:      Comments: Mechanically ventilated  Equal coarse breath sounds throughout  Abdominal:      General: There is distension  Comments: Large obese abdomen  Firm with palpation over right side  Midline abdominal surgical wound with abthera patent  Right lower quadrant with small surgical wound and vac dressing intact   Musculoskeletal:      Cervical back: Normal range of motion and neck supple  Comments: Passive ROM  Left foot >right foot  lymphedema   Skin:     General: Skin is warm and dry  Capillary Refill: Capillary refill takes less than 2 seconds     Neurological:      Comments: Sedated, difficult to assess   Psychiatric:      Comments: JOSÉ MANUEL             Laboratory and Diagnostics:  Results from last 7 days   Lab Units 09/14/22  1203   WBC Thousand/uL 12 90*   HEMOGLOBIN g/dL 12 1   HEMATOCRIT % 37 1   PLATELETS Thousands/uL 416*   NEUTROS PCT % 85*   MONOS PCT % 7     Results from last 7 days   Lab Units 09/14/22  1203   SODIUM mmol/L 134*   POTASSIUM mmol/L 3 9   CHLORIDE mmol/L 99   CO2 mmol/L 27   ANION GAP mmol/L 8   BUN mg/dL 21   CREATININE mg/dL 1 64*   CALCIUM mg/dL 8 8   GLUCOSE RANDOM mg/dL 115   ALT U/L 15   AST U/L 13   ALK PHOS U/L 55   ALBUMIN g/dL 2 6*   TOTAL BILIRUBIN mg/dL 1 25*          Results from last 7 days   Lab Units 09/14/22  1203   INR  1 19   PTT seconds 39*          Results from last 7 days   Lab Units 09/14/22  1947 09/14/22  1220   LACTIC ACID mmol/L 1 1 1 1     ABG:  Results from last 7 days   Lab Units 09/14/22 2139   PH ART  7 221*   PCO2 ART mm Hg 52 5*   PO2 ART mm Hg 131 9*   HCO3 ART mmol/L 21 1*   BASE EXC ART mmol/L -6 9   ABG SOURCE  Radial, Right     VBG:  Results from last 7 days   Lab Units 09/14/22 2139   ABG SOURCE  Radial, Right     Results from last 7 days   Lab Units 09/14/22  1203   PROCALCITONIN ng/ml 0 75*       Micro  Results from last 7 days   Lab Units 09/14/22  1220 09/14/22  1203   BLOOD CULTURE  Received in Microbiology Lab  Culture in Progress  Received in Microbiology Lab  Culture in Progress  EKG: ST  Imaging: I have personally reviewed pertinent films in PACS    Intake and Output  I/O       09/13 0701 09/14 0700 09/14 0701  09/15 0700    I V  (mL/kg)  2500 (14 4)    IV Piggyback  2100    Total Intake(mL/kg)  4600 (26 4)    Urine (mL/kg/hr)  360    Blood  50    Total Output  410    Net  +4190                Height and Weights   Height: 5' 9" (175 3 cm)  IBW (Ideal Body Weight): 70 7 kg  Body mass index is 56 68 kg/m²  Weight (last 2 days)     Date/Time Weight    09/14/22 2054 174 (383 82)    09/14/22 1125 167 (368 17)            Nutrition       Diet Orders   (From admission, onward)             Start     Ordered    09/14/22 2050  Diet NPO  Diet effective now        References:    Nutrtion Support Algorithm Enteral vs  Parenteral   Question Answer Comment   Diet Type NPO    RD to adjust diet per protocol?  No        09/14/22 2053                  Active Medications  Scheduled Meds:  Current Facility-Administered Medications   Medication Dose Route Frequency Provider Last Rate    chlorhexidine  15 mL Mouth[facility]Throat Q12H [facility] KELSEY Salinas      fentanyl citrate (PF)  50 mcg Intravenous Q1H PRN KELSEY Salinas      [START ON 9/15/2022] heparin (porcine)  7,500 Units Subcutaneous Q8H [facility] KELSEY Crain      multi-electrolyte  125 mL/hr Intravenous Continuous HERMAN CrainNP 125 mL/hr (09/14/22 2116)    propofol  5-50 mcg/kg/min Intravenous Titrated HERMAN CrainNP 20 mcg/kg/min (09/14/22 2143)     Continuous Infusions:  multi-electrolyte, 125 mL/hr, Last Rate: 125 mL/hr (09/14/22 2116)  propofol, 5-50 mcg/kg/min, Last Rate: 20 mcg/kg/min (09/14/22 2143)      PRN Meds:   fentanyl citrate (PF), 50 mcg, Q1H PRN        Invasive Devices Review  Invasive Devices  Report    Central Venous Catheter Line  Duration           CVC Central Lines 09/14/22 Triple 20cm <1 day          Peripheral Intravenous Line  Duration           Peripheral IV 09/14/22 Left Antecubital <1 day          Drain  Duration           NG/OG/Enteral Tube Nasogastric 18 Fr Right nare <1 day    Urethral Catheter Non-latex 16 Fr  <1 day          Airway  Duration           ETT  Cuffed;Oral 8 mm <1 day                Rationale for remaining devices: medical necessity  ---------------------------------------------------------------------------------------  Advance Directive and Living Will:      Power of :    POLST:    ---------------------------------------------------------------------------------------  Care Time Delivered:   Upon my evaluation, this patient had a high probability of imminent or life-threatening deterioration due to postoperative insufficiency, s/p exp lap, acute renal injury, which required my direct attention, intervention, and personal management  I have personally provided 45 minutes (2130 to 2215) of critical care time, exclusive of procedures, teaching, family meetings, and any prior time recorded by providers other than myself  KELSEY Crain      Portions of the record may have been created with voice recognition software  Occasional wrong word or "sound a like" substitutions may have occurred due to the inherent limitations of voice recognition software    Read the chart carefully and recognize, using context, where substitutions have occurred

## 2022-09-15 NOTE — CONSULTS
Please see the note labeled H&P from 9/14 at 5:12 p m  offered by Purnima Brown MD for consultation details      Purnima Brown MD   PGY 1, General surgery  09/15/2022

## 2022-09-15 NOTE — QUICK NOTE
Post op check:  General surgery    Assessment   Patient is a 78 y o  male who presented with a strangulated incisional hernia, now status post exploratory laparotomy, lysis of adhesions, and ABThera placement  Plan:  -remain intubated; titrate sedation  -NG tube; low continuous suction  -IVF  -Arias  -ABThera VAC; monitor output character  -DVT prophylaxis  -rest of care per ICU     Subjective:  Patient is intubated sedated; a arousable and follows directions  Objective:  Vitals:    09/14/22 2205 09/14/22 2344 09/14/22 2345 09/14/22 2350   BP: (!) 139/45 131/55     BP Location:       Pulse: (!) 128 (!) 108 (!) 106    Resp: 21 18 19    Temp:   98 2 °F (36 8 °C) 98 3 °F (36 8 °C)   TempSrc:   Tympanic Tympanic   SpO2: 96% 96% 96%    Weight:       Height:         I/O this shift:  In: 2603 8 [I V :2603 8]  Out: 485 [Urine:435; Blood:50]    Scheduled Meds:  Current Facility-Administered Medications   Medication Dose Route Frequency Provider Last Rate    chlorhexidine  15 mL Mouth/Throat Q12H [facility] KELSEY Brown      fentanyl citrate (PF)  50 mcg Intravenous Q1H PRN KELSEY Brown      [START ON 9/15/2022] heparin (porcine)  7,500 Units Subcutaneous Q8H [facility] HERMAN BrownNP      multi-electrolyte  125 mL/hr Intravenous Continuous Kushal RiverasHERMANNP 125 mL/hr (09/14/22 2116)    propofol  5-50 mcg/kg/min Intravenous Titrated Kushal Riveras, CRNP 20 mcg/kg/min (09/14/22 2143)     Continuous Infusions:multi-electrolyte, 125 mL/hr, Last Rate: 125 mL/hr (09/14/22 2116)  propofol, 5-50 mcg/kg/min, Last Rate: 20 mcg/kg/min (09/14/22 2143)      PRN Meds:   fentanyl citrate (PF)      Physical Exam   Gen:  Morbidly obese, intubated and sedated  HEENT: EOMI  CV: RRR,   Lungs:  Mechanically ventilated  Abd:  ABThera in place with 300 cc of serosanguineous output in the canister  Neuro:  AxO x3      Enedina Bernheim, MD

## 2022-09-15 NOTE — ASSESSMENT & PLAN NOTE
Postoperatively  Will remain intubated due to need for aggressive pain control, obesity, large abdominal wound and ongoing plan to take back to surgery tomorrow    Mechanical ventilation, check ABG and adjust settings as needed  Pulmonary hygiene

## 2022-09-15 NOTE — ANESTHESIA PREPROCEDURE EVALUATION
Procedure:  LAPAROTOMY EXPLORATORY W/ BOWEL RESECTION (N/A Abdomen)    Relevant Problems   ANESTHESIA (within normal limits)      CARDIO   (+) Essential hypertension      GI/HEPATIC  remains intubated on postop day 1, will return to OR today for laparotomy  /RENAL   (+) CKD (chronic kidney disease), stage III (HCC)   (+) Kidney stones      MUSCULOSKELETAL   (+) Gout      PULMONARY   (+) DELIA (obstructive sleep apnea)        Physical Exam    Airway  Comment: intubated           Dental   No notable dental hx     Cardiovascular  Cardiovascular exam normal    Pulmonary  Pulmonary exam normal     Other Findings        Anesthesia Plan  ASA Score- 4 Emergent    Anesthesia Type- general with ASA Monitors  Additional Monitors:   Airway Plan:           Plan Factors-    Chart reviewed  Existing labs reviewed  Patient summary reviewed  Induction- intravenous  Postoperative Plan-     Informed Consent- Anesthetic plan and risks discussed with patient

## 2022-09-15 NOTE — PROGRESS NOTES
The patients standard-infusion Piperacillin-Tazobactam / Zosyn (infused over 30-60 minutes) has been converted to extended-infusion (infused over 4 hours) per [facility] Extended-Infusion Piperacillin-Tazobactam Protocol for Adults as approved by the Pharmacy and Therapeutics Committee (accessible here on MyNET)       The patient met ALL eligible criteria:    Age >= 25years old   Critical Care patient    And did NOT have ANY exclusions:     Emergency Department or Operating Room patient  Drug incompatibilities that could NOT be avoided with timing or separate line administration    The following are reminders for Nursing regarding administration:  Infuse the first dose of Zosyn over 30min as a load (if new start), and then all subsequent doses will be given as an extended-infusion over 4 hours (see dosing below)  Use primary tubing as an intermittent infusion; change out primary tubing every 24 hours   Ensure full dose of the medication is given at the appropriate rate  Most incompatible drugs can be scheduled during times when the Zosyn is not being infused; however, if one requires administration during the same time, a separate site or lumen MUST be used  If access is limited and an incompatible medication urgently needs to be given, the Zosyn extended-infusion can be held for up to 30min (remember to flush line before/after)  Extended-infusion Zosyn does NOT require special timing around hemodialysis (it can even be given simultaneously)  If a patient needs an urgent MRI while Zosyn is infusing and there is not a MRI-compatible pump available for use, finish the infusion over the traditional length (30min) and ask Pharmacy to reschedule the next doses so that they start a few hours earlier  Pharmacy will assist nursing in troubleshooting other administration issues as they arise  Dosing for Piperacillin-Tazobactam  CrCl (mL/min) Traditional Dosing Extended-Infusion Dosing #   CrCl > 40 High-Dose  CrCl > 40 Low-Dose 4 5g Q6H (over 30min)  3 375g IV Q6H (over 30min) 3 375g IV Q8H (over 4hr)*    *1st dose loaded over 30min, then start extended-infusion dosing 4hr later   CrCl 20-40 High-Dose  CrCl 20-40 Low-Dose 3 375g IV Q6H (over 30min)  2 25g IV Q6H (over 30min)    CrCl < 20 High-Dose  CrCl < 20 Low-Dose 2 25g IV Q6H (over 30min)  2 25g IV Q8H (over 30min) 3 375g IV Q12H (over 4hr)*    *1st dose loaded over 30min, then start extended-infusion dosing 6hr later   Hemo/Peritoneal Dialysis High-Dose  Hemo/Peritoneal Dialysis Low-Dose 2 25g IV Q6H (over 30min)  2 25g IV Q8H (over 30min)    CVVH/D High-Dose  CVVH/D Low-Dose 3 375g IV Q6H (over 30min)  2 25 IV Q6H (over 30min) 3 375g IV Q8H (over 4hr)*    *1st dose loaded over 30min, then start extended-infusion dosing 4hr later   # = Use 4 5g dosing (same interval) if morbidly obese (BMI ?40)    Please call the Pharmacy with any questions or concerns

## 2022-09-15 NOTE — PROGRESS NOTES
Pastoral Care Progress Note    9/15/2022  Patient: Phil Moyer : 1955  Admission Date & Time: 2022 1129  MRN: [id number] CSN: [id number]      Visited with wife this morning before patient went to OR, provided listening  support and pastoral care presence  Will continue to follow patient

## 2022-09-15 NOTE — ASSESSMENT & PLAN NOTE
Lab Results   Component Value Date    EGFR 42 09/14/2022    EGFR 52 08/04/2021    EGFR 57 07/19/2021    CREATININE 1 64 (H) 09/14/2022    CREATININE 1 40 (H) 08/04/2021    CREATININE 1 30 07/19/2021   Baseline creatinine is 1 4-1 7; OR I/Os 3L/375 ml  Avoid nephrotoxins  IV hydration [id number]  Monitor renal indices closely

## 2022-09-15 NOTE — ASSESSMENT & PLAN NOTE
S/p exp lap, complex lysis of adhesions, reduction of strangulated hernia, debridement RLQ muscle with counter incision, abthera placement x 2  Intraoperative course was uneventful  RLQ Hernia repair with wound VAC placement and anterior abdominal wall repair with mesh and retention sutures x12 on 09/15/2022  Pain management with fentanyl infusion  Continue NPO   NGT to LCS

## 2022-09-15 NOTE — ANESTHESIA POSTPROCEDURE EVALUATION
Post-Op Assessment Note    CV Status:  Stable    Pain management: adequate     Mental Status:  Alert and awake   Hydration Status:  Euvolemic   PONV Controlled:  Controlled   Airway Patency:  Patent  Airway: intubated      Post Op Vitals Reviewed: Yes      Staff: Anesthesiologist, CRNA   Comments: Patient with large incarcerated hernia with possible small bowel ischemia/ necrotizing fasciitis ( pending Pathology) remains intubated post op secondary to unclosed abdomen and super morbid obesity  Reason for prolonged intubation > 24 hours:  Exploratory laparotomy with re exploration reapir of hernia      No complications documented      BP (!) 192/74 (09/14/22 2105)    Temp 98 2 °F (36 8 °C) (09/14/22 2105)    Pulse (!) 132 (09/14/22 2105)   Resp 16 (09/14/22 2105)    SpO2 97 % (09/14/22 2105)

## 2022-09-15 NOTE — ASSESSMENT & PLAN NOTE
Due to strangulated ventral hernia and RLQ abscess of the rectus muscle  As evidenced by WBC-12 9, fever (101 4 F) and tachycardia POA  Treated with fluid boluses and Zosyn in the ED, followed by emergent OR ex lap prior to which patient received a dose of Ancef  Underwent ex lap with hernia repair  Blood cultures negative at 24h  Tissue culture demonstrating few colonies of K  Pneumoniae and E   Coli    Plan:  Maintain MAP>65  Continue Zosyn q6h, renally dose  Continue monitoring WBC, trend fever curve  Continue hemodynamic monitoring

## 2022-09-15 NOTE — PROGRESS NOTES
Progress Note - General Surgery   Ailin Sharpe 78 y o  male MRN: [id number]  Unit/Bed#: ICU 07 Encounter: [id number]    Assessment:  78yoM p/w incarcerated incisional hernia and SBO now s/p:  9/14: ex lap, reduction of strangulated ventral hernia, debridement of RLQ abdominal wall musculature, Abthera VAC placement  9/15: ex lap, RLQ hernia repair w mesh and VAC, complex anterior abdominal wall repair w mesh and retention sutures, LANETTE    Vent AC/PC 18/500/50/6  Tachycardic mostly to the 110s, highest 843K, systolics mostly in the 21W to 100s, lowest is 80s, afebrile  WBC 14 74 from 16 3, hemoglobin 10 from 11 6  Blood cultures NG x24hr, OR cultures growing Klebsiella and E coli  Creatinine 2 5 from 1 9            Plan:  - NPO/NGT  - Zosyn  - abdominal VAC, monitor output and character  - [email or web address], continued IVF resuscitation  - Arias for accurate UOP  - wean vent as tolerated  - follow-up OR, blood cultures  - [facility]  - pain control  - appreciate ICU care    Subjective/Objective   Subjective:   No acute events overnight, did get 1 500 cc bolus and some albumin, is following commands this morning, reports abdominal pain  Remains on the vent    Objective:     Blood pressure (!) 91/41, pulse (!) 120, temperature 99 9 °F (37 7 °C), temperature source Bladder, resp  rate (!) 23, height 5' 9" (1 753 m), weight (!) 176 kg (388 lb 7 2 oz), SpO2 96 %  ,Body mass index is 57 36 kg/m²        Intake/Output Summary (Last 24 hours) at 9/16/2022 0600  Last data filed at 9/16/2022 0400  Gross per 24 hour   Intake 6106 24 ml   Output 1480 ml   Net 4626 24 ml       Invasive Devices  Report    Central Venous Catheter Line  Duration           CVC Central Lines 09/14/22 Triple 20cm 1 day          Peripheral Intravenous Line  Duration           Peripheral IV 09/14/22 Left Antecubital 1 day          Drain  Duration           NG/OG/Enteral Tube Nasogastric 18 Fr Right nare 1 day    Urethral Catheter Non-latex 16 Fr  1 day          Airway  Duration           ETT  Cuffed;Oral 8 mm 1 day                Physical Exam:   General:  Intubated, sedated, following commands  Skin: Warm, dry, anicteric  HEENT: Normocephalic, atraumatic  CV:  Tachy to 100s, no m/r/g  Pulm: CTA b/l, no inc WOB, vent as above  Abd: Soft, nondistended, tender to palpation, VAC in RLQ draining serosanguineous, retention sutures times 12 over midline incision, abdominal binder in place  MSK: Symmetric, no edema, no tenderness, no deformity    Lab, Imaging and other studies:  I have personally reviewed pertinent lab results    , CBC:   Lab Results   Component Value Date    WBC 14 74 (H) 09/16/2022    HGB 10 0 (L) 09/16/2022    HCT 31 4 (L) 09/16/2022    MCV 96 09/16/2022     09/16/2022    MCH 30 6 09/16/2022    MCHC 31 8 09/16/2022    RDW 13 8 09/16/2022    MPV 8 4 (L) 09/16/2022    NRBC 0 09/16/2022   , CMP:   Lab Results   Component Value Date    SODIUM 138 09/16/2022    K 4 2 09/16/2022     09/16/2022    CO2 23 09/16/2022    BUN 28 (H) 09/16/2022    CREATININE 2 05 (H) 09/16/2022    CALCIUM 7 8 (L) 09/16/2022    AST 31 09/16/2022    ALT 25 09/16/2022    ALKPHOS 44 (L) 09/16/2022    EGFR 32 09/16/2022     VTE Pharmacologic Prophylaxis: Sequential compression device (Venodyne)  and Heparin  VTE Mechanical Prophylaxis: sequential compression device

## 2022-09-15 NOTE — TELEPHONE ENCOUNTER
S/P = STRANGULATED HERNIA, EXP  LAP  = 9/14/2022    Unable to reach patient, still in-patient  Path & Culture pending

## 2022-09-15 NOTE — PROGRESS NOTES
[address]  Progress Note - Anna Maher 1955, 78 y o  male MRN: [id number]  Unit/Bed#: ICU 07 Encounter: [id number]  Primary Care Provider: Milind Albarran MD   Date and time admitted to hospital: 9/14/2022 11:29 AM    * Strangulated ventral hernia  Assessment & Plan  S/p exp lap, complex lysis of adhesions, reduction of strangulated hernia, debridement RLQ muscle with counter incision, abthera placement x 2  Intraoperative course was uneventful  Pain management with fentanyl prn  Plan for surgery tomorrow, will remain intubated  Continue NPO   NGT to LIS    Respiratory insufficiency  Assessment & Plan  Postoperatively  Will remain intubated due to need for aggressive pain control, obesity, large abdominal wound and ongoing plan to take back to surgery today  Mechanically ventilated - AC/VC 18/500/50%/6  RASS -1  Pulmonary hygiene    Class 3 severe obesity due to excess calories with serious comorbidity and body mass index (BMI) of 50 0 to 59 9 in adult [facility])  Assessment & Plan  Encourage lifestyle modifications    DELIA (obstructive sleep apnea)  Assessment & Plan  Currently intubated will address following extubation    CKD (chronic kidney disease), stage III [facility])  Assessment & Plan  Lab Results   Component Value Date    EGFR 48 09/15/2022    EGFR 42 09/14/2022    EGFR 52 08/04/2021    CREATININE 1 48 (H) 09/15/2022    CREATININE 1 64 (H) 09/14/2022    CREATININE 1 40 (H) 08/04/2021   Baseline creatinine is 1 4-1 7; OR I/Os 3L/375 ml  Avoid nephrotoxins, avoid hypotension  IV hydration [id number]  Monitor renal indices closely      Essential hypertension  Assessment & Plan  Continue holding PTA meds for now, toprol XL, lisinopril, lasix  BP normotensive postoperatively    ----------------------------------------------------------------------------------------  HPI/24hr events: Underwent ex lap with lysis of adhesion and reduction of strangulated hernia, debridement of RLQ muscle with counter incision, abthera placement x2 on 22  Currently intubated and sedated, vent settings are as follows: AC/VC 18/500/50%/6  RASS: -1  Opens eyes to name, follows commands  Patient appropriate for transfer out of the ICU today?: No  Disposition: Continue Critical Care   Code Status: Level 1 - Full Code  ---------------------------------------------------------------------------------------  SUBJECTIVE  Patient intubated and sedated  Review of Systems  Review of systems was unable to be performed secondary to patient on mechanical ventilation  ---------------------------------------------------------------------------------------  OBJECTIVE    Vitals   Vitals:    09/15/22 0405 09/15/22 0505 09/15/22 0600 09/15/22 0605   BP: 125/59 129/57  106/55   BP Location: Right leg      Pulse: 104 104  102   Resp:   22   Temp: 99 6 °F (37 6 °C)      TempSrc: Tympanic      SpO2: 100% 99%  99%   Weight:   (!) 174 kg (383 lb 13 1 oz)    Height:         Temp (24hrs), Av 2 °F (37 3 °C), Min:98 2 °F (36 8 °C), Max:101 4 °F (38 6 °C)  Current: Temperature: 99 6 °F (37 6 °C)  Arterial Line BP: 60/56  Arterial Line MAP (mmHg): (!) 58 mmHg    Respiratory:  SpO2: SpO2: 99 %       Invasive/non-invasive ventilation settings   Respiratory  Report   Lab Data (Last 4 hours)      09/15 0336            pH, Arterial       7 382             pCO2, Arterial       34 4             pO2, Arterial       237 7             HCO3, Arterial       20 0             Base Excess, Arterial       -4 3                  O2/Vent Data       09/15 0244   Most Recent         Vent Mode AC/VC        Resp Rate (BPM) (BPM) 18        Vt (mL) (mL) 500        FIO2 (%) (%) 80        PEEP (cmH2O) (cmH2O) 6        MV 11                    Physical Exam  Vitals and nursing note reviewed  Constitutional:       General: He is not in acute distress  Appearance: He is morbidly obese  Interventions: He is sedated and intubated  HENT:      Head: Normocephalic and atraumatic  Nose: Nose normal    Eyes:      General: No scleral icterus  Right eye: No discharge  Left eye: No discharge  Extraocular Movements: Extraocular movements intact  Conjunctiva/sclera: Conjunctivae normal    Cardiovascular:      Rate and Rhythm: Regular rhythm  Tachycardia present  Heart sounds: Normal heart sounds  No murmur heard  Pulmonary:      Effort: Pulmonary effort is normal  He is intubated  Breath sounds: Rhonchi (bilaterally in mid and lower lung fields) present  Abdominal:      General: Bowel sounds are normal  There is distension (likely due to body habitus)  Musculoskeletal:      Right lower leg: Edema present  Left lower leg: Edema present  Comments: Noted on the dorsal surfaces of the feet bilaterally   Skin:     General: Skin is warm and dry  Neurological:      Mental Status: He is easily aroused               Laboratory and Diagnostics:  Results from last 7 days   Lab Units 09/15/22  0336 09/14/22  1203   WBC Thousand/uL 15 84* 12 90*   HEMOGLOBIN g/dL 12 5 12 1   HEMATOCRIT % 39 1 37 1   PLATELETS Thousands/uL 380 416*   NEUTROS PCT %  --  85*   BANDS PCT % 12*  --    MONOS PCT %  --  7   MONO PCT % 4  --      Results from last 7 days   Lab Units 09/15/22  0336 09/14/22  1203   SODIUM mmol/L 135 134*   POTASSIUM mmol/L 4 4 3 9   CHLORIDE mmol/L 104 99   CO2 mmol/L 21 27   ANION GAP mmol/L 10 8   BUN mg/dL 23 21   CREATININE mg/dL 1 48* 1 64*   CALCIUM mg/dL 8 2* 8 8   GLUCOSE RANDOM mg/dL 145* 115   ALT U/L 14 15   AST U/L 14 13   ALK PHOS U/L 47 55   ALBUMIN g/dL 2 1* 2 6*   TOTAL BILIRUBIN mg/dL 1 02* 1 25*     Results from last 7 days   Lab Units 09/15/22  0336   MAGNESIUM mg/dL 1 8   PHOSPHORUS mg/dL 3 5      Results from last 7 days   Lab Units 09/14/22  1203   INR  1 19   PTT seconds 39*          Results from last 7 days   Lab Units 09/15/22  0007 09/14/22  1947 09/14/22  1220   LACTIC ACID mmol/L 1 4 1 1 1 1     ABG:  Results from last 7 days   Lab Units 09/15/22  0336   PH ART  7 382   PCO2 ART mm Hg 34 4*   PO2 ART mm Hg 237 7*   HCO3 ART mmol/L 20 0*   BASE EXC ART mmol/L -4 3   ABG SOURCE  Line, Arterial     VBG:  Results from last 7 days   Lab Units 09/15/22  0336 09/15/22  0007   PH JONH   --  7 265*   PCO2 JONH mm Hg  --  43 6   PO2 JONH mm Hg  --  37 8   HCO3 JONH mmol/L  --  19 3*   BASE EXC JONH mmol/L  --  -7 4   ABG SOURCE  Line, Arterial  --      Results from last 7 days   Lab Units 09/14/22  1203   PROCALCITONIN ng/ml 0 75*       Micro  Results from last 7 days   Lab Units 09/14/22  1944 09/14/22  1220 09/14/22  1203   BLOOD CULTURE   --  Received in Microbiology Lab  Culture in Progress  Received in Microbiology Lab  Culture in Progress  GRAM STAIN RESULT  No Polys or Bacteria seen  --   --        EKG: Sinus tachycardia,   Imaging: I have personally reviewed pertinent reports  and I have personally reviewed pertinent films in PACS    Intake and Output  I/O       09/13 0701  09/14 0700 09/14 0701  09/15 0700    I V  (mL/kg)  3761 3 (21 6)    IV Piggyback  3100    Total Intake(mL/kg)  6861 3 (39 4)    Urine (mL/kg/hr)  650    Emesis/NG output  100    Drains  550    Blood  50    Total Output  1350    Net  +5511 3                Height and Weights   Height: 5' 9" (175 3 cm)  IBW (Ideal Body Weight): 70 7 kg  Body mass index is 56 68 kg/m²  Weight (last 2 days)     Date/Time Weight    09/15/22 0600 174 (383 82)    09/14/22 2054 174 (383 82)    09/14/22 1125 167 (368 17)            Nutrition       Diet Orders   (From admission, onward)             Start     Ordered    09/14/22 2050  Diet NPO  Diet effective now        References:    Nutrtion Support Algorithm Enteral vs  Parenteral   Question Answer Comment   Diet Type NPO    RD to adjust diet per protocol?  No        09/14/22 2053                  Active Medications  Scheduled Meds:  Current Facility-Administered Medications Medication Dose Route Frequency Provider Last Rate    chlorhexidine  15 mL Mouth/Throat Q12H [address] Neo Paz, CRNP      fentanyl citrate (PF)  50 mcg Intravenous Q1H PRN Neo Paz, CRNP      heparin (porcine)  7,500 Units Subcutaneous Q8H [address] Neo Paz, CRNP      lactated ringers  500 mL Intravenous Once Neo Paz, CRNP 500 mL (09/15/22 0620)    multi-electrolyte  125 mL/hr Intravenous Continuous Neo Paz, CRNP 125 mL/hr (09/14/22 2116)    propofol  5-50 mcg/kg/min Intravenous Titrated Neo Paz, CRNP 20 mcg/kg/min (09/15/22 0542)     Continuous Infusions:  multi-electrolyte, 125 mL/hr, Last Rate: 125 mL/hr (09/14/22 2116)  propofol, 5-50 mcg/kg/min, Last Rate: 20 mcg/kg/min (09/15/22 0542)      PRN Meds:   fentanyl citrate (PF), 50 mcg, Q1H PRN        Invasive Devices Review  Invasive Devices  Report    Central Venous Catheter Line  Duration           CVC Central Lines 09/14/22 Triple 20cm <1 day          Peripheral Intravenous Line  Duration           Peripheral IV 09/14/22 Left Antecubital <1 day          Arterial Line  Duration           Arterial Line 09/15/22 Radial <1 day          Drain  Duration           NG/OG/Enteral Tube Nasogastric 18 Fr Right nare <1 day    Urethral Catheter Non-latex 16 Fr  <1 day          Airway  Duration           ETT  Cuffed;Oral 8 mm <1 day                Rationale for remaining devices: Patient on mechanical ventilation   ---------------------------------------------------------------------------------------  Advance Directive and Living Will:      Power of :    POLST:    ---------------------------------------------------------------------------------------  Care Time Delivered:   No Critical Care time spent       Hetal Carroll MD      Portions of the record may have been created with voice recognition software    Occasional wrong word or "sound a like" substitutions may have occurred due to the inherent limitations of voice recognition software    Read the chart carefully and recognize, using context, where substitutions have occurred

## 2022-09-15 NOTE — OP NOTE
OPERATIVE REPORT  PATIENT NAME: Damaris Cox    :  1955  MRN: [id number]  Pt Location: [facility] OR ROOM 08    SURGERY DATE: 2022    Surgeon(s) and Role:     * Rena Craft MD - Primary     * Aubrey Solorzano MD - Assisting 1st     * Bina Jung MD - Assisting    Preop Diagnosis:  Strangulated hernia of abdominal wall [K43 6]    Post-Op Diagnosis Codes:     * Strangulated hernia of abdominal wall [K43 6]    Procedure(s) (LRB):  Exdploratory laparotomy, complex Lysis of adhesions, reduction strangulated hernia, debridement RLQ muscle with counter incision, abthera placement (N/A)     This was a complex procedure requiring approximately 2 hours  Twice as long as the average lysis of adhesions for an incisional hernia  Specimen(s):  ID Type Source Tests Collected by Time Destination   1 : abdominal wall and muscle necrotizing fasciitis r/o NSPI Tissue Muscle TISSUE EXAM Rena Craft MD 2022    A : necrotizing fasciitis right rectus muscle Tissue Muscle ANAEROBIC CULTURE AND GRAM STAIN, CULTURE, TISSUE AND GRAM STAIN Rena Craft MD 2022        Estimated Blood Loss:   50 mL    Drains:  NG/OG/Enteral Tube Nasogastric 18 Fr Right nare (Active)   Number of days: 0       Urethral Catheter Non-latex 16 Fr  (Active)   Number of days: 0       Anesthesia Type:   General    Operative Indications:  Strangulated hernia of abdominal wall [K43 6]    Cellulitis RLQ abdominal wall    Operative Findings:  Necrotizing or abscess muscle RLQ rectus    Viable severely chronic dilated small bowel RLQ in hernia no perforation  The mesentery has elements of fibrinous purulent material    Multiple abdominal wall hernias coming from a very large right lower quadrant appendicitis incision  Super morbid obesity    Distal half of small bowel incarcerated in this hernia  Severe size mismatch  Closed loop obstruction  Acute on chronic       Proximal bowel measured approximately 8-10 cm of chronic dilatation  Significant loss of domain  Unable to reduce bowel  Complications:   None    Procedure and Technique:  The patient was seen again in the Holding Room  The risks, benefits, complications, treatment options, and expected outcomes were discussed with the patient  The patient and/or family concurred with the proposed plan, giving informed consent  Informed consent was personally discussed with the patient and reviewed  The site of surgery properly noted/marked  The patient was taken to Operating Room, identified as Phil Moyer  and the procedure verified  A Time Out was held after prepping and draping in sterile fashion  The above information was confirmed  Prior to the induction of general anesthesia, antibiotic prophylaxis was administered  General endotracheal anesthesia was then administered and tolerated well  After the induction, the surgical area was prepped in the usual sterile fashion  A long midline incision was made  Dissection carried out to the fascia  Should be noted the patient was super morbid obese with a BMI of 55  Dissection carried at the fashion the abdomen was entered under direct vision  Small bowel was traced to the right lower quadrant  There was severely large multiple loculated defect in the right lower quadrant  With great effort this area was reduced after enlarging some of the defect  Adhesions were lysed using electric cautery systems and blunt dissection  Eventually all loops of small bowel were brought out which revealed a closed loop obstruction in the right lower quadrant  The bowel bowel was viable  It was noted in the right lower quadrant that there was erythema of the abdominal wall and when this area was investigated it appeared the rectus muscle had a chronic abscess cavity or necrotizing soft tissue infection    This was thoroughly debrided and a counter incision was made on the right lower quadrant abdominal wall to reach this hernia sac  This counter incision measured 15 x 10 cm  The bowel was deemed viable no resection was not entertained at this time  The abdomen was irrigated  It was decided to have a second-look laparotomy as this abdomen would not be able to be closed in its current state due to loss of domain  The abdomen was irrigated  An ABThera wound VAC was placed over the right lower quadrant counter incision hernia sac and hernia and around circumferentially the abdominal cavity with the bowel mostly reduced into the abdomen  In the right lower quadrant incision was placed a white sponge over the ABThera and then a black sponge  There were 2 blue sponges over the ABThera  The ABThera was secured into place  The patient was returned to the ICU in critical condition       I was present for the entire procedure    Patient Disposition:  Critical Care Unit        SIGNATURE: [unfilled]  DATE: September 14, 2022  TIME: 8:31 PM

## 2022-09-15 NOTE — PROGRESS NOTES
S: Gio Botello is a 78 y o  male who returns to the ICU s/p ex lap, RLQ hernia repair w mesh and VAC, complex anterior abdominal wall repair w mesh and retention sutures, LANETTE  EBL minimal   Patient tolerated the procedure well, without complications, returns to the ICU intubated and sedated      O:    Vitals:    09/15/22 1715   BP: 92/57   Pulse: (!) 108   Resp: 21   Temp:    SpO2: 100%     Vent AC/VC settings 18/500/50/6  On propofol drip    General:  Intubated, sedated  Skin: Warm, dry, anicteric  HEENT: Normocephalic, atraumatic  CV:  Tachy to 100s, no m/r/g  Pulm: CTA b/l, no inc WOB, vent as above  Abd: Soft, minimally distended, VAC in RLQ draining serosanguineous, retention sutures times 12 over midline incision, abdominal binder in place  MSK: Symmetric, no edema, no tenderness, no deformity    A: 67yoM p/w incarcerated incisional hernia and SBO now s/p:  9/14: ex lap, reduction of strangulated ventral hernia, debridement of RLQ abdominal wall musculature, Abthera VAC placement  9/15: ex lap, RLQ hernia repair w mesh and VAC, complex anterior abdominal wall repair w mesh and retention sutures, LANETTE    P:  - NPO/NGT  - Zosyn  - abdominal VAC, monitor output and character  - [email or web address], will also give 1 time 500 cc bolus for tachycardia and hypotension systolics in the 87R  - Arias for accurate UOP  - wean vent as tolerated  - follow-up OR, blood cultures  - SQH  - pain control  - appreciate ICU care

## 2022-09-15 NOTE — ASSESSMENT & PLAN NOTE
Postoperatively  Will remain intubated due to need for aggressive pain control, obesity, large abdominal wound and ongoing plan to take back to surgery today  Mechanically ventilated - AC/VC 18/500/50%/6  Consider weaning off vent today    Continue fentanyl infusion  RASS -1  Pulmonary hygiene

## 2022-09-15 NOTE — ASSESSMENT & PLAN NOTE
Lab Results   Component Value Date    EGFR 48 09/15/2022    EGFR 42 09/14/2022    EGFR 52 08/04/2021    CREATININE 1 48 (H) 09/15/2022    CREATININE 1 64 (H) 09/14/2022    CREATININE 1 40 (H) 08/04/2021

## 2022-09-15 NOTE — PROGRESS NOTES
Pt currently intubated on propofol at 25  1mL/hr  Provides 663 kcal  Plans for re-exploratoration today  Pt with NG tube for suction  Consider nutrition support to meet nutritional needs  Will monitor if able to initiate TF versus TPN  If and when able to initiate TF if possible, recommend Jevity 1 2 with initial rate 10mL/hr advancing 10mL every 8hrs until goal rate of 60mL/hr with 1 pack of prosource and 250mL water flushes every 6hrs  Would provide with current propofol 2451 kcal, 95g protein, 2162mL  If unable to initiate EN, recommend standard TPN  Will monitor electrolytes and check triglycerides  Will provide goal TPN as needed

## 2022-09-15 NOTE — QUICK NOTE
Spoke with patient's wife, Rei Islas, via phone to give updates post-op  All questions were answered  Wife verbalized understanding      Estela Hernández MD  PGY-5, General Surgery  9/15/2022

## 2022-09-15 NOTE — ASSESSMENT & PLAN NOTE
S/p exp lap, complex lysis of adhesions, reduction of strangulated hernia, debridement RLQ muscle with counter incision, abthera placement x 2  Intraoperative course was uneventful  Pain management with fentanyl prn  Plan for surgery tomorrow, will remain intubated  Continue NPO   NGT to LIS

## 2022-09-15 NOTE — PLAN OF CARE
Problem: PAIN - ADULT  Goal: Verbalizes/displays adequate comfort level or baseline comfort level  Description: Interventions:  - Encourage patient to monitor pain and request assistance  - Assess pain using appropriate pain scale  - Administer analgesics based on type and severity of pain and evaluate response  - Implement non-pharmacological measures as appropriate and evaluate response  - Consider cultural and social influences on pain and pain management  - Notify physician/advanced practitioner if interventions unsuccessful or patient reports new pain  Outcome: Progressing     Problem: INFECTION - ADULT  Goal: Absence or prevention of progression during hospitalization  Description: INTERVENTIONS:  - Assess and monitor for signs and symptoms of infection  - Monitor lab/diagnostic results  - Monitor all insertion sites, i e  indwelling lines, tubes, and drains  - Monitor endotracheal if appropriate and nasal secretions for changes in amount and color  - [place] appropriate cooling/warming therapies per order  - Administer medications as ordered  - Instruct and encourage patient and family to use good hand hygiene technique  - Identify and instruct in appropriate isolation precautions for identified infection/condition  Outcome: Progressing  Goal: Absence of fever/infection during neutropenic period  Description: INTERVENTIONS:  - Monitor WBC    Outcome: Progressing     Problem: SAFETY ADULT  Goal: Patient will remain free of falls  Description: INTERVENTIONS:  - Educate patient/family on patient safety including physical limitations  - Instruct patient to call for assistance with activity   - Consult OT/PT to assist with strengthening/mobility   - Keep Call bell within reach  - Keep bed low and locked with side rails adjusted as appropriate  - Keep care items and personal belongings within reach  - Initiate and maintain comfort rounds  - Make Fall Risk Sign visible to staff  - Offer Toileting every 2 Hours, in advance of need  - Initiate/Maintain bed alarm  - Apply yellow socks and bracelet for high fall risk patients  - Consider moving patient to room near nurses station  Outcome: Progressing  Goal: Maintain or return to baseline ADL function  Description: INTERVENTIONS:  -  Assess patient's ability to carry out ADLs; assess patient's baseline for ADL function and identify physical deficits which impact ability to perform ADLs (bathing, care of mouth/teeth, toileting, grooming, dressing, etc )  - Assess/evaluate cause of self-care deficits   - Assess range of motion  - Assess patient's mobility; develop plan if impaired  - Assess patient's need for assistive devices and provide as appropriate  - Encourage maximum independence but intervene and supervise when necessary  - Involve family in performance of ADLs  - Assess for home care needs following discharge   - Consider OT consult to assist with ADL evaluation and planning for discharge  - Provide patient education as appropriate  Outcome: Progressing  Goal: Maintains/Returns to pre admission functional level  Description: INTERVENTIONS:  - Perform BMAT or MOVE assessment daily    - Set and communicate daily mobility goal to care team and patient/family/caregiver  - Collaborate with rehabilitation services on mobility goals if consulted  - Perform Range of Motion 3 times a day  - Reposition patient every 2 hours    - Dangle patient 3 times a day  - Stand patient 3 times a day  - Ambulate patient 3 times a day  - Out of bed to chair 3 times a day   - Out of bed for meals 3 times a day  - Out of bed for toileting  - Record patient progress and toleration of activity level   Outcome: Progressing     Problem: DISCHARGE PLANNING  Goal: Discharge to home or other facility with appropriate resources  Description: INTERVENTIONS:  - Identify barriers to discharge w/patient and caregiver  - Arrange for needed discharge resources and transportation as appropriate  - Identify discharge learning needs (meds, wound care, etc )  - Arrange for interpretive services to assist at discharge as needed  - Refer to Case Management Department for coordinating discharge planning if the patient needs post-hospital services based on physician/advanced practitioner order or complex needs related to functional status, cognitive ability, or social support system  Outcome: Progressing     Problem: Knowledge Deficit  Goal: Patient/family/caregiver demonstrates understanding of disease process, treatment plan, medications, and discharge instructions  Description: Complete learning assessment and assess knowledge base  Interventions:  - Provide teaching at level of understanding  - Provide teaching via preferred learning methods  Outcome: Progressing     Problem: SAFETY,RESTRAINT: NV/NON-SELF DESTRUCTIVE BEHAVIOR  Goal: Remains free of harm/injury (restraint for non violent/non self-detsructive behavior)  Description: INTERVENTIONS:  - Instruct patient/family regarding restraint use   - Assess and monitor physiologic and psychological status   - Provide interventions and comfort measures to meet assessed patient needs   - Identify and implement measures to help patient regain control  - Assess readiness for release of restraint   Outcome: Progressing  Goal: Returns to optimal restraint-free functioning  Description: INTERVENTIONS:  - Assess the patient's behavior and symptoms that indicate continued need for restraint  - Identify and implement measures to help patient regain control  - Assess readiness for release of restraint   Outcome: Progressing     Problem: Nutrition/Hydration-ADULT  Goal: Nutrient/Hydration intake appropriate for improving, restoring or maintaining nutritional needs  Description: Monitor and assess patient's nutrition/hydration status for malnutrition  Collaborate with interdisciplinary team and initiate plan and interventions as ordered    Monitor patient's weight and dietary intake as ordered or per policy  Utilize nutrition screening tool and intervene as necessary  Determine patient's food preferences and provide high-protein, high-caloric foods as appropriate       INTERVENTIONS:  - Monitor oral intake, urinary output, labs, and treatment plans  - Assess nutrition and hydration status and recommend course of action  - Evaluate amount of meals eaten  - Assist patient with eating if necessary   - Allow adequate time for meals  - Recommend/ encourage appropriate diets, oral nutritional supplements, and vitamin/mineral supplements  - Order, calculate, and assess calorie counts as needed  - Recommend, monitor, and adjust tube feedings and TPN/PPN based on assessed needs  - Assess need for intravenous fluids  - Provide specific nutrition/hydration education as appropriate  - Include patient/family/caregiver in decisions related to nutrition  Outcome: Progressing     Problem: GASTROINTESTINAL - ADULT  Goal: Minimal or absence of nausea and/or vomiting  Description: INTERVENTIONS:  - Administer IV fluids if ordered to ensure adequate hydration  - Maintain NPO status until nausea and vomiting are resolved  - Nasogastric tube if ordered  - Administer ordered antiemetic medications as needed  - Provide nonpharmacologic comfort measures as appropriate  - Advance diet as tolerated, if ordered  - Consider nutrition services referral to assist patient with adequate nutrition and appropriate food choices  Outcome: Progressing  Goal: Maintains or returns to baseline bowel function  Description: INTERVENTIONS:  - Assess bowel function  - Encourage oral fluids to ensure adequate hydration  - Administer IV fluids if ordered to ensure adequate hydration  - Administer ordered medications as needed  - Encourage mobilization and activity  - Consider nutritional services referral to assist patient with adequate nutrition and appropriate food choices  Outcome: Progressing  Goal: Maintains adequate nutritional intake  Description: INTERVENTIONS:  - Monitor percentage of each meal consumed  - Identify factors contributing to decreased intake, treat as appropriate  - Assist with meals as needed  - Monitor I&O, weight, and lab values if indicated  - Obtain nutrition services referral as needed  Outcome: Progressing  Goal: Establish and maintain optimal ostomy function  Description: INTERVENTIONS:  - Assess bowel function  - Encourage oral fluids to ensure adequate hydration  - Administer IV fluids if ordered to ensure adequate hydration   - Administer ordered medications as needed  - Encourage mobilization and activity  - Nutrition services referral to assist patient with appropriate food choices  - Assess stoma site  - Consider wound care consult   Outcome: Progressing  Goal: Oral mucous membranes remain intact  Description: INTERVENTIONS  - Assess oral mucosa and hygiene practices  - Implement preventative oral hygiene regimen  - Implement oral medicated treatments as ordered  - Initiate Nutrition services referral as needed  Outcome: Progressing

## 2022-09-16 PROBLEM — D64.9 ANEMIA: Status: ACTIVE | Noted: 2022-09-16

## 2022-09-16 PROBLEM — N17.9 ACUTE KIDNEY INJURY (HCC): Status: ACTIVE | Noted: 2022-09-16

## 2022-09-16 LAB
ALBUMIN SERPL BCP-MCNC: 1.8 G/DL (ref 3.5–5)
ALP SERPL-CCNC: 44 U/L (ref 46–116)
ALT SERPL W P-5'-P-CCNC: 25 U/L (ref 12–78)
ANION GAP SERPL CALCULATED.3IONS-SCNC: 11 MMOL/L (ref 4–13)
AST SERPL W P-5'-P-CCNC: 31 U/L (ref 5–45)
BACTERIA SPEC ANAEROBE CULT: ABNORMAL
BASOPHILS # BLD AUTO: 0.03 THOUSANDS/ΜL (ref 0–0.1)
BASOPHILS NFR BLD AUTO: 0 % (ref 0–1)
BILIRUB SERPL-MCNC: 0.64 MG/DL (ref 0.2–1)
BUN SERPL-MCNC: 28 MG/DL (ref 5–25)
CALCIUM ALBUM COR SERPL-MCNC: 9.6 MG/DL (ref 8.3–10.1)
CALCIUM SERPL-MCNC: 7.8 MG/DL (ref 8.3–10.1)
CHLORIDE SERPL-SCNC: 104 MMOL/L (ref 96–108)
CO2 SERPL-SCNC: 23 MMOL/L (ref 21–32)
CREAT SERPL-MCNC: 2.05 MG/DL (ref 0.6–1.3)
EOSINOPHIL # BLD AUTO: 0 THOUSAND/ΜL (ref 0–0.61)
EOSINOPHIL NFR BLD AUTO: 0 % (ref 0–6)
ERYTHROCYTE [DISTWIDTH] IN BLOOD BY AUTOMATED COUNT: 13.8 % (ref 11.6–15.1)
GFR SERPL CREATININE-BSD FRML MDRD: 32 ML/MIN/1.73SQ M
GLUCOSE SERPL-MCNC: 126 MG/DL (ref 65–140)
GLUCOSE SERPL-MCNC: 141 MG/DL (ref 65–140)
GLUCOSE SERPL-MCNC: 151 MG/DL (ref 65–140)
HCT VFR BLD AUTO: 31.4 % (ref 36.5–49.3)
HGB BLD-MCNC: 10 G/DL (ref 12–17)
IMM GRANULOCYTES # BLD AUTO: 0.08 THOUSAND/UL (ref 0–0.2)
IMM GRANULOCYTES NFR BLD AUTO: 1 % (ref 0–2)
LYMPHOCYTES # BLD AUTO: 0.63 THOUSANDS/ΜL (ref 0.6–4.47)
LYMPHOCYTES NFR BLD AUTO: 4 % (ref 14–44)
MAGNESIUM SERPL-MCNC: 2.2 MG/DL (ref 1.6–2.6)
MCH RBC QN AUTO: 30.6 PG (ref 26.8–34.3)
MCHC RBC AUTO-ENTMCNC: 31.8 G/DL (ref 31.4–37.4)
MCV RBC AUTO: 96 FL (ref 82–98)
MONOCYTES # BLD AUTO: 0.81 THOUSAND/ΜL (ref 0.17–1.22)
MONOCYTES NFR BLD AUTO: 6 % (ref 4–12)
NEUTROPHILS # BLD AUTO: 13.19 THOUSANDS/ΜL (ref 1.85–7.62)
NEUTS SEG NFR BLD AUTO: 89 % (ref 43–75)
NRBC BLD AUTO-RTO: 0 /100 WBCS
PHOSPHATE SERPL-MCNC: 3.9 MG/DL (ref 2.3–4.1)
PLATELET # BLD AUTO: 331 THOUSANDS/UL (ref 149–390)
PMV BLD AUTO: 8.4 FL (ref 8.9–12.7)
POTASSIUM SERPL-SCNC: 4.2 MMOL/L (ref 3.5–5.3)
PROT SERPL-MCNC: 6 G/DL (ref 6.4–8.4)
RBC # BLD AUTO: 3.27 MILLION/UL (ref 3.88–5.62)
SODIUM SERPL-SCNC: 138 MMOL/L (ref 135–147)
WBC # BLD AUTO: 14.74 THOUSAND/UL (ref 4.31–10.16)

## 2022-09-16 PROCEDURE — 82948 REAGENT STRIP/BLOOD GLUCOSE: CPT

## 2022-09-16 PROCEDURE — 99291 CRITICAL CARE FIRST HOUR: CPT | Performed by: INTERNAL MEDICINE

## 2022-09-16 PROCEDURE — 94003 VENT MGMT INPAT SUBQ DAY: CPT

## 2022-09-16 PROCEDURE — 83735 ASSAY OF MAGNESIUM: CPT

## 2022-09-16 PROCEDURE — 84100 ASSAY OF PHOSPHORUS: CPT

## 2022-09-16 PROCEDURE — 99024 POSTOP FOLLOW-UP VISIT: CPT | Performed by: SURGERY

## 2022-09-16 PROCEDURE — 85025 COMPLETE CBC W/AUTO DIFF WBC: CPT

## 2022-09-16 PROCEDURE — 80053 COMPREHEN METABOLIC PANEL: CPT

## 2022-09-16 PROCEDURE — C9113 INJ PANTOPRAZOLE SODIUM, VIA: HCPCS

## 2022-09-16 RX ORDER — METRONIDAZOLE 500 MG/1
500 TABLET ORAL EVERY 8 HOURS SCHEDULED
Status: DISCONTINUED | OUTPATIENT
Start: 2022-09-16 | End: 2022-09-16

## 2022-09-16 RX ORDER — HYDROMORPHONE HCL/PF 1 MG/ML
1 SYRINGE (ML) INJECTION EVERY 4 HOURS PRN
Status: DISCONTINUED | OUTPATIENT
Start: 2022-09-16 | End: 2022-09-28 | Stop reason: HOSPADM

## 2022-09-16 RX ORDER — PANTOPRAZOLE SODIUM 40 MG/10ML
40 INJECTION, POWDER, LYOPHILIZED, FOR SOLUTION INTRAVENOUS
Status: DISCONTINUED | OUTPATIENT
Start: 2022-09-16 | End: 2022-09-19

## 2022-09-16 RX ORDER — METRONIDAZOLE 500 MG/100ML
500 INJECTION, SOLUTION INTRAVENOUS EVERY 8 HOURS
Status: DISCONTINUED | OUTPATIENT
Start: 2022-09-16 | End: 2022-09-19

## 2022-09-16 RX ORDER — DEXTROSE AND SODIUM CHLORIDE 5; .45 G/100ML; G/100ML
75 INJECTION, SOLUTION INTRAVENOUS CONTINUOUS
Status: DISCONTINUED | OUTPATIENT
Start: 2022-09-16 | End: 2022-09-17

## 2022-09-16 RX ORDER — CEFAZOLIN SODIUM 2 G/50ML
2000 SOLUTION INTRAVENOUS EVERY 8 HOURS
Status: DISCONTINUED | OUTPATIENT
Start: 2022-09-16 | End: 2022-09-22

## 2022-09-16 RX ADMIN — CHLORHEXIDINE GLUCONATE 0.12% ORAL RINSE 15 ML: 1.2 LIQUID ORAL at 21:46

## 2022-09-16 RX ADMIN — SODIUM CHLORIDE, SODIUM GLUCONATE, SODIUM ACETATE, POTASSIUM CHLORIDE, MAGNESIUM CHLORIDE, SODIUM PHOSPHATE, DIBASIC, AND POTASSIUM PHOSPHATE 125 ML/HR: .53; .5; .37; .037; .03; .012; .00082 INJECTION, SOLUTION INTRAVENOUS at 03:18

## 2022-09-16 RX ADMIN — PANTOPRAZOLE SODIUM 40 MG: 40 INJECTION, POWDER, FOR SOLUTION INTRAVENOUS at 08:50

## 2022-09-16 RX ADMIN — DEXTROSE AND SODIUM CHLORIDE 100 ML/HR: 5; .45 INJECTION, SOLUTION INTRAVENOUS at 21:47

## 2022-09-16 RX ADMIN — HEPARIN SODIUM 7500 UNITS: 5000 INJECTION INTRAVENOUS; SUBCUTANEOUS at 14:55

## 2022-09-16 RX ADMIN — PIPERACILLIN AND TAZOBACTAM 4.5 G: 4; .5 INJECTION, POWDER, FOR SOLUTION INTRAVENOUS at 13:25

## 2022-09-16 RX ADMIN — HEPARIN SODIUM 7500 UNITS: 5000 INJECTION INTRAVENOUS; SUBCUTANEOUS at 21:46

## 2022-09-16 RX ADMIN — METRONIDAZOLE 500 MG: 500 INJECTION, SOLUTION INTRAVENOUS at 16:06

## 2022-09-16 RX ADMIN — DEXTROSE AND SODIUM CHLORIDE 100 ML/HR: 5; .45 INJECTION, SOLUTION INTRAVENOUS at 12:30

## 2022-09-16 RX ADMIN — CHLORHEXIDINE GLUCONATE 0.12% ORAL RINSE 15 ML: 1.2 LIQUID ORAL at 08:54

## 2022-09-16 RX ADMIN — PIPERACILLIN AND TAZOBACTAM 4.5 G: 4; .5 INJECTION, POWDER, FOR SOLUTION INTRAVENOUS at 03:24

## 2022-09-16 RX ADMIN — CEFAZOLIN SODIUM 2000 MG: 2 SOLUTION INTRAVENOUS at 16:40

## 2022-09-16 RX ADMIN — HEPARIN SODIUM 7500 UNITS: 5000 INJECTION INTRAVENOUS; SUBCUTANEOUS at 05:55

## 2022-09-16 NOTE — PROGRESS NOTES
[facility]  Progress Note - Stephanie Kirill 1955, 78 y o  male MRN: [id number]  Unit/Bed#: ICU 07 Encounter: [id number]  Primary Care Provider: Sheryl Stephen MD   Date and time admitted to hospital: 9/14/2022 11:29 AM    * Strangulated hernia of abdominal wall  Assessment & Plan  S/p exp lap, complex lysis of adhesions, reduction of strangulated hernia, debridement RLQ muscle with counter incision, abthera placement x 2  Intraoperative course was uneventful  RLQ Hernia repair with wound VAC placement and anterior abdominal wall repair with mesh and retention sutures x12 on 09/15/2022  Pain management with fentanyl infusion  Continue NPO   NGT to LCS    Respiratory insufficiency  Assessment & Plan  Postoperatively  Will remain intubated due to need for aggressive pain control, obesity, large abdominal wound and ongoing plan to take back to surgery today  Mechanically ventilated - AC/VC 18/500/50%/6  Consider weaning off vent today  Continue fentanyl infusion  RASS -1  Pulmonary hygiene    Sepsis ([address] )  Assessment & Plan  Due to strangulated ventral hernia and RLQ abscess of the rectus muscle  As evidenced by WBC-12 9, fever (101 4 F) and tachycardia POA  Treated with fluid boluses and Zosyn in the ED, followed by emergent OR ex lap prior to which patient received a dose of Ancef  Underwent ex lap with hernia repair  Blood cultures negative at 24h  Tissue culture demonstrating few colonies of K  Pneumoniae and E  Coli    Plan:  Maintain MAP>65  Continue Zosyn q6h, renally dose  Continue monitoring WBC, trend fever curve  Continue hemodynamic monitoring    Acute kidney injury [facility])  Assessment & Plan  Recent Labs     09/15/22  0336 09/15/22  1504 09/16/22  0506   CREATININE 1 48* 1 91* 2 05*   EGFR 48 35 32     Estimated Creatinine Clearance: 56 7 mL/min (A) (by C-G formula based on SCr of 2 05 mg/dL (H))    MILTON superimposed on stage 3 CKD due to hypotension and low MAPs overngiht, improved after patient was taken off Propofol and transitioned to Fentanyl  Plan:   Continue Isolyte at 125mL/h  Avoid nephrotoxic agents  Avoid hypotension  Renally dose medications  Continue monitoring renal function    Anemia  Assessment & Plan  Recent Labs     09/15/22  0336 09/15/22  1504 09/16/22  0506   HGB 12 5 11 6* 10 0*   Likely in the setting of blood loss due to ex lap x2 with a dilutional component after receiving IV fluids post op  Will continue monitoring       Class 3 severe obesity due to excess calories with serious comorbidity and body mass index (BMI) of 50 0 to 59 9 in adult [facility])  Assessment & Plan  Encourage lifestyle modifications    DELIA (obstructive sleep apnea)  Assessment & Plan  Currently intubated will address following extubation    CKD (chronic kidney disease), stage III [facility])  Assessment & Plan  Lab Results   Component Value Date    EGFR 48 09/15/2022    EGFR 42 09/14/2022    EGFR 52 08/04/2021    CREATININE 1 48 (H) 09/15/2022    CREATININE 1 64 (H) 09/14/2022    CREATININE 1 40 (H) 08/04/2021   Baseline creatinine is 1 4-1 7; OR I/Os 3L/375 ml  Avoid nephrotoxins, avoid hypotension  IV hydration [id number]  Monitor renal indices closely      Essential hypertension  Assessment & Plan  Continue holding PTA meds for now, toprol XL, lisinopril, lasix  Maintain MAP>65    ----------------------------------------------------------------------------------------  HPI/24hr events: Patient remains intubated, AC/VC 18/5 100/50%/6  Became hypotensive overnight with MAPs in the 50s, propofol was stopped and patient was started on fentanyl with improvement in BP  Urine output 855 mL, NG tube-450 mL, wound VAC with 300 mL sanguineous fluid        Patient appropriate for transfer out of the ICU today?: No  Disposition: Continue Critical Care   Code Status: Level 1 - Full Code  ---------------------------------------------------------------------------------------  SUBJECTIVE  Patient intubated and sedated  At the time of my evaluation, patient was alert, follows commands and answers questions appropriately by nodding yes or no  Review of Systems  Review of systems was unable to be performed secondary to patient mechanically ventilation  ---------------------------------------------------------------------------------------  OBJECTIVE    Vitals   Vitals:    22 0515 22 0545 22 0645 22 0711   BP: (!) 101/47 (!) 103/49 (!) 105/49    BP Location:       Pulse: (!) 120 (!) 120 (!) 118    Resp: 22 (!) 24 (!) 23    Temp:    100 °F (37 8 °C)   TempSrc:    Esophageal   SpO2: 97% 97% 98%    Weight:       Height:         Temp (24hrs), Av 1 °F (37 3 °C), Min:97 5 °F (36 4 °C), Max:100 °F (37 8 °C)  Current: Temperature: 100 °F (37 8 °C)  Arterial Line BP: 60/56  Arterial Line MAP (mmHg): (!) 58 mmHg    Respiratory:  SpO2: SpO2: 98 %       Invasive/non-invasive ventilation settings   Respiratory  Report   Lab Data (Last 4 hours)    None         O2/Vent Data (Last 4 hours)    None                Physical Exam  Vitals and nursing note reviewed  Constitutional:       General: He is awake  He is not in acute distress  Appearance: He is morbidly obese  He is ill-appearing  Interventions: He is intubated  HENT:      Head: Normocephalic and atraumatic  Mouth/Throat:      Mouth: Mucous membranes are moist    Eyes:      General: No scleral icterus  Right eye: No discharge  Left eye: No discharge  Extraocular Movements: Extraocular movements intact  Conjunctiva/sclera: Conjunctivae normal    Cardiovascular:      Rate and Rhythm: Regular rhythm  Tachycardia present  Heart sounds: Normal heart sounds  No murmur heard  No friction rub  No gallop  Pulmonary:      Effort: Pulmonary effort is normal  He is intubated  Breath sounds: Normal breath sounds  No wheezing, rhonchi or rales  Abdominal:      General: Abdomen is protuberant  Hernia: A hernia (now s/p repair with retention sutures) is present  Comments: Abdominal binder in situ   Skin:     General: Skin is warm and dry  Coloration: Skin is not jaundiced or pale  Neurological:      GCS: GCS eye subscore is 4  GCS verbal subscore is 5  GCS motor subscore is 6  Psychiatric:         Behavior: Behavior is cooperative               Laboratory and Diagnostics:  Results from last 7 days   Lab Units 09/16/22  0506 09/15/22  1504 09/15/22  0336 09/14/22 1952 09/14/22  1203   WBC Thousand/uL 14 74* 16 29* 15 84*  --  12 90*   HEMOGLOBIN g/dL 10 0* 11 6* 12 5  --  12 1   I STAT HEMOGLOBIN g/dl  --   --   --  11 2*  --    HEMATOCRIT % 31 4* 37 3 39 1  --  37 1   HEMATOCRIT, ISTAT %  --   --   --  33*  --    PLATELETS Thousands/uL 331 367 380  --  416*   NEUTROS PCT % 89*  --   --   --  85*   BANDS PCT %  --  7 12*  --   --    MONOS PCT % 6  --   --   --  7   MONO PCT %  --  7 4  --   --      Results from last 7 days   Lab Units 09/16/22  0506 09/15/22  1504 09/15/22  0336 09/14/22 1952 09/14/22  1203   SODIUM mmol/L 138 137 135  --  134*   POTASSIUM mmol/L 4 2 4 8 4 4  --  3 9   CHLORIDE mmol/L 104 104 104  --  99   CO2 mmol/L 23 20* 21  --  27   CO2, I-STAT mmol/L  --   --   --  24  --    ANION GAP mmol/L 11 13 10  --  8   BUN mg/dL 28* 24 23  --  21   CREATININE mg/dL 2 05* 1 91* 1 48*  --  1 64*   CALCIUM mg/dL 7 8* 7 5* 8 2*  --  8 8   GLUCOSE RANDOM mg/dL 151* 155* 145*  --  115   ALT U/L 25 20 14  --  15   AST U/L 31 23 14  --  13   ALK PHOS U/L 44* 47 47  --  55   ALBUMIN g/dL 1 8* 1 8* 2 1*  --  2 6*   TOTAL BILIRUBIN mg/dL 0 64 0 70 1 02*  --  1 25*     Results from last 7 days   Lab Units 09/16/22  0506 09/15/22  1504 09/15/22  0336   MAGNESIUM mg/dL 2 2 1 6 1 8   PHOSPHORUS mg/dL 3 9 5 6* 3 5      Results from last 7 days   Lab Units 09/14/22  1203   INR  1 19   PTT seconds 39*          Results from last 7 days   Lab Units 09/15/22  0007 09/14/22  1947 09/14/22  1220   LACTIC ACID mmol/L 1 4 1 1 1 1     ABG:  Results from last 7 days   Lab Units 09/15/22  0336   PH ART  7 382   PCO2 ART mm Hg 34 4*   PO2 ART mm Hg 237 7*   HCO3 ART mmol/L 20 0*   BASE EXC ART mmol/L -4 3   ABG SOURCE  Line, Arterial     VBG:  Results from last 7 days   Lab Units 09/15/22  0336 09/15/22  0007   PH JONH   --  7 265*   PCO2 JONH mm Hg  --  43 6   PO2 JONH mm Hg  --  37 8   HCO3 JONH mmol/L  --  19 3*   BASE EXC JONH mmol/L  --  -7 4   ABG SOURCE  Line, Arterial  --      Results from last 7 days   Lab Units 09/14/22  1203   PROCALCITONIN ng/ml 0 75*       Micro  Results from last 7 days   Lab Units 09/14/22  1944 09/14/22  1220 09/14/22  1203   BLOOD CULTURE   --  No Growth at 24 hrs  No Growth at 24 hrs  GRAM STAIN RESULT  No Polys or Bacteria seen  --   --        EKG: Sinus tachycardia,   Imaging: I have personally reviewed pertinent reports  and I have personally reviewed pertinent films in PACS    Intake and Output  I/O       09/14 0701  09/15 0700 09/15 0701  09/16 0700    I V  (mL/kg) 3761 3 (21 6) 5006 2 (28 4)    IV Piggyback 3100 1350    Total Intake(mL/kg) 6861 3 (39 4) 6356 2 (36 1)    Urine (mL/kg/hr) 650 855 (0 2)    Emesis/NG output 100 450    Drains 550 250    Blood 50     Total Output 1350 1555    Net +5511 3 +4801 2                Height and Weights   Height: 5' 9" (175 3 cm)  IBW (Ideal Body Weight): 70 7 kg  Body mass index is 57 36 kg/m²  Weight (last 2 days)     Date/Time Weight    09/16/22 0415 176 (388 45)    09/15/22 0600 174 (383 82)    09/14/22 2054 174 (383 82)    09/14/22 1125 167 (368 17)            Nutrition       Diet Orders   (From admission, onward)             Start     Ordered    09/14/22 2050  Diet NPO  Diet effective now        References:    Nutrtion Support Algorithm Enteral vs  Parenteral   Question Answer Comment   Diet Type NPO    RD to adjust diet per protocol?  No        09/14/22 5506                  Active Medications  Scheduled Meds:  Current Facility-Administered Medications   Medication Dose Route Frequency Provider Last Rate    chlorhexidine  15 mL Mouth/Throat Q12H [address] Jerica Brown MD      fentaNYL  75 mcg/hr Intravenous Continuous Jerica Brown MD 75 mcg/hr (09/16/22 5327)    fentanyl citrate (PF)  50 mcg Intravenous Q1H PRN Jerica Brown MD      heparin (porcine)  7,500 Units Subcutaneous [facility] Rita Lemons MD      multi-electrolyte  125 mL/hr Intravenous Continuous KELSEY James 125 mL/hr (09/16/22 0318)    pantoprazole  40 mg Intravenous Q24H [address] Jerica Brown MD      piperacillin-tazobactam  4 5 g Intravenous Q8H Jerica Brown MD 4 5 g (09/16/22 0324)    propofol  5-50 mcg/kg/min Intravenous Titrated [place] 5 mcg/kg/min (09/15/22 2215)     Continuous Infusions:  fentaNYL, 75 mcg/hr, Last Rate: 75 mcg/hr (09/16/22 7833)  multi-electrolyte, 125 mL/hr, Last Rate: 125 mL/hr (09/16/22 0318)  propofol, 5-50 mcg/kg/min, Last Rate: 5 mcg/kg/min (09/15/22 2215)      PRN Meds:   fentanyl citrate (PF), 50 mcg, Q1H PRN        Invasive Devices Review  Invasive Devices  Report    Central Venous Catheter Line  Duration           CVC Central Lines 09/14/22 Triple 20cm 1 day          Peripheral Intravenous Line  Duration           Peripheral IV 09/14/22 Left Antecubital 1 day          Drain  Duration           NG/OG/Enteral Tube Nasogastric 18 Fr Right nare 1 day    Urethral Catheter Non-latex 16 Fr  1 day          Airway  Duration           ETT  Cuffed;Oral 8 mm 1 day                Rationale for remaining devices: Severity of illness  ---------------------------------------------------------------------------------------  Advance Directive and Living Will:      Power of :    POLST:    ---------------------------------------------------------------------------------------  Care Time Delivered:   No Critical Care time spent       Jerica Brown MD      Portions of the record may have been created with voice recognition software  Occasional wrong word or "sound a like" substitutions may have occurred due to the inherent limitations of voice recognition software    Read the chart carefully and recognize, using context, where substitutions have occurred

## 2022-09-16 NOTE — ASSESSMENT & PLAN NOTE
Blood pressure 128/76, pulse 66, temperature 97.3 Â°F (36.3 Â°C), resp. rate 16, weight 56.2 kg, SpO2 96 %. Brittany SARAVIA [facility]. December 10. She is seen for review of chronic medical problems. She is at the dining room table. There is no peripheral edema. Chest is clear to auscultation. She has a moderate kyphosis. Rhythm is sinus. I do not hear a murmur. She has no questions or concerns and no additional change is made medications or treatments.     Patient Active Problem List    Diagnosis Date Noted   â¢ CVA (cerebral infarction) 08/26/2014     Priority: High     Right hemiparesis     â¢ Senile dementia without behavioral disturbance 02/27/2018     Priority: Medium   â¢ CVA (cerebral vascular accident) (CMS/[facility]) 02/26/2018     Priority: Low   â¢ Weakness on right side of face 02/26/2018     Priority: Low   â¢ Essential hypertension, benign 08/27/2014     Priority: Low   â¢ DM (diabetes mellitus) (CMS/[facility]) 08/27/2014     Priority: Low   â¢ Chronic venous insufficiency 08/27/2014     Priority: Low   â¢ CKD (chronic kidney disease), stage III (CMS/[facility]) 08/27/2014     Priority: Low     Next visit: 8 weeks or prn Recent Labs     09/15/22  0336 09/15/22  1504 09/16/22  0506   HGB 12 5 11 6* 10 0*   Likely in the setting of blood loss due to ex lap x2 with a dilutional component after receiving IV fluids post op     Will continue monitoring

## 2022-09-16 NOTE — ASSESSMENT & PLAN NOTE
Recent Labs     09/15/22  0336 09/15/22  1504 09/16/22  0506   CREATININE 1 48* 1 91* 2 05*   EGFR 48 35 32     Estimated Creatinine Clearance: 56 7 mL/min (A) (by C-G formula based on SCr of 2 05 mg/dL (H))  MILTON superimposed on stage 3 CKD due to hypotension and low MAPs overngiht, improved after patient was taken off Propofol and transitioned to Fentanyl       Plan:   Continue Isolyte at 125mL/h  Avoid nephrotoxic agents  Avoid hypotension  Renally dose medications  Continue monitoring renal function

## 2022-09-16 NOTE — PLAN OF CARE
Problem: PAIN - ADULT  Goal: Verbalizes/displays adequate comfort level or baseline comfort level  Description: Interventions:  - Encourage patient to monitor pain and request assistance  - Assess pain using appropriate pain scale  - Administer analgesics based on type and severity of pain and evaluate response  - Implement non-pharmacological measures as appropriate and evaluate response  - Consider cultural and social influences on pain and pain management  - Notify physician/advanced practitioner if interventions unsuccessful or patient reports new pain  Outcome: Progressing     Problem: INFECTION - ADULT  Goal: Absence or prevention of progression during hospitalization  Description: INTERVENTIONS:  - Assess and monitor for signs and symptoms of infection  - Monitor lab/diagnostic results  - Monitor all insertion sites, i e  indwelling lines, tubes, and drains  - Monitor endotracheal if appropriate and nasal secretions for changes in amount and color  - [place] appropriate cooling/warming therapies per order  - Administer medications as ordered  - Instruct and encourage patient and family to use good hand hygiene technique  - Identify and instruct in appropriate isolation precautions for identified infection/condition  Outcome: Progressing  Goal: Absence of fever/infection during neutropenic period  Description: INTERVENTIONS:  - Monitor WBC    Outcome: Progressing     Problem: DISCHARGE PLANNING  Goal: Discharge to home or other facility with appropriate resources  Description: INTERVENTIONS:  - Identify barriers to discharge w/patient and caregiver  - Arrange for needed discharge resources and transportation as appropriate  - Identify discharge learning needs (meds, wound care, etc )  - Arrange for interpretive services to assist at discharge as needed  - Refer to Case Management Department for coordinating discharge planning if the patient needs post-hospital services based on physician/advanced practitioner order or complex needs related to functional status, cognitive ability, or social support system  Outcome: Progressing     Problem: Knowledge Deficit  Goal: Patient/family/caregiver demonstrates understanding of disease process, treatment plan, medications, and discharge instructions  Description: Complete learning assessment and assess knowledge base    Interventions:  - Provide teaching at level of understanding  - Provide teaching via preferred learning methods  Outcome: Progressing     Problem: SAFETY,RESTRAINT: NV/NON-SELF DESTRUCTIVE BEHAVIOR  Goal: Remains free of harm/injury (restraint for non violent/non self-detsructive behavior)  Description: INTERVENTIONS:  - Instruct patient/family regarding restraint use   - Assess and monitor physiologic and psychological status   - Provide interventions and comfort measures to meet assessed patient needs   - Identify and implement measures to help patient regain control  - Assess readiness for release of restraint   Outcome: Progressing  Goal: Returns to optimal restraint-free functioning  Description: INTERVENTIONS:  - Assess the patient's behavior and symptoms that indicate continued need for restraint  - Identify and implement measures to help patient regain control  - Assess readiness for release of restraint   Outcome: Progressing     Problem: Prexisting or High Potential for Compromised Skin Integrity  Goal: Skin integrity is maintained or improved  Description: INTERVENTIONS:  - Identify patients at risk for skin breakdown  - Assess and monitor skin integrity  - Assess and monitor nutrition and hydration status  - Monitor labs   - Assess for incontinence   - Turn and reposition patient  - Assist with mobility/ambulation  - Relieve pressure over bony prominences  - Avoid friction and shearing  - Provide appropriate hygiene as needed including keeping skin clean and dry  - Evaluate need for skin moisturizer/barrier cream  - Collaborate with interdisciplinary team   - Patient/family teaching  - Consider wound care consult   Outcome: Progressing     Problem: Nutrition/Hydration-ADULT  Goal: Nutrient/Hydration intake appropriate for improving, restoring or maintaining nutritional needs  Description: Monitor and assess patient's nutrition/hydration status for malnutrition  Collaborate with interdisciplinary team and initiate plan and interventions as ordered  Monitor patient's weight and dietary intake as ordered or per policy  Utilize nutrition screening tool and intervene as necessary  Determine patient's food preferences and provide high-protein, high-caloric foods as appropriate       INTERVENTIONS:  - Monitor oral intake, urinary output, labs, and treatment plans  - Assess nutrition and hydration status and recommend course of action  - Evaluate amount of meals eaten  - Assist patient with eating if necessary   - Allow adequate time for meals  - Recommend/ encourage appropriate diets, oral nutritional supplements, and vitamin/mineral supplements  - Order, calculate, and assess calorie counts as needed  - Recommend, monitor, and adjust tube feedings and TPN/PPN based on assessed needs  - Assess need for intravenous fluids  - Provide specific nutrition/hydration education as appropriate  - Include patient/family/caregiver in decisions related to nutrition  Outcome: Progressing     Problem: GASTROINTESTINAL - ADULT  Goal: Minimal or absence of nausea and/or vomiting  Description: INTERVENTIONS:  - Administer IV fluids if ordered to ensure adequate hydration  - Maintain NPO status until nausea and vomiting are resolved  - Nasogastric tube if ordered  - Administer ordered antiemetic medications as needed  - Provide nonpharmacologic comfort measures as appropriate  - Advance diet as tolerated, if ordered  - Consider nutrition services referral to assist patient with adequate nutrition and appropriate food choices  Outcome: Progressing  Goal: Maintains or returns to baseline bowel function  Description: INTERVENTIONS:  - Assess bowel function  - Encourage oral fluids to ensure adequate hydration  - Administer IV fluids if ordered to ensure adequate hydration  - Administer ordered medications as needed  - Encourage mobilization and activity  - Consider nutritional services referral to assist patient with adequate nutrition and appropriate food choices  Outcome: Progressing  Goal: Maintains adequate nutritional intake  Description: INTERVENTIONS:  - Monitor percentage of each meal consumed  - Identify factors contributing to decreased intake, treat as appropriate  - Assist with meals as needed  - Monitor I&O, weight, and lab values if indicated  - Obtain nutrition services referral as needed  Outcome: Progressing  Goal: Establish and maintain optimal ostomy function  Description: INTERVENTIONS:  - Assess bowel function  - Encourage oral fluids to ensure adequate hydration  - Administer IV fluids if ordered to ensure adequate hydration   - Administer ordered medications as needed  - Encourage mobilization and activity  - Nutrition services referral to assist patient with appropriate food choices  - Assess stoma site  - Consider wound care consult   Outcome: Progressing  Goal: Oral mucous membranes remain intact  Description: INTERVENTIONS  - Assess oral mucosa and hygiene practices  - Implement preventative oral hygiene regimen  - Implement oral medicated treatments as ordered  - Initiate Nutrition services referral as needed  Outcome: Progressing

## 2022-09-16 NOTE — PROGRESS NOTES
Progress Note - General Surgery   Severo Bergeron 78 y o  male MRN: [id number]  Unit/Bed#: ICU 07 Encounter: [id number]    Assessment:  78yoM p/w incarcerated incisional hernia and SBO now s/p:  9/14: ex lap, reduction of strangulated ventral hernia, debridement of RLQ abdominal wall musculature, Abthera VAC placement  9/15: ex lap, RLQ hernia repair w mesh and VAC, complex anterior abdominal wall repair w mesh and retention sutures, LANETTE     cc SS  NGT 100cc  UOP 1 9L    Plan:  · NPO/NGT d/c   · Start on sips  · Monitor Renal function  · abdominal VAC, monitor output and character  · Vac change today MWF schedule after  · ABX  · D/c jenkins  · Appreciate ICU care  · PT/OT  · Please tigertext on call [facility] surgery resident or AP with any questions      Subjective/Objective   Subjective:   No acute events overnight  No flatus or bowel movement  Denies pain  Denies nausea or vomiting  Objective:     Blood pressure 126/56, pulse (!) 106, temperature 99 6 °F (37 6 °C), temperature source Tympanic, resp  rate (!) 23, height 5' 9" (1 753 m), weight (!) 174 kg (383 lb 13 1 oz), SpO2 99 %  ,Body mass index is 56 68 kg/m²  Intake/Output Summary (Last 24 hours) at 9/17/2022 8510  Last data filed at 9/17/2022 0501  Gross per 24 hour   Intake 3021 17 ml   Output 2325 ml   Net 696 17 ml       Invasive Devices  Report    Central Venous Catheter Line  Duration           CVC Central Lines 09/14/22 Triple 20cm 2 days          Peripheral Intravenous Line  Duration           Peripheral IV 09/14/22 Left Antecubital 2 days          Drain  Duration           NG/OG/Enteral Tube Nasogastric 18 Fr Right nare 2 days    Urethral Catheter Non-latex 16 Fr  2 days                Physical Exam:   Gen:  NAD  HEENT: NCAT  MMM  CV: well perfused, pulses palpable  Lungs: Normal respiratory effort  Abd: soft, nt/nd, incisions clean dry and intact  VAC intact  Skin: warm/ dry  Extremities: no peripheral edema, no cyanosis  Neuro:  AxO x3    Lab, Imaging and other studies:  I have personally reviewed pertinent lab results    , CBC:   Lab Results   Component Value Date    WBC 15 28 (H) 09/17/2022    HGB 9 1 (L) 09/17/2022    HCT 29 4 (L) 09/17/2022     (H) 09/17/2022     09/17/2022    MCH 30 8 09/17/2022    MCHC 31 0 (L) 09/17/2022    RDW 14 1 09/17/2022    MPV 8 6 (L) 09/17/2022    NRBC 0 09/17/2022   , CMP:   Lab Results   Component Value Date    SODIUM 140 09/17/2022    K 3 9 09/17/2022     09/17/2022    CO2 26 09/17/2022    BUN 21 09/17/2022    CREATININE 1 50 (H) 09/17/2022    CALCIUM 7 7 (L) 09/17/2022    EGFR 47 09/17/2022     VTE Pharmacologic Prophylaxis: Sequential compression device (Venodyne)  and Heparin  VTE Mechanical Prophylaxis: sequential compression device

## 2022-09-16 NOTE — PLAN OF CARE
Problem: INFECTION - ADULT  Goal: Absence or prevention of progression during hospitalization  Description: INTERVENTIONS:  - Assess and monitor for signs and symptoms of infection  - Monitor lab/diagnostic results  - Monitor all insertion sites, i e  indwelling lines, tubes, and drains  - Monitor endotracheal if appropriate and nasal secretions for changes in amount and color  - [place] appropriate cooling/warming therapies per order  - Administer medications as ordered  - Instruct and encourage patient and family to use good hand hygiene technique  - Identify and instruct in appropriate isolation precautions for identified infection/condition  Outcome: Progressing     Problem: INFECTION - ADULT  Goal: Absence of fever/infection during neutropenic period  Description: INTERVENTIONS:  - Monitor WBC    Outcome: Progressing     Problem: SAFETY ADULT  Goal: Patient will remain free of falls  Description: INTERVENTIONS:  - Educate patient/family on patient safety including physical limitations  - Instruct patient to call for assistance with activity   - Consult OT/PT to assist with strengthening/mobility   - Keep Call bell within reach  - Keep bed low and locked with side rails adjusted as appropriate  - Keep care items and personal belongings within reach  - Initiate and maintain comfort rounds  - Make Fall Risk Sign visible to staff  - Offer Toileting every  Hours, in advance of need  - Initiate/Maintain alarm  - Obtain necessary fall risk management equipment:  - Apply yellow socks and bracelet for high fall risk patients  - Consider moving patient to room near nurses station  Outcome: Progressing     Problem: SAFETY,RESTRAINT: NV/NON-SELF DESTRUCTIVE BEHAVIOR  Goal: Remains free of harm/injury (restraint for non violent/non self-detsructive behavior)  Description: INTERVENTIONS:  - Instruct patient/family regarding restraint use   - Assess and monitor physiologic and psychological status   - Provide interventions and comfort measures to meet assessed patient needs   - Identify and implement measures to help patient regain control  - Assess readiness for release of restraint   Outcome: Progressing     Problem: SAFETY,RESTRAINT: NV/NON-SELF DESTRUCTIVE BEHAVIOR  Goal: Returns to optimal restraint-free functioning  Description: INTERVENTIONS:  - Assess the patient's behavior and symptoms that indicate continued need for restraint  - Identify and implement measures to help patient regain control  - Assess readiness for release of restraint   Outcome: Progressing     Problem: Prexisting or High Potential for Compromised Skin Integrity  Goal: Skin integrity is maintained or improved  Description: INTERVENTIONS:  - Identify patients at risk for skin breakdown  - Assess and monitor skin integrity  - Assess and monitor nutrition and hydration status  - Monitor labs   - Assess for incontinence   - Turn and reposition patient  - Assist with mobility/ambulation  - Relieve pressure over bony prominences  - Avoid friction and shearing  - Provide appropriate hygiene as needed including keeping skin clean and dry  - Evaluate need for skin moisturizer/barrier cream  - Collaborate with interdisciplinary team   - Patient/family teaching  - Consider wound care consult   Outcome: Progressing

## 2022-09-17 ENCOUNTER — APPOINTMENT (OUTPATIENT)
Dept: RADIOLOGY | Facility: HOSPITAL | Age: 67
DRG: 853 | End: 2022-09-17
Payer: MEDICARE

## 2022-09-17 PROBLEM — N17.9 ACUTE KIDNEY INJURY (HCC): Status: RESOLVED | Noted: 2022-09-16 | Resolved: 2022-09-17

## 2022-09-17 PROBLEM — R06.89 RESPIRATORY INSUFFICIENCY: Status: RESOLVED | Noted: 2022-09-14 | Resolved: 2022-09-17

## 2022-09-17 LAB
ANION GAP SERPL CALCULATED.3IONS-SCNC: 6 MMOL/L (ref 4–13)
BACTERIA TISS AEROBE CULT: ABNORMAL
BASOPHILS # BLD AUTO: 0.03 THOUSANDS/ΜL (ref 0–0.1)
BASOPHILS NFR BLD AUTO: 0 % (ref 0–1)
BUN SERPL-MCNC: 21 MG/DL (ref 5–25)
CALCIUM SERPL-MCNC: 7.7 MG/DL (ref 8.3–10.1)
CHLORIDE SERPL-SCNC: 108 MMOL/L (ref 96–108)
CO2 SERPL-SCNC: 26 MMOL/L (ref 21–32)
CREAT SERPL-MCNC: 1.5 MG/DL (ref 0.6–1.3)
EOSINOPHIL # BLD AUTO: 0.01 THOUSAND/ΜL (ref 0–0.61)
EOSINOPHIL NFR BLD AUTO: 0 % (ref 0–6)
ERYTHROCYTE [DISTWIDTH] IN BLOOD BY AUTOMATED COUNT: 14.1 % (ref 11.6–15.1)
GFR SERPL CREATININE-BSD FRML MDRD: 47 ML/MIN/1.73SQ M
GLUCOSE SERPL-MCNC: 109 MG/DL (ref 65–140)
GLUCOSE SERPL-MCNC: 125 MG/DL (ref 65–140)
GLUCOSE SERPL-MCNC: 93 MG/DL (ref 65–140)
GLUCOSE SERPL-MCNC: 96 MG/DL (ref 65–140)
GRAM STN SPEC: ABNORMAL
HCT VFR BLD AUTO: 29.4 % (ref 36.5–49.3)
HGB BLD-MCNC: 9.1 G/DL (ref 12–17)
IMM GRANULOCYTES # BLD AUTO: 0.19 THOUSAND/UL (ref 0–0.2)
IMM GRANULOCYTES NFR BLD AUTO: 1 % (ref 0–2)
LYMPHOCYTES # BLD AUTO: 0.87 THOUSANDS/ΜL (ref 0.6–4.47)
LYMPHOCYTES NFR BLD AUTO: 6 % (ref 14–44)
MCH RBC QN AUTO: 30.8 PG (ref 26.8–34.3)
MCHC RBC AUTO-ENTMCNC: 31 G/DL (ref 31.4–37.4)
MCV RBC AUTO: 100 FL (ref 82–98)
MONOCYTES # BLD AUTO: 0.71 THOUSAND/ΜL (ref 0.17–1.22)
MONOCYTES NFR BLD AUTO: 5 % (ref 4–12)
NEUTROPHILS # BLD AUTO: 13.47 THOUSANDS/ΜL (ref 1.85–7.62)
NEUTS SEG NFR BLD AUTO: 88 % (ref 43–75)
NRBC BLD AUTO-RTO: 0 /100 WBCS
PLATELET # BLD AUTO: 366 THOUSANDS/UL (ref 149–390)
PMV BLD AUTO: 8.6 FL (ref 8.9–12.7)
POTASSIUM SERPL-SCNC: 3.9 MMOL/L (ref 3.5–5.3)
RBC # BLD AUTO: 2.95 MILLION/UL (ref 3.88–5.62)
SODIUM SERPL-SCNC: 140 MMOL/L (ref 135–147)
WBC # BLD AUTO: 15.28 THOUSAND/UL (ref 4.31–10.16)

## 2022-09-17 PROCEDURE — 97606 NEG PRS WND THER DME>50 SQCM: CPT | Performed by: SURGERY

## 2022-09-17 PROCEDURE — 82948 REAGENT STRIP/BLOOD GLUCOSE: CPT

## 2022-09-17 PROCEDURE — 2W03X6Z CHANGE PRESSURE DRESSING ON ABDOMINAL WALL: ICD-10-PCS | Performed by: SURGERY

## 2022-09-17 PROCEDURE — 99232 SBSQ HOSP IP/OBS MODERATE 35: CPT | Performed by: INTERNAL MEDICINE

## 2022-09-17 PROCEDURE — 85025 COMPLETE CBC W/AUTO DIFF WBC: CPT

## 2022-09-17 PROCEDURE — 80048 BASIC METABOLIC PNL TOTAL CA: CPT

## 2022-09-17 PROCEDURE — C9113 INJ PANTOPRAZOLE SODIUM, VIA: HCPCS

## 2022-09-17 PROCEDURE — 99024 POSTOP FOLLOW-UP VISIT: CPT | Performed by: SURGERY

## 2022-09-17 PROCEDURE — 71045 X-RAY EXAM CHEST 1 VIEW: CPT

## 2022-09-17 RX ORDER — ALLOPURINOL 100 MG/1
100 TABLET ORAL DAILY
Status: DISCONTINUED | OUTPATIENT
Start: 2022-09-17 | End: 2022-09-28 | Stop reason: HOSPADM

## 2022-09-17 RX ORDER — FUROSEMIDE 10 MG/ML
20 INJECTION INTRAMUSCULAR; INTRAVENOUS ONCE
Status: COMPLETED | OUTPATIENT
Start: 2022-09-17 | End: 2022-09-17

## 2022-09-17 RX ORDER — METOPROLOL TARTRATE 5 MG/5ML
5 INJECTION INTRAVENOUS EVERY 6 HOURS
Status: DISCONTINUED | OUTPATIENT
Start: 2022-09-17 | End: 2022-09-19

## 2022-09-17 RX ORDER — TRAVOPROST OPHTHALMIC SOLUTION 0.04 MG/ML
1 SOLUTION OPHTHALMIC
Status: DISCONTINUED | OUTPATIENT
Start: 2022-09-17 | End: 2022-09-28 | Stop reason: HOSPADM

## 2022-09-17 RX ADMIN — METOROPROLOL TARTRATE 5 MG: 5 INJECTION, SOLUTION INTRAVENOUS at 15:40

## 2022-09-17 RX ADMIN — HEPARIN SODIUM 7500 UNITS: 5000 INJECTION INTRAVENOUS; SUBCUTANEOUS at 06:12

## 2022-09-17 RX ADMIN — METRONIDAZOLE 500 MG: 500 INJECTION, SOLUTION INTRAVENOUS at 23:36

## 2022-09-17 RX ADMIN — CEFAZOLIN SODIUM 2000 MG: 2 SOLUTION INTRAVENOUS at 22:50

## 2022-09-17 RX ADMIN — TRAVOPROST 1 DROP: 0.04 SOLUTION OPHTHALMIC at 21:27

## 2022-09-17 RX ADMIN — HEPARIN SODIUM 7500 UNITS: 5000 INJECTION INTRAVENOUS; SUBCUTANEOUS at 21:27

## 2022-09-17 RX ADMIN — HEPARIN SODIUM 7500 UNITS: 5000 INJECTION INTRAVENOUS; SUBCUTANEOUS at 13:00

## 2022-09-17 RX ADMIN — FUROSEMIDE 20 MG: 10 INJECTION, SOLUTION INTRAVENOUS at 10:15

## 2022-09-17 RX ADMIN — CEFAZOLIN SODIUM 2000 MG: 2 SOLUTION INTRAVENOUS at 15:40

## 2022-09-17 RX ADMIN — METRONIDAZOLE 500 MG: 500 INJECTION, SOLUTION INTRAVENOUS at 01:30

## 2022-09-17 RX ADMIN — CEFAZOLIN SODIUM 2000 MG: 2 SOLUTION INTRAVENOUS at 07:33

## 2022-09-17 RX ADMIN — METRONIDAZOLE 500 MG: 500 INJECTION, SOLUTION INTRAVENOUS at 16:48

## 2022-09-17 RX ADMIN — PANTOPRAZOLE SODIUM 40 MG: 40 INJECTION, POWDER, FOR SOLUTION INTRAVENOUS at 08:35

## 2022-09-17 RX ADMIN — METOROPROLOL TARTRATE 5 MG: 5 INJECTION, SOLUTION INTRAVENOUS at 21:26

## 2022-09-17 RX ADMIN — CEFAZOLIN SODIUM 2000 MG: 2 SOLUTION INTRAVENOUS at 00:00

## 2022-09-17 RX ADMIN — METRONIDAZOLE 500 MG: 500 INJECTION, SOLUTION INTRAVENOUS at 08:34

## 2022-09-17 RX ADMIN — METOROPROLOL TARTRATE 5 MG: 5 INJECTION, SOLUTION INTRAVENOUS at 10:15

## 2022-09-17 RX ADMIN — HYDROMORPHONE HYDROCHLORIDE 1 MG: 1 INJECTION, SOLUTION INTRAMUSCULAR; INTRAVENOUS; SUBCUTANEOUS at 12:58

## 2022-09-17 NOTE — ASSESSMENT & PLAN NOTE
Patient endorses a history of snoring, however has not had a formal evaluation or sleep study  Referral to sleep medicine at discharge

## 2022-09-17 NOTE — ASSESSMENT & PLAN NOTE
Due to strangulated ventral hernia and RLQ abscess of the rectus muscle  As evidenced by WBC-12 9, fever (101 4 F) and tachycardia POA  Treated with fluid boluses and Zosyn in the ED, followed by emergent OR ex lap prior to which patient received a dose of Ancef  Underwent ex lap with hernia repair  Blood cultures negative at 24h  Tissue culture demonstrating few colonies of K  Pneumoniae, E   Coli and Bacteroides vulgatus    Plan:  Maintain MAP>65  Zosyn discontinued, patient started on Ancef and Flagyl  Continue monitoring WBC, trend fever curve  Continue hemodynamic monitoring

## 2022-09-17 NOTE — ASSESSMENT & PLAN NOTE
Due to strangulated ventral hernia and RLQ abscess of the rectus muscle  As evidenced by WBC-12 9, fever (101 4 F) and tachycardia POA  Treated with fluid boluses and Zosyn in the ED, followed by emergent OR ex lap prior to which patient received a dose of Ancef  Underwent ex lap with hernia repair  Blood cultures negative at 72h  Tissue culture demonstrating few colonies of K  Pneumoniae, E   Coli and Bacteroides vulgatus, S  anginosus    Plan:  Maintain MAP>65  Zosyn discontinued, patient started on Ancef and Flagyl  Continue monitoring WBC, trend fever curve  Continue hemodynamic monitoring

## 2022-09-17 NOTE — ASSESSMENT & PLAN NOTE
S/p exp lap, complex lysis of adhesions, reduction of strangulated hernia, debridement RLQ muscle with counter incision, abthera placement x 2  Intraoperative course was uneventful  RLQ Hernia repair with wound VAC placement and anterior abdominal wall repair with mesh and retention sutures x12 on 09/15/2022  Pain management with PRN Dilaudid  Continue NPO   Continue surgical incision site wound care  Monitor wound vac output  NGT to [facility]CS

## 2022-09-17 NOTE — PLAN OF CARE
Patient alert and oriented  Denies any pain or nausea during the shift  Refused Turn and prop other than slight boost in bed  Patient self suctioning with oral yankauer and frequent weak cough and expectoration of thick green sputum  Bowel retention sutures in place along with wound vac in place  Draining sanguinous fluid  Arias catheter in place draining bright yellow urine  D5 0 45% NS @ 100cc/hr via TL PICC R side  Slight ausculation of hypoactive bowel sounds in all four quadrants  No bowel movement this evening  VSS  "Struggling to breathe" due to NG tube in place  Feels "congested"  Continues on IV ABX  Rings call bell appropriately         Problem: PAIN - ADULT  Goal: Verbalizes/displays adequate comfort level or baseline comfort level  Description: Interventions:  - Encourage patient to monitor pain and request assistance  - Assess pain using appropriate pain scale  - Administer analgesics based on type and severity of pain and evaluate response  - Implement non-pharmacological measures as appropriate and evaluate response  - Consider cultural and social influences on pain and pain management  - Notify physician/advanced practitioner if interventions unsuccessful or patient reports new pain  Outcome: Progressing     Problem: INFECTION - ADULT  Goal: Absence or prevention of progression during hospitalization  Description: INTERVENTIONS:  - Assess and monitor for signs and symptoms of infection  - Monitor lab/diagnostic results  - Monitor all insertion sites, i e  indwelling lines, tubes, and drains  - Monitor endotracheal if appropriate and nasal secretions for changes in amount and color  - [place] appropriate cooling/warming therapies per order  - Administer medications as ordered  - Instruct and encourage patient and family to use good hand hygiene technique  - Identify and instruct in appropriate isolation precautions for identified infection/condition  Outcome: Progressing  Goal: Absence of fever/infection during neutropenic period  Description: INTERVENTIONS:  - Monitor WBC    Outcome: Progressing     Problem: SAFETY ADULT  Goal: Patient will remain free of falls  Description: INTERVENTIONS:  - Educate patient/family on patient safety including physical limitations  - Instruct patient to call for assistance with activity   - Consult OT/PT to assist with strengthening/mobility   - Keep Call bell within reach  - Keep bed low and locked with side rails adjusted as appropriate  - Keep care items and personal belongings within reach  - Initiate and maintain comfort rounds  - Apply yellow socks and bracelet for high fall risk patients  - Consider moving patient to room near nurses station  Outcome: Progressing     Problem: DISCHARGE PLANNING  Goal: Discharge to home or other facility with appropriate resources  Description: INTERVENTIONS:  - Identify barriers to discharge w/patient and caregiver  - Arrange for needed discharge resources and transportation as appropriate  - Identify discharge learning needs (meds, wound care, etc )  - Arrange for interpretive services to assist at discharge as needed  - Refer to Case Management Department for coordinating discharge planning if the patient needs post-hospital services based on physician/advanced practitioner order or complex needs related to functional status, cognitive ability, or social support system  Outcome: Progressing     Problem: Nutrition/Hydration-ADULT  Goal: Nutrient/Hydration intake appropriate for improving, restoring or maintaining nutritional needs  Description: Monitor and assess patient's nutrition/hydration status for malnutrition  Collaborate with interdisciplinary team and initiate plan and interventions as ordered  Monitor patient's weight and dietary intake as ordered or per policy  Utilize nutrition screening tool and intervene as necessary   Determine patient's food preferences and provide high-protein, high-caloric foods as appropriate       INTERVENTIONS:  - Monitor oral intake, urinary output, labs, and treatment plans  - Assess nutrition and hydration status and recommend course of action  - Evaluate amount of meals eaten  - Assist patient with eating if necessary   - Allow adequate time for meals  - Recommend/ encourage appropriate diets, oral nutritional supplements, and vitamin/mineral supplements  - Order, calculate, and assess calorie counts as needed  - Recommend, monitor, and adjust tube feedings and TPN/PPN based on assessed needs  - Assess need for intravenous fluids  - Provide specific nutrition/hydration education as appropriate  - Include patient/family/caregiver in decisions related to nutrition  Outcome: Progressing     Problem: GASTROINTESTINAL - ADULT  Goal: Minimal or absence of nausea and/or vomiting  Description: INTERVENTIONS:  - Administer IV fluids if ordered to ensure adequate hydration  - Maintain NPO status until nausea and vomiting are resolved  - Nasogastric tube if ordered  - Administer ordered antiemetic medications as needed  - Provide nonpharmacologic comfort measures as appropriate  - Advance diet as tolerated, if ordered  - Consider nutrition services referral to assist patient with adequate nutrition and appropriate food choices  Outcome: Progressing  Goal: Maintains or returns to baseline bowel function  Description: INTERVENTIONS:  - Assess bowel function  - Encourage oral fluids to ensure adequate hydration  - Administer IV fluids if ordered to ensure adequate hydration  - Administer ordered medications as needed  - Encourage mobilization and activity  - Consider nutritional services referral to assist patient with adequate nutrition and appropriate food choices  Outcome: Progressing  Goal: Maintains adequate nutritional intake  Description: INTERVENTIONS:  - Monitor percentage of each meal consumed  - Identify factors contributing to decreased intake, treat as appropriate  - Assist with meals as needed  - Monitor I&O, weight, and lab values if indicated  - Obtain nutrition services referral as needed  Outcome: Progressing  Goal: Establish and maintain optimal ostomy function  Description: INTERVENTIONS:  - Assess bowel function  - Encourage oral fluids to ensure adequate hydration  - Administer IV fluids if ordered to ensure adequate hydration   - Administer ordered medications as needed  - Encourage mobilization and activity  - Nutrition services referral to assist patient with appropriate food choices  - Assess stoma site  - Consider wound care consult   Outcome: Progressing  Goal: Oral mucous membranes remain intact  Description: INTERVENTIONS  - Assess oral mucosa and hygiene practices  - Implement preventative oral hygiene regimen  - Implement oral medicated treatments as ordered  - Initiate Nutrition services referral as needed  Outcome: Progressing

## 2022-09-17 NOTE — ASSESSMENT & PLAN NOTE
Recent Labs     09/15/22  1504 09/16/22  0506 09/17/22  0537   CREATININE 1 91* 2 05* 1 50*   EGFR 35 32 47     Estimated Creatinine Clearance: 76 7 mL/min (A) (by C-G formula based on SCr of 1 5 mg/dL (H))  MILTON superimposed on stage 3 CKD due to hypotension and low MAPs overngiht, improved after patient was taken off Propofol and transitioned to Fentanyl     Now resolved, patient back at baseline    Plan:   Continue Isolyte at 125mL/h  Avoid nephrotoxic agents  Avoid hypotension  Renally dose medications  Continue monitoring renal function

## 2022-09-17 NOTE — ASSESSMENT & PLAN NOTE
Lab Results   Component Value Date    EGFR 47 09/17/2022    EGFR 32 09/16/2022    EGFR 35 09/15/2022    CREATININE 1 50 (H) 09/17/2022    CREATININE 2 05 (H) 09/16/2022    CREATININE 1 91 (H) 09/15/2022   Baseline creatinine is 1 4-1 7; OR I/Os 3L/375 ml  Now back to baseline  Continue avoiding nephrotoxins, avoid hypotension  Monitor renal indices closely

## 2022-09-17 NOTE — ASSESSMENT & PLAN NOTE
Recent Labs     09/15/22  0336 09/15/22  1504 09/16/22  0506   CREATININE 1 48* 1 91* 2 05*   EGFR 48 35 32     Estimated Creatinine Clearance: 56 2 mL/min (A) (by C-G formula based on SCr of 2 05 mg/dL (H))  MILTON superimposed on stage 3 CKD due to hypotension and low MAPs overngiht, improved after patient was taken off Propofol and transitioned to Fentanyl     Now resolved, patient back at baseline    Plan:   Continue Isolyte at 125mL/h  Avoid nephrotoxic agents  Avoid hypotension  Renally dose medications  Continue monitoring renal function

## 2022-09-17 NOTE — ASSESSMENT & PLAN NOTE
S/p exp lap, complex lysis of adhesions, reduction of strangulated hernia, debridement RLQ muscle with counter incision, abthera placement x 2  Intraoperative course was uneventful  RLQ Hernia repair with wound VAC placement and anterior abdominal wall repair with mesh and retention sutures x12 on 09/15/2022  NGT D/C'd    Pain management with PRN Dilaudid  Continue NPO w ice chips pending formal swallow eval (failed nursing bedside dysphagia assessment)  Continue surgical incision site wound care  Monitor wound vac output

## 2022-09-17 NOTE — ASSESSMENT & PLAN NOTE
Holding Lisinopril and Lasix at this time, consider restarting  Consider restarting Toprol XL-- currently lopressor 5 mg q6h  Maintain MAP>65

## 2022-09-17 NOTE — PROGRESS NOTES
[address] [facility]  Progress Note - Cayla Palencia 1955, 78 y o  male MRN: [id number]  Unit/Bed#: ICU 07 Encounter: [id number]  Primary Care Provider: Nel Osborn MD   Date and time admitted to hospital: 9/14/2022 11:29 AM    * Strangulated hernia of abdominal wall  Assessment & Plan  S/p exp lap, complex lysis of adhesions, reduction of strangulated hernia, debridement RLQ muscle with counter incision, abthera placement x 2  Intraoperative course was uneventful  RLQ Hernia repair with wound VAC placement and anterior abdominal wall repair with mesh and retention sutures x12 on 09/15/2022  Pain management with PRN Dilaudid  Continue NPO   Continue surgical incision site wound care  Monitor wound vac output  NGT to LCS    Sepsis [facility])  Assessment & Plan  Due to strangulated ventral hernia and RLQ abscess of the rectus muscle  As evidenced by WBC-12 9, fever (101 4 F) and tachycardia POA  Treated with fluid boluses and Zosyn in the ED, followed by emergent OR ex lap prior to which patient received a dose of Ancef  Underwent ex lap with hernia repair  Blood cultures negative at 24h  Tissue culture demonstrating few colonies of K  Pneumoniae, E  Coli and Bacteroides vulgatus    Plan:  Maintain MAP>65  Zosyn discontinued, patient started on Ancef and Flagyl  Continue monitoring WBC, trend fever curve  Continue hemodynamic monitoring    Respiratory insufficiency-resolved as of 9/17/2022  Assessment & Plan  Patient extubated on 9/16, on room air  Acute kidney injury (HCC)-resolved as of 9/17/2022  Assessment & Plan  Recent Labs     09/15/22  1504 09/16/22  0506 09/17/22  0537   CREATININE 1 91* 2 05* 1 50*   EGFR 35 32 47     Estimated Creatinine Clearance: 76 7 mL/min (A) (by C-G formula based on SCr of 1 5 mg/dL (H))  MILTON superimposed on stage 3 CKD due to hypotension and low MAPs overngiht, improved after patient was taken off Propofol and transitioned to Fentanyl     Now resolved, patient back at baseline    Plan:   Continue Isolyte at 125mL/h  Avoid nephrotoxic agents  Avoid hypotension  Renally dose medications  Continue monitoring renal function    Anemia  Assessment & Plan  Recent Labs     09/15/22  1504 09/16/22  0506 09/17/22  0537   HGB 11 6* 10 0* 9 1*   Likely in the setting of blood loss due to ex lap x2 with a dilutional component after receiving IV fluids post op  Will continue monitoring       Class 3 severe obesity due to excess calories with serious comorbidity and body mass index (BMI) of 50 0 to 59 9 in adult [facility])  Assessment & Plan  Encourage lifestyle modifications    DELIA (obstructive sleep apnea)  Assessment & Plan  Will address once patient's NG tube comes out  CKD (chronic kidney disease), stage III [facility])  Assessment & Plan  Lab Results   Component Value Date    EGFR 47 09/17/2022    EGFR 32 09/16/2022    EGFR 35 09/15/2022    CREATININE 1 50 (H) 09/17/2022    CREATININE 2 05 (H) 09/16/2022    CREATININE 1 91 (H) 09/15/2022   Baseline creatinine is 1 4-1 7; OR I/Os 3L/375 ml  Now back to baseline  Continue avoiding nephrotoxins, avoid hypotension  Continue IV hydration with Dextrose 5% NaCl 0 45%  Monitor renal indices closely      Essential hypertension  Assessment & Plan  Continue holding Lisinopril and Lasix at this time  Consider restarting Toprol XL  Maintain MAP>65    ----------------------------------------------------------------------------------------  HPI/24hr events: No overnight events, Urine output 1975 in the past 24h via Arias catheter, NG tube with 100mL output, 250mL sanguinous fluid from the wound vac  On 2L NC  Patient appropriate for transfer out of the ICU today?: No  Disposition: Continue Critical Care   Code Status: Level 1 - Full Code  ---------------------------------------------------------------------------------------  SUBJECTIVE  Patient found laying in bed, not in acute distress at the time of my evaluation  Has no complaints  Denies abdominal pain or pain at the surgical incision sites  Notes that the NG tube is uncomfortable  Denies dizziness/lightheadedness, chest pain, shortness of breath, palpitations, lower extremity tenderness  Review of Systems  Review of systems was reviewed and negative unless stated above in HPI/24-hour events   ---------------------------------------------------------------------------------------  OBJECTIVE    Vitals   Vitals:    22 0610 22 0615 22 0620 22 0744   BP:    116/64   BP Location:       Pulse: 104 (!) 106 (!) 106 (!) 112   Resp: 16 (!) 23 (!) 23 (!) 28   Temp:    100 °F (37 8 °C)   TempSrc:       SpO2: 99% 99% 100% 95%   Weight:       Height:         Temp (24hrs), Av 8 °F (37 7 °C), Min:99 5 °F (37 5 °C), Max:100 °F (37 8 °C)  Current: Temperature: 100 °F (37 8 °C)  Arterial Line BP: 60/56  Arterial Line MAP (mmHg): (!) 58 mmHg    Respiratory:  SpO2: SpO2: 95 %       Invasive/non-invasive ventilation settings   Respiratory  Report   Lab Data (Last 4 hours)    None         O2/Vent Data (Last 4 hours)    None                Physical Exam  Vitals and nursing note reviewed  Constitutional:       General: He is not in acute distress  Appearance: Normal appearance  He is morbidly obese  He is not ill-appearing, toxic-appearing or diaphoretic  Interventions: Nasal cannula in place  HENT:      Head: Normocephalic and atraumatic  Nose: Nose normal       Mouth/Throat:      Mouth: Mucous membranes are moist       Pharynx: Oropharynx is clear  Eyes:      General: No scleral icterus  Right eye: No discharge  Left eye: No discharge  Extraocular Movements: Extraocular movements intact  Conjunctiva/sclera: Conjunctivae normal    Cardiovascular:      Rate and Rhythm: Regular rhythm  Tachycardia present  Pulses: Normal pulses  Heart sounds: Normal heart sounds  No murmur heard    Pulmonary:      Effort: Pulmonary effort is normal  No respiratory distress  Breath sounds: Normal breath sounds  No wheezing, rhonchi or rales  Abdominal:      General: Abdomen is flat  Bowel sounds are normal  There is no distension  Palpations: Abdomen is soft  Tenderness: There is no abdominal tenderness  There is no guarding or rebound  Musculoskeletal:      Cervical back: Normal range of motion and neck supple  Right lower leg: Edema present  Left lower leg: Edema present  Comments: 1+ pitting edema in the lower extremities bilaterally, most pronounced at the ankles and feet   Skin:     General: Skin is warm and dry  Coloration: Skin is not jaundiced or pale  Neurological:      Mental Status: He is alert and oriented to person, place, and time  Mental status is at baseline  GCS: GCS eye subscore is 4  GCS verbal subscore is 5  GCS motor subscore is 6  Psychiatric:         Mood and Affect: Mood normal          Behavior: Behavior normal  Behavior is cooperative  Thought Content:  Thought content normal          Judgment: Judgment normal              Laboratory and Diagnostics:  Results from last 7 days   Lab Units 09/17/22  0537 09/16/22  0506 09/15/22  1504 09/15/22  0336 09/14/22  1952 09/14/22  1203   WBC Thousand/uL 15 28* 14 74* 16 29* 15 84*  --  12 90*   HEMOGLOBIN g/dL 9 1* 10 0* 11 6* 12 5  --  12 1   I STAT HEMOGLOBIN g/dl  --   --   --   --  11 2*  --    HEMATOCRIT % 29 4* 31 4* 37 3 39 1  --  37 1   HEMATOCRIT, ISTAT %  --   --   --   --  33*  --    PLATELETS Thousands/uL 366 331 367 380  --  416*   NEUTROS PCT % 88* 89*  --   --   --  85*   BANDS PCT %  --   --  7 12*  --   --    MONOS PCT % 5 6  --   --   --  7   MONO PCT %  --   --  7 4  --   --      Results from last 7 days   Lab Units 09/17/22  0537 09/16/22  0506 09/15/22  1504 09/15/22  0336 09/14/22 1952 09/14/22  1203   SODIUM mmol/L 140 138 137 135  --  134*   POTASSIUM mmol/L 3 9 4 2 4 8 4 4  --  3 9   CHLORIDE mmol/L 108 104 104 104  --  99   CO2 mmol/L 26 23 20* 21  --  27   CO2, I-STAT mmol/L  --   --   --   --  24  --    ANION GAP mmol/L 6 11 13 10  --  8   BUN mg/dL 21 28* 24 23  --  21   CREATININE mg/dL 1 50* 2 05* 1 91* 1 48*  --  1 64*   CALCIUM mg/dL 7 7* 7 8* 7 5* 8 2*  --  8 8   GLUCOSE RANDOM mg/dL 125 151* 155* 145*  --  115   ALT U/L  --  25 20 14  --  15   AST U/L  --  31 23 14  --  13   ALK PHOS U/L  --  44* 47 47  --  55   ALBUMIN g/dL  --  1 8* 1 8* 2 1*  --  2 6*   TOTAL BILIRUBIN mg/dL  --  0 64 0 70 1 02*  --  1 25*     Results from last 7 days   Lab Units 09/16/22  0506 09/15/22  1504 09/15/22  0336   MAGNESIUM mg/dL 2 2 1 6 1 8   PHOSPHORUS mg/dL 3 9 5 6* 3 5      Results from last 7 days   Lab Units 09/14/22  1203   INR  1 19   PTT seconds 39*          Results from last 7 days   Lab Units 09/15/22  0007 09/14/22  1947 09/14/22  1220   LACTIC ACID mmol/L 1 4 1 1 1 1     ABG:  Results from last 7 days   Lab Units 09/15/22  0336   PH ART  7 382   PCO2 ART mm Hg 34 4*   PO2 ART mm Hg 237 7*   HCO3 ART mmol/L 20 0*   BASE EXC ART mmol/L -4 3   ABG SOURCE  Line, Arterial     VBG:  Results from last 7 days   Lab Units 09/15/22  0336 09/15/22  0007   PH JONH   --  7 265*   PCO2 JONH mm Hg  --  43 6   PO2 JONH mm Hg  --  37 8   HCO3 JONH mmol/L  --  19 3*   BASE EXC JONH mmol/L  --  -7 4   ABG SOURCE  Line, Arterial  --      Results from last 7 days   Lab Units 09/14/22  1203   PROCALCITONIN ng/ml 0 75*       Micro  Results from last 7 days   Lab Units 09/14/22  1944 09/14/22  1220 09/14/22  1203   BLOOD CULTURE   --  No Growth at 48 hrs  No Growth at 48 hrs  GRAM STAIN RESULT  No Polys or Bacteria seen  --   --        EKG: Sinus tachycardia,   Imaging: I have personally reviewed pertinent reports     and I have personally reviewed pertinent films in PACS    Intake and Output  I/O       09/15 0701 09/16 0700 09/16 0701 09/17 0700    I V  (mL/kg) 5006 2 (28 4) 2563 7 (14 7)    IV Piggyback 1350 457 5    Total Intake(mL/kg) 6356 2 (36 1) 3021 2 (17 4)    Urine (mL/kg/hr) 855 (0 2) 1975 (0 5)    Emesis/NG output 450 100    Drains 250 250    Total Output 1555 2325    Net +4801 2 +696 2                Height and Weights   Height: 5' 9" (175 3 cm)  IBW (Ideal Body Weight): 70 7 kg  Body mass index is 56 68 kg/m²  Weight (last 2 days)     Date/Time Weight    09/17/22 0352 174 (383 82)    09/16/22 0415 176 (388 45)    09/15/22 0600 174 (383 82)            Nutrition       Diet Orders   (From admission, onward)             Start     Ordered    09/14/22 2050  Diet NPO  Diet effective now        References:    Nutrtion Support Algorithm Enteral vs  Parenteral   Question Answer Comment   Diet Type NPO    RD to adjust diet per protocol?  No        09/14/22 2053                  Active Medications  Scheduled Meds:  Current Facility-Administered Medications   Medication Dose Route Frequency Provider Last Rate    cefazolin  2,000 mg Intravenous Q8H Steven Ortiz MD 2,000 mg (09/17/22 0733)    chlorhexidine  15 mL Mouth/Throat Q12H [facility] Magali Simms MD      dextrose 5 % and sodium chloride 0 45 %  75 mL/hr Intravenous Continuous Steven Ortiz MD 75 mL/hr (09/17/22 0645)    heparin (porcine)  7,500 Units Subcutaneous Q8H [facility] Rita Mayers MD      HYDROmorphone  1 mg Intravenous Q4H PRN Steven Ortiz MD      metroNIDAZOLE  500 mg Intravenous Q8H Steven Ortiz  mg (09/17/22 0130)    pantoprazole  40 mg Intravenous Q24H [facility] Magali Nur MD       Continuous Infusions:  dextrose 5 % and sodium chloride 0 45 %, 75 mL/hr, Last Rate: 75 mL/hr (09/17/22 0645)      PRN Meds:   HYDROmorphone, 1 mg, Q4H PRN        Invasive Devices Review  Invasive Devices  Report    Central Venous Catheter Line  Duration           CVC Central Lines 09/14/22 Triple 20cm 2 days          Peripheral Intravenous Line  Duration           Peripheral IV 09/14/22 Left Antecubital 2 days          Drain  Duration           NG/OG/Enteral Tube Nasogastric 18 Fr Right nare 2 days    Urethral Catheter Non-latex 16 Fr  2 days                Rationale for remaining devices:   ---------------------------------------------------------------------------------------  Advance Directive and Living Will:      Power of :    POLST:    ---------------------------------------------------------------------------------------  Care Time Delivered:   No Critical Care time spent       Dickson Macias MD      Portions of the record may have been created with voice recognition software  Occasional wrong word or "sound a like" substitutions may have occurred due to the inherent limitations of voice recognition software    Read the chart carefully and recognize, using context, where substitutions have occurred

## 2022-09-17 NOTE — PROGRESS NOTES
Acute Care Surgery  Bedside V A C  Procedure Note    Location of wound: RLQ abdomen    Dressings and Foam removed:  1 Black Foam  1 White Foam    Dimensions of wound:  13 cm x 8 cm x 4 cm    Description of wound:  Wound appears healthy with viable tissue, tissue appears pink, no drainage/discharge or signs of infection, mesh in place  Surrounding skin without erythema or induration  VAC dressing application:  The periwound skin was cleaned and dried  1 black foam, 1 white foam were cut to size of the wound and placed into the wound; the white sponge was 1st placed on top of the mesh, black sponge was then placed on top of the white sponge  The dressings were then covered with VAC drape  The track pad was then placed over the base of black foam  The VAC was then set to 125 mmHg low Continuous suction  The patient tolerated the procedure well and there were no complications  The patient did not require any excisional debridement during today's dressing change  VAC settings:  125 mmHg  Continuous    Wound Images: Additional Notes: The [facility] sticker placed over the dressing per protocol  The next [facility] dressing change will be planned for Monday 9/19      Mallika Akins MD  9/17/2022 1:41 PM

## 2022-09-17 NOTE — ASSESSMENT & PLAN NOTE
Lab Results   Component Value Date    EGFR 47 09/17/2022    EGFR 32 09/16/2022    EGFR 35 09/15/2022    CREATININE 1 50 (H) 09/17/2022    CREATININE 2 05 (H) 09/16/2022    CREATININE 1 91 (H) 09/15/2022   Baseline creatinine is 1 4-1 7; OR I/Os 3L/375 ml  Now back to baseline  Continue avoiding nephrotoxins, avoid hypotension  Continue IV hydration with Dextrose 5% NaCl 0 45%  Monitor renal indices closely

## 2022-09-17 NOTE — ASSESSMENT & PLAN NOTE
Recent Labs     09/15/22  1504 09/16/22  0506 09/17/22  0537   HGB 11 6* 10 0* 9 1*   Likely in the setting of blood loss due to ex lap x2 with a dilutional component after receiving IV fluids post op     Will continue monitoring

## 2022-09-18 LAB
ANION GAP SERPL CALCULATED.3IONS-SCNC: 6 MMOL/L (ref 4–13)
ANION GAP SERPL CALCULATED.3IONS-SCNC: 8 MMOL/L (ref 4–13)
BASE EX.OXY STD BLDV CALC-SCNC: 92.7 % (ref 60–80)
BASE EXCESS BLDV CALC-SCNC: 0.5 MMOL/L
BASOPHILS # BLD AUTO: 0.05 THOUSANDS/ΜL (ref 0–0.1)
BASOPHILS # BLD MANUAL: 0 THOUSAND/UL (ref 0–0.1)
BASOPHILS NFR BLD AUTO: 0 % (ref 0–1)
BASOPHILS NFR MAR MANUAL: 0 % (ref 0–1)
BUN SERPL-MCNC: 22 MG/DL (ref 5–25)
BUN SERPL-MCNC: 23 MG/DL (ref 5–25)
CALCIUM SERPL-MCNC: 8 MG/DL (ref 8.3–10.1)
CALCIUM SERPL-MCNC: 8.2 MG/DL (ref 8.3–10.1)
CHLORIDE SERPL-SCNC: 106 MMOL/L (ref 96–108)
CHLORIDE SERPL-SCNC: 108 MMOL/L (ref 96–108)
CO2 SERPL-SCNC: 26 MMOL/L (ref 21–32)
CO2 SERPL-SCNC: 29 MMOL/L (ref 21–32)
CREAT SERPL-MCNC: 1.39 MG/DL (ref 0.6–1.3)
CREAT SERPL-MCNC: 1.43 MG/DL (ref 0.6–1.3)
EOSINOPHIL # BLD AUTO: 0.05 THOUSAND/ΜL (ref 0–0.61)
EOSINOPHIL # BLD MANUAL: 0.19 THOUSAND/UL (ref 0–0.4)
EOSINOPHIL NFR BLD AUTO: 0 % (ref 0–6)
EOSINOPHIL NFR BLD MANUAL: 1 % (ref 0–6)
ERYTHROCYTE [DISTWIDTH] IN BLOOD BY AUTOMATED COUNT: 13.9 % (ref 11.6–15.1)
ERYTHROCYTE [DISTWIDTH] IN BLOOD BY AUTOMATED COUNT: 14 % (ref 11.6–15.1)
GFR SERPL CREATININE-BSD FRML MDRD: 50 ML/MIN/1.73SQ M
GFR SERPL CREATININE-BSD FRML MDRD: 52 ML/MIN/1.73SQ M
GLUCOSE SERPL-MCNC: 100 MG/DL (ref 65–140)
GLUCOSE SERPL-MCNC: 113 MG/DL (ref 65–140)
GLUCOSE SERPL-MCNC: 123 MG/DL (ref 65–140)
GLUCOSE SERPL-MCNC: 157 MG/DL (ref 65–140)
HCO3 BLDV-SCNC: 25.2 MMOL/L (ref 24–30)
HCT VFR BLD AUTO: 30.4 % (ref 36.5–49.3)
HCT VFR BLD AUTO: 31.4 % (ref 36.5–49.3)
HGB BLD-MCNC: 9.4 G/DL (ref 12–17)
HGB BLD-MCNC: 9.4 G/DL (ref 12–17)
IMM GRANULOCYTES # BLD AUTO: 0.35 THOUSAND/UL (ref 0–0.2)
IMM GRANULOCYTES NFR BLD AUTO: 2 % (ref 0–2)
LYMPHOCYTES # BLD AUTO: 0.96 THOUSANDS/ΜL (ref 0.6–4.47)
LYMPHOCYTES # BLD AUTO: 1.17 THOUSAND/UL (ref 0.6–4.47)
LYMPHOCYTES # BLD AUTO: 6 % (ref 14–44)
LYMPHOCYTES NFR BLD AUTO: 6 % (ref 14–44)
MAGNESIUM SERPL-MCNC: 2.1 MG/DL (ref 1.6–2.6)
MAGNESIUM SERPL-MCNC: 2.4 MG/DL (ref 1.6–2.6)
MCH RBC QN AUTO: 30.1 PG (ref 26.8–34.3)
MCH RBC QN AUTO: 30.5 PG (ref 26.8–34.3)
MCHC RBC AUTO-ENTMCNC: 29.9 G/DL (ref 31.4–37.4)
MCHC RBC AUTO-ENTMCNC: 30.9 G/DL (ref 31.4–37.4)
MCV RBC AUTO: 101 FL (ref 82–98)
MCV RBC AUTO: 99 FL (ref 82–98)
MONOCYTES # BLD AUTO: 0.95 THOUSAND/ΜL (ref 0.17–1.22)
MONOCYTES # BLD AUTO: 0.97 THOUSAND/UL (ref 0–1.22)
MONOCYTES NFR BLD AUTO: 6 % (ref 4–12)
MONOCYTES NFR BLD: 5 % (ref 4–12)
MYELOCYTES NFR BLD MANUAL: 1 % (ref 0–1)
NASAL CANNULA: 2
NEUTROPHILS # BLD AUTO: 14.33 THOUSANDS/ΜL (ref 1.85–7.62)
NEUTROPHILS # BLD MANUAL: 16.91 THOUSAND/UL (ref 1.85–7.62)
NEUTS BAND NFR BLD MANUAL: 1 % (ref 0–8)
NEUTS SEG NFR BLD AUTO: 86 % (ref 43–75)
NEUTS SEG NFR BLD AUTO: 86 % (ref 43–75)
NRBC BLD AUTO-RTO: 0 /100 WBCS
O2 CT BLDV-SCNC: 13.6 ML/DL
PCO2 BLDV: 40.8 MM HG (ref 42–50)
PH BLDV: 7.41 [PH] (ref 7.3–7.4)
PHOSPHATE SERPL-MCNC: 1.6 MG/DL (ref 2.3–4.1)
PLATELET # BLD AUTO: 385 THOUSANDS/UL (ref 149–390)
PLATELET # BLD AUTO: 398 THOUSANDS/UL (ref 149–390)
PLATELET BLD QL SMEAR: ADEQUATE
PMV BLD AUTO: 8.6 FL (ref 8.9–12.7)
PMV BLD AUTO: 8.7 FL (ref 8.9–12.7)
PO2 BLDV: 72.8 MM HG (ref 35–45)
POTASSIUM SERPL-SCNC: 3.6 MMOL/L (ref 3.5–5.3)
POTASSIUM SERPL-SCNC: 4 MMOL/L (ref 3.5–5.3)
RBC # BLD AUTO: 3.08 MILLION/UL (ref 3.88–5.62)
RBC # BLD AUTO: 3.12 MILLION/UL (ref 3.88–5.62)
RBC MORPH BLD: NORMAL
SODIUM SERPL-SCNC: 141 MMOL/L (ref 135–147)
SODIUM SERPL-SCNC: 142 MMOL/L (ref 135–147)
WBC # BLD AUTO: 16.69 THOUSAND/UL (ref 4.31–10.16)
WBC # BLD AUTO: 19.44 THOUSAND/UL (ref 4.31–10.16)

## 2022-09-18 PROCEDURE — 82948 REAGENT STRIP/BLOOD GLUCOSE: CPT

## 2022-09-18 PROCEDURE — C9113 INJ PANTOPRAZOLE SODIUM, VIA: HCPCS

## 2022-09-18 PROCEDURE — 82805 BLOOD GASES W/O2 SATURATION: CPT | Performed by: PHYSICIAN ASSISTANT

## 2022-09-18 PROCEDURE — 80048 BASIC METABOLIC PNL TOTAL CA: CPT | Performed by: PHYSICIAN ASSISTANT

## 2022-09-18 PROCEDURE — 83735 ASSAY OF MAGNESIUM: CPT

## 2022-09-18 PROCEDURE — 87040 BLOOD CULTURE FOR BACTERIA: CPT

## 2022-09-18 PROCEDURE — 85025 COMPLETE CBC W/AUTO DIFF WBC: CPT

## 2022-09-18 PROCEDURE — 80048 BASIC METABOLIC PNL TOTAL CA: CPT

## 2022-09-18 PROCEDURE — 84100 ASSAY OF PHOSPHORUS: CPT | Performed by: PHYSICIAN ASSISTANT

## 2022-09-18 PROCEDURE — 99024 POSTOP FOLLOW-UP VISIT: CPT | Performed by: SURGERY

## 2022-09-18 PROCEDURE — 92610 EVALUATE SWALLOWING FUNCTION: CPT

## 2022-09-18 PROCEDURE — 85007 BL SMEAR W/DIFF WBC COUNT: CPT | Performed by: PHYSICIAN ASSISTANT

## 2022-09-18 PROCEDURE — 85027 COMPLETE CBC AUTOMATED: CPT | Performed by: PHYSICIAN ASSISTANT

## 2022-09-18 PROCEDURE — 83735 ASSAY OF MAGNESIUM: CPT | Performed by: PHYSICIAN ASSISTANT

## 2022-09-18 PROCEDURE — 85025 COMPLETE CBC W/AUTO DIFF WBC: CPT | Performed by: PHYSICIAN ASSISTANT

## 2022-09-18 RX ORDER — FUROSEMIDE 10 MG/ML
20 INJECTION INTRAMUSCULAR; INTRAVENOUS ONCE
Status: COMPLETED | OUTPATIENT
Start: 2022-09-18 | End: 2022-09-18

## 2022-09-18 RX ORDER — ACETAMINOPHEN 325 MG/1
650 TABLET ORAL EVERY 6 HOURS PRN
Status: DISCONTINUED | OUTPATIENT
Start: 2022-09-18 | End: 2022-09-28 | Stop reason: HOSPADM

## 2022-09-18 RX ADMIN — METOROPROLOL TARTRATE 5 MG: 5 INJECTION, SOLUTION INTRAVENOUS at 15:19

## 2022-09-18 RX ADMIN — METRONIDAZOLE 500 MG: 500 INJECTION, SOLUTION INTRAVENOUS at 16:24

## 2022-09-18 RX ADMIN — METOROPROLOL TARTRATE 5 MG: 5 INJECTION, SOLUTION INTRAVENOUS at 02:40

## 2022-09-18 RX ADMIN — POTASSIUM PHOSPHATE, MONOBASIC POTASSIUM PHOSPHATE, DIBASIC 30 MMOL: 224; 236 INJECTION, SOLUTION, CONCENTRATE INTRAVENOUS at 22:03

## 2022-09-18 RX ADMIN — METOROPROLOL TARTRATE 5 MG: 5 INJECTION, SOLUTION INTRAVENOUS at 21:29

## 2022-09-18 RX ADMIN — HEPARIN SODIUM 7500 UNITS: 5000 INJECTION INTRAVENOUS; SUBCUTANEOUS at 13:52

## 2022-09-18 RX ADMIN — CEFAZOLIN SODIUM 2000 MG: 2 SOLUTION INTRAVENOUS at 23:19

## 2022-09-18 RX ADMIN — CEFAZOLIN SODIUM 2000 MG: 2 SOLUTION INTRAVENOUS at 07:55

## 2022-09-18 RX ADMIN — HEPARIN SODIUM 7500 UNITS: 5000 INJECTION INTRAVENOUS; SUBCUTANEOUS at 05:17

## 2022-09-18 RX ADMIN — PANTOPRAZOLE SODIUM 40 MG: 40 INJECTION, POWDER, FOR SOLUTION INTRAVENOUS at 09:16

## 2022-09-18 RX ADMIN — CEFAZOLIN SODIUM 2000 MG: 2 SOLUTION INTRAVENOUS at 15:18

## 2022-09-18 RX ADMIN — ALLOPURINOL 100 MG: 100 TABLET ORAL at 09:16

## 2022-09-18 RX ADMIN — METRONIDAZOLE 500 MG: 500 INJECTION, SOLUTION INTRAVENOUS at 08:47

## 2022-09-18 RX ADMIN — ACETAMINOPHEN 650 MG: 325 TABLET ORAL at 16:02

## 2022-09-18 RX ADMIN — HEPARIN SODIUM 7500 UNITS: 5000 INJECTION INTRAVENOUS; SUBCUTANEOUS at 21:29

## 2022-09-18 RX ADMIN — FUROSEMIDE 20 MG: 10 INJECTION, SOLUTION INTRAVENOUS at 10:37

## 2022-09-18 RX ADMIN — TRAVOPROST 1 DROP: 0.04 SOLUTION OPHTHALMIC at 21:33

## 2022-09-18 RX ADMIN — METOROPROLOL TARTRATE 5 MG: 5 INJECTION, SOLUTION INTRAVENOUS at 09:16

## 2022-09-18 NOTE — ASSESSMENT & PLAN NOTE
Patient endorses snoring at night, waking up unrefreshed by sleep, daytime sleepiness  Has not had a formal evaluation or sleep study     Does not desire trial of bedtime CPAP at this time but will consider  Referral to sleep medicine at discharge

## 2022-09-18 NOTE — PROGRESS NOTES
Progress Note - General Surgery   Stephania Hunt 78 y o  male MRN: [id number]  Unit/Bed#: ICU 07 Encounter: [id number]    Assessment:  78yoM p/w incarcerated incisional hernia and SBO now s/p:  9/14: ex lap, reduction of strangulated ventral hernia, debridement of RLQ abdominal wall musculature, Abthera VAC placement  9/15: ex lap, RLQ hernia repair w mesh and VAC, complex anterior abdominal wall repair w mesh and retention sutures, LANETTE     Febrile to 100 6 at midnight, tachycardic to 110s, 2L NC  WBC 16 7 from 15 3, hemoglobin 9 4 from 9 1  Blood cultures [id number]  Creatinine 1 43 from 1 5     VAC 0 recorded, minimal serosanguineous in chamber  UOP 2790    Plan:  - sips, will follow-up formal speech eval  - on Ancef/Flagyl  - RLQ abdominal VAC, monitor output and character, changes MWF  - Lopressor initiated for tachycardia, with switch to p o  metoprolol if able to tolerate  - would discontinue Arias  - follow-up OR, blood cultures  - SQ  - pain control  - PT/OT  - appreciate ICU care    Subjective/Objective   Subjective:   No acute events, patient denies any abdominal pain at present, denies any nausea or emesis  Objective:     Blood pressure 142/65, pulse (!) 106, temperature 100 1 °F (37 8 °C), resp  rate (!) 26, height 5' 9" (1 753 m), weight (!) 174 kg (383 lb 13 1 oz), SpO2 96 %  ,Body mass index is 56 68 kg/m²        Intake/Output Summary (Last 24 hours) at 9/18/2022 0734  Last data filed at 9/18/2022 0600  Gross per 24 hour   Intake 430 ml   Output 2790 ml   Net -2360 ml       Invasive Devices  Report    Central Venous Catheter Line  Duration           CVC Central Lines 09/14/22 Triple 20cm 3 days          Peripheral Intravenous Line  Duration           Peripheral IV 09/18/22 Right Antecubital <1 day          Drain  Duration           Urethral Catheter Non-latex 16 Fr  3 days                Physical Exam:   General: NAD  Skin: Warm, dry, anicteric  HEENT: Normocephalic, atraumatic  CV: RRR, no m/r/g  Pulm: CTA b/l, no inc WOB, 2 L NC  Abd: Soft, ND/NT, RLQ VAC, midline incision clean dry and intact with retention sutures in place, abdominal binder in place  MSK: Symmetric, no edema, no tenderness, no deformity  Neuro: AOx3, GCS 15    Lab, Imaging and other studies:  I have personally reviewed pertinent lab results    , CBC:   Lab Results   Component Value Date    WBC 16 69 (H) 09/18/2022    HGB 9 4 (L) 09/18/2022    HCT 31 4 (L) 09/18/2022     (H) 09/18/2022     09/18/2022    MCH 30 1 09/18/2022    MCHC 29 9 (L) 09/18/2022    RDW 14 0 09/18/2022    MPV 8 6 (L) 09/18/2022    NRBC 0 09/18/2022   , CMP:   Lab Results   Component Value Date    SODIUM 142 09/18/2022    K 4 0 09/18/2022     09/18/2022    CO2 26 09/18/2022    BUN 22 09/18/2022    CREATININE 1 43 (H) 09/18/2022    CALCIUM 8 2 (L) 09/18/2022    EGFR 50 09/18/2022     VTE Pharmacologic Prophylaxis: Sequential compression device (Venodyne)  and Heparin  VTE Mechanical Prophylaxis: sequential compression device

## 2022-09-18 NOTE — SPEECH THERAPY NOTE
Speech Language/Pathology  Speech/Language Pathology  Assessment    Patient Name: Stephania Hunt  [id number] Date: 9/18/2022     Problem List  Principal Problem:    Strangulated hernia of abdominal wall  Active Problems:    Essential hypertension    CKD (chronic kidney disease), stage III (HCC)    DELIA (obstructive sleep apnea)    Class 3 severe obesity due to excess calories with serious comorbidity and body mass index (BMI) of 50 0 to 59 9 in adult ([facility] [address] )    Sepsis ([facility] [address] )    Anemia    Past Medical History  Past Medical History:   Diagnosis Date    Anemia 9/16/2022    Chronic kidney disease     Hypertension     Obesity     Sepsis ([facility] [address] )     Urinary tract infection without hematuria 4/3/2020     Past Surgical History  Past Surgical History:   Procedure Laterality Date    APPENDECTOMY      EXPLORATORY LAPAROTOMY W/ BOWEL RESECTION N/A 9/14/2022    Procedure: Exdploratory laparotomy, complex Lysis of adhesions, reduction strangulated hernia, debridement RLQ muscle with counter incision, abthera placement;  Surgeon: Taye Parker MD;  Location: [facility];  Service: General    NASAL SEPTUM SURGERY            Bedside Swallow Evaluation:    Summary:  Pt presented w/ functional oral swallowing skills as characterized by wnl mastication, bolus formation, control, and timely transfer  No overt s/s of aspiration with the solid food  Ice chips of varying sizes all WFL, no overt s/s of aspiration  Pt took single sips of thin liquids via straw  Pt with good control and no overt s/s of aspiration with about ~10 sips of thin, but noted to have some abdominal pain while taking a sip of thin liquid followed by a dry throat clear x2  Vital signs remained stable  Drank an additional 5 sips, with no overt s/s of aspiration  Abdominal pain reported to doctors and nursing  Discussed intermittent supervision to ensure no overt s/s of aspiration with the thin liquids  Intermittent belching noted  ? esopgheal concerns/risk of bottom up aspiration  Recommend regular diet with single sips of thin liquids  ST to follow-up to ensure tolerance to recommendations  Recommendations:  Diet: Regular   Liquid: Small single sips of thin liquids   Meds: As tolerated  Supervision: Intermittent   Positioning:Upright  Strategies: Slow rate, alternate between liquids and solids, small single sips, small bites   Pt to take PO/Meds only when fully alert and upright  Oral care: To be completed daily   Aspiration precautions  Reflux precautions  Therapy Prognosis: Fair   Prognosis considerations: pain management, medical status, family support   Frequency:  F/u 2-3x to ensure tolerance to current/LRD     Consider consult w/:  Nutrition    Goal(s):  Pt will tolerate least restrictive diet w/out s/s aspiration or oral/pharyngeal difficulties  H&P/Admit info/ pertinent provider notes: (PMH noted above)  HPI:  April Metz is a 78 y o  male with pmhx of morbid obesity, hypertension, CKD, left RCC status post cryoablation, and a large chronically incarcerated incisional hernia at the site of open remote appendectomy who presented with 5 days of progressive fever and malaise as well as progressive bloating  Additionally he reports that over the past 9 months he has had chronic diarrhea are and that since the beginning of his symptoms are he has are only been having small volume watery bowel movements  His wife does report that she has noticed some increased redness over the site of the hernia  He denies nausea and vomiting, chest pain, he shortness of breath, urinary symptoms  This hernia has been present for many years and based on counseling from his PCP history does not to undergo repair  The hernia has been progressively enlarging but he denies any episodes of similar symptoms          Special Studies:  XR chest- 09/14/22  IMPRESSION:  Right upper lobe infiltrate/atelectasis  Prominent right hilar, paramediastinal masslike density    CT chest for further evaluation recommended  XR chest- 09/17/22  IMPRESSION:  Subsegmental atelectasis in both lower lobes  Previous VBS:  None in the records     Patient's goal:  He wants to eat/drink water     Did the pt report pain? Yes, patient had pain during evaluation, it was reported to nursing and doctors     Reason for consult:  R/o aspiration  Determine safest and least restrictive diet  Failed nursing dysphagia assessment    Precautions:  None     Food allergies:  None     Current diet:  NPO    Premorbid diet[de-identified]  Regular and thin     O2 requirement:  2L NC     Voice/Speech:  Hoarse/weak voice     Social/Prior living environment:  Lives at home in a 2-story home with his wife and son     Follows commands:  WFL                          Cognitive Status:  Alert & oriented     Oral [facility] exam:  Dentition: natural, good condition   Lips (VII): WFL  Tongue (XII): WFL  Mandible (V): WFL, adequate ROM  Face/oral sensation (V): WFL  Velum (X): unable to visualize   Secretion management: reduced   Volitional cough: weak  Volitional swallow: WFL  Pt has great oral care/condition of teeth- reported to the SLP that he had brushed his teeth shortly before she arrived  Items administered:  Puree, soft solid, hard solid,  thin liquids, Liquids were taken by straw    Oral stage:  WFL  Lip closure: WFL  Mastication: Functional for all consistencies presented   Bolus formation: WFL  Bolus control: good   Transfer: Timely   Oral residue: no residue noted   Pocketing: no pocketing noted/observed     Pharyngeal stage:  WFL-mild? Swallow promptness: fairly prompt   Laryngeal rise: palpated and judged to be functional for patient   Wet voice: no wet voice   Throat clear: x2 after episode of pain in abdomen while taking a sip of the water  Throat clear was dry, vitals remained stable     Cough: no coughing noted   Secondary swallows: no   Audible swallows: no     Esophageal stage:  Intermittent belching     Aspiration precautions posted    Results d/w:  Pt, nursing, physician

## 2022-09-18 NOTE — PROGRESS NOTES
Progress Note - General Surgery   Cayla Palencia 78 y o  male MRN: [id number]  Unit/Bed#: ICU 07 Encounter: [id number]    Assessment:  78yoM p/w incarcerated incisional hernia and SBO now s/p:  9/14: ex lap, reduction of strangulated ventral hernia, debridement of RLQ abdominal wall musculature, Abthera VAC placement  9/15: ex lap, RLQ hernia repair w mesh and VAC, complex anterior abdominal wall repair w mesh and retention sutures, LANETTE     cc SS  Temp 101 3° at 15:45 yesterday  WBC 19 from 16    Plan:  · Fever workup  · Will discuss CT scan with team  · Would obtain chest in addition abdomen  · Follow-up repeat blood cultures  · Follow-up chest x-ray today  · Arias an IJ removed yesterday  · VAC change today will inspect wound  · Clears- patient may need more work with speech therapy  · Monitor Renal function  · abdominal VAC, monitor output and character- MWF changes  · ABX  · Appreciate ICU care  · PT/OT  · Please tigertext on call [facility] surgery resident or AP with any questions      Subjective/Objective   Subjective:   No acute events overnight  Passing Flatus  Fever workup in place  ICU team with concerns the patient could be micro aspirating  Has been on liquids and seems to cough afterward  Patient had been up out of bed  Was up in the chair for many hours yesterday evening  Seems to be tired after that  Arias and IJ removed yesterday  Required straight cath x1 yesterday after Arias was removed  Objective:     Blood pressure 157/62, pulse 98, temperature 100 5 °F (38 1 °C), temperature source Tympanic, resp  rate (!) 23, height 5' 9" (1 753 m), weight (!) 174 kg (383 lb 13 1 oz), SpO2 93 %  ,Body mass index is 56 68 kg/m²        Intake/Output Summary (Last 24 hours) at 9/18/2022 1845  Last data filed at 9/18/2022 1746  Gross per 24 hour   Intake 2110 ml   Output 2385 ml   Net -275 ml       Invasive Devices  Report    Peripheral Intravenous Line  Duration           Peripheral IV 09/18/22 Right Antecubital <1 day              Physical Exam:   Gen:  NAD  HEENT: NCAT  MMM  CV: well perfused, pulses palpable  Lungs: Normal respiratory effort  Abd: soft, nt/nd, VAC intact  Skin: warm/ dry  Extremities: no peripheral edema, no cyanosis  Neuro: AxO x3    Lab, Imaging and other studies:  I have personally reviewed pertinent lab results    , CBC:   Lab Results   Component Value Date    WBC 16 69 (H) 09/18/2022    HGB 9 4 (L) 09/18/2022    HCT 31 4 (L) 09/18/2022     (H) 09/18/2022     09/18/2022    MCH 30 1 09/18/2022    MCHC 29 9 (L) 09/18/2022    RDW 14 0 09/18/2022    MPV 8 6 (L) 09/18/2022    NRBC 0 09/18/2022   , CMP:   Lab Results   Component Value Date    SODIUM 142 09/18/2022    K 4 0 09/18/2022     09/18/2022    CO2 26 09/18/2022    BUN 22 09/18/2022    CREATININE 1 43 (H) 09/18/2022    CALCIUM 8 2 (L) 09/18/2022    EGFR 50 09/18/2022     VTE Pharmacologic Prophylaxis: Sequential compression device (Venodyne)  and Heparin  VTE Mechanical Prophylaxis: sequential compression device

## 2022-09-18 NOTE — PROGRESS NOTES
[facility]  Progress Note - Jesus Tavarez 1955, 78 y o  male MRN: [id number]  Unit/Bed#: ICU 07 Encounter: [id number]  Primary Care Provider: Cheryl Mancuso MD   Date and time admitted to hospital: 9/14/2022 11:29 AM    * Strangulated hernia of abdominal wall  Assessment & Plan  S/p exp lap, complex lysis of adhesions, reduction of strangulated hernia, debridement RLQ muscle with counter incision, abthera placement x 2  Intraoperative course was uneventful  RLQ Hernia repair with wound VAC placement and anterior abdominal wall repair with mesh and retention sutures x12 on 09/15/2022  NGT D/C'd    Pain management with PRN Dilaudid  Continue NPO w ice chips pending formal swallow eval (failed nursing bedside dysphagia assessment)  Continue surgical incision site wound care  Monitor wound vac output    Sepsis ([facility] [address] )  Assessment & Plan  Due to strangulated ventral hernia and RLQ abscess of the rectus muscle  As evidenced by WBC-12 9, fever (101 4 F) and tachycardia POA  Treated with fluid boluses and Zosyn in the ED, followed by emergent OR ex lap prior to which patient received a dose of Ancef  Underwent ex lap with hernia repair  Blood cultures negative at 72h  Tissue culture demonstrating few colonies of K  Pneumoniae, E   Coli and Bacteroides vulgatus, S  anginosus    Plan:  Maintain MAP>65  Zosyn discontinued, patient started on Ancef and Flagyl  Continue monitoring WBC, trend fever curve  Continue hemodynamic monitoring    CKD (chronic kidney disease), stage III [facility])  Assessment & Plan  Lab Results   Component Value Date    EGFR 47 09/17/2022    EGFR 32 09/16/2022    EGFR 35 09/15/2022    CREATININE 1 50 (H) 09/17/2022    CREATININE 2 05 (H) 09/16/2022    CREATININE 1 91 (H) 09/15/2022   Baseline creatinine is 1 4-1 7; OR I/Os 3L/375 ml  Now back to baseline  Continue avoiding nephrotoxins, avoid hypotension  Monitor renal indices closely      Essential hypertension  Assessment & Plan  Holding Lisinopril and Lasix at this time, consider restarting  Consider restarting Toprol XL-- currently lopressor 5 mg q6h  Maintain MAP>65    Anemia  Assessment & Plan  Recent Labs     09/15/22  1504 09/16/22  0506 09/17/22  0537   HGB 11 6* 10 0* 9 1*   Likely in the setting of blood loss due to ex lap x2 with a dilutional component after receiving IV fluids post op  Will continue monitoring       Class 3 severe obesity due to excess calories with serious comorbidity and body mass index (BMI) of 50 0 to 59 9 in adult [facility])  Assessment & Plan  Encourage lifestyle modifications    DELIA (obstructive sleep apnea)  Assessment & Plan  Patient endorses a history of snoring, however has not had a formal evaluation or sleep study  Referral to sleep medicine at discharge  Acute kidney injury (HCC)-resolved as of 9/17/2022  Assessment & Plan  Recent Labs     09/15/22  1504 09/16/22  0506 09/17/22  0537   CREATININE 1 91* 2 05* 1 50*   EGFR 35 32 47     Estimated Creatinine Clearance: 76 7 mL/min (A) (by C-G formula based on SCr of 1 5 mg/dL (H))  MILTON superimposed on stage 3 CKD due to hypotension and low MAPs overngiht, improved after patient was taken off Propofol and transitioned to Fentanyl  Now resolved, patient back at baseline    Plan:   Continue Isolyte at 125mL/h  Avoid nephrotoxic agents  Avoid hypotension  Renally dose medications  Continue monitoring renal function    Respiratory insufficiency-resolved as of 9/17/2022  Assessment & Plan  Patient extubated on 9/16, on room air      ----------------------------------------------------------------------------------------  HPI/24hr events: NGT removed yesterday  NPO w ice chips pending Speech eval for aspiration risk after failing bedside nursing dysphagia assessment  Patient appropriate for transfer out of the ICU today?: Patient does not meet criteria for ICU Follow-up Clinic; referral has not been made     Disposition: Transfer to Med-Surg Code Status: Level 1 - Full Code  ---------------------------------------------------------------------------------------  SUBJECTIVE  This morning, Mr Fabienne John is seen asleep in bed, easy to wake, alert, engaged, in no acute distress  Endorses no complaints  Does request ice chips  Speaking voice is diminished but audible  Endorses some diffuse abdominal discomfort but not complaining of abd pain  Good UOP  No bowel movements  Denies flatus, denies belching  Review of Systems  Review of systems was reviewed and negative unless stated above in HPI/24-hour events   ---------------------------------------------------------------------------------------  OBJECTIVE    Vitals   Vitals:    22 0507 22 0607 22 0715 22 0747   BP: 127/62 130/59 142/65    BP Location:  Left arm     Pulse: 100 102 (!) 106    Resp: (!) 24 (!) 24 (!) 26    Temp:    99 8 °F (37 7 °C)   TempSrc:    Tympanic   SpO2: 95% 95% 96%    Weight:       Height:         Temp (24hrs), Av 8 °F (37 7 °C), Min:98 6 °F (37 °C), Max:100 6 °F (38 1 °C)  Current: Temperature: 99 8 °F (37 7 °C)  Arterial Line BP: 60/56  Arterial Line MAP (mmHg): (!) 58 mmHg    Respiratory:  SpO2: SpO2: 96 %  Nasal Cannula O2 Flow Rate (L/min): 2 L/min    Invasive/non-invasive ventilation settings   Respiratory  Report   Lab Data (Last 4 hours)    None         O2/Vent Data (Last 4 hours)    None                Physical Exam  Constitutional:       General: He is not in acute distress  Appearance: He is obese  He is ill-appearing  He is not toxic-appearing or diaphoretic  HENT:      Head: Normocephalic and atraumatic  Eyes:      General: No scleral icterus  Right eye: No discharge  Left eye: No discharge  Conjunctiva/sclera: Conjunctivae normal    Cardiovascular:      Rate and Rhythm: Regular rhythm  Tachycardia present  Pulses: Normal pulses  Heart sounds: Normal heart sounds  No murmur heard  No friction rub  No gallop  Comments: HR low 100s  Pulmonary:      Effort: No respiratory distress  Breath sounds: Normal breath sounds  No stridor  No wheezing, rhonchi or rales  Comments: RR low 20s  Lungs auscultated in the anterior superior fields w/o appreciation of wheezes, rales, or rhonchi  Chest:      Chest wall: No tenderness  Abdominal:      General: Bowel sounds are normal       Tenderness: There is no abdominal tenderness  There is no guarding or rebound  Comments: Abdominal binder in place  Midline bandage c/d/i  Woundvac RLQ w scant output  Abdominal wall pitting edema, 3+ in RLQ dependent tissue closest to wound  Musculoskeletal:         General: No tenderness  Right lower leg: Edema (1+ pitting) present  Left lower leg: Edema (1+ pitting) present  Skin:     General: Skin is warm and dry  Neurological:      Mental Status: He is alert and oriented to person, place, and time     Psychiatric:         Mood and Affect: Mood normal          Behavior: Behavior normal              Laboratory and Diagnostics:  Results from last 7 days   Lab Units 09/18/22  0515 09/17/22  0537 09/16/22  0506 09/15/22  1504 09/15/22  0336 09/14/22 1952 09/14/22  1203   WBC Thousand/uL 16 69* 15 28* 14 74* 16 29* 15 84*  --  12 90*   HEMOGLOBIN g/dL 9 4* 9 1* 10 0* 11 6* 12 5  --  12 1   I STAT HEMOGLOBIN g/dl  --   --   --   --   --  11 2*  --    HEMATOCRIT % 31 4* 29 4* 31 4* 37 3 39 1  --  37 1   HEMATOCRIT, ISTAT %  --   --   --   --   --  33*  --    PLATELETS Thousands/uL 385 366 331 367 380  --  416*   NEUTROS PCT % 86* 88* 89*  --   --   --  85*   BANDS PCT %  --   --   --  7 12*  --   --    MONOS PCT % 6 5 6  --   --   --  7   MONO PCT %  --   --   --  7 4  --   --      Results from last 7 days   Lab Units 09/18/22  0515 09/17/22  0537 09/16/22  0506 09/15/22  1504 09/15/22  0336 09/14/22 1952 09/14/22  1203   SODIUM mmol/L 142 140 138 137 135  --  134*   POTASSIUM mmol/L 4 0 3 9 4 2 4 8 4 4  -- 3  9   CHLORIDE mmol/L 108 108 104 104 104  --  99   CO2 mmol/L 26 26 23 20* 21  --  27   CO2, I-STAT mmol/L  --   --   --   --   --  24  --    ANION GAP mmol/L 8 6 11 13 10  --  8   BUN mg/dL 22 21 28* 24 23  --  21   CREATININE mg/dL 1 43* 1 50* 2 05* 1 91* 1 48*  --  1 64*   CALCIUM mg/dL 8 2* 7 7* 7 8* 7 5* 8 2*  --  8 8   GLUCOSE RANDOM mg/dL 100 125 151* 155* 145*  --  115   ALT U/L  --   --  25 20 14  --  15   AST U/L  --   --  31 23 14  --  13   ALK PHOS U/L  --   --  44* 47 47  --  55   ALBUMIN g/dL  --   --  1 8* 1 8* 2 1*  --  2 6*   TOTAL BILIRUBIN mg/dL  --   --  0 64 0 70 1 02*  --  1 25*     Results from last 7 days   Lab Units 09/18/22  0515 09/16/22  0506 09/15/22  1504 09/15/22  0336   MAGNESIUM mg/dL 2 4 2 2 1 6 1 8   PHOSPHORUS mg/dL  --  3 9 5 6* 3 5      Results from last 7 days   Lab Units 09/14/22  1203   INR  1 19   PTT seconds 39*          Results from last 7 days   Lab Units 09/15/22  0007 09/14/22  1947 09/14/22  1220   LACTIC ACID mmol/L 1 4 1 1 1 1     ABG:  Results from last 7 days   Lab Units 09/15/22  0336   PH ART  7 382   PCO2 ART mm Hg 34 4*   PO2 ART mm Hg 237 7*   HCO3 ART mmol/L 20 0*   BASE EXC ART mmol/L -4 3   ABG SOURCE  Line, Arterial     VBG:  Results from last 7 days   Lab Units 09/15/22  0336 09/15/22  0007   PH JONH   --  7 265*   PCO2 JONH mm Hg  --  43 6   PO2 JONH mm Hg  --  37 8   HCO3 JONH mmol/L  --  19 3*   BASE EXC JONH mmol/L  --  -7 4   ABG SOURCE  Line, Arterial  --      Results from last 7 days   Lab Units 09/14/22  1203   PROCALCITONIN ng/ml 0 75*       Micro  Results from last 7 days   Lab Units 09/14/22  1944 09/14/22  1220 09/14/22  1203   BLOOD CULTURE   --  No Growth at 72 hrs  No Growth at 72 hrs  GRAM STAIN RESULT  No Polys or Bacteria seen  --   --        EKG: Overnight, one 7-beat run of NSVT  Sinus tachy in low 100s  Imaging: I have personally reviewed pertinent reports     and I have personally reviewed pertinent films in PACS    Intake and Output  I/O       09/16 0701 09/17 0700 09/17 0701  09/18 0700 09/18 0701  09/19 0700    I V  (mL/kg) 2563 7 (14 7) 30 (0 2)     IV Piggyback 457 5 400     Total Intake(mL/kg) 3021 2 (17 4) 430 (2 5)     Urine (mL/kg/hr) 1975 (0 5) 2790 (0 7)     Emesis/NG output 100      Drains 250      Total Output 2325 2790     Net +696 2 -2360                  Height and Weights   Height: 5' 9" (175 3 cm)  IBW (Ideal Body Weight): 70 7 kg  Body mass index is 56 68 kg/m²  Weight (last 2 days)     Date/Time Weight    09/17/22 0352 174 (383 82)    09/16/22 0415 176 (388 45)            Nutrition       Diet Orders   (From admission, onward)             Start     Ordered    09/17/22 1813  Diet NPO; Sips with meds  Diet effective now        References:    Nutrtion Support Algorithm Enteral vs  Parenteral   Question Answer Comment   Diet Type NPO    NPO Except: Sips with meds    RD to adjust diet per protocol?  No        09/17/22 1812                  Active Medications  Scheduled Meds:  Current Facility-Administered Medications   Medication Dose Route Frequency Provider Last Rate    allopurinol  100 mg Oral Daily Debi Guerrero MD      cefazolin  2,000 mg Intravenous Rubin De La Garza MD 2,000 mg (09/18/22 7925)    glycerin-hypromellose-  1 drop Both Eyes Q4H PRN Debi Guerrero MD      heparin (porcine)  7,500 Units Subcutaneous Q8H Wing Dunham MD      HYDROmorphone  1 mg Intravenous Q4H PRN Debi Guerreor MD      metoprolol  5 mg Intravenous Q6H Debi Guerrero MD      metroNIDAZOLE  500 mg Intravenous Q8H Debi Guerrero  mg (09/17/22 3806)    pantoprazole  40 mg Intravenous Q24H [address] Magali Simms MD      travoprost  1 drop Both Eyes HS Minerva Simms MD       Continuous Infusions:     PRN Meds:   glycerin-hypromellose-, 1 drop, Q4H PRN  HYDROmorphone, 1 mg, Q4H PRN        Invasive Devices Review  Invasive Devices  Report    Central Venous Catheter Line  Duration CVC Central Lines 09/14/22 Triple 20cm 3 days          Peripheral Intravenous Line  Duration           Peripheral IV 09/18/22 Right Antecubital <1 day          Drain  Duration           Urethral Catheter Non-latex 16 Fr  3 days                Rationale for remaining devices: Pt not currently requiring any medications via central line  Off pressors since 9/15  Can likely D/C RIJ central line later today  Peripheral IVs for access  Voiding trial when ambulation improves   ---------------------------------------------------------------------------------------  Advance Directive and Living Will:      Power of :    POLST:    ---------------------------------------------------------------------------------------  Care Time Delivered:   No Critical Care time spent       Elma Boyer MD      Portions of the record may have been created with voice recognition software  Occasional wrong word or "sound a like" substitutions may have occurred due to the inherent limitations of voice recognition software    Read the chart carefully and recognize, using context, where substitutions have occurred

## 2022-09-18 NOTE — PLAN OF CARE
Problem: PAIN - ADULT  Goal: Verbalizes/displays adequate comfort level or baseline comfort level  Description: Interventions:  - Encourage patient to monitor pain and request assistance  - Assess pain using appropriate pain scale  - Administer analgesics based on type and severity of pain and evaluate response  - Implement non-pharmacological measures as appropriate and evaluate response  - Consider cultural and social influences on pain and pain management  - Notify physician/advanced practitioner if interventions unsuccessful or patient reports new pain  Outcome: Progressing     Problem: INFECTION - ADULT  Goal: Absence or prevention of progression during hospitalization  Description: INTERVENTIONS:  - Assess and monitor for signs and symptoms of infection  - Monitor lab/diagnostic results  - Monitor all insertion sites, i e  indwelling lines, tubes, and drains  - Monitor endotracheal if appropriate and nasal secretions for changes in amount and color  - Mozier appropriate cooling/warming therapies per order  - Administer medications as ordered  - Instruct and encourage patient and family to use good hand hygiene technique  - Identify and instruct in appropriate isolation precautions for identified infection/condition  Outcome: Progressing     Problem: SAFETY ADULT  Goal: Patient will remain free of falls  Description: INTERVENTIONS:  - Educate patient/family on patient safety including physical limitations  - Instruct patient to call for assistance with activity   - Consult OT/PT to assist with strengthening/mobility   - Keep Call bell within reach  - Keep bed low and locked with side rails adjusted as appropriate  - Keep care items and personal belongings within reach  - Initiate and maintain comfort rounds  - Make Fall Risk Sign visible to staff  - Offer Toileting every  Hours, in advance of need  - Initiate/Maintain alarm  - Obtain necessary fall risk management equipment:   - Apply yellow socks and bracelet for high fall risk patients  - Consider moving patient to room near nurses station  Outcome: Progressing  Goal: Maintain or return to baseline ADL function  Description: INTERVENTIONS:  -  Assess patient's ability to carry out ADLs; assess patient's baseline for ADL function and identify physical deficits which impact ability to perform ADLs (bathing, care of mouth/teeth, toileting, grooming, dressing, etc )  - Assess/evaluate cause of self-care deficits   - Assess range of motion  - Assess patient's mobility; develop plan if impaired  - Assess patient's need for assistive devices and provide as appropriate  - Encourage maximum independence but intervene and supervise when necessary  - Involve family in performance of ADLs  - Assess for home care needs following discharge   - Consider OT consult to assist with ADL evaluation and planning for discharge  - Provide patient education as appropriate  Outcome: Progressing  Goal: Maintains/Returns to pre admission functional level  Description: INTERVENTIONS:  - Perform BMAT or MOVE assessment daily    - Set and communicate daily mobility goal to care team and patient/family/caregiver  - Collaborate with rehabilitation services on mobility goals if consulted  - Perform Range of Motion  times a day  - Reposition patient every  hours    - Dangle patient  times a day  - Stand patient  times a day  - Ambulate patient  times a day  - Out of bed to chair  times a day   - Out of bed for meals  times a day  - Out of bed for toileting  - Record patient progress and toleration of activity level   Outcome: Progressing     Problem: DISCHARGE PLANNING  Goal: Discharge to home or other facility with appropriate resources  Description: INTERVENTIONS:  - Identify barriers to discharge w/patient and caregiver  - Arrange for needed discharge resources and transportation as appropriate  - Identify discharge learning needs (meds, wound care, etc )  - Arrange for interpretive services to assist at discharge as needed  - Refer to Case Management Department for coordinating discharge planning if the patient needs post-hospital services based on physician/advanced practitioner order or complex needs related to functional status, cognitive ability, or social support system  Outcome: Progressing     Problem: Knowledge Deficit  Goal: Patient/family/caregiver demonstrates understanding of disease process, treatment plan, medications, and discharge instructions  Description: Complete learning assessment and assess knowledge base  Interventions:  - Provide teaching at level of understanding  - Provide teaching via preferred learning methods  Outcome: Progressing     Problem: MOBILITY - ADULT  Goal: Maintain or return to baseline ADL function  Description: INTERVENTIONS:  -  Assess patient's ability to carry out ADLs; assess patient's baseline for ADL function and identify physical deficits which impact ability to perform ADLs (bathing, care of mouth/teeth, toileting, grooming, dressing, etc )  - Assess/evaluate cause of self-care deficits   - Assess range of motion  - Assess patient's mobility; develop plan if impaired  - Assess patient's need for assistive devices and provide as appropriate  - Encourage maximum independence but intervene and supervise when necessary  - Involve family in performance of ADLs  - Assess for home care needs following discharge   - Consider OT consult to assist with ADL evaluation and planning for discharge  - Provide patient education as appropriate  Outcome: Progressing  Goal: Maintains/Returns to pre admission functional level  Description: INTERVENTIONS:  - Perform BMAT or MOVE assessment daily    - Set and communicate daily mobility goal to care team and patient/family/caregiver  - Collaborate with rehabilitation services on mobility goals if consulted  - Perform Range of Motion  times a day  - Reposition patient every  hours    - Dangle patient  times a day  - Stand patient  times a day  - Ambulate patient  times a day  - Out of bed to chair  times a day   - Out of bed for meals  times a day  - Out of bed for toileting  - Record patient progress and toleration of activity level   Outcome: Progressing     Problem: Potential for Falls  Goal: Patient will remain free of falls  Description: INTERVENTIONS:  - Educate patient/family on patient safety including physical limitations  - Instruct patient to call for assistance with activity   - Consult OT/PT to assist with strengthening/mobility   - Keep Call bell within reach  - Keep bed low and locked with side rails adjusted as appropriate  - Keep care items and personal belongings within reach  - Initiate and maintain comfort rounds  - Make Fall Risk Sign visible to staff  - Offer Toileting every  Hours, in advance of need  - Initiate/Maintain alarm  - Obtain necessary fall risk management equipment:   - Apply yellow socks and bracelet for high fall risk patients  - Consider moving patient to room near nurses station  Outcome: Progressing     Problem: Prexisting or High Potential for Compromised Skin Integrity  Goal: Skin integrity is maintained or improved  Description: INTERVENTIONS:  - Identify patients at risk for skin breakdown  - Assess and monitor skin integrity  - Assess and monitor nutrition and hydration status  - Monitor labs   - Assess for incontinence   - Turn and reposition patient  - Assist with mobility/ambulation  - Relieve pressure over bony prominences  - Avoid friction and shearing  - Provide appropriate hygiene as needed including keeping skin clean and dry  - Evaluate need for skin moisturizer/barrier cream  - Collaborate with interdisciplinary team   - Patient/family teaching  - Consider wound care consult   Outcome: Progressing     Problem: Nutrition/Hydration-ADULT  Goal: Nutrient/Hydration intake appropriate for improving, restoring or maintaining nutritional needs  Description: Monitor and assess patient's nutrition/hydration status for malnutrition  Collaborate with interdisciplinary team and initiate plan and interventions as ordered  Monitor patient's weight and dietary intake as ordered or per policy  Utilize nutrition screening tool and intervene as necessary  Determine patient's food preferences and provide high-protein, high-caloric foods as appropriate       INTERVENTIONS:  - Monitor oral intake, urinary output, labs, and treatment plans  - Assess nutrition and hydration status and recommend course of action  - Evaluate amount of meals eaten  - Assist patient with eating if necessary   - Allow adequate time for meals  - Recommend/ encourage appropriate diets, oral nutritional supplements, and vitamin/mineral supplements  - Order, calculate, and assess calorie counts as needed  - Recommend, monitor, and adjust tube feedings and TPN/PPN based on assessed needs  - Assess need for intravenous fluids  - Provide specific nutrition/hydration education as appropriate  - Include patient/family/caregiver in decisions related to nutrition  Outcome: Progressing     Problem: GASTROINTESTINAL - ADULT  Goal: Minimal or absence of nausea and/or vomiting  Description: INTERVENTIONS:  - Administer IV fluids if ordered to ensure adequate hydration  - Maintain NPO status until nausea and vomiting are resolved  - Nasogastric tube if ordered  - Administer ordered antiemetic medications as needed  - Provide nonpharmacologic comfort measures as appropriate  - Advance diet as tolerated, if ordered  - Consider nutrition services referral to assist patient with adequate nutrition and appropriate food choices  Outcome: Progressing  Goal: Maintains or returns to baseline bowel function  Description: INTERVENTIONS:  - Assess bowel function  - Encourage oral fluids to ensure adequate hydration  - Administer IV fluids if ordered to ensure adequate hydration  - Administer ordered medications as needed  - Encourage mobilization and activity  - Consider nutritional services referral to assist patient with adequate nutrition and appropriate food choices  Outcome: Progressing  Goal: Maintains adequate nutritional intake  Description: INTERVENTIONS:  - Monitor percentage of each meal consumed  - Identify factors contributing to decreased intake, treat as appropriate  - Assist with meals as needed  - Monitor I&O, weight, and lab values if indicated  - Obtain nutrition services referral as needed  Outcome: Progressing  Goal: Oral mucous membranes remain intact  Description: INTERVENTIONS  - Assess oral mucosa and hygiene practices  - Implement preventative oral hygiene regimen  - Implement oral medicated treatments as ordered  - Initiate Nutrition services referral as needed  Outcome: Progressing

## 2022-09-19 ENCOUNTER — APPOINTMENT (OUTPATIENT)
Dept: RADIOLOGY | Facility: HOSPITAL | Age: 67
DRG: 853 | End: 2022-09-19
Payer: MEDICARE

## 2022-09-19 ENCOUNTER — APPOINTMENT (INPATIENT)
Dept: CT IMAGING | Facility: HOSPITAL | Age: 67
DRG: 853 | End: 2022-09-19
Payer: MEDICARE

## 2022-09-19 LAB
ALBUMIN SERPL BCP-MCNC: 1.6 G/DL (ref 3.5–5)
ALP SERPL-CCNC: 57 U/L (ref 46–116)
ALT SERPL W P-5'-P-CCNC: 12 U/L (ref 12–78)
ANION GAP SERPL CALCULATED.3IONS-SCNC: 4 MMOL/L (ref 4–13)
APTT PPP: 179 SECONDS (ref 23–37)
APTT PPP: 29 SECONDS (ref 23–37)
AST SERPL W P-5'-P-CCNC: 24 U/L (ref 5–45)
BACTERIA BLD CULT: NORMAL
BACTERIA BLD CULT: NORMAL
BILIRUB DIRECT SERPL-MCNC: 0.2 MG/DL (ref 0–0.2)
BILIRUB SERPL-MCNC: 0.35 MG/DL (ref 0.2–1)
BUN SERPL-MCNC: 23 MG/DL (ref 5–25)
CALCIUM SERPL-MCNC: 8 MG/DL (ref 8.3–10.1)
CHLORIDE SERPL-SCNC: 107 MMOL/L (ref 96–108)
CO2 SERPL-SCNC: 30 MMOL/L (ref 21–32)
CREAT SERPL-MCNC: 1.43 MG/DL (ref 0.6–1.3)
ERYTHROCYTE [DISTWIDTH] IN BLOOD BY AUTOMATED COUNT: 13.9 % (ref 11.6–15.1)
ERYTHROCYTE [DISTWIDTH] IN BLOOD BY AUTOMATED COUNT: 14 % (ref 11.6–15.1)
GFR SERPL CREATININE-BSD FRML MDRD: 50 ML/MIN/1.73SQ M
GLUCOSE SERPL-MCNC: 120 MG/DL (ref 65–140)
GLUCOSE SERPL-MCNC: 134 MG/DL (ref 65–140)
GLUCOSE SERPL-MCNC: 188 MG/DL (ref 65–140)
HCT VFR BLD AUTO: 30.4 % (ref 36.5–49.3)
HCT VFR BLD AUTO: 30.9 % (ref 36.5–49.3)
HGB BLD-MCNC: 9.5 G/DL (ref 12–17)
HGB BLD-MCNC: 9.6 G/DL (ref 12–17)
INR PPP: 1.34 (ref 0.84–1.19)
MAGNESIUM SERPL-MCNC: 2.1 MG/DL (ref 1.6–2.6)
MCH RBC QN AUTO: 30.1 PG (ref 26.8–34.3)
MCH RBC QN AUTO: 30.7 PG (ref 26.8–34.3)
MCHC RBC AUTO-ENTMCNC: 31.1 G/DL (ref 31.4–37.4)
MCHC RBC AUTO-ENTMCNC: 31.3 G/DL (ref 31.4–37.4)
MCV RBC AUTO: 97 FL (ref 82–98)
MCV RBC AUTO: 98 FL (ref 82–98)
PHOSPHATE SERPL-MCNC: 2.6 MG/DL (ref 2.3–4.1)
PLATELET # BLD AUTO: 382 THOUSANDS/UL (ref 149–390)
PLATELET # BLD AUTO: 388 THOUSANDS/UL (ref 149–390)
PMV BLD AUTO: 8.9 FL (ref 8.9–12.7)
PMV BLD AUTO: 8.9 FL (ref 8.9–12.7)
POTASSIUM SERPL-SCNC: 3.8 MMOL/L (ref 3.5–5.3)
PROCALCITONIN SERPL-MCNC: 1.44 NG/ML
PROT SERPL-MCNC: 6.4 G/DL (ref 6.4–8.4)
PROTHROMBIN TIME: 16.6 SECONDS (ref 11.6–14.5)
RBC # BLD AUTO: 3.09 MILLION/UL (ref 3.88–5.62)
RBC # BLD AUTO: 3.19 MILLION/UL (ref 3.88–5.62)
SODIUM SERPL-SCNC: 141 MMOL/L (ref 135–147)
WBC # BLD AUTO: 19.51 THOUSAND/UL (ref 4.31–10.16)
WBC # BLD AUTO: 21.73 THOUSAND/UL (ref 4.31–10.16)

## 2022-09-19 PROCEDURE — 85610 PROTHROMBIN TIME: CPT

## 2022-09-19 PROCEDURE — 99291 CRITICAL CARE FIRST HOUR: CPT | Performed by: INTERNAL MEDICINE

## 2022-09-19 PROCEDURE — 84100 ASSAY OF PHOSPHORUS: CPT | Performed by: PHYSICIAN ASSISTANT

## 2022-09-19 PROCEDURE — 71045 X-RAY EXAM CHEST 1 VIEW: CPT

## 2022-09-19 PROCEDURE — 97606 NEG PRS WND THER DME>50 SQCM: CPT | Performed by: SURGERY

## 2022-09-19 PROCEDURE — 85730 THROMBOPLASTIN TIME PARTIAL: CPT | Performed by: PHYSICIAN ASSISTANT

## 2022-09-19 PROCEDURE — 97167 OT EVAL HIGH COMPLEX 60 MIN: CPT

## 2022-09-19 PROCEDURE — 80076 HEPATIC FUNCTION PANEL: CPT

## 2022-09-19 PROCEDURE — 80048 BASIC METABOLIC PNL TOTAL CA: CPT | Performed by: PHYSICIAN ASSISTANT

## 2022-09-19 PROCEDURE — 2W03X6Z CHANGE PRESSURE DRESSING ON ABDOMINAL WALL: ICD-10-PCS | Performed by: SURGERY

## 2022-09-19 PROCEDURE — ND001 PR NO DOCUMENTATION: Performed by: SURGERY

## 2022-09-19 PROCEDURE — 74177 CT ABD & PELVIS W/CONTRAST: CPT

## 2022-09-19 PROCEDURE — 84145 PROCALCITONIN (PCT): CPT | Performed by: NURSE PRACTITIONER

## 2022-09-19 PROCEDURE — 83735 ASSAY OF MAGNESIUM: CPT | Performed by: PHYSICIAN ASSISTANT

## 2022-09-19 PROCEDURE — 82948 REAGENT STRIP/BLOOD GLUCOSE: CPT

## 2022-09-19 PROCEDURE — 85027 COMPLETE CBC AUTOMATED: CPT

## 2022-09-19 PROCEDURE — 85730 THROMBOPLASTIN TIME PARTIAL: CPT

## 2022-09-19 PROCEDURE — C9113 INJ PANTOPRAZOLE SODIUM, VIA: HCPCS

## 2022-09-19 PROCEDURE — 97163 PT EVAL HIGH COMPLEX 45 MIN: CPT

## 2022-09-19 PROCEDURE — 85027 COMPLETE CBC AUTOMATED: CPT | Performed by: PHYSICIAN ASSISTANT

## 2022-09-19 PROCEDURE — G1004 CDSM NDSC: HCPCS

## 2022-09-19 PROCEDURE — 71275 CT ANGIOGRAPHY CHEST: CPT

## 2022-09-19 PROCEDURE — 85730 THROMBOPLASTIN TIME PARTIAL: CPT | Performed by: INTERNAL MEDICINE

## 2022-09-19 RX ORDER — METRONIDAZOLE 500 MG/1
500 TABLET ORAL EVERY 8 HOURS SCHEDULED
Status: DISCONTINUED | OUTPATIENT
Start: 2022-09-19 | End: 2022-09-22

## 2022-09-19 RX ORDER — SODIUM CHLORIDE, SODIUM GLUCONATE, SODIUM ACETATE, POTASSIUM CHLORIDE, MAGNESIUM CHLORIDE, SODIUM PHOSPHATE, DIBASIC, AND POTASSIUM PHOSPHATE .53; .5; .37; .037; .03; .012; .00082 G/100ML; G/100ML; G/100ML; G/100ML; G/100ML; G/100ML; G/100ML
100 INJECTION, SOLUTION INTRAVENOUS CONTINUOUS
Status: DISCONTINUED | OUTPATIENT
Start: 2022-09-19 | End: 2022-09-20

## 2022-09-19 RX ORDER — TAMSULOSIN HYDROCHLORIDE 0.4 MG/1
0.4 CAPSULE ORAL
Status: DISCONTINUED | OUTPATIENT
Start: 2022-09-19 | End: 2022-09-28 | Stop reason: HOSPADM

## 2022-09-19 RX ORDER — PANTOPRAZOLE SODIUM 40 MG/1
40 TABLET, DELAYED RELEASE ORAL
Status: DISCONTINUED | OUTPATIENT
Start: 2022-09-20 | End: 2022-09-28 | Stop reason: HOSPADM

## 2022-09-19 RX ORDER — HEPARIN SODIUM 10000 [USP'U]/100ML
3-30 INJECTION, SOLUTION INTRAVENOUS
Status: DISCONTINUED | OUTPATIENT
Start: 2022-09-19 | End: 2022-09-25

## 2022-09-19 RX ORDER — HEPARIN SODIUM 1000 [USP'U]/ML
10000 INJECTION, SOLUTION INTRAVENOUS; SUBCUTANEOUS
Status: DISCONTINUED | OUTPATIENT
Start: 2022-09-19 | End: 2022-09-25

## 2022-09-19 RX ORDER — HEPARIN SODIUM 1000 [USP'U]/ML
5000 INJECTION, SOLUTION INTRAVENOUS; SUBCUTANEOUS
Status: DISCONTINUED | OUTPATIENT
Start: 2022-09-19 | End: 2022-09-25

## 2022-09-19 RX ORDER — HEPARIN SODIUM 1000 [USP'U]/ML
10000 INJECTION, SOLUTION INTRAVENOUS; SUBCUTANEOUS ONCE
Status: COMPLETED | OUTPATIENT
Start: 2022-09-19 | End: 2022-09-19

## 2022-09-19 RX ORDER — SACCHAROMYCES BOULARDII 250 MG
250 CAPSULE ORAL 2 TIMES DAILY
Status: DISCONTINUED | OUTPATIENT
Start: 2022-09-19 | End: 2022-09-21

## 2022-09-19 RX ADMIN — HEPARIN SODIUM 7500 UNITS: 5000 INJECTION INTRAVENOUS; SUBCUTANEOUS at 05:33

## 2022-09-19 RX ADMIN — METRONIDAZOLE 500 MG: 500 TABLET ORAL at 21:31

## 2022-09-19 RX ADMIN — HEPARIN SODIUM 10000 UNITS: 1000 INJECTION INTRAVENOUS; SUBCUTANEOUS at 16:35

## 2022-09-19 RX ADMIN — METRONIDAZOLE 500 MG: 500 INJECTION, SOLUTION INTRAVENOUS at 08:58

## 2022-09-19 RX ADMIN — HEPARIN SODIUM 18 UNITS/KG/HR: 10000 INJECTION, SOLUTION INTRAVENOUS at 16:35

## 2022-09-19 RX ADMIN — CEFAZOLIN SODIUM 2000 MG: 2 SOLUTION INTRAVENOUS at 16:17

## 2022-09-19 RX ADMIN — ALLOPURINOL 100 MG: 100 TABLET ORAL at 08:20

## 2022-09-19 RX ADMIN — PANTOPRAZOLE SODIUM 40 MG: 40 INJECTION, POWDER, FOR SOLUTION INTRAVENOUS at 08:21

## 2022-09-19 RX ADMIN — TAMSULOSIN HYDROCHLORIDE 0.4 MG: 0.4 CAPSULE ORAL at 16:18

## 2022-09-19 RX ADMIN — METOPROLOL TARTRATE 25 MG: 25 TABLET, FILM COATED ORAL at 12:07

## 2022-09-19 RX ADMIN — METOROPROLOL TARTRATE 5 MG: 5 INJECTION, SOLUTION INTRAVENOUS at 08:30

## 2022-09-19 RX ADMIN — Medication 250 MG: at 23:54

## 2022-09-19 RX ADMIN — SODIUM CHLORIDE, SODIUM GLUCONATE, SODIUM ACETATE, POTASSIUM CHLORIDE, MAGNESIUM CHLORIDE, SODIUM PHOSPHATE, DIBASIC, AND POTASSIUM PHOSPHATE 100 ML/HR: .53; .5; .37; .037; .03; .012; .00082 INJECTION, SOLUTION INTRAVENOUS at 22:56

## 2022-09-19 RX ADMIN — HEPARIN SODIUM 7500 UNITS: 5000 INJECTION INTRAVENOUS; SUBCUTANEOUS at 14:14

## 2022-09-19 RX ADMIN — SODIUM CHLORIDE, SODIUM GLUCONATE, SODIUM ACETATE, POTASSIUM CHLORIDE, MAGNESIUM CHLORIDE, SODIUM PHOSPHATE, DIBASIC, AND POTASSIUM PHOSPHATE 100 ML/HR: .53; .5; .37; .037; .03; .012; .00082 INJECTION, SOLUTION INTRAVENOUS at 12:08

## 2022-09-19 RX ADMIN — METRONIDAZOLE 500 MG: 500 TABLET ORAL at 14:13

## 2022-09-19 RX ADMIN — METRONIDAZOLE 500 MG: 500 INJECTION, SOLUTION INTRAVENOUS at 00:06

## 2022-09-19 RX ADMIN — CEFAZOLIN SODIUM 2000 MG: 2 SOLUTION INTRAVENOUS at 07:50

## 2022-09-19 RX ADMIN — HYDROMORPHONE HYDROCHLORIDE 1 MG: 1 INJECTION, SOLUTION INTRAMUSCULAR; INTRAVENOUS; SUBCUTANEOUS at 12:04

## 2022-09-19 RX ADMIN — IOHEXOL 100 ML: 350 INJECTION, SOLUTION INTRAVENOUS at 15:29

## 2022-09-19 RX ADMIN — TRAVOPROST 1 DROP: 0.04 SOLUTION OPHTHALMIC at 21:32

## 2022-09-19 RX ADMIN — METOPROLOL TARTRATE 25 MG: 25 TABLET, FILM COATED ORAL at 21:31

## 2022-09-19 NOTE — ASSESSMENT & PLAN NOTE
Due to strangulated ventral hernia and RLQ abscess of the rectus muscle  As evidenced by WBC-12 9, fever (101 4 F) and tachycardia POA  Treated with fluid boluses and Zosyn in the ED, followed by emergent OR ex lap prior to which patient received a dose of Ancef  Underwent ex lap with hernia repair  Blood cultures negative at 72h  Tissue culture demonstrating few colonies of K  Pneumoniae, E   Coli and Bacteroides vulgatus, S  anginosus  Now with worsening WBC    Plan:  Maintain MAP>65  Continue Ancef and Flagyl  Continue monitoring WBC, trend fever curves

## 2022-09-19 NOTE — ASSESSMENT & PLAN NOTE
S/p exp lap, complex lysis of adhesions, reduction of strangulated hernia, debridement RLQ muscle with counter incision, abthera placement x 2  Intraoperative course was uneventful  RLQ Hernia repair with wound VAC placement and anterior abdominal wall repair with mesh and retention sutures x12 on 09/15/2022  NGT D/C'd    Pain management with PRN Dilaudid  Continue clear liquid diet  Continue surgical incision site wound care  Monitor wound vac output

## 2022-09-19 NOTE — PHYSICAL THERAPY NOTE
PHYSICAL THERAPY EVALUATION          Patient Name: Aliza Smith  [id number] Date: 9/19/2022  PT EVALUATION    78 y o     [id number]    Fever [R50 9]  Strangulated hernia of abdominal wall [K43 6]    Past Medical History:   Diagnosis Date    Anemia 9/16/2022    Chronic kidney disease     Hypertension     Obesity     Sepsis ([facility] [address] )     Urinary tract infection without hematuria 4/3/2020     Past Surgical History:   Procedure Laterality Date    ABDOMINAL ADHESION SURGERY N/A 9/15/2022    Procedure: LYSIS ADHESIONS;  Surgeon: Lucho Hamilton MD;  Location: [facility];  Service: General    APPENDECTOMY      EXPLORATORY LAPAROTOMY W/ BOWEL RESECTION N/A 9/14/2022    Procedure: Exdploratory laparotomy, complex Lysis of adhesions, reduction strangulated hernia, debridement RLQ muscle with counter incision, abthera placement;  Surgeon: Lucho Hamilton MD;  Location[facility];  Service: General    EXPLORATORY LAPAROTOMY W/ BOWEL RESECTION N/A 9/15/2022    Procedure: LAPAROTOMY EXPLORATORY  REPAIR RIGHT LQ HERNIA WITH MESH, REPAIR ANTERIOR WALL HERNIA;  Surgeon: Lucho Hamilton MD;  Location: [facility];  Service: General    NASAL SEPTUM SURGERY          09/19/22 0840   PT Last Visit   PT Visit Date 09/19/22   Note Type   Note type Evaluation   Pain Assessment   Pain Assessment Tool 0-10   Pain Score No Pain   Restrictions/Precautions   Other Precautions Multiple lines;Telemetry;O2;Fall Risk  (2L, PIV)   Home Living   Type of [address] [place] [address] Two level;Stairs to enter with rails; Able to live on main level with bedroom/bathroom   Bathroom Shower/Tub Walk-in shower   Bathroom Toilet Standard   Bathroom Equipment Shower chair   Home Equipment Walker   Additional Comments 3-5 SANDRA  stays on first floor, sleeps in recliner and has full bath on that level   Prior Function   Level of [place] Independent with ADLs and functional mobility   Lives With Spouse; Son   Daniela 69 Help From Family   ADL Assistance Independent   IADLs   (splits w family)   Falls in the last 6 months 0   Vocational Volunteer work   Comments indep at baseline for ADLs and ambulation without an AD  no baseline O2 needs  +  reports family around to assist prn  General   Additional Pertinent History pt admitted 9/15/22 for strangulated hernia of abdominal wall  s/p ex lap 9/14  "early mobilization" orders  PMHx significant for obesity, CKD   Cognition   Overall Cognitive Status WFL   Arousal/Participation Cooperative   Orientation Level Oriented X4   Memory Within functional limits   Following Commands Follows all commands and directions without difficulty   RLE Assessment   RLE Assessment WFL  (ROM limited by body habitus)   LLE Assessment   LLE Assessment WFL  (ROM limited by body habitus)   Coordination   Sensation WFL   Bed Mobility   Supine to Sit 4  Minimal assistance   Additional items Assist x 1;HOB elevated;Trapeze bar; Increased time required   Transfers   Sit to Stand 3  Moderate assistance   Additional items Assist x 2; Increased time required;Verbal cues; Other  (RW, bed height slightly elevated)   Stand to Sit 4  Minimal assistance   Additional items Assist x 2; Increased time required;Verbal cues; Other  (RW)   Ambulation/Elevation   Gait pattern Poor UE support; Improper Weight shift; Forward Flexion; Wide MAXINE; Decreased foot clearance; Short stride; Excessively slow   Gait Assistance 3  Moderate assist   Additional items Assist x 2;Verbal cues   Assistive Device Bariatric Rolling walker   Distance 2'   Balance   Dynamic Sitting Fair -   Static Standing Poor +   Dynamic Standing Poor   Ambulatory Poor   Endurance Deficit   Endurance Deficit Yes   Endurance Deficit Description vitals at rest: 143/78, 95% on RA  desat to 85% post mobility   improved to >90% w replacement of 2L   Activity Tolerance   Activity Tolerance Treatment limited secondary to medical complications (Comment)   Medical Staff [address] Pinky Aj OT   Nurse Made Aware Nany Mcneill RN   Assessment   Prognosis Good   Problem List Decreased strength;Decreased range of motion;Decreased endurance; Impaired balance;Decreased mobility;Obesity; Decreased skin integrity   Assessment Jakub Browning is a 78 y o  male admitted to [facility] on 9/14/2022 for Strangulated hernia of abdominal wall  POD# 5 exdploratory laparotomy, complex Lysis of adhesions, reduction strangulated hernia, debridement RLQ muscle with counter incision, abthera placement  PT was consulted and pt was seen on 9/19/2022 for mobility assessment and d/c planning  Pt presents w medium fall risk, multiple lines (wound vac, PIV, tele, verenice, O2)  At baseline is indep for ADLs and ambulation without an AD  First floor set up and family support at home  Pt is currently functioning at a minimum assistance x1 level for bed mobility, min-mod Ax2 for transfers and mod Ax2 for ambulation bed>chair  Pt demonstrated deficits of ROM, strength, balance, endurance, posture (due to weakness, fatigue)  Require cues for technique, increased reliance on external support to transfer (kristin, RHONDA) and two persons to safely complete tasks  Require multiple attempts to stand fully upright  Unable to ambulate household distances or negotiate steps to access home environment  Pt will benefit from continued skilled IP PT to address the above mentioned impairments  in order to maximize recovery and increase functional independence when completing mobility and ADLs  At this time PT recommendations for d/c are STR given social and environmental barriers, risk for falls, functional decline  Barriers to Discharge Inaccessible home environment;Decreased caregiver support   Barriers to Discharge Comments Ax2- caregiver burden, steps, unable to amb household distances   Goals   Patient Goals get better   STG Expiration Date 10/03/22   Short Term Goal #1 1)    Pt will perform bed mobility with S demonstrating appropriate technique 100% of the time in order to improve function  2)  Perform all transfers with S demonstrating safe and appropriate technique 100% of the time in order to improve ability to negotiate safely in home environment  3) Amb with least restrictive AD > 50'x1 with S in order to demonstrate ability to negotiate in home environment  4)  Improve overall strength and balance 1/2 grade in order to optimize ability to perform functional tasks and reduce fall risk  5) Increase activity tolerance to 45 minutes in order to improve endurance to functional tasks  6)  Negotiate stairs using most appropriate technique and min Ax2 in order to be able to negotiate safely in home environment  7) PT for ongoing patient and family/caregiver education, DME needs and d/c planning in order to promote highest level of function in least restrictive environment  Plan   Treatment/Interventions Functional transfer training;LE strengthening/ROM; Elevations; Therapeutic exercise; Endurance training;Patient/family training;Equipment eval/education; Bed mobility;Gait training; Compensatory technique education;Continued evaluation;Spoke to nursing;OT   PT Frequency Other (Comment)  (4-5x)   Recommendation   PT Discharge Recommendation Post acute rehabilitation services   AM-PAC Basic Mobility Inpatient   Turning in Bed Without Bedrails 1   Lying on Back to Sitting on Edge of Flat Bed 1   Moving Bed to Chair 1   Standing Up From Chair 1   Walk in Room 1   Climb 3-5 Stairs 1   Basic Mobility Inpatient Raw Score 6   Turning Head Towards Sound 4   Follow Simple Instructions 4   Low Function Basic Mobility Raw Score 14   Low Function Basic Mobility Standardized Score 22 01   Highest Level Of Mobility   JH-HLM Goal 2: Bed activities/Dependent transfer   JH-HLM Achieved 4: Move to chair/commode   End of Consult   Patient Position at End of Consult Bedside chair; All needs within reach   History: co - morbidities, social background, fall risk, multiple lines  Exam: impairments in systems including musculoskeletal (strength), neuromuscular (balance, gait, transfers, motor function and sensation), am-pac, cardiopulmonary, cognition  Clinical: unstable/unpredictable  Complexity:high      Laura Akins, PT

## 2022-09-19 NOTE — PLAN OF CARE
Tolerating clears, will continue to monitor diet advancement and tolerance  Recommend adding ensure clear for now to increase protein intake  Problem: Nutrition/Hydration-ADULT  Goal: Nutrient/Hydration intake appropriate for improving, restoring or maintaining nutritional needs  Description: Monitor and assess patient's nutrition/hydration status for malnutrition  Collaborate with interdisciplinary team and initiate plan and interventions as ordered  Monitor patient's weight and dietary intake as ordered or per policy  Utilize nutrition screening tool and intervene as necessary  Determine patient's food preferences and provide high-protein, high-caloric foods as appropriate       INTERVENTIONS:  - Monitor oral intake, urinary output, labs, and treatment plans  - Assess nutrition and hydration status and recommend course of action  - Evaluate amount of meals eaten  - Assist patient with eating if necessary   - Allow adequate time for meals  - Recommend/ encourage appropriate diets, oral nutritional supplements, and vitamin/mineral supplements  - Order, calculate, and assess calorie counts as needed  - Recommend, monitor, and adjust tube feedings and TPN/PPN based on assessed needs  - Assess need for intravenous fluids  - Provide specific nutrition/hydration education as appropriate  - Include patient/family/caregiver in decisions related to nutrition  Outcome: Progressing

## 2022-09-19 NOTE — OCCUPATIONAL THERAPY NOTE
Occupational Therapy Evaluation     Patient Name: Gage RODRIGUEZRIJ'Z Date: 9/19/2022  Problem List  Principal Problem:    Strangulated hernia of abdominal wall  Active Problems:    Essential hypertension    CKD (chronic kidney disease), stage III (HCC)    DELIA (obstructive sleep apnea)    Class 3 severe obesity due to excess calories with serious comorbidity and body mass index (BMI) of 50 0 to 59 9 in adult ([facility] )    Sepsis ([facility] )    Anemia    Past Medical History  Past Medical History:   Diagnosis Date    Anemia 9/16/2022    Chronic kidney disease     Hypertension     Obesity     Sepsis ([facility] )     Urinary tract infection without hematuria 4/3/2020     Past Surgical History  Past Surgical History:   Procedure Laterality Date    ABDOMINAL ADHESION SURGERY N/A 9/15/2022    Procedure: LYSIS ADHESIONS;  Surgeon: Soo Sheets MD;  [facility]: [facility];  Service: General    APPENDECTOMY      EXPLORATORY LAPAROTOMY W/ BOWEL RESECTION N/A 9/14/2022    Procedure: Exdploratory laparotomy, complex Lysis of adhesions, reduction strangulated hernia, debridement RLQ muscle with counter incision, abthera placement;  Surgeon: Soo Sheets MD;  [facility];  Service: General    EXPLORATORY LAPAROTOMY W/ BOWEL RESECTION N/A 9/15/2022    Procedure: LAPAROTOMY EXPLORATORY  REPAIR RIGHT LQ HERNIA WITH MESH, REPAIR ANTERIOR WALL HERNIA;  Surgeon: Soo Sheets MD;  [facility];  Service: General    NASAL SEPTUM SURGERY             09/19/22 0841   OT Last Visit   OT Visit Date 09/19/22   Note Type   Note type Evaluation   Restrictions/Precautions   Weight Bearing Precautions Per Order No   Other Precautions Telemetry;Multiple lines;O2;Fall Risk;Pain  (2L O2>Room air)   Pain Assessment   Pain Assessment Tool 0-10   Pain Score No Pain   Home Living   Type of [address] [place] [address] Two level;Stairs to enter with rails; Able to live on main level with bedroom/bathroom; Performs ADLs on one level  (4-5 SANDRA) Bathroom Shower/Tub Walk-in shower   Bathroom Toilet Standard   Bathroom Equipment Shower chair   Bathroom Accessibility Accessible   Home Equipment Walker   Additional Comments Pt lives with spouse and son in a two level house with 4-5 SANDRA  Pt reports sleeping in recliner on 1st floor  Full bath on 1st and 2nd floor  (-) home alone  Prior Function   Level of [place] Independent with ADLs and functional mobility   Lives With Spouse; Son   Guillaume Help From Family   ADL Assistance Independent   IADLs   (Shares w/ spouse)   Falls in the last 6 months 0   Vocational Retired   Comments At baseline, pt was I w/ ADLs, IADLs (shares w/ spouse), and functional transfers/mobility w/o use of AD  (-)   Denies falls PTA  Lifestyle   Autonomy At baseline, pt was I w/ ADLs, IADLs (shares w/ spouse), and functional transfers/mobility w/o use of AD  (-)   Denies falls PTA  Reciprocal Relationships Spouse, son   Service to Others Retired-    Psychosocial   Psychosocial (WDL) WDL   ADL   Where Assessed Chair   Eating Assistance 7  [facility] 5  [address] [facility] 5  Supervision/Setup   LB [address] 2  Maximal Assistance   [address] 5  Supervision/Setup    [facility] 2  Maximal [address]  3  Moderate Assistance   Functional Assistance 3  Moderate Assistance   Bed Mobility   Supine to Sit 4  Minimal assistance   Additional items Assist x 1;HOB elevated;Trapeze bar; Increased time required;Verbal cues;LE management   Sit to Supine Unable to assess   Additional Comments Pt seated OOB in chair at end of session  Call bell and phone within reach  All needs met and pt reports no further questions for OT at this time  Transfers   Sit to Stand 3  Moderate assistance   Additional items Assist x 2; Increased time required;Verbal cues   Stand to Sit 4  Minimal assistance   Additional items Assist x 2;Armrests; Increased time required;Verbal cues   Additional Comments Cues for safe technique and hand placement  Increased assist required to initiate forward weight shift from surface for sit>stand   Functional Mobility   Functional Mobility 3  Moderate assistance   Additional Comments Assist x2   Additional items Rolling walker   Balance   Static Sitting Fair   Dynamic Sitting Fair -   Static Standing Poor +   Dynamic Standing Poor   Ambulatory Poor   Activity Tolerance   Activity Tolerance Treatment limited secondary to medical complications (Comment)   Medical Staff Made Aware Teri Huggins, PT   Nurse Made Aware yes; Kristin Buitrago RN   RUE Assessment   RUE Assessment WFL  (4/5 throughout)   LUE Assessment   LUE Assessment X  (decreased shldr flex (80-90*), elbow-distal=WFL)   LUE Strength   L Shoulder Flexion 3+/5   L Shoulder Extension 3+/5   L Elbow Flexion 4/5   L Elbow Extension 4/5   Hand Function   Gross Motor Coordination Functional   Fine Motor Coordination Functional   Sensation   Light Touch No apparent deficits   Proprioception   Proprioception No apparent deficits   Vision-Basic Assessment   Current Vision Wears glasses all the time   Vision - Complex Assessment   Ocular Range of Motion [facility]   Acuity Able to read clock/calendar on wall without difficulty; Able to read employee name badge without difficulty   Perception   Inattention/Neglect Appears intact   Cognition   Overall Cognitive Status [facility]   Arousal/Participation Alert; Cooperative   Attention Within functional limits   Orientation Level Oriented X4   Memory Within functional limits   Following Commands Follows all commands and directions without difficulty   Assessment   Limitation Decreased ADL status; Decreased UE ROM; Decreased endurance;Decreased self-care trans;Decreased high-level ADLs   Prognosis Good   Assessment Pt is a 78 y o  male seen for OT evaluation s/p adm to [facility] on 9/14/2022 w/ Strangulated hernia of abdominal wall    Pt s/p exp lap, complex lysis of adhesions, reduction of strangulated hernia, debridement RLQ muscle with counter incision, abthera placement x2 on 9/14/22 and s/p RLQ Hernia repair with wound VAC placement and anterior abdominal wall repair with mesh and retention sutures x12 on 09/15/2022  Comorbidities affecting pts functional performance include a significant PMH of Anemia, CKD, HTN, and Obesity  Pt with active OT orders and activity orders for Up with assistance  Pt lives with spouse and son in a two level house with 4-5 SANDRA  Pt reports sleeping in recliner on 1st floor  Full bath on 1st and 2nd floor  (-) home alone  At baseline, pt was I w/ ADLs, IADLs (shares w/ spouse), and functional transfers/mobility w/o use of AD  (-)   Denies falls PTA  Upon evaluation, pt currently requires Supervision for UB ADLs, Max a for LB ADLs, Mod A for toileting, Min A for bed mobility, Mod A of 2 for sit>stand transfers, Min A of 2 for stand>sit transfers, and Min A of 2 for functional mobility 2* the following deficits impacting occupational performance: decreased ROM, decreased strength, decreased balance and decreased tolerance  These impairments, as well at pts fall risk, multiple lines, steps to enter environment, difficulty performing ADLS and difficulty performing IADLS  limit pts ability to safely engage in all baseline areas of occupation  Based on the aforementioned OT evaluation, functional performance deficits, and assessments, pt has been identified as a High complexity evaluation  Pt to continue to benefit from continued acute OT services during hospital stay to address defined deficits and to maximize level of functional independence in the following Occupational Performance areas: grooming, bathing/shower, toilet hygiene, dressing, medication management, health maintenance, functional mobility, community mobility, clothing management, cleaning, meal prep and household maintenance   From OT standpoint, recommend STR upon D/C  OT will continue to follow pt 3-5x/wk to address the following goals to  w/in 10-14 days:   Goals   Patient Goals To get better   LTG Time Frame 10-14   Long Term Goal Please refer to LTGs listed below   Plan   Treatment Interventions ADL retraining;Functional transfer training;UE strengthening/ROM; Endurance training;Patient/family training;Equipment evaluation/education; Compensatory technique education;Continued evaluation; Activityengagement   Goal Expiration Date 10/03/22   OT Treatment Day 0   OT Frequency 3-5x/wk   Recommendation   OT Discharge Recommendation Post acute rehabilitation services   Additional Comments  The patient's raw score on the AM-PAC Daily Activity inpatient short form is 16, standardized score is 35 96, less than 39 4  Patients at this level are likely to benefit from discharge to post-acute rehabilitation services  Please refer to the recommendation of the Occupational Therapist for safe discharge planning     AM-PAC Daily Activity Inpatient   Lower Body Dressing 2   Bathing 2   Toileting 2   Upper Body Dressing 3   Grooming 3   Eating 4   Daily Activity Raw Score 16   Daily Activity Standardized Score (Calc for Raw Score >=11) 35 96   AM-PAC Applied Cognition Inpatient   Following a Speech/Presentation 4   Understanding Ordinary Conversation 4   Taking Medications 4   Remembering Where Things Are Placed or Put Away 4   Remembering List of 4-5 Errands 4   Taking Care of Complicated Tasks 4   Applied Cognition Raw Score 24   Applied Cognition Standardized Score 62 21       GOALS    Pt will improve activity tolerance to G for min 30 min txment sessions for increase engagement in functional tasks    Pt will complete bed mobility at a Mod I level w/ G balance/safety demonstrated to decrease caregiver assistance required     Pt will complete UB dressing/self care w/ mod I using adaptive device and DME as needed     Pt will complete LB dressing/self care w/ mod I using adaptive device and DME as needed    Pt will complete toileting w/ mod I w/ G hygiene/thoroughness using DME as needed    Pt will improve functional transfers to Mod I on/off all surfaces using DME as needed w/ G balance/safety     Pt will improve functional mobility during ADL/IADL/leisure tasks to Mod I using DME as needed w/ G balance/safety     Pt will be attentive 100% of the time during ongoing cognitive assessment w/ G participation to assist w/ safe d/c planning/recommendations    Pt will demonstrate G carryover of pt/caregiver education and training as appropriate w/o cues w/ good tolerance to increase safety during functional tasks    Pt will demonstrate 100% carryover of energy conservation techniques t/o functional I/ADL/leisure tasks w/o cues s/p skilled education to increase endurance during functional tasks    Pt will increase BUE strength by 1MM grade via AROM/AAROM/PROM exercises to increase independence in ADLs and transfers    Pt will increase standing tolerance to 8-10 mins with Fair+ dynamic standing balance to increase safety during participation in ADLs       Misty Dawkins OTR/L

## 2022-09-19 NOTE — PROGRESS NOTES
Pastoral Care Progress Note    2022  Patient: Jesus Tavarez : 1955  Admission Date & Time: 2022 1129  MRN: [id number] CSN: [id number]    Visited with wife of patient, provided listening support and pastoral presence

## 2022-09-19 NOTE — PROGRESS NOTES
Acute Care Surgery  Bedside V A C  Procedure Note    Location of wound: RLQ abdomen    Dressings and Foam removed:  1 Black Foam  1 White Foam    Dimensions of wound: 14 cm x 8 cm x 5 cm    Description of wound:  Wound appears healthy throughout with pink and viable appearing tissue, patted dry with 4 x 4 gauze, no bleeding appreciated, no purulent discharge/drainage or signs of infection, RLQ mesh in place and intact  Surrounding skin appears healthy without erythema or induration  VAC dressing application:  The periwound skin was cleaned and dried  1 black foam, 1 white foam were cut to size of the wound and placed into the wound; the white sponge was 1st placed on top of the mesh, black sponge was then placed on top of the white sponge  The dressings were then covered with VAC drape  The track pad was then placed over the base of black foam  The VAC was then set to 125 mmHg low Continuous suction  The patient tolerated the procedure well and there were no complications  The patient did not require any excisional debridement during today's dressing change  VAC settings:  125 mmHg  Continuous    Wound Images: Additional Notes: The [facility] sticker placed over the dressing per protocol  The next [facility] dressing change will be planned for Wednesday 9/21      Natalia Pa MD  9/19/2022 12:36 PM

## 2022-09-19 NOTE — PLAN OF CARE
Problem: OCCUPATIONAL THERAPY ADULT  Goal: Performs self-care activities at highest level of function for planned discharge setting  See evaluation for individualized goals  Description: Treatment Interventions: ADL retraining, Functional transfer training, UE strengthening/ROM, Endurance training, Patient/family training, Equipment evaluation/education, Compensatory technique education, Continued evaluation, Activityengagement          See flowsheet documentation for full assessment, interventions and recommendations  Note: Limitation: Decreased ADL status, Decreased UE ROM, Decreased endurance, Decreased self-care trans, Decreased high-level ADLs  Prognosis: Good  Assessment: Pt is a 78 y o  male seen for OT evaluation s/p adm to [facility] on 9/14/2022 w/ Strangulated hernia of abdominal wall   Pt s/p exp lap, complex lysis of adhesions, reduction of strangulated hernia, debridement RLQ muscle with counter incision, abthera placement x2 on 9/14/22 and s/p RLQ Hernia repair with wound VAC placement and anterior abdominal wall repair with mesh and retention sutures x12 on 09/15/2022  Comorbidities affecting pts functional performance include a significant PMH of Anemia, CKD, HTN, and Obesity  Pt with active OT orders and activity orders for Up with assistance  Pt lives with spouse and son in a two level house with 4-5 SANDRA  Pt reports sleeping in recliner on 1st floor  Full bath on 1st and 2nd floor  (-) home alone  At baseline, pt was I w/ ADLs, IADLs (shares w/ spouse), and functional transfers/mobility w/o use of AD  (-)   Denies falls PTA   Upon evaluation, pt currently requires Supervision for UB ADLs, Max a for LB ADLs, Mod A for toileting, Min A for bed mobility, Mod A of 2 for sit>stand transfers, Min A of 2 for stand>sit transfers, and Min A of 2 for functional mobility 2* the following deficits impacting occupational performance: decreased ROM, decreased strength, decreased balance and decreased tolerance  These impairments, as well at pts fall risk, multiple lines, steps to enter environment, difficulty performing ADLS and difficulty performing IADLS  limit pts ability to safely engage in all baseline areas of occupation  Based on the aforementioned OT evaluation, functional performance deficits, and assessments, pt has been identified as a High complexity evaluation  Pt to continue to benefit from continued acute OT services during hospital stay to address defined deficits and to maximize level of functional independence in the following Occupational Performance areas: grooming, bathing/shower, toilet hygiene, dressing, medication management, health maintenance, functional mobility, community mobility, clothing management, cleaning, meal prep and household maintenance  From OT standpoint, recommend STR upon D/C   OT will continue to follow pt 3-5x/wk to address the following goals to  w/in 10-14 days:     OT Discharge Recommendation: Post acute rehabilitation services

## 2022-09-19 NOTE — PROGRESS NOTES
General Surgery  Progress Note   Urvashi Myers 78 y o  male MRN: [id number]  Unit/Bed#: ICU 07 Encounter: [id number]    Assessment:  67yoM p/w incarcerated incisional hernia and SBO now s/p:  9/14: ex lap, reduction of strangulated ventral hernia, debridement of RLQ abdominal wall musculature, Abthera VAC placement  9/15: ex lap, RLQ hernia repair w mesh and VAC, complex anterior abdominal wall repair w mesh and retention sutures, LANETTE    9/14 OR tissue culture: Bacteroides volgatus, strep anginosus, E coli, Klebsiella pneumoniae  9/19 CT C/A/P: Right main pulmonary artery filling defect extending into right basilar right middle lobe branches consistent E  Additional defect right upper lobe branch also consistent  Tiny defect subsegmental left upper lobe branch  Abd wall; no evidence of extravasation or suspected abscess collection  No residual bowel obstruction although mild small bowel loop distention likely reflects postoperative ileus    Afebrile, VSS, 4 L nasal cannula  Morning labs pending    PT:  57 from 179 from 29    UOP:  975 CC  Wound VAC:  425 cc of serous    Plan:  -clear liquid diet  -heparin drip for PE  -follow-up 9/18 final blood cultures  -wound VAC change M/W/F; monitor output & character  -monitor renal function  -IV antibiotics; Ancef/Flagyl  -speech; continued evaluation for aspiration  -Appreciate ICU care  -PT/OT    Subjective/Objective     Subjective: Patient seen and examined at bedside, in no acute distress  No acute events overnight  Patient's pain is well controlled  Pt denies nausea or vomiting  Passing gas and stool  tolerating clear liquid diet  Had urinary retention after Arias was removed yesterday; Arias was replaced after to straight caths  Objective:     Vitals:Blood pressure 113/59, pulse 84, temperature 98 1 °F (36 7 °C), temperature source Tympanic, resp  rate (!) 23, height 5' 9" (1 753 m), weight (!) 175 kg (385 lb 12 9 oz), SpO2 100 %  ,Body mass index is 56 97 kg/m²  Temp (24hrs), Av 1 °F (37 3 °C), Min:98 1 °F (36 7 °C), Max:100 4 °F (38 °C)  Current: Temperature: 98 1 °F (36 7 °C)      Intake/Output Summary (Last 24 hours) at 2022 0546  Last data filed at 2022 0501  Gross per 24 hour   Intake 3472 11 ml   Output 1450 ml   Net  11 ml       Invasive Devices  Report    Peripheral Intravenous Line  Duration           Peripheral IV 22 Right Antecubital 1 day    Peripheral IV 22 Left Antecubital <1 day    Peripheral IV 22 Right;Upper;Ventral (anterior) Arm <1 day          Drain  Duration           Urethral Catheter Temperature probe 16 Fr  <1 day                Physical Exam:  General:  Obese, No acute distress, alert and oriented  CV: Well perfused, regular rate and rhythm  Lungs: Normal work of breathing, no increased respiratory effort  Abdomen: Soft, non-tender, non-distended  Midline incision clean, dry, retention sutures in place  Wound VAC over right abdominal wall wound as above     Extremities: No edema, clubbing or cyanosis  Skin: Warm, dry    Lab Results: Results: I have personally reviewed all pertinent laboratory/tests results  VTE Prophylaxis: Sequential compression device (Venodyne) , heparin drip    Girma Green MD  2022

## 2022-09-19 NOTE — PLAN OF CARE
Problem: PHYSICAL THERAPY ADULT  Goal: Performs mobility at highest level of function for planned discharge setting  See evaluation for individualized goals  Description: Treatment/Interventions: Functional transfer training, LE strengthening/ROM, Elevations, Therapeutic exercise, Endurance training, Patient/family training, Equipment eval/education, Bed mobility, Gait training, Compensatory technique education, Continued evaluation, Spoke to nursing, OT          See flowsheet documentation for full assessment, interventions and recommendations  Note: Prognosis: Good  Problem List: Decreased strength, Decreased range of motion, Decreased endurance, Impaired balance, Decreased mobility, Obesity, Decreased skin integrity  Assessment: Phil Moyer is a 78 y o  male admitted to [facility] on 9/14/2022 for Strangulated hernia of abdominal wall  POD# 5 exdploratory laparotomy, complex Lysis of adhesions, reduction strangulated hernia, debridement RLQ muscle with counter incision, abthera placement  PT was consulted and pt was seen on 9/19/2022 for mobility assessment and d/c planning  Pt presents w medium fall risk, multiple lines (wound vac, PIV, tele, verenice, O2)  At baseline is indep for ADLs and ambulation without an AD  First floor set up and family support at home  Pt is currently functioning at a minimum assistance x1 level for bed mobility, min-mod Ax2 for transfers and mod Ax2 for ambulation bed>chair  Pt demonstrated deficits of ROM, strength, balance, endurance, posture (due to weakness, fatigue)  Require cues for technique, increased reliance on external support to transfer (RHONDA foster) and two persons to safely complete tasks  Require multiple attempts to stand fully upright  Unable to ambulate household distances or negotiate steps to access home environment   Pt will benefit from continued skilled IP PT to address the above mentioned impairments  in order to maximize recovery and increase functional independence when completing mobility and ADLs  At this time PT recommendations for d/c are STR given social and environmental barriers, risk for falls, functional decline  Barriers to Discharge: Inaccessible home environment, Decreased caregiver support  Barriers to Discharge Comments: Ax2- caregiver burden, steps, unable to amb household distances  PT Discharge Recommendation: Post acute rehabilitation services    See flowsheet documentation for full assessment

## 2022-09-19 NOTE — ASSESSMENT & PLAN NOTE
Recent Labs     09/18/22  0515 09/18/22 2055 09/19/22  0444   CREATININE 1 43* 1 39* 1 43*   EGFR 50 52 50     Estimated Creatinine Clearance: 80 5 mL/min (A) (by C-G formula based on SCr of 1 43 mg/dL (H))  MILTON superimposed on stage 3 CKD due to hypotension and low MAPs overngiht, improved after patient was taken off Propofol and transitioned to Fentanyl     Now resolved, patient back at baseline    Plan:   Continue Isolyte at 125mL/h  Avoid nephrotoxic agents  Avoid hypotension  Renally dose medications  Continue monitoring renal function

## 2022-09-19 NOTE — PROGRESS NOTES
09/19/22 1200   Clinical Encounter Type   Visited With Patient   Routine Visit Follow-up   Continue Visiting Yes

## 2022-09-19 NOTE — ASSESSMENT & PLAN NOTE
Holding Lisinopril and Lasix at this time, consider restarting  Consider restarting Toprol XL-- currently lopressor 5 mg q6h, consider switching to PO  Consider restarting home Lasix given patient's 1+ pitting edema in the lower extremities bilaterally  Maintain MAP>65

## 2022-09-19 NOTE — PROGRESS NOTES
[address] [facility]  Progress Note - Gio Botello 1955, 78 y o  male MRN: [id number]  Unit/Bed#: ICU 07 Encounter: [id number]  Primary Care Provider: Faustina Farr MD   Date and time admitted to hospital: 9/14/2022 11:29 AM    * Strangulated hernia of abdominal wall  Assessment & Plan  S/p exp lap, complex lysis of adhesions, reduction of strangulated hernia, debridement RLQ muscle with counter incision, abthera placement x 2  Intraoperative course was uneventful  RLQ Hernia repair with wound VAC placement and anterior abdominal wall repair with mesh and retention sutures x12 on 09/15/2022  NGT D/C'd    Pain management with PRN Dilaudid  Continue clear liquid diet  Continue surgical incision site wound care  Monitor wound vac output    Sepsis ([address] )  Assessment & Plan  Due to strangulated ventral hernia and RLQ abscess of the rectus muscle  As evidenced by WBC-12 9, fever (101 4 F) and tachycardia POA  Treated with fluid boluses and Zosyn in the ED, followed by emergent OR ex lap prior to which patient received a dose of Ancef  Underwent ex lap with hernia repair  Blood cultures negative at 72h  Tissue culture demonstrating few colonies of K  Pneumoniae, E  Coli and Bacteroides vulgatus, S  anginosus  Now with worsening WBC    Plan:  Maintain MAP>65  Continue Ancef and Flagyl  Continue monitoring WBC, trend fever curves      Respiratory insufficiency-resolved as of 9/17/2022  Assessment & Plan  Patient extubated on 9/16, on room air  Acute kidney injury (HCC)-resolved as of 9/17/2022  Assessment & Plan  Recent Labs     09/18/22  0515 09/18/22 2055 09/19/22  0444   CREATININE 1 43* 1 39* 1 43*   EGFR 50 52 50     Estimated Creatinine Clearance: 80 5 mL/min (A) (by C-G formula based on SCr of 1 43 mg/dL (H))  MILTON superimposed on stage 3 CKD due to hypotension and low MAPs overngiht, improved after patient was taken off Propofol and transitioned to Fentanyl     Now resolved, patient back at baseline    Plan:   Continue Isolyte at 125mL/h  Avoid nephrotoxic agents  Avoid hypotension  Renally dose medications  Continue monitoring renal function    Anemia  Assessment & Plan  Recent Labs     09/18/22  0515 09/18/22 2055 09/19/22  0444   HGB 9 4* 9 4* 9 5*   Likely in the setting of blood loss due to ex lap x2 with a dilutional component after receiving IV fluids post op  Baseline Hb - 12  Will continue monitoring       Class 3 severe obesity due to excess calories with serious comorbidity and body mass index (BMI) of 50 0 to 59 9 in adult [facility])  Assessment & Plan  Encourage lifestyle modifications    DELIA (obstructive sleep apnea)  Assessment & Plan  Patient endorses snoring at night, waking up unrefreshed by sleep, daytime sleepiness  Has not had a formal evaluation or sleep study  Does not desire trial of bedtime CPAP at this time but will consider  Referral to sleep medicine at discharge    CKD (chronic kidney disease), stage III [facility])  Assessment & Plan  Lab Results   Component Value Date    EGFR 50 09/19/2022    EGFR 52 09/18/2022    EGFR 50 09/18/2022    CREATININE 1 43 (H) 09/19/2022    CREATININE 1 39 (H) 09/18/2022    CREATININE 1 43 (H) 09/18/2022   Baseline creatinine is 1 4-1 7; OR I/Os 3L/375 ml  Now back to baseline  Continue avoiding nephrotoxins, avoid hypotension  Renally dose medications  Monitor renal indices closely      Essential hypertension  Assessment & Plan  Holding Lisinopril and Lasix at this time, consider restarting  Consider restarting Toprol XL-- currently lopressor 5 mg q6h, consider switching to PO  Maintain MAP>65    ----------------------------------------------------------------------------------------  HPI/24hr events:  No overnight events, patient out of bed yesterday, [place] taylor IJ removed on 09/18  Patient developed a fever of 101 3 yesterday around 1500  Had bowel movement yesterday evening  100 mL output from [address]       Patient appropriate for transfer out of the ICU today?: No  Disposition: Continue Critical Care   Code Status: Level 1 - Full Code  ---------------------------------------------------------------------------------------  SUBJECTIVE  Patient found lying in bed, not in acute distress at the time of my evaluation  He is lethargic, but easily rousable, answers questions appropriately and follows commands  He denies dizziness/lightheadedness, chest pain, SoB, palpitations, abdominal pain, was able to have a bowel movement overnight  He had no other complaints  Review of Systems  Review of systems was reviewed and negative unless stated above in HPI/24-hour events   ---------------------------------------------------------------------------------------  OBJECTIVE    Vitals   Vitals:    22 0500 22 0600 22 0700 22 0726   BP: 134/67 127/64 116/64    BP Location:       Pulse: 96 100 100    Resp: (!) 27 22 21    Temp:    98 5 °F (36 9 °C)   TempSrc:    Tympanic   SpO2: 97% 97% 97%    Weight:       Height:         Temp (24hrs), Av 6 °F (37 6 °C), Min:98 5 °F (36 9 °C), Max:101 3 °F (38 5 °C)  Current: Temperature: 98 5 °F (36 9 °C)  Arterial Line BP: 60/56  Arterial Line MAP (mmHg): (!) 58 mmHg    Respiratory:  SpO2: SpO2: 97 %  Nasal Cannula O2 Flow Rate (L/min): 2 L/min    Invasive/non-invasive ventilation settings   Respiratory  Report   Lab Data (Last 4 hours)    None         O2/Vent Data (Last 4 hours)    None                Physical Exam  Vitals and nursing note reviewed  Constitutional:       General: He is not in acute distress  Appearance: Normal appearance  He is morbidly obese  He is not ill-appearing, toxic-appearing or diaphoretic  HENT:      Head: Normocephalic and atraumatic  Nose: Nose normal       Mouth/Throat:      Mouth: Mucous membranes are moist       Pharynx: Oropharynx is clear  Eyes:      General: No scleral icterus  Right eye: No discharge  Left eye: No discharge  Extraocular Movements: Extraocular movements intact  Conjunctiva/sclera: Conjunctivae normal    Cardiovascular:      Rate and Rhythm: Normal rate and regular rhythm  Pulses: Normal pulses  Heart sounds: Normal heart sounds  No murmur heard  Pulmonary:      Effort: Pulmonary effort is normal  No respiratory distress  Breath sounds: Normal breath sounds  No wheezing, rhonchi or rales  Abdominal:      General: Abdomen is protuberant  Bowel sounds are normal  There is no distension  Palpations: Abdomen is soft  Tenderness: There is no abdominal tenderness  There is no guarding or rebound  Comments: Midline surgical incision with retention suture x 12 without surrounding erythema or over exudate  Right lower quadrant Abthera VAC with 250mL serosanguineous drainage  Musculoskeletal:      Cervical back: Normal range of motion and neck supple  Right lower leg: Edema present  Left lower leg: Edema present  Comments: 1+ pitting edema in the lower extremities most pronounced at the dorsum of the feet   Skin:     General: Skin is warm and dry  Coloration: Skin is not jaundiced or pale  Neurological:      Mental Status: He is oriented to person, place, and time and easily aroused  Mental status is at baseline  He is lethargic  Psychiatric:         Mood and Affect: Mood normal          Behavior: Behavior normal          Thought Content:  Thought content normal          Judgment: Judgment normal              Laboratory and Diagnostics:  Results from last 7 days   Lab Units 09/19/22  0444 09/18/22  2055 09/18/22  0515 09/17/22  0537 09/16/22  0506 09/15/22  1504 09/15/22  0336 09/14/22  1952 09/14/22  1203   WBC Thousand/uL 19 51* 19 44* 16 69* 15 28* 14 74* 16 29* 15 84*  --  12 90*   HEMOGLOBIN g/dL 9 5* 9 4* 9 4* 9 1* 10 0* 11 6* 12 5  --  12 1   I STAT HEMOGLOBIN   --   --   --   --   --   --   --    < >  --    HEMATOCRIT % 30 4* 30 4* 31 4* 29 4* 31 4* 37 3 39 1  --  37 1   HEMATOCRIT, ISTAT   --   --   --   --   --   --   --    < >  --    PLATELETS Thousands/uL 388 398* 385 366 331 367 380  --  416*   NEUTROS PCT %  --   --  86* 88* 89*  --   --   --  85*   BANDS PCT %  --  1  --   --   --  7 12*  --   --    MONOS PCT %  --   --  6 5 6  --   --   --  7   MONO PCT %  --  5  --   --   --  7 4  --   --     < > = values in this interval not displayed  Results from last 7 days   Lab Units 09/19/22  0444 09/18/22 2055 09/18/22  0515 09/17/22  0537 09/16/22  0506 09/15/22  1504 09/15/22  0336 09/14/22  1952 09/14/22  1203   SODIUM mmol/L 141 141 142 140 138 137 135  --  134*   POTASSIUM mmol/L 3 8 3 6 4 0 3 9 4 2 4 8 4 4  --  3 9   CHLORIDE mmol/L 107 106 108 108 104 104 104  --  99   CO2 mmol/L 30 29 26 26 23 20* 21  --  27   CO2, I-STAT   --   --   --   --   --   --   --    < >  --    ANION GAP mmol/L 4 6 8 6 11 13 10  --  8   BUN mg/dL 23 23 22 21 28* 24 23  --  21   CREATININE mg/dL 1 43* 1 39* 1 43* 1 50* 2 05* 1 91* 1 48*  --  1 64*   CALCIUM mg/dL 8 0* 8 0* 8 2* 7 7* 7 8* 7 5* 8 2*  --  8 8   GLUCOSE RANDOM mg/dL 120 123 100 125 151* 155* 145*  --  115   ALT U/L  --   --   --   --  25 20 14  --  15   AST U/L  --   --   --   --  31 23 14  --  13   ALK PHOS U/L  --   --   --   --  44* 47 47  --  55   ALBUMIN g/dL  --   --   --   --  1 8* 1 8* 2 1*  --  2 6*   TOTAL BILIRUBIN mg/dL  --   --   --   --  0 64 0 70 1 02*  --  1 25*    < > = values in this interval not displayed       Results from last 7 days   Lab Units 09/19/22  0444 09/18/22  2055 09/18/22  0515 09/16/22  0506 09/15/22  1504 09/15/22  0336   MAGNESIUM mg/dL 2 1 2 1 2 4 2 2 1 6 1 8   PHOSPHORUS mg/dL 2 6 1 6*  --  3 9 5 6* 3 5      Results from last 7 days   Lab Units 09/14/22  1203   INR  1 19   PTT seconds 39*          Results from last 7 days   Lab Units 09/15/22  0007 09/14/22  1947 09/14/22  1220   LACTIC ACID mmol/L 1 4 1 1 1 1     ABG:  Results from last 7 days   Lab Units 09/15/22  0336   PH ART 7 382   PCO2 ART mm Hg 34 4*   PO2 ART mm Hg 237 7*   HCO3 ART mmol/L 20 0*   BASE EXC ART mmol/L -4 3   ABG SOURCE  Line, Arterial     VBG:  Results from last 7 days   Lab Units 09/18/22  2056 09/15/22  0336   PH JONH  7 408*  --    PCO2 JONH mm Hg 40 8*  --    PO2 JONH mm Hg 72 8*  --    HCO3 JONH mmol/L 25 2  --    BASE EXC JONH mmol/L 0 5  --    ABG SOURCE   --  Line, Arterial     Results from last 7 days   Lab Units 09/19/22  0444 09/14/22  1203   PROCALCITONIN ng/ml 1 44* 0 75*       Micro  Results from last 7 days   Lab Units 09/18/22  1043 09/14/22  1944 09/14/22  1220 09/14/22  1203   BLOOD CULTURE  Received in Microbiology Lab  Culture in Progress  Received in Microbiology Lab  Culture in Progress  --  No Growth After 4 Days  No Growth After 4 Days  GRAM STAIN RESULT   --  No Polys or Bacteria seen  --   --        EKG: NSR, HR 86  Imaging: I have personally reviewed pertinent reports  and I have personally reviewed pertinent films in PACS    Intake and Output  I/O       09/17 0701  09/18 0700 09/18 0701  09/19 0700    P  O   1560    I V  (mL/kg) 30 (0 2)     IV Piggyback 400 700    Total Intake(mL/kg) 430 (2 5) 2260 (13)    Urine (mL/kg/hr) 2790 (0 7) 1785 (0 4)    Total Output 2790 1785    Net -2360 +475                Height and Weights   Height: 5' 9" (175 3 cm)  IBW (Ideal Body Weight): 70 7 kg  Body mass index is 56 68 kg/m²  Weight (last 2 days)     Date/Time Weight    09/17/22 0352 174 (383 82)            Nutrition       Diet Orders   (From admission, onward)             Start     Ordered    09/18/22 1123  Diet Surgical; Clear Liquid; Sodium 2 GM, No Carbonation  Diet effective now        References:    Nutrtion Support Algorithm Enteral vs  Parenteral   Question Answer Comment   Diet Type Surgical    Surgical Clear Liquid    Other Restriction(s): Sodium 2 GM    Other Restriction(s): No Carbonation    RD to adjust diet per protocol?  No        09/18/22 1124                  Active Medications  Scheduled Meds:  Current Facility-Administered Medications   Medication Dose Route Frequency Provider Last Rate    acetaminophen  650 mg Oral Q6H PRN Tramaine Gomes MD      allopurinol  100 mg Oral Daily Viviane Burch MD      cefazolin  2,000 mg Intravenous Q8H Viviane Burch MD 2,000 mg (09/18/22 231)    glycerin-hypromellose-  1 drop Both Eyes Q4H PRN Viviane Burch MD      heparin (porcine)  7,500 Units Subcutaneous Q8H Charissa Smith MD      HYDROmorphone  1 mg Intravenous Q4H PRN Viviane Burch MD      metoprolol  5 mg Intravenous Q6H Viviane Burch MD      metroNIDAZOLE  500 mg Intravenous Q8H Viviane Burch  mg (09/19/22 0006)    pantoprazole  40 mg Intravenous Q24H [address] Magali Leroy MD      travoprost  1 drop Both Eyes HS Viviane Burch MD       Continuous Infusions:     PRN Meds:   acetaminophen, 650 mg, Q6H PRN  glycerin-hypromellose-, 1 drop, Q4H PRN  HYDROmorphone, 1 mg, Q4H PRN        Invasive Devices Review  Invasive Devices  Report    Peripheral Intravenous Line  Duration           Peripheral IV 09/18/22 Right Antecubital 1 day                Rationale for remaining devices:  Need for peripheral access  ---------------------------------------------------------------------------------------  Advance Directive and Living Will:      Power of :    POLST:    ---------------------------------------------------------------------------------------  Care Time Delivered:   No Critical Care time spent       Viviane Marcin, MD      Portions of the record may have been created with voice recognition software  Occasional wrong word or "sound a like" substitutions may have occurred due to the inherent limitations of voice recognition software    Read the chart carefully and recognize, using context, where substitutions have occurred

## 2022-09-19 NOTE — ASSESSMENT & PLAN NOTE
Recent Labs     09/18/22  0515 09/18/22 2055 09/19/22  0444   HGB 9 4* 9 4* 9 5*   Likely in the setting of blood loss due to ex lap x2 with a dilutional component after receiving IV fluids post op     Baseline Hb - 12  Will continue monitoring

## 2022-09-19 NOTE — ASSESSMENT & PLAN NOTE
Lab Results   Component Value Date    EGFR 50 09/19/2022    EGFR 52 09/18/2022    EGFR 50 09/18/2022    CREATININE 1 43 (H) 09/19/2022    CREATININE 1 39 (H) 09/18/2022    CREATININE 1 43 (H) 09/18/2022   Baseline creatinine is 1 4-1 7; OR I/Os 3L/375 ml  Now back to baseline  Continue avoiding nephrotoxins, avoid hypotension  Renally dose medications  Monitor renal indices closely

## 2022-09-19 NOTE — PLAN OF CARE
Problem: PAIN - ADULT  Goal: Verbalizes/displays adequate comfort level or baseline comfort level  Description: Interventions:  - Encourage patient to monitor pain and request assistance  - Assess pain using appropriate pain scale  - Administer analgesics based on type and severity of pain and evaluate response  - Implement non-pharmacological measures as appropriate and evaluate response  - Consider cultural and social influences on pain and pain management  - Notify physician/advanced practitioner if interventions unsuccessful or patient reports new pain  Outcome: Progressing     Problem: INFECTION - ADULT  Goal: Absence or prevention of progression during hospitalization  Description: INTERVENTIONS:  - Assess and monitor for signs and symptoms of infection  - Monitor lab/diagnostic results  - Monitor all insertion sites, i e  indwelling lines, tubes, and drains  - Monitor endotracheal if appropriate and nasal secretions for changes in amount and color  - [place] appropriate cooling/warming therapies per order  - Administer medications as ordered  - Instruct and encourage patient and family to use good hand hygiene technique  - Identify and instruct in appropriate isolation precautions for identified infection/condition  Outcome: Progressing     Problem: SAFETY ADULT  Goal: Patient will remain free of falls  Description: INTERVENTIONS:  - Educate patient/family on patient safety including physical limitations  - Instruct patient to call for assistance with activity   - Consult OT/PT to assist with strengthening/mobility   - Keep Call bell within reach  - Keep bed low and locked with side rails adjusted as appropriate  - Keep care items and personal belongings within reach  - Initiate and maintain comfort rounds  - Make Fall Risk Sign visible to staff  - Offer Toileting every  Hours, in advance of need  - Initiate/Maintain alarm  - Obtain necessary fall risk management equipment:   - Apply yellow socks and bracelet for high fall risk patients  - Consider moving patient to room near nurses station  Outcome: Progressing  Goal: Maintain or return to baseline ADL function  Description: INTERVENTIONS:  -  Assess patient's ability to carry out ADLs; assess patient's baseline for ADL function and identify physical deficits which impact ability to perform ADLs (bathing, care of mouth/teeth, toileting, grooming, dressing, etc )  - Assess/evaluate cause of self-care deficits   - Assess range of motion  - Assess patient's mobility; develop plan if impaired  - Assess patient's need for assistive devices and provide as appropriate  - Encourage maximum independence but intervene and supervise when necessary  - Involve family in performance of ADLs  - Assess for home care needs following discharge   - Consider OT consult to assist with ADL evaluation and planning for discharge  - Provide patient education as appropriate  Outcome: Progressing  Goal: Maintains/Returns to pre admission functional level  Description: INTERVENTIONS:  - Perform BMAT or MOVE assessment daily    - Set and communicate daily mobility goal to care team and patient/family/caregiver  - Collaborate with rehabilitation services on mobility goals if consulted  - Perform Range of Motion  times a day  - Reposition patient every  hours    - Dangle patient  times a day  - Stand patient  times a day  - Ambulate patient  times a day  - Out of bed to chair  times a day   - Out of bed for meals  times a day  - Out of bed for toileting  - Record patient progress and toleration of activity level   Outcome: Progressing     Problem: DISCHARGE PLANNING  Goal: Discharge to home or other facility with appropriate resources  Description: INTERVENTIONS:  - Identify barriers to discharge w/patient and caregiver  - Arrange for needed discharge resources and transportation as appropriate  - Identify discharge learning needs (meds, wound care, etc )  - Arrange for interpretive services to assist at discharge as needed  - Refer to Case Management Department for coordinating discharge planning if the patient needs post-hospital services based on physician/advanced practitioner order or complex needs related to functional status, cognitive ability, or social support system  Outcome: Progressing     Problem: Knowledge Deficit  Goal: Patient/family/caregiver demonstrates understanding of disease process, treatment plan, medications, and discharge instructions  Description: Complete learning assessment and assess knowledge base  Interventions:  - Provide teaching at level of understanding  - Provide teaching via preferred learning methods  Outcome: Progressing     Problem: MOBILITY - ADULT  Goal: Maintain or return to baseline ADL function  Description: INTERVENTIONS:  -  Assess patient's ability to carry out ADLs; assess patient's baseline for ADL function and identify physical deficits which impact ability to perform ADLs (bathing, care of mouth/teeth, toileting, grooming, dressing, etc )  - Assess/evaluate cause of self-care deficits   - Assess range of motion  - Assess patient's mobility; develop plan if impaired  - Assess patient's need for assistive devices and provide as appropriate  - Encourage maximum independence but intervene and supervise when necessary  - Involve family in performance of ADLs  - Assess for home care needs following discharge   - Consider OT consult to assist with ADL evaluation and planning for discharge  - Provide patient education as appropriate  Outcome: Progressing  Goal: Maintains/Returns to pre admission functional level  Description: INTERVENTIONS:  - Perform BMAT or MOVE assessment daily    - Set and communicate daily mobility goal to care team and patient/family/caregiver  - Collaborate with rehabilitation services on mobility goals if consulted  - Perform Range of Motion  times a day  - Reposition patient every  hours    - Dangle patient  times a day  - Stand patient  times a day  - Ambulate patient  times a day  - Out of bed to chair  times a day   - Out of bed for meals  times a day  - Out of bed for toileting  - Record patient progress and toleration of activity level   Outcome: Progressing     Problem: Potential for Falls  Goal: Patient will remain free of falls  Description: INTERVENTIONS:  - Educate patient/family on patient safety including physical limitations  - Instruct patient to call for assistance with activity   - Consult OT/PT to assist with strengthening/mobility   - Keep Call bell within reach  - Keep bed low and locked with side rails adjusted as appropriate  - Keep care items and personal belongings within reach  - Initiate and maintain comfort rounds  - Make Fall Risk Sign visible to staff  - Offer Toileting every  Hours, in advance of need  - Initiate/Maintain alarm  - Obtain necessary fall risk management equipment:   - Apply yellow socks and bracelet for high fall risk patients  - Consider moving patient to room near nurses station  Outcome: Progressing     Problem: Prexisting or High Potential for Compromised Skin Integrity  Goal: Skin integrity is maintained or improved  Description: INTERVENTIONS:  - Identify patients at risk for skin breakdown  - Assess and monitor skin integrity  - Assess and monitor nutrition and hydration status  - Monitor labs   - Assess for incontinence   - Turn and reposition patient  - Assist with mobility/ambulation  - Relieve pressure over bony prominences  - Avoid friction and shearing  - Provide appropriate hygiene as needed including keeping skin clean and dry  - Evaluate need for skin moisturizer/barrier cream  - Collaborate with interdisciplinary team   - Patient/family teaching  - Consider wound care consult   Outcome: Progressing     Problem: Nutrition/Hydration-ADULT  Goal: Nutrient/Hydration intake appropriate for improving, restoring or maintaining nutritional needs  Description: Monitor and assess patient's nutrition/hydration status for malnutrition  Collaborate with interdisciplinary team and initiate plan and interventions as ordered  Monitor patient's weight and dietary intake as ordered or per policy  Utilize nutrition screening tool and intervene as necessary  Determine patient's food preferences and provide high-protein, high-caloric foods as appropriate       INTERVENTIONS:  - Monitor oral intake, urinary output, labs, and treatment plans  - Assess nutrition and hydration status and recommend course of action  - Evaluate amount of meals eaten  - Assist patient with eating if necessary   - Allow adequate time for meals  - Recommend/ encourage appropriate diets, oral nutritional supplements, and vitamin/mineral supplements  - Order, calculate, and assess calorie counts as needed  - Recommend, monitor, and adjust tube feedings and TPN/PPN based on assessed needs  - Assess need for intravenous fluids  - Provide specific nutrition/hydration education as appropriate  - Include patient/family/caregiver in decisions related to nutrition  Outcome: Progressing     Problem: GASTROINTESTINAL - ADULT  Goal: Minimal or absence of nausea and/or vomiting  Description: INTERVENTIONS:  - Administer IV fluids if ordered to ensure adequate hydration  - Maintain NPO status until nausea and vomiting are resolved  - Nasogastric tube if ordered  - Administer ordered antiemetic medications as needed  - Provide nonpharmacologic comfort measures as appropriate  - Advance diet as tolerated, if ordered  - Consider nutrition services referral to assist patient with adequate nutrition and appropriate food choices  Outcome: Progressing  Goal: Maintains or returns to baseline bowel function  Description: INTERVENTIONS:  - Assess bowel function  - Encourage oral fluids to ensure adequate hydration  - Administer IV fluids if ordered to ensure adequate hydration  - Administer ordered medications as needed  - Encourage mobilization and activity  - Consider nutritional services referral to assist patient with adequate nutrition and appropriate food choices  Outcome: Progressing  Goal: Maintains adequate nutritional intake  Description: INTERVENTIONS:  - Monitor percentage of each meal consumed  - Identify factors contributing to decreased intake, treat as appropriate  - Assist with meals as needed  - Monitor I&O, weight, and lab values if indicated  - Obtain nutrition services referral as needed  Outcome: Progressing  Goal: Oral mucous membranes remain intact  Description: INTERVENTIONS  - Assess oral mucosa and hygiene practices  - Implement preventative oral hygiene regimen  - Implement oral medicated treatments as ordered  - Initiate Nutrition services referral as needed  Outcome: Progressing

## 2022-09-20 PROBLEM — I26.99 PULMONARY EMBOLISM (HCC): Status: ACTIVE | Noted: 2022-09-20

## 2022-09-20 LAB
ALBUMIN SERPL BCP-MCNC: 1.4 G/DL (ref 3.5–5)
ALP SERPL-CCNC: 50 U/L (ref 46–116)
ALT SERPL W P-5'-P-CCNC: 6 U/L (ref 12–78)
ANION GAP SERPL CALCULATED.3IONS-SCNC: 8 MMOL/L (ref 4–13)
APTT PPP: 50 SECONDS (ref 23–37)
APTT PPP: 57 SECONDS (ref 23–37)
APTT PPP: >210 SECONDS (ref 23–37)
AST SERPL W P-5'-P-CCNC: 35 U/L (ref 5–45)
BACTERIA UR QL AUTO: ABNORMAL /HPF
BILIRUB SERPL-MCNC: 0.37 MG/DL (ref 0.2–1)
BILIRUB UR QL STRIP: NEGATIVE
BUN SERPL-MCNC: 17 MG/DL (ref 5–25)
CALCIUM ALBUM COR SERPL-MCNC: 9.3 MG/DL (ref 8.3–10.1)
CALCIUM SERPL-MCNC: 7.2 MG/DL (ref 8.3–10.1)
CHLORIDE SERPL-SCNC: 106 MMOL/L (ref 96–108)
CLARITY UR: ABNORMAL
CO2 SERPL-SCNC: 27 MMOL/L (ref 21–32)
COLOR UR: YELLOW
CREAT SERPL-MCNC: 1.16 MG/DL (ref 0.6–1.3)
ERYTHROCYTE [DISTWIDTH] IN BLOOD BY AUTOMATED COUNT: 14.1 % (ref 11.6–15.1)
GFR SERPL CREATININE-BSD FRML MDRD: 65 ML/MIN/1.73SQ M
GLUCOSE SERPL-MCNC: 114 MG/DL (ref 65–140)
GLUCOSE SERPL-MCNC: 124 MG/DL (ref 65–140)
GLUCOSE SERPL-MCNC: 139 MG/DL (ref 65–140)
GLUCOSE SERPL-MCNC: 213 MG/DL (ref 65–140)
GLUCOSE UR STRIP-MCNC: NEGATIVE MG/DL
HCT VFR BLD AUTO: 27.5 % (ref 36.5–49.3)
HGB BLD-MCNC: 8.8 G/DL (ref 12–17)
HGB UR QL STRIP.AUTO: ABNORMAL
KETONES UR STRIP-MCNC: NEGATIVE MG/DL
LEUKOCYTE ESTERASE UR QL STRIP: ABNORMAL
MCH RBC QN AUTO: 31.1 PG (ref 26.8–34.3)
MCHC RBC AUTO-ENTMCNC: 32 G/DL (ref 31.4–37.4)
MCV RBC AUTO: 97 FL (ref 82–98)
NITRITE UR QL STRIP: NEGATIVE
NON-SQ EPI CELLS URNS QL MICRO: ABNORMAL /HPF
PH UR STRIP.AUTO: 6 [PH]
PLATELET # BLD AUTO: 286 THOUSANDS/UL (ref 149–390)
PMV BLD AUTO: 9.6 FL (ref 8.9–12.7)
POTASSIUM SERPL-SCNC: 3.8 MMOL/L (ref 3.5–5.3)
PROCALCITONIN SERPL-MCNC: 0.99 NG/ML
PROT SERPL-MCNC: 5.7 G/DL (ref 6.4–8.4)
PROT UR STRIP-MCNC: NEGATIVE MG/DL
RBC # BLD AUTO: 2.83 MILLION/UL (ref 3.88–5.62)
RBC #/AREA URNS AUTO: ABNORMAL /HPF
SODIUM SERPL-SCNC: 141 MMOL/L (ref 135–147)
SP GR UR STRIP.AUTO: 1.02 (ref 1–1.03)
UROBILINOGEN UR QL STRIP.AUTO: 0.2 E.U./DL
WBC # BLD AUTO: 19.83 THOUSAND/UL (ref 4.31–10.16)
WBC #/AREA URNS AUTO: ABNORMAL /HPF

## 2022-09-20 PROCEDURE — 97535 SELF CARE MNGMENT TRAINING: CPT

## 2022-09-20 PROCEDURE — 84145 PROCALCITONIN (PCT): CPT

## 2022-09-20 PROCEDURE — 99024 POSTOP FOLLOW-UP VISIT: CPT | Performed by: SURGERY

## 2022-09-20 PROCEDURE — 0T9B70Z DRAINAGE OF BLADDER WITH DRAINAGE DEVICE, VIA NATURAL OR ARTIFICIAL OPENING: ICD-10-PCS | Performed by: INTERNAL MEDICINE

## 2022-09-20 PROCEDURE — 85730 THROMBOPLASTIN TIME PARTIAL: CPT | Performed by: INTERNAL MEDICINE

## 2022-09-20 PROCEDURE — 80053 COMPREHEN METABOLIC PANEL: CPT

## 2022-09-20 PROCEDURE — 85027 COMPLETE CBC AUTOMATED: CPT

## 2022-09-20 PROCEDURE — 97116 GAIT TRAINING THERAPY: CPT

## 2022-09-20 PROCEDURE — 97110 THERAPEUTIC EXERCISES: CPT

## 2022-09-20 PROCEDURE — 97530 THERAPEUTIC ACTIVITIES: CPT

## 2022-09-20 PROCEDURE — 99233 SBSQ HOSP IP/OBS HIGH 50: CPT | Performed by: INTERNAL MEDICINE

## 2022-09-20 PROCEDURE — 85730 THROMBOPLASTIN TIME PARTIAL: CPT

## 2022-09-20 PROCEDURE — 82948 REAGENT STRIP/BLOOD GLUCOSE: CPT

## 2022-09-20 PROCEDURE — 81001 URINALYSIS AUTO W/SCOPE: CPT

## 2022-09-20 RX ORDER — FUROSEMIDE 10 MG/ML
20 INJECTION INTRAMUSCULAR; INTRAVENOUS DAILY
Status: DISCONTINUED | OUTPATIENT
Start: 2022-09-20 | End: 2022-09-21

## 2022-09-20 RX ADMIN — HEPARIN SODIUM 5000 UNITS: 1000 INJECTION INTRAVENOUS; SUBCUTANEOUS at 00:40

## 2022-09-20 RX ADMIN — METRONIDAZOLE 500 MG: 500 TABLET ORAL at 05:20

## 2022-09-20 RX ADMIN — TAMSULOSIN HYDROCHLORIDE 0.4 MG: 0.4 CAPSULE ORAL at 17:30

## 2022-09-20 RX ADMIN — CEFAZOLIN SODIUM 2000 MG: 2 SOLUTION INTRAVENOUS at 00:01

## 2022-09-20 RX ADMIN — PANTOPRAZOLE SODIUM 40 MG: 40 TABLET, DELAYED RELEASE ORAL at 05:20

## 2022-09-20 RX ADMIN — TRAVOPROST 1 DROP: 0.04 SOLUTION OPHTHALMIC at 21:12

## 2022-09-20 RX ADMIN — Medication 250 MG: at 08:13

## 2022-09-20 RX ADMIN — CEFAZOLIN SODIUM 2000 MG: 2 SOLUTION INTRAVENOUS at 07:08

## 2022-09-20 RX ADMIN — FUROSEMIDE 20 MG: 10 INJECTION, SOLUTION INTRAVENOUS at 09:34

## 2022-09-20 RX ADMIN — Medication 250 MG: at 17:30

## 2022-09-20 RX ADMIN — HEPARIN SODIUM 20 UNITS/KG/HR: 10000 INJECTION, SOLUTION INTRAVENOUS at 02:43

## 2022-09-20 RX ADMIN — CEFAZOLIN SODIUM 2000 MG: 2 SOLUTION INTRAVENOUS at 15:12

## 2022-09-20 RX ADMIN — METRONIDAZOLE 500 MG: 500 TABLET ORAL at 21:12

## 2022-09-20 RX ADMIN — METRONIDAZOLE 500 MG: 500 TABLET ORAL at 14:22

## 2022-09-20 RX ADMIN — HEPARIN SODIUM 5000 UNITS: 1000 INJECTION INTRAVENOUS; SUBCUTANEOUS at 09:34

## 2022-09-20 RX ADMIN — ACETAMINOPHEN 650 MG: 325 TABLET ORAL at 10:45

## 2022-09-20 RX ADMIN — HEPARIN SODIUM 22 UNITS/KG/HR: 10000 INJECTION, SOLUTION INTRAVENOUS at 14:21

## 2022-09-20 RX ADMIN — ACETAMINOPHEN 650 MG: 325 TABLET ORAL at 17:32

## 2022-09-20 RX ADMIN — CEFAZOLIN SODIUM 2000 MG: 2 SOLUTION INTRAVENOUS at 23:32

## 2022-09-20 RX ADMIN — ALLOPURINOL 100 MG: 100 TABLET ORAL at 08:13

## 2022-09-20 RX ADMIN — METOPROLOL TARTRATE 25 MG: 25 TABLET, FILM COATED ORAL at 21:12

## 2022-09-20 RX ADMIN — METOPROLOL TARTRATE 25 MG: 25 TABLET, FILM COATED ORAL at 08:13

## 2022-09-20 NOTE — ASSESSMENT & PLAN NOTE
Lab Results   Component Value Date    EGFR 65 09/20/2022    EGFR 50 09/19/2022    EGFR 52 09/18/2022    CREATININE 1 16 09/20/2022    CREATININE 1 43 (H) 09/19/2022    CREATININE 1 39 (H) 09/18/2022   Baseline creatinine is 1 4-1 7; OR I/Os 3L/375 ml  Now back to baseline  Continue avoiding nephrotoxins, avoid hypotension  Renally dose medications  Monitor renal indices closely

## 2022-09-20 NOTE — ASSESSMENT & PLAN NOTE
S/p exp lap, complex lysis of adhesions, reduction of strangulated hernia, debridement RLQ muscle with counter incision, abthera placement x 2  Intraoperative course was uneventful  RLQ Hernia repair with wound VAC placement and anterior abdominal wall repair with mesh and retention sutures x12 on 09/15/2022  NGT D/C'd    Pain management with PRN Dilaudid  Diet advanced to soft surgical  Continue surgical incision site wound care  Monitor wound vac output

## 2022-09-20 NOTE — OCCUPATIONAL THERAPY NOTE
Occupational Therapy Progress Note     Patient Name: Audi Kasper  WVBTI'S Date: 9/20/2022  Problem List  Principal Problem:    Strangulated hernia of abdominal wall  Active Problems:    Essential hypertension    CKD (chronic kidney disease), stage III (HCC)    DELIA (obstructive sleep apnea)    Class 3 severe obesity due to excess calories with serious comorbidity and body mass index (BMI) of 50 0 to 59 9 in adult ([facility] [address] )    Sepsis ([facility] )    Anemia    Pulmonary embolism (HCC)       **Actual time of treatment: 14:04-14:47  15:46 entered as error in Flowsheet  09/20/22 1546   OT Last Visit   OT Visit Date 09/20/22   Note Type   Note Type Treatment   Restrictions/Precautions   Weight Bearing Precautions Per Order No   Other Precautions Fall Risk;Multiple lines;Telemetry   General   Response to Previous Treatment Patient with no complaints from previous session   Pain Assessment   Pain Assessment Tool 0-10   Pain Score No Pain   ADL   Where Assessed Chair   Grooming Assistance 5  Supervision/Setup   Grooming Deficit Setup;Supervision/safety; Increased time to complete   LB Dressing Assistance 2  Maximal Assistance   LB Dressing Deficit Setup;Verbal cueing;Supervision/safety; Increased time to complete; Don/doff R sock; Don/doff L sock   Toileting Assistance  2  Maximal Assistance   Toileting Deficit Setup;Steadying;Supervison/safety;Verbal cueing; Increased time to complete; Bedside commode;Perineal hygiene   Functional Standing Tolerance   Time 3-4 mins   Activity Dynamic standing balance activity, toileting tasks   Comments Pt required seated rest break after 3-4 mins 2* increased fatigue reported   Bed Mobility   Supine to Sit Unable to assess   Sit to Supine Unable to assess   Additional Comments Pt seated OOB in chair at end of session  Call bell and phone within reach   All needs met and pt reports no further questions for OT at this time   Transfers   Sit to Stand 4  Minimal assistance   Additional items Assist x 2;Increased time required;Verbal cues   Stand to Sit 4  Minimal assistance   Additional items Assist x 2;Armrests; Increased time required;Verbal cues   Stand pivot 4  Minimal assistance   Additional items Assist x 2;Armrests; Increased time required;Verbal cues   Toilet transfer 4  Minimal assistance   Additional items Assist x 2; Increased time required;Verbal cues; Commode   Additional Comments Cues for safe technique and hand placement   Functional Mobility   Functional Mobility 4  Minimal assistance   Additional Comments Assist x2 with assist for line management   Additional items Rolling walker   Cognition   Overall Cognitive Status WFL   Arousal/Participation Responsive; Cooperative   Attention Within functional limits   Orientation Level Oriented X4   Memory Within functional limits   Following Commands Follows all commands and directions without difficulty   Activity Tolerance   Activity Tolerance Patient limited by fatigue;Treatment limited secondary to medical complications (Comment)   Medical Staff Made Aware Yaima Finney RN   Assessment   Assessment Pt seen for OT treatment session focusing on functional activity tolerance, ADLs, and functional transfers/mobility  Pt alert and cooperative throughout session, however required verbal cues to keep eyes open throughout session  Pt seated OOB in chair at start of session  Transfers (sit<>stand) completed w/ Min A of 2 with assist to initiate forward weight shift from surface for sit>stand and assist for controlled descent to surface for stand>sit  Pt required verbal cues for safe technique and hand placement  Min A of 2 required for functional mobility with assist for balance/steadying w/ RW and assist for line management  Toileting tasks completed w/ Max A 2* pt requiring B/L UE support on RW when standing for posterior hygiene  Grooming tasks completed w/ Supervision w/ setup required and increased time to complete   LB dressing completed w/ Max A 2* limited functional reach, increased body habitus, and increased abdominal pain when attempting tasks  Pt seated OOB in chair at end of session  Call bell and phone within reach  All needs met and pt reports no further questions for OT at this time  Continue to recommend STR when medically cleared  OT to follow pt on caseload  Plan   Treatment Interventions ADL retraining;Functional transfer training;UE strengthening/ROM; Endurance training;Patient/family training;Equipment evaluation/education; Compensatory technique education; Activityengagement; Energy conservation;Continued evaluation   Goal Expiration Date 10/03/22   OT Treatment Day 1   OT Frequency 3-5x/wk   Recommendation   OT Discharge Recommendation Post acute rehabilitation services   Additional Comments  The patient's raw score on the AM-PAC Daily Activity inpatient short form is 16, standardized score is 35 96, less than 39 4  Patients at this level are likely to benefit from discharge to post-acute rehabilitation services  Please refer to the recommendation of the Occupational Therapist for safe discharge planning     AM-PAC Daily Activity Inpatient   Lower Body Dressing 2   Bathing 2   Toileting 2   Upper Body Dressing 3   Grooming 3   Eating 4   Daily Activity Raw Score 16   Daily Activity Standardized Score (Calc for Raw Score >=11) 35 96   AM-PAC Applied Cognition Inpatient   Following a Speech/Presentation 4   Understanding Ordinary Conversation 4   Taking Medications 4   Remembering Where Things Are Placed or Put Away 4   Remembering List of 4-5 Errands 4   Taking Care of Complicated Tasks 4   Applied Cognition Raw Score 24   Applied Cognition Standardized Score 62 21       Chad Soriano OTR/L

## 2022-09-20 NOTE — ASSESSMENT & PLAN NOTE
Recent Labs     09/19/22  0444 09/19/22  1630 09/20/22  0635   HGB 9 5* 9 6* 8 8*   Likely in the setting of blood loss due to ex lap x2 with a dilutional component after receiving IV fluids post op     Baseline Hb - 12  Will continue monitoring

## 2022-09-20 NOTE — PLAN OF CARE
Problem: PHYSICAL THERAPY ADULT  Goal: Performs mobility at highest level of function for planned discharge setting  See evaluation for individualized goals  Description: Treatment/Interventions: Functional transfer training, LE strengthening/ROM, Elevations, Therapeutic exercise, Endurance training, Patient/family training, Equipment eval/education, Bed mobility, Gait training, Compensatory technique education, Continued evaluation, Spoke to nursing, OT          See flowsheet documentation for full assessment, interventions and recommendations  Outcome: Progressing  Note: Prognosis: Good  Problem List: Decreased strength, Decreased range of motion, Decreased endurance, Impaired balance, Decreased mobility, Decreased coordination, Impaired judgement, Obesity  Assessment: Pt  reported and noted to be fatigued pre and post session  Decreased activity tolerance  However patient cooperated in all activities  Pt  needed total A for pericare in standing post BM in BSC  Pt  needed Min A x 2 for all transfers and ambulation  3rd A for ambulation for managing lines  Pt  needed cues for postural correction during amb , to keep his eyes open t/o session  Pt  seated in recliner post session with OT present in room  Pt  fatigued post session  Will continue follow patient during his stay in hospital and progress his mobility appropriately  Barriers to Discharge: Decreased caregiver support, Inaccessible home environment  Barriers to Discharge Comments: Ax2- caregiver burden, steps, unable to amb household distances  PT Discharge Recommendation: Post acute rehabilitation services    See flowsheet documentation for full assessment

## 2022-09-20 NOTE — ASSESSMENT & PLAN NOTE
Due to strangulated ventral hernia and RLQ abscess of the rectus muscle  As evidenced by WBC-12 9, fever (101 4 F) and tachycardia POA  Treated with fluid boluses and Zosyn in the ED, followed by emergent OR ex lap prior to which patient received a dose of Ancef  Underwent ex lap with hernia repair  Blood cultures negative at 72h  Tissue culture demonstrating few colonies of K  Pneumoniae, E   Coli and Bacteroides vulgatus, S  anginosus  Repeat blood cultures x2 negative at 24 hours  Procalcitonin- 1 44-0 99    Plan:  Maintain MAP>65  Continue Ancef and Flagyl until blood cultures negative x2 at 48 hours  Continue trending procalcitonin  Continue monitoring WBC, trend fever curves

## 2022-09-20 NOTE — ASSESSMENT & PLAN NOTE
Recent Labs     09/18/22 2055 09/19/22  0444 09/20/22  0635   CREATININE 1 39* 1 43* 1 16   EGFR 52 50 65     Estimated Creatinine Clearance: 99 2 mL/min (by C-G formula based on SCr of 1 16 mg/dL)  MIL[place] superimposed on stage 3 CKD due to hypotension and low MAPs overngiht, improved after patient was taken off Propofol and transitioned to Fentanyl     Now resolved, patient back at baseline    Plan:   Continue Isolyte at 125mL/h  Avoid nephrotoxic agents  Avoid hypotension  Renally dose medications  Continue monitoring renal function

## 2022-09-20 NOTE — PHYSICAL THERAPY NOTE
Physical Therapy Treatment Note     09/20/22 1443   PT Last Visit   PT Visit Date 09/20/22   Note Type   Note Type Treatment   Pain Assessment   Pain Assessment Tool 0-10   Pain Score No Pain   Restrictions/Precautions   Weight Bearing Precautions Per Order No   Other Precautions Fall Risk;Multiple lines;Telemetry   General   Chart Reviewed Yes   Subjective   Subjective Pt  seated on recliner upon entry  Agreeable to PT   Transfers   Sit to Stand 4  Minimal assistance   Additional items Assist x 2;Verbal cues; Increased time required;Armrests   Stand to Sit 4  Minimal assistance   Additional items Assist x 2; Increased time required;Verbal cues;Armrests   Stand pivot 4  Minimal assistance   Additional items Assist x 2; Increased time required;Verbal cues;Armrests   Toilet transfer 4  Minimal assistance   Additional items Assist x 2; Increased time required;Commode;Verbal cues;Armrests   Ambulation/Elevation   Gait pattern Improper Weight shift; Wide MAXINE; Forward Flexion;Decreased foot clearance; Inconsistent kang; Foward flexed; Short stride; Excessively slow;Decreased toe off;Decreased heel strike   Gait Assistance 4  Minimal assist   Additional items Assist x 2;Assist x 3;Verbal cues; Other (Comment)  (3rd A for lines only)   Assistive Device Bariatric Rolling walker   Distance 4ft, 10ft, 6ft   Balance   Static Sitting Fair +   Dynamic Sitting Fair   Static Standing Fair -   Dynamic Standing Poor +   Ambulatory Poor +   Endurance Deficit   Endurance Deficit Yes   Endurance Deficit Description Fatigue   Activity Tolerance   Activity Tolerance Patient tolerated treatment well;Patient limited by fatigue   Nurse Made Aware Yes   Exercises   Knee AROM Long Arc Quad Sitting;Bilateral;10 reps;AROM   Ankle Pumps Sitting;Bilateral;AROM;10 reps   Assessment   Prognosis Good   Problem List Decreased strength;Decreased range of motion;Decreased endurance; Impaired balance;Decreased mobility; Decreased coordination; Impaired judgement;Obesity   Assessment Pt  reported and noted to be fatigued pre and post session  Decreased activity tolerance  However patient cooperated in all activities  Pt  needed total A for pericare in standing post BM in BSC  Pt  needed Min A x 2 for all transfers and ambulation  3rd A for ambulation for managing lines  Pt  needed cues for postural correction during amb , to keep his eyes open t/o session  Pt  seated in recliner post session with OT present in room  Pt  fatigued post session  Will continue follow patient during his stay in hospital and progress his mobility appropriately  Barriers to Discharge Decreased caregiver support; Inaccessible home environment   Goals   Patient Goals None reported   STG Expiration Date 10/03/22   PT Treatment Day 1   Plan   Treatment/Interventions Functional transfer training;LE strengthening/ROM; Therapeutic exercise;Gait training;Equipment eval/education;Patient/family training;Spoke to nursing;OT   Progress Slow progress, decreased activity tolerance   PT Frequency Other (Comment)  (4-5x/week)   Recommendation   PT Discharge Recommendation Post acute rehabilitation services   AM-PAC Basic Mobility Inpatient   Turning in Bed Without Bedrails 2   Lying on Back to Sitting on Edge of Flat Bed 2   Moving Bed to Chair 2   Standing Up From Chair 2   Walk in Room 2   Climb 3-5 Stairs 1   Basic Mobility Inpatient Raw Score 11   Basic Mobility Standardized Score 30 25   Turning Head Towards Sound 4   Follow Simple Instructions 4   Low Function Basic Mobility Raw Score 19   Low Function Basic Mobility Standardized Score 31 06   Highest Level Of Mobility   JH-HLM Goal 4: Move to chair/commode   JH-HLM Achieved 6: Walk 10 steps or more   End of Consult   Patient Position at End of Consult Bedside chair; All needs within reach         Margie Nichols PTA    An AM-PAC basic mobility standardized score less than 42 9 suggest the patient may benefit from discharge to post-acute rehab services

## 2022-09-20 NOTE — PLAN OF CARE
Problem: OCCUPATIONAL THERAPY ADULT  Goal: Performs self-care activities at highest level of function for planned discharge setting  See evaluation for individualized goals  Description: Treatment Interventions: ADL retraining, Functional transfer training, UE strengthening/ROM, Endurance training, Patient/family training, Equipment evaluation/education, Compensatory technique education, Continued evaluation, Activityengagement          See flowsheet documentation for full assessment, interventions and recommendations  Outcome: Progressing  Note: Limitation: Decreased ADL status, Decreased UE ROM, Decreased endurance, Decreased self-care trans, Decreased high-level ADLs  Prognosis: Good  Assessment: Pt seen for OT treatment session focusing on functional activity tolerance, ADLs, and functional transfers/mobility  Pt alert and cooperative throughout session, however required verbal cues to keep eyes open throughout session  Pt seated OOB in chair at start of session  Transfers (sit<>stand) completed w/ Min A of 2 with assist to initiate forward weight shift from surface for sit>stand and assist for controlled descent to surface for stand>sit  Pt required verbal cues for safe technique and hand placement  Min A of 2 required for functional mobility with assist for balance/steadying w/ RW and assist for line management  Toileting tasks completed w/ Max A 2* pt requiring B/L UE support on RW when standing for posterior hygiene  Grooming tasks completed w/ Supervision w/ setup required and increased time to complete  LB dressing completed w/ Max A 2* limited functional reach, increased body habitus, and increased abdominal pain when attempting tasks  Pt seated OOB in chair at end of session  Call bell and phone within reach  All needs met and pt reports no further questions for OT at this time  Continue to recommend STR when medically cleared  OT to follow pt on caseload       OT Discharge Recommendation: Post acute rehabilitation services

## 2022-09-20 NOTE — ASSESSMENT & PLAN NOTE
As evidenced by orthopnea, fever, leukocytosis, mild supplemental O2 requirements and CT PE noting  Right greater than left filling defects consistent with PE  No RV strain noted  No prior hx of DVT/PE  Likely in the setting of morbid obesity and s/p abdominal surgical intervention  Plan:  Patient started on heparin gtt  Consider transitioning to DOAC x 3-6mo     Continue monitoring vitals

## 2022-09-20 NOTE — PLAN OF CARE
Problem: PAIN - ADULT  Goal: Verbalizes/displays adequate comfort level or baseline comfort level  Description: Interventions:  - Encourage patient to monitor pain and request assistance  - Assess pain using appropriate pain scale  - Administer analgesics based on type and severity of pain and evaluate response  - Implement non-pharmacological measures as appropriate and evaluate response  - Consider cultural and social influences on pain and pain management  - Notify physician/advanced practitioner if interventions unsuccessful or patient reports new pain  Outcome: Progressing     Problem: INFECTION - ADULT  Goal: Absence or prevention of progression during hospitalization  Description: INTERVENTIONS:  - Assess and monitor for signs and symptoms of infection  - Monitor lab/diagnostic results  - Monitor all insertion sites, i e  indwelling lines, tubes, and drains  - Monitor endotracheal if appropriate and nasal secretions for changes in amount and color  - [place] appropriate cooling/warming therapies per order  - Administer medications as ordered  - Instruct and encourage patient and family to use good hand hygiene technique  - Identify and instruct in appropriate isolation precautions for identified infection/condition  Outcome: Progressing     Problem: SAFETY ADULT  Goal: Patient will remain free of falls  Description: INTERVENTIONS:  - Educate patient/family on patient safety including physical limitations  - Instruct patient to call for assistance with activity   - Consult OT/PT to assist with strengthening/mobility   - Keep Call bell within reach  - Keep bed low and locked with side rails adjusted as appropriate  - Keep care items and personal belongings within reach  - Initiate and maintain comfort rounds  - Make Fall Risk Sign visible to staff  - Apply yellow socks and bracelet for high fall risk patients  - Consider moving patient to room near nurses station  Outcome: Progressing  Goal: Maintain or return to baseline ADL function  Description: INTERVENTIONS:  -  Assess patient's ability to carry out ADLs; assess patient's baseline for ADL function and identify physical deficits which impact ability to perform ADLs (bathing, care of mouth/teeth, toileting, grooming, dressing, etc )  - Assess/evaluate cause of self-care deficits   - Assess range of motion  - Assess patient's mobility; develop plan if impaired  - Assess patient's need for assistive devices and provide as appropriate  - Encourage maximum independence but intervene and supervise when necessary  - Involve family in performance of ADLs  - Assess for home care needs following discharge   - Consider OT consult to assist with ADL evaluation and planning for discharge  - Provide patient education as appropriate  Outcome: Progressing  Goal: Maintains/Returns to pre admission functional level  Description: INTERVENTIONS:  - Perform BMAT or MOVE assessment daily    - Set and communicate daily mobility goal to care team and patient/family/caregiver     - Collaborate with rehabilitation services on mobility goals if consulted  - Out of bed for toileting  - Record patient progress and toleration of activity level   Outcome: Progressing     Problem: DISCHARGE PLANNING  Goal: Discharge to home or other facility with appropriate resources  Description: INTERVENTIONS:  - Identify barriers to discharge w/patient and caregiver  - Arrange for needed discharge resources and transportation as appropriate  - Identify discharge learning needs (meds, wound care, etc )  - Arrange for interpretive services to assist at discharge as needed  - Refer to Case Management Department for coordinating discharge planning if the patient needs post-hospital services based on physician/advanced practitioner order or complex needs related to functional status, cognitive ability, or social support system  Outcome: Progressing     Problem: Knowledge Deficit  Goal: Patient/family/caregiver demonstrates understanding of disease process, treatment plan, medications, and discharge instructions  Description: Complete learning assessment and assess knowledge base  Interventions:  - Provide teaching at level of understanding  - Provide teaching via preferred learning methods  Outcome: Progressing     Problem: MOBILITY - ADULT  Goal: Maintain or return to baseline ADL function  Description: INTERVENTIONS:  -  Assess patient's ability to carry out ADLs; assess patient's baseline for ADL function and identify physical deficits which impact ability to perform ADLs (bathing, care of mouth/teeth, toileting, grooming, dressing, etc )  - Assess/evaluate cause of self-care deficits   - Assess range of motion  - Assess patient's mobility; develop plan if impaired  - Assess patient's need for assistive devices and provide as appropriate  - Encourage maximum independence but intervene and supervise when necessary  - Involve family in performance of ADLs  - Assess for home care needs following discharge   - Consider OT consult to assist with ADL evaluation and planning for discharge  - Provide patient education as appropriate  Outcome: Progressing  Goal: Maintains/Returns to pre admission functional level  Description: INTERVENTIONS:  - Perform BMAT or MOVE assessment daily    - Set and communicate daily mobility goal to care team and patient/family/caregiver     - Collaborate with rehabilitation services on mobility goals if consulted  - Out of bed for toileting  - Record patient progress and toleration of activity level   Outcome: Progressing     Problem: Potential for Falls  Goal: Patient will remain free of falls  Description: INTERVENTIONS:  - Educate patient/family on patient safety including physical limitations  - Instruct patient to call for assistance with activity   - Consult OT/PT to assist with strengthening/mobility   - Keep Call bell within reach  - Keep bed low and locked with side rails adjusted as appropriate  - Keep care items and personal belongings within reach  - Initiate and maintain comfort rounds  - Make Fall Risk Sign visible to staff  - Apply yellow socks and bracelet for high fall risk patients  - Consider moving patient to room near nurses station  Outcome: Progressing     Problem: Prexisting or High Potential for Compromised Skin Integrity  Goal: Skin integrity is maintained or improved  Description: INTERVENTIONS:  - Identify patients at risk for skin breakdown  - Assess and monitor skin integrity  - Assess and monitor nutrition and hydration status  - Monitor labs   - Assess for incontinence   - Turn and reposition patient  - Assist with mobility/ambulation  - Relieve pressure over bony prominences  - Avoid friction and shearing  - Provide appropriate hygiene as needed including keeping skin clean and dry  - Evaluate need for skin moisturizer/barrier cream  - Collaborate with interdisciplinary team   - Patient/family teaching  - Consider wound care consult   Outcome: Progressing     Problem: Nutrition/Hydration-ADULT  Goal: Nutrient/Hydration intake appropriate for improving, restoring or maintaining nutritional needs  Description: Monitor and assess patient's nutrition/hydration status for malnutrition  Collaborate with interdisciplinary team and initiate plan and interventions as ordered  Monitor patient's weight and dietary intake as ordered or per policy  Utilize nutrition screening tool and intervene as necessary  Determine patient's food preferences and provide high-protein, high-caloric foods as appropriate       INTERVENTIONS:  - Monitor oral intake, urinary output, labs, and treatment plans  - Assess nutrition and hydration status and recommend course of action  - Evaluate amount of meals eaten  - Assist patient with eating if necessary   - Allow adequate time for meals  - Recommend/ encourage appropriate diets, oral nutritional supplements, and vitamin/mineral supplements  - Order, calculate, and assess calorie counts as needed  - Recommend, monitor, and adjust tube feedings and TPN/PPN based on assessed needs  - Assess need for intravenous fluids  - Provide specific nutrition/hydration education as appropriate  - Include patient/family/caregiver in decisions related to nutrition  Outcome: Progressing     Problem: GASTROINTESTINAL - ADULT  Goal: Minimal or absence of nausea and/or vomiting  Description: INTERVENTIONS:  - Administer IV fluids if ordered to ensure adequate hydration  - Maintain NPO status until nausea and vomiting are resolved  - Nasogastric tube if ordered  - Administer ordered antiemetic medications as needed  - Provide nonpharmacologic comfort measures as appropriate  - Advance diet as tolerated, if ordered  - Consider nutrition services referral to assist patient with adequate nutrition and appropriate food choices  Outcome: Progressing  Goal: Maintains or returns to baseline bowel function  Description: INTERVENTIONS:  - Assess bowel function  - Encourage oral fluids to ensure adequate hydration  - Administer IV fluids if ordered to ensure adequate hydration  - Administer ordered medications as needed  - Encourage mobilization and activity  - Consider nutritional services referral to assist patient with adequate nutrition and appropriate food choices  Outcome: Progressing  Goal: Maintains adequate nutritional intake  Description: INTERVENTIONS:  - Monitor percentage of each meal consumed  - Identify factors contributing to decreased intake, treat as appropriate  - Assist with meals as needed  - Monitor I&O, weight, and lab values if indicated  - Obtain nutrition services referral as needed  Outcome: Progressing  Goal: Oral mucous membranes remain intact  Description: INTERVENTIONS  - Assess oral mucosa and hygiene practices  - Implement preventative oral hygiene regimen  - Implement oral medicated treatments as ordered  - Initiate Nutrition services referral as needed  Outcome: Progressing

## 2022-09-20 NOTE — PROGRESS NOTES
Progress Note - General Surgery   Kee Pritchett 78 y o  male MRN: [id number]  Unit/Bed#: ICU 07 Encounter: [id number]    Assessment:  78yoM p/w incarcerated incisional hernia and SBO now s/p:  9/14: ex lap, reduction of strangulated ventral hernia, debridement of RLQ abdominal wall musculature, Abthera VAC placement  9/15: ex lap, RLQ hernia repair w mesh and VAC, complex anterior abdominal wall repair w mesh and retention sutures, LANETTE  Found to have b/l subsegmental PE  Urinary Retention    VAC minimal cc SS  Temp 102 2 @ 1747    Plan:  · Fever workup  · Appreciate ID input  · Monitor WBC, fever curve  · Continue ABX  · Soft diet as tolerated  · abdominal VAC, monitor output and character- MWF changes  · Continue heparin gtt  · Appreciate ICU care  · PT/OT- rehab  · Please tigertext on call [facility] surgery resident or AP with any questions      Subjective/Objective   Subjective:   No acute events overnight  Pain control  Passing gas  Having bowel function  Lasix started yesterday    Objective:     Blood pressure 128/64, pulse 88, temperature 100 04 °F (37 8 °C), resp  rate (!) 29, height 5' 9" (1 753 m), weight (!) 176 kg (389 lb 1 8 oz), SpO2 97 %  ,Body mass index is 57 46 kg/m²  Intake/Output Summary (Last 24 hours) at 9/21/2022 0551  Last data filed at 9/21/2022 0537  Gross per 24 hour   Intake 1603 8 ml   Output 2670 ml   Net -1066 2 ml       Invasive Devices  Report    Peripheral Intravenous Line  Duration           Peripheral IV 09/18/22 Right Antecubital 2 days    Peripheral IV 09/19/22 Left Antecubital 1 day    Peripheral IV 09/20/22 Right;Upper;Ventral (anterior) Arm 1 day          Drain  Duration           Urethral Catheter Temperature probe 16 Fr  1 day              Physical Exam:   Gen:  NAD  HEENT: NCAT  MMM    CV: well perfused, pulses palpable  Lungs: Normal respiratory effort  Abd: soft, nt/nd, incisions CDI, VAC intact  Skin: warm/ dry  Extremities: no peripheral edema, no cyanosis  Neuro: AxO x3    Lab, Imaging and other studies:  I have personally reviewed pertinent lab results    , CBC:   Lab Results   Component Value Date    WBC 21 05 (H) 09/21/2022    HGB 8 8 (L) 09/21/2022    HCT 27 9 (L) 09/21/2022    MCV 98 09/21/2022     09/21/2022    MCH 30 9 09/21/2022    MCHC 31 5 09/21/2022    RDW 14 0 09/21/2022    MPV 9 4 09/21/2022   , CMP:   Lab Results   Component Value Date    SODIUM 141 09/20/2022    K 3 8 09/20/2022     09/20/2022    CO2 27 09/20/2022    BUN 17 09/20/2022    CREATININE 1 16 09/20/2022    CALCIUM 7 2 (L) 09/20/2022    AST 35 09/20/2022    ALT 6 (L) 09/20/2022    ALKPHOS 50 09/20/2022    EGFR 65 09/20/2022     VTE Pharmacologic Prophylaxis: Sequential compression device (Venodyne)  and Heparin  VTE Mechanical Prophylaxis: sequential compression device

## 2022-09-20 NOTE — PROGRESS NOTES
[facility]  Progress Note - Stephanie Kirill 1955, 78 y o  male MRN: [id number]  Unit/Bed#: ICU 07 Encounter: [id number]  Primary Care Provider: Sheryl Stephen MD   Date and time admitted to hospital: 9/14/2022 11:29 AM    * Strangulated hernia of abdominal wall  Assessment & Plan  S/p exp lap, complex lysis of adhesions, reduction of strangulated hernia, debridement RLQ muscle with counter incision, abthera placement x 2  Intraoperative course was uneventful  RLQ Hernia repair with wound VAC placement and anterior abdominal wall repair with mesh and retention sutures x12 on 09/15/2022  NGT D/C'd    Pain management with PRN Dilaudid  Diet advanced to soft surgical  Continue surgical incision site wound care  Monitor wound vac output    Pulmonary embolism ([facility] [address] )  Assessment & Plan  As evidenced by orthopnea, fever, leukocytosis, mild supplemental O2 requirements and CT PE noting  Right greater than left filling defects consistent with PE  No RV strain noted  No prior hx of DVT/PE  Likely in the setting of morbid obesity and s/p abdominal surgical intervention  Plan:  Patient started on heparin gtt  Consider transitioning to DOAC x 3-6mo  Continue monitoring vitals    Sepsis ([address] )  Assessment & Plan  Due to strangulated ventral hernia and RLQ abscess of the rectus muscle  As evidenced by WBC-12 9, fever (101 4 F) and tachycardia POA  Treated with fluid boluses and Zosyn in the ED, followed by emergent OR ex lap prior to which patient received a dose of Ancef  Underwent ex lap with hernia repair  Blood cultures negative at 72h  Tissue culture demonstrating few colonies of K  Pneumoniae, E   Coli and Bacteroides vulgatus, S  anginosus  Repeat blood cultures x2 negative at 24 hours  Procalcitonin- 1 44-0 99    Plan:  Maintain MAP>65  Continue Ancef and Flagyl until blood cultures negative x2 at 48 hours  Continue trending procalcitonin  Continue monitoring WBC, trend fever curves      Respiratory insufficiency-resolved as of 9/17/2022  Assessment & Plan  Patient extubated on 9/16      Acute kidney injury (HCC)-resolved as of 9/17/2022  Assessment & Plan  Recent Labs     09/18/22  2055 09/19/22  0444 09/20/22  0635   CREATININE 1 39* 1 43* 1 16   EGFR 52 50 65     Estimated Creatinine Clearance: 99 2 mL/min (by C-G formula based on SCr of 1 16 mg/dL)  MILTON superimposed on stage 3 CKD due to hypotension and low MAPs overngiht, improved after patient was taken off Propofol and transitioned to Fentanyl  Now resolved, patient back at baseline    Plan:   Continue Isolyte at 125mL/h  Avoid nephrotoxic agents  Avoid hypotension  Renally dose medications  Continue monitoring renal function    Anemia  Assessment & Plan  Recent Labs     09/19/22  0444 09/19/22  1630 09/20/22  0635   HGB 9 5* 9 6* 8 8*   Likely in the setting of blood loss due to ex lap x2 with a dilutional component after receiving IV fluids post op  Baseline Hb - 12  Will continue monitoring       Class 3 severe obesity due to excess calories with serious comorbidity and body mass index (BMI) of 50 0 to 59 9 in adult [facility])  Assessment & Plan  Encourage lifestyle modifications    DELIA (obstructive sleep apnea)  Assessment & Plan  Patient endorses snoring at night, waking up unrefreshed by sleep, daytime sleepiness  Has not had a formal evaluation or sleep study     Does not desire trial of bedtime CPAP at this time but will consider  Referral to sleep medicine at discharge    CKD (chronic kidney disease), stage III [facility])  Assessment & Plan  Lab Results   Component Value Date    EGFR 65 09/20/2022    EGFR 50 09/19/2022    EGFR 52 09/18/2022    CREATININE 1 16 09/20/2022    CREATININE 1 43 (H) 09/19/2022    CREATININE 1 39 (H) 09/18/2022   Baseline creatinine is 1 4-1 7; OR I/Os 3L/375 ml  Now back to baseline  Continue avoiding nephrotoxins, avoid hypotension  Renally dose medications  Monitor renal indices closely      Essential hypertension  Assessment & Plan  Holding Lisinopril and Lasix at this time, consider restarting  Consider restarting Toprol XL-- currently lopressor 5 mg q6h, consider switching to PO  Consider restarting home Lasix given patient's 1+ pitting edema in the lower extremities bilaterally  Maintain MAP>65      ----------------------------------------------------------------------------------------  HPI/24hr events:  Patient found to have PE on imaging on , started on heparin drip  Failed urinary retention protocol necessitating the insertion of a Arias catheter  Otherwise no other overnight events  Patient appropriate for transfer out of the ICU today?: Patient does not meet criteria for ICU Follow-up Clinic; referral has not been made  Disposition: Transfer to [facility]  Code Status: Level 1 - Full Code  ---------------------------------------------------------------------------------------  SUBJECTIVE  Patient found lying in bed, alert, not in acute distress at the time of my evaluation  Endorses hunger and would like his diet escalated  He also notes that his abdomen feels distended, however he denies abdominal pain or tenderness at the site of surgical incisions  States that he is unable to feel when he needs to defecate  He denied dizziness, lightheadedness, vision changes, chest pain, shortness of breath, palpitations      Review of Systems  Review of systems was reviewed and negative unless stated above in HPI/24-hour events   ---------------------------------------------------------------------------------------  OBJECTIVE    Vitals   Vitals:    22 0620 22 0640 22 0715 22 0754   BP:   127/57    BP Location:   Left arm    Pulse: 86  92    Resp: (!) 25  (!) 30    Temp:  98 1 °F (36 7 °C) (!) 97 4 °F (36 3 °C) 98 4 °F (36 9 °C)   TempSrc:  Tympanic Tympanic Tympanic   SpO2: 100%  100%    Weight:       Height:         Temp (24hrs), Av 6 °F (37 °C), Min:97 4 °F (36 3 °C), Max:100 4 °F (38 °C)  Current: Temperature: 98 4 °F (36 9 °C)  Arterial Line BP: 60/56  Arterial Line MAP (mmHg): (!) 58 mmHg    Respiratory:  SpO2: SpO2: 100 %  Nasal Cannula O2 Flow Rate (L/min): 2 L/min    Invasive/non-invasive ventilation settings   Respiratory  Report   Lab Data (Last 4 hours)    None         O2/Vent Data (Last 4 hours)    None                Physical Exam  Vitals and nursing note reviewed  Constitutional:       General: He is not in acute distress  Appearance: Normal appearance  He is morbidly obese  He is not ill-appearing, toxic-appearing or diaphoretic  HENT:      Head: Normocephalic and atraumatic  Nose: Nose normal       Mouth/Throat:      Mouth: Mucous membranes are moist       Pharynx: Oropharynx is clear  Eyes:      General: No scleral icterus  Right eye: No discharge  Left eye: No discharge  Extraocular Movements: Extraocular movements intact  Conjunctiva/sclera: Conjunctivae normal    Cardiovascular:      Rate and Rhythm: Normal rate and regular rhythm  Pulses: Normal pulses  Heart sounds: Normal heart sounds  No murmur heard  Pulmonary:      Effort: Pulmonary effort is normal  No respiratory distress  Breath sounds: Rhonchi present  No wheezing or rales  Comments: Coarse breath sounds bilaterally  Abdominal:      General: Abdomen is protuberant  Bowel sounds are normal  There is no distension  Palpations: Abdomen is soft  Tenderness: There is no abdominal tenderness  There is no guarding or rebound  Comments: Abdominal binder in situ  Midline incision covered by dressing  RLQ wound vac in situ with 200mL serosanguineous drainage  Musculoskeletal:      Cervical back: Normal range of motion and neck supple  Right lower leg: Edema present  Left lower leg: Edema present        Comments: 2+ pitting edema in the lower extremities bilaterally, most pronounced at the dorsum of the feet   Skin:     General: Skin is warm and dry  Coloration: Skin is not jaundiced or pale  Neurological:      Mental Status: He is alert and oriented to person, place, and time  Mental status is at baseline  Psychiatric:         Mood and Affect: Mood normal          Behavior: Behavior normal          Thought Content: Thought content normal          Judgment: Judgment normal              Laboratory and Diagnostics:  Results from last 7 days   Lab Units 09/20/22 0635 09/19/22  1630 09/19/22 0444 09/18/22 2055 09/18/22  0515 09/17/22  0537 09/16/22  0506 09/15/22  1504 09/15/22  0336 09/14/22 1952 09/14/22  1203   WBC Thousand/uL 19 83* 21 73* 19 51* 19 44* 16 69* 15 28* 14 74* 16 29* 15 84*  --  12 90*   HEMOGLOBIN g/dL 8 8* 9 6* 9 5* 9 4* 9 4* 9 1* 10 0* 11 6* 12 5  --  12 1   I STAT HEMOGLOBIN   --   --   --   --   --   --   --   --   --    < >  --    HEMATOCRIT % 27 5* 30 9* 30 4* 30 4* 31 4* 29 4* 31 4* 37 3 39 1  --  37 1   HEMATOCRIT, ISTAT   --   --   --   --   --   --   --   --   --    < >  --    PLATELETS Thousands/uL 286 382 388 398* 385 366 331 367 380  --  416*   NEUTROS PCT %  --   --   --   --  86* 88* 89*  --   --   --  85*   BANDS PCT %  --   --   --  1  --   --   --  7 12*  --   --    MONOS PCT %  --   --   --   --  6 5 6  --   --   --  7   MONO PCT %  --   --   --  5  --   --   --  7 4  --   --     < > = values in this interval not displayed       Results from last 7 days   Lab Units 09/20/22 0635 09/19/22 0444 09/18/22 2055 09/18/22  0515 09/17/22  0537 09/16/22  0506 09/15/22  1504 09/15/22  0336 09/14/22 1952 09/14/22  1203   SODIUM mmol/L 141 141 141 142 140 138 137 135  --  134*   POTASSIUM mmol/L 3 8 3 8 3 6 4 0 3 9 4 2 4 8 4 4  --  3 9   CHLORIDE mmol/L 106 107 106 108 108 104 104 104  --  99   CO2 mmol/L 27 30 29 26 26 23 20* 21  --  27   CO2, I-STAT   --   --   --   --   --   --   --   --    < >  --    ANION GAP mmol/L 8 4 6 8 6 11 13 10  --  8   BUN mg/dL 17 23 23 22 21 28* 24 23  --  21   CREATININE mg/dL 1 16 1 43* 1 39* 1 43* 1 50* 2 05* 1 91* 1 48*  --  1 64*   CALCIUM mg/dL 7 2* 8 0* 8 0* 8 2* 7 7* 7 8* 7 5* 8 2*  --  8 8   GLUCOSE RANDOM mg/dL 114 120 123 100 125 151* 155* 145*  --  115   ALT U/L 6* 12  --   --   --  25 20 14  --  15   AST U/L 35 24  --   --   --  31 23 14  --  13   ALK PHOS U/L 50 57  --   --   --  44* 47 47  --  55   ALBUMIN g/dL 1 4* 1 6*  --   --   --  1 8* 1 8* 2 1*  --  2 6*   TOTAL BILIRUBIN mg/dL 0 37 0 35  --   --   --  0 64 0 70 1 02*  --  1 25*    < > = values in this interval not displayed  Results from last 7 days   Lab Units 09/19/22  0444 09/18/22 2055 09/18/22  0515 09/16/22  0506 09/15/22  1504 09/15/22  0336   MAGNESIUM mg/dL 2 1 2 1 2 4 2 2 1 6 1 8   PHOSPHORUS mg/dL 2 6 1 6*  --  3 9 5 6* 3 5      Results from last 7 days   Lab Units 09/20/22  0635 09/19/22  2348 09/19/22  2241 09/19/22  1630 09/14/22  1203   INR   --   --   --  1 34* 1 19   PTT seconds 50* 57* 179* 29 39*          Results from last 7 days   Lab Units 09/15/22  0007 09/14/22  1947 09/14/22  1220   LACTIC ACID mmol/L 1 4 1 1 1 1     ABG:  Results from last 7 days   Lab Units 09/15/22  0336   PH ART  7 382   PCO2 ART mm Hg 34 4*   PO2 ART mm Hg 237 7*   HCO3 ART mmol/L 20 0*   BASE EXC ART mmol/L -4 3   ABG SOURCE  Line, Arterial     VBG:  Results from last 7 days   Lab Units 09/18/22  2056 09/15/22  0336   PH JONH  7 408*  --    PCO2 JONH mm Hg 40 8*  --    PO2 JONH mm Hg 72 8*  --    HCO3 JONH mmol/L 25 2  --    BASE EXC JONH mmol/L 0 5  --    ABG SOURCE   --  Line, Arterial     Results from last 7 days   Lab Units 09/20/22  0635 09/19/22  0444 09/14/22  1203   PROCALCITONIN ng/ml 0 99* 1 44* 0 75*       Micro  Results from last 7 days   Lab Units 09/18/22  1043 09/14/22  1944 09/14/22  1220 09/14/22  1203   BLOOD CULTURE  No Growth at 24 hrs  No Growth at 24 hrs   --  No Growth After 5 Days  No Growth After 5 Days     GRAM STAIN RESULT   --  No Polys or Bacteria seen  -- --        EKG:  NSR, heart rate 89  Imaging: I have personally reviewed pertinent reports  and I have personally reviewed pertinent films in PACS    Intake and Output  I/O       09/18 0701 09/19 0700 09/19 0701 09/20 0700 09/20 0701 09/21 0700    P  O  1560 1440     I V  (mL/kg)  1845 2 (10 5)     IV Piggyback 700 200     Total Intake(mL/kg) 2260 (13) 3485 2 (19 9)     Urine (mL/kg/hr) 1785 (0 4) 1875 (0 4)     Drains  475     Stool  0     Total Output 1785 2350     Net +475 +1135 2            Unmeasured Stool Occurrence  2 x           Height and Weights   Height: 5' 9" (175 3 cm)  IBW (Ideal Body Weight): 70 7 kg  Body mass index is 56 97 kg/m²  Weight (last 2 days)     Date/Time Weight    09/20/22 0525 175 (385 81)            Nutrition       Diet Orders   (From admission, onward)             Start     Ordered    09/20/22 0733  Diet Surgical; Surgical Soft/Lite Meal; Sodium 2 GM, No Carbonation  Diet effective now        References:    Nutrtion Support Algorithm Enteral vs  Parenteral   Question Answer Comment   Diet Type Surgical    Surgical Surgical Soft/Lite Meal    Other Restriction(s): Sodium 2 GM    Other Restriction(s): No Carbonation    RD to adjust diet per protocol?  No        09/20/22 0735                  Active Medications  Scheduled Meds:  Current Facility-Administered Medications   Medication Dose Route Frequency Provider Last Rate    acetaminophen  650 mg Oral Q6H PRN Pham Espinoza MD      allopurinol  100 mg Oral Daily Sheryl Fragoso MD      cefazolin  2,000 mg Intravenous Q8H Sheryl Fragoso MD 2,000 mg (09/20/22 0708)    glycerin-hypromellose-  1 drop Both Eyes Q4H PRN Sheryl Fragoso MD      heparin (porcine)  3-30 Units/kg/hr (Order-Specific) Intravenous Titrated Sheryl Fragoso MD 20 Units/kg/hr (09/20/22 0243)    heparin (porcine)  10,000 Units Intravenous Q1H PRN Sheryl Fragoso MD      heparin (porcine)  5,000 Units Intravenous Q1H PRN Sheryl Fragoso MD  HYDROmorphone  1 mg Intravenous Q4H PRN Sparkle Xavier MD      metoprolol tartrate  25 mg Oral Q12H [address] Sparkle Xavier MD      metroNIDAZOLE  500 mg Oral Q8H [address] Sparkle Xavier MD      pantoprazole  40 mg Oral Early Morning Sparkle Xavier MD      saccharomyces boulardii  250 mg Oral BID Karri Krishna MD      tamsulosin  0 4 mg Oral Daily With Raleigh Osler, MD      travoprost  1 drop Both Eyes HS Sparkle Xavier MD       Continuous Infusions:  heparin (porcine), 3-30 Units/kg/hr (Order-Specific), Last Rate: 20 Units/kg/hr (09/20/22 0243)      PRN Meds:   acetaminophen, 650 mg, Q6H PRN  glycerin-hypromellose-, 1 drop, Q4H PRN  heparin (porcine), 10,000 Units, Q1H PRN  heparin (porcine), 5,000 Units, Q1H PRN  HYDROmorphone, 1 mg, Q4H PRN        Invasive Devices Review  Invasive Devices  Report    Peripheral Intravenous Line  Duration           Peripheral IV 09/18/22 Right Antecubital 2 days    Peripheral IV 09/19/22 Left Antecubital <1 day    Peripheral IV 09/20/22 Right;Upper;Ventral (anterior) Arm <1 day          Drain  Duration           Urethral Catheter Temperature probe 16 Fr  <1 day                Rationale for remaining devices:  Patient failed urinary retention protocol, necessitating placement of Arias catheter  Peripheral IVs needed for IV access  ---------------------------------------------------------------------------------------  Advance Directive and Living Will:      Power of :    POLST:    ---------------------------------------------------------------------------------------  Care Time Delivered:   No Critical Care time spent       Sparkle Xavier MD      Portions of the record may have been created with voice recognition software  Occasional wrong word or "sound a like" substitutions may have occurred due to the inherent limitations of voice recognition software    Read the chart carefully and recognize, using context, where substitutions have occurred

## 2022-09-20 NOTE — CASE MANAGEMENT
Case Management Discharge Planning Note    Patient name Audi Kasper  Location ICU 07/ICU 07 MRN [id number]  : 1955 Date 2022       Current Admission Date: 2022  Current Admission Diagnosis:Strangulated hernia of abdominal wall   Patient Active Problem List    Diagnosis Date Noted    Pulmonary embolism ([facility] [address] ) 2022    Anemia 2022    Strangulated hernia of abdominal wall 2022    COVID-19 2022    Sepsis ([facility] ) 2021    Right foot pain 2019    Class 3 severe obesity due to excess calories with serious comorbidity and body mass index (BMI) of 50 0 to 59 9 in adult [facility]) 2019    Lymphedema of both lower extremities 2019    CKD [facility] 2018    HDL deficiency 2018    Gout 2018    DELIA (obstructive sleep apnea) 2018    Allergic rhinitis 2018    Essential hypertension 2018    Kidney stones 2018      LOS (days): 6  Geometric Mean LOS (GMLOS) (days): 5 30  Days to GMLOS:-0 3     OBJECTIVE:  Risk of Unplanned Readmission Score: 17 49         Current admission status: Inpatient   [facility] [address] [place] [address]  Phone: [phone] Fax: [phone]    Primary Care Provider: Jacquelin Arroyo MD    Primary Insurance: MEDICARE  Secondary Insurance: [employer]    DISCHARGE DETAILS:    Discharge planning discussed with[de-identified] Patient  Freedom of Choice: Yes  Comments - Freedom of Choice: Patient interested in [place] and [place] - agreeable to referrals to [facility] and [facility]  Also agreeable to referrals to SNFs in 10 mile radius  CM to f/u for choices and discuss options w/ patient and his wife       Were Treatment Team discharge recommendations reviewed with patient/caregiver?: Yes  Did patient/caregiver verbalize understanding of patient care needs?: N/A- going to facility  Were patient/caregiver advised of the risks associated with not following Treatment Team discharge recommendations?: Yes              DME Referral Provided  Referral made for DME?: No    Other Referral/Resources/Interventions Provided:  Interventions: Short Term Rehab, Acute Rehab  Referral Comments: Referrals sent to Acute and SNFs in 10 mile radius    Would you like to participate in our [address] service program?  : No - Declined    Treatment Team Recommendation: Short Term Rehab  Discharge Destination Plan[de-identified] Short Term Rehab  Transport at Discharge : BLS Ambulance

## 2022-09-20 NOTE — PLAN OF CARE
Alert and oriented  Was in bedside recliner at shift change  Requested bedside commode and back to bed  Max assist with walker  Patient requires frequent queing and remains unstable  Abdominal binder in place  Wound examined  Clean dry intact ABD pads  Unable to void  Previous straight cath  Loose BM brown in color  Requested probiotic PO due to multiple ABX on board and risk for Cdiff  Arias placed due to continued urinary retention  4L NC placed overnight for shallow breathing with desat oxygenation  Continues on clear liquid diet  IV fluids discontinued per surgery team  Dressing changed by surgery team, midline retention sutures intact  Wound vac canister changed  Continues with serosanguinous drainage  X1 PIV placed by ultrasound due to difficulty drawing labs and expiring PIV  SCDs in place  Weight increased 1kg in past 24hrs  Takes medications whole with water         Problem: PAIN - ADULT  Goal: Verbalizes/displays adequate comfort level or baseline comfort level  Description: Interventions:  - Encourage patient to monitor pain and request assistance  - Assess pain using appropriate pain scale  - Administer analgesics based on type and severity of pain and evaluate response  - Implement non-pharmacological measures as appropriate and evaluate response  - Consider cultural and social influences on pain and pain management  - Notify physician/advanced practitioner if interventions unsuccessful or patient reports new pain  Outcome: Progressing     Problem: INFECTION - ADULT  Goal: Absence or prevention of progression during hospitalization  Description: INTERVENTIONS:  - Assess and monitor for signs and symptoms of infection  - Monitor lab/diagnostic results  - Monitor all insertion sites, i e  indwelling lines, tubes, and drains  - Monitor endotracheal if appropriate and nasal secretions for changes in amount and color  - [place] appropriate cooling/warming therapies per order  - Administer medications as ordered  - Instruct and encourage patient and family to use good hand hygiene technique  - Identify and instruct in appropriate isolation precautions for identified infection/condition  Outcome: Progressing     Problem: SAFETY ADULT  Goal: Patient will remain free of falls  Description: INTERVENTIONS:  - Educate patient/family on patient safety including physical limitations  - Instruct patient to call for assistance with activity   - Consult OT/PT to assist with strengthening/mobility   - Keep Call bell within reach  - Keep bed low and locked with side rails adjusted as appropriate  - Keep care items and personal belongings within reach  - Initiate and maintain comfort rounds  - Make Fall Risk Sign visible to staff  - Offer Toileting every 2 Hours, in advance of need  - Initiate/Maintain bed alarm  - Obtain necessary fall risk management equipment: bed alarm  - Apply yellow socks and bracelet for high fall risk patients  - Consider moving patient to room near nurses station  Outcome: Progressing  Goal: Maintain or return to baseline ADL function  Description: INTERVENTIONS:  -  Assess patient's ability to carry out ADLs; assess patient's baseline for ADL function and identify physical deficits which impact ability to perform ADLs (bathing, care of mouth/teeth, toileting, grooming, dressing, etc )  - Assess/evaluate cause of self-care deficits   - Assess range of motion  - Assess patient's mobility; develop plan if impaired  - Assess patient's need for assistive devices and provide as appropriate  - Encourage maximum independence but intervene and supervise when necessary  - Involve family in performance of ADLs  - Assess for home care needs following discharge   - Consider OT consult to assist with ADL evaluation and planning for discharge  - Provide patient education as appropriate  Outcome: Progressing  Goal: Maintains/Returns to pre admission functional level  Description: INTERVENTIONS:  - Perform BMAT or MOVE assessment daily    - Set and communicate daily mobility goal to care team and patient/family/caregiver     - Collaborate with rehabilitation services on mobility goals if consulted  - Out of bed for toileting  - Record patient progress and toleration of activity level   Outcome: Progressing     Problem: DISCHARGE PLANNING  Goal: Discharge to home or other facility with appropriate resources  Description: INTERVENTIONS:  - Identify barriers to discharge w/patient and caregiver  - Arrange for needed discharge resources and transportation as appropriate  - Identify discharge learning needs (meds, wound care, etc )  - Arrange for interpretive services to assist at discharge as needed  - Refer to Case Management Department for coordinating discharge planning if the patient needs post-hospital services based on physician/advanced practitioner order or complex needs related to functional status, cognitive ability, or social support system  Outcome: Progressing     Problem: GASTROINTESTINAL - ADULT  Goal: Minimal or absence of nausea and/or vomiting  Description: INTERVENTIONS:  - Administer IV fluids if ordered to ensure adequate hydration  - Maintain NPO status until nausea and vomiting are resolved  - Nasogastric tube if ordered  - Administer ordered antiemetic medications as needed  - Provide nonpharmacologic comfort measures as appropriate  - Advance diet as tolerated, if ordered  - Consider nutrition services referral to assist patient with adequate nutrition and appropriate food choices  Outcome: Progressing  Goal: Maintains or returns to baseline bowel function  Description: INTERVENTIONS:  - Assess bowel function  - Encourage oral fluids to ensure adequate hydration  - Administer IV fluids if ordered to ensure adequate hydration  - Administer ordered medications as needed  - Encourage mobilization and activity  - Consider nutritional services referral to assist patient with adequate nutrition and appropriate food choices  Outcome: Progressing  Goal: Maintains adequate nutritional intake  Description: INTERVENTIONS:  - Monitor percentage of each meal consumed  - Identify factors contributing to decreased intake, treat as appropriate  - Assist with meals as needed  - Monitor I&O, weight, and lab values if indicated  - Obtain nutrition services referral as needed  Outcome: Progressing  Goal: Oral mucous membranes remain intact  Description: INTERVENTIONS  - Assess oral mucosa and hygiene practices  - Implement preventative oral hygiene regimen  - Implement oral medicated treatments as ordered  - Initiate Nutrition services referral as needed  Outcome: Progressing

## 2022-09-21 ENCOUNTER — APPOINTMENT (INPATIENT)
Dept: CT IMAGING | Facility: HOSPITAL | Age: 67
DRG: 853 | End: 2022-09-21
Payer: MEDICARE

## 2022-09-21 LAB
ALBUMIN SERPL BCP-MCNC: 1.6 G/DL (ref 3.5–5)
ALP SERPL-CCNC: 57 U/L (ref 46–116)
ALT SERPL W P-5'-P-CCNC: 8 U/L (ref 12–78)
ANION GAP SERPL CALCULATED.3IONS-SCNC: 6 MMOL/L (ref 4–13)
APTT PPP: 57 SECONDS (ref 23–37)
APTT PPP: 63 SECONDS (ref 23–37)
APTT PPP: 84 SECONDS (ref 23–37)
AST SERPL W P-5'-P-CCNC: 16 U/L (ref 5–45)
BILIRUB SERPL-MCNC: 0.29 MG/DL (ref 0.2–1)
BUN SERPL-MCNC: 17 MG/DL (ref 5–25)
CA-I BLD-SCNC: 1.04 MMOL/L (ref 1.12–1.32)
CALCIUM ALBUM COR SERPL-MCNC: 9.6 MG/DL (ref 8.3–10.1)
CALCIUM SERPL-MCNC: 7.7 MG/DL (ref 8.3–10.1)
CHLORIDE SERPL-SCNC: 103 MMOL/L (ref 96–108)
CO2 SERPL-SCNC: 29 MMOL/L (ref 21–32)
CREAT SERPL-MCNC: 1.36 MG/DL (ref 0.6–1.3)
ERYTHROCYTE [DISTWIDTH] IN BLOOD BY AUTOMATED COUNT: 14 % (ref 11.6–15.1)
GFR SERPL CREATININE-BSD FRML MDRD: 53 ML/MIN/1.73SQ M
GLUCOSE SERPL-MCNC: 114 MG/DL (ref 65–140)
GLUCOSE SERPL-MCNC: 118 MG/DL (ref 65–140)
GLUCOSE SERPL-MCNC: 122 MG/DL (ref 65–140)
GLUCOSE SERPL-MCNC: 126 MG/DL (ref 65–140)
HCT VFR BLD AUTO: 27.9 % (ref 36.5–49.3)
HGB BLD-MCNC: 8.8 G/DL (ref 12–17)
LACTATE SERPL-SCNC: 1.2 MMOL/L (ref 0.5–2)
LDH SERPL-CCNC: 312 U/L (ref 81–234)
MAGNESIUM SERPL-MCNC: 2.1 MG/DL (ref 1.6–2.6)
MCH RBC QN AUTO: 30.9 PG (ref 26.8–34.3)
MCHC RBC AUTO-ENTMCNC: 31.5 G/DL (ref 31.4–37.4)
MCV RBC AUTO: 98 FL (ref 82–98)
PHOSPHATE SERPL-MCNC: 2.1 MG/DL (ref 2.3–4.1)
PLATELET # BLD AUTO: 388 THOUSANDS/UL (ref 149–390)
PMV BLD AUTO: 9.4 FL (ref 8.9–12.7)
POTASSIUM SERPL-SCNC: 3.1 MMOL/L (ref 3.5–5.3)
PROT SERPL-MCNC: 6.2 G/DL (ref 6.4–8.4)
RBC # BLD AUTO: 2.85 MILLION/UL (ref 3.88–5.62)
SODIUM SERPL-SCNC: 138 MMOL/L (ref 135–147)
WBC # BLD AUTO: 21.05 THOUSAND/UL (ref 4.31–10.16)

## 2022-09-21 PROCEDURE — 99291 CRITICAL CARE FIRST HOUR: CPT | Performed by: INTERNAL MEDICINE

## 2022-09-21 PROCEDURE — 99223 1ST HOSP IP/OBS HIGH 75: CPT | Performed by: INTERNAL MEDICINE

## 2022-09-21 PROCEDURE — 74177 CT ABD & PELVIS W/CONTRAST: CPT

## 2022-09-21 PROCEDURE — 84100 ASSAY OF PHOSPHORUS: CPT

## 2022-09-21 PROCEDURE — 83735 ASSAY OF MAGNESIUM: CPT

## 2022-09-21 PROCEDURE — 85730 THROMBOPLASTIN TIME PARTIAL: CPT | Performed by: NURSE PRACTITIONER

## 2022-09-21 PROCEDURE — 83615 LACTATE (LD) (LDH) ENZYME: CPT

## 2022-09-21 PROCEDURE — 87493 C DIFF AMPLIFIED PROBE: CPT

## 2022-09-21 PROCEDURE — 82330 ASSAY OF CALCIUM: CPT

## 2022-09-21 PROCEDURE — 82948 REAGENT STRIP/BLOOD GLUCOSE: CPT

## 2022-09-21 PROCEDURE — 80053 COMPREHEN METABOLIC PANEL: CPT

## 2022-09-21 PROCEDURE — 85730 THROMBOPLASTIN TIME PARTIAL: CPT | Performed by: INTERNAL MEDICINE

## 2022-09-21 PROCEDURE — 99024 POSTOP FOLLOW-UP VISIT: CPT | Performed by: SURGERY

## 2022-09-21 PROCEDURE — 92526 ORAL FUNCTION THERAPY: CPT

## 2022-09-21 PROCEDURE — G1004 CDSM NDSC: HCPCS

## 2022-09-21 PROCEDURE — 83605 ASSAY OF LACTIC ACID: CPT

## 2022-09-21 PROCEDURE — 87040 BLOOD CULTURE FOR BACTERIA: CPT

## 2022-09-21 PROCEDURE — 85027 COMPLETE CBC AUTOMATED: CPT

## 2022-09-21 RX ORDER — POTASSIUM CHLORIDE 20 MEQ/1
40 TABLET, EXTENDED RELEASE ORAL 2 TIMES DAILY
Status: COMPLETED | OUTPATIENT
Start: 2022-09-21 | End: 2022-09-21

## 2022-09-21 RX ADMIN — ACETAMINOPHEN 650 MG: 325 TABLET ORAL at 12:27

## 2022-09-21 RX ADMIN — POTASSIUM CHLORIDE 40 MEQ: 1500 TABLET, EXTENDED RELEASE ORAL at 09:20

## 2022-09-21 RX ADMIN — IOHEXOL 100 ML: 350 INJECTION, SOLUTION INTRAVENOUS at 14:18

## 2022-09-21 RX ADMIN — HEPARIN SODIUM 21 UNITS/KG/HR: 10000 INJECTION, SOLUTION INTRAVENOUS at 21:11

## 2022-09-21 RX ADMIN — PANTOPRAZOLE SODIUM 40 MG: 40 TABLET, DELAYED RELEASE ORAL at 05:29

## 2022-09-21 RX ADMIN — METRONIDAZOLE 500 MG: 500 TABLET ORAL at 21:08

## 2022-09-21 RX ADMIN — HEPARIN SODIUM 5000 UNITS: 1000 INJECTION INTRAVENOUS; SUBCUTANEOUS at 01:30

## 2022-09-21 RX ADMIN — HEPARIN SODIUM 21 UNITS/KG/HR: 10000 INJECTION, SOLUTION INTRAVENOUS at 12:37

## 2022-09-21 RX ADMIN — CEFAZOLIN SODIUM 2000 MG: 2 SOLUTION INTRAVENOUS at 15:12

## 2022-09-21 RX ADMIN — CEFAZOLIN SODIUM 2000 MG: 2 SOLUTION INTRAVENOUS at 08:01

## 2022-09-21 RX ADMIN — METOPROLOL TARTRATE 25 MG: 25 TABLET, FILM COATED ORAL at 09:20

## 2022-09-21 RX ADMIN — METRONIDAZOLE 500 MG: 500 TABLET ORAL at 15:12

## 2022-09-21 RX ADMIN — ALLOPURINOL 100 MG: 100 TABLET ORAL at 09:20

## 2022-09-21 RX ADMIN — METRONIDAZOLE 500 MG: 500 TABLET ORAL at 05:29

## 2022-09-21 RX ADMIN — CEFAZOLIN SODIUM 2000 MG: 2 SOLUTION INTRAVENOUS at 23:48

## 2022-09-21 RX ADMIN — METOPROLOL TARTRATE 25 MG: 25 TABLET, FILM COATED ORAL at 21:08

## 2022-09-21 RX ADMIN — POTASSIUM CHLORIDE 40 MEQ: 1500 TABLET, EXTENDED RELEASE ORAL at 18:23

## 2022-09-21 RX ADMIN — HEPARIN SODIUM 21 UNITS/KG/HR: 10000 INJECTION, SOLUTION INTRAVENOUS at 02:12

## 2022-09-21 RX ADMIN — TAMSULOSIN HYDROCHLORIDE 0.4 MG: 0.4 CAPSULE ORAL at 15:12

## 2022-09-21 RX ADMIN — TRAVOPROST 1 DROP: 0.04 SOLUTION OPHTHALMIC at 21:08

## 2022-09-21 RX ADMIN — Medication 250 MG: at 09:20

## 2022-09-21 RX ADMIN — FUROSEMIDE 20 MG: 10 INJECTION, SOLUTION INTRAVENOUS at 09:20

## 2022-09-21 NOTE — QUICK NOTE
Cal Bauer [address] [facility] 78 y o  male MRN: [id number]  Unit/Bed#: ICU 07 Encounter: [id number]    Date: 09/21/22    Findings:   Well-healing right lower quadrant wound at site of prior hernia, with healthy granulation tissue at base  No purulent discharge or signs of infection, Surrounding skin healthy and intact with no signs of erythema      Sponge removed: 2 sponges (1 black, 1 white)    Dimensions: 14 x 8 x 5 cm    Sponge replaced: 2 sponges (1 black, 1 white)    VAC therapy: 125mm Hg continuous    Wound image:        Next VAC change: Friday, 9/23    Nathalie Bailey MD  PGY-5, General Surgery  9/21/2022

## 2022-09-21 NOTE — ASSESSMENT & PLAN NOTE
Possibly POA in 78 y o morbidly obese, sedentary male, as evidenced by orthopnea, fever, leukocytosis, mild supplemental O2 requirements and CT PE noting  Right greater than left filling defects consistent with PE  No RV strain noted  No prior hx of DVT/PE  Likely in the setting of morbid obesity and s/p abdominal surgical intervention  Plan:  Consider transitioning to [address] today, price check with insurance   If not, will need to be on warfarin  Continue monitoring vitals

## 2022-09-21 NOTE — PROGRESS NOTES
[address] [place] [address]  Progress Note - Aliza Smith 1955, 78 y o  male MRN: [id number]  Unit/Bed#: ICU 07 Encounter: [id number]  Primary Care Provider: Prashanth Webb MD   Date and time admitted to hospital: 9/14/2022 11:29 AM    * Strangulated hernia of abdominal wall  Assessment & Plan  9/14 S/p exp lap, complex lysis of adhesions, reduction of strangulated hernia, debridement RLQ muscle with counter incision, abthera placement x 2  9/15 RLQ Hernia s/p repair with mesh and wound VAC placement and anterior abdominal wall repair with mesh and retention sutures x12 on 09/15/2022  NGT D/C'd    Plan:  Pain management with PRN Dilaudid  Diet advanced to soft surgical  Continue surgical incision site wound care  Monitor wound vac output    Pulmonary embolism ([address] )  Assessment & Plan  As evidenced by orthopnea, fever, leukocytosis, mild supplemental O2 requirements and CT PE noting  Right greater than left filling defects consistent with PE  No RV strain noted  No prior hx of DVT/PE  Likely in the setting of morbid obesity and s/p abdominal surgical intervention  Plan:  Continue heparin gtt  Will need to be transitioned to DOAC  Continue monitoring vitals    Sepsis [facility])  Assessment & Plan  Due to strangulated ventral hernia and RLQ abscess of the rectus muscle  As evidenced by WBC-12 9, fever (101 4 F) and tachycardia POA  Treated with fluid boluses and Zosyn in the ED, followed by emergent OR ex lap prior to which patient received a dose of Ancef  Underwent ex lap with hernia repair  Tissue culture demonstrating few colonies of K  Pneumoniae, E  Coli and Bacteroides vulgatus, S  anginosus  Repeat blood cultures x2 negative at 48 hours  Procalcitonin- 1 44-0 99  Remained febrile overnight, WBC - 21       Plan:  Infectious disease on board - recommendations appreciated  Maintain MAP>65  Continue Ancef and Flagyl, await ID recs  Continue trending procalcitonin  Continue monitoring WBC, trend fever curves      Respiratory insufficiency-resolved as of 9/17/2022  Assessment & Plan  Patient extubated on 9/16      Acute kidney injury (HCC)-resolved as of 9/17/2022  Assessment & Plan  Recent Labs     09/19/22  0444 09/20/22  0635 09/21/22  0525   CREATININE 1 43* 1 16 1 36*   EGFR 50 65 53     Estimated Creatinine Clearance: 85 4 mL/min (A) (by C-G formula based on SCr of 1 36 mg/dL (H))  MILTON superimposed on stage 3 CKD due to hypotension and low MAPs overngiht, improved after patient was taken off Propofol and transitioned to Fentanyl  Now resolved, patient back at baseline        Anemia  Assessment & Plan  Recent Labs     09/19/22  1630 09/20/22  0635 09/21/22  0525   HGB 9 6* 8 8* 8 8*   Likely in the setting of blood loss due to ex lap x2 with a dilutional component after receiving IV fluids post op  Baseline Hb - 12  Now stable at 8 8  Will continue monitoring       Class 3 severe obesity due to excess calories with serious comorbidity and body mass index (BMI) of 50 0 to 59 9 in adult [facility])  Assessment & Plan  Encourage lifestyle modifications    DELIA (obstructive sleep apnea)  Assessment & Plan  Patient endorses snoring at night, waking up unrefreshed by sleep, daytime sleepiness  Has not had a formal evaluation or sleep study     Does not desire trial of bedtime CPAP at this time but will consider  Referral to sleep medicine at discharge    CKD (chronic kidney disease), stage III [facility])  Assessment & Plan  Lab Results   Component Value Date    EGFR 53 09/21/2022    EGFR 65 09/20/2022    EGFR 50 09/19/2022    CREATININE 1 36 (H) 09/21/2022    CREATININE 1 16 09/20/2022    CREATININE 1 43 (H) 09/19/2022   Baseline creatinine is 1 4-1 7; OR I/Os 3L/375 ml  Now back to baseline  Continue avoiding nephrotoxins, avoid hypotension  Renally dose medications  Monitor renal indices closely      Essential hypertension  Assessment & Plan  Blood Pressure: 137/67  Continue holding lisinopril at this time  Continue b i d  Lopressor  Continue IV Lasix, monitor creatinine, consider transitioning to home p o  Lasix tomorrow  Continue monitoring BP      ----------------------------------------------------------------------------------------  HPI/24hr events:  Patient remained febrile overnight, 24 hour T-max 101 8°  Currently 100 04  Otherwise no significant overnight events  Patient appropriate for transfer out of the ICU today?: No  Disposition: Continue Critical Care   Code Status: Level 1 - Full Code  ---------------------------------------------------------------------------------------  SUBJECTIVE  Patient found lying in bed, not in acute distress at the time of my evaluation  He states that he feels tired this morning  He also notes that his abdomen feels distended and his upper and lower extremities are edematous  Denies dizziness, lightheadedness, chest pain, palpitations, shortness of breath, abdominal tenderness, lower extremity tenderness  He has no other complaints      Review of Systems  Review of systems was reviewed and negative unless stated above in HPI/24-hour events   ---------------------------------------------------------------------------------------  OBJECTIVE    Vitals   Vitals:    22 0530 22 0540 22 0630 22 0700   BP: 128/64  137/67    Pulse: 88  88    Resp: (!) 29  (!) 24    Temp: 100 04 °F (37 8 °C)  100 04 °F (37 8 °C) 100 2 °F (37 9 °C)   TempSrc:    Bladder   SpO2: 97%  99%    Weight:  (!) 176 kg (389 lb 1 8 oz)     Height:         Temp (24hrs), Av 7 °F (38 2 °C), Min:99 68 °F (37 6 °C), Max:102 2 °F (39 °C)  Current: Temperature: 100 2 °F (37 9 °C)  Arterial Line BP: 60/56  Arterial Line MAP (mmHg): (!) 58 mmHg    Respiratory:  SpO2: SpO2: 99 %  Nasal Cannula O2 Flow Rate (L/min): 2 L/min    Invasive/non-invasive ventilation settings   Respiratory  Report   Lab Data (Last 4 hours)    None         O2/Vent Data (Last 4 hours)    None Physical Exam  Vitals and nursing note reviewed  Constitutional:       General: He is not in acute distress  Appearance: Normal appearance  He is morbidly obese  He is not ill-appearing, toxic-appearing or diaphoretic  HENT:      Head: Normocephalic and atraumatic  Nose: Nose normal       Mouth/Throat:      Mouth: Mucous membranes are moist       Pharynx: Oropharynx is clear  Eyes:      General: No scleral icterus  Right eye: No discharge  Left eye: No discharge  Extraocular Movements: Extraocular movements intact  Conjunctiva/sclera: Conjunctivae normal    Cardiovascular:      Rate and Rhythm: Normal rate and regular rhythm  Pulses: Normal pulses  Heart sounds: Normal heart sounds  No murmur heard  Pulmonary:      Effort: Pulmonary effort is normal  No respiratory distress  Breath sounds: Rhonchi (Most pronounced in mid and lower lung fields bilaterally) present  No wheezing or rales  Abdominal:      General: Abdomen is protuberant  Bowel sounds are normal  There is distension  Palpations: Abdomen is soft  Tenderness: There is no abdominal tenderness  There is no guarding or rebound  Comments: Abdominal binder in situ  Midline surgical scar with retention sutures times 12 with scant drainage  ABThera wound VAC in situ with approximately 50 mL serosanguineous drainage  Musculoskeletal:      Cervical back: Normal range of motion and neck supple  Right lower leg: Edema present  Left lower leg: Edema present  Comments: 1+ pitting edema in the upper and lower extremities bilaterally, no tenderness, warmth or erythema noted   Skin:     General: Skin is warm and dry  Coloration: Skin is not jaundiced or pale  Neurological:      Mental Status: He is alert and oriented to person, place, and time  Mental status is at baseline     Psychiatric:         Mood and Affect: Mood normal          Behavior: Behavior normal  Thought Content: Thought content normal          Judgment: Judgment normal              Laboratory and Diagnostics:  Results from last 7 days   Lab Units 09/21/22  0525 09/20/22  0635 09/19/22  1630 09/19/22 0444 09/18/22 2055 09/18/22  0515 09/17/22  0537 09/16/22  0506 09/15/22  1504 09/15/22  0336 09/14/22 1952 09/14/22  1203   WBC Thousand/uL 21 05* 19 83* 21 73* 19 51* 19 44* 16 69* 15 28* 14 74* 16 29* 15 84*  --  12 90*   HEMOGLOBIN g/dL 8 8* 8 8* 9 6* 9 5* 9 4* 9 4* 9 1* 10 0* 11 6* 12 5  --  12 1   I STAT HEMOGLOBIN   --   --   --   --   --   --   --   --   --   --    < >  --    HEMATOCRIT % 27 9* 27 5* 30 9* 30 4* 30 4* 31 4* 29 4* 31 4* 37 3 39 1  --  37 1   HEMATOCRIT, ISTAT   --   --   --   --   --   --   --   --   --   --    < >  --    PLATELETS Thousands/uL 388 286 382 388 398* 385 366 331 367 380  --  416*   NEUTROS PCT %  --   --   --   --   --  86* 88* 89*  --   --   --  85*   BANDS PCT %  --   --   --   --  1  --   --   --  7 12*  --   --    MONOS PCT %  --   --   --   --   --  6 5 6  --   --   --  7   MONO PCT %  --   --   --   --  5  --   --   --  7 4  --   --     < > = values in this interval not displayed       Results from last 7 days   Lab Units 09/21/22  0525 09/20/22  2464 09/19/22 0444 09/18/22 2055 09/18/22 0515 09/17/22  0537 09/16/22  0506 09/15/22  1504 09/15/22  0336 09/14/22 1952 09/14/22  1203   SODIUM mmol/L 138 141 141 141 142 140 138 137 135  --  134*   POTASSIUM mmol/L 3 1* 3 8 3 8 3 6 4 0 3 9 4 2 4 8 4 4  --  3 9   CHLORIDE mmol/L 103 106 107 106 108 108 104 104 104  --  99   CO2 mmol/L 29 27 30 29 26 26 23 20* 21  --  27   CO2, I-STAT   --   --   --   --   --   --   --   --   --    < >  --    ANION GAP mmol/L 6 8 4 6 8 6 11 13 10  --  8   BUN mg/dL 17 17 23 23 22 21 28* 24 23  --  21   CREATININE mg/dL 1 36* 1 16 1 43* 1 39* 1 43* 1 50* 2 05* 1 91* 1 48*  --  1 64*   CALCIUM mg/dL 7 7* 7 2* 8 0* 8 0* 8 2* 7 7* 7 8* 7 5* 8 2*  --  8 8   GLUCOSE RANDOM mg/dL 118 114 120 123 100 125 151* 155* 145*  --  115   ALT U/L 8* 6* 12  --   --   --  25 20 14  --  15   AST U/L 16 35 24  --   --   --  31 23 14  --  13   ALK PHOS U/L 57 50 57  --   --   --  44* 47 47  --  55   ALBUMIN g/dL 1 6* 1 4* 1 6*  --   --   --  1 8* 1 8* 2 1*  --  2 6*   TOTAL BILIRUBIN mg/dL 0 29 0 37 0 35  --   --   --  0 64 0 70 1 02*  --  1 25*    < > = values in this interval not displayed  Results from last 7 days   Lab Units 09/21/22  0525 09/19/22  0444 09/18/22 2055 09/18/22  0515 09/16/22  0506 09/15/22  1504 09/15/22  0336   MAGNESIUM mg/dL 2 1 2 1 2 1 2 4 2 2 1 6 1 8   PHOSPHORUS mg/dL 2 1* 2 6 1 6*  --  3 9 5 6* 3 5      Results from last 7 days   Lab Units 09/20/22  2308 09/20/22  1528 09/20/22  0635 09/19/22  2348 09/19/22  2241 09/19/22  1630 09/14/22  1203   INR   --   --   --   --   --  1 34* 1 19   PTT seconds 57* >210* 50* 57* 179* 29 39*          Results from last 7 days   Lab Units 09/15/22  0007 09/14/22  1947 09/14/22  1220   LACTIC ACID mmol/L 1 4 1 1 1 1     ABG:  Results from last 7 days   Lab Units 09/15/22  0336   PH ART  7 382   PCO2 ART mm Hg 34 4*   PO2 ART mm Hg 237 7*   HCO3 ART mmol/L 20 0*   BASE EXC ART mmol/L -4 3   ABG SOURCE  Line, Arterial     VBG:  Results from last 7 days   Lab Units 09/18/22  2056 09/15/22  0336   PH JONH  7 408*  --    PCO2 JONH mm Hg 40 8*  --    PO2 JONH mm Hg 72 8*  --    HCO3 JONH mmol/L 25 2  --    BASE EXC JONH mmol/L 0 5  --    ABG SOURCE   --  Line, Arterial     Results from last 7 days   Lab Units 09/20/22  0635 09/19/22  0444 09/14/22  1203   PROCALCITONIN ng/ml 0 99* 1 44* 0 75*       Micro  Results from last 7 days   Lab Units 09/18/22  1043 09/14/22  1944 09/14/22  1220 09/14/22  1203   BLOOD CULTURE  No Growth at 48 hrs  No Growth at 48 hrs  --  No Growth After 5 Days  No Growth After 5 Days     GRAM STAIN RESULT   --  No Polys or Bacteria seen  --   --        EKG:  Normal sinus rhythm, heart rate 89  Imaging: I have personally reviewed pertinent reports  and I have personally reviewed pertinent films in PACS    Intake and Output  I/O       09/19 0701 09/20 0700 09/20 0701 09/21 0700 09/21 0701 09/22 0700    P  O  1440 520     I V  (mL/kg) 1845 2 (10 5) 833 8 (4 7)     IV Piggyback 250 151 7     Total Intake(mL/kg) 3535 2 (20 2) 1505 5 (8 6)     Urine (mL/kg/hr) 1875 (0 4) 1720 (0 4)     Drains 475 500     Stool 0 0     Total Output 2350 2220     Net +1185 2 -714 5            Unmeasured Stool Occurrence 2 x 1 x           Height and Weights   Height: 5' 9" (175 3 cm)  IBW (Ideal Body Weight): 70 7 kg  Body mass index is 57 46 kg/m²  Weight (last 2 days)     Date/Time Weight    09/21/22 0540 176 (389 11)    09/20/22 0525 175 (385 81)            Nutrition       Diet Orders   (From admission, onward)             Start     Ordered    09/20/22 0733  Diet Surgical; Surgical Soft/Lite Meal; Sodium 2 GM, No Carbonation  Diet effective now        References:    Nutrtion Support Algorithm Enteral vs  Parenteral   Question Answer Comment   Diet Type Surgical    Surgical Surgical Soft/Lite Meal    Other Restriction(s): Sodium 2 GM    Other Restriction(s): No Carbonation    RD to adjust diet per protocol?  No        09/20/22 0735                  Active Medications  Scheduled Meds:  Current Facility-Administered Medications   Medication Dose Route Frequency Provider Last Rate    acetaminophen  650 mg Oral Q6H PRN Stephen Wise MD      allopurinol  100 mg Oral Daily Shaggy Urbano MD      cefazolin  2,000 mg Intravenous Q8H Shaggy Urbano MD 2,000 mg (09/21/22 0801)    furosemide  20 mg Intravenous Daily KELSEY Tran      glycerin-hypromellose-  1 drop Both Eyes Q4H PRN Shaggy Urbano MD      heparin (porcine)  3-30 Units/kg/hr (Order-Specific) Intravenous Titrated Shaggy Urbnao MD 21 Units/kg/hr (09/21/22 0531)    heparin (porcine)  10,000 Units Intravenous Q1H PRN Shaggy Urbano MD      heparin (porcine)  5,000 Units Intravenous Q1H PRN Whitley Golden MD      HYDROmorphone  1 mg Intravenous Q4H PRN Whitley Golden MD      metoprolol tartrate  25 mg Oral Q12H [address] Whitley Golden MD      metroNIDAZOLE  500 mg Oral Q8H [address] Whitley Golden MD      pantoprazole  40 mg Oral Early Morning Whitley Golden MD      potassium chloride  40 mEq Oral BID Whitley Golden MD      saccharomyces boulardii  250 mg Oral BID Eulogio Mac MD      tamsulosin  0 4 mg Oral Daily With Dinner Whitley Golden MD      travoprost  1 drop Both Eyes HS Whitley Golden MD       Continuous Infusions:  heparin (porcine), 3-30 Units/kg/hr (Order-Specific), Last Rate: 21 Units/kg/hr (09/21/22 0212)      PRN Meds:   acetaminophen, 650 mg, Q6H PRN  glycerin-hypromellose-, 1 drop, Q4H PRN  heparin (porcine), 10,000 Units, Q1H PRN  heparin (porcine), 5,000 Units, Q1H PRN  HYDROmorphone, 1 mg, Q4H PRN        Invasive Devices Review  Invasive Devices  Report    Peripheral Intravenous Line  Duration           Peripheral IV 09/19/22 Left Antecubital 1 day    Peripheral IV 09/20/22 Right;Upper;Ventral (anterior) Arm 1 day    Peripheral IV 09/21/22 Left;Upper;Ventral (anterior) Arm <1 day          Drain  Duration           Urethral Catheter Temperature probe 16 Fr  1 day                Rationale for remaining devices:  Need for peripheral access, patient failed voiding trial   Will attempt voiding trial again today   ---------------------------------------------------------------------------------------  Advance Directive and Living Will:      Power of :    POLST:    ---------------------------------------------------------------------------------------  Care Time Delivered:   No Critical Care time spent       Whitley Golden MD      Portions of the record may have been created with voice recognition software    Occasional wrong word or "sound a like" substitutions may have occurred due to the inherent limitations of voice recognition software    Read the chart carefully and recognize, using context, where substitutions have occurred

## 2022-09-21 NOTE — SPEECH THERAPY NOTE
Speech Language/Pathology    Speech/Language Pathology Progress Note    Patient Name: Rita Preston VIWNDDANIELA Date: 9/21/2022                     SLP RECOMMENDATIONS:         Diet: Regular - when no longer NPO for procedure         Liquids: Thin - when no longer NPO for procedure         Medications: As tolerated         Strategies: General swallow precautions          Subjective:  "I swallow okay"   Objective:  Pt seen for ongoing dysphagia therapy  Pt was seen by ST previously and placed on a Regular/thin diet  Pt is currently NPO for CAT scan  Upon arrival pt drinking thin contrast for CAT scan  Pt observed with 3oz thin liquid via straw sips with no overt s/s aspiration  Pt denies difficulty chewing/swallowing  SLP reviewed aspiration precautions with pt  SLP to continue to follow give limited trials observed today       Assessment:  No overt s/s aspiration thin liquids     Plan/Recommendations:  Regular/thin (when no longer NPO for scan)   General aspiration precautions  SLP to follow     Problem List  Principal Problem:    Strangulated hernia of abdominal wall  Active Problems:    Essential hypertension    CKD (chronic kidney disease), stage III (HCC)    DELIA (obstructive sleep apnea)    Class 3 severe obesity due to excess calories with serious comorbidity and body mass index (BMI) of 50 0 to 59 9 in adult ([address] )    Sepsis ([address] )    Anemia    Pulmonary embolism ([address] )       Past Medical History  Past Medical History:   Diagnosis Date    Anemia 9/16/2022    Chronic kidney disease     Hypertension     Obesity     Sepsis ([address] )     Urinary tract infection without hematuria 4/3/2020        Past Surgical History  Past Surgical History:   Procedure Laterality Date    ABDOMINAL ADHESION SURGERY N/A 9/15/2022    Procedure: LYSIS ADHESIONS;  Surgeon: Jose Juan Akbar MD;  Location: [facility];  Service: General    APPENDECTOMY      EXPLORATORY LAPAROTOMY W/ BOWEL RESECTION N/A 9/14/2022    Procedure: Exdploratory laparotomy, complex Lysis of adhesions, reduction strangulated hernia, debridement RLQ muscle with counter incision, abthera placement;  Surgeon: Zeynep Marcum MD;  Location[facility];  Service: General    EXPLORATORY LAPAROTOMY W/ BOWEL RESECTION N/A 9/15/2022    Procedure: LAPAROTOMY EXPLORATORY  REPAIR RIGHT LQ HERNIA WITH MESH, REPAIR ANTERIOR WALL HERNIA;  Surgeon: Zeynep Marcum MD;  Location: [facility];  Service: General    NASAL SEPTUM SURGERY

## 2022-09-21 NOTE — PROGRESS NOTES
Progress Note - General Surgery   Ethan Tristan 78 y o  male MRN: [id number]  Unit/Bed#: ICU 07 Encounter: [id number]    Assessment:  78yoM p/w incarcerated incisional hernia and SBO now s/p:  9/14: ex lap, reduction of strangulated ventral hernia, debridement of RLQ abdominal wall musculature, Abthera VAC placement  9/15: ex lap, RLQ hernia repair w mesh and VAC, complex anterior abdominal wall repair w mesh and retention sutures, LANETTE  Found to have bilateral subsegmental PEs on 9/19 9/21 CT a/P: No evidence of leak, stable postoperative changes in RLQ, cholelithiasis     Vitals stable, last febrile noon yesterday 101 1  WBC 20 4 from 21 1, hemoglobin 9 1 from 8 8  C diff negative  Repeat blood cultures pending  BMP pending       UOP 1670  Stool x1    Plan:  - surgical soft diet  - on Ancef/Flagyl, appreciate ID input  - continue to follow WBC, fever curve, repeat blood cultures  - heparin gtt for PEs  - RLQ abdominal VAC, monitor output and character, changes MWF  - Arias  - SQH  - pain control  - PT/OT  - appreciate ICU care    Subjective/Objective   Subjective:   No acute events overnight, patient is doing well, denies any more fevers/chills/rigors, tolerating diet without nausea or emesis, voiding with Arias in place, having bowel movements and passing flatus  Objective:     Blood pressure 103/69, pulse 92, temperature 98 9 °F (37 2 °C), temperature source Tympanic, resp  rate 15, height 5' 9" (1 753 m), weight (!) 176 kg (389 lb 1 8 oz), SpO2 95 %  ,Body mass index is 57 46 kg/m²        Intake/Output Summary (Last 24 hours) at 9/22/2022 0643  Last data filed at 9/22/2022 0600  Gross per 24 hour   Intake 2316 28 ml   Output 2320 ml   Net -3 72 ml       Invasive Devices  Report    Peripheral Intravenous Line  Duration           Peripheral IV 09/19/22 Left Antecubital 2 days    Peripheral IV 09/20/22 Right;Upper;Ventral (anterior) Arm 2 days    Peripheral IV 09/21/22 Left;Upper;Ventral (anterior) Arm <1 day                Physical Exam:  General: NAD  Skin: Warm, dry, anicteric  HEENT: Normocephalic, atraumatic  CV: RRR, no m/r/g  Pulm: CTA b/l, no inc WOB  Abd: Soft, ND/NT, VAC in place RLQ draining serosanguineous, midline incision clean dry and intact, no drainage or signs of infection, dressing present  MSK: Symmetric, no edema, no tenderness, no deformity  Neuro: AOx3, GCS 15    Lab, Imaging and other studies:  I have personally reviewed pertinent lab results    , CBC:   Lab Results   Component Value Date    WBC 20 39 (H) 09/22/2022    HGB 9 1 (L) 09/22/2022    HCT 28 7 (L) 09/22/2022    MCV 96 09/22/2022     (H) 09/22/2022    MCH 30 5 09/22/2022    MCHC 31 7 09/22/2022    RDW 13 9 09/22/2022    MPV 9 5 09/22/2022   , CMP: No results found for: SODIUM, K, CL, CO2, ANIONGAP, BUN, CREATININE, GLUCOSE, CALCIUM, AST, ALT, ALKPHOS, PROT, BILITOT, EGFR  VTE Pharmacologic Prophylaxis: Sequential compression device (Venodyne)  and Heparin  VTE Mechanical Prophylaxis: sequential compression device

## 2022-09-21 NOTE — ASSESSMENT & PLAN NOTE
9/14 S/p exp lap, complex lysis of adhesions, reduction of strangulated hernia, debridement RLQ muscle with counter incision, abthera placement x 2  9/15 RLQ Hernia s/p repair with mesh and wound VAC placement and anterior abdominal wall repair with mesh and retention sutures x12 on 09/15/2022  NGT D/C'd    Plan:  Pain management with PRN Dilaudid  Diet advanced to soft surgical  Continue surgical incision site wound care  Monitor wound vac output

## 2022-09-21 NOTE — ASSESSMENT & PLAN NOTE
Blood Pressure: 137/67  Continue holding lisinopril at this time  Continue b i d  Lopressor  Continue IV Lasix, monitor creatinine, consider transitioning to home p o   Lasix tomorrow  Continue monitoring BP

## 2022-09-21 NOTE — ASSESSMENT & PLAN NOTE
Recent Labs     09/19/22  1630 09/20/22  0635 09/21/22  0525   HGB 9 6* 8 8* 8 8*   Likely in the setting of blood loss due to ex lap x2 with a dilutional component after receiving IV fluids post op     Baseline Hb - 12  Now stable at 8 8  Will continue monitoring

## 2022-09-21 NOTE — ASSESSMENT & PLAN NOTE
As evidenced by orthopnea, fever, leukocytosis, mild supplemental O2 requirements and CT PE noting  Right greater than left filling defects consistent with PE  No RV strain noted  No prior hx of DVT/PE  Likely in the setting of morbid obesity and s/p abdominal surgical intervention      Plan:  Continue heparin gtt  Will need to be transitioned to [address] monitoring vitals

## 2022-09-21 NOTE — PLAN OF CARE
Problem: PAIN - ADULT  Goal: Verbalizes/displays adequate comfort level or baseline comfort level  Description: Interventions:  - Encourage patient to monitor pain and request assistance  - Assess pain using appropriate pain scale  - Administer analgesics based on type and severity of pain and evaluate response  - Implement non-pharmacological measures as appropriate and evaluate response  - Consider cultural and social influences on pain and pain management  - Notify physician/advanced practitioner if interventions unsuccessful or patient reports new pain  Outcome: Progressing     Problem: INFECTION - ADULT  Goal: Absence or prevention of progression during hospitalization  Description: INTERVENTIONS:  - Assess and monitor for signs and symptoms of infection  - Monitor lab/diagnostic results  - Monitor all insertion sites, i e  indwelling lines, tubes, and drains  - Monitor endotracheal if appropriate and nasal secretions for changes in amount and color  - [place] appropriate cooling/warming therapies per order  - Administer medications as ordered  - Instruct and encourage patient and family to use good hand hygiene technique  - Identify and instruct in appropriate isolation precautions for identified infection/condition  Outcome: Progressing     Problem: SAFETY ADULT  Goal: Patient will remain free of falls  Description: INTERVENTIONS:  - Educate patient/family on patient safety including physical limitations  - Instruct patient to call for assistance with activity   - Consult OT/PT to assist with strengthening/mobility   - Keep Call bell within reach  - Keep bed low and locked with side rails adjusted as appropriate  - Keep care items and personal belongings within reach  - Initiate and maintain comfort rounds  - Make Fall Risk Sign visible to staff  - Apply yellow socks and bracelet for high fall risk patients  - Consider moving patient to room near nurses station  Outcome: Progressing  Goal: Maintain or return to baseline ADL function  Description: INTERVENTIONS:  -  Assess patient's ability to carry out ADLs; assess patient's baseline for ADL function and identify physical deficits which impact ability to perform ADLs (bathing, care of mouth/teeth, toileting, grooming, dressing, etc )  - Assess/evaluate cause of self-care deficits   - Assess range of motion  - Assess patient's mobility; develop plan if impaired  - Assess patient's need for assistive devices and provide as appropriate  - Encourage maximum independence but intervene and supervise when necessary  - Involve family in performance of ADLs  - Assess for home care needs following discharge   - Consider OT consult to assist with ADL evaluation and planning for discharge  - Provide patient education as appropriate  Outcome: Progressing  Goal: Maintains/Returns to pre admission functional level  Description: INTERVENTIONS:  - Perform BMAT or MOVE assessment daily    - Set and communicate daily mobility goal to care team and patient/family/caregiver     - Collaborate with rehabilitation services on mobility goals if consulted  - Out of bed for toileting  - Record patient progress and toleration of activity level   Outcome: Progressing     Problem: DISCHARGE PLANNING  Goal: Discharge to home or other facility with appropriate resources  Description: INTERVENTIONS:  - Identify barriers to discharge w/patient and caregiver  - Arrange for needed discharge resources and transportation as appropriate  - Identify discharge learning needs (meds, wound care, etc )  - Arrange for interpretive services to assist at discharge as needed  - Refer to Case Management Department for coordinating discharge planning if the patient needs post-hospital services based on physician/advanced practitioner order or complex needs related to functional status, cognitive ability, or social support system  Outcome: Progressing     Problem: Knowledge Deficit  Goal: Patient/family/caregiver demonstrates understanding of disease process, treatment plan, medications, and discharge instructions  Description: Complete learning assessment and assess knowledge base  Interventions:  - Provide teaching at level of understanding  - Provide teaching via preferred learning methods  Outcome: Progressing     Problem: MOBILITY - ADULT  Goal: Maintain or return to baseline ADL function  Description: INTERVENTIONS:  -  Assess patient's ability to carry out ADLs; assess patient's baseline for ADL function and identify physical deficits which impact ability to perform ADLs (bathing, care of mouth/teeth, toileting, grooming, dressing, etc )  - Assess/evaluate cause of self-care deficits   - Assess range of motion  - Assess patient's mobility; develop plan if impaired  - Assess patient's need for assistive devices and provide as appropriate  - Encourage maximum independence but intervene and supervise when necessary  - Involve family in performance of ADLs  - Assess for home care needs following discharge   - Consider OT consult to assist with ADL evaluation and planning for discharge  - Provide patient education as appropriate  Outcome: Progressing  Goal: Maintains/Returns to pre admission functional level  Description: INTERVENTIONS:  - Perform BMAT or MOVE assessment daily    - Set and communicate daily mobility goal to care team and patient/family/caregiver     - Collaborate with rehabilitation services on mobility goals if consulted  - Out of bed for toileting  - Record patient progress and toleration of activity level   Outcome: Progressing     Problem: Potential for Falls  Goal: Patient will remain free of falls  Description: INTERVENTIONS:  - Educate patient/family on patient safety including physical limitations  - Instruct patient to call for assistance with activity   - Consult OT/PT to assist with strengthening/mobility   - Keep Call bell within reach  - Keep bed low and locked with side rails adjusted as appropriate  - Keep care items and personal belongings within reach  - Initiate and maintain comfort rounds  - Make Fall Risk Sign visible to staff  - Apply yellow socks and bracelet for high fall risk patients  - Consider moving patient to room near nurses station  Outcome: Progressing     Problem: Prexisting or High Potential for Compromised Skin Integrity  Goal: Skin integrity is maintained or improved  Description: INTERVENTIONS:  - Identify patients at risk for skin breakdown  - Assess and monitor skin integrity  - Assess and monitor nutrition and hydration status  - Monitor labs   - Assess for incontinence   - Turn and reposition patient  - Assist with mobility/ambulation  - Relieve pressure over bony prominences  - Avoid friction and shearing  - Provide appropriate hygiene as needed including keeping skin clean and dry  - Evaluate need for skin moisturizer/barrier cream  - Collaborate with interdisciplinary team   - Patient/family teaching  - Consider wound care consult   Outcome: Progressing     Problem: Nutrition/Hydration-ADULT  Goal: Nutrient/Hydration intake appropriate for improving, restoring or maintaining nutritional needs  Description: Monitor and assess patient's nutrition/hydration status for malnutrition  Collaborate with interdisciplinary team and initiate plan and interventions as ordered  Monitor patient's weight and dietary intake as ordered or per policy  Utilize nutrition screening tool and intervene as necessary  Determine patient's food preferences and provide high-protein, high-caloric foods as appropriate       INTERVENTIONS:  - Monitor oral intake, urinary output, labs, and treatment plans  - Assess nutrition and hydration status and recommend course of action  - Evaluate amount of meals eaten  - Assist patient with eating if necessary   - Allow adequate time for meals  - Recommend/ encourage appropriate diets, oral nutritional supplements, and vitamin/mineral supplements  - Order, calculate, and assess calorie counts as needed  - Recommend, monitor, and adjust tube feedings and TPN/PPN based on assessed needs  - Assess need for intravenous fluids  - Provide specific nutrition/hydration education as appropriate  - Include patient/family/caregiver in decisions related to nutrition  Outcome: Progressing     Problem: GASTROINTESTINAL - ADULT  Goal: Minimal or absence of nausea and/or vomiting  Description: INTERVENTIONS:  - Administer IV fluids if ordered to ensure adequate hydration  - Maintain NPO status until nausea and vomiting are resolved  - Nasogastric tube if ordered  - Administer ordered antiemetic medications as needed  - Provide nonpharmacologic comfort measures as appropriate  - Advance diet as tolerated, if ordered  - Consider nutrition services referral to assist patient with adequate nutrition and appropriate food choices  Outcome: Progressing  Goal: Maintains or returns to baseline bowel function  Description: INTERVENTIONS:  - Assess bowel function  - Encourage oral fluids to ensure adequate hydration  - Administer IV fluids if ordered to ensure adequate hydration  - Administer ordered medications as needed  - Encourage mobilization and activity  - Consider nutritional services referral to assist patient with adequate nutrition and appropriate food choices  Outcome: Progressing  Goal: Maintains adequate nutritional intake  Description: INTERVENTIONS:  - Monitor percentage of each meal consumed  - Identify factors contributing to decreased intake, treat as appropriate  - Assist with meals as needed  - Monitor I&O, weight, and lab values if indicated  - Obtain nutrition services referral as needed  Outcome: Progressing  Goal: Oral mucous membranes remain intact  Description: INTERVENTIONS  - Assess oral mucosa and hygiene practices  - Implement preventative oral hygiene regimen  - Implement oral medicated treatments as ordered  - Initiate Nutrition services referral as needed  Outcome: Progressing

## 2022-09-21 NOTE — ASSESSMENT & PLAN NOTE
Recent Labs     09/19/22  0444 09/20/22  0635 09/21/22  0525   CREATININE 1 43* 1 16 1 36*   EGFR 50 65 53     Estimated Creatinine Clearance: 85 4 mL/min (A) (by C-G formula based on SCr of 1 36 mg/dL (H))  MILTON superimposed on stage 3 CKD due to hypotension and low MAPs overngiht, improved after patient was taken off Propofol and transitioned to Fentanyl     Now resolved, patient back at baseline

## 2022-09-21 NOTE — TREATMENT PLAN
Patient repeatedly seen and examined during the day  CT scan was personally reviewed with the radiologist in the radiology department  There is no evidence of bowel injury or leak or enterocutaneous fistula  Lactic acid is normal there is no evidence on CT scan of bowel ischemia  The right lateral abdominal wall has stable postoperative changes with continued non continuity of the abdominal musculature as anticipated from the surgical events  There is no evidence of extravasation of contrast and the contrast goes through small bowel in this area freely  Fever curve has stabilized all the patient did have shaking rigors today  Abdomen is benign in the wound has been examined repeatedly  Continue fever workup  Consider pulmonary source, recheck blood cultures  Following closely      Signature:   Jagdeep Hayes MD  Date: 9/21/2022 Time: 3:23 PM

## 2022-09-21 NOTE — ASSESSMENT & PLAN NOTE
Due to strangulated ventral hernia and RLQ abscess of the rectus muscle  As evidenced by WBC-12 9, fever (101 4 F) and tachycardia POA  Treated with fluid boluses and Zosyn in the ED, followed by emergent OR ex lap prior to which patient received a dose of Ancef  Underwent ex lap with hernia repair  Tissue culture demonstrating few colonies of K  Pneumoniae, E  Coli and Bacteroides vulgatus, S  anginosus  Repeat blood cultures x2 negative at 48 hours  Procalcitonin- 1 44-0 99  Remained febrile overnight, WBC - 21       Plan:  Infectious disease on board - recommendations appreciated  Maintain MAP>65  Continue Ancef and Flagyl, await ID recs  Continue trending procalcitonin  Continue monitoring WBC, trend fever curves

## 2022-09-21 NOTE — ASSESSMENT & PLAN NOTE
Lab Results   Component Value Date    EGFR 53 09/21/2022    EGFR 65 09/20/2022    EGFR 50 09/19/2022    CREATININE 1 36 (H) 09/21/2022    CREATININE 1 16 09/20/2022    CREATININE 1 43 (H) 09/19/2022   Baseline creatinine is 1 4-1 7; OR I/Os 3L/375 ml  Now back to baseline  Continue avoiding nephrotoxins, avoid hypotension  Renally dose medications  Monitor renal indices closely

## 2022-09-21 NOTE — CONSULTS
Consultation - [facility] Infectious Disease   Mclean Sedgwick 78 y o  male MRN: [id number]  Unit/Bed#: ICU 07 Encounter: [id number]      IMPRESSION & RECOMMENDATIONS:   1  Sepsis, evolving from admission, e/b fever, leukocytosis, tachycardia, tachypnea  Lactic acid normal  WBC stable between 19-21K  PCT down trending  Fever curve also downtrending  Consider possibility of a nosocomial infection given protracted ICU stay  Consider possibility of BSI however bl cx x2 sets are so far negative at 48 hrs  UA is benign and low suspicion for UTI  Consider possibility of drug fever, as patient has been on IV abx for 8 days  May be secondary to noninfectious etiology, including pulmonary emboli seen on CTA  Abdominal wound appears healthy and wound vac remains in place, consider intraabdominal infectious or hemmorhagic etiology  A repeat CT A/P has been requested  -continue IV cefazolin and metronidazole, tentative plan to monitor off IV abx pending CT A/P results   -follow-up cultures and adjust antibiotics as needed  -monitor temperature and hemodynamics  -recheck CBC and BMP in a m      2  Incarcerated incisional hernia and small-bowel obstruction status post ex lap 9/14 with reduction of strangulated ventral hernia and debridement of right lower quadrant abdominal wall musculature and VAC placement  Repeat ex lap on 09/15 with right lower quadrant hernia repair with mesh and VAC, complex anterior abdominal wall repair with mesh and retention sutures, LANETTE  Wound cultures polymicrobial including Klebsiella pneumoniae, E coli and Bacteroides vulgatus, Strep anginosus  No evidence of current infection    -antibiotics as above  -serial exam and close surgical follow-up  -continue woundVAC management      3  Bilateral segmental pulmonary emboli  Noted on CTA C/A/P completed on 9/19 during sepsis/fever workup  Continues on IV heparin drip per primary team   -monitor respiratory status      4  Urinary retention    Arias catheter discontinued however failed retention protocol and this has been reinserted  No suspicion for urinary tract infection, UA benign  5  CKD, history of renal cell carcinoma status post cryoablation     -renal dose adjust antibiotic as needed  -volume management   -recheck BMP    6  Obesity  BMI 57  46  Recommend diet and lifestyle modifications as recommended per primary pain  Antibiotics:  Day 8 IV cefazolin/flagyl     I have discussed the above management plan in detail with patient  I have discussed the above management plan in detail with patient's RN, and the primary service, ICU AP  Extensive review of the medical records in epic including review of the notes, radiographs, and laboratory results     HISTORY OF PRESENT ILLNESS:  Reason for Consult: Sepsis despite source control     HPI: Cayla Palencia is a 78y o  year old male with past medical history significant for CKD, left renal cell carcinoma status post cryoablation, hypertension, obesity, large chronically incarcerated incisional hernia at the site of open remote appendectomy who initially presented with a small-bowel obstruction within the hernia and possible strangulation  He underwent exploratory laparotomy, lysis of adhesions and ABThera placement on 9/14 was started on IV Ancef and Flagyl  OR findings included necrotizing abscess of the right lower quadrant rectus muscle  Cultures obtained  A central line was also inserted on 09/14  NG tube was inserted for low continuous suction and he was also intubated  Patient with back to the OR for repeat ex lap on 09/15 with repair of the right lower quadrant hernia with mesh and wound VAC placement, complex anterior abdominal wall repair with mesh and retention sutures and lysis of adhesions, and additional tissue sent for culture  Patient was extubated on 09/16  Sepsis had resolved around this time  He was started on small doses of IV diuretics    X-ray revealed a right upper lobe atelectasis  Surgery continue to follow for wound VAC dressing changes  Patient started meeting sepsis criteria again on 09/18 with up trending leukocytosis, fever, and tachycardia  Workup for fever, Arias and central line were discontinued on 09/18  Arias catheter was reinserted due to failed retention protocols  Repeat blood cultures were obtained in addition to chest x-ray  Wound VAC was changed on 09/19 and wound inspected with no evidence of infection or purulence with minimal drainage in VAC  Patient has remained on Ancef and Flagyl however continue to have fevers with rigors  Currently he reports pain in his buttocks  No fevers but reports chills  Denies SOB  No nausea or vomiting  Doesn't want to go for repeat imaging  REVIEW OF SYSTEMS:  A complete review of systems is negative other than that noted in the HPI      PAST MEDICAL HISTORY:  Past Medical History:   Diagnosis Date    Anemia 9/16/2022    Chronic kidney disease     Hypertension     Obesity     Sepsis ([address] )     Urinary tract infection without hematuria 4/3/2020     Past Surgical History:   Procedure Laterality Date    ABDOMINAL ADHESION SURGERY N/A 9/15/2022    Procedure: LYSIS ADHESIONS;  Surgeon: Jagdeep Hayes MD;  Location: [facility];  Service: General    APPENDECTOMY      EXPLORATORY LAPAROTOMY W/ BOWEL RESECTION N/A 9/14/2022    Procedure: Exdploratory laparotomy, complex Lysis of adhesions, reduction strangulated hernia, debridement RLQ muscle with counter incision, abthera placement;  Surgeon: Jagedep Hayes MD;  [facility];  Service: General    EXPLORATORY LAPAROTOMY W/ BOWEL RESECTION N/A 9/15/2022    Procedure: LAPAROTOMY EXPLORATORY  REPAIR RIGHT LQ HERNIA WITH MESH, REPAIR ANTERIOR WALL HERNIA;  Surgeon: Jagdeep Hayes MD;  Location: [facility];  Service: General    NASAL SEPTUM SURGERY         FAMILY HISTORY:  Non-contributory    SOCIAL HISTORY:  Social History   Social History Substance and Sexual Activity   Alcohol Use No     Social History     Substance and Sexual Activity   Drug Use Not Currently     Social History     Tobacco Use   Smoking Status Never Smoker   Smokeless Tobacco Never Used   Tobacco Comment    no exposure to passive smoke       ALLERGIES:  Allergies   Allergen Reactions    Codeine Swelling     Other reaction(s): SWELLING OF FACE  Other reaction(s): SWELLING OF FACE  Other reaction(s): SWELLING OF FACE  Swollen eyes/swelling    Seasonal Ic [Cholestatin] Allergic Rhinitis       MEDICATIONS:  All current active medications have been reviewed  PHYSICAL EXAM:  Temp:  [99 68 °F (37 6 °C)-102 2 °F (39 °C)] 100 2 °F (37 9 °C)  HR:  [] 88  Resp:  [23-33] 24  BP: (122-168)/() 137/67  SpO2:  [94 %-99 %] 99 %  Temp (24hrs), Av 7 °F (38 2 °C), Min:99 68 °F (37 6 °C), Max:102 2 °F (39 °C)  Current: Temperature: 100 2 °F (37 9 °C)    Intake/Output Summary (Last 24 hours) at 2022 0901  Last data filed at 2022 0537  Gross per 24 hour   Intake 1455 47 ml   Output 2220 ml   Net -764 53 ml       General Appearance:  Appearing chronically ill, nontoxic, and in no distress, sitting in bedside recliner    HEENT: Normocephalic, without obvious abnormality, atraumatic  Conjunctiva pink and sclera anicteric  Oropharynx moist without lesions  Lungs:   Clear to auscultation bilaterally, respirations unlabored, satting 94% on RA    Heart:  RRR; no murmur, rub or gallop   Abdomen:   Large, obese, distended abdomen with RLQ wound vac sponges and midline incision with dressing intact without streaking drainage  Extremities: No cyanosis, clubbing  B/l LE edema  Musculoskeletal: Back symmetric without curvature, ROM normal     : No CVA or suprapubic tenderness  Arias with abdirizak colored urine  Skin: No rashes or lesions  No draining wounds noted  IV in place without warmth, erythema, or exudate   Site of prior R IJ CVC without evidence of purulence or infection  LABS, IMAGING, & OTHER STUDIES:  Lab Results:  I have personally reviewed pertinent labs  Results from last 7 days   Lab Units 09/21/22  0525 09/20/22  0635 09/19/22  1630   WBC Thousand/uL 21 05* 19 83* 21 73*   HEMOGLOBIN g/dL 8 8* 8 8* 9 6*   PLATELETS Thousands/uL 388 286 382     Results from last 7 days   Lab Units 09/21/22  0525 09/20/22  0635 09/19/22  0444 09/15/22  0336 09/14/22  1952   SODIUM mmol/L 138 141 141   < >  --    POTASSIUM mmol/L 3 1* 3 8 3 8   < >  --    CHLORIDE mmol/L 103 106 107   < >  --    CO2 mmol/L 29 27 30   < >  --    CO2, I-STAT mmol/L  --   --   --   --  24   BUN mg/dL 17 17 23   < >  --    CREATININE mg/dL 1 36* 1 16 1 43*   < >  --    EGFR ml/min/1 73sq m 53 65 50   < >  --    GLUCOSE, ISTAT mg/dl  --   --   --   --  129   CALCIUM mg/dL 7 7* 7 2* 8 0*   < >  --    AST U/L 16 35 24   < >  --    ALT U/L 8* 6* 12   < >  --    ALK PHOS U/L 57 50 57   < >  --     < > = values in this interval not displayed  Results from last 7 days   Lab Units 09/18/22  1043 09/14/22  1944 09/14/22  1220 09/14/22  1203   BLOOD CULTURE  No Growth at 48 hrs  No Growth at 48 hrs  --  No Growth After 5 Days  No Growth After 5 Days  GRAM STAIN RESULT   --  No Polys or Bacteria seen  --   --      Results from last 7 days   Lab Units 09/20/22  0635 09/19/22  0444 09/14/22  1203   PROCALCITONIN ng/ml 0 99* 1 44* 0 75*                   Imaging Studies:   I have personally reviewed pertinent imaging study reports and images in PACS  Other Studies:   I have personally reviewed pertinent reports

## 2022-09-22 LAB
ALBUMIN SERPL BCP-MCNC: 1.7 G/DL (ref 3.5–5)
ALP SERPL-CCNC: 53 U/L (ref 46–116)
ALT SERPL W P-5'-P-CCNC: 6 U/L (ref 12–78)
ANION GAP SERPL CALCULATED.3IONS-SCNC: 8 MMOL/L (ref 4–13)
ANISOCYTOSIS BLD QL SMEAR: PRESENT
APTT PPP: 88 SECONDS (ref 23–37)
AST SERPL W P-5'-P-CCNC: 18 U/L (ref 5–45)
BASOPHILS # BLD MANUAL: 0 THOUSAND/UL (ref 0–0.1)
BASOPHILS NFR MAR MANUAL: 0 % (ref 0–1)
BILIRUB SERPL-MCNC: 0.3 MG/DL (ref 0.2–1)
BUN SERPL-MCNC: 14 MG/DL (ref 5–25)
C DIFF TOX GENS STL QL NAA+PROBE: NEGATIVE
CALCIUM ALBUM COR SERPL-MCNC: 9.8 MG/DL (ref 8.3–10.1)
CALCIUM SERPL-MCNC: 8 MG/DL (ref 8.3–10.1)
CHLORIDE SERPL-SCNC: 102 MMOL/L (ref 96–108)
CO2 SERPL-SCNC: 26 MMOL/L (ref 21–32)
CREAT SERPL-MCNC: 1.23 MG/DL (ref 0.6–1.3)
EOSINOPHIL # BLD MANUAL: 0.2 THOUSAND/UL (ref 0–0.4)
EOSINOPHIL NFR BLD MANUAL: 1 % (ref 0–6)
ERYTHROCYTE [DISTWIDTH] IN BLOOD BY AUTOMATED COUNT: 13.9 % (ref 11.6–15.1)
GFR SERPL CREATININE-BSD FRML MDRD: 60 ML/MIN/1.73SQ M
GLUCOSE SERPL-MCNC: 116 MG/DL (ref 65–140)
GLUCOSE SERPL-MCNC: 133 MG/DL (ref 65–140)
GLUCOSE SERPL-MCNC: 143 MG/DL (ref 65–140)
GLUCOSE SERPL-MCNC: 146 MG/DL (ref 65–140)
HCT VFR BLD AUTO: 28.7 % (ref 36.5–49.3)
HGB BLD-MCNC: 9.1 G/DL (ref 12–17)
LG PLATELETS BLD QL SMEAR: PRESENT
LYMPHOCYTES # BLD AUTO: 0.82 THOUSAND/UL (ref 0.6–4.47)
LYMPHOCYTES # BLD AUTO: 4 % (ref 14–44)
MACROCYTES BLD QL AUTO: PRESENT
MAGNESIUM SERPL-MCNC: 1.9 MG/DL (ref 1.6–2.6)
MCH RBC QN AUTO: 30.5 PG (ref 26.8–34.3)
MCHC RBC AUTO-ENTMCNC: 31.7 G/DL (ref 31.4–37.4)
MCV RBC AUTO: 96 FL (ref 82–98)
MONOCYTES # BLD AUTO: 1.22 THOUSAND/UL (ref 0–1.22)
MONOCYTES NFR BLD: 6 % (ref 4–12)
MYELOCYTES NFR BLD MANUAL: 1 % (ref 0–1)
NEUTROPHILS # BLD MANUAL: 17.74 THOUSAND/UL (ref 1.85–7.62)
NEUTS BAND NFR BLD MANUAL: 3 % (ref 0–8)
NEUTS SEG NFR BLD AUTO: 84 % (ref 43–75)
PHOSPHATE SERPL-MCNC: 1.7 MG/DL (ref 2.3–4.1)
PLATELET # BLD AUTO: 428 THOUSANDS/UL (ref 149–390)
PLATELET BLD QL SMEAR: ADEQUATE
PMV BLD AUTO: 9.5 FL (ref 8.9–12.7)
POLYCHROMASIA BLD QL SMEAR: PRESENT
POTASSIUM SERPL-SCNC: 3.3 MMOL/L (ref 3.5–5.3)
PROCALCITONIN SERPL-MCNC: 0.72 NG/ML
PROMYELOCYTES NFR BLD MANUAL: 1 % (ref 0–0)
PROT SERPL-MCNC: 6.5 G/DL (ref 6.4–8.4)
RBC # BLD AUTO: 2.98 MILLION/UL (ref 3.88–5.62)
RBC MORPH BLD: PRESENT
SODIUM SERPL-SCNC: 136 MMOL/L (ref 135–147)
TOXIC GRANULES BLD QL SMEAR: PRESENT
WBC # BLD AUTO: 20.39 THOUSAND/UL (ref 4.31–10.16)

## 2022-09-22 PROCEDURE — 99024 POSTOP FOLLOW-UP VISIT: CPT | Performed by: SURGERY

## 2022-09-22 PROCEDURE — 80053 COMPREHEN METABOLIC PANEL: CPT

## 2022-09-22 PROCEDURE — 85007 BL SMEAR W/DIFF WBC COUNT: CPT

## 2022-09-22 PROCEDURE — 97530 THERAPEUTIC ACTIVITIES: CPT

## 2022-09-22 PROCEDURE — 83735 ASSAY OF MAGNESIUM: CPT

## 2022-09-22 PROCEDURE — 84145 PROCALCITONIN (PCT): CPT

## 2022-09-22 PROCEDURE — 88305 TISSUE EXAM BY PATHOLOGIST: CPT | Performed by: PATHOLOGY

## 2022-09-22 PROCEDURE — 99232 SBSQ HOSP IP/OBS MODERATE 35: CPT | Performed by: INTERNAL MEDICINE

## 2022-09-22 PROCEDURE — 84100 ASSAY OF PHOSPHORUS: CPT

## 2022-09-22 PROCEDURE — 92526 ORAL FUNCTION THERAPY: CPT

## 2022-09-22 PROCEDURE — 85027 COMPLETE CBC AUTOMATED: CPT

## 2022-09-22 PROCEDURE — 85730 THROMBOPLASTIN TIME PARTIAL: CPT | Performed by: INTERNAL MEDICINE

## 2022-09-22 PROCEDURE — 99233 SBSQ HOSP IP/OBS HIGH 50: CPT | Performed by: INTERNAL MEDICINE

## 2022-09-22 PROCEDURE — 97535 SELF CARE MNGMENT TRAINING: CPT

## 2022-09-22 PROCEDURE — 82948 REAGENT STRIP/BLOOD GLUCOSE: CPT

## 2022-09-22 RX ORDER — FUROSEMIDE 20 MG/1
20 TABLET ORAL DAILY
Status: DISCONTINUED | OUTPATIENT
Start: 2022-09-22 | End: 2022-09-28 | Stop reason: HOSPADM

## 2022-09-22 RX ORDER — POTASSIUM CHLORIDE 20 MEQ/1
40 TABLET, EXTENDED RELEASE ORAL ONCE
Status: COMPLETED | OUTPATIENT
Start: 2022-09-22 | End: 2022-09-22

## 2022-09-22 RX ADMIN — HEPARIN SODIUM 21 UNITS/KG/HR: 10000 INJECTION, SOLUTION INTRAVENOUS at 18:22

## 2022-09-22 RX ADMIN — METOPROLOL TARTRATE 25 MG: 25 TABLET, FILM COATED ORAL at 21:22

## 2022-09-22 RX ADMIN — TRAVOPROST 1 DROP: 0.04 SOLUTION OPHTHALMIC at 21:22

## 2022-09-22 RX ADMIN — METOPROLOL TARTRATE 25 MG: 25 TABLET, FILM COATED ORAL at 08:38

## 2022-09-22 RX ADMIN — TAMSULOSIN HYDROCHLORIDE 0.4 MG: 0.4 CAPSULE ORAL at 17:24

## 2022-09-22 RX ADMIN — ALLOPURINOL 100 MG: 100 TABLET ORAL at 08:38

## 2022-09-22 RX ADMIN — POTASSIUM CHLORIDE 40 MEQ: 1500 TABLET, EXTENDED RELEASE ORAL at 08:38

## 2022-09-22 RX ADMIN — PANTOPRAZOLE SODIUM 40 MG: 40 TABLET, DELAYED RELEASE ORAL at 05:11

## 2022-09-22 RX ADMIN — METRONIDAZOLE 500 MG: 500 TABLET ORAL at 05:11

## 2022-09-22 RX ADMIN — HEPARIN SODIUM 21 UNITS/KG/HR: 10000 INJECTION, SOLUTION INTRAVENOUS at 07:30

## 2022-09-22 RX ADMIN — FUROSEMIDE 20 MG: 20 TABLET ORAL at 09:38

## 2022-09-22 RX ADMIN — CEFAZOLIN SODIUM 2000 MG: 2 SOLUTION INTRAVENOUS at 07:30

## 2022-09-22 RX ADMIN — POTASSIUM PHOSPHATE, MONOBASIC POTASSIUM PHOSPHATE, DIBASIC 21 MMOL: 224; 236 INJECTION, SOLUTION, CONCENTRATE INTRAVENOUS at 09:38

## 2022-09-22 NOTE — OCCUPATIONAL THERAPY NOTE
Occupational Therapy Progress Note     Patient Name: Nnamdi Christensen  NGELP'O Date: 9/22/2022  Problem List  Principal Problem:    Strangulated hernia of abdominal wall  Active Problems:    Essential hypertension    CKD (chronic kidney disease), stage III (HCC)    DELIA (obstructive sleep apnea)    Class 3 severe obesity due to excess calories with serious comorbidity and body mass index (BMI) of 50 0 to 59 9 in adult [facility])    Sepsis ([facility] [address] )    Anemia    [facility] embolism ([facility] )            09/22/22 1536   OT Last Visit   OT Visit Date 09/22/22   Note Type   Note Type Treatment   Restrictions/Precautions   Weight Bearing Precautions Per Order No   Other Precautions Fall Risk;Multiple lines;Telemetry   General   Response to Previous Treatment Patient with no complaints from previous session   Pain Assessment   Pain Assessment Tool 0-10   Pain Score No Pain   ADL   Where Assessed Chair   Grooming Assistance 5  Supervision/Setup   Grooming Deficit Setup;Supervision/safety; Increased time to complete   LB Dressing Assistance 2  Maximal Assistance   LB Dressing Deficit Setup;Verbal cueing;Supervision/safety; Increased time to complete; Don/doff R sock; Don/doff L sock   LB Dressing Comments Pt may benefit from trial w/ LH AE in future session   Functional Standing Tolerance   Time 4 mins   Activity Dynamic standing balance activity   Comments Min A for balance/steadying w/ use of RW  Seated rest break required 2* increased fatigue demonstrated   Bed Mobility   Supine to Sit Unable to assess   Sit to Supine Unable to assess   Additional Comments Pt seated OOB in chair at end of session  Call bell and phone within reach  All needs met and pt reports no further questions for OT at this time  Transfers   Sit to Stand 3  Moderate assistance   Additional items Assist x 1; Increased time required;Verbal cues;Armrests   Stand to Sit 4  Minimal assistance   Additional items Assist x 1; Armrests; Increased time required;Verbal cues Additional Comments Cues for safe technique and hand placement  Increased assist required to initiate forward weight shift for sit>stand   Functional Mobility   Functional Mobility 4  Minimal assistance   Additional Comments Assist x1 with 2nd person present for chair follow and line management   Additional items Rolling walker   Therapeutic Excerise-Strength   UE Strength Yes   Right Upper Extremity- Strength   R Shoulder Flexion; Extension;Horizontal ABduction   R Elbow Elbow flexion;Elbow extension   R Position Seated   R Weight/Reps/Sets 10 reps x2 sets   Left Upper Extremity-Strength   L Shoulder Flexion; Extension;Horizontal ABduction   L Elbow Elbow flexion;Elbow extension   L Position Seated   L Weights/Reps/Sets 10 reps x2 sets   Cognition   Overall Cognitive Status WFL   Arousal/Participation Alert; Cooperative   Attention Within functional limits   Orientation Level Oriented X4   Memory Within functional limits   Following Commands Follows all commands and directions without difficulty   Activity Tolerance   Activity Tolerance Patient limited by fatigue   Medical Staff [address], RN   Assessment   Assessment Pt seen for OT treatment session focusing on functional activity tolerance, ADLs, UE ROM/strengthening, and functional transfers/mobility  Pt alert and cooperative throughout session  Pt seated OOB in chair at start of session  UE exercises completed while seated OOB in chair to increase UE ROM/strength needed for ADLs/transfers  Pt tolerated well, no c/o pain or fatigue  Grooming tasks completed w/ Supervision w/ setup required and increased time to complete  LB ADLs completed w/ Max A 2* limited functional reach, increased body habitus, and increased abdominal discomfort when attempting task  Transfers (sit<>stand) completed w/ Mod-Min A with Mod A required to initiate forward weight shift from surface for sit>stand and Min A for controlled descent to surface for stand>sit   Pt required verbal cues for safe technique and hand placement  Min A required for functional mobility with 2nd person present for chair follow and line management  Pt required verbal cues to keep eyes open during mobility  Wound vac noted to disconnect with last sit>stand transfer- RN aware and present at end of session  Pt seated OOB in chair at end of session  Call bell and phone within reach  All needs met and pt reports no further questions for OT at this time  Continue to recommend STR when medically cleared  OT to follow pt on caseload  Plan   Treatment Interventions ADL retraining;Functional transfer training;UE strengthening/ROM; Endurance training;Patient/family training;Equipment evaluation/education; Compensatory technique education;Continued evaluation; Activityengagement   Goal Expiration Date 10/03/22   OT Treatment Day 2   OT Frequency 3-5x/wk   Recommendation   OT Discharge Recommendation Post acute rehabilitation services   Additional Comments  The patient's raw score on the AM-PAC Daily Activity inpatient short form is 16, standardized score is 35 96, less than 39 4  Patients at this level are likely to benefit from discharge to post-acute rehabilitation services  Please refer to the recommendation of the Occupational Therapist for safe discharge planning     AM-PAC Daily Activity Inpatient   Lower Body Dressing 2   Bathing 2   Toileting 2   Upper Body Dressing 3   Grooming 3   Eating 4   Daily Activity Raw Score 16   Daily Activity Standardized Score (Calc for Raw Score >=11) 35 96   AM-[facility] Applied Cognition Inpatient   Following a Speech/Presentation 4   Understanding Ordinary Conversation 4   Taking Medications 4   Remembering Where Things Are Placed or Put Away 4   Remembering List of 4-5 Errands 4   Taking Care of Complicated Tasks 4   Applied Cognition Raw Score 24   Applied Cognition Standardized Score 62 21       Yuniel Kellogg OTR/L

## 2022-09-22 NOTE — PROGRESS NOTES
Progress Note - General Surgery   Benigno Members 78 y o  male MRN: [id number]  Unit/Bed#: ICU 07 Encounter: [id number]    Assessment:  78yoM p/w incarcerated incisional hernia and SBO now s/p:  9/14: ex lap, reduction of strangulated ventral hernia, debridement of RLQ abdominal wall musculature, Abthera VAC placement  9/15: ex lap, RLQ hernia repair w mesh and VAC, complex anterior abdominal wall repair w mesh and retention sutures, LANETTE  Found to have b/l subsegmental PE  Urinary Retention     cc SS    Plan:  · Soft diet as tolerated, add ensures  · abdominal VAC, monitor output and character- MWF changes  · Continue heparin gtt- may transition to PO AC  · Appreciate ID recs- d/c abx for now  · Appreciate ICU care- okay for transfer out  · PT/OT- rehab  · Please tigertext on call [facility] surgery resident or AP with any questions      Subjective/Objective   Subjective:   No acute events overnight  No fevers  Patient is passing gas having bowel function  Has been out of bed  Working with PT OT  Good effort on incentive spirometry  States he is eating about 60% of his tray  Objective:     Blood pressure 135/98, pulse 86, temperature 98 7 °F (37 1 °C), temperature source Tympanic, resp  rate (!) 27, height 5' 9" (1 753 m), weight (!) 176 kg (389 lb 1 8 oz), SpO2 93 %  ,Body mass index is 57 46 kg/m²  Intake/Output Summary (Last 24 hours) at 9/23/2022 0531  Last data filed at 9/23/2022 0500  Gross per 24 hour   Intake 1761 2 ml   Output 1575 ml   Net 186 2 ml       Invasive Devices  Report    Peripheral Intravenous Line  Duration           Peripheral IV 09/19/22 Left Antecubital 3 days    Peripheral IV 09/20/22 Right;Upper;Ventral (anterior) Arm 3 days    Peripheral IV 09/21/22 Left;Upper;Ventral (anterior) Arm 1 day              Physical Exam:   Gen:  NAD  HEENT: NCAT  MMM    CV: well perfused, pulses palpable  Lungs: Normal respiratory effort  Abd: soft, nt/nd, dressings clean dry and intact, VAC intact  Skin: warm/ dry  Extremities: no peripheral edema, no cyanosis  Neuro: AxO x3      Lab, Imaging and other studies:  I have personally reviewed pertinent lab results    , CBC:   Lab Results   Component Value Date    WBC 20 85 (H) 09/23/2022    HGB 9 1 (L) 09/23/2022    HCT 28 8 (L) 09/23/2022    MCV 97 09/23/2022     (H) 09/23/2022    MCH 30 6 09/23/2022    MCHC 31 6 09/23/2022    RDW 14 1 09/23/2022    MPV 9 5 09/23/2022   , CMP:   Lab Results   Component Value Date    SODIUM 137 09/23/2022    K 3 5 09/23/2022     09/23/2022    CO2 28 09/23/2022    BUN 16 09/23/2022    CREATININE 1 37 (H) 09/23/2022    CALCIUM 8 4 09/23/2022    AST 17 09/23/2022    ALT <6 (L) 09/23/2022    ALKPHOS 56 09/23/2022    EGFR 53 09/23/2022     VTE Pharmacologic Prophylaxis: Sequential compression device (Venodyne)  and Heparin  VTE Mechanical Prophylaxis: sequential compression device

## 2022-09-22 NOTE — ASSESSMENT & PLAN NOTE
Recent Labs     09/20/22  0635 09/21/22  0525 09/22/22  0458   HGB 8 8* 8 8* 9 1*   Likely in the setting of blood loss due to ex lap x2 with a dilutional component after receiving IV fluids post op     Baseline Hb - 12  Now stable at 9 1  Will continue monitoring

## 2022-09-22 NOTE — ASSESSMENT & PLAN NOTE
9/14 S/p exp lap, complex lysis of adhesions, reduction of strangulated hernia, debridement RLQ muscle with counter incision, abthera placement x 2  9/15 RLQ Hernia s/p repair with mesh and wound VAC placement and anterior abdominal wall repair with mesh and retention sutures x12 on 09/15/2022  NGT D/C'd    Plan:  Pain management with PRN Dilaudid  Now advanced to diabetic diet  Continue surgical incision site wound care  Monitor wound vac output

## 2022-09-22 NOTE — ASSESSMENT & PLAN NOTE
Due to strangulated ventral hernia and RLQ abscess of the rectus muscle  As evidenced by WBC-12 9, fever (101 4 F) and tachycardia POA  Treated with fluid boluses and Zosyn in the ED, followed by emergent OR ex lap prior to which patient received a dose of Ancef  Underwent ex lap with hernia repair  Tissue culture demonstrating few colonies of K  Pneumoniae, E   Coli and Bacteroides vulgatus, S  anginosus  Repeat blood cultures x2 negative at 48 hours  Procalcitonin- 1 44-0 99- 0 72  Remained afebrile overnight, WBC - 20 39    Plan:  Infectious disease on board - recommendations appreciated  Maintain MAP>65  Consider monitoring off antibiotics  Continue trending procalcitonin  Continue monitoring WBC, trend fever curves

## 2022-09-22 NOTE — PLAN OF CARE
Problem: PAIN - ADULT  Goal: Verbalizes/displays adequate comfort level or baseline comfort level  Description: Interventions:  - Encourage patient to monitor pain and request assistance  - Assess pain using appropriate pain scale  - Administer analgesics based on type and severity of pain and evaluate response  - Implement non-pharmacological measures as appropriate and evaluate response  - Consider cultural and social influences on pain and pain management  - Notify physician/advanced practitioner if interventions unsuccessful or patient reports new pain  Outcome: Progressing     Problem: INFECTION - ADULT  Goal: Absence or prevention of progression during hospitalization  Description: INTERVENTIONS:  - Assess and monitor for signs and symptoms of infection  - Monitor lab/diagnostic results  - Monitor all insertion sites, i e  indwelling lines, tubes, and drains  - Monitor endotracheal if appropriate and nasal secretions for changes in amount and color  - [place] appropriate cooling/warming therapies per order  - Administer medications as ordered  - Instruct and encourage patient and family to use good hand hygiene technique  - Identify and instruct in appropriate isolation precautions for identified infection/condition  Outcome: Progressing     Problem: SAFETY ADULT  Goal: Patient will remain free of falls  Description: INTERVENTIONS:  - Educate patient/family on patient safety including physical limitations  - Instruct patient to call for assistance with activity   - Consult OT/PT to assist with strengthening/mobility   - Keep Call bell within reach  - Keep bed low and locked with side rails adjusted as appropriate  - Keep care items and personal belongings within reach  - Initiate and maintain comfort rounds  - Make Fall Risk Sign visible to staff  - Apply yellow socks and bracelet for high fall risk patients  - Consider moving patient to room near nurses station  Outcome: Progressing  Goal: Maintain or return to baseline ADL function  Description: INTERVENTIONS:  -  Assess patient's ability to carry out ADLs; assess patient's baseline for ADL function and identify physical deficits which impact ability to perform ADLs (bathing, care of mouth/teeth, toileting, grooming, dressing, etc )  - Assess/evaluate cause of self-care deficits   - Assess range of motion  - Assess patient's mobility; develop plan if impaired  - Assess patient's need for assistive devices and provide as appropriate  - Encourage maximum independence but intervene and supervise when necessary  - Involve family in performance of ADLs  - Assess for home care needs following discharge   - Consider OT consult to assist with ADL evaluation and planning for discharge  - Provide patient education as appropriate  Outcome: Progressing  Goal: Maintains/Returns to pre admission functional level  Description: INTERVENTIONS:  - Perform BMAT or MOVE assessment daily    - Set and communicate daily mobility goal to care team and patient/family/caregiver     - Collaborate with rehabilitation services on mobility goals if consulted  - Out of bed for toileting  - Record patient progress and toleration of activity level   Outcome: Progressing     Problem: DISCHARGE PLANNING  Goal: Discharge to home or other facility with appropriate resources  Description: INTERVENTIONS:  - Identify barriers to discharge w/patient and caregiver  - Arrange for needed discharge resources and transportation as appropriate  - Identify discharge learning needs (meds, wound care, etc )  - Arrange for interpretive services to assist at discharge as needed  - Refer to Case Management Department for coordinating discharge planning if the patient needs post-hospital services based on physician/advanced practitioner order or complex needs related to functional status, cognitive ability, or social support system  Outcome: Progressing     Problem: Knowledge Deficit  Goal: Patient/family/caregiver demonstrates understanding of disease process, treatment plan, medications, and discharge instructions  Description: Complete learning assessment and assess knowledge base  Interventions:  - Provide teaching at level of understanding  - Provide teaching via preferred learning methods  Outcome: Progressing     Problem: MOBILITY - ADULT  Goal: Maintain or return to baseline ADL function  Description: INTERVENTIONS:  -  Assess patient's ability to carry out ADLs; assess patient's baseline for ADL function and identify physical deficits which impact ability to perform ADLs (bathing, care of mouth/teeth, toileting, grooming, dressing, etc )  - Assess/evaluate cause of self-care deficits   - Assess range of motion  - Assess patient's mobility; develop plan if impaired  - Assess patient's need for assistive devices and provide as appropriate  - Encourage maximum independence but intervene and supervise when necessary  - Involve family in performance of ADLs  - Assess for home care needs following discharge   - Consider OT consult to assist with ADL evaluation and planning for discharge  - Provide patient education as appropriate  Outcome: Progressing  Goal: Maintains/Returns to pre admission functional level  Description: INTERVENTIONS:  - Perform BMAT or MOVE assessment daily    - Set and communicate daily mobility goal to care team and patient/family/caregiver     - Collaborate with rehabilitation services on mobility goals if consulted  - Out of bed for toileting  - Record patient progress and toleration of activity level   Outcome: Progressing     Problem: Potential for Falls  Goal: Patient will remain free of falls  Description: INTERVENTIONS:  - Educate patient/family on patient safety including physical limitations  - Instruct patient to call for assistance with activity   - Consult OT/PT to assist with strengthening/mobility   - Keep Call bell within reach  - Keep bed low and locked with side rails adjusted as appropriate  - Keep care items and personal belongings within reach  - Initiate and maintain comfort rounds  - Make Fall Risk Sign visible to staff  - Apply yellow socks and bracelet for high fall risk patients  - Consider moving patient to room near nurses station  Outcome: Progressing     Problem: Prexisting or High Potential for Compromised Skin Integrity  Goal: Skin integrity is maintained or improved  Description: INTERVENTIONS:  - Identify patients at risk for skin breakdown  - Assess and monitor skin integrity  - Assess and monitor nutrition and hydration status  - Monitor labs   - Assess for incontinence   - Turn and reposition patient  - Assist with mobility/ambulation  - Relieve pressure over bony prominences  - Avoid friction and shearing  - Provide appropriate hygiene as needed including keeping skin clean and dry  - Evaluate need for skin moisturizer/barrier cream  - Collaborate with interdisciplinary team   - Patient/family teaching  - Consider wound care consult   Outcome: Progressing     Problem: Nutrition/Hydration-ADULT  Goal: Nutrient/Hydration intake appropriate for improving, restoring or maintaining nutritional needs  Description: Monitor and assess patient's nutrition/hydration status for malnutrition  Collaborate with interdisciplinary team and initiate plan and interventions as ordered  Monitor patient's weight and dietary intake as ordered or per policy  Utilize nutrition screening tool and intervene as necessary  Determine patient's food preferences and provide high-protein, high-caloric foods as appropriate       INTERVENTIONS:  - Monitor oral intake, urinary output, labs, and treatment plans  - Assess nutrition and hydration status and recommend course of action  - Evaluate amount of meals eaten  - Assist patient with eating if necessary   - Allow adequate time for meals  - Recommend/ encourage appropriate diets, oral nutritional supplements, and vitamin/mineral supplements  - Order, calculate, and assess calorie counts as needed  - Recommend, monitor, and adjust tube feedings and TPN/PPN based on assessed needs  - Assess need for intravenous fluids  - Provide specific nutrition/hydration education as appropriate  - Include patient/family/caregiver in decisions related to nutrition  Outcome: Progressing     Problem: GASTROINTESTINAL - ADULT  Goal: Minimal or absence of nausea and/or vomiting  Description: INTERVENTIONS:  - Administer IV fluids if ordered to ensure adequate hydration  - Maintain NPO status until nausea and vomiting are resolved  - Nasogastric tube if ordered  - Administer ordered antiemetic medications as needed  - Provide nonpharmacologic comfort measures as appropriate  - Advance diet as tolerated, if ordered  - Consider nutrition services referral to assist patient with adequate nutrition and appropriate food choices  Outcome: Progressing  Goal: Maintains or returns to baseline bowel function  Description: INTERVENTIONS:  - Assess bowel function  - Encourage oral fluids to ensure adequate hydration  - Administer IV fluids if ordered to ensure adequate hydration  - Administer ordered medications as needed  - Encourage mobilization and activity  - Consider nutritional services referral to assist patient with adequate nutrition and appropriate food choices  Outcome: Progressing  Goal: Maintains adequate nutritional intake  Description: INTERVENTIONS:  - Monitor percentage of each meal consumed  - Identify factors contributing to decreased intake, treat as appropriate  - Assist with meals as needed  - Monitor I&O, weight, and lab values if indicated  - Obtain nutrition services referral as needed  Outcome: Progressing  Goal: Oral mucous membranes remain intact  Description: INTERVENTIONS  - Assess oral mucosa and hygiene practices  - Implement preventative oral hygiene regimen  - Implement oral medicated treatments as ordered  - Initiate Nutrition services referral as needed  Outcome: Progressing

## 2022-09-22 NOTE — PROGRESS NOTES
Progress Note - [facility] Infectious Disease   Stephania Hunt 78 y o  male MRN: [id number]  Unit/Bed#: ICU 07 Encounter: [id number]      IMPRESSION & RECOMMENDATIONS:   1  Sepsis, evolving from admission, e/b fever, leukocytosis, tachycardia, tachypnea  Lactic acid normal  WBC stable between 19-21K  PCT down trending  Continues to have fevers  Consider possibility of a nosocomial infection given protracted ICU stay  Consider possibility of BSI however bl cx x2 sets are so far negative at 72 hrs and 2 more sets drawn last night are pending  Low suspicion for UTI  Consider possibility of drug fever, as patient has been on IV abx for 8+ days  May be secondary to noninfectious etiology, including pulmonary emboli seen on CTA  No clinical or radiographic evidence of a developing pneumonia  Abdominal wound appears healthy and wound vac remains in place, consider intraabdominal infectious or hemmorhagic etiology  A repeat CT A/P without evidence of new source  -recommend to discontinue IV ancef and flagyl and monitor off antibiotics   -continue to follow repeat blood cultures   -monitor temperature and hemodynamics  -recheck CBC and BMP in a m      2  Incarcerated incisional hernia and small-bowel obstruction  Patient is status post ex lap 9/14 with reduction of strangulated ventral hernia and debridement of RLQ abdominal wall musculature and VAC placement  Repeat ex lap 09/15 with RLQ hernia repair with mesh and VAC, complex anterior abdominal wall repair with mesh and retention sutures, LANETTE  Wound cultures polymicrobial including Klebsiella pneumoniae, E  coli, Bacteroides vulgatus, Strep anginosus  No evidence of current infection and repeat CT A/P with post surgical changes    -antibiotics as above  -serial exam and close surgical follow-up  -continue wound VAC management      3  Bilateral segmental pulmonary emboli  Noted on CTA C/A/P completed on 9/19 during sepsis/fever workup   Anticoagulation per primary team    -monitor respiratory status      4  Urinary retention  Jenkins catheter now discontinued and patient spontaneous voiding  No suspicion for urinary tract infection, UA benign      5  CKD, history of renal cell carcinoma status post cryoablation        6  Obesity  BMI 57  46  Recommend diet and lifestyle modifications as recommended per primary pain    Antibiotics:  Day 9 flagyl  Day 8 cefazolin  3 days of IV zosyn    I have discussed the above management plan in detail with patient  I have discussed the above management plan in detail with patient's RN, and the primary service, ICU  Subjective:  States he feels improved today, denies feeling fever or chills today or overnight  Had 101 1*F yesterday afternoon and repeat blood cultures obtained  Said he had chills  No more diaphoresis  He is tolerating PO  No SOB or cough  No abdominal pain  Jenkins removed and he is voiding spontaneously  Objective:  Vitals:  Temp:  [98 8 °F (37 1 °C)-101 12 °F (38 4 °C)] 99 5 °F (37 5 °C)  HR:  [] 94  Resp:  [15-31] 24  BP: ()/(45-76) 103/69  SpO2:  [91 %-100 %] 97 %  Temp (24hrs), Av 9 °F (37 7 °C), Min:98 8 °F (37 1 °C), Max:101 12 °F (38 4 °C)  Current: Temperature: 99 5 °F (37 5 °C)    PHYSICAL EXAM:  General Appearance:  Appearing generally weak, nontoxic, and in no distress, sitting in bedside recliner watching TV   HEENT: Normocephalic, without obvious abnormality, atraumatic  Conjunctiva pink and sclera anicteric  Oropharynx moist without lesions  Lungs:   Clear to auscultation bilaterally but decreased at b/l bases, respirations unlabored   Heart:  RRR; no murmur, rub or gallop   Abdomen:   Large, obese and distended abdomen with RLQ wound vac apparatus and midline incision dressing c/d/i, decreaed bowel sounds    Extremities: No cyanosis, clubbing  B/l LE edema  Musculoskeletal: Back symmetric without curvature, ROM normal     : No CVA or suprapubic tenderness  No jenkins      Skin: No rashes or lesions  No draining wounds noted  two peripheral IVs are intact without evidence of erythema, warmth, or exudate  No central line in place  LABS, IMAGING, & OTHER STUDIES:  Lab Results:  I have personally reviewed pertinent labs  Results from last 7 days   Lab Units 09/22/22  0458 09/21/22  0525 09/20/22  0635   WBC Thousand/uL 20 39* 21 05* 19 83*   HEMOGLOBIN g/dL 9 1* 8 8* 8 8*   PLATELETS Thousands/uL 428* 388 286     Results from last 7 days   Lab Units 09/21/22  0525 09/20/22  0635 09/19/22  0444   SODIUM mmol/L 138 141 141   POTASSIUM mmol/L 3 1* 3 8 3 8   CHLORIDE mmol/L 103 106 107   CO2 mmol/L 29 27 30   BUN mg/dL 17 17 23   CREATININE mg/dL 1 36* 1 16 1 43*   EGFR ml/min/1 73sq m 53 65 50   CALCIUM mg/dL 7 7* 7 2* 8 0*   AST U/L 16 35 24   ALT U/L 8* 6* 12   ALK PHOS U/L 57 50 57     Results from last 7 days   Lab Units 09/21/22  1654 09/21/22  1118 09/18/22  1043   BLOOD CULTURE  Received in Microbiology Lab  Culture in Progress  Received in Microbiology Lab  Culture in Progress  --  No Growth at 72 hrs  No Growth at 72 hrs     C DIFF TOXIN B BY PCR   --  Negative  --      Results from last 7 days   Lab Units 09/22/22  0458 09/20/22  0635 09/19/22  0444   PROCALCITONIN ng/ml 0 72* 0 99* 1 44*

## 2022-09-22 NOTE — SPEECH THERAPY NOTE
Speech Language/Pathology    Speech/Language Pathology Progress Note    Patient Name: Ailin Sharpe  [id number] Date: 9/22/2022                     SLP RECOMMENDATIONS:         Diet: Regular        Liquids: Thin liquids         Medications: As tolerated         Strategies: Slow rate, small bites/sips, upright         Subjective:  "I eat fine "     Objective:  Pt denies any difficulty chewing or swallowing  Pt's CN exam is unremarkable  Pt reports he has had a good appetite  Pt observed with portion of afternoon meal of Regular/thin liquids  Pt with functional mastication/manipulation of bolus  Pt with no overt s/s aspiration with thin liquids via successive straw sips  Pt's voice is hoarse, but improving  SLP reviewed vocal hygiene strategies with pt and family members  Pt has met all ST goals at this time and is appropriate for d/c from services  Please re-consult with any new concerns  Assessment:  No overt s/s aspiration during meals   Pt demonstrates functional mastication/manipulation of bolus     Plan/Recommendations:  Continue Regular/thin diet   D/C ST services     Problem List  Principal Problem:    Strangulated hernia of abdominal wall  Active Problems:    Essential hypertension    CKD (chronic kidney disease), stage III ([facility])    DELIA (obstructive sleep apnea)    Class 3 severe obesity due to excess calories with serious comorbidity and body mass index (BMI) of 50 0 to 59 9 in adult ([facility] [address] )    Sepsis ([facility] [address] )    Anemia    Pulmonary embolism ([facility] [address] )       Past Medical History  Past Medical History:   Diagnosis Date    Anemia 9/16/2022    Chronic kidney disease     Hypertension     Obesity     Sepsis ([address] )     Urinary tract infection without hematuria 4/3/2020        Past Surgical History  Past Surgical History:   Procedure Laterality Date    ABDOMINAL ADHESION SURGERY N/A 9/15/2022    Procedure: LYSIS ADHESIONS;  Surgeon: Jarrett Chauhan MD;  Location: [facility];  Service: General    APPENDECTOMY  EXPLORATORY LAPAROTOMY W/ BOWEL RESECTION N/A 9/14/2022    Procedure: Exdploratory laparotomy, complex Lysis of adhesions, reduction strangulated hernia, debridement RLQ muscle with counter incision, abthera placement;  Surgeon: Taye Parker MD;  Location: [facility];  Service: General    EXPLORATORY LAPAROTOMY W/ BOWEL RESECTION N/A 9/15/2022    Procedure: LAPAROTOMY EXPLORATORY  REPAIR RIGHT LQ HERNIA WITH MESH, REPAIR ANTERIOR WALL HERNIA;  Surgeon: Taye Parker MD;  Location: [facility];  Service: General    NASAL SEPTUM SURGERY

## 2022-09-22 NOTE — [facility] ADMISSION
Per chart review patient was advanced to a diabetic diet, continues with wound VAC to RLQ  [facility] Admissions will continue to follow patient for medical stability

## 2022-09-22 NOTE — ASSESSMENT & PLAN NOTE
Lab Results   Component Value Date    EGFR 60 09/22/2022    EGFR 53 09/21/2022    EGFR 65 09/20/2022    CREATININE 1 23 09/22/2022    CREATININE 1 36 (H) 09/21/2022    CREATININE 1 16 09/20/2022   Baseline creatinine is 1 4-1 7; OR I/Os 3L/375 ml  Now back to baseline  Continue avoiding nephrotoxins, avoid hypotension  Renally dose medications  Monitor renal indices closely

## 2022-09-22 NOTE — PROGRESS NOTES
[address]  Progress Note - Gage Ok 1955, 78 y o  male MRN: [id number]  Unit/Bed#: ICU 07 Encounter: [id number]  Primary Care Provider: Janae Reynoso MD   Date and time admitted to hospital: 9/14/2022 11:29 AM    * Strangulated hernia of abdominal wall  Assessment & Plan  9/14 S/p exp lap, complex lysis of adhesions, reduction of strangulated hernia, debridement RLQ muscle with counter incision, abthera placement x 2  9/15 RLQ Hernia s/p repair with mesh and wound VAC placement and anterior abdominal wall repair with mesh and retention sutures x12 on 09/15/2022  NGT D/C'd    Plan:  Pain management with PRN Dilaudid  Now advanced to diabetic diet  Continue surgical incision site wound care  Monitor wound vac output    Pulmonary embolism ([facility] [address] )  Assessment & Plan  Possibly POA in 78 y o morbidly obese, sedentary male, as evidenced by orthopnea, fever, leukocytosis, mild supplemental O2 requirements and CT PE noting  Right greater than left filling defects consistent with PE  No RV strain noted  No prior hx of DVT/PE  Likely in the setting of morbid obesity and s/p abdominal surgical intervention  Plan:  Consider transitioning to [id number] today, price check with insurance  If not, will need to be on warfarin  Continue monitoring vitals    Sepsis [facility])  Assessment & Plan  Due to strangulated ventral hernia and RLQ abscess of the rectus muscle  As evidenced by WBC-12 9, fever (101 4 F) and tachycardia POA  Treated with fluid boluses and Zosyn in the ED, followed by emergent OR ex lap prior to which patient received a dose of Ancef  Underwent ex lap with hernia repair  Tissue culture demonstrating few colonies of K  Pneumoniae, E   Coli and Bacteroides vulgatus, S  anginosus  Repeat blood cultures x2 negative at 48 hours  Procalcitonin- 1 44-0 99- 0 72  Remained afebrile overnight, WBC - 20 39    Plan:  Infectious disease on board - recommendations appreciated  Maintain MAP>65  Consider monitoring off antibiotics  Continue trending procalcitonin  Continue monitoring WBC, trend fever curves      Respiratory insufficiency-resolved as of 9/17/2022  Assessment & Plan  Patient extubated on 9/16      Acute kidney injury (HCC)-resolved as of 9/17/2022  Assessment & Plan  Recent Labs     09/20/22  0635 09/21/22  0525 09/22/22  0458   CREATININE 1 16 1 36* 1 23   EGFR 65 53 60     Estimated Creatinine Clearance: 94 4 mL/min (by C-G formula based on SCr of 1 23 mg/dL)  MILTON superimposed on stage 3 CKD due to hypotension and low MAPs overngiht, improved after patient was taken off Propofol and transitioned to Fentanyl  Now resolved, patient back at baseline        Anemia  Assessment & Plan  Recent Labs     09/20/22  0635 09/21/22  0525 09/22/22  0458   HGB 8 8* 8 8* 9 1*   Likely in the setting of blood loss due to ex lap x2 with a dilutional component after receiving IV fluids post op  Baseline Hb - 12  Now stable at 9 1  Will continue monitoring       Class 3 severe obesity due to excess calories with serious comorbidity and body mass index (BMI) of 50 0 to 59 9 in adult [facility])  Assessment & Plan  Encourage lifestyle modifications  May benefit from referral to bariatric surgery at d/c    DELIA (obstructive sleep apnea)  Assessment & Plan  Patient endorses snoring at night, waking up unrefreshed by sleep, daytime sleepiness  Has not had a formal evaluation or sleep study     Does not desire trial of bedtime CPAP at this time but will consider  Referral to sleep medicine at discharge    CKD (chronic kidney disease), stage III [facility])  Assessment & Plan  Lab Results   Component Value Date    EGFR 60 09/22/2022    EGFR 53 09/21/2022    EGFR 65 09/20/2022    CREATININE 1 23 09/22/2022    CREATININE 1 36 (H) 09/21/2022    CREATININE 1 16 09/20/2022   Baseline creatinine is 1 4-1 7; OR I/Os 3L/375 ml  Now back to baseline  Continue avoiding nephrotoxins, avoid hypotension  Renally dose medications  Monitor renal indices closely      Essential hypertension  Assessment & Plan  Blood Pressure: 103/69  Continue holding lisinopril at this time  Continue b i d  Lopressor  Consider restarting home Lasix  Continue monitoring BP    ----------------------------------------------------------------------------------------  HPI/24hr events: No overnight events  Patient has been afebrile without need of antipyretics  Arias removed , patient passed voiding trial, currently voiding without issues  Patient appropriate for transfer out of the ICU today?: Patient does not meet criteria for ICU Follow-up Clinic; referral has not been made  Disposition: Transfer to Stepdown Level 2  Code Status: Level 1 - Full Code  ---------------------------------------------------------------------------------------  SUBJECTIVE  Patient found sitting in chair at the time of my evaluation, has no complaints  Continues to endorse diarrhea  Notes that his voice is still hoarse  Denies dizziness, lightheadedness, sore throat, difficulty swallowing, chest pain, shortness of breath, palpitations, abdominal tenderness, difficulty urinating      Review of Systems  Review of systems was reviewed and negative unless stated above in HPI/24-hour events   ---------------------------------------------------------------------------------------  OBJECTIVE    Vitals   Vitals:    22 0425 22 0508 22 0715 22 0720   BP: 107/66 103/69  103/64   BP Location:       Pulse: 92 92 94 94   Resp: (!) 28 15 (!) 24 (!) 23   Temp:   99 5 °F (37 5 °C)    TempSrc:   Tympanic    SpO2: 95% 95% 97% 96%   Weight:       Height:         Temp (24hrs), Av 9 °F (37 7 °C), Min:98 8 °F (37 1 °C), Max:101 12 °F (38 4 °C)  Current: Temperature: 99 5 °F (37 5 °C)  Arterial Line BP: 60/56  Arterial Line MAP (mmHg): (!) 58 mmHg    Respiratory:  SpO2: SpO2: 96 %  Nasal Cannula O2 Flow Rate (L/min): 2 L/min    Invasive/non-invasive ventilation settings   Respiratory  Report   Lab Data (Last 4 hours)    None         O2/Vent Data (Last 4 hours)    None                Physical Exam  Vitals and nursing note reviewed  Constitutional:       General: He is not in acute distress  Appearance: Normal appearance  He is morbidly obese  He is not ill-appearing, toxic-appearing or diaphoretic  HENT:      Head: Normocephalic and atraumatic  Nose: Nose normal       Mouth/Throat:      Mouth: Mucous membranes are moist       Pharynx: Oropharynx is clear  Eyes:      General: No scleral icterus  Right eye: No discharge  Left eye: No discharge  Extraocular Movements: Extraocular movements intact  Conjunctiva/sclera: Conjunctivae normal    Cardiovascular:      Rate and Rhythm: Normal rate and regular rhythm  Pulses: Normal pulses  Heart sounds: Normal heart sounds  No murmur heard  Pulmonary:      Effort: Pulmonary effort is normal  No respiratory distress  Breath sounds: No wheezing, rhonchi or rales  Abdominal:      General: Abdomen is protuberant  Bowel sounds are normal  There is distension  Palpations: Abdomen is soft  Tenderness: There is no abdominal tenderness  There is no guarding or rebound  Comments: Abdominal binder in situ  Midline surgical scar with retention sutures times 12 with scant drainage  ABThera wound VAC in RLQ   Musculoskeletal:      Cervical back: Normal range of motion and neck supple  Right lower leg: Edema present  Left lower leg: Edema present  Comments: 2+ pitting edema in the lower extremities bilaterally, no tenderness, warmth or erythema noted, 1+ pitting edema in the upper extremities bilaterally   Skin:     General: Skin is warm and dry  Coloration: Skin is not jaundiced or pale  Neurological:      Mental Status: He is alert and oriented to person, place, and time  Mental status is at baseline     Psychiatric:         Mood and Affect: Mood normal  Behavior: Behavior normal          Thought Content:  Thought content normal          Judgment: Judgment normal              Laboratory and Diagnostics:  Results from last 7 days   Lab Units 09/22/22  0458 09/21/22  0525 09/20/22  6514 09/19/22  1630 09/19/22  0444 09/18/22 2055 09/18/22  0515 09/17/22  0537 09/16/22  0506 09/15/22  1504   WBC Thousand/uL 20 39* 21 05* 19 83* 21 73* 19 51* 19 44* 16 69* 15 28* 14 74* 16 29*   HEMOGLOBIN g/dL 9 1* 8 8* 8 8* 9 6* 9 5* 9 4* 9 4* 9 1* 10 0* 11 6*   HEMATOCRIT % 28 7* 27 9* 27 5* 30 9* 30 4* 30 4* 31 4* 29 4* 31 4* 37 3   PLATELETS Thousands/uL 428* 388 286 382 388 398* 385 366 331 367   NEUTROS PCT %  --   --   --   --   --   --  86* 88* 89*  --    BANDS PCT % 3  --   --   --   --  1  --   --   --  7   MONOS PCT %  --   --   --   --   --   --  6 5 6  --    MONO PCT % 6  --   --   --   --  5  --   --   --  7     Results from last 7 days   Lab Units 09/22/22  0458 09/21/22  0525 09/20/22  0635 09/19/22  0444 09/18/22 2055 09/18/22  0515 09/17/22  0537 09/16/22  0506 09/15/22  1504   SODIUM mmol/L 136 138 141 141 141 142 140 138 137   POTASSIUM mmol/L 3 3* 3 1* 3 8 3 8 3 6 4 0 3 9 4 2 4 8   CHLORIDE mmol/L 102 103 106 107 106 108 108 104 104   CO2 mmol/L 26 29 27 30 29 26 26 23 20*   ANION GAP mmol/L 8 6 8 4 6 8 6 11 13   BUN mg/dL 14 17 17 23 23 22 21 28* 24   CREATININE mg/dL 1 23 1 36* 1 16 1 43* 1 39* 1 43* 1 50* 2 05* 1 91*   CALCIUM mg/dL 8 0* 7 7* 7 2* 8 0* 8 0* 8 2* 7 7* 7 8* 7 5*   GLUCOSE RANDOM mg/dL 116 118 114 120 123 100 125 151* 155*   ALT U/L 6* 8* 6* 12  --   --   --  25 20   AST U/L 18 16 35 24  --   --   --  31 23   ALK PHOS U/L 53 57 50 57  --   --   --  44* 47   ALBUMIN g/dL 1 7* 1 6* 1 4* 1 6*  --   --   --  1 8* 1 8*   TOTAL BILIRUBIN mg/dL 0 30 0 29 0 37 0 35  --   --   --  0 64 0 70     Results from last 7 days   Lab Units 09/22/22  0458 09/21/22  0525 09/19/22  0444 09/18/22  2055 09/18/22  0515 09/16/22  0506 09/15/22  1504   MAGNESIUM mg/dL 1 9 2 1 2 1 2 1 2 4 2 2 1 6   PHOSPHORUS mg/dL 1 7* 2 1* 2 6 1 6*  --  3 9 5 6*      Results from last 7 days   Lab Units 09/22/22  0458 09/21/22  1620 09/21/22  0757 09/20/22  2308 09/20/22  1528 09/20/22  0635 09/19/22  2348 09/19/22  2241 09/19/22  1630   INR   --   --   --   --   --   --   --   --  1 34*   PTT seconds 88* 63* 84* 57* >210* 50* 57*   < > 29    < > = values in this interval not displayed  Results from last 7 days   Lab Units 09/21/22  1258   LACTIC ACID mmol/L 1 2     ABG:    VBG:  Results from last 7 days   Lab Units 09/18/22  2056   PH JONH  7 408*   PCO2 JONH mm Hg 40 8*   PO2 JONH mm Hg 72 8*   HCO3 JONH mmol/L 25 2   BASE EXC JONH mmol/L 0 5     Results from last 7 days   Lab Units 09/22/22  0458 09/20/22  0635 09/19/22  0444   PROCALCITONIN ng/ml 0 72* 0 99* 1 44*       Micro  Results from last 7 days   Lab Units 09/21/22  1654 09/21/22  1118 09/18/22  1043   BLOOD CULTURE  Received in Microbiology Lab  Culture in Progress  Received in Microbiology Lab  Culture in Progress  --  No Growth at 72 hrs  No Growth at 72 hrs  C DIFF TOXIN B BY PCR   --  Negative  --        EKG: NSR, HR 87  Imaging: I have personally reviewed pertinent reports  and I have personally reviewed pertinent films in PACS    Intake and Output  I/O       09/20 0701 09/21 0700 09/21 0701  09/22 0700 09/22 0701  09/23 0700    P  O  520 1480     I V  (mL/kg) 833 8 (4 7) 641 3 (3 6)     IV Piggyback 155 195     Total Intake(mL/kg) 1508 8 (8 6) 2316 3 (13 2)     Urine (mL/kg/hr) 1720 (0 4) 1670 (0 4)     Drains 500 650     Stool 0 0     Total Output 2220 2320     Net -711 2 -3 7            Unmeasured Stool Occurrence 1 x 1 x           Height and Weights   Height: 5' 9" (175 3 cm)  IBW (Ideal Body Weight): 70 7 kg  Body mass index is 57 46 kg/m²    Weight (last 2 days)     Date/Time Weight    09/21/22 0540 176 (389 11)    09/20/22 0525 175 (385 81)            Nutrition       Diet Orders   (From admission, onward)             Start     Ordered    09/22/22 0736  Diet Lyle/CHO Controlled; Consistent Carbohydrate Diet Level 2 (5 carb servings/75 grams CHO/meal); Sodium 2 GM, No Carbonation  Diet effective now        References:    Nutrtion Support Algorithm Enteral vs  Parenteral   Question Answer Comment   Diet Type Lyle/CHO Controlled    Lyle/CHO Controlled Consistent Carbohydrate Diet Level 2 (5 carb servings/75 grams CHO/meal)    Other Restriction(s): Sodium 2 GM    Other Restriction(s): No Carbonation    RD to adjust diet per protocol?  No        09/22/22 0736    09/21/22 1219  Dietary nutrition supplements  Once        Question Answer Comment   Select Supplement: Ensure Clear Apple    Frequency Breakfast, Lunch, Dinner        09/21/22 1218    09/21/22 0941  Dietary nutrition supplements  Once        Question Answer Comment   Select Supplement: Ensure Plus High Protein Strawberry    Select Supplement: Ensure Plus High Protein Butter Pecan    Select Supplement: Ensure Plus High Protein Chocolate    Select Supplement: Ensure Plus High Protein Vanilla    Frequency Breakfast, Lunch, Dinner        09/21/22 0940                  Active Medications  Scheduled Meds:  Current Facility-Administered Medications   Medication Dose Route Frequency Provider Last Rate    acetaminophen  650 mg Oral Q6H PRN Rc Chatterjee MD      allopurinol  100 mg Oral Daily Yue Floyd MD      cefazolin  2,000 mg Intravenous Q8H Yue Floyd MD 2,000 mg (09/22/22 0730)    glycerin-hypromellose-  1 drop Both Eyes Q4H PRN Yue Floyd MD      heparin (porcine)  3-30 Units/kg/hr (Order-Specific) Intravenous Titrated Yue Floyd MD 21 Units/kg/hr (09/22/22 0730)    heparin (porcine)  10,000 Units Intravenous Q1H PRN Yue Floyd MD      heparin (porcine)  5,000 Units Intravenous Q1H PRN Yue Floyd MD      HYDROmorphone  1 mg Intravenous Q4H PRN Yue Floyd MD      iohexol  50 mL Oral Once in imaging Lexine Goltz, CRNP      metoprolol tartrate  25 mg Oral Q12H [address] Jerica Stephanie, MD      metroNIDAZOLE  500 mg Oral Q8H [address] Jerica Stephanie, MD      pantoprazole  40 mg Oral Early Morning Jerica Brown, MD      potassium chloride  40 mEq Oral Once Jerica Brown MD      potassium phosphate  21 mmol Intravenous Once Jerica Brown MD      tamsulosin  0 4 mg Oral Daily With Sarah Morataya MD      travoprost  1 drop Both Eyes HS Jerica Brown MD       Continuous Infusions:  heparin (porcine), 3-30 Units/kg/hr (Order-Specific), Last Rate: 21 Units/kg/hr (09/22/22 0730)      PRN Meds:   acetaminophen, 650 mg, Q6H PRN  glycerin-hypromellose-, 1 drop, Q4H PRN  heparin (porcine), 10,000 Units, Q1H PRN  heparin (porcine), 5,000 Units, Q1H PRN  HYDROmorphone, 1 mg, Q4H PRN  iohexol, 50 mL, Once in imaging        Invasive Devices Review  Invasive Devices  Report    Peripheral Intravenous Line  Duration           Peripheral IV 09/19/22 Left Antecubital 2 days    Peripheral IV 09/20/22 Right;Upper;Ventral (anterior) Arm 2 days    Peripheral IV 09/21/22 Left;Upper;Ventral (anterior) Arm 1 day                Rationale for remaining devices: Need for peripheral IV access  ---------------------------------------------------------------------------------------  Advance Directive and Living Will:      Power of :    POLST:    ---------------------------------------------------------------------------------------  Care Time Delivered:   No Critical Care time spent       Jerica Brown MD      Portions of the record may have been created with voice recognition software  Occasional wrong word or "sound a like" substitutions may have occurred due to the inherent limitations of voice recognition software    Read the chart carefully and recognize, using context, where substitutions have occurred

## 2022-09-22 NOTE — ASSESSMENT & PLAN NOTE
Blood Pressure: 103/69  Continue holding lisinopril at this time  Continue b i d   Lopressor  Consider restarting home Lasix  Continue monitoring BP

## 2022-09-22 NOTE — PLAN OF CARE
Problem: OCCUPATIONAL THERAPY ADULT  Goal: Performs self-care activities at highest level of function for planned discharge setting  See evaluation for individualized goals  Description: Treatment Interventions: ADL retraining, Functional transfer training, UE strengthening/ROM, Endurance training, Patient/family training, Equipment evaluation/education, Compensatory technique education, Continued evaluation, Activityengagement          See flowsheet documentation for full assessment, interventions and recommendations  Outcome: Progressing  Note: Limitation: Decreased ADL status, Decreased UE ROM, Decreased endurance, Decreased self-care trans, Decreased high-level ADLs  Prognosis: Good  Assessment: Pt seen for OT treatment session focusing on functional activity tolerance, ADLs, UE ROM/strengthening, and functional transfers/mobility  Pt alert and cooperative throughout session  Pt seated OOB in chair at start of session  UE exercises completed while seated OOB in chair to increase UE ROM/strength needed for ADLs/transfers  Pt tolerated well, no c/o pain or fatigue  Grooming tasks completed w/ Supervision w/ setup required and increased time to complete  LB ADLs completed w/ Max A 2* limited functional reach, increased body habitus, and increased abdominal discomfort when attempting task  Transfers (sit<>stand) completed w/ Mod-Min A with Mod A required to initiate forward weight shift from surface for sit>stand and Min A for controlled descent to surface for stand>sit  Pt required verbal cues for safe technique and hand placement  Min A required for functional mobility with 2nd person present for chair follow and line management  Pt required verbal cues to keep eyes open during mobility  Wound vac noted to disconnect with last sit>stand transfer- RN aware and present at end of session  Pt seated OOB in chair at end of session  Call bell and phone within reach   All needs met and pt reports no further questions for OT at this time  Continue to recommend STR when medically cleared  OT to follow pt on caseload       OT Discharge Recommendation: Post acute rehabilitation services

## 2022-09-22 NOTE — ASSESSMENT & PLAN NOTE
Recent Labs     09/20/22  0635 09/21/22  0525 09/22/22  0458   CREATININE 1 16 1 36* 1 23   EGFR 65 53 60     Estimated Creatinine Clearance: 94 4 mL/min (by C-G formula based on SCr of 1 23 mg/dL)  MILTON superimposed on stage 3 CKD due to hypotension and low MAPs overngiht, improved after patient was taken off Propofol and transitioned to Fentanyl     Now resolved, patient back at baseline

## 2022-09-23 LAB
ALBUMIN SERPL BCP-MCNC: 1.8 G/DL (ref 3.5–5)
ALP SERPL-CCNC: 56 U/L (ref 46–116)
ALT SERPL W P-5'-P-CCNC: <6 U/L (ref 12–78)
ANION GAP SERPL CALCULATED.3IONS-SCNC: 5 MMOL/L (ref 4–13)
APTT PPP: 77 SECONDS (ref 23–37)
AST SERPL W P-5'-P-CCNC: 17 U/L (ref 5–45)
BACTERIA BLD CULT: NORMAL
BACTERIA BLD CULT: NORMAL
BILIRUB SERPL-MCNC: 0.4 MG/DL (ref 0.2–1)
BUN SERPL-MCNC: 16 MG/DL (ref 5–25)
CALCIUM ALBUM COR SERPL-MCNC: 10.2 MG/DL (ref 8.3–10.1)
CALCIUM SERPL-MCNC: 8.4 MG/DL (ref 8.3–10.1)
CHLORIDE SERPL-SCNC: 104 MMOL/L (ref 96–108)
CO2 SERPL-SCNC: 28 MMOL/L (ref 21–32)
CREAT SERPL-MCNC: 1.37 MG/DL (ref 0.6–1.3)
ERYTHROCYTE [DISTWIDTH] IN BLOOD BY AUTOMATED COUNT: 14.1 % (ref 11.6–15.1)
GFR SERPL CREATININE-BSD FRML MDRD: 53 ML/MIN/1.73SQ M
GLUCOSE SERPL-MCNC: 113 MG/DL (ref 65–140)
GLUCOSE SERPL-MCNC: 119 MG/DL (ref 65–140)
GLUCOSE SERPL-MCNC: 136 MG/DL (ref 65–140)
GLUCOSE SERPL-MCNC: 139 MG/DL (ref 65–140)
GLUCOSE SERPL-MCNC: 139 MG/DL (ref 65–140)
HCT VFR BLD AUTO: 28.8 % (ref 36.5–49.3)
HGB BLD-MCNC: 9.1 G/DL (ref 12–17)
MAGNESIUM SERPL-MCNC: 1.9 MG/DL (ref 1.6–2.6)
MCH RBC QN AUTO: 30.6 PG (ref 26.8–34.3)
MCHC RBC AUTO-ENTMCNC: 31.6 G/DL (ref 31.4–37.4)
MCV RBC AUTO: 97 FL (ref 82–98)
PHOSPHATE SERPL-MCNC: 2.4 MG/DL (ref 2.3–4.1)
PLATELET # BLD AUTO: 448 THOUSANDS/UL (ref 149–390)
PMV BLD AUTO: 9.5 FL (ref 8.9–12.7)
POTASSIUM SERPL-SCNC: 3.5 MMOL/L (ref 3.5–5.3)
PROT SERPL-MCNC: 6.7 G/DL (ref 6.4–8.4)
RBC # BLD AUTO: 2.97 MILLION/UL (ref 3.88–5.62)
SODIUM SERPL-SCNC: 137 MMOL/L (ref 135–147)
WBC # BLD AUTO: 20.85 THOUSAND/UL (ref 4.31–10.16)

## 2022-09-23 PROCEDURE — 99232 SBSQ HOSP IP/OBS MODERATE 35: CPT | Performed by: INTERNAL MEDICINE

## 2022-09-23 PROCEDURE — 82948 REAGENT STRIP/BLOOD GLUCOSE: CPT

## 2022-09-23 PROCEDURE — 84100 ASSAY OF PHOSPHORUS: CPT

## 2022-09-23 PROCEDURE — 88304 TISSUE EXAM BY PATHOLOGIST: CPT | Performed by: PATHOLOGY

## 2022-09-23 PROCEDURE — 83735 ASSAY OF MAGNESIUM: CPT

## 2022-09-23 PROCEDURE — 85025 COMPLETE CBC W/AUTO DIFF WBC: CPT

## 2022-09-23 PROCEDURE — 99024 POSTOP FOLLOW-UP VISIT: CPT | Performed by: SURGERY

## 2022-09-23 PROCEDURE — 99233 SBSQ HOSP IP/OBS HIGH 50: CPT | Performed by: INTERNAL MEDICINE

## 2022-09-23 PROCEDURE — 85730 THROMBOPLASTIN TIME PARTIAL: CPT

## 2022-09-23 PROCEDURE — 80053 COMPREHEN METABOLIC PANEL: CPT

## 2022-09-23 RX ORDER — MAGNESIUM SULFATE HEPTAHYDRATE 40 MG/ML
2 INJECTION, SOLUTION INTRAVENOUS ONCE
Status: COMPLETED | OUTPATIENT
Start: 2022-09-23 | End: 2022-09-23

## 2022-09-23 RX ORDER — POTASSIUM CHLORIDE 20 MEQ/1
40 TABLET, EXTENDED RELEASE ORAL ONCE
Status: COMPLETED | OUTPATIENT
Start: 2022-09-23 | End: 2022-09-23

## 2022-09-23 RX ADMIN — POTASSIUM & SODIUM PHOSPHATES POWDER PACK 280-160-250 MG 2 PACKET: 280-160-250 PACK at 16:42

## 2022-09-23 RX ADMIN — FUROSEMIDE 20 MG: 20 TABLET ORAL at 09:33

## 2022-09-23 RX ADMIN — HEPARIN SODIUM 21 UNITS/KG/HR: 10000 INJECTION, SOLUTION INTRAVENOUS at 15:05

## 2022-09-23 RX ADMIN — POTASSIUM CHLORIDE 40 MEQ: 1500 TABLET, EXTENDED RELEASE ORAL at 16:42

## 2022-09-23 RX ADMIN — ALLOPURINOL 100 MG: 100 TABLET ORAL at 09:33

## 2022-09-23 RX ADMIN — HEPARIN SODIUM 21 UNITS/KG/HR: 10000 INJECTION, SOLUTION INTRAVENOUS at 04:57

## 2022-09-23 RX ADMIN — METOPROLOL TARTRATE 25 MG: 25 TABLET, FILM COATED ORAL at 21:28

## 2022-09-23 RX ADMIN — TRAVOPROST 1 DROP: 0.04 SOLUTION OPHTHALMIC at 21:28

## 2022-09-23 RX ADMIN — HEPARIN SODIUM 21 UNITS/KG/HR: 10000 INJECTION, SOLUTION INTRAVENOUS at 23:46

## 2022-09-23 RX ADMIN — MAGNESIUM SULFATE 2 G: 2 INJECTION INTRAVENOUS at 14:22

## 2022-09-23 RX ADMIN — TAMSULOSIN HYDROCHLORIDE 0.4 MG: 0.4 CAPSULE ORAL at 16:42

## 2022-09-23 RX ADMIN — PANTOPRAZOLE SODIUM 40 MG: 40 TABLET, DELAYED RELEASE ORAL at 05:00

## 2022-09-23 RX ADMIN — METOPROLOL TARTRATE 25 MG: 25 TABLET, FILM COATED ORAL at 09:33

## 2022-09-23 NOTE — QUICK NOTE
Acute Care Surgery  Bedside V A C  Procedure Note    Location of wound: RLQ abdomen    Dressings and Foam removed:  1 Black Foam  1 White Foam    Dimensions of wound: 15 cm x 6 cm x 4 cm    Description of wound: Wound appears healthy throughout with pink and viable appearing tissue, patted dry with 4 x 4 gauze, no bleeding appreciated, no purulent discharge/drainage or signs of infection, RLQ mesh in place and intact  Surrounding skin appears healthy without erythema or induration  VAC dressing application:  The periwound skin was cleaned and dried  1 black foam, 1 white foam were cut to size of the wound and placed into the wound  White sponge was 1st placed over mesh and black sponge was then placed over white sponge  The dressings were then covered with VAC drape  The track pad was then placed over the base of black foam  The VAC was then set to 125 mmHg low Continuous suction  The patient tolerated the procedure well and there were no complications  The patient did not require any excisional debridement during today's dressing change  VAC settings:  125 mmHg  Continuous    Wound Images: Additional Notes: The [facility] sticker placed over the dressing per protocol  The next [facility] dressing change will be planned for Monday 9/26      Aleksandra Moody MD  9/23/2022 2:27 PM

## 2022-09-23 NOTE — PROGRESS NOTES
09/23/22 1200   Clinical Encounter Type   Visited With Patient   Routine Visit Follow-up   Continue Visiting Yes

## 2022-09-23 NOTE — PLAN OF CARE
Problem: PAIN - ADULT  Goal: Verbalizes/displays adequate comfort level or baseline comfort level  Description: Interventions:  - Encourage patient to monitor pain and request assistance  - Assess pain using appropriate pain scale  - Administer analgesics based on type and severity of pain and evaluate response  - Implement non-pharmacological measures as appropriate and evaluate response  - Consider cultural and social influences on pain and pain management  - Notify physician/advanced practitioner if interventions unsuccessful or patient reports new pain  Outcome: Progressing     Problem: INFECTION - ADULT  Goal: Absence or prevention of progression during hospitalization  Description: INTERVENTIONS:  - Assess and monitor for signs and symptoms of infection  - Monitor lab/diagnostic results  - Monitor all insertion sites, i e  indwelling lines, tubes, and drains  - Monitor endotracheal if appropriate and nasal secretions for changes in amount and color  - [place] appropriate cooling/warming therapies per order  - Administer medications as ordered  - Instruct and encourage patient and family to use good hand hygiene technique  - Identify and instruct in appropriate isolation precautions for identified infection/condition  Outcome: Progressing     Problem: SAFETY ADULT  Goal: Patient will remain free of falls  Description: INTERVENTIONS:  - Educate patient/family on patient safety including physical limitations  - Instruct patient to call for assistance with activity   - Consult OT/PT to assist with strengthening/mobility   - Keep Call bell within reach  - Keep bed low and locked with side rails adjusted as appropriate  - Keep care items and personal belongings within reach  - Initiate and maintain comfort rounds  - Make Fall Risk Sign visible to staff  - Apply yellow socks and bracelet for high fall risk patients  - Consider moving patient to room near nurses station  Outcome: Progressing  Goal: Maintain or return to baseline ADL function  Description: INTERVENTIONS:  -  Assess patient's ability to carry out ADLs; assess patient's baseline for ADL function and identify physical deficits which impact ability to perform ADLs (bathing, care of mouth/teeth, toileting, grooming, dressing, etc )  - Assess/evaluate cause of self-care deficits   - Assess range of motion  - Assess patient's mobility; develop plan if impaired  - Assess patient's need for assistive devices and provide as appropriate  - Encourage maximum independence but intervene and supervise when necessary  - Involve family in performance of ADLs  - Assess for home care needs following discharge   - Consider OT consult to assist with ADL evaluation and planning for discharge  - Provide patient education as appropriate  Outcome: Progressing  Goal: Maintains/Returns to pre admission functional level  Description: INTERVENTIONS:  - Perform BMAT or MOVE assessment daily    - Set and communicate daily mobility goal to care team and patient/family/caregiver  - Collaborate with rehabilitation services on mobility goals if consulted  - Reposition patient every 2 hours    - Stand patient 3 times a day  - Ambulate patient 2 times a day  - Out of bed to chair 3 times a day   - Out of bed for meals 3 times a day  - Out of bed for toileting  - Record patient progress and toleration of activity level   Outcome: Progressing     Problem: DISCHARGE PLANNING  Goal: Discharge to home or other facility with appropriate resources  Description: INTERVENTIONS:  - Identify barriers to discharge w/patient and caregiver  - Arrange for needed discharge resources and transportation as appropriate  - Identify discharge learning needs (meds, wound care, etc )  - Arrange for interpretive services to assist at discharge as needed  - Refer to Case Management Department for coordinating discharge planning if the patient needs post-hospital services based on physician/advanced practitioner order or complex needs related to functional status, cognitive ability, or social support system  Outcome: Progressing     Problem: Knowledge Deficit  Goal: Patient/family/caregiver demonstrates understanding of disease process, treatment plan, medications, and discharge instructions  Description: Complete learning assessment and assess knowledge base  Interventions:  - Provide teaching at level of understanding  - Provide teaching via preferred learning methods  Outcome: Progressing     Problem: MOBILITY - ADULT  Goal: Maintain or return to baseline ADL function  Description: INTERVENTIONS:  -  Assess patient's ability to carry out ADLs; assess patient's baseline for ADL function and identify physical deficits which impact ability to perform ADLs (bathing, care of mouth/teeth, toileting, grooming, dressing, etc )  - Assess/evaluate cause of self-care deficits   - Assess range of motion  - Assess patient's mobility; develop plan if impaired  - Assess patient's need for assistive devices and provide as appropriate  - Encourage maximum independence but intervene and supervise when necessary  - Involve family in performance of ADLs  - Assess for home care needs following discharge   - Consider OT consult to assist with ADL evaluation and planning for discharge  - Provide patient education as appropriate  Outcome: Progressing  Goal: Maintains/Returns to pre admission functional level  Description: INTERVENTIONS:  - Perform BMAT or MOVE assessment daily    - Set and communicate daily mobility goal to care team and patient/family/caregiver  - Collaborate with rehabilitation services on mobility goals if consulted  - Reposition patient every 2 hours    - Stand patient 3 times a day  - Ambulate patient 2 times a day  - Out of bed to chair 3 times a day   - Out of bed for meals 3 times a day  - Out of bed for toileting  - Record patient progress and toleration of activity level   Outcome: Progressing     Problem: Potential for Falls  Goal: Patient will remain free of falls  Description: INTERVENTIONS:  - Educate patient/family on patient safety including physical limitations  - Instruct patient to call for assistance with activity   - Consult OT/PT to assist with strengthening/mobility   - Keep Call bell within reach  - Keep bed low and locked with side rails adjusted as appropriate  - Keep care items and personal belongings within reach  - Initiate and maintain comfort rounds  - Make Fall Risk Sign visible to staff  - Apply yellow socks and bracelet for high fall risk patients  - Consider moving patient to room near nurses station  Outcome: Progressing     Problem: Prexisting or High Potential for Compromised Skin Integrity  Goal: Skin integrity is maintained or improved  Description: INTERVENTIONS:  - Identify patients at risk for skin breakdown  - Assess and monitor skin integrity  - Assess and monitor nutrition and hydration status  - Monitor labs   - Assess for incontinence   - Turn and reposition patient  - Assist with mobility/ambulation  - Relieve pressure over bony prominences  - Avoid friction and shearing  - Provide appropriate hygiene as needed including keeping skin clean and dry  - Evaluate need for skin moisturizer/barrier cream  - Collaborate with interdisciplinary team   - Patient/family teaching  - Consider wound care consult   Outcome: Progressing     Problem: Nutrition/Hydration-ADULT  Goal: Nutrient/Hydration intake appropriate for improving, restoring or maintaining nutritional needs  Description: Monitor and assess patient's nutrition/hydration status for malnutrition  Collaborate with interdisciplinary team and initiate plan and interventions as ordered  Monitor patient's weight and dietary intake as ordered or per policy  Utilize nutrition screening tool and intervene as necessary  Determine patient's food preferences and provide high-protein, high-caloric foods as appropriate       INTERVENTIONS:  - Monitor oral intake, urinary output, labs, and treatment plans  - Assess nutrition and hydration status and recommend course of action  - Evaluate amount of meals eaten  - Assist patient with eating if necessary   - Allow adequate time for meals  - Recommend/ encourage appropriate diets, oral nutritional supplements, and vitamin/mineral supplements  - Order, calculate, and assess calorie counts as needed  - Recommend, monitor, and adjust tube feedings and TPN/PPN based on assessed needs  - Assess need for intravenous fluids  - Provide specific nutrition/hydration education as appropriate  - Include patient/family/caregiver in decisions related to nutrition  Outcome: Progressing     Problem: GASTROINTESTINAL - ADULT  Goal: Minimal or absence of nausea and/or vomiting  Description: INTERVENTIONS:  - Administer IV fluids if ordered to ensure adequate hydration  - Maintain NPO status until nausea and vomiting are resolved  - Nasogastric tube if ordered  - Administer ordered antiemetic medications as needed  - Provide nonpharmacologic comfort measures as appropriate  - Advance diet as tolerated, if ordered  - Consider nutrition services referral to assist patient with adequate nutrition and appropriate food choices  Outcome: Progressing  Goal: Maintains or returns to baseline bowel function  Description: INTERVENTIONS:  - Assess bowel function  - Encourage oral fluids to ensure adequate hydration  - Administer IV fluids if ordered to ensure adequate hydration  - Administer ordered medications as needed  - Encourage mobilization and activity  - Consider nutritional services referral to assist patient with adequate nutrition and appropriate food choices  Outcome: Progressing  Goal: Maintains adequate nutritional intake  Description: INTERVENTIONS:  - Monitor percentage of each meal consumed  - Identify factors contributing to decreased intake, treat as appropriate  - Assist with meals as needed  - Monitor I&O, weight, and lab values if indicated  - Obtain nutrition services referral as needed  Outcome: Progressing  Goal: Oral mucous membranes remain intact  Description: INTERVENTIONS  - Assess oral mucosa and hygiene practices  - Implement preventative oral hygiene regimen  - Implement oral medicated treatments as ordered  - Initiate Nutrition services referral as needed  Outcome: Progressing

## 2022-09-23 NOTE — PROGRESS NOTES
Pastoral Care Progress Note    2022  Patient: Jakub Browning : 1955  Admission Date & Time: 2022 1129  MRN: [id number] [facility]: [id number]                     Chaplaincy Interventions Utilized:   Empowerment: Clarified, confirmed, or reviewed information from treatment team     Exploration: Explored emotional needs & resources    Collaboration: Encouraged adherence to treatment plan    Relationship Building: Cultivated a relationship of care and support and Listened empathically    Chaplaincy Outcomes Achieved:  Expressed gratitude      Spiritual Coping Strategies Utilized:   Spiritual gratitude

## 2022-09-23 NOTE — ASSESSMENT & PLAN NOTE
· Due to strangulated ventral hernia and right lower quadrant abscess at rectal muscle  · And ex lap with hernia repair  · Tissue culture demonstrated few colonies of Klebsiella pneumonia, E coli, Bacteroides  · Repeat cultures negative thus far  · Infectious disease on board  · Continuing to monitor off antibiotics

## 2022-09-23 NOTE — ASSESSMENT & PLAN NOTE
· 9/14 status post ex the scooby laparotomy, complex lysis of adhesions, reduction of strangulated hernia, debridement on the right lower quadrant muscle with counter incision, abthera placement x2  · 9/15 right lower quadrant hernia status post repair with mesh and wound VAC placement and anterior abdominal wall repair with mesh and retention sutures  · Pain management  · Further management as per primary team

## 2022-09-23 NOTE — ASSESSMENT & PLAN NOTE
Lab Results   Component Value Date    EGFR 53 09/23/2022    EGFR 60 09/22/2022    EGFR 53 09/21/2022    CREATININE 1 37 (H) 09/23/2022    CREATININE 1 23 09/22/2022    CREATININE 1 36 (H) 09/21/2022   · Baseline creatinine 1 4-1 7  · Creatinine improved  · Monitor renal function

## 2022-09-23 NOTE — PROGRESS NOTES
[address] [place] [address]  Progress Note - Anna Maher 1955, 78 y o  male MRN: [id number]  Unit/Bed#: ICU 07 Encounter: [id number]  Primary Care Provider: Milind Albarran MD   Date and time admitted to hospital: 9/14/2022 11:29 AM    * Strangulated hernia of abdominal wall  Assessment & Plan  · 9/14 status post ex the scooby laparotomy, complex lysis of adhesions, reduction of strangulated hernia, debridement on the right lower quadrant muscle with counter incision, abthera placement x2  · 9/15 right lower quadrant hernia status post repair with mesh and wound VAC placement and anterior abdominal wall repair with mesh and retention sutures  · Pain management  · Further management as per primary team    Pulmonary embolism ([facility] )  Assessment & Plan  · Patient with sedentary lifestyle, obesity  · CT revealed right greater than left filling defect, no RV strain  · No history of DVT or PE  · Continue heparin drip    Anemia  Assessment & Plan  · Monitor H&H    Sepsis ([facility] )  Assessment & Plan  · Due to strangulated ventral hernia and right lower quadrant abscess at rectal muscle  · And ex lap with hernia repair  · Tissue culture demonstrated few colonies of Klebsiella pneumonia, E coli, Bacteroides  · Repeat cultures negative thus far  · Infectious disease on board  · Continuing to monitor off antibiotics    Class 3 severe obesity due to excess calories with serious comorbidity and body mass index (BMI) of 50 0 to 59 9 in adult [facility])  Assessment & Plan  · Encourage lifestyle modifications    DELIA (obstructive sleep apnea)  Assessment & Plan  · Referral to sleep medicine at discharge    CKD (chronic kidney disease), stage III [facility])  Assessment & Plan  Lab Results   Component Value Date    EGFR 53 09/23/2022    EGFR 60 09/22/2022    EGFR 53 09/21/2022    CREATININE 1 37 (H) 09/23/2022    CREATININE 1 23 09/22/2022    CREATININE 1 36 (H) 09/21/2022   · Baseline creatinine 1 4-1 7  · Creatinine improved  · Monitor renal function    Essential hypertension  Assessment & Plan  · Continue metoprolol 25 mg b i d , furosemide 20 mg daily          VTE Pharmacologic Prophylaxis:   Pharmacologic:  Heparin drip    Patient Centered Rounds: I have performed bedside rounds with nursing staff today  Time Spent for Care: 20 minutes  More than 50% of total time spent on counseling and coordination of care as described above  Current Length of Stay: 9 day(s)    Current Patient Status: Inpatient     Code Status: Level 1 - Full Code      Subjective:   Patient seen and examined at bedside, no acute events overnight    Objective:     Vitals:   Temp (24hrs), Av 4 °F (36 9 °C), Min:97 2 °F (36 2 °C), Max:99 6 °F (37 6 °C)    Temp:  [97 2 °F (36 2 °C)-99 6 °F (37 6 °C)] 97 2 °F (36 2 °C)  HR:  [80-96] 92  Resp:  [14-30] 28  BP: (109-157)/(53-98) 127/61  SpO2:  [93 %-98 %] 96 %  Body mass index is 57 46 kg/m²  Input and Output Summary (last 24 hours): Intake/Output Summary (Last 24 hours) at 2022 1830  Last data filed at 2022 1601  Gross per 24 hour   Intake 618 64 ml   Output 1800 ml   Net -1181 36 ml       Physical Exam:    Constitutional: Patient is in no acute distress  HEENT:  Normocephalic, atraumatic  Cardiovascular: Normal S1S2, RRR, No murmurs/rubs/gallops appreciated  Pulmonary:  Bilateral air entry, No rhonchi/rales/wheezing appreciated  Abdominal:  Dressings and back in place  Extremities:  No cyanosis, clubbing or edema     Neurological:  Awake, alert  Skin:  Warm, dry    Additional Data:     Labs:    Results from last 7 days   Lab Units 22  0450 22  0458 22  2055 22  0515   WBC Thousand/uL 20 85* 20 39*   < > 16 69*   HEMOGLOBIN g/dL 9 1* 9 1*   < > 9 4*   HEMATOCRIT % 28 8* 28 7*   < > 31 4*   PLATELETS Thousands/uL 448* 428*   < > 385   NEUTROS PCT %  --   --   --  86*   LYMPHS PCT %  --   --   --  6*   LYMPHO PCT %  --  4*   < >  --    MONOS PCT %  --   -- --  6   MONO PCT %  --  6   < >  --    EOS PCT %  --  1   < > 0    < > = values in this interval not displayed  Results from last 7 days   Lab Units 09/23/22  0450   POTASSIUM mmol/L 3 5   CHLORIDE mmol/L 104   CO2 mmol/L 28   BUN mg/dL 16   CREATININE mg/dL 1 37*   CALCIUM mg/dL 8 4   ALK PHOS U/L 56   ALT U/L <6*   AST U/L 17     Results from last 7 days   Lab Units 09/19/22  1630   INR  1 34*        I Have Reviewed All Lab Data Listed Above  Invasive Devices  Report    Peripheral Intravenous Line  Duration           Peripheral IV 09/20/22 Right;Upper;Ventral (anterior) Arm 3 days    Peripheral IV 09/23/22 Right Forearm <1 day                     Recent Cultures (last 7 days):     Results from last 7 days   Lab Units 09/21/22  1654 09/21/22  1118 09/18/22  1043   BLOOD CULTURE  No Growth at 24 hrs  No Growth at 24 hrs   --  No Growth After 5 Days  No Growth After 5 Days     C DIFF TOXIN B BY PCR   --  Negative  --        Last 24 Hours Medication List:   Current Facility-Administered Medications   Medication Dose Route Frequency Provider Last Rate    acetaminophen  650 mg Oral Q6H PRN Yue Floyd MD      allopurinol  100 mg Oral Daily Yue Floyd MD      furosemide  20 mg Oral Daily Yue Floyd MD      glycerin-hypromellose-  1 drop Both Eyes Q4H PRN Yue Floyd MD      heparin (porcine)  3-30 Units/kg/hr (Order-Specific) Intravenous Titrated Yue Floyd MD 21 Units/kg/hr (09/23/22 1505)    heparin (porcine)  10,000 Units Intravenous Q1H PRN Yue Floyd MD      heparin (porcine)  5,000 Units Intravenous Q1H PRN Yue Floyd MD      HYDROmorphone  1 mg Intravenous Q4H PRN Yue Floyd MD      iohexol  50 mL Oral Once in imaging Yue Floyd MD      metoprolol tartrate  25 mg Oral Q12H [facility] Yue Floyd MD      pantoprazole  40 mg Oral Early Morning Yue Floyd MD      tamsulosin  0 4 mg Oral Daily With [address] [facility], MD      travoprost  1 drop Both Eyes HS Benjamín Gonzalez MD          Today, Patient Was Seen By: Raul Holguin MD

## 2022-09-23 NOTE — PROGRESS NOTES
Progress Note - [facility] Infectious Disease   [place] [facility] 78 [facility]  [facility] MRN: [id number]  Unit/Bed#: ICU 07 Encounter: [id number]      IMPRESSION & RECOMMENDATIONS:   1  Sepsis, evolving from admission, e/b fever, leukocytosis, tachycardia, tachypnea  Lactic acid normal  WBC stable between 19-21K  PCT down trending  Appears to have defervesced  Consider possibility of a nosocomial infection given protracted ICU stay  Consider possibility of BSI however bl cx all remain negative to date thus far  Low suspicion for UTI  Consider possibility of drug fever, as patient had been on IV abx for 8+ days  May be secondary to noninfectious etiology, including pulmonary emboli seen on CTA  No clinical or radiographic evidence of a developing pneumonia  Abdominal wound appears healthy and wound vac remains in place, consider intraabdominal infectious or hemmorhagic etiology  Repeat CT A/P without evidence of new source  -continue to monitor off antibiotics   -continue to follow repeat blood cultures   -monitor temperature and hemodynamics  -recheck CBC and BMP in a m      2  Incarcerated incisional hernia with abscess of rectus muscle and small-bowel obstruction  Patient is status post ex lap 9/14 with reduction of strangulated ventral hernia and debridement of RLQ abdominal wall musculature and VAC placement   Repeat ex lap 09/15 with RLQ hernia repair with mesh and VAC, complex anterior abdominal wall repair with mesh and retention sutures, LANETTE   Wound cultures polymicrobial including Klebsiella pneumoniae, E  coli, Bacteroides vulgatus, Strep anginosus  No evidence of current infection and repeat CT A/P with post surgical changes    -antibiotics as above  -serial exam and close surgical follow-up  -continue wound VAC management      3  Bilateral segmental pulmonary emboli  Noted on CTA C/A/P completed on 9/19 during sepsis/fever workup   Anticoagulation per primary team  On room air   -monitor respiratory status      4  Urinary retention   Arias catheter now discontinued and patient spontaneously voiding  No suspicion for urinary tract infection, UA benign      5  CKD, history of renal cell carcinoma status post cryoablation        6  Obesity   BMI 57  46   Recommend diet and lifestyle modifications as recommended per primary pain    Antibiotics:  Day 1 off IV abx     I have discussed the above management plan in detail with patient  I have discussed the above management plan in detail with patient's RN, and the primary service, SLIM  Subjective:  Patient has no fever, chills, sweats; no nausea, vomiting, diarrhea; no cough, shortness of breath; no pain  No new symptoms  Objective:  Vitals:  Temp:  [98 7 °F (37 1 °C)-99 6 °F (37 6 °C)] 98 7 °F (37 1 °C)  HR:  [80-96] 86  Resp:  [14-30] 28  BP: (109-157)/(53-98) 133/60  SpO2:  [93 %-96 %] 96 %  Temp (24hrs), Av 1 °F (37 3 °C), Min:98 7 °F (37 1 °C), Max:99 6 °F (37 6 °C)  Current: Temperature: 98 7 °F (37 1 °C)    PHYSICAL EXAM:  General Appearance:  Appearing well, nontoxic, and in no distress, sitting in bedside recliner    HEENT: Normocephalic, without obvious abnormality, atraumatic  Conjunctiva pink and sclera anicteric  Oropharynx moist without lesions  Lungs:   Clear to auscultation bilaterally, decreased at bases however, respirations unlabored; on room air  Heart:  RRR; no murmur, rub or gallop   Abdomen:   Large obese and distended abdomen with RLQ wound vac apparatus and midline incision with dressing c/d/i  Overall dec bowel sounds  Extremities: No cyanosis, clubbing or edema   Musculoskeletal: Back symmetric without curvature, ROM normal     : No CVA or suprapubic tenderness  Arias removed  Skin: No rashes or lesions  No draining wounds noted  2 peripheral IV intact without evidence of erythema, warmth, or exudate  No central line in place         LABS, IMAGING, & OTHER STUDIES:  Lab Results:  I have personally reviewed pertinent labs   Results from last 7 days   Lab Units 09/23/22  0450 09/22/22  0458 09/21/22  0525   WBC Thousand/uL 20 85* 20 39* 21 05*   HEMOGLOBIN g/dL 9 1* 9 1* 8 8*   PLATELETS Thousands/uL 448* 428* 388     Results from last 7 days   Lab Units 09/23/22  0450 09/22/22  0458 09/21/22  0525   SODIUM mmol/L 137 136 138   POTASSIUM mmol/L 3 5 3 3* 3 1*   CHLORIDE mmol/L 104 102 103   CO2 mmol/L 28 26 29   BUN mg/dL 16 14 17   CREATININE mg/dL 1 37* 1 23 1 36*   EGFR ml/min/1 73sq m 53 60 53   CALCIUM mg/dL 8 4 8 0* 7 7*   AST U/L 17 18 16   ALT U/L <6* 6* 8*   ALK PHOS U/L 56 53 57     Results from last 7 days   Lab Units 09/21/22  1654 09/21/22  1118 09/18/22  1043   BLOOD CULTURE  No Growth at 24 hrs  No Growth at 24 hrs   --  No Growth After 4 Days  No Growth After 4 Days     C DIFF TOXIN B BY PCR   --  Negative  --      Results from last 7 days   Lab Units 09/22/22  0458 09/20/22  0635 09/19/22  0444   PROCALCITONIN ng/ml 0 72* 0 99* 1 44*

## 2022-09-23 NOTE — ASSESSMENT & PLAN NOTE
· Patient with sedentary lifestyle, obesity  · CT revealed right greater than left filling defect, no RV strain  · No history of DVT or PE  · Continue heparin drip

## 2022-09-24 LAB
ANION GAP SERPL CALCULATED.3IONS-SCNC: 6 MMOL/L (ref 4–13)
APTT PPP: 55 SECONDS (ref 23–37)
APTT PPP: 69 SECONDS (ref 23–37)
APTT PPP: 93 SECONDS (ref 23–37)
BUN SERPL-MCNC: 12 MG/DL (ref 5–25)
CALCIUM SERPL-MCNC: 7.2 MG/DL (ref 8.3–10.1)
CHLORIDE SERPL-SCNC: 107 MMOL/L (ref 96–108)
CO2 SERPL-SCNC: 26 MMOL/L (ref 21–32)
CREAT SERPL-MCNC: 0.97 MG/DL (ref 0.6–1.3)
ERYTHROCYTE [DISTWIDTH] IN BLOOD BY AUTOMATED COUNT: 14.3 % (ref 11.6–15.1)
GFR SERPL CREATININE-BSD FRML MDRD: 81 ML/MIN/1.73SQ M
GLUCOSE SERPL-MCNC: 103 MG/DL (ref 65–140)
GLUCOSE SERPL-MCNC: 114 MG/DL (ref 65–140)
HCT VFR BLD AUTO: 27.6 % (ref 36.5–49.3)
HGB BLD-MCNC: 8.7 G/DL (ref 12–17)
MAGNESIUM SERPL-MCNC: 1.8 MG/DL (ref 1.6–2.6)
MCH RBC QN AUTO: 30.4 PG (ref 26.8–34.3)
MCHC RBC AUTO-ENTMCNC: 31.5 G/DL (ref 31.4–37.4)
MCV RBC AUTO: 97 FL (ref 82–98)
NRBC BLD AUTO-RTO: 0 /100 WBCS
PHOSPHATE SERPL-MCNC: 2.5 MG/DL (ref 2.3–4.1)
PLATELET # BLD AUTO: 307 THOUSANDS/UL (ref 149–390)
PMV BLD AUTO: 9.7 FL (ref 8.9–12.7)
POTASSIUM SERPL-SCNC: 3.7 MMOL/L (ref 3.5–5.3)
RBC # BLD AUTO: 2.86 MILLION/UL (ref 3.88–5.62)
SODIUM SERPL-SCNC: 139 MMOL/L (ref 135–147)
WBC # BLD AUTO: 19.95 THOUSAND/UL (ref 4.31–10.16)

## 2022-09-24 PROCEDURE — 85730 THROMBOPLASTIN TIME PARTIAL: CPT | Performed by: NURSE PRACTITIONER

## 2022-09-24 PROCEDURE — 80048 BASIC METABOLIC PNL TOTAL CA: CPT

## 2022-09-24 PROCEDURE — 99232 SBSQ HOSP IP/OBS MODERATE 35: CPT | Performed by: INTERNAL MEDICINE

## 2022-09-24 PROCEDURE — 84100 ASSAY OF PHOSPHORUS: CPT

## 2022-09-24 PROCEDURE — 99024 POSTOP FOLLOW-UP VISIT: CPT | Performed by: SURGERY

## 2022-09-24 PROCEDURE — 82948 REAGENT STRIP/BLOOD GLUCOSE: CPT

## 2022-09-24 PROCEDURE — 83735 ASSAY OF MAGNESIUM: CPT

## 2022-09-24 PROCEDURE — 85027 COMPLETE CBC AUTOMATED: CPT

## 2022-09-24 PROCEDURE — 85730 THROMBOPLASTIN TIME PARTIAL: CPT | Performed by: SURGERY

## 2022-09-24 RX ADMIN — ALLOPURINOL 100 MG: 100 TABLET ORAL at 09:16

## 2022-09-24 RX ADMIN — METOPROLOL TARTRATE 25 MG: 25 TABLET, FILM COATED ORAL at 09:16

## 2022-09-24 RX ADMIN — FUROSEMIDE 20 MG: 20 TABLET ORAL at 09:16

## 2022-09-24 RX ADMIN — TAMSULOSIN HYDROCHLORIDE 0.4 MG: 0.4 CAPSULE ORAL at 16:00

## 2022-09-24 RX ADMIN — HEPARIN SODIUM 21 UNITS/KG/HR: 10000 INJECTION, SOLUTION INTRAVENOUS at 16:10

## 2022-09-24 RX ADMIN — TRAVOPROST 1 DROP: 0.04 SOLUTION OPHTHALMIC at 21:14

## 2022-09-24 RX ADMIN — PANTOPRAZOLE SODIUM 40 MG: 40 TABLET, DELAYED RELEASE ORAL at 05:24

## 2022-09-24 RX ADMIN — HEPARIN SODIUM 23 UNITS/KG/HR: 10000 INJECTION, SOLUTION INTRAVENOUS at 09:00

## 2022-09-24 RX ADMIN — METOPROLOL TARTRATE 25 MG: 25 TABLET, FILM COATED ORAL at 21:23

## 2022-09-24 RX ADMIN — HEPARIN SODIUM 5000 UNITS: 1000 INJECTION INTRAVENOUS; SUBCUTANEOUS at 05:24

## 2022-09-24 NOTE — ASSESSMENT & PLAN NOTE
· Patient with sedentary lifestyle, obesity  · CT revealed right greater than left filling defect, no RV strain  · No history of DVT or PE  · Continue heparin drip can transition to oral anticoagulation upon discharge

## 2022-09-24 NOTE — PROGRESS NOTES
[facility]  Progress Note - Rita Salts 1955, 78 y o  male MRN: [id number]  Unit/Bed#: ICU 07 Encounter: [id number]  Primary Care Provider: Xiao Lewis MD   Date and time admitted to hospital: 9/14/2022 11:29 AM    * Strangulated hernia of abdominal wall  Assessment & Plan  · 9/14 status post ex the scooby laparotomy, complex lysis of adhesions, reduction of strangulated hernia, debridement on the right lower quadrant muscle with counter incision, abthera placement x2  · 9/15 right lower quadrant hernia status post repair with mesh and wound VAC placement and anterior abdominal wall repair with mesh and retention sutures  · Pain management  · Further management as per primary team    Pulmonary embolism ([facility] )  Assessment & Plan  · Patient with sedentary lifestyle, obesity  · CT revealed right greater than left filling defect, no RV strain  · No history of DVT or PE  · Continue heparin drip can transition to oral anticoagulation upon discharge    Anemia  Assessment & Plan  · Hemoglobin 8 1  · Monitor H&H    Sepsis ([facility] )  Assessment & Plan  · Due to strangulated ventral hernia and right lower quadrant abscess at rectal muscle  · And ex lap with hernia repair  · Tissue culture demonstrated few colonies of Klebsiella pneumonia, E coli, Bacteroides  · Repeat cultures negative thus far  · Infectious disease on board  · Continuing to monitor off antibiotics    Class 3 severe obesity due to excess calories with serious comorbidity and body mass index (BMI) of 50 0 to 59 9 in adult [facility])  Assessment & Plan  · Encourage lifestyle modifications    DELIA (obstructive sleep apnea)  Assessment & Plan  · Referral to sleep medicine at discharge    CKD (chronic kidney disease), stage III [facility])  Assessment & Plan  Lab Results   Component Value Date    EGFR 81 09/24/2022    EGFR 53 09/23/2022    EGFR 60 09/22/2022    CREATININE 0 97 09/24/2022    CREATININE 1 37 (H) 09/23/2022    CREATININE 1 23 2022   · Baseline creatinine 1 4-1 7  · Creatinine improved  · Monitor renal function    Essential hypertension  Assessment & Plan  · Continue metoprolol 25 mg b i d , furosemide 20 mg daily        VTE Pharmacologic Prophylaxis:   Pharmacologic: heparin gtt    Patient Centered Rounds: I have performed bedside rounds with nursing staff today  Education and Discussions with Family / Patient:  Patient    Time Spent for Care: 20 minutes  More than 50% of total time spent on counseling and coordination of care as described above  Current Length of Stay: 10 day(s)    Current Patient Status: Inpatient     Code Status: Level 1 - Full Code      Subjective:   Patient seen and examined at bedside, sitting in recliner, states he could not sleep well in the bed last night  Denies any nausea or vomiting    Objective:     Vitals:   Temp (24hrs), Av 7 °F (36 5 °C), Min:97 2 °F (36 2 °C), Max:98 2 °F (36 8 °C)    Temp:  [97 2 °F (36 2 °C)-98 2 °F (36 8 °C)] 98 2 °F (36 8 °C)  HR:  [74-98] 94  Resp:  [16-30] 22  BP: (112-137)/(54-85) 133/62  SpO2:  [95 %-97 %] 97 %  Body mass index is 57 46 kg/m²  Input and Output Summary (last 24 hours): Intake/Output Summary (Last 24 hours) at 2022 1339  Last data filed at 2022 0600  Gross per 24 hour   Intake 631 93 ml   Output 1200 ml   Net -568 07 ml       Physical Exam:    Constitutional: Patient is oriented to person, place and time, no acute distress  HEENT:  Normocephalic, atraumatic  Cardiovascular: Normal S1S2, RRR, No murmurs/rubs/gallops appreciated  Pulmonary:  Bilateral air entry, No rhonchi/rales/wheezing appreciated  Abdominal:  Soft, obese, nontender, dressing in place, Wound VAC in place  Extremities:  No cyanosis, clubbing or edema  Neurological: Cranial nerves II-XII grossly intact, sensation intact, otherwise no focal neurological symptoms     Skin:  Warm, dry    Additional Data:     Labs:    Results from last 7 days   Lab Units 09/24/22  0440 09/23/22  0450 09/22/22  0458 09/18/22  2055 09/18/22  0515   WBC Thousand/uL 19 95*   < > 20 39*   < > 16 69*   HEMOGLOBIN g/dL 8 7*   < > 9 1*   < > 9 4*   HEMATOCRIT % 27 6*   < > 28 7*   < > 31 4*   PLATELETS Thousands/uL 307   < > 428*   < > 385   NEUTROS PCT %  --   --   --   --  86*   LYMPHS PCT %  --   --   --   --  6*   LYMPHO PCT %  --   --  4*   < >  --    MONOS PCT %  --   --   --   --  6   MONO PCT %  --   --  6   < >  --    EOS PCT %  --   --  1   < > 0    < > = values in this interval not displayed  Results from last 7 days   Lab Units 09/24/22  0440 09/23/22  0450   POTASSIUM mmol/L 3 7 3 5   CHLORIDE mmol/L 107 104   CO2 mmol/L 26 28   BUN mg/dL 12 16   CREATININE mg/dL 0 97 1 37*   CALCIUM mg/dL 7 2* 8 4   ALK PHOS U/L  --  56   ALT U/L  --  <6*   AST U/L  --  17     Results from last 7 days   Lab Units 09/19/22  1630   INR  1 34*        I Have Reviewed All Lab Data Listed Above  Invasive Devices  Report    Peripheral Intravenous Line  Duration           Peripheral IV 09/20/22 Right;Upper;Ventral (anterior) Arm 4 days    Peripheral IV 09/23/22 Right Forearm <1 day                     Recent Cultures (last 7 days):     Results from last 7 days   Lab Units 09/21/22  1654 09/21/22  1118 09/18/22  1043   BLOOD CULTURE  No Growth at 48 hrs  No Growth at 48 hrs  --  No Growth After 5 Days  No Growth After 5 Days     C DIFF TOXIN B BY PCR   --  Negative  --        Last 24 Hours Medication List:   Current Facility-Administered Medications   Medication Dose Route Frequency Provider Last Rate    acetaminophen  650 mg Oral Q6H PRN Neno Connor MD      allopurinol  100 mg Oral Daily Neno Connor MD      furosemide  20 mg Oral Daily Neno Connor MD      glycerin-hypromellose-  1 drop Both Eyes Q4H PRN Neno Connor MD      heparin (porcine)  3-30 Units/kg/hr (Order-Specific) Intravenous Titrated Neno Connor MD 21 Units/kg/hr (09/24/22 9101)    heparin (porcine)  10,000 Units Intravenous Q1H PRN Dickson Macias MD      heparin (porcine)  5,000 Units Intravenous Q1H PRN Dickson Macias MD      HYDROmorphone  1 mg Intravenous Q4H PRN Dickson Macias MD      iohexol  50 mL Oral Once in imaging Dickson Macias MD      metoprolol tartrate  25 mg Oral Q12H [facility] Araceli Harvey MD      pantoprazole  40 mg Oral Early Morning Dickson Macias MD      tamsulosin  0 4 mg Oral Daily With Juliana Joseph MD      travoprost  1 drop Both Eyes HS Dickson Macias MD          Today, Patient Was Seen By: Joleen Pearl MD

## 2022-09-24 NOTE — CONSULTS
Please see progress note from Dr Gilbetro Zhou on 9/23/22 for most recent assessment and plan from internal medicine  This note is linking consult order to EMR and is nonbillable

## 2022-09-24 NOTE — PLAN OF CARE
Problem: PAIN - ADULT  Goal: Verbalizes/displays adequate comfort level or baseline comfort level  Description: Interventions:  - Encourage patient to monitor pain and request assistance  - Assess pain using appropriate pain scale  - Administer analgesics based on type and severity of pain and evaluate response  - Implement non-pharmacological measures as appropriate and evaluate response  - Consider cultural and social influences on pain and pain management  - Notify physician/advanced practitioner if interventions unsuccessful or patient reports new pain  Outcome: Progressing     Problem: INFECTION - ADULT  Goal: Absence or prevention of progression during hospitalization  Description: INTERVENTIONS:  - Assess and monitor for signs and symptoms of infection  - Monitor lab/diagnostic results  - Monitor all insertion sites, i e  indwelling lines, tubes, and drains  - Monitor endotracheal if appropriate and nasal secretions for changes in amount and color  - [place] appropriate cooling/warming therapies per order  - Administer medications as ordered  - Instruct and encourage patient and family to use good hand hygiene technique  - Identify and instruct in appropriate isolation precautions for identified infection/condition  Outcome: Progressing     Problem: SAFETY ADULT  Goal: Patient will remain free of falls  Description: INTERVENTIONS:  - Educate patient/family on patient safety including physical limitations  - Instruct patient to call for assistance with activity   - Consult OT/PT to assist with strengthening/mobility   - Keep Call bell within reach  - Keep bed low and locked with side rails adjusted as appropriate  - Keep care items and personal belongings within reach  - Initiate and maintain comfort rounds  - Make Fall Risk Sign visible to staff  - Offer Toileting in advance of need  - Initiate/Maintain bed alarm  - Obtain necessary fall risk management equipment  - Apply yellow socks and bracelet for high fall risk patients  - Consider moving patient to room near nurses station  Outcome: Progressing  Goal: Maintain or return to baseline ADL function  Description: INTERVENTIONS:  -  Assess patient's ability to carry out ADLs; assess patient's baseline for ADL function and identify physical deficits which impact ability to perform ADLs (bathing, care of mouth/teeth, toileting, grooming, dressing, etc )  - Assess/evaluate cause of self-care deficits   - Assess range of motion  - Assess patient's mobility; develop plan if impaired  - Assess patient's need for assistive devices and provide as appropriate  - Encourage maximum independence but intervene and supervise when necessary  - Involve family in performance of ADLs  - Assess for home care needs following discharge   - Consider OT consult to assist with ADL evaluation and planning for discharge  - Provide patient education as appropriate  Outcome: Progressing  Goal: Maintains/Returns to pre admission functional level  Description: INTERVENTIONS:  - Perform BMAT or MOVE assessment daily    - Set and communicate daily mobility goal to care team and patient/family/caregiver     - Collaborate with rehabilitation services on mobility goals if consulted  - Out of bed for toileting  - Record patient progress and toleration of activity level   Outcome: Progressing     Problem: MOBILITY - ADULT  Goal: Maintain or return to baseline ADL function  Description: INTERVENTIONS:  -  Assess patient's ability to carry out ADLs; assess patient's baseline for ADL function and identify physical deficits which impact ability to perform ADLs (bathing, care of mouth/teeth, toileting, grooming, dressing, etc )  - Assess/evaluate cause of self-care deficits   - Assess range of motion  - Assess patient's mobility; develop plan if impaired  - Assess patient's need for assistive devices and provide as appropriate  - Encourage maximum independence but intervene and supervise when necessary  - Involve family in performance of ADLs  - Assess for home care needs following discharge   - Consider OT consult to assist with ADL evaluation and planning for discharge  - Provide patient education as appropriate  Outcome: Progressing  Goal: Maintains/Returns to pre admission functional level  Description: INTERVENTIONS:  - Perform BMAT or MOVE assessment daily    - Set and communicate daily mobility goal to care team and patient/family/caregiver     - Collaborate with rehabilitation services on mobility goals if consulted  - Out of bed for toileting  - Record patient progress and toleration of activity level   Outcome: Progressing     Problem: Potential for Falls  Goal: Patient will remain free of falls  Description: INTERVENTIONS:  - Educate patient/family on patient safety including physical limitations  - Instruct patient to call for assistance with activity   - Consult OT/PT to assist with strengthening/mobility   - Keep Call bell within reach  - Keep bed low and locked with side rails adjusted as appropriate  - Keep care items and personal belongings within reach  - Initiate and maintain comfort rounds  - Make Fall Risk Sign visible to staff  - Offer Toileting in advance of need  - Initiate/Maintain bed alarm  - Obtain necessary fall risk management equipment  - Apply yellow socks and bracelet for high fall risk patients  - Consider moving patient to room near nurses station  Outcome: Progressing     Problem: Prexisting or High Potential for Compromised Skin Integrity  Goal: Skin integrity is maintained or improved  Description: INTERVENTIONS:  - Identify patients at risk for skin breakdown  - Assess and monitor skin integrity  - Assess and monitor nutrition and hydration status  - Monitor labs   - Assess for incontinence   - Turn and reposition patient  - Assist with mobility/ambulation  - Relieve pressure over bony prominences  - Avoid friction and shearing  - Provide appropriate hygiene as needed including keeping skin clean and dry  - Evaluate need for skin moisturizer/barrier cream  - Collaborate with interdisciplinary team   - Patient/family teaching  - Consider wound care consult   Outcome: Progressing     Problem: Nutrition/Hydration-ADULT  Goal: Nutrient/Hydration intake appropriate for improving, restoring or maintaining nutritional needs  Description: Monitor and assess patient's nutrition/hydration status for malnutrition  Collaborate with interdisciplinary team and initiate plan and interventions as ordered  Monitor patient's weight and dietary intake as ordered or per policy  Utilize nutrition screening tool and intervene as necessary  Determine patient's food preferences and provide high-protein, high-caloric foods as appropriate       INTERVENTIONS:  - Monitor oral intake, urinary output, labs, and treatment plans  - Assess nutrition and hydration status and recommend course of action  - Evaluate amount of meals eaten  - Assist patient with eating if necessary   - Allow adequate time for meals  - Recommend/ encourage appropriate diets, oral nutritional supplements, and vitamin/mineral supplements  - Order, calculate, and assess calorie counts as needed  - Recommend, monitor, and adjust tube feedings and TPN/PPN based on assessed needs  - Assess need for intravenous fluids  - Provide specific nutrition/hydration education as appropriate  - Include patient/family/caregiver in decisions related to nutrition  Outcome: Progressing     Problem: GASTROINTESTINAL - ADULT  Goal: Minimal or absence of nausea and/or vomiting  Description: INTERVENTIONS:  - Administer IV fluids if ordered to ensure adequate hydration  - Maintain NPO status until nausea and vomiting are resolved  - Nasogastric tube if ordered  - Administer ordered antiemetic medications as needed  - Provide nonpharmacologic comfort measures as appropriate  - Advance diet as tolerated, if ordered  - Consider nutrition services referral to assist patient with adequate nutrition and appropriate food choices  Outcome: Progressing  Goal: Maintains or returns to baseline bowel function  Description: INTERVENTIONS:  - Assess bowel function  - Encourage oral fluids to ensure adequate hydration  - Administer IV fluids if ordered to ensure adequate hydration  - Administer ordered medications as needed  - Encourage mobilization and activity  - Consider nutritional services referral to assist patient with adequate nutrition and appropriate food choices  Outcome: Progressing  Goal: Maintains adequate nutritional intake  Description: INTERVENTIONS:  - Monitor percentage of each meal consumed  - Identify factors contributing to decreased intake, treat as appropriate  - Assist with meals as needed  - Monitor I&O, weight, and lab values if indicated  - Obtain nutrition services referral as needed  Outcome: Progressing  Goal: Oral mucous membranes remain intact  Description: INTERVENTIONS  - Assess oral mucosa and hygiene practices  - Implement preventative oral hygiene regimen  - Implement oral medicated treatments as ordered  - Initiate Nutrition services referral as needed  Outcome: Progressing

## 2022-09-24 NOTE — PLAN OF CARE
Problem: PAIN - ADULT  Goal: Verbalizes/displays adequate comfort level or baseline comfort level  Description: Interventions:  - Encourage patient to monitor pain and request assistance  - Assess pain using appropriate pain scale  - Administer analgesics based on type and severity of pain and evaluate response  - Implement non-pharmacological measures as appropriate and evaluate response  - Consider cultural and social influences on pain and pain management  - Notify physician/advanced practitioner if interventions unsuccessful or patient reports new pain  Outcome: Progressing     Problem: INFECTION - ADULT  Goal: Absence or prevention of progression during hospitalization  Description: INTERVENTIONS:  - Assess and monitor for signs and symptoms of infection  - Monitor lab/diagnostic results  - Monitor all insertion sites, i e  indwelling lines, tubes, and drains  - Monitor endotracheal if appropriate and nasal secretions for changes in amount and color  - [place] appropriate cooling/warming therapies per order  - Administer medications as ordered  - Instruct and encourage patient and family to use good hand hygiene technique  - Identify and instruct in appropriate isolation precautions for identified infection/condition  Outcome: Progressing     Problem: SAFETY ADULT  Goal: Patient will remain free of falls  Description: INTERVENTIONS:  - Educate patient/family on patient safety including physical limitations  - Instruct patient to call for assistance with activity   - Consult OT/PT to assist with strengthening/mobility   - Keep Call bell within reach  - Keep bed low and locked with side rails adjusted as appropriate  - Keep care items and personal belongings within reach  - Initiate and maintain comfort rounds  - Make Fall Risk Sign visible to staff  - Offer Toileting every 2 Hours, in advance of need  - Obtain necessary fall risk management equipment: walker  - Apply yellow socks and bracelet for high fall risk patients  - Consider moving patient to room near nurses station  Outcome: Progressing  Goal: Maintain or return to baseline ADL function  Description: INTERVENTIONS:  -  Assess patient's ability to carry out ADLs; assess patient's baseline for ADL function and identify physical deficits which impact ability to perform ADLs (bathing, care of mouth/teeth, toileting, grooming, dressing, etc )  - Assess/evaluate cause of self-care deficits   - Assess range of motion  - Assess patient's mobility; develop plan if impaired  - Assess patient's need for assistive devices and provide as appropriate  - Encourage maximum independence but intervene and supervise when necessary  - Involve family in performance of ADLs  - Assess for home care needs following discharge   - Consider OT consult to assist with ADL evaluation and planning for discharge  - Provide patient education as appropriate  Outcome: Progressing  Goal: Maintains/Returns to pre admission functional level  Description: INTERVENTIONS:  - Perform BMAT or MOVE assessment daily    - Set and communicate daily mobility goal to care team and patient/family/caregiver  - Collaborate with rehabilitation services on mobility goals if consulted  - Perform Range of Motion 3 times a day  - Reposition patient every 2 hours    - Stand patient 3 times a day  - Ambulate patient 3 times a day  - Out of bed to chair 3 times a day   - Out of bed for meals 3 times a day  - Out of bed for toileting  - Record patient progress and toleration of activity level   Outcome: Progressing     Problem: DISCHARGE PLANNING  Goal: Discharge to home or other facility with appropriate resources  Description: INTERVENTIONS:  - Identify barriers to discharge w/patient and caregiver  - Arrange for needed discharge resources and transportation as appropriate  - Identify discharge learning needs (meds, wound care, etc )  - Arrange for interpretive services to assist at discharge as needed  - Refer to Case Management Department for coordinating discharge planning if the patient needs post-hospital services based on physician/advanced practitioner order or complex needs related to functional status, cognitive ability, or social support system  Outcome: Progressing     Problem: Knowledge Deficit  Goal: Patient/family/caregiver demonstrates understanding of disease process, treatment plan, medications, and discharge instructions  Description: Complete learning assessment and assess knowledge base  Interventions:  - Provide teaching at level of understanding  - Provide teaching via preferred learning methods  Outcome: Progressing     Problem: MOBILITY - ADULT  Goal: Maintain or return to baseline ADL function  Description: INTERVENTIONS:  -  Assess patient's ability to carry out ADLs; assess patient's baseline for ADL function and identify physical deficits which impact ability to perform ADLs (bathing, care of mouth/teeth, toileting, grooming, dressing, etc )  - Assess/evaluate cause of self-care deficits   - Assess range of motion  - Assess patient's mobility; develop plan if impaired  - Assess patient's need for assistive devices and provide as appropriate  - Encourage maximum independence but intervene and supervise when necessary  - Involve family in performance of ADLs  - Assess for home care needs following discharge   - Consider OT consult to assist with ADL evaluation and planning for discharge  - Provide patient education as appropriate  Outcome: Progressing  Goal: Maintains/Returns to pre admission functional level  Description: INTERVENTIONS:  - Perform BMAT or MOVE assessment daily    - Set and communicate daily mobility goal to care team and patient/family/caregiver  - Collaborate with rehabilitation services on mobility goals if consulted  - Perform Range of Motion 3 times a day  - Reposition patient every 2 hours    - Stand patient 3 times a day  - Ambulate patient 3 times a day  - Out of bed to chair 3 times a day   - Out of bed for meals 3 times a day  - Out of bed for toileting  - Record patient progress and toleration of activity level   Outcome: Progressing     Problem: Potential for Falls  Goal: Patient will remain free of falls  Description: INTERVENTIONS:  - Educate patient/family on patient safety including physical limitations  - Instruct patient to call for assistance with activity   - Consult OT/PT to assist with strengthening/mobility   - Keep Call bell within reach  - Keep bed low and locked with side rails adjusted as appropriate  - Keep care items and personal belongings within reach  - Initiate and maintain comfort rounds  - Make Fall Risk Sign visible to staff  - Offer Toileting every 2 Hours, in advance of need  - Obtain necessary fall risk management equipment: walker  - Apply yellow socks and bracelet for high fall risk patients  - Consider moving patient to room near nurses station  Outcome: Progressing     Problem: Prexisting or High Potential for Compromised Skin Integrity  Goal: Skin integrity is maintained or improved  Description: INTERVENTIONS:  - Identify patients at risk for skin breakdown  - Assess and monitor skin integrity  - Assess and monitor nutrition and hydration status  - Monitor labs   - Assess for incontinence   - Turn and reposition patient  - Assist with mobility/ambulation  - Relieve pressure over bony prominences  - Avoid friction and shearing  - Provide appropriate hygiene as needed including keeping skin clean and dry  - Evaluate need for skin moisturizer/barrier cream  - Collaborate with interdisciplinary team   - Patient/family teaching  - Consider wound care consult   Outcome: Progressing     Problem: Nutrition/Hydration-ADULT  Goal: Nutrient/Hydration intake appropriate for improving, restoring or maintaining nutritional needs  Description: Monitor and assess patient's nutrition/hydration status for malnutrition   Collaborate with interdisciplinary team and initiate plan and interventions as ordered  Monitor patient's weight and dietary intake as ordered or per policy  Utilize nutrition screening tool and intervene as necessary  Determine patient's food preferences and provide high-protein, high-caloric foods as appropriate       INTERVENTIONS:  - Monitor oral intake, urinary output, labs, and treatment plans  - Assess nutrition and hydration status and recommend course of action  - Evaluate amount of meals eaten  - Assist patient with eating if necessary   - Allow adequate time for meals  - Recommend/ encourage appropriate diets, oral nutritional supplements, and vitamin/mineral supplements  - Order, calculate, and assess calorie counts as needed  - Recommend, monitor, and adjust tube feedings and TPN/PPN based on assessed needs  - Assess need for intravenous fluids  - Provide specific nutrition/hydration education as appropriate  - Include patient/family/caregiver in decisions related to nutrition  Outcome: Progressing     Problem: GASTROINTESTINAL - ADULT  Goal: Minimal or absence of nausea and/or vomiting  Description: INTERVENTIONS:  - Administer IV fluids if ordered to ensure adequate hydration  - Maintain NPO status until nausea and vomiting are resolved  - Nasogastric tube if ordered  - Administer ordered antiemetic medications as needed  - Provide nonpharmacologic comfort measures as appropriate  - Advance diet as tolerated, if ordered  - Consider nutrition services referral to assist patient with adequate nutrition and appropriate food choices  Outcome: Progressing  Goal: Maintains or returns to baseline bowel function  Description: INTERVENTIONS:  - Assess bowel function  - Encourage oral fluids to ensure adequate hydration  - Administer IV fluids if ordered to ensure adequate hydration  - Administer ordered medications as needed  - Encourage mobilization and activity  - Consider nutritional services referral to assist patient with adequate nutrition and appropriate food choices  Outcome: Progressing  Goal: Maintains adequate nutritional intake  Description: INTERVENTIONS:  - Monitor percentage of each meal consumed  - Identify factors contributing to decreased intake, treat as appropriate  - Assist with meals as needed  - Monitor I&O, weight, and lab values if indicated  - Obtain nutrition services referral as needed  Outcome: Progressing  Goal: Oral mucous membranes remain intact  Description: INTERVENTIONS  - Assess oral mucosa and hygiene practices  - Implement preventative oral hygiene regimen  - Implement oral medicated treatments as ordered  - Initiate Nutrition services referral as needed  Outcome: Progressing

## 2022-09-24 NOTE — PROGRESS NOTES
General Surgery  Progress Note   Jakub Browning 78 y o  male MRN: [id number]  Unit/Bed#: ICU 07 Encounter: [id number]    Assessment:  67yoM p/w incarcerated incisional hernia and SBO now s/p:  : ex lap, reduction of strangulated ventral hernia, debridement of RLQ abdominal wall musculature, Abthera VAC placement  9/15: ex lap, RLQ hernia repair w mesh and VAC, complex anterior abdominal wall repair w mesh and retention sutures, LANETTE  Found to have b/l subsegmental PE       Afebrile, VSS  WBC: 19 5 from 20 85; Hb 7    VAC:50 cc SS    Plan:  -soft diet as tolerated; ensures to supplement  -abdominal VAC; monitor output in character (M/W/F changes)  -continue heparin GGT; may transition to p o  AC  -appreciate ID recs; monitor off antibiotics  -PT/OT; acute rehab    Subjective/Objective     Subjective: Patient seen and examined at bedside, in no acute distress  No acute events overnight  Patient's pain is well controlled  Pt denies nausea or vomiting  Passing gas and stool  Objective:     Vitals:Blood pressure 132/67, pulse 74, temperature 98 2 °F (36 8 °C), resp  rate 16, height 5' 9" (1 753 m), weight (!) 176 kg (389 lb 1 8 oz), SpO2 97 %  ,Body mass index is 57 46 kg/m²  Temp (24hrs), Av 8 °F (36 6 °C), Min:97 2 °F (36 2 °C), Max:98 2 °F (36 8 °C)  Current: Temperature: 98 2 °F (36 8 °C)      Intake/Output Summary (Last 24 hours) at 2022 0732  Last data filed at 2022 0600  Gross per 24 hour   Intake 631 93 ml   Output 1800 ml   Net -1168 07 ml       Invasive Devices  Report    Peripheral Intravenous Line  Duration           Peripheral IV 22 Right;Upper;Ventral (anterior) Arm 4 days    Peripheral IV 22 Right Forearm <1 day                Physical Exam:  General: No acute distress, alert and oriented  CV: Well perfused, regular rate and rhythm  Lungs: Normal work of breathing, no increased respiratory effort  Abdomen: Soft, non-tender, non-distended   Incision clean, dry and intact    Abdominal wall wound VAC in place as above  Extremities: No edema, clubbing or cyanosis  Skin: Warm, dry    Lab Results: Results: I have personally reviewed all pertinent laboratory/tests results  VTE Prophylaxis: Sequential compression device (Venodyne)  and Heparin GTT    Ilda Brown MD  9/24/2022

## 2022-09-24 NOTE — ASSESSMENT & PLAN NOTE
Lab Results   Component Value Date    EGFR 81 09/24/2022    EGFR 53 09/23/2022    EGFR 60 09/22/2022    CREATININE 0 97 09/24/2022    CREATININE 1 37 (H) 09/23/2022    CREATININE 1 23 09/22/2022   · Baseline creatinine 1 4-1 7  · Creatinine improved  · Monitor renal function

## 2022-09-24 NOTE — PROGRESS NOTES
Progress Note - General Surgery   Sophy Pan 78 y o  male MRN: [id number]  Unit/Bed#: E5 -01 Encounter: [id number]    Assessment:  78yoM p/w incarcerated incisional hernia and SBO now s/p:  9/14: ex lap, reduction of strangulated ventral hernia, debridement of RLQ abdominal wall musculature, Abthera VAC placement  9/15: ex lap, RLQ hernia repair w mesh and VAC, complex anterior abdominal wall repair w mesh and retention sutures, LANETTE  Found to have b/l subsegmental PE  Urinary Retention     cc serous    Plan:  · Regular diet, ensures   · abdominal VAC, monitor output and character- MWF changes  · Continue heparin gtt- may transition to PO AC  · Price check in process for eliquis/xarelto  · Appreciate ID recs  · PT/OT- rehab, [facility] eval  · Please tigertext on call [facility] surgery resident or AP with any questions      Subjective/Objective   Subjective:   No acute events overnight  Tolerating diet  Taking in ensures  Having flatus and bowel movements  Pain well controlled  Objective:     Blood pressure 126/63, pulse 100, temperature 98 7 °F (37 1 °C), resp  rate 18, height 5' 9" (1 753 m), weight (!) 176 kg (389 lb 1 8 oz), SpO2 95 %  ,Body mass index is 57 46 kg/m²  Intake/Output Summary (Last 24 hours) at 9/25/2022 0601  Last data filed at 9/24/2022 1541  Gross per 24 hour   Intake 188 64 ml   Output 1100 ml   Net -911 36 ml       Invasive Devices  Report    Peripheral Intravenous Line  Duration           Peripheral IV 09/23/22 Right Forearm 1 day    Peripheral IV 09/25/22 Left;Proximal;Upper;Ventral (anterior) Arm <1 day              Physical Exam:   Gen:  NAD  HEENT: NCAT  MMM  CV: well perfused, pulses palpable  Lungs: Normal respiratory effort  Abd: soft, nt/nd, dressings cdi, vac intact  Skin: warm/ dry  Extremities: no peripheral edema, no cyanosis  Neuro: AxO x3    Lab, Imaging and other studies:  I have personally reviewed pertinent lab results    , CBC:   No results found for: WBC, HGB, HCT, MCV, PLT, ADJUSTEDWBC, MCH, MCHC, RDW, MPV, NRBC, CMP:   No results found for: SODIUM, K, CL, CO2, ANIONGAP, BUN, CREATININE, GLUCOSE, CALCIUM, AST, ALT, ALKPHOS, PROT, BILITOT, EGFR  VTE Pharmacologic Prophylaxis: Sequential compression device (Venodyne)  and Heparin  VTE Mechanical Prophylaxis: sequential compression device

## 2022-09-25 LAB
APTT PPP: 42 SECONDS (ref 23–37)
APTT PPP: 51 SECONDS (ref 23–37)
BASOPHILS # BLD MANUAL: 0 THOUSAND/UL (ref 0–0.1)
BASOPHILS NFR MAR MANUAL: 0 % (ref 0–1)
EOSINOPHIL # BLD MANUAL: 0 THOUSAND/UL (ref 0–0.4)
EOSINOPHIL NFR BLD MANUAL: 0 % (ref 0–6)
ERYTHROCYTE [DISTWIDTH] IN BLOOD BY AUTOMATED COUNT: 14.6 % (ref 11.6–15.1)
HCT VFR BLD AUTO: 28.1 % (ref 36.5–49.3)
HGB BLD-MCNC: 9 G/DL (ref 12–17)
LYMPHOCYTES # BLD AUTO: 21 % (ref 14–44)
LYMPHOCYTES # BLD AUTO: 4.03 THOUSAND/UL (ref 0.6–4.47)
MACROCYTES BLD QL AUTO: PRESENT
MCH RBC QN AUTO: 31.1 PG (ref 26.8–34.3)
MCHC RBC AUTO-ENTMCNC: 32 G/DL (ref 31.4–37.4)
MCV RBC AUTO: 97 FL (ref 82–98)
METAMYELOCYTES NFR BLD MANUAL: 1 % (ref 0–1)
MONOCYTES # BLD AUTO: 0.38 THOUSAND/UL (ref 0–1.22)
MONOCYTES NFR BLD: 2 % (ref 4–12)
MYELOCYTES NFR BLD MANUAL: 2 % (ref 0–1)
NEUTROPHILS # BLD MANUAL: 14.22 THOUSAND/UL (ref 1.85–7.62)
NEUTS BAND NFR BLD MANUAL: 3 % (ref 0–8)
NEUTS SEG NFR BLD AUTO: 71 % (ref 43–75)
PLATELET # BLD AUTO: 491 THOUSANDS/UL (ref 149–390)
PLATELET BLD QL SMEAR: ADEQUATE
PMV BLD AUTO: 9.1 FL (ref 8.9–12.7)
POLYCHROMASIA BLD QL SMEAR: PRESENT
RBC # BLD AUTO: 2.89 MILLION/UL (ref 3.88–5.62)
WBC # BLD AUTO: 19.21 THOUSAND/UL (ref 4.31–10.16)

## 2022-09-25 PROCEDURE — 85730 THROMBOPLASTIN TIME PARTIAL: CPT | Performed by: SURGERY

## 2022-09-25 PROCEDURE — 85025 COMPLETE CBC W/AUTO DIFF WBC: CPT | Performed by: SURGERY

## 2022-09-25 PROCEDURE — 99024 POSTOP FOLLOW-UP VISIT: CPT | Performed by: SURGERY

## 2022-09-25 PROCEDURE — 97110 THERAPEUTIC EXERCISES: CPT

## 2022-09-25 PROCEDURE — 99232 SBSQ HOSP IP/OBS MODERATE 35: CPT | Performed by: INTERNAL MEDICINE

## 2022-09-25 PROCEDURE — 97530 THERAPEUTIC ACTIVITIES: CPT

## 2022-09-25 PROCEDURE — 97116 GAIT TRAINING THERAPY: CPT

## 2022-09-25 PROCEDURE — 85007 BL SMEAR W/DIFF WBC COUNT: CPT | Performed by: SURGERY

## 2022-09-25 PROCEDURE — 85027 COMPLETE CBC AUTOMATED: CPT | Performed by: SURGERY

## 2022-09-25 PROCEDURE — 97535 SELF CARE MNGMENT TRAINING: CPT

## 2022-09-25 RX ADMIN — HEPARIN SODIUM 25 UNITS/KG/HR: 10000 INJECTION, SOLUTION INTRAVENOUS at 02:09

## 2022-09-25 RX ADMIN — ALLOPURINOL 100 MG: 100 TABLET ORAL at 08:44

## 2022-09-25 RX ADMIN — APIXABAN 10 MG: 5 TABLET, FILM COATED ORAL at 17:14

## 2022-09-25 RX ADMIN — METOPROLOL TARTRATE 25 MG: 25 TABLET, FILM COATED ORAL at 21:08

## 2022-09-25 RX ADMIN — APIXABAN 10 MG: 5 TABLET, FILM COATED ORAL at 10:13

## 2022-09-25 RX ADMIN — FUROSEMIDE 20 MG: 20 TABLET ORAL at 08:44

## 2022-09-25 RX ADMIN — TAMSULOSIN HYDROCHLORIDE 0.4 MG: 0.4 CAPSULE ORAL at 17:15

## 2022-09-25 RX ADMIN — PANTOPRAZOLE SODIUM 40 MG: 40 TABLET, DELAYED RELEASE ORAL at 05:33

## 2022-09-25 RX ADMIN — METOPROLOL TARTRATE 25 MG: 25 TABLET, FILM COATED ORAL at 08:44

## 2022-09-25 RX ADMIN — HEPARIN SODIUM 10000 UNITS: 1000 INJECTION INTRAVENOUS; SUBCUTANEOUS at 02:03

## 2022-09-25 RX ADMIN — TRAVOPROST 1 DROP: 0.04 SOLUTION OPHTHALMIC at 21:06

## 2022-09-25 NOTE — CASE MANAGEMENT
Case Management Discharge Planning Note    Patient name Alexy Sharma  Location East 5 /E5 MS 9356 1286-* MRN [id number]  : 1955 Date 2022       Current Admission Date: 2022  Current Admission Diagnosis:Strangulated hernia of abdominal wall   Patient Active Problem List    Diagnosis Date Noted    Pulmonary embolism ([facility] [address] ) 2022    Anemia 2022    Strangulated hernia of abdominal wall 2022    COVID-19 2022    Sepsis ([facility] [address] ) 2021    Right foot pain 2019    Class 3 severe obesity due to excess calories with serious comorbidity and body mass index (BMI) of 50 0 to 59 9 in adult [facility]) 2019    Lymphedema of both lower extremities 2019    CKD (chronic kidney disease), stage III ([facility] [address] ) 2018    HDL deficiency 2018    Gout 2018    DELIA (obstructive sleep apnea) 2018    Allergic rhinitis 2018    Essential hypertension 2018    Kidney stones 2018      LOS (days): 11  Geometric Mean LOS (GMLOS) (days): 9 60  Days to GMLOS:-0 9     OBJECTIVE:  Risk of Unplanned Readmission Score: 17 04         Current admission status: Inpatient   Preferred Pharmacy:   [facility] , [address] [address]  Phone: [phone] Fax: [phone]    Primary Care Provider: Ludy Ellington MD    Primary Insurance: MEDICARE  Secondary Insurance: [employer]    DISCHARGE DETAILS:     [facility] and [address] following  Acute rehab appears to be patient and family first choice  [place] and [address] [place] [address] also following  [place] and [address] [place] [address] made aware that acute rehab is first choice but will continue to follow if patient is not appropriate for acute rehab

## 2022-09-25 NOTE — OCCUPATIONAL THERAPY NOTE
Occupational Therapy Progress Note     Patient Name: Starlette Scheuermann  BXXIL'X Date: 9/25/2022  Problem List  Principal Problem:    Strangulated hernia of abdominal wall  Active Problems:    Essential hypertension    CKD (chronic kidney disease), stage III (HCC)    DELIA (obstructive sleep apnea)    Class 3 severe obesity due to excess calories with serious comorbidity and body mass index (BMI) of 50 0 to 59 9 in adult [facility])    Sepsis ([facility] [address] )    Anemia    Pulmonary embolism ([facility] [address] )            09/25/22 0958   OT Last Visit   OT Visit Date 09/25/22   Note Type   Note Type Treatment   Restrictions/Precautions   Weight Bearing Precautions Per Order No   Other Precautions Fall Risk;Multiple lines;Telemetry  (wound vac)   General   Response to Previous Treatment Patient with no complaints from previous session   Family/Caregiver Present none   Pain Assessment   Pain Score No Pain   ADL   LB Dressing Assistance 2  Maximal Assistance   LB Dressing Deficit Verbal cueing; Increased time to complete   LB Dressing Comments recommend trying [address] AE   Toileting Assistance  2  Maximal Assistance   Toileting Deficit Increased time to complete;Verbal cueing   Toileting Comments educated on a toilet tomás duncan ordered on [facility] for home use  Functional Standing Tolerance   Time approx 8 mins with S with RW   Bed Mobility   Additional Comments recieved OOB   Transfers   Sit to Stand 6  Modified independent   Additional items Armrests   Stand to Sit 6  Modified independent   Toilet transfer 5  Supervision   Additional items Standard toilet  (grab bars, RW)   Functional Mobility   Functional Mobility 5  Supervision   Additional Comments RW needed management of wound vac   Therapeutic Excerise-Strength   UE Strength Yes   Right Upper Extremity- Strength   R Shoulder Flexion; Extension;Horizontal ABduction   R Elbow Elbow flexion;Elbow extension   R Position Seated   R Weight/Reps/Sets 10 reps 2 sets   Left Upper Extremity-Strength   L Shoulder Flexion; Extension;Horizontal ABduction   L Elbow Elbow flexion;Elbow extension   L Position Seated   L Weights/Reps/Sets 10 reps 2 sets   Cognition   Overall Cognitive Status WFL   Arousal/Participation Alert; Cooperative   Attention Within functional limits   Orientation Level Oriented X4   Memory Within functional limits   Following Commands Follows all commands and directions without difficulty   Activity Tolerance   Activity Tolerance Patient tolerated treatment well   Medical Staff Made Aware RN and PTA   Assessment   Assessment Pt seen for skilled Occupational Therapy session focused on ADL's, balance, endurance, functional transfers and mobility  Pt received in the recliner agreeable to therapy  Wound vac intact  Pt performed mobility with supervision  Pt unable to performed posterior hygiene after toielting, educated patient on a toilet wand, pt ordered on [facility]  Pt continues to need assistance with LB dressing, pt would benefit from further education on LB AE  Pt also reports his shower chair broke, educated about shower chair vs transfer tub bench and weight capacities  Pt noted with good understanding  Pt to continue to benefit from continued acute OT services during hospital stay to address defined deficits and to maximize level of functional independence in the following Occupational Performance areas: grooming, bathing/shower, toilet hygiene, dressing, medication management, health maintenance, functional mobility, community mobility, clothing management and social participation  Pt contiues to be limited due to decreased endurance, strength, balance, ADL's, and activity tolerance  The patient's raw score on the AM-PAC Daily Activity inpatient short form is 16, standardized score is 35 96, less than 39 4  Patients at this level are likely to benefit from discharge to home with home OT  Please refer to the recommendation of the Occupational Therapist for safe discharge planning          AE Treatment Interventions ADL retraining;Functional transfer training;UE strengthening/ROM; Endurance training;Patient/family training;Equipment evaluation/education;Continued evaluation; Energy conservation; Activityengagement   Goal Expiration Date 10/03/22   OT Treatment Day 3   OT Frequency 3-5x/wk   Recommendation   OT Discharge Recommendation Home with home health rehabilitation   AM-PAC Daily Activity Inpatient   Lower Body Dressing 2   Bathing 2   Toileting 2   Upper Body Dressing 3   Grooming 3   Eating 4   Daily Activity Raw Score 16   Daily Activity Standardized Score (Calc for Raw Score >=11) 35 96   AM-PAC Applied Cognition Inpatient   Following a Speech/Presentation 4   Understanding Ordinary Conversation 4   Taking Medications 4   Remembering Where Things Are Placed or Put Away 4   Remembering List of 4-5 Errands 4   Taking Care of Complicated Tasks 4   Applied Cognition Raw Score 24   Applied Cognition Standardized Score 62 21   Inez Loco MS, OTR/L [facility]

## 2022-09-25 NOTE — ASSESSMENT & PLAN NOTE
· Patient with sedentary lifestyle, obesity  · CT revealed right greater than left filling defect, no RV strain  · No history of DVT or PE  · Discussed with surgeon, Dr Landy Garcia will start on Eliquis 10 mg b i d  For 7 days and Eliquis 5 mg b i d   Thereafter will need to follow closely with PCP outpatient

## 2022-09-25 NOTE — PLAN OF CARE
Problem: PAIN - ADULT  Goal: Verbalizes/displays adequate comfort level or baseline comfort level  Description: Interventions:  - Encourage patient to monitor pain and request assistance  - Assess pain using appropriate pain scale  - Administer analgesics based on type and severity of pain and evaluate response  - Implement non-pharmacological measures as appropriate and evaluate response  - Consider cultural and social influences on pain and pain management  - Notify physician/advanced practitioner if interventions unsuccessful or patient reports new pain  Outcome: Progressing     Problem: INFECTION - ADULT  Goal: Absence or prevention of progression during hospitalization  Description: INTERVENTIONS:  - Assess and monitor for signs and symptoms of infection  - Monitor lab/diagnostic results  - Monitor all insertion sites, i e  indwelling lines, tubes, and drains  - Monitor endotracheal if appropriate and nasal secretions for changes in amount and color  - [place] appropriate cooling/warming therapies per order  - Administer medications as ordered  - Instruct and encourage patient and family to use good hand hygiene technique  - Identify and instruct in appropriate isolation precautions for identified infection/condition  Outcome: Progressing     Problem: SAFETY ADULT  Goal: Patient will remain free of falls  Description: INTERVENTIONS:  - Educate patient/family on patient safety including physical limitations  - Instruct patient to call for assistance with activity   - Consult OT/PT to assist with strengthening/mobility   - Keep Call bell within reach  - Keep bed low and locked with side rails adjusted as appropriate  - Keep care items and personal belongings within reach  - Initiate and maintain comfort rounds  - Make Fall Risk Sign visible to staff  - Apply yellow socks and bracelet for high fall risk patients  - Consider moving patient to room near nurses station  Outcome: Progressing  Goal: Maintain or return to baseline ADL function  Description: INTERVENTIONS:  -  Assess patient's ability to carry out ADLs; assess patient's baseline for ADL function and identify physical deficits which impact ability to perform ADLs (bathing, care of mouth/teeth, toileting, grooming, dressing, etc )  - Assess/evaluate cause of self-care deficits   - Assess range of motion  - Assess patient's mobility; develop plan if impaired  - Assess patient's need for assistive devices and provide as appropriate  - Encourage maximum independence but intervene and supervise when necessary  - Involve family in performance of ADLs  - Assess for home care needs following discharge   - Consider OT consult to assist with ADL evaluation and planning for discharge  - Provide patient education as appropriate  Outcome: Progressing  Goal: Maintains/Returns to pre admission functional level  Description: INTERVENTIONS:  - Perform BMAT or MOVE assessment daily    - Set and communicate daily mobility goal to care team and patient/family/caregiver     - Collaborate with rehabilitation services on mobility goals if consulted  - Out of bed for toileting  - Record patient progress and toleration of activity level   Outcome: Progressing     Problem: DISCHARGE PLANNING  Goal: Discharge to home or other facility with appropriate resources  Description: INTERVENTIONS:  - Identify barriers to discharge w/patient and caregiver  - Arrange for needed discharge resources and transportation as appropriate  - Identify discharge learning needs (meds, wound care, etc )  - Arrange for interpretive services to assist at discharge as needed  - Refer to Case Management Department for coordinating discharge planning if the patient needs post-hospital services based on physician/advanced practitioner order or complex needs related to functional status, cognitive ability, or social support system  Outcome: Progressing     Problem: Knowledge Deficit  Goal: Patient/family/caregiver demonstrates understanding of disease process, treatment plan, medications, and discharge instructions  Description: Complete learning assessment and assess knowledge base  Interventions:  - Provide teaching at level of understanding  - Provide teaching via preferred learning methods  Outcome: Progressing     Problem: MOBILITY - ADULT  Goal: Maintain or return to baseline ADL function  Description: INTERVENTIONS:  -  Assess patient's ability to carry out ADLs; assess patient's baseline for ADL function and identify physical deficits which impact ability to perform ADLs (bathing, care of mouth/teeth, toileting, grooming, dressing, etc )  - Assess/evaluate cause of self-care deficits   - Assess range of motion  - Assess patient's mobility; develop plan if impaired  - Assess patient's need for assistive devices and provide as appropriate  - Encourage maximum independence but intervene and supervise when necessary  - Involve family in performance of ADLs  - Assess for home care needs following discharge   - Consider OT consult to assist with ADL evaluation and planning for discharge  - Provide patient education as appropriate  Outcome: Progressing  Goal: Maintains/Returns to pre admission functional level  Description: INTERVENTIONS:  - Perform BMAT or MOVE assessment daily    - Set and communicate daily mobility goal to care team and patient/family/caregiver     - Collaborate with rehabilitation services on mobility goals if consulted  - Out of bed for toileting  - Record patient progress and toleration of activity level   Outcome: Progressing     Problem: Prexisting or High Potential for Compromised Skin Integrity  Goal: Skin integrity is maintained or improved  Description: INTERVENTIONS:  - Identify patients at risk for skin breakdown  - Assess and monitor skin integrity  - Assess and monitor nutrition and hydration status  - Monitor labs   - Assess for incontinence   - Turn and reposition patient  - Assist with mobility/ambulation  - Relieve pressure over bony prominences  - Avoid friction and shearing  - Provide appropriate hygiene as needed including keeping skin clean and dry  - Evaluate need for skin moisturizer/barrier cream  - Collaborate with interdisciplinary team   - Patient/family teaching  - Consider wound care consult   Outcome: Progressing     Problem: Nutrition/Hydration-ADULT  Goal: Nutrient/Hydration intake appropriate for improving, restoring or maintaining nutritional needs  Description: Monitor and assess patient's nutrition/hydration status for malnutrition  Collaborate with interdisciplinary team and initiate plan and interventions as ordered  Monitor patient's weight and dietary intake as ordered or per policy  Utilize nutrition screening tool and intervene as necessary  Determine patient's food preferences and provide high-protein, high-caloric foods as appropriate       INTERVENTIONS:  - Monitor oral intake, urinary output, labs, and treatment plans  - Assess nutrition and hydration status and recommend course of action  - Evaluate amount of meals eaten  - Assist patient with eating if necessary   - Allow adequate time for meals  - Recommend/ encourage appropriate diets, oral nutritional supplements, and vitamin/mineral supplements  - Order, calculate, and assess calorie counts as needed  - Recommend, monitor, and adjust tube feedings and TPN/PPN based on assessed needs  - Assess need for intravenous fluids  - Provide specific nutrition/hydration education as appropriate  - Include patient/family/caregiver in decisions related to nutrition  Outcome: Progressing     Problem: GASTROINTESTINAL - ADULT  Goal: Minimal or absence of nausea and/or vomiting  Description: INTERVENTIONS:  - Administer IV fluids if ordered to ensure adequate hydration  - Maintain NPO status until nausea and vomiting are resolved  - Nasogastric tube if ordered  - Administer ordered antiemetic medications as needed  - Provide nonpharmacologic comfort measures as appropriate  - Advance diet as tolerated, if ordered  - Consider nutrition services referral to assist patient with adequate nutrition and appropriate food choices  Outcome: Progressing  Goal: Maintains or returns to baseline bowel function  Description: INTERVENTIONS:  - Assess bowel function  - Encourage oral fluids to ensure adequate hydration  - Administer IV fluids if ordered to ensure adequate hydration  - Administer ordered medications as needed  - Encourage mobilization and activity  - Consider nutritional services referral to assist patient with adequate nutrition and appropriate food choices  Outcome: Progressing  Goal: Maintains adequate nutritional intake  Description: INTERVENTIONS:  - Monitor percentage of each meal consumed  - Identify factors contributing to decreased intake, treat as appropriate  - Assist with meals as needed  - Monitor I&O, weight, and lab values if indicated  - Obtain nutrition services referral as needed  Outcome: Progressing  Goal: Oral mucous membranes remain intact  Description: INTERVENTIONS  - Assess oral mucosa and hygiene practices  - Implement preventative oral hygiene regimen  - Implement oral medicated treatments as ordered  - Initiate Nutrition services referral as needed  Outcome: Progressing

## 2022-09-25 NOTE — PLAN OF CARE
Problem: OCCUPATIONAL THERAPY ADULT  Goal: Performs self-care activities at highest level of function for planned discharge setting  See evaluation for individualized goals  Description: Treatment Interventions: ADL retraining, Functional transfer training, UE strengthening/ROM, Endurance training, Patient/family training, Equipment evaluation/education, Compensatory technique education, Continued evaluation, Activityengagement          See flowsheet documentation for full assessment, interventions and recommendations  Note: Limitation: Decreased ADL status, Decreased UE ROM, Decreased endurance, Decreased self-care trans, Decreased high-level ADLs  Prognosis: Good  Assessment: Pt seen for skilled Occupational Therapy session focused on ADL's, balance, endurance, functional transfers and mobility  Pt received in the recliner agreeable to therapy  Wound vac intact  Pt performed mobility with supervision  Pt unable to performed posterior hygiene after toielting, educated patient on a toilet wand, pt ordered on [place]  Pt continues to need assistance with LB dressing, pt would benefit from further education on LB AE  Pt also reports his shower chair broke, educated about shower chair vs transfer tub bench and weight capacities  Pt noted with good understanding  Pt to continue to benefit from continued acute OT services during hospital stay to address defined deficits and to maximize level of functional independence in the following Occupational Performance areas: grooming, bathing/shower, toilet hygiene, dressing, medication management, health maintenance, functional mobility, community mobility, clothing management and social participation  Pt contiues to be limited due to decreased endurance, strength, balance, ADL's, and activity tolerance  The patient's raw score on the AM-PAC Daily Activity inpatient short form is 16, standardized score is 35 96, less than 39 4   Patients at this level are likely to benefit from discharge to home with home OT  Please refer to the recommendation of the Occupational Therapist for safe discharge planning       OT Discharge Recommendation: Home with home health rehabilitation

## 2022-09-25 NOTE — PROGRESS NOTES
[address] [facility]  Progress Note - Remedios Session 1955, 78 y o  male MRN: [id number]  Unit/Bed#: E5 -01 Encounter: [id number]  Primary Care Provider: Giacomo Lima MD   Date and time admitted to hospital: 9/14/2022 11:29 AM    * Strangulated hernia of abdominal wall  Assessment & Plan  · 9/14 status post ex the scooby laparotomy, complex lysis of adhesions, reduction of strangulated hernia, debridement on the right lower quadrant muscle with counter incision, abthera placement x2  · 9/15 right lower quadrant hernia status post repair with mesh and wound VAC placement and anterior abdominal wall repair with mesh and retention sutures  · Pain management  · Further management as per primary team    Pulmonary embolism ([facility] )  Assessment & Plan  · Patient with sedentary lifestyle, obesity  · CT revealed right greater than left filling defect, no RV strain  · No history of DVT or PE  · Discussed with surgeon, Dr Adriana Hall will start on Eliquis 10 mg b i d  For 7 days and Eliquis 5 mg b i d   Thereafter will need to follow closely with PCP outpatient    Anemia  Assessment & Plan  · Hemoglobin 9  · Monitor H&H    Sepsis ([facility] [address] )  Assessment & Plan  · Due to strangulated ventral hernia and right lower quadrant abscess at rectal muscle  · And ex lap with hernia repair  · Tissue culture demonstrated few colonies of Klebsiella pneumonia, E coli, Bacteroides  · Repeat cultures negative thus far  · Infectious disease on board  · Continuing to monitor off antibiotics    Class 3 severe obesity due to excess calories with serious comorbidity and body mass index (BMI) of 50 0 to 59 9 in adult [facility])  Assessment & Plan  · Encourage lifestyle modifications    DELIA (obstructive sleep apnea)  Assessment & Plan  · Referral to sleep medicine at discharge    CKD (chronic kidney disease), stage III [facility])  Assessment & Plan  Lab Results   Component Value Date    EGFR 81 09/24/2022    EGFR 53 09/23/2022    EGFR 60 2022    CREATININE 0 97 2022    CREATININE 1 37 (H) 2022    CREATININE 1 23 2022   · Baseline creatinine 1 4-1 7  · Creatinine improved  · Monitor renal function    Essential hypertension  Assessment & Plan  · Continue metoprolol 25 mg b i d , furosemide 20 mg daily          VTE Pharmacologic Prophylaxis:   Pharmacologic:  Eliquis    Patient Centered Rounds: I have performed bedside rounds with nursing staff today  Discussions with Specialists or Other Care Team Provider: surgery team    Education and Discussions with Family / Patient: patient    Time Spent for Care: 20 minutes  More than 50% of total time spent on counseling and coordination of care as described above  Current Length of Stay: 11 day(s)    Current Patient Status: Inpatient     Code Status: Level 1 - Full Code      Subjective:   Patient seen and examined at bedside, sitting upright in recliner stating he feels better today    Objective:     Vitals:   Temp (24hrs), Av 7 °F (37 1 °C), Min:98 7 °F (37 1 °C), Max:98 7 °F (37 1 °C)    Temp:  [98 7 °F (37 1 °C)] 98 7 °F (37 1 °C)  HR:  [] 95  Resp:  [18-22] 18  BP: (113-134)/(54-63) 113/54  SpO2:  [95 %-98 %] 98 %  Body mass index is 45 38 kg/m²  Input and Output Summary (last 24 hours): Intake/Output Summary (Last 24 hours) at 2022 1229  Last data filed at 2022 1541  Gross per 24 hour   Intake 188 64 ml   Output 600 ml   Net -411 36 ml       Physical Exam:    Constitutional: Patient is in, no acute distress  HEENT:  Normocephalic, atraumatic  Cardiovascular: Normal S1S2, RRR, No murmurs/rubs/gallops appreciated  Pulmonary:  Bilateral air entry, No rhonchi/rales/wheezing appreciated  Abdominal: Soft, obese, Bowel sounds present, dressing and wound VAC in place  Extremities:  No cyanosis, clubbing or edema     Neurological:  Awake, alert  Skin:  Warm, dry    Additional Data:     Labs:    Results from last 7 days   Lab Units 22  1140 09/23/22  0450 09/22/22  0458   WBC Thousand/uL 19 21*   < > 20 39*   HEMOGLOBIN g/dL 9 0*   < > 9 1*   HEMATOCRIT % 28 1*   < > 28 7*   PLATELETS Thousands/uL 491*   < > 428*   LYMPHO PCT %  --   --  4*   MONO PCT %  --   --  6   EOS PCT %  --   --  1    < > = values in this interval not displayed  Results from last 7 days   Lab Units 09/24/22  0440 09/23/22  0450   POTASSIUM mmol/L 3 7 3 5   CHLORIDE mmol/L 107 104   CO2 mmol/L 26 28   BUN mg/dL 12 16   CREATININE mg/dL 0 97 1 37*   CALCIUM mg/dL 7 2* 8 4   ALK PHOS U/L  --  56   ALT U/L  --  <6*   AST U/L  --  17     Results from last 7 days   Lab Units 09/19/22  1630   INR  1 34*        I Have Reviewed All Lab Data Listed Above  Invasive Devices  Report    Peripheral Intravenous Line  Duration           Peripheral IV 09/23/22 Right Forearm 1 day    Peripheral IV 09/25/22 Left;Proximal;Upper;Ventral (anterior) Arm <1 day                     Recent Cultures (last 7 days):     Results from last 7 days   Lab Units 09/21/22  1654 09/21/22  1118   BLOOD CULTURE  No Growth at 72 hrs  No Growth at 72 hrs    --    C DIFF TOXIN B BY PCR   --  Negative       Last 24 Hours Medication List:   Current Facility-Administered Medications   Medication Dose Route Frequency Provider Last Rate    acetaminophen  650 mg Oral Q6H PRN Adalgisa Felix MD      allopurinol  100 mg Oral Daily Adalgisa Felix MD      apixaban  10 mg Oral BID Janet Fletcher MD      Followed by   Argenis Monroy ON 10/2/2022] apixaban  5 mg Oral BID Janet Fletcher MD      furosemide  20 mg Oral Daily Adalgisa Felix MD      glycerin-hypromellose-  1 drop Both Eyes Q4H PRN Adalgisa Felix MD      HYDROmorphone  1 mg Intravenous Q4H PRN Adalgisa Felix MD      iohexol  50 mL Oral Once in imaging Adalgisa Felix MD      metoprolol tartrate  25 mg Oral Q12H [address] Adalgisa Felix MD      pantoprazole  40 mg Oral Early Morning Adalgisa Felix MD      tamsulosin  0 4 mg Oral Daily With Lemuel Pearson MD      travoprost  1 drop Both Eyes HS Benjamín Gonzalez MD          Today, Patient Was Seen By: Raul Holguin MD

## 2022-09-25 NOTE — PLAN OF CARE
Problem: PAIN - ADULT  Goal: Verbalizes/displays adequate comfort level or baseline comfort level  Description: Interventions:  - Encourage patient to monitor pain and request assistance  - Assess pain using appropriate pain scale  - Administer analgesics based on type and severity of pain and evaluate response  - Implement non-pharmacological measures as appropriate and evaluate response  - Consider cultural and social influences on pain and pain management  - Notify physician/advanced practitioner if interventions unsuccessful or patient reports new pain  Outcome: Progressing     Problem: INFECTION - ADULT  Goal: Absence or prevention of progression during hospitalization  Description: INTERVENTIONS:  - Assess and monitor for signs and symptoms of infection  - Monitor lab/diagnostic results  - Monitor all insertion sites, i e  indwelling lines, tubes, and drains  - Monitor endotracheal if appropriate and nasal secretions for changes in amount and color  - [place] appropriate cooling/warming therapies per order  - Administer medications as ordered  - Instruct and encourage patient and family to use good hand hygiene technique  - Identify and instruct in appropriate isolation precautions for identified infection/condition  Outcome: Progressing     Problem: SAFETY ADULT  Goal: Patient will remain free of falls  Description: INTERVENTIONS:  - Educate patient/family on patient safety including physical limitations  - Instruct patient to call for assistance with activity   - Consult OT/PT to assist with strengthening/mobility   - Keep Call bell within reach  - Keep bed low and locked with side rails adjusted as appropriate  - Keep care items and personal belongings within reach  - Initiate and maintain comfort rounds  - Make Fall Risk Sign visible to staff  - Offer Toileting every 2 Hours, in advance of need  - Initiate/Maintain bed alarm  - Obtain necessary fall risk management equipment:   - Apply yellow socks and bracelet for high fall risk patients  - Consider moving patient to room near nurses station  Outcome: Progressing  Goal: Maintain or return to baseline ADL function  Description: INTERVENTIONS:  -  Assess patient's ability to carry out ADLs; assess patient's baseline for ADL function and identify physical deficits which impact ability to perform ADLs (bathing, care of mouth/teeth, toileting, grooming, dressing, etc )  - Assess/evaluate cause of self-care deficits   - Assess range of motion  - Assess patient's mobility; develop plan if impaired  - Assess patient's need for assistive devices and provide as appropriate  - Encourage maximum independence but intervene and supervise when necessary  - Involve family in performance of ADLs  - Assess for home care needs following discharge   - Consider OT consult to assist with ADL evaluation and planning for discharge  - Provide patient education as appropriate  Outcome: Progressing  Goal: Maintains/Returns to pre admission functional level  Description: INTERVENTIONS:  - Perform BMAT or MOVE assessment daily    - Set and communicate daily mobility goal to care team and patient/family/caregiver  - Collaborate with rehabilitation services on mobility goals if consulted  - Perform Range of Motion 3 times a day  - Reposition patient every 2 hours    - Dangle patient 3 times a day  - Stand patient 3 times a day  - Ambulate patient 3 times a day  - Out of bed to chair 3 times a day   - Out of bed for meals 3 times a day  - Out of bed for toileting  - Record patient progress and toleration of activity level   Outcome: Progressing     Problem: DISCHARGE PLANNING  Goal: Discharge to home or other facility with appropriate resources  Description: INTERVENTIONS:  - Identify barriers to discharge w/patient and caregiver  - Arrange for needed discharge resources and transportation as appropriate  - Identify discharge learning needs (meds, wound care, etc )  - Arrange for interpretive services to assist at discharge as needed  - Refer to Case Management Department for coordinating discharge planning if the patient needs post-hospital services based on physician/advanced practitioner order or complex needs related to functional status, cognitive ability, or social support system  Outcome: Progressing     Problem: Knowledge Deficit  Goal: Patient/family/caregiver demonstrates understanding of disease process, treatment plan, medications, and discharge instructions  Description: Complete learning assessment and assess knowledge base  Interventions:  - Provide teaching at level of understanding  - Provide teaching via preferred learning methods  Outcome: Progressing     Problem: MOBILITY - ADULT  Goal: Maintain or return to baseline ADL function  Description: INTERVENTIONS:  -  Assess patient's ability to carry out ADLs; assess patient's baseline for ADL function and identify physical deficits which impact ability to perform ADLs (bathing, care of mouth/teeth, toileting, grooming, dressing, etc )  - Assess/evaluate cause of self-care deficits   - Assess range of motion  - Assess patient's mobility; develop plan if impaired  - Assess patient's need for assistive devices and provide as appropriate  - Encourage maximum independence but intervene and supervise when necessary  - Involve family in performance of ADLs  - Assess for home care needs following discharge   - Consider OT consult to assist with ADL evaluation and planning for discharge  - Provide patient education as appropriate  Outcome: Progressing  Goal: Maintains/Returns to pre admission functional level  Description: INTERVENTIONS:  - Perform BMAT or MOVE assessment daily    - Set and communicate daily mobility goal to care team and patient/family/caregiver  - Collaborate with rehabilitation services on mobility goals if consulted  - Perform Range of Motion 3 times a day  - Reposition patient every 2 hours    - Dangle patient 3 times a day  - Stand patient 3 times a day  - Ambulate patient 3 times a day  - Out of bed to chair 3 times a day   - Out of bed for meals 3 times a day  - Out of bed for toileting  - Record patient progress and toleration of activity level   Outcome: Progressing     Problem: Potential for Falls  Goal: Patient will remain free of falls  Description: INTERVENTIONS:  - Educate patient/family on patient safety including physical limitations  - Instruct patient to call for assistance with activity   - Consult OT/PT to assist with strengthening/mobility   - Keep Call bell within reach  - Keep bed low and locked with side rails adjusted as appropriate  - Keep care items and personal belongings within reach  - Initiate and maintain comfort rounds  - Make Fall Risk Sign visible to staff  - Offer Toileting every 2 Hours, in advance of need  - Initiate/Maintain bed alarm  - Obtain necessary fall risk management equipment:   - Apply yellow socks and bracelet for high fall risk patients  - Consider moving patient to room near nurses station  Outcome: Progressing     Problem: Prexisting or High Potential for Compromised Skin Integrity  Goal: Skin integrity is maintained or improved  Description: INTERVENTIONS:  - Identify patients at risk for skin breakdown  - Assess and monitor skin integrity  - Assess and monitor nutrition and hydration status  - Monitor labs   - Assess for incontinence   - Turn and reposition patient  - Assist with mobility/ambulation  - Relieve pressure over bony prominences  - Avoid friction and shearing  - Provide appropriate hygiene as needed including keeping skin clean and dry  - Evaluate need for skin moisturizer/barrier cream  - Collaborate with interdisciplinary team   - Patient/family teaching  - Consider wound care consult   Outcome: Progressing     Problem: Nutrition/Hydration-ADULT  Goal: Nutrient/Hydration intake appropriate for improving, restoring or maintaining nutritional needs  Description: Monitor and assess patient's nutrition/hydration status for malnutrition  Collaborate with interdisciplinary team and initiate plan and interventions as ordered  Monitor patient's weight and dietary intake as ordered or per policy  Utilize nutrition screening tool and intervene as necessary  Determine patient's food preferences and provide high-protein, high-caloric foods as appropriate       INTERVENTIONS:  - Monitor oral intake, urinary output, labs, and treatment plans  - Assess nutrition and hydration status and recommend course of action  - Evaluate amount of meals eaten  - Assist patient with eating if necessary   - Allow adequate time for meals  - Recommend/ encourage appropriate diets, oral nutritional supplements, and vitamin/mineral supplements  - Order, calculate, and assess calorie counts as needed  - Recommend, monitor, and adjust tube feedings and TPN/PPN based on assessed needs  - Assess need for intravenous fluids  - Provide specific nutrition/hydration education as appropriate  - Include patient/family/caregiver in decisions related to nutrition  Outcome: Progressing     Problem: GASTROINTESTINAL - ADULT  Goal: Minimal or absence of nausea and/or vomiting  Description: INTERVENTIONS:  - Administer IV fluids if ordered to ensure adequate hydration  - Maintain NPO status until nausea and vomiting are resolved  - Nasogastric tube if ordered  - Administer ordered antiemetic medications as needed  - Provide nonpharmacologic comfort measures as appropriate  - Advance diet as tolerated, if ordered  - Consider nutrition services referral to assist patient with adequate nutrition and appropriate food choices  Outcome: Progressing  Goal: Maintains or returns to baseline bowel function  Description: INTERVENTIONS:  - Assess bowel function  - Encourage oral fluids to ensure adequate hydration  - Administer IV fluids if ordered to ensure adequate hydration  - Administer ordered medications as needed  - Encourage mobilization and activity  - Consider nutritional services referral to assist patient with adequate nutrition and appropriate food choices  Outcome: Progressing  Goal: Maintains adequate nutritional intake  Description: INTERVENTIONS:  - Monitor percentage of each meal consumed  - Identify factors contributing to decreased intake, treat as appropriate  - Assist with meals as needed  - Monitor I&O, weight, and lab values if indicated  - Obtain nutrition services referral as needed  Outcome: Progressing  Goal: Oral mucous membranes remain intact  Description: INTERVENTIONS  - Assess oral mucosa and hygiene practices  - Implement preventative oral hygiene regimen  - Implement oral medicated treatments as ordered  - Initiate Nutrition services referral as needed  Outcome: Progressing

## 2022-09-25 NOTE — CASE MANAGEMENT
Case Management Discharge Planning Note    Patient name Kee Pritchett  Location [address] 537/[address]5 [place] 9356 1286-* MRN [id number]  : 1955 Date 2022       Current Admission Date: 2022  Current Admission Diagnosis:Strangulated hernia of abdominal wall   Patient Active Problem List    Diagnosis Date Noted    Pulmonary embolism ([facility] ) 2022    Anemia 2022    Strangulated hernia of abdominal wall 2022    COVID-19 2022    [facility] 2021    Right foot pain 2019    Class 3 severe obesity due to excess calories with serious comorbidity and body mass index (BMI) of 50 0 to 59 9 in adult [facility]) 2019    Lymphedema of both lower extremities 2019    CKD (chronic kidney disease), stage III ([facility] ) 2018    HDL deficiency 2018    Gout 2018    DELIA (obstructive sleep apnea) 2018    Allergic rhinitis 2018    Essential hypertension 2018    Kidney stones 2018      LOS (days): 11  Geometric Mean LOS (GMLOS) (days): 9 60  Days to GMLOS:-1 2     OBJECTIVE:  Risk of Unplanned Readmission Score: 16 9         Current admission status: Inpatient   Preferred Pharmacy:   [address] [place] [address], [address] [place] [address]  Phone: [phone] Fax: [phone]    Primary Care Provider: Quinton Boswell MD    Primary Insurance: MEDICARE  Secondary Insurance: CIGNA    DISCHARGE DETAILS:     CM notified by Surgery Team that patient now feels that he can go home and is capable of doing so  Patient will need a wound vac to go home with  Surgery Team will complete the [employer] wound vac order  Once CM dept has completed order, it can be sent to [facility] for approval  CM placed multiple [facility] referrals via Khalif Tapia  Awaiting for an accepting [facility]

## 2022-09-25 NOTE — PLAN OF CARE
Problem: PHYSICAL THERAPY ADULT  Goal: Performs mobility at highest level of function for planned discharge setting  See evaluation for individualized goals  Description: Treatment/Interventions: Functional transfer training, LE strengthening/ROM, Elevations, Therapeutic exercise, Endurance training, Patient/family training, Equipment eval/education, Bed mobility, Gait training, Compensatory technique education, Continued evaluation, Spoke to nursing, OT          See flowsheet documentation for full assessment, interventions and recommendations  Outcome: Adequate for Discharge  Note: Prognosis: Good  Problem List: Impaired balance, Decreased mobility, Obesity, Decreased skin integrity, Decreased endurance, Decreased range of motion  Assessment: Pt  rec'd seated out of bed in chair upon arrival   Pt  Reports no pain  Pt is showing progress toward PT goals  Noted improved activity tolerance, mobility, balance, endurance, and overall strength  Pt is functioning at mod I level for transfers with cues for hand placement with stand to sit and ease of decent, supervision for ambulation on levels with use of BRW  Pt progressed with ambulation distances to 120' x2 which is > household distances and progressed to stair climbing with use of b/l rails and supervision x1 using reciprocal gait pattern  Assist for management of wound vac during gait and stair climbing  No unsteadiness or lob noted during transfer, gait, or stair training  Mild fatigue and sob noted requiring standing or seated rest breaks  Pt performs toilet transfers with supervision assist x1 with use of grab bar  Cues for ease of descent  Pt  Has made significant gains toward PT goals and is appropriate for d/c to home with HHPT and family support and Assist PRN  Pt encouraged to ambulate with nursing staff 3x/day with use of BRW and sit out of bed for all meals  The patient's AM-PAC Basic Mobility Inpatient Short Form Raw Score is 22   A Raw score of greater than 16 suggests the patient may benefit from discharge to home  Please also refer to the recommendation of the Physical Therapist for safe discharge planning  Barriers to Discharge: Decreased caregiver support, Inaccessible home environment  Barriers to Discharge Comments: Ax2- caregiver burden, steps, unable to amb household distances  PT Discharge Recommendation: Home with home health rehabilitation    See flowsheet documentation for full assessment

## 2022-09-25 NOTE — CASE MANAGEMENT
Case Management Progress Note    Patient name Jesus Tavarez  Location [place] 5 /E5 [place] 9356 1286-* MRN [id number]  : 1955 Date 2022       LOS (days): 11  Geometric Mean LOS (GMLOS) (days): 9 60  Days to GMLOS:-1 2        OBJECTIVE:        Current admission status: Inpatient  Preferred Pharmacy:   [address] [place] [id number]  Phone: [phone] Fax: [phone]    Primary Care Provider: Cheryl Mancuso MD    Primary Insurance: MEDICARE  Secondary Insurance: CIGNA    PROGRESS NOTE:    CM called [facility] to check on price of Eliquis  CM spoke with Pharmacist and was told that both Eliquis and Xarelto were sent and both require a prior auth  CM was provided with prior auth phone number which is [phone]  CM called this number and spoke with Laureano Lopez to initiate the prior auth  CM informed that it is approved  CM called [facility] to obtain a price and was informed that it is $125  Surgery Team made aware

## 2022-09-25 NOTE — PHYSICAL THERAPY NOTE
PHYSICAL THERAPY NOTE          Patient Name: Starlette Scheuermann HOOAU'E Date: 9/25/2022 09/25/22 1052   Note Type   Note Type Treatment   Pain Assessment   Pain Assessment Tool 0-10   Pain Score No Pain   Restrictions/Precautions   Other Precautions Multiple lines;Telemetry; Fall Risk  (wound vac)   General   Chart Reviewed Yes   Family/Caregiver Present No   Cognition   Overall Cognitive Status WFL   Arousal/Participation Alert; Responsive; Cooperative   Attention Within functional limits   Orientation Level Oriented X4   Memory Within functional limits   Following Commands Follows all commands and directions without difficulty   Comments flat affect   Subjective   Subjective I have no pain  Pt  agreeable to PT  Bed Mobility   Additional Comments pt seated out of bed upon entering and remained out of bed at conclusion of PT session  Transfers   Sit to Stand 6  Modified independent   Additional items Armrests   Stand to Sit 6  Modified independent   Additional items Increased time required;Verbal cues  (cues fo hand placement)   Toilet transfer 5  Supervision   Additional items Assist x 1; Increased time required;Verbal cues;Standard toilet  (grab bar use)   Additional Comments cues for hand placement with stand to sit and cues for ease of descent  Ambulation/Elevation   Gait pattern Wide MAXINE  (fluent reciprocal gait )   Gait Assistance 5  Supervision   Additional items Assist x 1;Verbal cues   Assistive Device Bariatric Rolling walker   Distance 120' x2   Stair Management Assistance 5  Supervision   Additional items Verbal cues   Stair Management Technique Two rails; Foreward;Reciprocal   Number of Stairs   (5 steps x2)   Ambulation/Elevation Additional Comments assist for management of wound vac   Balance   Static Sitting Good   Dynamic Sitting Fair +   Static Standing Good  (with rw)   Dynamic Standing Fair +  (with rw) Ambulatory Fair +  (with rw)   Activity Tolerance   Activity Tolerance Patient tolerated treatment well   Exercises   Hip Flexion Sitting;10 reps;AROM; Bilateral   Knee AROM Long Arc Quad Sitting;10 reps;AROM; Bilateral   Ankle Pumps Sitting;10 reps;AROM; Bilateral   Assessment   Prognosis Good   Problem List Impaired balance;Decreased mobility;Obesity; Decreased skin integrity; Decreased endurance;Decreased range of motion   Assessment Pt  rec'd seated out of bed in chair upon arrival   Pt  Reports no pain  Pt is showing progress toward PT goals  Noted improved activity tolerance, mobility, balance, endurance, and overall strength  Pt is functioning at mod I level for transfers with cues for hand placement with stand to sit and ease of decent, supervision for ambulation on levels with use of BRW  Pt progressed with ambulation distances to 120' x2 which is > household distances and progressed to stair climbing with use of b/l rails and supervision x1 using reciprocal gait pattern  Assist for management of wound vac during gait and stair climbing  No unsteadiness or lob noted during transfer, gait, or stair training  Mild fatigue and sob noted requiring standing or seated rest breaks  Pt performs toilet transfers with supervision assist x1 with use of grab bar  Cues for ease of descent  Pt  Has made significant gains toward PT goals and is appropriate for d/c to home with HHPT and family support and Assist PRN  Pt encouraged to ambulate with nursing staff 3x/day with use of BRW and sit out of bed for all meals  The patient's AM-PAC Basic Mobility Inpatient Short Form Raw Score is 22  A Raw score of greater than 16 suggests the patient may benefit from discharge to home  Please also refer to the recommendation of the Physical Therapist for safe discharge planning  Goals   Patient Goals to get back to everything that I was doing before     STG Expiration Date 10/03/22   PT Treatment Day 2   Plan Treatment/Interventions Functional transfer training;LE strengthening/ROM; Elevations; Therapeutic exercise; Endurance training;Patient/family training;Equipment eval/education;Gait training;Spoke to nursing;OT   Progress Improving as expected   PT Frequency Other (Comment)  (4-5x/ week)   Recommendation   PT Discharge Recommendation Home with home health rehabilitation   [address] [place] [address] Mobility Inpatient   Turning in Bed Without Bedrails 3   Lying on Back to Sitting on Edge of Flat Bed 3   Moving Bed to Chair 4   Standing Up From Chair 4   Walk in Room 4   Climb 3-5 Stairs 4   Basic Mobility Inpatient Raw Score 22   Basic Mobility Standardized Score 47 4   Highest Level Of Mobility   -[facility] Goal 7: Walk 25 feet or more   Education   Education Provided Mobility training;Home exercise program;Assistive device   Patient Demonstrates acceptance/verbal understanding   End of Consult   Patient Position at End of Consult Bedside chair; All needs within reach   End of Consult Comments pt in stable condition at conclusion of PT session     Maureen Palacio, PTA

## 2022-09-26 ENCOUNTER — HOME HEALTH ADMISSION (OUTPATIENT)
Dept: HOME HEALTH SERVICES | Facility: HOME HEALTHCARE | Age: 67
End: 2022-09-26

## 2022-09-26 LAB
BACTERIA BLD CULT: NORMAL
BACTERIA BLD CULT: NORMAL
ERYTHROCYTE [DISTWIDTH] IN BLOOD BY AUTOMATED COUNT: 14.6 % (ref 11.6–15.1)
HCT VFR BLD AUTO: 28.5 % (ref 36.5–49.3)
HGB BLD-MCNC: 9 G/DL (ref 12–17)
MCH RBC QN AUTO: 30.9 PG (ref 26.8–34.3)
MCHC RBC AUTO-ENTMCNC: 31.6 G/DL (ref 31.4–37.4)
MCV RBC AUTO: 98 FL (ref 82–98)
PLATELET # BLD AUTO: 483 THOUSANDS/UL (ref 149–390)
PMV BLD AUTO: 9.1 FL (ref 8.9–12.7)
RBC # BLD AUTO: 2.91 MILLION/UL (ref 3.88–5.62)
WBC # BLD AUTO: 16.33 THOUSAND/UL (ref 4.31–10.16)

## 2022-09-26 PROCEDURE — 85027 COMPLETE CBC AUTOMATED: CPT | Performed by: PHYSICIAN ASSISTANT

## 2022-09-26 PROCEDURE — 99232 SBSQ HOSP IP/OBS MODERATE 35: CPT | Performed by: INTERNAL MEDICINE

## 2022-09-26 PROCEDURE — 97606 NEG PRS WND THER DME>50 SQCM: CPT | Performed by: PHYSICIAN ASSISTANT

## 2022-09-26 PROCEDURE — NC001 PR NO CHARGE: Performed by: PHYSICIAN ASSISTANT

## 2022-09-26 RX ADMIN — TRAVOPROST 1 DROP: 0.04 SOLUTION OPHTHALMIC at 20:18

## 2022-09-26 RX ADMIN — METOPROLOL TARTRATE 25 MG: 25 TABLET, FILM COATED ORAL at 08:29

## 2022-09-26 RX ADMIN — PANTOPRAZOLE SODIUM 40 MG: 40 TABLET, DELAYED RELEASE ORAL at 06:22

## 2022-09-26 RX ADMIN — APIXABAN 10 MG: 5 TABLET, FILM COATED ORAL at 17:13

## 2022-09-26 RX ADMIN — TAMSULOSIN HYDROCHLORIDE 0.4 MG: 0.4 CAPSULE ORAL at 17:13

## 2022-09-26 RX ADMIN — ALLOPURINOL 100 MG: 100 TABLET ORAL at 08:29

## 2022-09-26 RX ADMIN — FUROSEMIDE 20 MG: 20 TABLET ORAL at 08:29

## 2022-09-26 RX ADMIN — APIXABAN 10 MG: 5 TABLET, FILM COATED ORAL at 08:29

## 2022-09-26 NOTE — ARC ADMISSION
Notified by CM that patient has been cleared by therapy for discharge home with home health services  Please update with any changes

## 2022-09-26 NOTE — CASE MANAGEMENT
Case Management Discharge Planning Note    Patient name Jesus Tavarez  Location [facility] [address]-* MRN [id number]  : 1955 Date 2022       Current Admission Date: 2022  Current Admission Diagnosis:Strangulated hernia of abdominal wall   Patient Active Problem List    Diagnosis Date Noted    Pulmonary embolism ([facility] ) 2022    Anemia 2022    Strangulated hernia of abdominal wall 2022    COVID-19 2022    [facility] 2021    Right foot pain 2019    Class 3 severe obesity due to excess calories with serious comorbidity and body mass index (BMI) of 50 0 to 59 9 in adult [facility]) 2019    Lymphedema of both lower extremities 2019    CKD (chronic kidney disease), stage III ([facility] ) 2018    HDL deficiency 2018    Gout 2018    DELIA (obstructive sleep apnea) 2018    Allergic rhinitis 2018    Essential hypertension 2018    Kidney stones 2018      LOS (days): 12  Geometric Mean LOS (GMLOS) (days): 9 60  Days to GMLOS:-2 1     OBJECTIVE:  Risk of Unplanned Readmission Score: 17 15         Current admission status: Inpatient   Preferred Pharmacy:   [address] [place] [address], [address] [place] [address]  Phone: [phone] Fax: [phone]    Primary Care Provider: Cheryl Mancuso MD    Primary Insurance: MEDICARE  [facility]    DISCHARGE DETAILS:            Requested  [address]         Is the patient interested in [address] at discharge?: Yes  [address] requested[de-identified] Nursing, Occupational Therapy, Physical [address] [place] [address] Ave Name[de-identified] [facility] Provider[de-identified] PCP  Home Health Services Needed[de-identified] Wound/Ostomy Care, Strengthening/Theraputic Exercises to Improve Function, Evaluate Functional Status and Safety, Gait/ADL Training  Homebound Criteria Met[de-identified] Uses an Assist Device (i e  cane, walker, etc), Requires the Assistance of Another Person for Safe Ambulation or to Leave the Home  Supporting Clincal Findings[de-identified] Fatigues [employer] in [employer]    DME Referral Provided  Referral made for DME?: Yes  DME referral completed for the following items[de-identified] Other (wound vac)  DME Supplier Name[de-identified] Other (Add supplier name in comments) [facility])       Treatment Team Recommendation: Short Term Rehab  Discharge Destination Plan[de-identified] Home with [address] faxed to [facility]  CM uploaded wound vac paperwork in [employer] and authorization process started  CM will continue to follow patient

## 2022-09-26 NOTE — PROGRESS NOTES
Progress Note - General Surgery   Remedios Session 78 y o  male MRN: [id number]  Unit/Bed#: E5 -01 Encounter: [id number]    Assessment:  78yoM p/w incarcerated incisional hernia and SBO now s/p:  9/14: ex lap, reduction of strangulated ventral hernia, debridement of RLQ abdominal wall musculature, Abthera VAC placement  9/15: ex lap, RLQ hernia repair w mesh and VAC, complex anterior abdominal wall repair w mesh and retention sutures, LANETTE  Found to have b/l subsegmental PE  Urinary Retention    [id number] serous    Plan:  · Regular diet, ensures   · abdominal VAC, monitor output and character- MWF changes  · Xarelto for PE  · Appreciate ID recs  · PT/OT-d/c home  · Dispo planning- d/c with Xarelto and home vac  · Please tigertext on call [facility] surgery resident or AP with any questions      Subjective/Objective   Subjective:   No acute events overnight  Tolerating diet  No nausea vomiting  Having bowel function  Has been out of bed  Objective:     Blood pressure 137/74, pulse 97, temperature 98 5 °F (36 9 °C), resp  rate 19, height 5' 9" (1 753 m), weight (!) 139 kg (307 lb 5 1 oz), SpO2 93 %  ,Body mass index is 45 38 kg/m²  Intake/Output Summary (Last 24 hours) at 9/27/2022 0527  Last data filed at 9/27/2022 0443  Gross per 24 hour   Intake --   Output 500 ml   Net -500 ml       Invasive Devices  Report    Peripheral Intravenous Line  Duration           Peripheral IV 09/23/22 Right Forearm 3 days    Peripheral IV 09/25/22 Left;Proximal;Upper;Ventral (anterior) Arm 2 days              Physical Exam:   Gen:  NAD  HEENT: NCAT  MMM  CV: well perfused, pulses palpable  Lungs: Normal respiratory effort  Abd: soft, nt/nd, vac intact, dressing, cdi, retention sutures in place  Skin: warm/ dry  Extremities: no peripheral edema, no cyanosis  Neuro: AxO x3    Lab, Imaging and other studies:  I have personally reviewed pertinent lab results    , CBC:   No results found for: WBC, HGB, HCT, MCV, PLT, ADJUSTEDWBC, MCH, MCHC, RDW, MPV, NRBC, CMP:   No results found for: SODIUM, K, CL, CO2, ANIONGAP, BUN, CREATININE, GLUCOSE, CALCIUM, AST, ALT, ALKPHOS, PROT, BILITOT, EGFR  VTE Pharmacologic Prophylaxis: Sequential compression device (Venodyne)  and Heparin  VTE Mechanical Prophylaxis: sequential compression device

## 2022-09-26 NOTE — CASE MANAGEMENT
Case Management Discharge Planning Note    Patient name Pooja Vernon  Location [place]  [address] 537/E5 MS 9356 [id number]-* MRN [id number]  : 1955 Date 2022       Current Admission Date: 2022  Current Admission Diagnosis:Strangulated hernia of abdominal wall   Patient Active Problem List    Diagnosis Date Noted    Pulmonary embolism ([facility] ) 2022    Anemia 2022    Strangulated hernia of abdominal wall 2022    COVID-19 2022    Sepsis ([facility] ) 2021    Right foot pain 2019    Class 3 severe obesity due to excess calories with serious comorbidity and body mass index (BMI) of 50 0 to 59 9 in adult [facility]) 2019    Lymphedema of both lower extremities 2019    CKD (chronic kidney disease), stage III ([facility] ) 2018    HDL deficiency 2018    Gout 2018    DELIA (obstructive sleep apnea) 2018    Allergic rhinitis 2018    Essential hypertension 2018    Kidney stones 2018      LOS (days): 12  Geometric Mean LOS (GMLOS) (days): 9 60  Days to GMLOS:-2     OBJECTIVE:  Risk of Unplanned Readmission Score: 17 15         Current admission status: Inpatient   Preferred Pharmacy:   [address] [place] [address], [address] [place] [address]  Phone: [phone] Fax: [phone]    Primary Care Provider: Daniela Fajardo MD    Primary Insurance: MEDICARE  Secondary Insurance: [employer]    DISCHARGE DETAILS:    Per [facility] pharmacy, patient's Xarelto requires prior authorization  The Eliquis is $[id number] 79  ISAEL spoke with [employer]  The prior auth can take up to 1-3 days, the representative encouraging provider to call by phone to complete the prior auth/questionnaire to phone [phone]  CM made surgery aware  Referrals in [place] pending for home health for wound vac management  Once a [facility] is secured, CM can submit wound vac form order

## 2022-09-26 NOTE — ASSESSMENT & PLAN NOTE
· Patient with sedentary lifestyle, obesity  · CT revealed right greater than left filling defect, no RV strain  · No history of DVT or PE  · Previously during this admission started on Eliquis 10 mg b i d  For 7 days and Eliquis 5 mg b i d   Thereafter will need to follow closely with PCP outpatient

## 2022-09-26 NOTE — PROGRESS NOTES
[address] [place] [address]  Progress Note - Thedora Stairs 1955, 78 y o  male MRN: [id number]  Unit/Bed#: E5 -01 Encounter: [id number]  Primary Care Provider: Denzel Montano MD   Date and time admitted to hospital: 9/14/2022 11:29 AM    Pulmonary embolism ([facility] )  Assessment & Plan  · Patient with sedentary lifestyle, obesity  · CT revealed right greater than left filling defect, no RV strain  · No history of DVT or PE  · Previously during this admission started on Eliquis 10 mg b i d  For 7 days and Eliquis 5 mg b i d  Thereafter will need to follow closely with PCP outpatient    Anemia  Assessment & Plan  · Hemoglobin 9  · Monitor H&H    Sepsis ([facility] )  Assessment & Plan  · Due to strangulated ventral hernia and right lower quadrant abscess at rectal muscle  · And ex lap with hernia repair  · Tissue culture demonstrated few colonies of Klebsiella pneumonia, E coli, Bacteroides  · Repeat cultures negative thus far  · Infectious disease on board  · Continuing to monitor off antibiotics    Class 3 severe obesity due to excess calories with serious comorbidity and body mass index (BMI) of 50 0 to 59 9 in adult [facility])  Assessment & Plan  - pt with BMI of Body mass index is 45 38 kg/m²    - pt counseled on risks associated with obesity and it's contribution to other health issues  - advise portion control, healthy food choices, drinking water instead of juice/soda  - advise beginning light exercise program (i e  Walking 30min 4-5x/wk)  - advise keeping food diary to help identify problem foods/times/stressors  - pt advised that weight loss of ~ 1lb/wk is a good goal and not to become frustrated if loss is slower      DELIA (obstructive sleep apnea)  Assessment & Plan  · Referral to sleep medicine at discharge    CKD (chronic kidney disease), stage III [facility])  Assessment & Plan  Lab Results   Component Value Date    EGFR 81 09/24/2022    EGFR 53 09/23/2022    EGFR 60 09/22/2022    CREATININE 0 97 09/24/2022    CREATININE 1 37 (H) 09/23/2022    CREATININE 1 23 09/22/2022   · Baseline creatinine 1 4-1 7  · Creatinine improved  · Monitor renal function    Essential hypertension  Assessment & Plan  · Continue metoprolol 25 mg b i d , furosemide 20 mg daily    * Strangulated hernia of abdominal wall  Assessment & Plan  · 9/14 status post exploritory laparotomy, complex lysis of adhesions, reduction of strangulated hernia, debridement on the right lower quadrant muscle with counter incision, abthera placement x2  · 9/15 right lower quadrant hernia status post repair with mesh and wound VAC placement and anterior abdominal wall repair with mesh and retention sutures  · Pain management  · Further management as per primary team      VTE Pharmacologic Prophylaxis:   Pharmacologic: Apixaban (Eliquis)  Mechanical VTE Prophylaxis in Place: Yes    Patient Centered Rounds: I have performed bedside rounds with nursing staff today  Discussions with Specialists or Other Care Team Provider:     Education and Discussions with Family / Patient: Care plan discussed with patient who voiced understanding and agrees with recommendations  Time Spent for Care: 30 minutes  More than 50% of total time spent on counseling and coordination of care as described above  Current Length of Stay: 12 day(s)    Current Patient Status: Inpatient   Certification Statement: The patient will continue to require additional inpatient hospital stay due to Awaiting discharge home with wound VAC    Discharge Plan:  As per primary team    Code Status: Level 1 - Full Code      Subjective:   Patient seen examined bedside, no acute distress or discomfort noted  Reports pain is well controlled and generally feels well  Asking to be discharged home; instructed that surgery is working with Case Management to get home wound VAC obtained  Seen by ID, follow off antibiotics  White count trending down to 16 33 today all other labs and vital stable  Continue Eliquis for PE and what is likely a provoked event  Instructed patient he would need anticoagulation for minimum of 3-6 months and follow-up with primary care for further guidance  Would benefit from weight loss moving forward  Objective:     Vitals:   Temp (24hrs), Av 7 °F (36 5 °C), Min:97 4 °F (36 3 °C), Max:97 9 °F (36 6 °C)    Temp:  [97 4 °F (36 3 °C)-97 9 °F (36 6 °C)] 97 4 °F (36 3 °C)  HR:  [] 92  Resp:  [14] 14  BP: (136-159)/(57-88) 157/57  SpO2:  [95 %-98 %] 98 %  Body mass index is 45 38 kg/m²  Input and Output Summary (last 24 hours): Intake/Output Summary (Last 24 hours) at 2022 1446  Last data filed at 2022 1720  Gross per 24 hour   Intake --   Output 400 ml   Net -400 ml       Physical Exam:     Physical Exam  Vitals and nursing note reviewed  Constitutional:       Appearance: He is well-developed  He is obese  HENT:      Head: Normocephalic and atraumatic  Eyes:      Conjunctiva/sclera: Conjunctivae normal    Cardiovascular:      Rate and Rhythm: Normal rate  Heart sounds: No murmur heard  Pulmonary:      Effort: Pulmonary effort is normal  No respiratory distress  Abdominal:      Tenderness: There is no abdominal tenderness  Comments: Wound VAC in place over surgical incision   Musculoskeletal:      Cervical back: Neck supple  Right lower leg: Edema present  Left lower leg: Edema present  Skin:     General: Skin is warm and dry  Neurological:      Mental Status: He is alert and oriented to person, place, and time     Psychiatric:         Behavior: Behavior normal            Additional Data:     Labs:    Results from last 7 days   Lab Units 22  0459 22  1140   WBC Thousand/uL 16 33* 19 21*   HEMOGLOBIN g/dL 9 0* 9 0*   HEMATOCRIT % 28 5* 28 1*   PLATELETS Thousands/uL 483* 491*   BANDS PCT %  --  3   LYMPHO PCT %  --  21   MONO PCT %  --  2*   EOS PCT %  --  0     Results from last 7 days   Lab Units 09/24/22  0440 09/23/22  0450   SODIUM mmol/L 139 137   POTASSIUM mmol/L 3 7 3 5   CHLORIDE mmol/L 107 104   CO2 mmol/L 26 28   BUN mg/dL 12 16   CREATININE mg/dL 0 97 1 37*   ANION GAP mmol/L 6 5   CALCIUM mg/dL 7 2* 8 4   ALBUMIN g/dL  --  1 8*   TOTAL BILIRUBIN mg/dL  --  0 40   ALK PHOS U/L  --  56   ALT U/L  --  <6*   AST U/L  --  17   GLUCOSE RANDOM mg/dL 103 119     Results from last 7 days   Lab Units 09/19/22  1630   INR  1 34*     Results from last 7 days   Lab Units 09/24/22  0720 09/23/22  2128 09/23/22  1544 09/23/22  1047 09/23/22  0721 09/22/22  1601 09/22/22  1105 09/22/22  0719 09/21/22  1457 09/21/22  1132 09/21/22  0713 09/20/22  1530   POC GLUCOSE mg/dl 114 139 136 139 113 146* 133 143* 122 126 114 213*         Results from last 7 days   Lab Units 09/22/22  0458 09/21/22  1258 09/20/22  0635   LACTIC ACID mmol/L  --  1 2  --    PROCALCITONIN ng/ml 0 72*  --  0 99*           * I Have Reviewed All Lab Data Listed Above  * Additional Pertinent Lab Tests Reviewed: All Labs Within Last 24 Hours Reviewed    Imaging:    Imaging Reports Reviewed Today Include:   Imaging Personally Reviewed by Myself Includes:      Recent Cultures (last 7 days):     Results from last 7 days   Lab Units 09/21/22  1654 09/21/22  1118   BLOOD CULTURE  No Growth After 4 Days  No Growth After 4 Days    --    C DIFF TOXIN B BY PCR   --  Negative       Last 24 Hours Medication List:   Current Facility-Administered Medications   Medication Dose Route Frequency Provider Last Rate    acetaminophen  650 mg Oral Q6H PRN Steven Ortiz MD      allopurinol  100 mg Oral Daily Steven Ortiz MD      apixaban  10 mg Oral BID Georgina Fofana MD      Followed by   Shana Mccall ON 10/2/2022] apixaban  5 mg Oral BID Georgina Fofana MD      furosemide  20 mg Oral Daily Steven Ortiz MD      glycerin-hypromellose-  1 drop Both Eyes Q4H PRN Steven Ortiz MD      HYDROmorphone  1 mg Intravenous Q4H PRN Steven Ortiz MD  iohexol  50 mL Oral Once in imaging Mandeep Sotelo MD      metoprolol tartrate  25 mg Oral Q12H [facility] Mandeep Sotelo MD      pantoprazole  40 mg Oral Early Morning Mandeep Sotelo MD      tamsulosin  0 4 mg Oral Daily With Jung Ma MD      travoprost  1 drop Both Eyes HS Mandeep Sotelo MD          Today, Patient Was Seen By: Atul Jiménez MD    ** Please Note: Dictation voice to text software may have been used in the creation of this document   **

## 2022-09-26 NOTE — PLAN OF CARE
Problem: PAIN - ADULT  Goal: Verbalizes/displays adequate comfort level or baseline comfort level  Description: Interventions:  - Encourage patient to monitor pain and request assistance  - Assess pain using appropriate pain scale  - Administer analgesics based on type and severity of pain and evaluate response  - Implement non-pharmacological measures as appropriate and evaluate response  - Consider cultural and social influences on pain and pain management  - Notify physician/advanced practitioner if interventions unsuccessful or patient reports new pain  Outcome: Progressing     Problem: INFECTION - ADULT  Goal: Absence or prevention of progression during hospitalization  Description: INTERVENTIONS:  - Assess and monitor for signs and symptoms of infection  - Monitor lab/diagnostic results  - Monitor all insertion sites, i e  indwelling lines, tubes, and drains  - Monitor endotracheal if appropriate and nasal secretions for changes in amount and color  - Ransom appropriate cooling/warming therapies per order  - Administer medications as ordered  - Instruct and encourage patient and family to use good hand hygiene technique  - Identify and instruct in appropriate isolation precautions for identified infection/condition  Outcome: Progressing     Problem: SAFETY ADULT  Goal: Patient will remain free of falls  Description: INTERVENTIONS:  - Educate patient/family on patient safety including physical limitations  - Instruct patient to call for assistance with activity   - Consult OT/PT to assist with strengthening/mobility   - Keep Call bell within reach  - Keep bed low and locked with side rails adjusted as appropriate  - Keep care items and personal belongings within reach  - Initiate and maintain comfort rounds  - Make Fall Risk Sign visible to staff  - Offer Toileting every 2 Hours, in advance of need  - Initiate/Maintain bed alarm  - Obtain necessary fall risk management equipment: nonskid footwear on and call bell in reach  - Apply yellow socks and bracelet for high fall risk patients  - Consider moving patient to room near nurses station  Outcome: Progressing  Goal: Maintain or return to baseline ADL function  Description: INTERVENTIONS:  -  Assess patient's ability to carry out ADLs; assess patient's baseline for ADL function and identify physical deficits which impact ability to perform ADLs (bathing, care of mouth/teeth, toileting, grooming, dressing, etc )  - Assess/evaluate cause of self-care deficits   - Assess range of motion  - Assess patient's mobility; develop plan if impaired  - Assess patient's need for assistive devices and provide as appropriate  - Encourage maximum independence but intervene and supervise when necessary  - Involve family in performance of ADLs  - Assess for home care needs following discharge   - Consider OT consult to assist with ADL evaluation and planning for discharge  - Provide patient education as appropriate  Outcome: Progressing  Goal: Maintains/Returns to pre admission functional level  Description: INTERVENTIONS:  - Perform BMAT or MOVE assessment daily    - Set and communicate daily mobility goal to care team and patient/family/caregiver  - Collaborate with rehabilitation services on mobility goals if consulted  - Perform Range of Motion 3 times a day  - Reposition patient every 2 hours    - Dangle patient 3 times a day  - Stand patient 3 times a day  - Ambulate patient 3 times a day  - Out of bed to chair 3 times a day   - Out of bed for meals 3 times a day  - Out of bed for toileting  - Record patient progress and toleration of activity level   Outcome: Progressing     Problem: DISCHARGE PLANNING  Goal: Discharge to home or other facility with appropriate resources  Description: INTERVENTIONS:  - Identify barriers to discharge w/patient and caregiver  - Arrange for needed discharge resources and transportation as appropriate  - Identify discharge learning needs (meds, wound care, etc )  - Arrange for interpretive services to assist at discharge as needed  - Refer to Case Management Department for coordinating discharge planning if the patient needs post-hospital services based on physician/advanced practitioner order or complex needs related to functional status, cognitive ability, or social support system  Outcome: Progressing     Problem: Knowledge Deficit  Goal: Patient/family/caregiver demonstrates understanding of disease process, treatment plan, medications, and discharge instructions  Description: Complete learning assessment and assess knowledge base  Interventions:  - Provide teaching at level of understanding  - Provide teaching via preferred learning methods  Outcome: Progressing     Problem: MOBILITY - ADULT  Goal: Maintain or return to baseline ADL function  Description: INTERVENTIONS:  -  Assess patient's ability to carry out ADLs; assess patient's baseline for ADL function and identify physical deficits which impact ability to perform ADLs (bathing, care of mouth/teeth, toileting, grooming, dressing, etc )  - Assess/evaluate cause of self-care deficits   - Assess range of motion  - Assess patient's mobility; develop plan if impaired  - Assess patient's need for assistive devices and provide as appropriate  - Encourage maximum independence but intervene and supervise when necessary  - Involve family in performance of ADLs  - Assess for home care needs following discharge   - Consider OT consult to assist with ADL evaluation and planning for discharge  - Provide patient education as appropriate  Outcome: Progressing  Goal: Maintains/Returns to pre admission functional level  Description: INTERVENTIONS:  - Perform BMAT or MOVE assessment daily    - Set and communicate daily mobility goal to care team and patient/family/caregiver     - Collaborate with rehabilitation services on mobility goals if consulted  - Out of bed for toileting  - Record patient progress and toleration of activity level   Outcome: Progressing     Problem: Potential for Falls  Goal: Patient will remain free of falls  Description: INTERVENTIONS:  - Educate patient/family on patient safety including physical limitations  - Instruct patient to call for assistance with activity   - Consult OT/PT to assist with strengthening/mobility   - Keep Call bell within reach  - Keep bed low and locked with side rails adjusted as appropriate  - Keep care items and personal belongings within reach  - Initiate and maintain comfort rounds  - Make Fall Risk Sign visible to staff  - Apply yellow socks and bracelet for high fall risk patients  - Consider moving patient to room near nurses station  Outcome: Progressing     Problem: Prexisting or High Potential for Compromised Skin Integrity  Goal: Skin integrity is maintained or improved  Description: INTERVENTIONS:  - Identify patients at risk for skin breakdown  - Assess and monitor skin integrity  - Assess and monitor nutrition and hydration status  - Monitor labs   - Assess for incontinence   - Turn and reposition patient  - Assist with mobility/ambulation  - Relieve pressure over bony prominences  - Avoid friction and shearing  - Provide appropriate hygiene as needed including keeping skin clean and dry  - Evaluate need for skin moisturizer/barrier cream  - Collaborate with interdisciplinary team   - Patient/family teaching  - Consider wound care consult   Outcome: Progressing     Problem: Nutrition/Hydration-ADULT  Goal: Nutrient/Hydration intake appropriate for improving, restoring or maintaining nutritional needs  Description: Monitor and assess patient's nutrition/hydration status for malnutrition  Collaborate with interdisciplinary team and initiate plan and interventions as ordered  Monitor patient's weight and dietary intake as ordered or per policy  Utilize nutrition screening tool and intervene as necessary   Determine patient's food preferences and provide high-protein, high-caloric foods as appropriate       INTERVENTIONS:  - Monitor oral intake, urinary output, labs, and treatment plans  - Assess nutrition and hydration status and recommend course of action  - Evaluate amount of meals eaten  - Assist patient with eating if necessary   - Allow adequate time for meals  - Recommend/ encourage appropriate diets, oral nutritional supplements, and vitamin/mineral supplements  - Order, calculate, and assess calorie counts as needed  - Recommend, monitor, and adjust tube feedings and TPN/PPN based on assessed needs  - Assess need for intravenous fluids  - Provide specific nutrition/hydration education as appropriate  - Include patient/family/caregiver in decisions related to nutrition  Outcome: Progressing     Problem: GASTROINTESTINAL - ADULT  Goal: Minimal or absence of nausea and/or vomiting  Description: INTERVENTIONS:  - Administer IV fluids if ordered to ensure adequate hydration  - Maintain NPO status until nausea and vomiting are resolved  - Nasogastric tube if ordered  - Administer ordered antiemetic medications as needed  - Provide nonpharmacologic comfort measures as appropriate  - Advance diet as tolerated, if ordered  - Consider nutrition services referral to assist patient with adequate nutrition and appropriate food choices  Outcome: Progressing  Goal: Maintains or returns to baseline bowel function  Description: INTERVENTIONS:  - Assess bowel function  - Encourage oral fluids to ensure adequate hydration  - Administer IV fluids if ordered to ensure adequate hydration  - Administer ordered medications as needed  - Encourage mobilization and activity  - Consider nutritional services referral to assist patient with adequate nutrition and appropriate food choices  Outcome: Progressing  Goal: Maintains adequate nutritional intake  Description: INTERVENTIONS:  - Monitor percentage of each meal consumed  - Identify factors contributing to decreased intake, treat as appropriate  - Assist with meals as needed  - Monitor I&O, weight, and lab values if indicated  - Obtain nutrition services referral as needed  Outcome: Progressing  Goal: Oral mucous membranes remain intact  Description: INTERVENTIONS  - Assess oral mucosa and hygiene practices  - Implement preventative oral hygiene regimen  - Implement oral medicated treatments as ordered  - Initiate Nutrition services referral as needed  Outcome: Progressing

## 2022-09-26 NOTE — ASSESSMENT & PLAN NOTE
· 9/14 status post exploritory laparotomy, complex lysis of adhesions, reduction of strangulated hernia, debridement on the right lower quadrant muscle with counter incision, abthera placement x2  · 9/15 right lower quadrant hernia status post repair with mesh and wound VAC placement and anterior abdominal wall repair with mesh and retention sutures  · Pain management  · Further management as per primary team

## 2022-09-26 NOTE — DISCHARGE INSTR - OTHER ORDERS
Midline incision wound  Clean ricky wound area with soap and water daily and pat to dry  May keep covered for comfort with ABD pads and paper tape, change daily or as needed   No lotions or creams     Right lower quadrant abdominal wound  Continuous wound vac therapy at 125mmHg  Change wound VAC MWF

## 2022-09-26 NOTE — PROGRESS NOTES
General Surgery  Progress Note   Audi Kasper 78 y o  male MRN: [id number]  Unit/Bed#: E5 -01 Encounter: [id number]    Assessment:  78yoM p/w incarcerated incisional hernia and SBO now s/p:  : ex lap, reduction of strangulated ventral hernia, debridement of RLQ abdominal wall musculature, Abthera VAC placement  9/15: ex lap, RLQ hernia repair w mesh and VAC, complex anterior abdominal wall repair w mesh and retention sutures, LANETTE  Found to have b/l subsegmental PE  Urinary Retention    Afebrile, VSS  WBC: 16 from 19; Hb    UOP: Not recorded  WV:Not recorded    Plan:  -Regular diet and ensures  -abdominal wall VAC; monitor output in character, MWF changes  -Eliquis  -appreciate ID recs  -appreciate medicine recs  -PT/OT; home with home PT  -CM for dispo planning; pending home wound VAC    Subjective/Objective     Subjective: Patient seen and examined at bedside, in no acute distress  No acute events overnight  Patient's pain is well controlled  Pt denies nausea or vomiting  Passing gas and stool  Objective:     Vitals:Blood pressure 157/57, pulse 92, temperature (!) 97 4 °F (36 3 °C), resp  rate 14, height 5' 9" (1 753 m), weight (!) 139 kg (307 lb 5 1 oz), SpO2 98 %  ,Body mass index is 45 38 kg/m²       Temp (24hrs), Av 7 °F (36 5 °C), Min:97 4 °F (36 3 °C), Max:97 9 °F (36 6 °C)  Current: Temperature: (!) 97 4 °F (36 3 °C)      Intake/Output Summary (Last 24 hours) at 2022 0804  Last data filed at 2022 1720  Gross per 24 hour   Intake --   Output 400 ml   Net -400 ml       Invasive Devices  Report    Peripheral Intravenous Line  Duration           Peripheral IV 22 Right Forearm 2 days    Peripheral IV 22 Left;Proximal;Upper;Ventral (anterior) Arm 1 day                Physical Exam:  General: No acute distress, alert and oriented  CV: Well perfused, regular rate and rhythm  Lungs: Normal work of breathing, no increased respiratory effort  Abdomen: Soft, non-tender, non-distended  Wound VAC in place as above    Midline incision with retention sutures in place; adjacent skin blistering and blanching erythema, picture below  Extremities: No edema, clubbing or cyanosis  Skin: Warm, dry          Lab Results: Results: I have personally reviewed all pertinent laboratory/tests results  VTE Prophylaxis: Sequential compression device [facility])  Emmett Mayer MD  9/26/2022

## 2022-09-26 NOTE — PROCEDURES
General Surgery  Josh Christiancherelle 78 y o  male MRN: [id number]  Unit/Bed#: E5 -01 Encounter: [id number]    V  A C  Procedure Note    Date: 9/26/22  Time: 0945    Location of wound: RLQ abdomen    Sponges removed:  1 Black Sponges  1 White Sponges    Dimensions of wound: 15 cm x 6 cm x 6 cm    Description of wound: pink wound bed with healthy granulation tissue  White sponge placed over mesh with black sponge on top      Sponges placed:  1 Black Sponges  1 White Sponges    VAC settings:  125 mmHg  Continuous          A procedure time out was completed per protocol with patient  Pt tolerated procedure well  VAC sticker placed to wound dressing, per protocol      Lili Olmstead PA-C  9/26/2022

## 2022-09-26 NOTE — TELEPHONE ENCOUNTER
Still in-patient       Path finalized from the 14th and 15th - should received results while still in the hospital

## 2022-09-26 NOTE — PROGRESS NOTES
Progress Note - [facility] Infectious Disease   Benigno Members 78 y o  male MRN: [id number]  Unit/Bed#: E5 -01 Encounter: [id number]      IMPRESSION & RECOMMENDATIONS:   1  Sepsis, evolving from admission, e/b fever, leukocytosis, tachycardia, tachypnea  Lactic acid normal  WBC and PCT down trending   Appears to have defervesced  All likely secondary to intra-abdominal process  Repeat abdominal imaging was negative  Consider possibility of pulmonary emboli playing a role  Also may have had drug fever  UA benign and unlikely BSI with multiple negative sets of bl cx  Abdominal wound appears healthy and wound vac remains in place  He remains hemodynamically stable without localizing sx    -continue to monitor off antibiotics   -no further ID work up indicated      2  Incarcerated incisional hernia with abscess of rectus muscle and small-bowel obstruction  Patient is status post ex lap 9/14 with reduction of strangulated ventral hernia and debridement of RLQ abdominal wall musculature and VAC placement   Repeat ex lap 09/15 with RLQ hernia repair with mesh and VAC, complex anterior abdominal wall repair with mesh and retention sutures, LANETTE   Wound cultures polymicrobial including Klebsiella pneumoniae, E  coli, Bacteroides vulgatus, Strep anginosus  No evidence of current infection and repeat CT A/P with post surgical changes  Completed >7 days IV ancef and flagyl   -monitor off abx   -serial exam and close surgical follow-up  -continue wound VAC management      3  Bilateral segmental pulmonary emboli  Noted on CTA C/A/P completed on 9/19 during sepsis/fever workup  Anticoagulation per primary team  On room air   -monitor respiratory status      4  Urinary retention   Arias catheter now discontinued and patient spontaneously voiding  No suspicion for urinary tract infection, UA benign      5  CKD, history of renal cell carcinoma status post cryoablation        6  Obesity   BMI 57  46   Recommend diet and lifestyle modifications as recommended per primary pain    Infectious disease consultation service will sign off for now  Please call if any new signs or symptoms of infection develop  Thank you! Antibiotics:  Day 4 off abx    I have discussed the above management plan in detail with patient  I have discussed the above management plan in detail with patient's RN, and the primary service, surgery  Subjective:  Patient has no fever, chills, sweats; no nausea, vomiting, diarrhea; no cough, shortness of breath; no pain  No new symptoms  Wants to go home  Objective:  Vitals:  Temp:  [97 8 °F (36 6 °C)-97 9 °F (36 6 °C)] 97 8 °F (36 6 °C)  HR:  [] 95  Resp:  [14] 14  BP: (113-159)/(54-88) 157/66  SpO2:  [95 %-98 %] 95 %  Temp (24hrs), Av 9 °F (36 6 °C), Min:97 8 °F (36 6 °C), Max:97 9 °F (36 6 °C)  Current: Temperature: 97 8 °F (36 6 °C)    PHYSICAL EXAM:  General Appearance:  Appearing well, nontoxic, and in no distress, sitting in bedside recliner   HEENT: Normocephalic, without obvious abnormality, atraumatic  Conjunctiva pink and sclera anicteric  Oropharynx moist without lesions  Lungs:   Clear to auscultation bilaterally, respirations unlabored   Heart:  RRR; no murmur, rub or gallop   Abdomen:   Large, nontender, distended obese abdomen, positive bowel sounds  Wound VAC apparatus in place to right lower quadrant with large midline incision with clean dressing  Extremities: No cyanosis, clubbing or edema   Musculoskeletal: Back symmetric without curvature, ROM normal     : No CVA or suprapubic tenderness  No Arias catheter  Skin: No rashes or lesions  No draining wounds noted  Peripheral IV intact without evidence of erythema, warmth, or exudate  LABS, IMAGING, & OTHER STUDIES:  Lab Results:  I have personally reviewed pertinent labs    Results from last 7 days   Lab Units 22  0459 22  1140 22  0440   WBC Thousand/uL 16 33* 19 21* 19 95*   HEMOGLOBIN g/dL 9  0* 9 0* 8 7*   PLATELETS Thousands/uL 483* 491* 307     Results from last 7 days   Lab Units 09/24/22  0440 09/23/22  0450 09/22/22  0458 09/21/22  0525   SODIUM mmol/L 139 137 136 138   POTASSIUM mmol/L 3 7 3 5 3 3* 3 1*   CHLORIDE mmol/L 107 104 102 103   CO2 mmol/L 26 28 26 29   BUN mg/dL 12 16 14 17   CREATININE mg/dL 0 97 1 37* 1 23 1 36*   EGFR ml/min/1 73sq m 81 53 60 53   CALCIUM mg/dL 7 2* 8 4 8 0* 7 7*   AST U/L  --  17 18 16   ALT U/L  --  <6* 6* 8*   ALK PHOS U/L  --  56 53 57     Results from last 7 days   Lab Units 09/21/22  1654 09/21/22  1118   BLOOD CULTURE  No Growth After 4 Days  No Growth After 4 Days    --    C DIFF TOXIN B BY PCR   --  Negative     Results from last 7 days   Lab Units 09/22/22  0458 09/20/22  0635   PROCALCITONIN ng/ml 0 72* 0 99*

## 2022-09-27 LAB
ANION GAP SERPL CALCULATED.3IONS-SCNC: 7 MMOL/L (ref 4–13)
BASOPHILS # BLD MANUAL: 0 THOUSAND/UL (ref 0–0.1)
BASOPHILS NFR MAR MANUAL: 0 % (ref 0–1)
BUN SERPL-MCNC: 14 MG/DL (ref 5–25)
CALCIUM SERPL-MCNC: 8.5 MG/DL (ref 8.3–10.1)
CHLORIDE SERPL-SCNC: 103 MMOL/L (ref 96–108)
CO2 SERPL-SCNC: 28 MMOL/L (ref 21–32)
CREAT SERPL-MCNC: 1.28 MG/DL (ref 0.6–1.3)
EOSINOPHIL # BLD MANUAL: 0.18 THOUSAND/UL (ref 0–0.4)
EOSINOPHIL NFR BLD MANUAL: 1 % (ref 0–6)
ERYTHROCYTE [DISTWIDTH] IN BLOOD BY AUTOMATED COUNT: 14.9 % (ref 11.6–15.1)
GFR SERPL CREATININE-BSD FRML MDRD: 57 ML/MIN/1.73SQ M
GLUCOSE SERPL-MCNC: 121 MG/DL (ref 65–140)
HCT VFR BLD AUTO: 34.7 % (ref 36.5–49.3)
HGB BLD-MCNC: 10.8 G/DL (ref 12–17)
LYMPHOCYTES # BLD AUTO: 1.45 THOUSAND/UL (ref 0.6–4.47)
LYMPHOCYTES # BLD AUTO: 8 % (ref 14–44)
MAGNESIUM SERPL-MCNC: 1.7 MG/DL (ref 1.6–2.6)
MCH RBC QN AUTO: 30.7 PG (ref 26.8–34.3)
MCHC RBC AUTO-ENTMCNC: 31.1 G/DL (ref 31.4–37.4)
MCV RBC AUTO: 99 FL (ref 82–98)
MONOCYTES # BLD AUTO: 0.54 THOUSAND/UL (ref 0–1.22)
MONOCYTES NFR BLD: 3 % (ref 4–12)
MYELOCYTES NFR BLD MANUAL: 1 % (ref 0–1)
NEUTROPHILS # BLD MANUAL: 15.21 THOUSAND/UL (ref 1.85–7.62)
NEUTS BAND NFR BLD MANUAL: 8 % (ref 0–8)
NEUTS SEG NFR BLD AUTO: 76 % (ref 43–75)
PHOSPHATE SERPL-MCNC: 2.7 MG/DL (ref 2.3–4.1)
PLATELET # BLD AUTO: 569 THOUSANDS/UL (ref 149–390)
PLATELET BLD QL SMEAR: ABNORMAL
PLATELET CLUMP BLD QL SMEAR: PRESENT
PMV BLD AUTO: 8.9 FL (ref 8.9–12.7)
POLYCHROMASIA BLD QL SMEAR: PRESENT
POTASSIUM SERPL-SCNC: 3.9 MMOL/L (ref 3.5–5.3)
PROCALCITONIN SERPL-MCNC: 0.2 NG/ML
RBC # BLD AUTO: 3.52 MILLION/UL (ref 3.88–5.62)
SODIUM SERPL-SCNC: 138 MMOL/L (ref 135–147)
WBC # BLD AUTO: 18.11 THOUSAND/UL (ref 4.31–10.16)

## 2022-09-27 PROCEDURE — 87505 NFCT AGENT DETECTION GI: CPT | Performed by: SURGERY

## 2022-09-27 PROCEDURE — 84145 PROCALCITONIN (PCT): CPT | Performed by: INTERNAL MEDICINE

## 2022-09-27 PROCEDURE — NC001 PR NO CHARGE: Performed by: SURGERY

## 2022-09-27 PROCEDURE — 87493 C DIFF AMPLIFIED PROBE: CPT | Performed by: SURGERY

## 2022-09-27 PROCEDURE — 85007 BL SMEAR W/DIFF WBC COUNT: CPT | Performed by: SURGERY

## 2022-09-27 PROCEDURE — 83735 ASSAY OF MAGNESIUM: CPT | Performed by: SURGERY

## 2022-09-27 PROCEDURE — 84100 ASSAY OF PHOSPHORUS: CPT | Performed by: SURGERY

## 2022-09-27 PROCEDURE — 97535 SELF CARE MNGMENT TRAINING: CPT

## 2022-09-27 PROCEDURE — 97530 THERAPEUTIC ACTIVITIES: CPT

## 2022-09-27 PROCEDURE — 99232 SBSQ HOSP IP/OBS MODERATE 35: CPT | Performed by: INTERNAL MEDICINE

## 2022-09-27 PROCEDURE — 85027 COMPLETE CBC AUTOMATED: CPT | Performed by: SURGERY

## 2022-09-27 PROCEDURE — 80048 BASIC METABOLIC PNL TOTAL CA: CPT | Performed by: SURGERY

## 2022-09-27 RX ADMIN — ALLOPURINOL 100 MG: 100 TABLET ORAL at 08:01

## 2022-09-27 RX ADMIN — RIVAROXABAN 15 MG: 15 TABLET, FILM COATED ORAL at 16:36

## 2022-09-27 RX ADMIN — PANTOPRAZOLE SODIUM 40 MG: 40 TABLET, DELAYED RELEASE ORAL at 05:17

## 2022-09-27 RX ADMIN — TAMSULOSIN HYDROCHLORIDE 0.4 MG: 0.4 CAPSULE ORAL at 16:36

## 2022-09-27 RX ADMIN — METOPROLOL TARTRATE 25 MG: 25 TABLET, FILM COATED ORAL at 08:01

## 2022-09-27 RX ADMIN — FUROSEMIDE 20 MG: 20 TABLET ORAL at 08:01

## 2022-09-27 RX ADMIN — RIVAROXABAN 15 MG: 15 TABLET, FILM COATED ORAL at 08:01

## 2022-09-27 RX ADMIN — TRAVOPROST 1 DROP: 0.04 SOLUTION OPHTHALMIC at 22:05

## 2022-09-27 RX ADMIN — METOPROLOL TARTRATE 25 MG: 25 TABLET, FILM COATED ORAL at 22:06

## 2022-09-27 NOTE — CASE MANAGEMENT
Case Management Discharge Planning Note    Patient name Sophy Pan  [place] [address] /E5 MS [id number]-* MRN [id number]  : 1955 Date 2022       Current Admission Date: 2022  Current Admission Diagnosis:Strangulated hernia of abdominal wall   Patient Active Problem List    Diagnosis Date Noted    Pulmonary embolism ([facility] ) 2022    Anemia 2022    Strangulated hernia of abdominal wall 2022    COVID-19 2022    Sepsis ([facility] ) 2021    Right foot pain 2019    Class 3 severe obesity due to excess calories with serious comorbidity and body mass index (BMI) of 50 0 to 59 9 in adult [facility]) 2019    Lymphedema of both lower extremities 2019    CKD (chronic kidney disease), stage III ([facility] ) 2018    HDL deficiency 2018    Gout 2018    DELIA (obstructive sleep apnea) 2018    Allergic rhinitis 2018    Essential hypertension 2018    Kidney stones 2018      LOS (days): 13  Geometric Mean LOS (GMLOS) (days): 9 60  Days to GMLOS:-2 9     OBJECTIVE:  Risk of Unplanned Readmission Score: 13 62         Current admission status: Inpatient   Preferred Pharmacy:   [facility] , [address] [place] [address]  Phone: [phone] Fax: [phone]    Primary Care Provider: Katia Soto MD    Primary Insurance: MEDICARE  Secondary Insurance: [employer]    DISCHARGE DETAILS:       Treatment Team Recommendation: Home with  [address]  Discharge Destination Plan[de-identified] Home with [place] at Discharge : Family        IMM Given (Date):: 22  IMM Given to[de-identified] Patient       IMM reviewed with patient, patient agrees with discharge determination  Wound Vac (#[id number]) approved & in patients room  Patient states that he can pay for either Eliquis or Xarelto  [facility] has Xarelto coupon (free for up to 30 days)-patient can use   Patient to follow up with PCP/specialist after that  [facility] accepted patient and reserved in [place]  Per surgery, they will switch patient to his home wound vac  Patient is not medically stable for discharge at this time  Anticipated d/c in 24-48hrs

## 2022-09-27 NOTE — OCCUPATIONAL THERAPY NOTE
Occupational Therapy Progress Note     Patient Name: Sophy Pan  BBABN'D Date: 9/27/2022  Problem List  Principal Problem:    Strangulated hernia of abdominal wall  Active Problems:    Essential hypertension    CKD (chronic kidney disease), stage III (HCC)    DELIA (obstructive sleep apnea)    Class 3 severe obesity due to excess calories with serious comorbidity and body mass index (BMI) of 50 0 to 59 9 in adult [facility])    Sepsis ([facility] )    Anemia    P[facility] embolism ([facility] )            09/27/22 1535   OT Last Visit   OT Visit Date 09/27/22   Note Type   Note Type Treatment   Restrictions/Precautions   Weight Bearing Precautions Per Order No   Other Precautions Multiple lines; Fall Risk  (wound vac)   General   Response to Previous Treatment Patient with no complaints from previous session   Pain Assessment   Pain Assessment Tool 0-10   Pain Score No Pain   ADL   Where Assessed Chair   Grooming Assistance 5  Supervision/Setup   Grooming Deficit Setup;Supervision/safety; Increased time to complete   UB Dressing Assistance 5  Supervision/Setup   UB Dressing Deficit Setup;Supervision/safety; Increased time to complete   LB Dressing Assistance 2  Maximal Assistance   LB Dressing Deficit Setup;Supervision/safety;Verbal cueing; Increased time to complete; Don/doff R sock; Don/doff L sock   Toileting Assistance  2  Maximal Assistance   Toileting Deficit Setup;Verbal cueing;Supervison/safety; Increased time to complete;Perineal hygiene   Functional Standing Tolerance   Time 8 mins   Activity Dynamic standing balance activity   Comments No LOBs noted   Bed Mobility   Supine to Sit Unable to assess   Sit to Supine Unable to assess   Additional Comments Pt seated OOB in chair at end of session  Call bell and phone within reach  All needs met and pt reports no further questions for OT at this time  Transfers   Sit to Stand 6  Modified independent   Additional items Armrests; Increased time required   Stand to Sit 6  Modified independent   Additional items Armrests   Toilet transfer 6  Modified independent   Additional items Increased time required;Standard toilet  (grab bar use)   Functional Mobility   Functional Mobility 5  Supervision   Additional Comments Assist x1 w/ use of RW   Additional items Rolling walker   Cognition   Overall Cognitive Status WFL   Arousal/Participation Alert; Cooperative   Attention Within functional limits   Orientation Level Oriented X4   Memory Within functional limits   Following Commands Follows all commands and directions without difficulty   Activity Tolerance   Activity Tolerance Patient tolerated treatment well   Medical Staff Made Aware Hallie, RN   Assessment   Assessment Pt seen for OT treatment session focusing on functional activity tolerance, bed mobility, ADLs, and functional transfers/mobility  Pt alert and cooperative throughout session  Pt seated OOB in chair at start of session  Transfers (sit<>stand) completed at a Mod I level w/ increased time required to achieve sit>stand transition  Pt w/ good carryover of hand placement for transitions from previous sessions  Supervision required for functional mobility w/ increased dynamic standing tolerance demonstrated  Toileting tasks completed w/ Max A with assist for posterior hygiene 2* limited reach and increased body habitus  Pt reports purchased toilet wand for increased independence w/ toileting tasks in home environment  UB dressing and grooming tasks completed w/ Supervision w/ setup required and increased time to complete  LB dressing completed w/ Max A 2* limited functional reach and increased LE swelling  Pt believes he has a sock aid at home for increased independence w/ LB ADLs- reports will check with spouse  Pt reports spouse also able to assist w/ LB ADLs upon return home  Pt seated OOB in chair at end of session  Call bell and phone within reach  All needs met and pt reports no further questions for OT at this time   Continue to recommend Home OT when medically cleared  Decrease POC to 2-3x/wk  OT to follow pt on caseload  Plan   Treatment Interventions ADL retraining;Functional transfer training;UE strengthening/ROM; Endurance training;Patient/family training;Equipment evaluation/education; Compensatory technique education; Energy conservation; Activityengagement   Goal Expiration Date 10/03/22   OT Treatment Day 4   OT Frequency 2-3x/wk   Recommendation   OT Discharge Recommendation Home with home health rehabilitation   Additional Comments  The patient's raw score on the AM-PAC Daily Activity inpatient short form is 16, standardized score is 35 96, less than 39 4  Patients at this level are likely to benefit from discharge to post-acute rehabilitation services  Please refer to the recommendation of the Occupational Therapist for safe discharge planning     AM-PAC Daily Activity Inpatient   Lower Body Dressing 2   Bathing 2   Toileting 2   Upper Body Dressing 3   Grooming 3   Eating 4   Daily Activity Raw Score 16   Daily Activity Standardized Score (Calc for Raw Score >=11) 35 96   AM-PAC Applied Cognition Inpatient   Following a Speech/Presentation 4   Understanding Ordinary Conversation 4   Taking Medications 4   Remembering Where Things Are Placed or Put Away 4   Remembering List of 4-5 Errands 4   Taking Care of Complicated Tasks 4   Applied Cognition Raw Score 24   Applied Cognition Standardized Score 62 21       Kameron Gay, OTR/L

## 2022-09-27 NOTE — PROGRESS NOTES
[address] [place] [address]  Progress Note - Phil Moyer 1955, 78 y o  male MRN: [id number]  Unit/Bed#: E5 -01 Encounter: [id number]  Primary Care Provider: Mirtha Lopez MD   Date and time admitted to hospital: 9/14/2022 11:29 AM    Pulmonary embolism ([facility] )  Assessment & Plan  · Patient with sedentary lifestyle, obesity  · CT revealed right greater than left filling defect, no RV strain  · No history of DVT or PE  · Previously during this admission started on Eliquis; now on Xarelto 15 mg b i d  · Would recommend b i d  dosing through 10/18; then can transition to 20 mg daily pending renal function    Anemia  Assessment & Plan  · Hemoglobin 9  · Monitor H&H    Sepsis ([facility] )  Assessment & Plan  · Due to strangulated ventral hernia and right lower quadrant abscess at rectal muscle  · And ex lap with hernia repair  · Tissue culture demonstrated few colonies of Klebsiella pneumonia, E coli, Bacteroides  · Repeat cultures negative thus far  · Infectious disease on board  · Continuing to monitor off antibiotics  · 9/27-concern regarding increase in white count to 18 11; id signed off and recommend no further antibiotics  Additionally, patient tachycardic; 2/4 sirs criteria  Blood cultures negative at 5 days  Repeat procalcitonin; consider lactic acid moving forward  Class 3 severe obesity due to excess calories with serious comorbidity and body mass index (BMI) of 50 0 to 59 9 in adult [facility])  Assessment & Plan  - pt with BMI of Body mass index is 45 38 kg/m²    - pt counseled on risks associated with obesity and it's contribution to other health issues  - advise portion control, healthy food choices, drinking water instead of juice/soda  - advise beginning light exercise program (i e  Walking 30min 4-5x/wk)  - advise keeping food diary to help identify problem foods/times/stressors  - pt advised that weight loss of ~ 1lb/wk is a good goal and not to become frustrated if loss is slower      DELIA (obstructive sleep apnea)  Assessment & Plan  · Referral to sleep medicine at discharge    CKD (chronic kidney disease), stage III [facility])  Assessment & Plan  Lab Results   Component Value Date    EGFR 57 09/27/2022    EGFR 81 09/24/2022    EGFR 53 09/23/2022    CREATININE 1 28 09/27/2022    CREATININE 0 97 09/24/2022    CREATININE 1 37 (H) 09/23/2022   · Baseline creatinine 1 4-1 7  · Creatinine improved  · Monitor renal function    Essential hypertension  Assessment & Plan  · Continue metoprolol 25 mg b i d , furosemide 20 mg daily    * Strangulated hernia of abdominal wall  Assessment & Plan  · 9/14 status post exploritory laparotomy, complex lysis of adhesions, reduction of strangulated hernia, debridement on the right lower quadrant muscle with counter incision, abthera placement x2  · 9/15 right lower quadrant hernia status post repair with mesh and wound VAC placement and anterior abdominal wall repair with mesh and retention sutures  · Pain management  · Further management as per primary team      VTE Pharmacologic Prophylaxis:   Pharmacologic: Rivaroxaban (Xarelto)  Mechanical VTE Prophylaxis in Place: Yes    Patient Centered Rounds: I have performed bedside rounds with nursing staff today  Discussions with Specialists or Other Care Team Provider:  Surgery    Education and Discussions with Family / Patient: Discussed treatment plan with family and patient who agree with current plan; encouraged to ask questions and participate  Time Spent for Care: 45 minutes  More than 50% of total time spent on counseling and coordination of care as described above      Current Length of Stay: 13 day(s)    Current Patient Status: Inpatient   Certification Statement: The patient will continue to require additional inpatient hospital stay due to Treatment of ventral hernia    Discharge Plan:  As per primary team    Code Status: Level 1 - Full Code      Subjective:   Patient seen examined bedside, no acute distress or discomfort noted  Sub concerns regarding white count; however procalcitonin negative and patient being followed by primary team   Consider empiric antibiotics if becomes afebrile overnight  Patient noted to have some rectal bleeding as per primary team; defer workup to surgery  Wife at bedside concern regarding patient's lower extremity edema  Agree that it is increased and patient currently with compression stockings  Have requested patient elevate his feet and will consider more aggressive compression  Consider increasing diuresis tomorrow as well pending improvement in renal function  Objective:     Vitals:   Temp (24hrs), Av 5 °F (36 9 °C), Min:98 5 °F (36 9 °C), Max:98 5 °F (36 9 °C)    Temp:  [98 5 °F (36 9 °C)] 98 5 °F (36 9 °C)  HR:  [] 93  Resp:  [19] 19  BP: (107-137)/(52-74) 120/70  SpO2:  [93 %-98 %] 98 %  Body mass index is 45 38 kg/m²  Input and Output Summary (last 24 hours): Intake/Output Summary (Last 24 hours) at 2022 1705  Last data filed at 2022 0443  Gross per 24 hour   Intake --   Output 100 ml   Net -100 ml       Physical Exam:     Physical Exam  Vitals and nursing note reviewed  Constitutional:       Appearance: He is well-developed  He is obese  HENT:      Head: Normocephalic and atraumatic  Eyes:      Conjunctiva/sclera: Conjunctivae normal    Cardiovascular:      Rate and Rhythm: Normal rate  Heart sounds: No murmur heard  Pulmonary:      Effort: Pulmonary effort is normal  No respiratory distress  Abdominal:      Tenderness: There is no abdominal tenderness  Comments: Wound VAC in place over surgical incision   Musculoskeletal:      Cervical back: Neck supple  Right lower leg: Edema present  Left lower leg: Edema present  Skin:     General: Skin is warm and dry  Neurological:      Mental Status: He is alert and oriented to person, place, and time     Psychiatric:         Behavior: Behavior normal  Additional Data:     Labs:    Results from last 7 days   Lab Units 09/27/22  0906   WBC Thousand/uL 18 11*   HEMOGLOBIN g/dL 10 8*   HEMATOCRIT % 34 7*   PLATELETS Thousands/uL 569*   BANDS PCT % 8   LYMPHO PCT % 8*   MONO PCT % 3*   EOS PCT % 1     Results from last 7 days   Lab Units 09/27/22  0906 09/24/22  0440 09/23/22  0450   SODIUM mmol/L 138   < > 137   POTASSIUM mmol/L 3 9   < > 3 5   CHLORIDE mmol/L 103   < > 104   CO2 mmol/L 28   < > 28   BUN mg/dL 14   < > 16   CREATININE mg/dL 1 28   < > 1 37*   ANION GAP mmol/L 7   < > 5   CALCIUM mg/dL 8 5   < > 8 4   ALBUMIN g/dL  --   --  1 8*   TOTAL BILIRUBIN mg/dL  --   --  0 40   ALK PHOS U/L  --   --  56   ALT U/L  --   --  <6*   AST U/L  --   --  17   GLUCOSE RANDOM mg/dL 121   < > 119    < > = values in this interval not displayed  Results from last 7 days   Lab Units 09/24/22  0720 09/23/22  2128 09/23/22  1544 09/23/22  1047 09/23/22  0721 09/22/22  1601 09/22/22  1105 09/22/22  0719 09/21/22  1457 09/21/22  1132 09/21/22  0713   POC GLUCOSE mg/dl 114 139 136 139 113 146* 133 143* 122 126 114         Results from last 7 days   Lab Units 09/27/22  0906 09/22/22  0458 09/21/22  1258   LACTIC ACID mmol/L  --   --  1 2   PROCALCITONIN ng/ml 0 20 0 72*  --            * I Have Reviewed All Lab Data Listed Above  * Additional Pertinent Lab Tests Reviewed: All Labs Within Last 24 Hours Reviewed    Imaging:    Imaging Reports Reviewed Today Include:   Imaging Personally Reviewed by Myself Includes:      Recent Cultures (last 7 days):     Results from last 7 days   Lab Units 09/21/22  1654 09/21/22  1118   BLOOD CULTURE  No Growth After 5 Days  No Growth After 5 Days    --    C DIFF TOXIN B BY PCR   --  Negative       Last 24 Hours Medication List:   Current Facility-Administered Medications   Medication Dose Route Frequency Provider Last Rate    acetaminophen  650 mg Oral Q6H PRN Lexis Bottoms, MD      allopurinol  100 mg Oral Daily Jaycob Drew Javid Tejeda MD      furosemide  20 mg Oral Daily Neno Connor MD      glycerin-hypromellose-  1 drop Both Eyes Q4H PRN Neno Connor MD      HYDROmorphone  1 mg Intravenous Q4H PRN Neno Connor MD      iohexol  50 mL Oral Once in imaging Neno Connor MD      metoprolol tartrate  25 mg Oral Q12H [address] Neno Connor MD      pantoprazole  40 mg Oral Early Morning Neno Connor MD      rivaroxaban  15 mg Oral BID With Meals Hardik Sanchez MD      tamsulosin  0 4 mg Oral Daily With Naz Sanchez MD      travoprost  1 drop Both Eyes HS Neno Connor MD          Today, Patient Was Seen By: Sam Case MD    ** Please Note: Dictation voice to text software may have been used in the creation of this document   **

## 2022-09-27 NOTE — ASSESSMENT & PLAN NOTE
· Due to strangulated ventral hernia and right lower quadrant abscess at rectal muscle  · And ex lap with hernia repair  · Tissue culture demonstrated few colonies of Klebsiella pneumonia, E coli, Bacteroides  · Repeat cultures negative thus far  · Infectious disease on board  · Continuing to monitor off antibiotics  · 9/27-concern regarding increase in white count to 18 11; id signed off and recommend no further antibiotics  Additionally, patient tachycardic; 2/4 sirs criteria  Blood cultures negative at 5 days  Repeat procalcitonin; consider lactic acid moving forward

## 2022-09-27 NOTE — QUICK NOTE
Updated patient's wife at bedside regarding plan to rule out C  Diff with stool studies, and monitor WBC  Patient is eager to go home as soon as possible  Patient is surgically doing well, will continue to monitor WBC for downtrend, and follow up stool studies

## 2022-09-27 NOTE — ASSESSMENT & PLAN NOTE
Lab Results   Component Value Date    EGFR 57 09/27/2022    EGFR 81 09/24/2022    EGFR 53 09/23/2022    CREATININE 1 28 09/27/2022    CREATININE 0 97 09/24/2022    CREATININE 1 37 (H) 09/23/2022   · Baseline creatinine 1 4-1 7  · Creatinine improved  · Monitor renal function

## 2022-09-27 NOTE — ASSESSMENT & PLAN NOTE
- pt with BMI of Body mass index is 45 38 kg/m²    - pt counseled on risks associated with obesity and it's contribution to other health issues  - advise portion control, healthy food choices, drinking water instead of juice/soda  - advise beginning light exercise program (i e  Walking 30min 4-5x/wk)  - advise keeping food diary to help identify problem foods/times/stressors  - pt advised that weight loss of ~ 1lb/wk is a good goal and not to become frustrated if loss is slower

## 2022-09-27 NOTE — PLAN OF CARE
Problem: OCCUPATIONAL THERAPY ADULT  Goal: Performs self-care activities at highest level of function for planned discharge setting  See evaluation for individualized goals  Description: Treatment Interventions: ADL retraining, Functional transfer training, UE strengthening/ROM, Endurance training, Patient/family training, Equipment evaluation/education, Compensatory technique education, Continued evaluation, Activityengagement          See flowsheet documentation for full assessment, interventions and recommendations  Outcome: Progressing  Note: Limitation: Decreased ADL status, Decreased UE ROM, Decreased endurance, Decreased self-care trans, Decreased high-level ADLs  Prognosis: Good  Assessment: Pt seen for OT treatment session focusing on functional activity tolerance, bed mobility, ADLs, and functional transfers/mobility  Pt alert and cooperative throughout session  Pt seated OOB in chair at start of session  Transfers (sit<>stand) completed at a Mod I level w/ increased time required to achieve sit>stand transition  Pt w/ good carryover of hand placement for transitions from previous sessions  Supervision required for functional mobility w/ increased dynamic standing tolerance demonstrated  Toileting tasks completed w/ Max A with assist for posterior hygiene 2* limited reach and increased body habitus  Pt reports purchased toilet wand for increased independence w/ toileting tasks in home environment  UB dressing and grooming tasks completed w/ Supervision w/ setup required and increased time to complete  LB dressing completed w/ Max A 2* limited functional reach and increased LE swelling  Pt believes he has a sock aid at home for increased independence w/ LB ADLs- reports will check with spouse  Pt reports spouse also able to assist w/ LB ADLs upon return home  Pt seated OOB in chair at end of session  Call bell and phone within reach   All needs met and pt reports no further questions for OT at this time  Continue to recommend Home OT when medically cleared  Decrease POC to 2-3x/wk  OT to follow pt on caseload       OT Discharge Recommendation: Home with home health rehabilitation

## 2022-09-27 NOTE — ASSESSMENT & PLAN NOTE
· Patient with sedentary lifestyle, obesity  · CT revealed right greater than left filling defect, no RV strain  · No history of DVT or PE  · Previously during this admission started on Eliquis; now on Xarelto 15 mg b i d    · Would recommend b i d  dosing through 10/18; then can transition to 20 mg daily pending renal function

## 2022-09-27 NOTE — CASE MANAGEMENT
Woundvac order released by [facility]  assigned vac [id number]   CM notified & will deliver to pt

## 2022-09-28 ENCOUNTER — TRANSITIONAL CARE MANAGEMENT (OUTPATIENT)
Dept: FAMILY MEDICINE CLINIC | Facility: CLINIC | Age: 67
End: 2022-09-28

## 2022-09-28 VITALS
HEIGHT: 69 IN | DIASTOLIC BLOOD PRESSURE: 70 MMHG | BODY MASS INDEX: 45.78 KG/M2 | SYSTOLIC BLOOD PRESSURE: 146 MMHG | HEART RATE: 83 BPM | TEMPERATURE: 97.8 F | OXYGEN SATURATION: 99 % | RESPIRATION RATE: 18 BRPM | WEIGHT: 309.08 LBS

## 2022-09-28 PROBLEM — A41.9 SEPSIS (HCC): Status: RESOLVED | Noted: 2021-07-12 | Resolved: 2022-09-28

## 2022-09-28 LAB
ANION GAP SERPL CALCULATED.3IONS-SCNC: 8 MMOL/L (ref 4–13)
BUN SERPL-MCNC: 11 MG/DL (ref 5–25)
C DIFF TOX GENS STL QL NAA+PROBE: NEGATIVE
CALCIUM SERPL-MCNC: 8.4 MG/DL (ref 8.3–10.1)
CAMPYLOBACTER DNA SPEC NAA+PROBE: NORMAL
CHLORIDE SERPL-SCNC: 105 MMOL/L (ref 96–108)
CO2 SERPL-SCNC: 26 MMOL/L (ref 21–32)
CREAT SERPL-MCNC: 1.16 MG/DL (ref 0.6–1.3)
ERYTHROCYTE [DISTWIDTH] IN BLOOD BY AUTOMATED COUNT: 15.2 % (ref 11.6–15.1)
GFR SERPL CREATININE-BSD FRML MDRD: 65 ML/MIN/1.73SQ M
GLUCOSE SERPL-MCNC: 103 MG/DL (ref 65–140)
HCT VFR BLD AUTO: 28.9 % (ref 36.5–49.3)
HGB BLD-MCNC: 8.9 G/DL (ref 12–17)
MCH RBC QN AUTO: 30.2 PG (ref 26.8–34.3)
MCHC RBC AUTO-ENTMCNC: 30.8 G/DL (ref 31.4–37.4)
MCV RBC AUTO: 98 FL (ref 82–98)
NRBC BLD AUTO-RTO: 0 /100 WBCS
PLATELET # BLD AUTO: 510 THOUSANDS/UL (ref 149–390)
PMV BLD AUTO: 8.9 FL (ref 8.9–12.7)
POTASSIUM SERPL-SCNC: 3.6 MMOL/L (ref 3.5–5.3)
RBC # BLD AUTO: 2.95 MILLION/UL (ref 3.88–5.62)
SALMONELLA DNA SPEC QL NAA+PROBE: NORMAL
SHIGA TOXIN STX GENE SPEC NAA+PROBE: NORMAL
SHIGELLA DNA SPEC QL NAA+PROBE: NORMAL
SODIUM SERPL-SCNC: 139 MMOL/L (ref 135–147)
WBC # BLD AUTO: 11.03 THOUSAND/UL (ref 4.31–10.16)

## 2022-09-28 PROCEDURE — NC001 PR NO CHARGE: Performed by: SURGERY

## 2022-09-28 PROCEDURE — NC001 PR NO CHARGE: Performed by: PHYSICAL MEDICINE & REHABILITATION

## 2022-09-28 PROCEDURE — 85025 COMPLETE CBC W/AUTO DIFF WBC: CPT | Performed by: SURGERY

## 2022-09-28 PROCEDURE — 80048 BASIC METABOLIC PNL TOTAL CA: CPT | Performed by: SURGERY

## 2022-09-28 PROCEDURE — 97605 NEG PRS WND THER DME<=50SQCM: CPT | Performed by: SURGERY

## 2022-09-28 PROCEDURE — 99024 POSTOP FOLLOW-UP VISIT: CPT | Performed by: SURGERY

## 2022-09-28 RX ADMIN — FUROSEMIDE 20 MG: 20 TABLET ORAL at 08:13

## 2022-09-28 RX ADMIN — PANTOPRAZOLE SODIUM 40 MG: 40 TABLET, DELAYED RELEASE ORAL at 06:38

## 2022-09-28 RX ADMIN — RIVAROXABAN 15 MG: 15 TABLET, FILM COATED ORAL at 08:13

## 2022-09-28 RX ADMIN — METOPROLOL TARTRATE 25 MG: 25 TABLET, FILM COATED ORAL at 08:13

## 2022-09-28 RX ADMIN — ALLOPURINOL 100 MG: 100 TABLET ORAL at 08:13

## 2022-09-28 NOTE — DISCHARGE SUMMARY
Discharge Summary - Chula Edwards 78 y o  male MRN: [id number]    Unit/Bed#: E5 -01 Encounter: [id number]    Admission Date:   Admission Orders (From admission, onward)     Ordered        09/14/22 2053  Inpatient Admission  Once                        Admitting Diagnosis: Fever [R50 9]  Strangulated hernia of abdominal wall [K43 6]      HPI:  Chula Edwards is a 78 y o  male with pmhx of morbid obesity, hypertension, CKD, left RCC status post cryoablation, and a large chronically incarcerated incisional hernia at the site of open remote appendectomy who presented with 5 days of progressive fever and malaise as well as progressive bloating  Additionally he reports that over the past 9 months he has had chronic diarrhea are and that since the beginning of his symptoms are he has are only been having small volume watery bowel movements  His wife does report that she has noticed some increased redness over the site of the hernia  He denies nausea and vomiting, chest pain, he shortness of breath, urinary symptoms  This hernia has been present for many years and based on counseling from his PCP history does not to undergo repair  The hernia has been progressively enlarging but he denies any episodes of similar symptoms  Procedures Performed:   Orders Placed This Encounter   Procedures   [facility]       9/14 ex lap, reduction of strangulated ventral hernia, air debridement of right abdominal wall musculature, ABThera vac placement  9/15 ex lap, out right lower quadrant hernia repair with mesh and VAC placement, complex anterior abdominal wall repair w/ mesh and retention sutures, LANETTE      Summary of Hospital Course: The patient was admitted to the surgery service on 09/14  He was taken emergently to the operating room for exploratory laparotomy and reduction of hernia  He was left open with an ABThera VAC placed in the ICU overnight for further resuscitation    He was taken to the operating room the next day for definitive closure  A wound VAC was placed in the right lower quadrant  A mesh was placed in the midline  Retention sutures were used in the midline  He was monitored postoperatively in the ICU  NGT was kept until minimal output and NGT placement  SLP was consulted to assist in evaluation of diet advancement/ concerns for dysphagia  A PE study was obtained on 9/19 due to concerns for fevers and increased WBC  B/l subsegmental PEs were found (see below)   He was started on heparin  Wound care was continued with VAC changes on MWF  ID was consulted for the persistent elevation in WBC/fevers  It was thought that his fevers were drug induced given negative fever workup  ABX were stopped  He was transitioned to PO AC on d/c  He worked with PT/OT  He was cleared for home d/c with home services  He was stable for d/c on 9/28  Significant Findings, Care, Treatment and Services Provided:     CT abdomen pelvis w contrast   Final Result by Raya Dotson MD (09/21 6070)   1  No evidence of enteric contrast extravasation to indicate bowel leak  2   Stable postoperative change after right lateral abdominal wall hernia repair with continued dehiscent subcutaneous wound and soft tissue edema/blood products after intervention  No evidence of active extravasation  3   Cholelithiasis  I personally discussed this study with Siena Haley on 9/21/2022 at 2:42 PM                Workstation performed: [id number]         CT pe study w abdomen pelvis w contrast   Final Result by Raya Dotson MD (09/19 6056)   1  Right greater than left filling defects consistent with PE   Measured RV/LV ratio is within normal limits at less than 0 9      2   Status post right lateral abdominal wall repair without hernia recurrence  Omental flap with postoperative subcutaneous serous fluid and likely some blood products but no evidence of extravasation or suspected abscess collection    Dehiscent wound    with wound VAC noted  No residual bowel obstruction although mild small bowel loop distention likely reflects postoperative ileus  3   Cholelithiasis  4   Right nonobstructive nephrolithiasis  Left renal lesion containing fat again likely reflects AML  Refer to prior CT study for follow-up recommendations  I personally discussed this study with Chen Edward on 9/19/2022 at 4:00 PM                                  Workstation performed: [id number]         XR chest portable   Final Result by Paige Gusman MD (09/19 1022)      No acute cardiopulmonary disease  Workstation performed: [id number]         XR chest portable ICU   Final Result by Kaden Grissom MD (09/17 1004)      Subsegmental atelectasis in both lower lobes  Workstation performed: [id number]         STAT CXR ICU   Final Result by Griselda Win MD (09/15 4469)      Right upper lobe infiltrate/atelectasis  Prominent right hilar, paramediastinal masslike density  CT chest for further evaluation recommended  Workstation performed: [id number]         CT abdomen pelvis with contrast   Final Result by Paige Gusman MD (09/14 2514)      1  Findings concerning for high-grade small bowel obstruction within a large right lateral abdominal wall hernia with transition point in the distal ileum as it exits the hernia sac  2   Additional, more upstream short segment small bowel obstruction with likely 2 transition points as the upstream and downstream segments of bowel enter and exit the hernia, suggesting closed-loop obstruction  3  Unchanged 3 4 cm fat-containing left renal lesion, suggesting an angiomyolipoma  Right hepatic lobe hypodense lesion measuring 1 4 cm, not significantly changed since 7/12/2021, but not seen in 2009    Both of these lesions can be further evaluated    with nonemergent MRI abdomen with and without contrast        I personally discussed this study with DEANNAIRSI FU on 9/14/2022 at 2:24 PM                Workstation performed: [id number]             Complications: none    Discharge Diagnosis: strangulated ventral hernia s/p complex abdominal repair as above  Medical Problems             Resolved Problems  Date Reviewed: 9/26/2022          Resolved    Sepsis ([facility] ) 9/28/2022     Resolved by  Evelyn Hollins PA-C    Respiratory insufficiency 9/17/2022     Resolved by  Lexis Almeida MD    Acute kidney injury ([facility] ) 9/17/2022     Resolved by  Lexis Almeida MD                Condition at Discharge: good         Discharge instructions/Information to patient and family:   See after visit summary for information provided to patient and family  Provisions for Follow-Up Care:  See after visit summary for information related to follow-up care and any pertinent home health orders  PCP: Pamela Gould MD    Disposition: Home    Planned Readmission: No      Discharge Statement   I spent 10 minutes discharging the patient  This time was spent on the day of discharge  I had direct contact with the patient on the day of discharge  Additional documentation is required if more than 30 minutes were spent on discharge  Discharge Medications:  See after visit summary for reconciled discharge medications provided to patient and family

## 2022-09-28 NOTE — CASE MANAGEMENT
Case Management Discharge Planning Note    Patient name Phyllis Bravo  Location [address] [place] [address] [address] 537/[address] [place] 9356 [id number]-* MRN [id number]  : 1955 Date 2022       Current Admission Date: 2022  Current Admission Diagnosis:Strangulated hernia of abdominal wall   Patient Active Problem List    Diagnosis Date Noted    Pulmonary embolism ([facility] ) 2022    Anemia 2022    Strangulated hernia of abdominal wall 2022    COVID-19 2022    Right foot pain 2019    Class 3 severe obesity due to excess calories with serious comorbidity and body mass index (BMI) of 50 0 to 59 9 in adult [facility]) 2019    Lymphedema of both lower extremities 2019    CKD (chronic kidney disease), stage III ([facility] ) 2018    HDL deficiency 2018    Gout 2018    DELIA (obstructive sleep apnea) 2018    Allergic rhinitis 2018    Essential hypertension 2018    Kidney stones 2018      LOS (days): 14  Geometric Mean LOS (GMLOS) (days): 9 60  Days to GMLOS:-4     OBJECTIVE:  Risk of Unplanned Readmission Score: 15 16         Current admission status: Inpatient   Preferred Pharmacy:   [address] [place] [address]  Phone: [phone] Fax: [phone]    Primary Care Provider: Alice Peck MD    Primary Insurance: MEDICARE  Secondary Insurance: [employer]    DISCHARGE DETAILS:     Patient written for discharge  CM verified that medication (Eliquis) is at [address] and would be $125 at pharmacy  CM made patient aware and coupon given to patient for medication  Patient states his wife can take him home  [facility] made aware of discharge and patient has wound vac

## 2022-09-28 NOTE — TELEPHONE ENCOUNTER
Patient has been discharged today 9/28  Patient's wife called to schedule an appt per Laurie Drain  He is scheduled for 10/3/22

## 2022-09-28 NOTE — ASSESSMENT & PLAN NOTE
Lab Results   Component Value Date    EGFR 65 09/28/2022    EGFR 57 09/27/2022    EGFR 81 09/24/2022    CREATININE 1 16 09/28/2022    CREATININE 1 28 09/27/2022    CREATININE 0 97 09/24/2022   · Baseline creatinine 1 4-1 7  · Creatinine improved  · Monitor renal function

## 2022-09-28 NOTE — CONSULTS
Per chart, plan is to discharge home with [facility]  Will sign off at this time      Lucien Flores MD  Physical Medicine and Rehabilitation

## 2022-09-28 NOTE — PLAN OF CARE
Problem: PAIN - ADULT  Goal: Verbalizes/displays adequate comfort level or baseline comfort level  Description: Interventions:  - Encourage patient to monitor pain and request assistance  - Assess pain using appropriate pain scale  - Administer analgesics based on type and severity of pain and evaluate response  - Implement non-pharmacological measures as appropriate and evaluate response  - Consider cultural and social influences on pain and pain management  - Notify physician/advanced practitioner if interventions unsuccessful or patient reports new pain  Outcome: Progressing     Problem: SAFETY ADULT  Goal: Maintain or return to baseline ADL function  Description: INTERVENTIONS:  -  Assess patient's ability to carry out ADLs; assess patient's baseline for ADL function and identify physical deficits which impact ability to perform ADLs (bathing, care of mouth/teeth, toileting, grooming, dressing, etc )  - Assess/evaluate cause of self-care deficits   - Assess range of motion  - Assess patient's mobility; develop plan if impaired  - Assess patient's need for assistive devices and provide as appropriate  - Encourage maximum independence but intervene and supervise when necessary  - Involve family in performance of ADLs  - Assess for home care needs following discharge   - Consider OT consult to assist with ADL evaluation and planning for discharge  - Provide patient education as appropriate  Outcome: Progressing     Problem: DISCHARGE PLANNING  Goal: Discharge to home or other facility with appropriate resources  Description: INTERVENTIONS:  - Identify barriers to discharge w/patient and caregiver  - Arrange for needed discharge resources and transportation as appropriate  - Identify discharge learning needs (meds, wound care, etc )  - Arrange for interpretive services to assist at discharge as needed  - Refer to Case Management Department for coordinating discharge planning if the patient needs post-hospital services based on physician/advanced practitioner order or complex needs related to functional status, cognitive ability, or social support system  Outcome: Progressing     Problem: Knowledge Deficit  Goal: Patient/family/caregiver demonstrates understanding of disease process, treatment plan, medications, and discharge instructions  Description: Complete learning assessment and assess knowledge base    Interventions:  - Provide teaching at level of understanding  - Provide teaching via preferred learning methods  Outcome: Progressing     Problem: MOBILITY - ADULT  Goal: Maintain or return to baseline ADL function  Description: INTERVENTIONS:  -  Assess patient's ability to carry out ADLs; assess patient's baseline for ADL function and identify physical deficits which impact ability to perform ADLs (bathing, care of mouth/teeth, toileting, grooming, dressing, etc )  - Assess/evaluate cause of self-care deficits   - Assess range of motion  - Assess patient's mobility; develop plan if impaired  - Assess patient's need for assistive devices and provide as appropriate  - Encourage maximum independence but intervene and supervise when necessary  - Involve family in performance of ADLs  - Assess for home care needs following discharge   - Consider OT consult to assist with ADL evaluation and planning for discharge  - Provide patient education as appropriate  Outcome: Progressing     Problem: Prexisting or High Potential for Compromised Skin Integrity  Goal: Skin integrity is maintained or improved  Description: INTERVENTIONS:  - Identify patients at risk for skin breakdown  - Assess and monitor skin integrity  - Assess and monitor nutrition and hydration status  - Monitor labs   - Assess for incontinence   - Turn and reposition patient  - Assist with mobility/ambulation  - Relieve pressure over bony prominences  - Avoid friction and shearing  - Provide appropriate hygiene as needed including keeping skin clean and dry  - Evaluate need for skin moisturizer/barrier cream  - Collaborate with interdisciplinary team   - Patient/family teaching  - Consider wound care consult   Outcome: Progressing     Problem: Nutrition/Hydration-ADULT  Goal: Nutrient/Hydration intake appropriate for improving, restoring or maintaining nutritional needs  Description: Monitor and assess patient's nutrition/hydration status for malnutrition  Collaborate with interdisciplinary team and initiate plan and interventions as ordered  Monitor patient's weight and dietary intake as ordered or per policy  Utilize nutrition screening tool and intervene as necessary  Determine patient's food preferences and provide high-protein, high-caloric foods as appropriate       INTERVENTIONS:  - Monitor oral intake, urinary output, labs, and treatment plans  - Assess nutrition and hydration status and recommend course of action  - Evaluate amount of meals eaten  - Assist patient with eating if necessary   - Allow adequate time for meals  - Recommend/ encourage appropriate diets, oral nutritional supplements, and vitamin/mineral supplements  - Order, calculate, and assess calorie counts as needed  - Recommend, monitor, and adjust tube feedings and TPN/PPN based on assessed needs  - Assess need for intravenous fluids  - Provide specific nutrition/hydration education as appropriate  - Include patient/family/caregiver in decisions related to nutrition  Outcome: Progressing     Problem: GASTROINTESTINAL - ADULT  Goal: Minimal or absence of nausea and/or vomiting  Description: INTERVENTIONS:  - Administer IV fluids if ordered to ensure adequate hydration  - Maintain NPO status until nausea and vomiting are resolved  - Nasogastric tube if ordered  - Administer ordered antiemetic medications as needed  - Provide nonpharmacologic comfort measures as appropriate  - Advance diet as tolerated, if ordered  - Consider nutrition services referral to assist patient with adequate nutrition and appropriate food choices  Outcome: Progressing  Goal: Maintains or returns to baseline bowel function  Description: INTERVENTIONS:  - Assess bowel function  - Encourage oral fluids to ensure adequate hydration  - Administer IV fluids if ordered to ensure adequate hydration  - Administer ordered medications as needed  - Encourage mobilization and activity  - Consider nutritional services referral to assist patient with adequate nutrition and appropriate food choices  Outcome: Progressing

## 2022-09-28 NOTE — PLAN OF CARE
Problem: PAIN - ADULT  Goal: Verbalizes/displays adequate comfort level or baseline comfort level  Description: Interventions:  - Encourage patient to monitor pain and request assistance  - Assess pain using appropriate pain scale  - Administer analgesics based on type and severity of pain and evaluate response  - Implement non-pharmacological measures as appropriate and evaluate response  - Consider cultural and social influences on pain and pain management  - Notify physician/advanced practitioner if interventions unsuccessful or patient reports new pain  Outcome: Progressing     Problem: INFECTION - ADULT  Goal: Absence or prevention of progression during hospitalization  Description: INTERVENTIONS:  - Assess and monitor for signs and symptoms of infection  - Monitor lab/diagnostic results  - Monitor all insertion sites, i e  indwelling lines, tubes, and drains  - Monitor endotracheal if appropriate and nasal secretions for changes in amount and color  - [place] appropriate cooling/warming therapies per order  - Administer medications as ordered  - Instruct and encourage patient and family to use good hand hygiene technique  - Identify and instruct in appropriate isolation precautions for identified infection/condition  Outcome: Progressing     Problem: SAFETY ADULT  Goal: Patient will remain free of falls  Description: INTERVENTIONS:  - Educate patient/family on patient safety including physical limitations  - Instruct patient to call for assistance with activity   - Consult OT/PT to assist with strengthening/mobility   - Keep Call bell within reach  - Keep bed low and locked with side rails adjusted as appropriate  - Keep care items and personal belongings within reach  - Initiate and maintain comfort rounds  - Make Fall Risk Sign visible to staff  - Offer Toileting every 2 Hours, in advance of need  - Apply yellow socks and bracelet for high fall risk patients  - Consider moving patient to room near nurses station  Outcome: Progressing  Goal: Maintain or return to baseline ADL function  Description: INTERVENTIONS:  -  Assess patient's ability to carry out ADLs; assess patient's baseline for ADL function and identify physical deficits which impact ability to perform ADLs (bathing, care of mouth/teeth, toileting, grooming, dressing, etc )  - Assess/evaluate cause of self-care deficits   - Assess range of motion  - Assess patient's mobility; develop plan if impaired  - Assess patient's need for assistive devices and provide as appropriate  - Encourage maximum independence but intervene and supervise when necessary  - Involve family in performance of ADLs  - Assess for home care needs following discharge   - Consider OT consult to assist with ADL evaluation and planning for discharge  - Provide patient education as appropriate  Outcome: Progressing  Goal: Maintains/Returns to pre admission functional level  Description: INTERVENTIONS:  - Perform BMAT or MOVE assessment daily    - Set and communicate daily mobility goal to care team and patient/family/caregiver  - Collaborate with rehabilitation services on mobility goals if consulted  - Perform Range of Motion 3times a day  - Reposition patient every 2 hours    - Dangle patient 3 times a day  - Stand patient 3 times a day  - Ambulate patient 3 times a day  - Out of bed to chair 3 times a day   - Out of bed for meals 3 times a day  - Out of bed for toileting  - Record patient progress and toleration of activity level   Outcome: Progressing     Problem: DISCHARGE PLANNING  Goal: Discharge to home or other facility with appropriate resources  Description: INTERVENTIONS:  - Identify barriers to discharge w/patient and caregiver  - Arrange for needed discharge resources and transportation as appropriate  - Identify discharge learning needs (meds, wound care, etc )  - Arrange for interpretive services to assist at discharge as needed  - Refer to Case Management Department for coordinating discharge planning if the patient needs post-hospital services based on physician/advanced practitioner order or complex needs related to functional status, cognitive ability, or social support system  Outcome: Progressing     Problem: Knowledge Deficit  Goal: Patient/family/caregiver demonstrates understanding of disease process, treatment plan, medications, and discharge instructions  Description: Complete learning assessment and assess knowledge base    Interventions:  - Provide teaching at level of understanding  - Provide teaching via preferred learning methods  Outcome: Progressing     Problem: MOBILITY - ADULT  Goal: Maintain or return to baseline ADL function  Description: INTERVENTIONS:  -  Assess patient's ability to carry out ADLs; assess patient's baseline for ADL function and identify physical deficits which impact ability to perform ADLs (bathing, care of mouth/teeth, toileting, grooming, dressing, etc )  - Assess/evaluate cause of self-care deficits   - Assess range of motion  - Assess patient's mobility; develop plan if impaired  - Assess patient's need for assistive devices and provide as appropriate  - Encourage maximum independence but intervene and supervise when necessary  - Involve family in performance of ADLs  - Assess for home care needs following discharge   - Consider OT consult to assist with ADL evaluation and planning for discharge  - Provide patient education as appropriate  Outcome: Progressing       Problem: Potential for Falls  Goal: Patient will remain free of falls  Description: INTERVENTIONS:  - Educate patient/family on patient safety including physical limitations  - Instruct patient to call for assistance with activity   - Consult OT/PT to assist with strengthening/mobility   - Keep Call bell within reach  - Keep bed low and locked with side rails adjusted as appropriate  - Keep care items and personal belongings within reach  - Initiate and maintain comfort rounds  - Make Fall Risk Sign visible to staff  - Apply yellow socks and bracelet for high fall risk patients  - Consider moving patient to room near nurses station  Outcome: Progressing     Problem: Prexisting or High Potential for Compromised Skin Integrity  Goal: Skin integrity is maintained or improved  Description: INTERVENTIONS:  - Identify patients at risk for skin breakdown  - Assess and monitor skin integrity  - Assess and monitor nutrition and hydration status  - Monitor labs   - Assess for incontinence   - Turn and reposition patient  - Assist with mobility/ambulation  - Relieve pressure over bony prominences  - Avoid friction and shearing  - Provide appropriate hygiene as needed including keeping skin clean and dry  - Evaluate need for skin moisturizer/barrier cream  - Collaborate with interdisciplinary team   - Patient/family teaching  - Consider wound care consult   Outcome: Progressing     Problem: Nutrition/Hydration-ADULT  Goal: Nutrient/Hydration intake appropriate for improving, restoring or maintaining nutritional needs  Description: Monitor and assess patient's nutrition/hydration status for malnutrition  Collaborate with interdisciplinary team and initiate plan and interventions as ordered  Monitor patient's weight and dietary intake as ordered or per policy  Utilize nutrition screening tool and intervene as necessary  Determine patient's food preferences and provide high-protein, high-caloric foods as appropriate       INTERVENTIONS:  - Monitor oral intake, urinary output, labs, and treatment plans  - Assess nutrition and hydration status and recommend course of action  - Evaluate amount of meals eaten  - Assist patient with eating if necessary   - Allow adequate time for meals  - Recommend/ encourage appropriate diets, oral nutritional supplements, and vitamin/mineral supplements  - Order, calculate, and assess calorie counts as needed  - Recommend, monitor, and adjust tube feedings and TPN/PPN based on assessed needs  - Assess need for intravenous fluids  - Provide specific nutrition/hydration education as appropriate  - Include patient/family/caregiver in decisions related to nutrition  Outcome: Progressing     Problem: GASTROINTESTINAL - ADULT  Goal: Minimal or absence of nausea and/or vomiting  Description: INTERVENTIONS:  - Administer IV fluids if ordered to ensure adequate hydration  - Maintain NPO status until nausea and vomiting are resolved  - Nasogastric tube if ordered  - Administer ordered antiemetic medications as needed  - Provide nonpharmacologic comfort measures as appropriate  - Advance diet as tolerated, if ordered  - Consider nutrition services referral to assist patient with adequate nutrition and appropriate food choices  Outcome: Progressing  Goal: Maintains or returns to baseline bowel function  Description: INTERVENTIONS:  - Assess bowel function  - Encourage oral fluids to ensure adequate hydration  - Administer IV fluids if ordered to ensure adequate hydration  - Administer ordered medications as needed  - Encourage mobilization and activity  - Consider nutritional services referral to assist patient with adequate nutrition and appropriate food choices  Outcome: Progressing  Goal: Maintains adequate nutritional intake  Description: INTERVENTIONS:  - Monitor percentage of each meal consumed  - Identify factors contributing to decreased intake, treat as appropriate  - Assist with meals as needed  - Monitor I&O, weight, and lab values if indicated  - Obtain nutrition services referral as needed  Outcome: Progressing  Goal: Oral mucous membranes remain intact  Description: INTERVENTIONS  - Assess oral mucosa and hygiene practices  - Implement preventative oral hygiene regimen  - Implement oral medicated treatments as ordered  - Initiate Nutrition services referral as needed  Outcome: Progressing

## 2022-09-28 NOTE — PROCEDURES
V  A C  Procedure Note  Irineo Colon 78 y o  male MRN: [id number]  Unit/Bed#: E5 -01 Encounter: [id number]     Date: 09/28/22      Time: 1:31 PM      Location of wound: RLQ     Description of wound: clean base, no purulence, good granulation tissue             Sponges removed:  1 black sponge, 1 white sponge     Dimensions of wound:   15 5 cm x 5 5 cm x 5 5 cm     Sponges placed:  1 black, 1 white sponge     VAC settings:  125 mmHg  Continuous     The patient tolerated the procedure well  A VAC sticker was placed to wound dressing, per protocol  VAC hooked up to patient's home vac system        [address] Surgery

## 2022-09-28 NOTE — PROGRESS NOTES
Progress Note - General Surgery  : KEE [facility] Surgery Resident on Lidia Tam 78 y o  male MRN: [id number]  Unit/Bed#: E5 -01 Encounter: [id number]      Assessment:  78 y o  male s/p 9/14 ex-lap, Von, reduction of RLQ hernia, temporary abdominal closure, 9/15 bridging mesh, laparotomy closure, and RLQ vac placement       Plan:  F/u AM WBC   If downtrending, hopefully home today vs tomorrow  Home with RLQ vac, VNA for dressing changes  Home on new-start Xarelto for PE this admission  Regular diet  Encourage up and OOB, ambulation      Subjective: No acute complaints, wants to go home      Objective:     Physical Exam:  GEN: NAD   Ab: Soft, NT/ND, RLQ vac sucked down, midline laparotomy incision stable / CDI  Lung: Normal effort   CV: RRR   Extrem: Significant BLE edema  Neuro: A+Ox3       I/O       09/26 0701  09/27 0700 09/27 0701  09/28 0700    Urine (mL/kg/hr) 400 (0 1)     Drains 100     Total Output 500     Net -500                 Lab, Imaging and other studies: I have personally reviewed pertinent reports    , CBC with diff:   Lab Results   Component Value Date    WBC 18 11 (H) 09/27/2022    HGB 10 8 (L) 09/27/2022    HCT 34 7 (L) 09/27/2022    MCV 99 (H) 09/27/2022     (H) 09/27/2022    MCH 30 7 09/27/2022    MCHC 31 1 (L) 09/27/2022    RDW 14 9 09/27/2022    MPV 8 9 09/27/2022   , BMP/CMP:   Lab Results   Component Value Date    SODIUM 138 09/27/2022    K 3 9 09/27/2022     09/27/2022    CO2 28 09/27/2022    BUN 14 09/27/2022    CREATININE 1 28 09/27/2022    CALCIUM 8 5 09/27/2022    EGFR 57 09/27/2022         VTE Pharmacologic Prophylaxis: Phoebe James MD  9/27/2022 9:21 PM

## 2022-09-28 NOTE — ASSESSMENT & PLAN NOTE
- pt with BMI of Body mass index is 45 64 kg/m²    - pt counseled on risks associated with obesity and it's contribution to other health issues  - advise portion control, healthy food choices, drinking water instead of juice/soda  - advise beginning light exercise program (i e  Walking 30min 4-5x/wk)  - advise keeping food diary to help identify problem foods/times/stressors  - pt advised that weight loss of ~ 1lb/wk is a good goal and not to become frustrated if loss is slower

## 2022-09-29 ENCOUNTER — TELEPHONE (OUTPATIENT)
Dept: FAMILY MEDICINE CLINIC | Facility: CLINIC | Age: 67
End: 2022-09-29

## 2022-09-29 ENCOUNTER — OFFICE VISIT (OUTPATIENT)
Dept: FAMILY MEDICINE CLINIC | Facility: CLINIC | Age: 67
End: 2022-09-29
Payer: MEDICARE

## 2022-09-29 VITALS
HEART RATE: 110 BPM | WEIGHT: 315 LBS | OXYGEN SATURATION: 98 % | RESPIRATION RATE: 18 BRPM | TEMPERATURE: 97.9 F | SYSTOLIC BLOOD PRESSURE: 132 MMHG | DIASTOLIC BLOOD PRESSURE: 70 MMHG | HEIGHT: 69 IN | BODY MASS INDEX: 46.65 KG/M2

## 2022-09-29 DIAGNOSIS — N18.31 STAGE 3A CHRONIC KIDNEY DISEASE (HCC): ICD-10-CM

## 2022-09-29 DIAGNOSIS — I10 ESSENTIAL HYPERTENSION: Primary | ICD-10-CM

## 2022-09-29 DIAGNOSIS — E78.6 HDL DEFICIENCY: ICD-10-CM

## 2022-09-29 DIAGNOSIS — U07.1 COVID-19: Chronic | ICD-10-CM

## 2022-09-29 PROCEDURE — 99496 TRANSJ CARE MGMT HIGH F2F 7D: CPT | Performed by: FAMILY MEDICINE

## 2022-09-29 NOTE — TELEPHONE ENCOUNTER
P/C [facility] Visiting Nurse [phone], spoke with Wang Wharton  Patient is scheduled to be seen by VNA [facility] on 9/30/2022  Phone Paulo Coma informed wife TRAY Reese 74 will be visiting tomorrow 9/30/22  They will call her with time  Patient agreed with plan of action

## 2022-09-29 NOTE — PROGRESS NOTES
Assessment & Plan     {There are no diagnoses linked to this encounter  (Refresh or delete this SmartLink)}     Subjective     Transitional Care Management Review:   Stephania Hunt is a 78 y o  male here for TCM follow up  During the TCM phone call patient stated:  TCM Call     Date and time call was made  9/28/2022  3:08 PM    Hospital care reviewed  Records reviewed    Patient was hospitialized at  [facility]    Date of Admission  09/14/22    Date of discharge  09/28/22    Diagnosis  Strangulated hernia of abdominal wall    Disposition  Home    Were the patients medications reviewed and updated  No    Current Symptoms  None    Fatigue severity  Mild      TCM Call     Post hospital issues  None    Should patient be enrolled in anticoag monitoring? No    Scheduled for follow up? Yes    Did you obtain your prescribed medications  Yes    Do you need help managing your prescriptions or medications  No    Is transportation to your appointment needed  No    I have advised the patient to call PCP with any new or worsening symptoms  Christo Hernandez MA    Living Arrangements  Spouse or Significiant other    Support System  Family    The type of support provided  Financial; Emotional    Do you have social support  Yes, as much as I need    Are you recieving any outpatient services  No    Are you recieving home care services  No    Are you using any community resources  No    Current waiver services  No    Have you fallen in the last 12 months  No    Interperter language line needed  No    Counseling  Family    Counseling topics  Activities of daily living;  Importance of RX compliance; Home health agency benefits        HPI  Review of Systems    Objective     /70 (BP Location: Left arm, Patient Position: Sitting, Cuff Size: Large)   Pulse (!) 110   Temp 97 9 °F (36 6 °C) (Temporal)   Resp 18   Ht 5' 9" (1 753 m)   Wt (!) 171 kg (376 lb)   SpO2 98%   BMI 55 53 kg/m²      Physical Exam  Cyrilla Coil Kitty Forte MD

## 2022-09-29 NOTE — PROGRESS NOTES
Assessment/Plan:    No problem-specific Assessment & Plan notes found for this encounter  Diagnoses and all orders for this visit:    Essential hypertension    Stage 3a chronic kidney disease ([address] )    HDL deficiency    COVID-19          Subjective:      Patient ID: Phil Moyer is a 78 y o  male  Pt admitted for 2 wks for bowel obstruction / lg ventral hernia repaier/ PE/ prolonged fever   Hosp records reviewed     Since being home for the last 2 days patient notices edema of 4 extremities  Not much pain  Eating okay  Urinating fine  Some hemorrhoidal issues  VNA will be involved he sees the surgical doctors in a week he is on blood thinners for the PE  The following portions of the patient's history were reviewed and updated as appropriate: allergies, current medications, past family history, past medical history, past social history, past surgical history and problem list     Review of Systems   Constitutional: Negative for activity change and appetite change  HENT: Negative for trouble swallowing  Eyes: Negative for visual disturbance  Respiratory: Negative for cough and shortness of breath  Cardiovascular: Positive for leg swelling  Negative for chest pain and palpitations  Gastrointestinal: Negative for abdominal pain and blood in stool  Endocrine: Negative for polyuria  Genitourinary: Negative for difficulty urinating and hematuria  Skin: Negative for rash  Neurological: Positive for weakness  Negative for dizziness  Psychiatric/Behavioral: Negative for behavioral problems  Objective:  Vitals:    09/29/22 1026   BP: 132/70   BP Location: Left arm   Patient Position: Sitting   Cuff Size: Large   Pulse: (!) 110   Resp: 18   Temp: 97 9 °F (36 6 °C)   TempSrc: Temporal   SpO2: 98%   Weight: (!) 171 kg (376 lb)   Height: 5' 9" (1 753 m)      Physical Exam  Constitutional:       Appearance: He is well-developed  HENT:      Head: Normocephalic and atraumatic  Eyes:      Conjunctiva/sclera: Conjunctivae normal    Neck:      Thyroid: No thyromegaly  Cardiovascular:      Rate and Rhythm: Normal rate and regular rhythm  Heart sounds: Normal heart sounds  No murmur heard  Pulmonary:      Effort: Pulmonary effort is normal  No respiratory distress  Breath sounds: Normal breath sounds  Musculoskeletal:      Cervical back: Neck supple  Lymphadenopathy:      Cervical: No cervical adenopathy  Skin:     General: Skin is warm and dry  Psychiatric:         Behavior: Behavior normal            Wound examined and OK     Patient may require outpatient physical therapy in the future  He will remain on the blood thinner for 6 months  I doubled the diuretic for a week for the edema

## 2022-09-29 NOTE — PATIENT INSTRUCTIONS
Take 2 of the water pills (furosemide) every morning for 1 week  This should help the swelling  We will call you later today with instructions about visiting nurses etcetera  Continue the current blood thinner that he has from the pharmacy

## 2022-09-30 ENCOUNTER — HOME CARE VISIT (OUTPATIENT)
Dept: HOME HEALTH SERVICES | Facility: HOME HEALTHCARE | Age: 67
End: 2022-09-30
Payer: MEDICARE

## 2022-09-30 VITALS
DIASTOLIC BLOOD PRESSURE: 50 MMHG | OXYGEN SATURATION: 98 % | HEART RATE: 78 BPM | SYSTOLIC BLOOD PRESSURE: 120 MMHG | TEMPERATURE: 97.9 F | RESPIRATION RATE: 16 BRPM

## 2022-09-30 PROCEDURE — G0299 HHS/HOSPICE OF RN EA 15 MIN: HCPCS

## 2022-09-30 PROCEDURE — 400013 VN SOC

## 2022-09-30 NOTE — CASE COMMUNICATION
Ship to Pt  Home   Insurance medicare    Wound 1 midline abdominal  Wound 2 RLQ      Full   All items are ordered by the each unless otherwise noted  PLEASE DO NOT ORDER BY THE BOX  Request specific quantity needed  For Private Insurances order should be for a 2 week period    Saline 100ml [id number](Not covered by Private Ins)  ABD 5x9 [id number]  Ama Soulier Ama Soulier pt with heavily saturating midline abdominal wound requiring at least 3 ABD pads per dress ing change     Transpore white  2in [id number]

## 2022-09-30 NOTE — CASE COMMUNICATION
[facility] with the following disciplines:      SN, PT and OT  Response needed, please respond via TT or [phone]  Primary focus of home health care wound vac changes MWF  Patient stated goals of care wounds to heal  Anticipated visit pattern and next visit date 3x per week 10  3 22    See medication list - meds in home differ from AVS pt reports that he is taking lisinopril 20mg Q D but medication is not on medication list  Xarelto on medication list and AVS  pt states he is not taking medication  Please clarify if pt is to be taking lisinopril and xarelto please  Significant clinical findings Pt's midline incision has heavy serous drainage  Pt's wife states she is changing the ABD dressings approximately 5x per day  Potential barriers to goal achievement     Other pertinent information the pt did not hav e white sponges in the home  Black sponges used for wound vac changes  white sponges ordered and will be delivered 10 3    Thank you for allowing us to participate in the care of your patient        Kia Mayorga RN

## 2022-09-30 NOTE — CASE COMMUNICATION
Ship to [facility]    Wound 1 midline abdomen     Wound 2 RLQ     Full     [place] [id number]  Gauze 4x4 N/S 200 4x4s per unit  [id number]

## 2022-10-01 ENCOUNTER — HOME CARE VISIT (OUTPATIENT)
Dept: HOME HEALTH SERVICES | Facility: HOME HEALTHCARE | Age: 67
End: 2022-10-01
Payer: MEDICARE

## 2022-10-01 PROCEDURE — G0299 HHS/HOSPICE OF RN EA 15 MIN: HCPCS

## 2022-10-02 ENCOUNTER — HOME CARE VISIT (OUTPATIENT)
Dept: HOME HEALTH SERVICES | Facility: HOME HEALTHCARE | Age: 67
End: 2022-10-02
Payer: MEDICARE

## 2022-10-02 VITALS
OXYGEN SATURATION: 97 % | TEMPERATURE: 98.1 F | SYSTOLIC BLOOD PRESSURE: 122 MMHG | HEART RATE: 81 BPM | RESPIRATION RATE: 18 BRPM | DIASTOLIC BLOOD PRESSURE: 60 MMHG

## 2022-10-03 ENCOUNTER — HOME CARE VISIT (OUTPATIENT)
Dept: HOME HEALTH SERVICES | Facility: HOME HEALTHCARE | Age: 67
End: 2022-10-03
Payer: MEDICARE

## 2022-10-03 ENCOUNTER — OFFICE VISIT (OUTPATIENT)
Dept: SURGERY | Facility: CLINIC | Age: 67
End: 2022-10-03

## 2022-10-03 VITALS
BODY MASS INDEX: 46.65 KG/M2 | TEMPERATURE: 97.1 F | HEART RATE: 65 BPM | SYSTOLIC BLOOD PRESSURE: 134 MMHG | HEIGHT: 69 IN | WEIGHT: 315 LBS | OXYGEN SATURATION: 99 % | DIASTOLIC BLOOD PRESSURE: 60 MMHG

## 2022-10-03 VITALS — RESPIRATION RATE: 16 BRPM

## 2022-10-03 VITALS — OXYGEN SATURATION: 99 % | DIASTOLIC BLOOD PRESSURE: 70 MMHG | HEART RATE: 66 BPM | SYSTOLIC BLOOD PRESSURE: 128 MMHG

## 2022-10-03 DIAGNOSIS — K43.6 STRANGULATED HERNIA OF ABDOMINAL WALL: Primary | ICD-10-CM

## 2022-10-03 PROCEDURE — 99024 POSTOP FOLLOW-UP VISIT: CPT | Performed by: SURGERY

## 2022-10-03 PROCEDURE — G0299 HHS/HOSPICE OF RN EA 15 MIN: HCPCS

## 2022-10-03 PROCEDURE — G0152 HHCP-SERV OF OT,EA 15 MIN: HCPCS

## 2022-10-03 NOTE — PROGRESS NOTES
Assessment/Plan:    Strangulated hernia of abdominal wall  Now s/p emergent LAPAROTOMY EXPLORATORY  REPAIR RIGHT LQ HERNIA WITH MESH, REPAIR ANTERIOR WALL HERNIA with LYSIS ADHESIONS on 9/15   Wound vac in place c/d/i, draining appropriately  Abdominal incision healing well, will schedule Mr Venkata Forte with wound clinic hopefully this week to have abdominal wound monitored more closely as it is a hefty incision  Wound vac and incision cleaning to be done by home wound care nurse  Diagnoses and all orders for this visit:    Strangulated hernia of abdominal wall          Subjective:      Patient ID: Nba Tony is a 78 y o  male  77 yo M with morbid obesity s/p strangulated hernia repair with LAPAROTOMY EXPLORATORY  REPAIR RIGHT LQ HERNIA WITH MESH, REPAIR ANTERIOR WALL HERNIA (N/A Abdomen)  Wound vac in place in RLQ  Patient and wife state that wound is draining multiple canisters a day  They also state that abdominal incision is draining from the bottom  Wound care nurses have been coming to change the wound vac but wife asks if they could also please clean the abdominal incision  Pt and wife are doing their best at managing this complex recovery and doing very well  Pt states that he is retaining fluid and saw his PCP on the 29th and they doubled his lasix  Otherwise he is doing well, no CP, chills, fever  He is SOB but that is the baseline for him  The following portions of the patient's history were reviewed and updated as appropriate: allergies, current medications, past family history, past medical history, past social history, past surgical history and problem list     Review of Systems   Constitutional: Positive for activity change and fatigue  Negative for chills and fever  Respiratory: Negative for cough, shortness of breath and wheezing  Cardiovascular: Positive for leg swelling  Negative for chest pain and palpitations  Gastrointestinal: Positive for abdominal distention  Negative for abdominal pain, blood in stool, constipation, diarrhea, nausea and vomiting  Genitourinary: Negative for dysuria and hematuria  Musculoskeletal: Negative for arthralgias and back pain  Skin: Negative for color change and rash  Neurological: Negative for syncope and headaches  Psychiatric/Behavioral: Negative for agitation and behavioral problems  All other systems reviewed and are negative  Objective:      /60 (BP Location: Left arm, Patient Position: Sitting, Cuff Size: Standard)   Pulse 65   Temp (!) 97 1 °F (36 2 °C) (Temporal)   Ht 5' 9" (1 753 m)   Wt (!) 166 kg (365 lb 3 2 oz)   SpO2 99%   BMI 53 93 kg/m²          Physical Exam  Vitals reviewed  Constitutional:       General: He is not in acute distress  Appearance: Normal appearance  He is obese  He is not ill-appearing  HENT:      Head: Normocephalic and atraumatic  Mouth/Throat:      Mouth: Mucous membranes are moist       Pharynx: Oropharynx is clear  Eyes:      Conjunctiva/sclera: Conjunctivae normal    Cardiovascular:      Rate and Rhythm: Normal rate and regular rhythm  Heart sounds: No murmur heard  No friction rub  No gallop  Pulmonary:      Effort: Pulmonary effort is normal  No respiratory distress  Breath sounds: Normal breath sounds  No wheezing, rhonchi or rales  Abdominal:      General: Abdomen is protuberant  Bowel sounds are normal  There is distension  Comments: Serosanguinous fluid draining from bottom of abdominal incision, soaking through 1 ABD a day per wife  Musculoskeletal:      Cervical back: Normal range of motion  Skin:     General: Skin is warm and dry  Neurological:      General: No focal deficit present  Mental Status: He is alert     Psychiatric:         Mood and Affect: Mood normal          Behavior: Behavior normal

## 2022-10-03 NOTE — CASE COMMUNICATION
OT evaluation completed 10 3  22  Pt requires min assist with UB ADLs and mod/max assist with LB ADLs  Provided pt with soft sock aid to don TEDS/socks  However has difficulty due to lymphedema  Pt cleared today to bathe in shower  Current shower chair is broken, plan to purchase a new one  Discussed OT services to increase independence with ADLs  Pt feels no OT services are needed at this time  Wife to continue assisting pt with needs  Prefers wife to assist due to wound vac  Explained OT can be ordered in the future if having difficulty   DC OT services per pt request

## 2022-10-03 NOTE — ASSESSMENT & PLAN NOTE
Now s/p emergent LAPAROTOMY EXPLORATORY  REPAIR RIGHT LQ HERNIA WITH MESH, REPAIR ANTERIOR WALL HERNIA with LYSIS ADHESIONS on 9/15   Wound vac in place c/d/i, draining appropriately  Abdominal incision healing well, will schedule Mr April Montanay with wound clinic hopefully this week to have abdominal wound monitored more closely as it is a hefty incision  Wound vac and incision cleaning to be done by home wound care nurse

## 2022-10-04 ENCOUNTER — HOME CARE VISIT (OUTPATIENT)
Dept: HOME HEALTH SERVICES | Facility: HOME HEALTHCARE | Age: 67
End: 2022-10-04
Payer: MEDICARE

## 2022-10-04 ENCOUNTER — TELEPHONE (OUTPATIENT)
Dept: FAMILY MEDICINE CLINIC | Facility: CLINIC | Age: 67
End: 2022-10-04

## 2022-10-04 VITALS — OXYGEN SATURATION: 97 % | SYSTOLIC BLOOD PRESSURE: 128 MMHG | DIASTOLIC BLOOD PRESSURE: 70 MMHG | HEART RATE: 66 BPM

## 2022-10-04 PROCEDURE — G0151 HHCP-SERV OF PT,EA 15 MIN: HCPCS

## 2022-10-04 NOTE — TELEPHONE ENCOUNTER
Patient would like to know if there is any blood work that needs to be done or repeated before next appt

## 2022-10-05 ENCOUNTER — HOSPITAL ENCOUNTER (INPATIENT)
Facility: HOSPITAL | Age: 67
LOS: 7 days | Discharge: HOME HEALTH | DRG: 908 | End: 2022-10-12
Attending: EMERGENCY MEDICINE | Admitting: SURGERY
Payer: MEDICARE

## 2022-10-05 ENCOUNTER — HOME CARE VISIT (OUTPATIENT)
Dept: HOME HEALTH SERVICES | Facility: HOME HEALTHCARE | Age: 67
End: 2022-10-05
Payer: MEDICARE

## 2022-10-05 VITALS
OXYGEN SATURATION: 96 % | HEART RATE: 96 BPM | DIASTOLIC BLOOD PRESSURE: 54 MMHG | TEMPERATURE: 100.2 F | RESPIRATION RATE: 24 BRPM | SYSTOLIC BLOOD PRESSURE: 106 MMHG

## 2022-10-05 DIAGNOSIS — I26.99 ACUTE PULMONARY EMBOLISM WITHOUT ACUTE COR PULMONALE, UNSPECIFIED PULMONARY EMBOLISM TYPE (HCC): ICD-10-CM

## 2022-10-05 DIAGNOSIS — Z48.89 ENCOUNTER FOR POST SURGICAL WOUND CHECK: Primary | ICD-10-CM

## 2022-10-05 DIAGNOSIS — T81.30XA WOUND DEHISCENCE: ICD-10-CM

## 2022-10-05 DIAGNOSIS — L08.9 WOUND INFECTION: ICD-10-CM

## 2022-10-05 DIAGNOSIS — L24.A9 WOUND DRAINAGE: ICD-10-CM

## 2022-10-05 DIAGNOSIS — T14.8XXA WOUND INFECTION: ICD-10-CM

## 2022-10-05 LAB
ALBUMIN SERPL BCP-MCNC: 2.5 G/DL (ref 3.5–5)
ALP SERPL-CCNC: 69 U/L (ref 46–116)
ALT SERPL W P-5'-P-CCNC: 14 U/L (ref 12–78)
ANION GAP SERPL CALCULATED.3IONS-SCNC: 6 MMOL/L (ref 4–13)
APTT PPP: 49 SECONDS (ref 23–37)
AST SERPL W P-5'-P-CCNC: 14 U/L (ref 5–45)
BASOPHILS # BLD AUTO: 0.05 THOUSANDS/ΜL (ref 0–0.1)
BASOPHILS NFR BLD AUTO: 1 % (ref 0–1)
BILIRUB SERPL-MCNC: 1.15 MG/DL (ref 0.2–1)
BUN SERPL-MCNC: 17 MG/DL (ref 5–25)
CALCIUM ALBUM COR SERPL-MCNC: 10.2 MG/DL (ref 8.3–10.1)
CALCIUM SERPL-MCNC: 9 MG/DL (ref 8.3–10.1)
CHLORIDE SERPL-SCNC: 101 MMOL/L (ref 96–108)
CO2 SERPL-SCNC: 28 MMOL/L (ref 21–32)
CREAT SERPL-MCNC: 1.53 MG/DL (ref 0.6–1.3)
EOSINOPHIL # BLD AUTO: 0.11 THOUSAND/ΜL (ref 0–0.61)
EOSINOPHIL NFR BLD AUTO: 1 % (ref 0–6)
ERYTHROCYTE [DISTWIDTH] IN BLOOD BY AUTOMATED COUNT: 14.8 % (ref 11.6–15.1)
GFR SERPL CREATININE-BSD FRML MDRD: 46 ML/MIN/1.73SQ M
GLUCOSE SERPL-MCNC: 108 MG/DL (ref 65–140)
HCT VFR BLD AUTO: 30.7 % (ref 36.5–49.3)
HGB BLD-MCNC: 9.7 G/DL (ref 12–17)
IMM GRANULOCYTES # BLD AUTO: 0.04 THOUSAND/UL (ref 0–0.2)
IMM GRANULOCYTES NFR BLD AUTO: 0 % (ref 0–2)
INR PPP: 1.73 (ref 0.84–1.19)
LACTATE SERPL-SCNC: 1.5 MMOL/L (ref 0.5–2)
LYMPHOCYTES # BLD AUTO: 1.66 THOUSANDS/ΜL (ref 0.6–4.47)
LYMPHOCYTES NFR BLD AUTO: 18 % (ref 14–44)
MCH RBC QN AUTO: 30.8 PG (ref 26.8–34.3)
MCHC RBC AUTO-ENTMCNC: 31.6 G/DL (ref 31.4–37.4)
MCV RBC AUTO: 98 FL (ref 82–98)
MONOCYTES # BLD AUTO: 0.72 THOUSAND/ΜL (ref 0.17–1.22)
MONOCYTES NFR BLD AUTO: 8 % (ref 4–12)
NEUTROPHILS # BLD AUTO: 6.81 THOUSANDS/ΜL (ref 1.85–7.62)
NEUTS SEG NFR BLD AUTO: 72 % (ref 43–75)
NRBC BLD AUTO-RTO: 0 /100 WBCS
PLATELET # BLD AUTO: 282 THOUSANDS/UL (ref 149–390)
PMV BLD AUTO: 8.5 FL (ref 8.9–12.7)
POTASSIUM SERPL-SCNC: 3.4 MMOL/L (ref 3.5–5.3)
PROCALCITONIN SERPL-MCNC: 0.12 NG/ML
PROT SERPL-MCNC: 7.5 G/DL (ref 6.4–8.4)
PROTHROMBIN TIME: 20.2 SECONDS (ref 11.6–14.5)
RBC # BLD AUTO: 3.15 MILLION/UL (ref 3.88–5.62)
SODIUM SERPL-SCNC: 135 MMOL/L (ref 135–147)
WBC # BLD AUTO: 9.39 THOUSAND/UL (ref 4.31–10.16)

## 2022-10-05 PROCEDURE — 99284 EMERGENCY DEPT VISIT MOD MDM: CPT

## 2022-10-05 PROCEDURE — 87070 CULTURE OTHR SPECIMN AEROBIC: CPT | Performed by: EMERGENCY MEDICINE

## 2022-10-05 PROCEDURE — 84145 PROCALCITONIN (PCT): CPT | Performed by: EMERGENCY MEDICINE

## 2022-10-05 PROCEDURE — 36415 COLL VENOUS BLD VENIPUNCTURE: CPT | Performed by: EMERGENCY MEDICINE

## 2022-10-05 PROCEDURE — 97606 NEG PRS WND THER DME>50 SQCM: CPT | Performed by: SURGERY

## 2022-10-05 PROCEDURE — 87040 BLOOD CULTURE FOR BACTERIA: CPT | Performed by: EMERGENCY MEDICINE

## 2022-10-05 PROCEDURE — 87205 SMEAR GRAM STAIN: CPT | Performed by: EMERGENCY MEDICINE

## 2022-10-05 PROCEDURE — 85730 THROMBOPLASTIN TIME PARTIAL: CPT | Performed by: EMERGENCY MEDICINE

## 2022-10-05 PROCEDURE — 99285 EMERGENCY DEPT VISIT HI MDM: CPT | Performed by: EMERGENCY MEDICINE

## 2022-10-05 PROCEDURE — 85610 PROTHROMBIN TIME: CPT | Performed by: EMERGENCY MEDICINE

## 2022-10-05 PROCEDURE — 87077 CULTURE AEROBIC IDENTIFY: CPT | Performed by: EMERGENCY MEDICINE

## 2022-10-05 PROCEDURE — 80053 COMPREHEN METABOLIC PANEL: CPT | Performed by: EMERGENCY MEDICINE

## 2022-10-05 PROCEDURE — G0300 HHS/HOSPICE OF LPN EA 15 MIN: HCPCS

## 2022-10-05 PROCEDURE — 85025 COMPLETE CBC W/AUTO DIFF WBC: CPT | Performed by: EMERGENCY MEDICINE

## 2022-10-05 PROCEDURE — 93005 ELECTROCARDIOGRAM TRACING: CPT

## 2022-10-05 PROCEDURE — 99024 POSTOP FOLLOW-UP VISIT: CPT | Performed by: SURGERY

## 2022-10-05 PROCEDURE — 83605 ASSAY OF LACTIC ACID: CPT | Performed by: EMERGENCY MEDICINE

## 2022-10-05 PROCEDURE — 87186 SC STD MICRODIL/AGAR DIL: CPT | Performed by: EMERGENCY MEDICINE

## 2022-10-05 RX ORDER — LISINOPRIL 20 MG/1
20 TABLET ORAL DAILY
Status: DISCONTINUED | OUTPATIENT
Start: 2022-10-05 | End: 2022-10-10

## 2022-10-05 RX ORDER — NYSTATIN 100000 [USP'U]/G
POWDER TOPICAL 2 TIMES DAILY
Status: DISCONTINUED | OUTPATIENT
Start: 2022-10-05 | End: 2022-10-12 | Stop reason: HOSPADM

## 2022-10-05 RX ORDER — POTASSIUM CHLORIDE 20 MEQ/1
40 TABLET, EXTENDED RELEASE ORAL ONCE
Status: COMPLETED | OUTPATIENT
Start: 2022-10-05 | End: 2022-10-05

## 2022-10-05 RX ORDER — POTASSIUM CITRATE 10 MEQ/1
10 TABLET, EXTENDED RELEASE ORAL DAILY
Status: DISCONTINUED | OUTPATIENT
Start: 2022-10-05 | End: 2022-10-12 | Stop reason: HOSPADM

## 2022-10-05 RX ORDER — METOPROLOL SUCCINATE 50 MG/1
100 TABLET, EXTENDED RELEASE ORAL DAILY
Status: DISCONTINUED | OUTPATIENT
Start: 2022-10-05 | End: 2022-10-10

## 2022-10-05 RX ORDER — ONDANSETRON 2 MG/ML
4 INJECTION INTRAMUSCULAR; INTRAVENOUS EVERY 6 HOURS PRN
Status: DISCONTINUED | OUTPATIENT
Start: 2022-10-05 | End: 2022-10-12 | Stop reason: HOSPADM

## 2022-10-05 RX ORDER — TAMSULOSIN HYDROCHLORIDE 0.4 MG/1
0.4 CAPSULE ORAL 2 TIMES DAILY
Status: DISCONTINUED | OUTPATIENT
Start: 2022-10-05 | End: 2022-10-12 | Stop reason: HOSPADM

## 2022-10-05 RX ORDER — SODIUM CHLORIDE, SODIUM LACTATE, POTASSIUM CHLORIDE, CALCIUM CHLORIDE 600; 310; 30; 20 MG/100ML; MG/100ML; MG/100ML; MG/100ML
125 INJECTION, SOLUTION INTRAVENOUS CONTINUOUS
Status: DISCONTINUED | OUTPATIENT
Start: 2022-10-05 | End: 2022-10-07

## 2022-10-05 RX ORDER — ENOXAPARIN SODIUM 100 MG/ML
60 INJECTION SUBCUTANEOUS 2 TIMES DAILY
Status: DISCONTINUED | OUTPATIENT
Start: 2022-10-05 | End: 2022-10-05

## 2022-10-05 RX ORDER — FUROSEMIDE 20 MG/1
20 TABLET ORAL DAILY
Status: DISCONTINUED | OUTPATIENT
Start: 2022-10-05 | End: 2022-10-10

## 2022-10-05 RX ORDER — ALLOPURINOL 100 MG/1
100 TABLET ORAL DAILY
Status: DISCONTINUED | OUTPATIENT
Start: 2022-10-05 | End: 2022-10-12 | Stop reason: HOSPADM

## 2022-10-05 RX ORDER — LISINOPRIL 20 MG/1
20 TABLET ORAL DAILY
COMMUNITY
End: 2022-10-20

## 2022-10-05 RX ORDER — POTASSIUM CITRATE 15 MEQ/1
1 TABLET, EXTENDED RELEASE ORAL DAILY
Status: DISCONTINUED | OUTPATIENT
Start: 2022-10-05 | End: 2022-10-05

## 2022-10-05 RX ORDER — TRAVOPROST OPHTHALMIC SOLUTION 0.04 MG/ML
1 SOLUTION OPHTHALMIC
Status: DISCONTINUED | OUTPATIENT
Start: 2022-10-05 | End: 2022-10-12 | Stop reason: HOSPADM

## 2022-10-05 RX ORDER — ACETAMINOPHEN 325 MG/1
975 TABLET ORAL EVERY 6 HOURS PRN
Status: DISCONTINUED | OUTPATIENT
Start: 2022-10-05 | End: 2022-10-12 | Stop reason: HOSPADM

## 2022-10-05 RX ADMIN — POTASSIUM CITRATE 10 MEQ: 10 TABLET, EXTENDED RELEASE ORAL at 20:31

## 2022-10-05 RX ADMIN — ALLOPURINOL 100 MG: 100 TABLET ORAL at 18:09

## 2022-10-05 RX ADMIN — TAMSULOSIN HYDROCHLORIDE 0.4 MG: 0.4 CAPSULE ORAL at 20:17

## 2022-10-05 RX ADMIN — POTASSIUM CHLORIDE 40 MEQ: 1500 TABLET, EXTENDED RELEASE ORAL at 20:17

## 2022-10-05 RX ADMIN — METOPROLOL SUCCINATE 100 MG: 50 TABLET, EXTENDED RELEASE ORAL at 18:09

## 2022-10-05 RX ADMIN — SODIUM CHLORIDE, SODIUM LACTATE, POTASSIUM CHLORIDE, AND CALCIUM CHLORIDE 125 ML/HR: .6; .31; .03; .02 INJECTION, SOLUTION INTRAVENOUS at 20:21

## 2022-10-05 RX ADMIN — FUROSEMIDE 20 MG: 20 TABLET ORAL at 18:09

## 2022-10-05 RX ADMIN — NYSTATIN: 100000 POWDER TOPICAL at 18:13

## 2022-10-05 RX ADMIN — TRAVOPROST 1 DROP: 0.04 SOLUTION OPHTHALMIC at 21:04

## 2022-10-05 RX ADMIN — SODIUM CHLORIDE 1000 ML: 0.9 INJECTION, SOLUTION INTRAVENOUS at 14:01

## 2022-10-05 RX ADMIN — LISINOPRIL 20 MG: 20 TABLET ORAL at 18:09

## 2022-10-05 NOTE — CASE COMMUNICATION
Patient  evaluated a nd  found  to  be  independent  with  transfers  and  ambualtion  with  functional  range  and  strength  Patient  reports  performing  ex  to  BLEs  on  a  daily  basis    Patient  does  not  feel  home  P T   at  this  time/  DC

## 2022-10-05 NOTE — ED PROVIDER NOTES
Pt Name: Kee Pritchett  MRN: [id number]  [place] 1955  Age/Sex: 78 y o  male  Date of evaluation: 10/5/2022  PCP: Quinton Boswell MD    [address] [place] [address]    Chief Complaint   Patient presents with   • Wound Check     Pt reports having excess drainage from abdominal surgery 2 weeks ago  Pt states more drainage than usual over the past couple of days that is soaking the bandage  Pt denies pain in abdomen, n/v, numbness and tingling  Pt reports feeling cold and had a slight fever this morning  Pt has wound vac from surgery and visiting nurse who come 3x a week  HPI    Bobbi Stark presents to the Emergency Department complaining of increased drainage from his surgical wound  He just saw Dr Yelena Duque a few days ago and has been having visiting nurses at home  The dressings are needing to be changed more frequently and are soaking through  There is some concern that the area of redness around the wound is worsening as well  Overnight he felt feverish and had chills          HPI      Past Medical and Surgical History    Past Medical History:   Diagnosis Date   • Anemia 9/16/2022   • Chronic kidney disease    • Hypertension    • Obesity    • Sepsis ([facility] [address] )    • Urinary tract infection without hematuria 4/3/2020       Past Surgical History:   Procedure Laterality Date   • ABDOMINAL ADHESION SURGERY N/A 9/15/2022    Procedure: LYSIS ADHESIONS;  Surgeon: Monae Pop MD;  Location: [facility];  Service: General   • APPENDECTOMY     • EXPLORATORY LAPAROTOMY W/ BOWEL RESECTION N/A 9/14/2022    Procedure: Exdploratory laparotomy, complex Lysis of adhesions, reduction strangulated hernia, debridement RLQ muscle with counter incision, abthera placement;  Surgeon: Monae Pop MD;  Location: [facility];  Service: General   • EXPLORATORY LAPAROTOMY W/ BOWEL RESECTION N/A 9/15/2022    Procedure: LAPAROTOMY EXPLORATORY  REPAIR RIGHT LQ HERNIA WITH MESH, REPAIR ANTERIOR WALL HERNIA;  Surgeon: Monae Pop MD; Location: [facility];  Service: General   • NASAL SEPTUM SURGERY         Family History   Problem Relation Age of Onset   • Diabetes Father    • Prostate cancer Brother        Social History     Tobacco Use   • Smoking status: Never Smoker   • Smokeless tobacco: Never Used   • Tobacco comment: no exposure to passive smoke   Vaping Use   • Vaping Use: Never used   Substance Use Topics   • Alcohol use: No   • Drug use: Not Currently           Allergies    Allergies   Allergen Reactions   • Codeine Swelling     Other reaction(s): SWELLING OF FACE  Other reaction(s): SWELLING OF FACE  Other reaction(s): SWELLING OF FACE  Swollen eyes/swelling   • Seasonal Ic [Cholestatin] Allergic Rhinitis       Home Medications    Prior to Admission medications    Medication Sig Start Date End Date Taking?  Authorizing Provider   allopurinol (ZYLOPRIM) 100 mg tablet Take 100 mg by mouth daily  12/4/18   Historical Provider, MD   ammonium lactate (LAC-HYDRIN) 12 % lotion Apply topically 2 (two) times a day 7/19/21   Lucia Fuel, DO   apixaban (Eliquis) 5 mg Take 2 tablets (10 mg total) by mouth 2 (two) times a day for 7 days 9/24/22 10/1/22  Melisa Ashton MD   furosemide (LASIX) 20 mg tablet Take 1 tablet (20 mg total) by mouth daily For swelling 7/12/22   KELSEY Chaudhari   metoprolol succinate (TOPROL-XL) 100 mg 24 hr tablet Take 1 tablet (100 mg total) by mouth daily 4/21/22   Sulaiman Miranda MD   nystatin (MYCOSTATIN) powder Apply topically 2 (two) times a day 8/18/21   Sulaiman Miranda MD   Potassium Citrate ER 15 MEQ (1620 MG) TBCR Take 1 tablet by mouth daily  12/4/18   Historical Provider, MD   psyllium (METAMUCIL) 58 6 % packet Take 1 packet by mouth daily  Patient not taking: No sig reported    Historical Provider, MD   rivaroxaban (XARELTO) 15 & 20 MG starter pack Take 1 Package by mouth see administration instructions for 21 days 9/24/22 10/15/22  Melisa Ashton MD   tamsulosin [facility]) 0 4 mg 1-2 daily as directed 7/27/22   MD ALEX Matta Z 0 004 % ophthalmic solution Administer 1 drop to both eyes daily at bedtime  6/1/18   Historical Provider, MD           Review of Systems    Review of Systems   Constitutional: Positive for chills, fatigue and fever  HENT: Negative for ear pain and sore throat  Eyes: Negative for pain and visual disturbance  Respiratory: Negative for cough and shortness of breath  Cardiovascular: Negative for chest pain and palpitations  Gastrointestinal: Negative for abdominal pain and vomiting  Genitourinary: Negative for dysuria and hematuria  Musculoskeletal: Negative for arthralgias and back pain  Skin: Positive for wound  Negative for color change and rash  Neurological: Negative for seizures and syncope  All other systems reviewed and are negative  Physical Exam      ED Triage Vitals   Temperature Pulse Respirations Blood Pressure SpO2   10/05/22 1314 10/05/22 1311 10/05/22 1311 10/05/22 1311 10/05/22 1311   98 5 °F (36 9 °C) 82 20 121/80 100 %      Temp Source Heart Rate Source Patient Position - Orthostatic VS BP Location FiO2 (%)   10/05/22 1314 10/05/22 1311 10/05/22 1311 10/05/22 1311 --   Oral Monitor Lying Left arm       Pain Score       --                      Physical Exam  Vitals and nursing note reviewed  Constitutional:       Appearance: He is diaphoretic  HENT:      Head: Normocephalic  Nose: Nose normal       Mouth/Throat:      Mouth: Mucous membranes are moist    Eyes:      Conjunctiva/sclera: Conjunctivae normal       Pupils: Pupils are equal, round, and reactive to light  Cardiovascular:      Rate and Rhythm: Normal rate  Pulmonary:      Effort: Pulmonary effort is normal    Abdominal:      General: Abdomen is protuberant  A surgical scar is present  There is distension  Comments: Large vertical midline scar with retention sutures    There is a wound vac inferiorly   See media   Musculoskeletal:      Cervical back: Normal range of motion  Skin:     General: Skin is warm  Findings: Erythema present  Neurological:      General: No focal deficit present  Mental Status: He is alert and oriented to person, place, and time  Mental status is at baseline  Assessment and Plan    Starlette Scheuermann is a 78 y o  male who presents with concern about his surgical wound  Plan will be to perform diagnostic testing and treat symptomatically  Patient will need surgical eval in the ED         MDM    Diagnostic Results      Labs:    Results for orders placed or performed during the hospital encounter of 10/05/22   CBC and differential   Result Value Ref Range    WBC 9 39 4 31 - 10 16 Thousand/uL    RBC 3 15 (L) 3 88 - 5 62 Million/uL    Hemoglobin 9 7 (L) 12 0 - 17 0 g/dL    Hematocrit 30 7 (L) 36 5 - 49 3 %    MCV 98 82 - 98 fL    MCH 30 8 26 8 - 34 3 pg    MCHC 31 6 31 4 - 37 4 g/dL    RDW 14 8 11 6 - 15 1 %    MPV 8 5 (L) 8 9 - 12 7 fL    Platelets 180 831 - 340 Thousands/uL    nRBC 0 /100 WBCs    Neutrophils Relative 72 43 - 75 %    Immat GRANS % 0 0 - 2 %    Lymphocytes Relative 18 14 - 44 %    Monocytes Relative 8 4 - 12 %    Eosinophils Relative 1 0 - 6 %    Basophils Relative 1 0 - 1 %    Neutrophils Absolute 6 81 1 85 - 7 62 Thousands/µL    Immature Grans Absolute 0 04 0 00 - 0 20 Thousand/uL    Lymphocytes Absolute 1 66 0 60 - 4 47 Thousands/µL    Monocytes Absolute 0 72 0 17 - 1 22 Thousand/µL    Eosinophils Absolute 0 11 0 00 - 0 61 Thousand/µL    Basophils Absolute 0 05 0 00 - 0 10 Thousands/µL   Comprehensive metabolic panel   Result Value Ref Range    Sodium 135 135 - 147 mmol/L    Potassium 3 4 (L) 3 5 - 5 3 mmol/L    Chloride 101 96 - 108 mmol/L    CO2 28 21 - 32 mmol/L    ANION GAP 6 4 - 13 mmol/L    BUN 17 5 - 25 mg/dL    Creatinine 1 53 (H) 0 60 - 1 30 mg/dL    Glucose 108 65 - 140 mg/dL    Calcium 9 0 8 3 - 10 1 mg/dL    Corrected Calcium 10 2 (H) 8 3 - 10 1 mg/dL    AST 14 5 - 45 U/L    ALT 14 12 - 78 U/L    Alkaline Phosphatase 69 46 - 116 U/L    Total Protein 7 5 6 4 - 8 4 g/dL    Albumin 2 5 (L) 3 5 - 5 0 g/dL    Total Bilirubin 1 15 (H) 0 20 - 1 00 mg/dL    eGFR 46 ml/min/1 73sq m   Lactic acid   Result Value Ref Range    LACTIC ACID 1 5 0 5 - 2 0 mmol/L   Procalcitonin   Result Value Ref Range    Procalcitonin 0 12 <=0 25 ng/ml   Protime-INR   Result Value Ref Range    Protime 20 2 (H) 11 6 - 14 5 seconds    INR 1 73 (H) 0 84 - 1 19   APTT   Result Value Ref Range    PTT 49 (H) 23 - 37 seconds   ECG 12 lead   Result Value Ref Range    Ventricular Rate 81 BPM    Atrial Rate 267 BPM    UT Interval  ms    QRSD Interval 78 ms    QT Interval 348 ms    QTC Interval 404 ms    P Axis  degrees    QRS Axis 0 degrees    T Wave Axis 50 degrees       All labs reviewed and utilized in the medical decision making process    Radiology:    No orders to display       All radiology studies independently viewed by me and interpreted by the radiologist     Procedure    Procedures      ED Course of Care and Re-Assessments      Medications   travoprost (TRAVATAN-Z) 0 004 % ophthalmic solution 1 drop (has no administration in time range)   allopurinol (ZYLOPRIM) tablet 100 mg (has no administration in time range)   nystatin (MYCOSTATIN) powder (has no administration in time range)   metoprolol succinate (TOPROL-XL) 24 hr tablet 100 mg (has no administration in time range)   furosemide (LASIX) tablet 20 mg (has no administration in time range)   lisinopril (ZESTRIL) tablet 20 mg (has no administration in time range)   psyllium (METAMUCIL) 1 packet (has no administration in time range)   tamsulosin (FLOMAX) capsule 0 4 mg (has no administration in time range)   ondansetron (ZOFRAN) injection 4 mg (has no administration in time range)   potassium citrate (UROCIT-K) CR tablet 10 mEq (has no administration in time range)   rivaroxaban (XARELTO) tablet 20 mg (has no administration in time range)     Surgical team came to ER to evaluate patient  Admitted      FINAL IMPRESSION    Final diagnoses:   Encounter for post surgical wound check   Wound infection         DISPOSITION/PLAN    Time reflects when diagnosis was documented in both MDM as applicable and the Disposition within this note     Time User Action Codes Description Comment    10/5/2022  4:34 PM Bozena Lamas Add [Z48 89] Encounter for post surgical wound check     10/5/2022  4:34 PM Bozena BUSTILLOS Add James Mcdonaldvers  8XXA,  L08 9] Wound infection       ED Disposition     ED Disposition   Admit    Condition   Stable    Date/Time   Wed Oct 5, 2022  4:34 PM    Comment   Case was discussed with general surgery and the patient's admission status was agreed to be Admission Status: inpatient status to the service of Dr Terrence Whitaker   Follow-up Information    None           PATIENT REFERRED TO:    No follow-up provider specified      DISCHARGE MEDICATIONS:    Current Discharge Medication List      CONTINUE these medications which have NOT CHANGED    Details   allopurinol (ZYLOPRIM) 100 mg tablet Take 100 mg by mouth daily       ammonium lactate (LAC-HYDRIN) 12 % lotion Apply topically 2 (two) times a day  Qty: 400 g, Refills: 0    Associated Diagnoses: Lymphedema of both lower extremities      apixaban (Eliquis) 5 mg Take 2 tablets (10 mg total) by mouth 2 (two) times a day for 7 days  Qty: 60 tablet, Refills: 0    Associated Diagnoses: Acute pulmonary embolism, unspecified pulmonary embolism type, unspecified whether acute cor pulmonale present (HCC)      furosemide (LASIX) 20 mg tablet Take 1 tablet (20 mg total) by mouth daily For swelling  Qty: 90 tablet, Refills: 3    Associated Diagnoses: Essential hypertension      lisinopril (ZESTRIL) 20 mg tablet Take 20 mg by mouth daily      metoprolol succinate (TOPROL-XL) 100 mg 24 hr tablet Take 1 tablet (100 mg total) by mouth daily  Qty: 90 tablet, Refills: 3    Associated Diagnoses: Essential hypertension      nystatin (MYCOSTATIN) powder Apply topically 2 (two) times a day  Qty: 60 g, Refills: 12    Associated Diagnoses: Lymphedema of both lower extremities      Potassium Citrate ER 15 MEQ (1620 MG) TBCR Take 1 tablet by mouth daily       tamsulosin (FLOMAX) 0 4 mg 1-2 daily as directed  Qty: 180 capsule, Refills: 3    Associated Diagnoses: Chronic renal failure, unspecified CKD stage      TRAVATAN Z 0 004 % ophthalmic solution Administer 1 drop to both eyes daily at bedtime       psyllium (METAMUCIL) 58 6 % packet Take 1 packet by mouth daily      rivaroxaban (XARELTO) 15 & 20 MG starter pack Take 1 Package by mouth see administration instructions for 21 days  Qty: 1 each, Refills: 0    Associated Diagnoses: Acute pulmonary embolism, unspecified pulmonary embolism type, unspecified whether acute cor pulmonale present ([facility] )             No discharge procedures on file           Lurdes Matos, [address] [place] [address],   10/05/22 [id number]

## 2022-10-05 NOTE — H&P
General Surgery  History and Physical  Damaris Cox 78 y o  male MRN: [id number]  Unit/Bed#: ED 25 Encounter: [id number]    Assessment:  74yo M with c/f wound dehiscence s/p ex-lap, LANETTE, reduction of strangulated hernia, abthera 9/14 then ex-lap with hernia repair and closure 9/15 with Dr Edgar Newton  No concern for surgical site infection at this time  Afebrile, normal WBC    Plan:  - admit to surgery service  - regular diet  - cont home meds including xarelto  - wound vac maintenance, changes per surgery team MWF, monitor output and character  - monitor BMP, started IVF for c/f MILTON with creatinine 1 53, baseline around 1 4  - plan for discharge with home vac and xarelto, appreciate CM assistance      History of Present Illness   HPI:  Damaris Cox is a 78 y o  male who presents with concerns about his midline abdominal wound  For the past few days, there has been increased drainage from the wound and the color changed to a brownish color  Contacted the office, but was told there was no concern for infection  However, decided to seek medical attention when the drainage became more copious  Denies fever, chills, abdominal pain  Review of Systems   Constitutional: Negative  HENT: Negative  Respiratory: Negative  Cardiovascular: Negative  Gastrointestinal: Negative for abdominal pain, nausea and vomiting  Genitourinary: Negative  Musculoskeletal: Negative  Skin:        Increased drainage from incision   Neurological: Negative  Psychiatric/Behavioral: Negative          Historical Information   Past Medical History:   Diagnosis Date   • Anemia 9/16/2022   • Chronic kidney disease    • Hypertension    • Obesity    • Sepsis ([facility] [address] )    • Urinary tract infection without hematuria 4/3/2020     Past Surgical History:   Procedure Laterality Date   • ABDOMINAL ADHESION SURGERY N/A 9/15/2022    Procedure: LYSIS ADHESIONS;  Surgeon: Rena Craft MD;  Location: [facility];  Service: General   • APPENDECTOMY     • EXPLORATORY LAPAROTOMY W/ BOWEL RESECTION N/A 9/14/2022    Procedure: Exdploratory laparotomy, complex Lysis of adhesions, reduction strangulated hernia, debridement RLQ muscle with counter incision, abthera placement;  Surgeon: Kenny Limon MD;  Location[facility];  Service: General   • EXPLORATORY LAPAROTOMY W/ BOWEL RESECTION N/A 9/15/2022    Procedure: LAPAROTOMY EXPLORATORY  REPAIR RIGHT LQ HERNIA WITH MESH, REPAIR ANTERIOR WALL HERNIA;  Surgeon: Kenny Limon MD;  Location: [facility];  Service: General   • NASAL SEPTUM SURGERY       Social History   Social History     Substance and Sexual Activity   Alcohol Use No     Social History     Substance and Sexual Activity   Drug Use Not Currently     Social History     Tobacco Use   Smoking Status Never Smoker   Smokeless Tobacco Never Used   Tobacco Comment    no exposure to passive smoke     Family History: non-contributory    Meds/Allergies   PTA meds:   Prior to Admission Medications   Prescriptions Last Dose Informant Patient Reported? Taking?    Potassium Citrate ER 15 MEQ (1620 MG) TBCR 10/4/2022 at Unknown time Self Yes Yes   Sig: Take 1 tablet by mouth daily    TRAVATAN Z 0 004 % ophthalmic solution 10/4/2022 at Unknown time Self Yes Yes   Sig: Administer 1 drop to both eyes daily at bedtime    allopurinol (ZYLOPRIM) 100 mg tablet 10/4/2022 at Unknown time Self Yes Yes   Sig: Take 100 mg by mouth daily    ammonium lactate (LAC-HYDRIN) 12 % lotion  Self No Yes   Sig: Apply topically 2 (two) times a day   apixaban (Eliquis) 5 mg 10/4/2022 at Unknown time Self No Yes   Sig: Take 2 tablets (10 mg total) by mouth 2 (two) times a day for 7 days   furosemide (LASIX) 20 mg tablet 10/4/2022 at Unknown time Self No Yes   Sig: Take 1 tablet (20 mg total) by mouth daily For swelling   lisinopril (ZESTRIL) 20 mg tablet 10/4/2022 at Unknown time  Yes Yes   Sig: Take 20 mg by mouth daily   metoprolol succinate (TOPROL-XL) 100 mg 24 hr tablet 10/4/2022 at Unknown time Self No Yes   Sig: Take 1 tablet (100 mg total) by mouth daily   nystatin (MYCOSTATIN) powder Past Month at Unknown time Self No Yes   Sig: Apply topically 2 (two) times a day   psyllium (METAMUCIL) 58 6 % packet Not Taking at Unknown time Self Yes No   Sig: Take 1 packet by mouth daily   Patient not taking: No sig reported   rivaroxaban (XARELTO) 15 & 20 MG starter pack Not Taking at Unknown time  No No   Sig: Take 1 Package by mouth see administration instructions for 21 days   Patient not taking: Reported on 10/5/2022   tamsulosin (FLOMAX) 0 4 mg 10/4/2022 at Unknown time Self No Yes   Si-2 daily as directed      Facility-Administered Medications: None     Allergies   Allergen Reactions   • Codeine Swelling     Other reaction(s): SWELLING OF FACE  Other reaction(s): SWELLING OF FACE  Other reaction(s): SWELLING OF FACE  Swollen eyes/swelling   • Seasonal Ic [Cholestatin] Allergic Rhinitis       Objective   First Vitals:   Blood Pressure: 121/80 (10/05/22 1311)  Pulse: 82 (10/05/22 1311)  Temperature: 98 5 °F (36 9 °C) (10/05/22 1314)  Temp Source: Oral (10/05/22 1314)  Respirations: 20 (10/05/22 1311)  Weight - Scale: (!) 165 kg (364 lb 3 2 oz) (10/05/22 1335)  SpO2: 100 % (10/05/22 1311)    Current Vitals:   Blood Pressure: 135/60 (10/05/22 1430)  Pulse: 81 (10/05/22 1430)  Temperature: 98 5 °F (36 9 °C) (10/05/22 1314)  Temp Source: Oral (10/05/22 1314)  Respirations: 18 (10/05/22 1430)  Weight - Scale: (!) 165 kg (364 lb 3 2 oz) (10/05/22 1335)  SpO2: 97 % (10/05/22 1430)      Intake/Output Summary (Last 24 hours) at 10/5/2022 1557  Last data filed at 10/5/2022 1429  Gross per 24 hour   Intake 200 ml   Output --   Net 200 ml       Invasive Devices  Report    Peripheral Intravenous Line  Duration           Peripheral IV 10/05/22 Left;Upper Arm <1 day                Physical Exam:  General: No acute distress  Neuro: alert and oriented  HEENT: moist mucous membranes  CV: Well perfused, regular rate and rhythm  Lungs: Normal work of breathing, no increased respiratory effort  Abdomen: Soft, non-tender, non-distended  RLQ wound had wound vac with good seal with healthy underlying granulation tissue  Midline wound with copious drainage of thin green-brown liquid, nonpurulent and not foul-smelling  Skin edges well-approximated, but with unhealthy skin edges   Extremities: No edema, clubbing or cyanosis  Skin: Warm, dry      Lab Results:   CBC:   Lab Results   Component Value Date    WBC 9 39 10/05/2022    HGB 9 7 (L) 10/05/2022    HCT 30 7 (L) 10/05/2022    MCV 98 10/05/2022     10/05/2022    MCH 30 8 10/05/2022    MCHC 31 6 10/05/2022    RDW 14 8 10/05/2022    MPV 8 5 (L) 10/05/2022    NRBC 0 10/05/2022   , CMP:   Lab Results   Component Value Date    SODIUM 135 10/05/2022    K 3 4 (L) 10/05/2022     10/05/2022    CO2 28 10/05/2022    BUN 17 10/05/2022    CREATININE 1 53 (H) 10/05/2022    CALCIUM 9 0 10/05/2022    AST 14 10/05/2022    ALT 14 10/05/2022    ALKPHOS 69 10/05/2022    EGFR 46 10/05/2022     Imaging: I have personally reviewed pertinent reports  EKG, Pathology, and Other Studies: I have personally reviewed pertinent reports  Code Status: Level 1 - Full Code  Advance Directive and Living Will:      Power of :    POLST:      Counseling / Coordination of Care  Total floor / unit time spent today 15 minutes  This involved direct patient contact where I performed a full history and physical, reviewed previous records, and reviewed laboratory and other diagnostic studies  Greater than 50% of total time was spent with the patient and / or family counseling and / or coordination of care      Michelle Roberto MD  General Surgery PGY1  10/5/2022

## 2022-10-05 NOTE — CASE COMMUNICATION
Sn arrived into home  Wife meeting SN at the door with concerns and very worried  Stated "my  needs help"  SN found pt laying on the sofa appearing grey in color, rapid heart rate and appeared very weak  Pt wife states she changed his abd dressing 3 times since this morning with excessive drainage  SN took VS pt noted to have tachy heart rate 96, low blood pressure 106/54, resp 24 and temp of 100 2  Dr Edgar Newton office called wit h nursing placing call directly to Dr Cobb notified of changes of appearance in wound bed, drainage, redness and warm to touch around incision and at the base of his abd, current v s  and pt c/o of not feeling well   stated at that time to have pt go to the Emergency Room at [facility] [place]  Dr aware that wound vac would not be changed by this SN  At that time wife requested to drive him  This SN felt it would be a safety issue for  both pt and wife  Explained to both the visual findings that I have found and felt it would be best if ambulance would transport pt  Both agreed  911 was called at 12:37 pm  Information given  SN waited until EMS arrived at 12:46  Report given to EMS  EMS did start IV in the home and gave pain medication via IV  SN stayed with wife and emotional support given   Neighbor to take wife to E R

## 2022-10-06 ENCOUNTER — RA CDI HCC (OUTPATIENT)
Dept: OTHER | Facility: HOSPITAL | Age: 67
End: 2022-10-06

## 2022-10-06 ENCOUNTER — HOME CARE VISIT (OUTPATIENT)
Dept: HOME HEALTH SERVICES | Facility: HOME HEALTHCARE | Age: 67
End: 2022-10-06
Payer: MEDICARE

## 2022-10-06 PROBLEM — L24.A9 WOUND DRAINAGE: Status: ACTIVE | Noted: 2022-10-06

## 2022-10-06 PROBLEM — T14.8XXA WOUND DRAINAGE: Status: ACTIVE | Noted: 2022-10-06

## 2022-10-06 LAB
ANION GAP SERPL CALCULATED.3IONS-SCNC: 4 MMOL/L (ref 4–13)
ATRIAL RATE: 267 BPM
BACTERIA UR QL AUTO: ABNORMAL /HPF
BASOPHILS # BLD AUTO: 0.04 THOUSANDS/ΜL (ref 0–0.1)
BASOPHILS NFR BLD AUTO: 0 % (ref 0–1)
BILIRUB UR QL STRIP: ABNORMAL
BUN SERPL-MCNC: 16 MG/DL (ref 5–25)
CALCIUM SERPL-MCNC: 8.7 MG/DL (ref 8.3–10.1)
CHLORIDE SERPL-SCNC: 102 MMOL/L (ref 96–108)
CLARITY UR: ABNORMAL
CO2 SERPL-SCNC: 28 MMOL/L (ref 21–32)
COLOR UR: ABNORMAL
CREAT SERPL-MCNC: 1.51 MG/DL (ref 0.6–1.3)
EOSINOPHIL # BLD AUTO: 0.08 THOUSAND/ΜL (ref 0–0.61)
EOSINOPHIL NFR BLD AUTO: 1 % (ref 0–6)
ERYTHROCYTE [DISTWIDTH] IN BLOOD BY AUTOMATED COUNT: 14.9 % (ref 11.6–15.1)
GFR SERPL CREATININE-BSD FRML MDRD: 47 ML/MIN/1.73SQ M
GLUCOSE SERPL-MCNC: 113 MG/DL (ref 65–140)
GLUCOSE UR STRIP-MCNC: NEGATIVE MG/DL
HCT VFR BLD AUTO: 26.7 % (ref 36.5–49.3)
HGB BLD-MCNC: 8.7 G/DL (ref 12–17)
HGB UR QL STRIP.AUTO: ABNORMAL
IMM GRANULOCYTES # BLD AUTO: 0.08 THOUSAND/UL (ref 0–0.2)
IMM GRANULOCYTES NFR BLD AUTO: 1 % (ref 0–2)
KETONES UR STRIP-MCNC: NEGATIVE MG/DL
LEUKOCYTE ESTERASE UR QL STRIP: ABNORMAL
LYMPHOCYTES # BLD AUTO: 1.26 THOUSANDS/ΜL (ref 0.6–4.47)
LYMPHOCYTES NFR BLD AUTO: 11 % (ref 14–44)
MAGNESIUM SERPL-MCNC: 1.8 MG/DL (ref 1.6–2.6)
MCH RBC QN AUTO: 31.4 PG (ref 26.8–34.3)
MCHC RBC AUTO-ENTMCNC: 32.6 G/DL (ref 31.4–37.4)
MCV RBC AUTO: 96 FL (ref 82–98)
MONOCYTES # BLD AUTO: 0.8 THOUSAND/ΜL (ref 0.17–1.22)
MONOCYTES NFR BLD AUTO: 7 % (ref 4–12)
NEUTROPHILS # BLD AUTO: 8.95 THOUSANDS/ΜL (ref 1.85–7.62)
NEUTS SEG NFR BLD AUTO: 80 % (ref 43–75)
NITRITE UR QL STRIP: POSITIVE
NON-SQ EPI CELLS URNS QL MICRO: ABNORMAL /HPF
NRBC BLD AUTO-RTO: 0 /100 WBCS
PH UR STRIP.AUTO: 6 [PH]
PHOSPHATE SERPL-MCNC: 3.6 MG/DL (ref 2.3–4.1)
PLATELET # BLD AUTO: 248 THOUSANDS/UL (ref 149–390)
PMV BLD AUTO: 8.6 FL (ref 8.9–12.7)
POTASSIUM SERPL-SCNC: 3.8 MMOL/L (ref 3.5–5.3)
PROT UR STRIP-MCNC: ABNORMAL MG/DL
QRS AXIS: 0 DEGREES
QRSD INTERVAL: 78 MS
QT INTERVAL: 348 MS
QTC INTERVAL: 404 MS
RBC # BLD AUTO: 2.77 MILLION/UL (ref 3.88–5.62)
RBC #/AREA URNS AUTO: ABNORMAL /HPF
SODIUM SERPL-SCNC: 134 MMOL/L (ref 135–147)
SP GR UR STRIP.AUTO: 1.02 (ref 1–1.03)
T WAVE AXIS: 50 DEGREES
UROBILINOGEN UR QL STRIP.AUTO: 1 E.U./DL
VENTRICULAR RATE: 81 BPM
WBC # BLD AUTO: 11.21 THOUSAND/UL (ref 4.31–10.16)
WBC #/AREA URNS AUTO: ABNORMAL /HPF

## 2022-10-06 PROCEDURE — 87077 CULTURE AEROBIC IDENTIFY: CPT

## 2022-10-06 PROCEDURE — 81001 URINALYSIS AUTO W/SCOPE: CPT

## 2022-10-06 PROCEDURE — 87086 URINE CULTURE/COLONY COUNT: CPT

## 2022-10-06 PROCEDURE — 80048 BASIC METABOLIC PNL TOTAL CA: CPT

## 2022-10-06 PROCEDURE — 99024 POSTOP FOLLOW-UP VISIT: CPT | Performed by: SURGERY

## 2022-10-06 PROCEDURE — 87186 SC STD MICRODIL/AGAR DIL: CPT

## 2022-10-06 PROCEDURE — 83735 ASSAY OF MAGNESIUM: CPT

## 2022-10-06 PROCEDURE — 84100 ASSAY OF PHOSPHORUS: CPT

## 2022-10-06 PROCEDURE — 85025 COMPLETE CBC W/AUTO DIFF WBC: CPT

## 2022-10-06 PROCEDURE — 93010 ELECTROCARDIOGRAM REPORT: CPT | Performed by: STUDENT IN AN ORGANIZED HEALTH CARE EDUCATION/TRAINING PROGRAM

## 2022-10-06 RX ORDER — MAGNESIUM SULFATE HEPTAHYDRATE 40 MG/ML
2 INJECTION, SOLUTION INTRAVENOUS ONCE
Status: COMPLETED | OUTPATIENT
Start: 2022-10-06 | End: 2022-10-06

## 2022-10-06 RX ORDER — POTASSIUM CHLORIDE 20 MEQ/1
20 TABLET, EXTENDED RELEASE ORAL ONCE
Status: COMPLETED | OUTPATIENT
Start: 2022-10-06 | End: 2022-10-06

## 2022-10-06 RX ADMIN — ACETAMINOPHEN 975 MG: 325 TABLET ORAL at 00:14

## 2022-10-06 RX ADMIN — METOPROLOL SUCCINATE 100 MG: 50 TABLET, EXTENDED RELEASE ORAL at 08:41

## 2022-10-06 RX ADMIN — FUROSEMIDE 20 MG: 20 TABLET ORAL at 08:41

## 2022-10-06 RX ADMIN — LISINOPRIL 20 MG: 20 TABLET ORAL at 08:41

## 2022-10-06 RX ADMIN — APIXABAN 5 MG: 5 TABLET, FILM COATED ORAL at 17:07

## 2022-10-06 RX ADMIN — MAGNESIUM SULFATE HEPTAHYDRATE 2 G: 40 INJECTION, SOLUTION INTRAVENOUS at 08:41

## 2022-10-06 RX ADMIN — RIVAROXABAN 20 MG: 20 TABLET, FILM COATED ORAL at 00:26

## 2022-10-06 RX ADMIN — ALLOPURINOL 100 MG: 100 TABLET ORAL at 08:41

## 2022-10-06 RX ADMIN — TRAVOPROST 1 DROP: 0.04 SOLUTION OPHTHALMIC at 21:04

## 2022-10-06 RX ADMIN — TAMSULOSIN HYDROCHLORIDE 0.4 MG: 0.4 CAPSULE ORAL at 08:41

## 2022-10-06 RX ADMIN — TAMSULOSIN HYDROCHLORIDE 0.4 MG: 0.4 CAPSULE ORAL at 21:04

## 2022-10-06 RX ADMIN — POTASSIUM CHLORIDE 20 MEQ: 1500 TABLET, EXTENDED RELEASE ORAL at 08:41

## 2022-10-06 RX ADMIN — POTASSIUM CITRATE 10 MEQ: 10 TABLET, EXTENDED RELEASE ORAL at 08:50

## 2022-10-06 RX ADMIN — SODIUM CHLORIDE, SODIUM LACTATE, POTASSIUM CHLORIDE, AND CALCIUM CHLORIDE 125 ML/HR: .6; .31; .03; .02 INJECTION, SOLUTION INTRAVENOUS at 11:59

## 2022-10-06 NOTE — QUICK NOTE
Informed by the nurse via tiger text at 11:40PM that the patient has a fever of 100 5  Went to the patient's bedside for evaluation  Denies SOB, CP, dysuria, increased abdominal pain, diarrhea  He did say he skipped at least 2 doses of the eliquis he was instructed to take for his PE  Spoke with pharmacy about making up for missed doses and decided to give him a dose of xarelto 20mg now then continue daily with dinner around 6PM  The patient is hemodynamically stable, HR <100, SpO2 >95% on room air, and blood pressure within his normal range  His abdomen is soft, there is still erythema around his incision with warmth to the touch  Exam is grossly unchanged from the time of admission  Will discuss and re-evaluate with the team in the morning

## 2022-10-06 NOTE — DISCHARGE INSTR - OTHER ORDERS
Wound VAC  Keep wound VAC on suction pressure 125mmHg   MWF VAC changes with one piece of black sponge in RLQ wound and one white sponge under one black sponge in the distal aspect of the midline incision  Maxsorb to cover upper midline incision, cover both vac sponges and maxsorb with vac drape  Bridge RLQ and midline vac/maxsorb wounds together with a bridge and apply track pad

## 2022-10-06 NOTE — PLAN OF CARE
Problem: PAIN - ADULT  Goal: Verbalizes/displays adequate comfort level or baseline comfort level  Description: Interventions:  - Encourage patient to monitor pain and request assistance  - Assess pain using appropriate pain scale  - Administer analgesics based on type and severity of pain and evaluate response  - Implement non-pharmacological measures as appropriate and evaluate response  - Consider cultural and social influences on pain and pain management  - Notify physician/advanced practitioner if interventions unsuccessful or patient reports new pain  Outcome: Progressing     Problem: INFECTION - ADULT  Goal: Absence or prevention of progression during hospitalization  Description: INTERVENTIONS:  - Assess and monitor for signs and symptoms of infection  - Monitor lab/diagnostic results  - Monitor all insertion sites, i e  indwelling lines, tubes, and drains  - Monitor endotracheal if appropriate and nasal secretions for changes in amount and color  - [place] appropriate cooling/warming therapies per order  - Administer medications as ordered  - Instruct and encourage patient and family to use good hand hygiene technique  - Identify and instruct in appropriate isolation precautions for identified infection/condition  Outcome: Progressing     Problem: SAFETY ADULT  Goal: Patient will remain free of falls  Description: INTERVENTIONS:  - Educate patient/family on patient safety including physical limitations  - Instruct patient to call for assistance with activity   - Consult OT/PT to assist with strengthening/mobility   - Keep Call bell within reach  - Keep bed low and locked with side rails adjusted as appropriate  - Keep care items and personal belongings within reach  - Initiate and maintain comfort rounds  - Make Fall Risk Sign visible to staff  - Apply yellow socks and bracelet for high fall risk patients  - Consider moving patient to room near nurses station  Outcome: Progressing  Goal: Maintain or return to baseline ADL function  Description: INTERVENTIONS:  -  Assess patient's ability to carry out ADLs; assess patient's baseline for ADL function and identify physical deficits which impact ability to perform ADLs (bathing, care of mouth/teeth, toileting, grooming, dressing, etc )  - Assess/evaluate cause of self-care deficits   - Assess range of motion  - Assess patient's mobility; develop plan if impaired  - Assess patient's need for assistive devices and provide as appropriate  - Encourage maximum independence but intervene and supervise when necessary  - Involve family in performance of ADLs  - Assess for home care needs following discharge   - Consider OT consult to assist with ADL evaluation and planning for discharge  - Provide patient education as appropriate  Outcome: Progressing  Goal: Maintains/Returns to pre admission functional level  Description: INTERVENTIONS:  - Perform BMAT or MOVE assessment daily    - Set and communicate daily mobility goal to care team and patient/family/caregiver     - Collaborate with rehabilitation services on mobility goals if consulted  - Out of bed for toileting  - Record patient progress and toleration of activity level   Outcome: Progressing     Problem: SKIN/TISSUE INTEGRITY - ADULT  Goal: Skin Integrity remains intact(Skin Breakdown Prevention)  Description: Assess:  -Perform Mic assessment every shift  -Inspect skin when repositioning, toileting, and assisting with ADLS  -Assess extremities for adequate circulation and sensation     Bed Management:  -Have minimal linens on bed & keep smooth, unwrinkled  -Change linens as needed when moist or perspiring    Toileting:  -Offer bedside commode    Activity:  -Encourage activity and walks on unit  -Encourage or provide ROM exercises   -Use appropriate equipment to lift or move patient in bed    Skin Care:  -Avoid use of baby powder, tape, friction and shearing, hot water or constrictive clothing  -Do not massage red bony areas      Outcome: Progressing  Goal: Incision(s), wounds(s) or drain site(s) healing without S/S of infection  Description: INTERVENTIONS  - Assess and document dressing, incision, wound bed, drain sites and surrounding tissue  - Provide patient and family education  - Perform skin care/dressing changes every shift  Outcome: Progressing     Problem: MOBILITY - ADULT  Goal: Maintain or return to baseline ADL function  Description: INTERVENTIONS:  -  Assess patient's ability to carry out ADLs; assess patient's baseline for ADL function and identify physical deficits which impact ability to perform ADLs (bathing, care of mouth/teeth, toileting, grooming, dressing, etc )  - Assess/evaluate cause of self-care deficits   - Assess range of motion  - Assess patient's mobility; develop plan if impaired  - Assess patient's need for assistive devices and provide as appropriate  - Encourage maximum independence but intervene and supervise when necessary  - Involve family in performance of ADLs  - Assess for home care needs following discharge   - Consider OT consult to assist with ADL evaluation and planning for discharge  - Provide patient education as appropriate  Outcome: Progressing  Goal: Maintains/Returns to pre admission functional level  Description: INTERVENTIONS:  - Perform BMAT or MOVE assessment daily    - Set and communicate daily mobility goal to care team and patient/family/caregiver     - Collaborate with rehabilitation services on mobility goals if consulted  - Out of bed for toileting  - Record patient progress and toleration of activity level   Outcome: Progressing     Problem: Potential for Falls  Goal: Patient will remain free of falls  Description: INTERVENTIONS:  - Educate patient/family on patient safety including physical limitations  - Instruct patient to call for assistance with activity   - Consult OT/PT to assist with strengthening/mobility   - Keep Call bell within reach  - Keep bed low and locked with side rails adjusted as appropriate  - Keep care items and personal belongings within reach  - Initiate and maintain comfort rounds  - Make Fall Risk Sign visible to staff  - Apply yellow socks and bracelet for high fall risk patients  - Consider moving patient to room near nurses station  Outcome: Progressing

## 2022-10-06 NOTE — PLAN OF CARE
Problem: PAIN - ADULT  Goal: Verbalizes/displays adequate comfort level or baseline comfort level  Description: Interventions:  - Encourage patient to monitor pain and request assistance  - Assess pain using appropriate pain scale  - Administer analgesics based on type and severity of pain and evaluate response  - Implement non-pharmacological measures as appropriate and evaluate response  - Consider cultural and social influences on pain and pain management  - Notify physician/advanced practitioner if interventions unsuccessful or patient reports new pain  Outcome: Progressing     Problem: INFECTION - ADULT  Goal: Absence or prevention of progression during hospitalization  Description: INTERVENTIONS:  - Assess and monitor for signs and symptoms of infection  - Monitor lab/diagnostic results  - Monitor all insertion sites, i e  indwelling lines, tubes, and drains  - Monitor endotracheal if appropriate and nasal secretions for changes in amount and color  - [place] appropriate cooling/warming therapies per order  - Administer medications as ordered  - Instruct and encourage patient and family to use good hand hygiene technique  - Identify and instruct in appropriate isolation precautions for identified infection/condition  Outcome: Progressing     Problem: SAFETY ADULT  Goal: Patient will remain free of falls  Description: INTERVENTIONS:  - Educate patient/family on patient safety including physical limitations  - Instruct patient to call for assistance with activity   - Consult OT/PT to assist with strengthening/mobility   - Keep Call bell within reach  - Keep bed low and locked with side rails adjusted as appropriate  - Keep care items and personal belongings within reach  - Initiate and maintain comfort rounds  - Make Fall Risk Sign visible to staff  - Apply yellow socks and bracelet for high fall risk patients  - Consider moving patient to room near nurses station  Outcome: Progressing  Goal: Maintain or return to baseline ADL function  Description: INTERVENTIONS:  -  Assess patient's ability to carry out ADLs; assess patient's baseline for ADL function and identify physical deficits which impact ability to perform ADLs (bathing, care of mouth/teeth, toileting, grooming, dressing, etc )  - Assess/evaluate cause of self-care deficits   - Assess range of motion  - Assess patient's mobility; develop plan if impaired  - Assess patient's need for assistive devices and provide as appropriate  - Encourage maximum independence but intervene and supervise when necessary  - Involve family in performance of ADLs  - Assess for home care needs following discharge   - Consider OT consult to assist with ADL evaluation and planning for discharge  - Provide patient education as appropriate  Outcome: Progressing  Goal: Maintains/Returns to pre admission functional level  Description: INTERVENTIONS:  - Perform BMAT or MOVE assessment daily    - Set and communicate daily mobility goal to care team and patient/family/caregiver     - Collaborate with rehabilitation services on mobility goals if consulted  - Out of bed for toileting  - Record patient progress and toleration of activity level   Outcome: Progressing     Problem: SKIN/TISSUE INTEGRITY - ADULT  Goal: Skin Integrity remains intact(Skin Breakdown Prevention)  Description: Assess:  -Perform Mic assessment every shift  -Inspect skin when repositioning, toileting, and assisting with ADLS  -Assess extremities for adequate circulation and sensation     Bed Management:  -Have minimal linens on bed & keep smooth, unwrinkled  -Change linens as needed when moist or perspiring    Toileting:  -Offer bedside commode    Activity:  -Encourage activity and walks on unit  -Encourage or provide ROM exercises   -Use appropriate equipment to lift or move patient in bed    Skin Care:  -Avoid use of baby powder, tape, friction and shearing, hot water or constrictive clothing  -Do not massage red bony areas      Outcome: Progressing  Goal: Incision(s), wounds(s) or drain site(s) healing without S/S of infection  Description: INTERVENTIONS  - Assess and document dressing, incision, wound bed, drain sites and surrounding tissue  - Provide patient and family education  - Perform skin care/dressing changes every shift  Outcome: Progressing     Problem: MOBILITY - ADULT  Goal: Maintain or return to baseline ADL function  Description: INTERVENTIONS:  -  Assess patient's ability to carry out ADLs; assess patient's baseline for ADL function and identify physical deficits which impact ability to perform ADLs (bathing, care of mouth/teeth, toileting, grooming, dressing, etc )  - Assess/evaluate cause of self-care deficits   - Assess range of motion  - Assess patient's mobility; develop plan if impaired  - Assess patient's need for assistive devices and provide as appropriate  - Encourage maximum independence but intervene and supervise when necessary  - Involve family in performance of ADLs  - Assess for home care needs following discharge   - Consider OT consult to assist with ADL evaluation and planning for discharge  - Provide patient education as appropriate  Outcome: Progressing  Goal: Maintains/Returns to pre admission functional level  Description: INTERVENTIONS:  - Perform BMAT or MOVE assessment daily    - Set and communicate daily mobility goal to care team and patient/family/caregiver     - Collaborate with rehabilitation services on mobility goals if consulted  - Out of bed for toileting  - Record patient progress and toleration of activity level   Outcome: Progressing     Problem: Potential for Falls  Goal: Patient will remain free of falls  Description: INTERVENTIONS:  - Educate patient/family on patient safety including physical limitations  - Instruct patient to call for assistance with activity   - Consult OT/PT to assist with strengthening/mobility   - Keep Call bell within reach  - Keep bed low and locked with side rails adjusted as appropriate  - Keep care items and personal belongings within reach  - Initiate and maintain comfort rounds  - Make Fall Risk Sign visible to staff  - Apply yellow socks and bracelet for high fall risk patients  - Consider moving patient to room near nurses station  Outcome: Progressing

## 2022-10-06 NOTE — PROGRESS NOTES
Progress Note - General Surgery  : [facility] Resident on Callie Tam 78 y o  male MRN: [id number]  Unit/Bed#: E5 -01 Encounter: [id number]      Assessment:  72yo M with wound drainage  s/p ex-lap, LANETTE, reduction of strangulated hernia, abthera 9/14   then ex-lap with hernia repair and closure 9/15 with Dr Kerry Pagan  Plan:  Regular diet  Likely IVF off today  Wound vac change by surgery team today (MWF)  Anticipate d/c home soon, possibly today  Eliquis for PE, continue as outpatient      Subjective: No acute complaints      Objective:     Physical Exam:  GEN: NAD   Ab: Soft, NT/ND, vac sucked down with light SS in tubing  Lung: Normal effort   CV: RRR   Extrem: No CCE   Neuro: A+Ox3       I/O       10/04 0701  10/05 0700 10/05 0701  10/06 0700 10/06 0701  10/07 0700    IV Piggyback  200     Total Intake(mL/kg)  200 (1 2)     Urine (mL/kg/hr)   300 (0 3)    Drains  75     Total Output  75 300    Net  +125 -300                 Lab, Imaging and other studies: I have personally reviewed pertinent reports    , CBC with diff:   Lab Results   Component Value Date    WBC 11 21 (H) 10/06/2022    HGB 8 7 (L) 10/06/2022    HCT 26 7 (L) 10/06/2022    MCV 96 10/06/2022     10/06/2022    MCH 31 4 10/06/2022    MCHC 32 6 10/06/2022    RDW 14 9 10/06/2022    MPV 8 6 (L) 10/06/2022    NRBC 0 10/06/2022   , BMP/CMP:   Lab Results   Component Value Date    SODIUM 134 (L) 10/06/2022    K 3 8 10/06/2022     10/06/2022    CO2 28 10/06/2022    BUN 16 10/06/2022    CREATININE 1 51 (H) 10/06/2022    CALCIUM 8 7 10/06/2022    EGFR 47 10/06/2022         VTE Pharmacologic Prophylaxis: Lise Yousif  10/6/2022 1:53 PM

## 2022-10-06 NOTE — CASE MANAGEMENT
Case Management Assessment & Discharge Planning Note    Patient name Dory Pradhan  Location [address] [address] /E5 MS 21 -* MRN [id number]  : 1955 Date 10/6/2022       Current Admission Date: 10/5/2022  Current Admission Diagnosis:Wound drainage   Patient Active Problem List    Diagnosis Date Noted   • Wound drainage 10/06/2022   • Pulmonary embolism ([facility] ) 2022   • Anemia 2022   • Strangulated hernia of abdominal wall 2022   • COVID-19 2022   • Right foot pain 2019   • Class 3 severe obesity due to excess calories with serious comorbidity and body mass index (BMI) of 50 0 to 59 9 in adult ([facility] ) 2019   • Lymphedema of both lower extremities 2019   • CKD (chronic kidney disease), stage III ([facility] ) 2018   • HDL deficiency 2018   • Gout 2018   • DELIA (obstructive sleep apnea) 2018   • Allergic rhinitis 2018   • Essential hypertension 2018   • Kidney stones 2018      LOS (days): 1  Geometric Mean LOS (GMLOS) (days): 2 90  Days to GMLOS:2     OBJECTIVE:  PATIENT READMITTED TO HOSPITAL  Risk of Unplanned Readmission Score: 19 84         Current admission status: Inpatient       Preferred Pharmacy:   [address] [place] [address], [address] [place] [address]  Phone: [phone] Fax: [phone]    [facility]/pharmacy #[id number] [facility] [address]  Phone: [phone] Fax: [phone]    Primary Care Provider: Josefina Deleon MD    Primary Insurance: MEDICARE  Secondary Insurance: ISAC    ASSESSMENT:  [address] [facility]   [facility]   Primary Phone: [phone] (Mobile)  Home Phone: [phone]               Readmission Root Cause  30 Day Readmission: Yes  Who directed you to return to the hospital?: [facility]  Did you understand whom to contact if you had questions or problems?: Yes  Patient was readmitted due to: Wound Drainage  Action Plan: Surgery / Podiatry addressed    Patient Information  Admitted from[de-identified] Home  Mental Status: Alert  During Assessment patient was accompanied by: Spouse  Assessment information provided by[de-identified] Spouse  Primary Caregiver: Family  Caregiver's Name[de-identified] Mireya Chin  [phone] Daniel Dotson)  Caregiver's Telephone Number[de-identified] Mireya [facility]  [phone] (M)  Support Systems: Self, Spouse/significant other  [place] of Residence: [address] [place] [address] do you live in?: [address] of Daily Living Prior to Admission  Does patient currently have [facility]?: Yes  [facility]  Type of Current Home Care Services: Nurse visit, Home PT, Home OT  [address] [place] [address][de-identified] [address] [place] [address] Provider[de-identified] PCP      DISCHARGE DETAILS:    Discharge planning discussed with[de-identified] Patient and [facility]  [phone] (M)     Freedom of Choice: Yes  Comments - Freedom of Choice: Plan is for Pt to return to home with [facility] for SN/PT/OT - wound care & therapy     CM contacted family/caregiver?: Yes (Present at Bedside)  Were Treatment Team discharge recommendations reviewed with patient/caregiver?: Yes  Did patient/caregiver verbalize understanding of patient care needs?: Yes  Were patient/caregiver advised of the risks associated with not following Treatment Team discharge recommendations?: Yes    Contacts  Patient Contacts: [facility]  [phone] (M)  Relationship to Patient[de-identified] Family  Contact Method:  In Person  Phone Number: [facility]  [phone] Danielaamir Dotson)  Reason/Outcome: Continuity of Care, Emergency Contact, Discharge [address]         Is the patient interested in [facility] at discharge?: Yes  [address] requested[de-identified] Nursing, Occupational Therapy, Physical Therapy, Medical Social Work  Home Health Agency Name[de-identified] [facility] Provider[de-identified] PCP  [facility] Tyrone Vaughan Services Needed[de-identified] Evaluate Functional Status and Safety, Gait/ADL Training, Post-Op Care and Assessment, Strengthening/Theraputic Exercises to Improve Function, Wound/Ostomy Care  Homebound Criteria Met[de-identified] Requires the Assistance of Another Person for Safe Ambulation or to Leave the Home, Uses an Assist Device (i e  cane, walker, etc)  Supporting Clincal Findings[de-identified] Limited Endurance, Fatigues Easliy in Short Distances    Discharge Destination Plan[de-identified] Home with [facility] is for Pt to discharge with existing wound vac, however Pts spouse informed CM that she is not adequately supplied for wound vac changes  Spouse states that she is changing the canisters frequently, as they are filling frequently  Spouse informed CM that [facility]NA RN was limited to how many she was able to order for the family  CM called [facility] to investigate  Spoke with [employer]  As per [facility] rep- Orders will be filled to an extent, the marked as excessive - the "excessive" order will be submitted to the insurance for coverage  If insurance does not cover, Pt will be billed foir the additional amount of supplies, and labled as a "co-pay"  As per [facility] rep- Pt does not have an "excessive" order on file yet  CM sent message to [facility] intake requesting additional information about the supplies, and to coordinate additional canister orders to [facility]  CM following

## 2022-10-06 NOTE — PROGRESS NOTES
Progress Note - General Surgery   Anna Maher 78 y o  male MRN: [id number]  Unit/Bed#: E5 -01 Encounter: [id number]    Assessment:  72yo M with wound drainage  s/p ex-lap, LANETTE, reduction of strangulated hernia, abthera 9/14   then ex-lap with hernia repair and closure 9/15 with Dr Kerry Del Rosario  Creatinine: 1 51 (1 53)    Plan:  - cont regular diet  - cont mIVF for [place]  - cont home meds, plan to continue xarelto at home for PE  - wound vac changes per surgery team, monitor output and character  - appreciate CM assistance for home wound vac and VNA    Subjective/Objective   Subjective:   Febrile overnight to 100 5F  resolved with tylenol  Nurse reported bright red blood in stool, but patient says this is baseline 2/2 his hemorrhoids  Objective:     Blood pressure 136/71, pulse 86, temperature 98 9 °F (37 2 °C), resp  rate 18, weight (!) 165 kg (364 lb 3 2 oz), SpO2 97 %  ,Body mass index is 53 78 kg/m²  Intake/Output Summary (Last 24 hours) at 10/6/2022 3871  Last data filed at 10/5/2022 1429  Gross per 24 hour   Intake 200 ml   Output --   Net 200 ml       Invasive Devices  Report    Peripheral Intravenous Line  Duration           Peripheral IV 10/05/22 Left;Upper Arm <1 day                Physical Exam:  General: No acute distress  Neuro: alert and oriented  HEENT: moist mucous membranes  CV: Well perfused, regular rate and rhythm  Lungs: Normal work of breathing, no increased respiratory effort  Abdomen: Soft, non-tender, non-distended   Midline and RLQ wound vacs in place with good seal  Extremities: No edema, clubbing or cyanosis  Skin: Warm, dry        Juan Cerda MD  General Surgery PGY1

## 2022-10-07 LAB
ANION GAP SERPL CALCULATED.3IONS-SCNC: 4 MMOL/L (ref 4–13)
BACTERIA WND AEROBE CULT: ABNORMAL
BASOPHILS # BLD AUTO: 0.05 THOUSANDS/ΜL (ref 0–0.1)
BASOPHILS NFR BLD AUTO: 1 % (ref 0–1)
BUN SERPL-MCNC: 15 MG/DL (ref 5–25)
CALCIUM SERPL-MCNC: 8.6 MG/DL (ref 8.3–10.1)
CHLORIDE SERPL-SCNC: 101 MMOL/L (ref 96–108)
CO2 SERPL-SCNC: 30 MMOL/L (ref 21–32)
CREAT SERPL-MCNC: 1.29 MG/DL (ref 0.6–1.3)
EOSINOPHIL # BLD AUTO: 0.15 THOUSAND/ΜL (ref 0–0.61)
EOSINOPHIL NFR BLD AUTO: 2 % (ref 0–6)
ERYTHROCYTE [DISTWIDTH] IN BLOOD BY AUTOMATED COUNT: 14.9 % (ref 11.6–15.1)
GFR SERPL CREATININE-BSD FRML MDRD: 57 ML/MIN/1.73SQ M
GLUCOSE SERPL-MCNC: 104 MG/DL (ref 65–140)
GRAM STN SPEC: ABNORMAL
GRAM STN SPEC: ABNORMAL
HCT VFR BLD AUTO: 27.7 % (ref 36.5–49.3)
HGB BLD-MCNC: 8.6 G/DL (ref 12–17)
IMM GRANULOCYTES # BLD AUTO: 0.04 THOUSAND/UL (ref 0–0.2)
IMM GRANULOCYTES NFR BLD AUTO: 1 % (ref 0–2)
LYMPHOCYTES # BLD AUTO: 1.58 THOUSANDS/ΜL (ref 0.6–4.47)
LYMPHOCYTES NFR BLD AUTO: 20 % (ref 14–44)
MCH RBC QN AUTO: 29.9 PG (ref 26.8–34.3)
MCHC RBC AUTO-ENTMCNC: 31 G/DL (ref 31.4–37.4)
MCV RBC AUTO: 96 FL (ref 82–98)
MONOCYTES # BLD AUTO: 0.62 THOUSAND/ΜL (ref 0.17–1.22)
MONOCYTES NFR BLD AUTO: 8 % (ref 4–12)
NEUTROPHILS # BLD AUTO: 5.64 THOUSANDS/ΜL (ref 1.85–7.62)
NEUTS SEG NFR BLD AUTO: 68 % (ref 43–75)
NRBC BLD AUTO-RTO: 0 /100 WBCS
PLATELET # BLD AUTO: 260 THOUSANDS/UL (ref 149–390)
PMV BLD AUTO: 9 FL (ref 8.9–12.7)
POTASSIUM SERPL-SCNC: 3.8 MMOL/L (ref 3.5–5.3)
RBC # BLD AUTO: 2.88 MILLION/UL (ref 3.88–5.62)
SODIUM SERPL-SCNC: 135 MMOL/L (ref 135–147)
WBC # BLD AUTO: 8.08 THOUSAND/UL (ref 4.31–10.16)

## 2022-10-07 PROCEDURE — 80048 BASIC METABOLIC PNL TOTAL CA: CPT | Performed by: SURGERY

## 2022-10-07 PROCEDURE — 85025 COMPLETE CBC W/AUTO DIFF WBC: CPT | Performed by: SURGERY

## 2022-10-07 PROCEDURE — 99024 POSTOP FOLLOW-UP VISIT: CPT | Performed by: SURGERY

## 2022-10-07 PROCEDURE — 97606 NEG PRS WND THER DME>50 SQCM: CPT | Performed by: PHYSICIAN ASSISTANT

## 2022-10-07 RX ORDER — SULFAMETHOXAZOLE AND TRIMETHOPRIM 800; 160 MG/1; MG/1
1 TABLET ORAL EVERY 12 HOURS SCHEDULED
Status: DISCONTINUED | OUTPATIENT
Start: 2022-10-07 | End: 2022-10-12 | Stop reason: HOSPADM

## 2022-10-07 RX ADMIN — TRAVOPROST 1 DROP: 0.04 SOLUTION OPHTHALMIC at 21:55

## 2022-10-07 RX ADMIN — TAMSULOSIN HYDROCHLORIDE 0.4 MG: 0.4 CAPSULE ORAL at 21:55

## 2022-10-07 RX ADMIN — SULFAMETHOXAZOLE AND TRIMETHOPRIM 1 TABLET: 800; 160 TABLET ORAL at 14:04

## 2022-10-07 RX ADMIN — SULFAMETHOXAZOLE AND TRIMETHOPRIM 1 TABLET: 800; 160 TABLET ORAL at 21:55

## 2022-10-07 RX ADMIN — METOPROLOL SUCCINATE 100 MG: 50 TABLET, EXTENDED RELEASE ORAL at 08:26

## 2022-10-07 RX ADMIN — ALLOPURINOL 100 MG: 100 TABLET ORAL at 08:26

## 2022-10-07 RX ADMIN — TAMSULOSIN HYDROCHLORIDE 0.4 MG: 0.4 CAPSULE ORAL at 08:26

## 2022-10-07 RX ADMIN — NYSTATIN: 100000 POWDER TOPICAL at 08:27

## 2022-10-07 RX ADMIN — APIXABAN 5 MG: 5 TABLET, FILM COATED ORAL at 17:17

## 2022-10-07 RX ADMIN — LISINOPRIL 20 MG: 20 TABLET ORAL at 08:26

## 2022-10-07 RX ADMIN — APIXABAN 5 MG: 5 TABLET, FILM COATED ORAL at 08:26

## 2022-10-07 RX ADMIN — FUROSEMIDE 20 MG: 20 TABLET ORAL at 08:26

## 2022-10-07 RX ADMIN — POTASSIUM CITRATE 10 MEQ: 10 TABLET, EXTENDED RELEASE ORAL at 08:26

## 2022-10-07 NOTE — PLAN OF CARE
Problem: PAIN - ADULT  Goal: Verbalizes/displays adequate comfort level or baseline comfort level  Description: Interventions:  - Encourage patient to monitor pain and request assistance  - Assess pain using appropriate pain scale  - Administer analgesics based on type and severity of pain and evaluate response  - Implement non-pharmacological measures as appropriate and evaluate response  - Consider cultural and social influences on pain and pain management  - Notify physician/advanced practitioner if interventions unsuccessful or patient reports new pain  Outcome: Progressing     Problem: INFECTION - ADULT  Goal: Absence or prevention of progression during hospitalization  Description: INTERVENTIONS:  - Assess and monitor for signs and symptoms of infection  - Monitor lab/diagnostic results  - Monitor all insertion sites, i e  indwelling lines, tubes, and drains  - Monitor endotracheal if appropriate and nasal secretions for changes in amount and color  - [place] appropriate cooling/warming therapies per order  - Administer medications as ordered  - Instruct and encourage patient and family to use good hand hygiene technique  - Identify and instruct in appropriate isolation precautions for identified infection/condition  Outcome: Progressing     Problem: SAFETY ADULT  Goal: Patient will remain free of falls  Description: INTERVENTIONS:  - Educate patient/family on patient safety including physical limitations  - Instruct patient to call for assistance with activity   - Consult OT/PT to assist with strengthening/mobility   - Keep Call bell within reach  - Keep bed low and locked with side rails adjusted as appropriate  - Keep care items and personal belongings within reach  - Initiate and maintain comfort rounds  - Make Fall Risk Sign visible to staff  - Apply yellow socks and bracelet for high fall risk patients  - Consider moving patient to room near nurses station  Outcome: Progressing  Goal: Maintain or return to baseline ADL function  Description: INTERVENTIONS:  -  Assess patient's ability to carry out ADLs; assess patient's baseline for ADL function and identify physical deficits which impact ability to perform ADLs (bathing, care of mouth/teeth, toileting, grooming, dressing, etc )  - Assess/evaluate cause of self-care deficits   - Assess range of motion  - Assess patient's mobility; develop plan if impaired  - Assess patient's need for assistive devices and provide as appropriate  - Encourage maximum independence but intervene and supervise when necessary  - Involve family in performance of ADLs  - Assess for home care needs following discharge   - Consider OT consult to assist with ADL evaluation and planning for discharge  - Provide patient education as appropriate  Outcome: Progressing  Goal: Maintains/Returns to pre admission functional level  Description: INTERVENTIONS:  - Perform BMAT or MOVE assessment daily    - Set and communicate daily mobility goal to care team and patient/family/caregiver     - Collaborate with rehabilitation services on mobility goals if consulted  - Out of bed for toileting  - Record patient progress and toleration of activity level   Outcome: Progressing     Problem: SKIN/TISSUE INTEGRITY - ADULT  Goal: Skin Integrity remains intact(Skin Breakdown Prevention)  Description: Assess:  -Perform Mic assessment every shift  -Inspect skin when repositioning, toileting, and assisting with ADLS  -Assess extremities for adequate circulation and sensation     Bed Management:  -Have minimal linens on bed & keep smooth, unwrinkled  -Change linens as needed when moist or perspiring    Toileting:  -Offer bedside commode    Activity:  -Encourage activity and walks on unit  -Encourage or provide ROM exercises   -Use appropriate equipment to lift or move patient in bed    Skin Care:  -Avoid use of baby powder, tape, friction and shearing, hot water or constrictive clothing  -Do not massage red bony areas      Outcome: Progressing  Goal: Incision(s), wounds(s) or drain site(s) healing without S/S of infection  Description: INTERVENTIONS  - Assess and document dressing, incision, wound bed, drain sites and surrounding tissue  - Provide patient and family education  - Perform skin care/dressing changes every shift  Outcome: Progressing     Problem: MOBILITY - ADULT  Goal: Maintain or return to baseline ADL function  Description: INTERVENTIONS:  -  Assess patient's ability to carry out ADLs; assess patient's baseline for ADL function and identify physical deficits which impact ability to perform ADLs (bathing, care of mouth/teeth, toileting, grooming, dressing, etc )  - Assess/evaluate cause of self-care deficits   - Assess range of motion  - Assess patient's mobility; develop plan if impaired  - Assess patient's need for assistive devices and provide as appropriate  - Encourage maximum independence but intervene and supervise when necessary  - Involve family in performance of ADLs  - Assess for home care needs following discharge   - Consider OT consult to assist with ADL evaluation and planning for discharge  - Provide patient education as appropriate  Outcome: Progressing  Goal: Maintains/Returns to pre admission functional level  Description: INTERVENTIONS:  - Perform BMAT or MOVE assessment daily    - Set and communicate daily mobility goal to care team and patient/family/caregiver     - Collaborate with rehabilitation services on mobility goals if consulted  - Out of bed for toileting  - Record patient progress and toleration of activity level   Outcome: Progressing     Problem: Potential for Falls  Goal: Patient will remain free of falls  Description: INTERVENTIONS:  - Educate patient/family on patient safety including physical limitations  - Instruct patient to call for assistance with activity   - Consult OT/PT to assist with strengthening/mobility   - Keep Call bell within reach  - Keep bed low and locked with side rails adjusted as appropriate  - Keep care items and personal belongings within reach  - Initiate and maintain comfort rounds  - Make Fall Risk Sign visible to staff  - Apply yellow socks and bracelet for high fall risk patients  - Consider moving patient to room near nurses station  Outcome: Progressing

## 2022-10-07 NOTE — PROCEDURES
VAC change: 10/7/22 - Abdominal wound x2      VAC location: Right abdominal lower quadrant, Distal midline abdominal incision    Drains:  Unit Type     V A C ulta       Black foam- # applied     3   White foam- # applied     1   Cycle     Continuous       Target Pressure (mmHg)     125       Dressing Status     Clean;Dry; Intact       ABD dressing applied over retention suture part of wound and covered with dermatac drape  Wound description and size:  RLW wound: 12x7x1 5 cm wound with pink tissue    Midline abdominal wound  Retention sutures proximal abdominal wound  8x3x2 cm wound with medial undermining 6 cm and lateral wound 3 cm  Patient tolerated well  New VNA paperwork given to case management

## 2022-10-07 NOTE — CASE MANAGEMENT
Case Management Progress Note    Patient name Rita Preston  [address] [address] /E5 MS 21 -* MRN [id number]  : 1955 Date 10/7/2022       LOS (days): 2  Geometric Mean LOS (GMLOS) (days): 2 90  Days to GMLOS:1 1        OBJECTIVE:        Current admission status: Inpatient  Preferred Pharmacy:   [address] [place] [address]  Phone: [phone] Fax: [phone]    [id number] [place], [address] [address]  Phone: [phone] Fax: [phone]    Primary Care Provider: Xiao Lewis MD    Primary Insurance: MEDICARE  Secondary Insurance: [employer]    PROGRESS NOTE:      Pt may discharge to home today- waiting for final determination from Surgery  CM met w/ Pt to review discussion CM had with [facility] about Pts wound vac canisters  Spouse will be arriving within the hour- Pt reports the discussion will include his wife  CM following to update Pt and his spouse about [facility] order procedures, prior to discharge

## 2022-10-07 NOTE — PROCEDURES
Acute Care Surgery  Bedside V A C  Procedure Note    A timeout was performed with the other members of the team prior to beginning the dressing change  The team remained present to confirm the correct dressing counts on removal of the VAC dressing and was debriefed at the completion of the dressing change  Location of wound: abdomen    Dressings and Foam removed:  1 black sponge, 1 white sponge from the RLQ  No VAC was in place in the midline abdomen    Dimensions of wound:   RLW wound: 12x7x1 5 cm wound with pink tissue    Description of wound: On inspection of the wound today, there was significant drainage from the lower aspect of the midline wound  The RLQ wound is pink with viable tissue in the wound bed  There was no necrotic or nonviable tissue requiring debridement  Approximately 100% of the wound base of RLQ wound is now pink and healthy and there is granulation tissue  The periwound skin of the RLQ wound remains clean and intact and unremarkable  VAC dressing application:  The periwound skin was cleaned and dried  1 black foam was cut to size of the wound and placed into the RLQ wound  1 black foam and 1 white foam were place in the lower aspect of the midline wound  The dressings were then covered with VAC drape  Additional VAC drape and black foam was used to create a bridge between the two wounds and a base for the track pad  The track pad was then placed over the base of black foam  The VAC was then set to 125 mmHg low Continuous suction  The patient tolerated the procedure well and there were no complications  The patient did not require any excisional debridement during today's dressing change  VAC settings:  125 mmHg  Continuous    Wound Images:    midline      RLQ wound        Additional Notes: The [facility] sticker placed over the dressing per protocol  The next [facility] dressing change will be planned for 10/7/2022    The [facility] paperwork was completed and give to the case management staff to arrange home VAC therapy  Dr Emily Carlisle and Dr Carson Malik were present for the dressing change        Karson Simon  10/7/2022 10:02 AM

## 2022-10-07 NOTE — PLAN OF CARE
Problem: PAIN - ADULT  Goal: Verbalizes/displays adequate comfort level or baseline comfort level  Description: Interventions:  - Encourage patient to monitor pain and request assistance  - Assess pain using appropriate pain scale  - Administer analgesics based on type and severity of pain and evaluate response  - Implement non-pharmacological measures as appropriate and evaluate response  - Consider cultural and social influences on pain and pain management  - Notify physician/advanced practitioner if interventions unsuccessful or patient reports new pain  Outcome: Progressing     Problem: INFECTION - ADULT  Goal: Absence or prevention of progression during hospitalization  Description: INTERVENTIONS:  - Assess and monitor for signs and symptoms of infection  - Monitor lab/diagnostic results  - Monitor all insertion sites, i e  indwelling lines, tubes, and drains  - Monitor endotracheal if appropriate and nasal secretions for changes in amount and color  - [place] appropriate cooling/warming therapies per order  - Administer medications as ordered  - Instruct and encourage patient and family to use good hand hygiene technique  - Identify and instruct in appropriate isolation precautions for identified infection/condition  Outcome: Progressing     Problem: SAFETY ADULT  Goal: Patient will remain free of falls  Description: INTERVENTIONS:  - Educate patient/family on patient safety including physical limitations  - Instruct patient to call for assistance with activity   - Consult OT/PT to assist with strengthening/mobility   - Keep Call bell within reach  - Keep bed low and locked with side rails adjusted as appropriate  - Keep care items and personal belongings within reach  - Initiate and maintain comfort rounds  - Make Fall Risk Sign visible to staff  - Apply yellow socks and bracelet for high fall risk patients  - Consider moving patient to room near nurses station  Outcome: Progressing  Goal: Maintain or return to baseline ADL function  Description: INTERVENTIONS:  -  Assess patient's ability to carry out ADLs; assess patient's baseline for ADL function and identify physical deficits which impact ability to perform ADLs (bathing, care of mouth/teeth, toileting, grooming, dressing, etc )  - Assess/evaluate cause of self-care deficits   - Assess range of motion  - Assess patient's mobility; develop plan if impaired  - Assess patient's need for assistive devices and provide as appropriate  - Encourage maximum independence but intervene and supervise when necessary  - Involve family in performance of ADLs  - Assess for home care needs following discharge   - Consider OT consult to assist with ADL evaluation and planning for discharge  - Provide patient education as appropriate  Outcome: Progressing  Goal: Maintains/Returns to pre admission functional level  Description: INTERVENTIONS:  - Perform BMAT or MOVE assessment daily    - Set and communicate daily mobility goal to care team and patient/family/caregiver     - Collaborate with rehabilitation services on mobility goals if consulted  - Out of bed for toileting  - Record patient progress and toleration of activity level   Outcome: Progressing     Problem: SKIN/TISSUE INTEGRITY - ADULT  Goal: Skin Integrity remains intact(Skin Breakdown Prevention)  Description: Assess:  -Perform Mic assessment every shift  -Inspect skin when repositioning, toileting, and assisting with ADLS  -Assess extremities for adequate circulation and sensation     Bed Management:  -Have minimal linens on bed & keep smooth, unwrinkled  -Change linens as needed when moist or perspiring    Toileting:  -Offer bedside commode    Activity:  -Encourage activity and walks on unit  -Encourage or provide ROM exercises   -Use appropriate equipment to lift or move patient in bed    Skin Care:  -Avoid use of baby powder, tape, friction and shearing, hot water or constrictive clothing  -Do not massage red bony areas      Outcome: Progressing  Goal: Incision(s), wounds(s) or drain site(s) healing without S/S of infection  Description: INTERVENTIONS  - Assess and document dressing, incision, wound bed, drain sites and surrounding tissue  - Provide patient and family education  - Perform skin care/dressing changes every shift  Outcome: Progressing     Problem: MOBILITY - ADULT  Goal: Maintain or return to baseline ADL function  Description: INTERVENTIONS:  -  Assess patient's ability to carry out ADLs; assess patient's baseline for ADL function and identify physical deficits which impact ability to perform ADLs (bathing, care of mouth/teeth, toileting, grooming, dressing, etc )  - Assess/evaluate cause of self-care deficits   - Assess range of motion  - Assess patient's mobility; develop plan if impaired  - Assess patient's need for assistive devices and provide as appropriate  - Encourage maximum independence but intervene and supervise when necessary  - Involve family in performance of ADLs  - Assess for home care needs following discharge   - Consider OT consult to assist with ADL evaluation and planning for discharge  - Provide patient education as appropriate  Outcome: Progressing  Goal: Maintains/Returns to pre admission functional level  Description: INTERVENTIONS:  - Perform BMAT or MOVE assessment daily    - Set and communicate daily mobility goal to care team and patient/family/caregiver     - Collaborate with rehabilitation services on mobility goals if consulted  - Out of bed for toileting  - Record patient progress and toleration of activity level   Outcome: Progressing     Problem: Potential for Falls  Goal: Patient will remain free of falls  Description: INTERVENTIONS:  - Educate patient/family on patient safety including physical limitations  - Instruct patient to call for assistance with activity   - Consult OT/PT to assist with strengthening/mobility   - Keep Call bell within reach  - Keep bed low and locked with side rails adjusted as appropriate  - Keep care items and personal belongings within reach  - Initiate and maintain comfort rounds  - Make Fall Risk Sign visible to staff  - Apply yellow socks and bracelet for high fall risk patients  - Consider moving patient to room near nurses station  Outcome: Progressing     Problem: Nutrition/Hydration-ADULT  Goal: Nutrient/Hydration intake appropriate for improving, restoring or maintaining nutritional needs  Description: Monitor and assess patient's nutrition/hydration status for malnutrition  Collaborate with interdisciplinary team and initiate plan and interventions as ordered  Monitor patient's weight and dietary intake as ordered or per policy  Utilize nutrition screening tool and intervene as necessary  Determine patient's food preferences and provide high-protein, high-caloric foods as appropriate       INTERVENTIONS:  - Monitor oral intake, urinary output, labs, and treatment plans  - Assess nutrition and hydration status and recommend course of action  - Evaluate amount of meals eaten  - Assist patient with eating if necessary   - Allow adequate time for meals  - Recommend/ encourage appropriate diets, oral nutritional supplements, and vitamin/mineral supplements  - Order, calculate, and assess calorie counts as needed  - Recommend, monitor, and adjust tube feedings and TPN/PPN based on assessed needs  - Assess need for intravenous fluids  - Provide specific nutrition/hydration education as appropriate  - Include patient/family/caregiver in decisions related to nutrition  Outcome: Progressing

## 2022-10-08 LAB
ANION GAP SERPL CALCULATED.3IONS-SCNC: 5 MMOL/L (ref 4–13)
BACTERIA UR CULT: ABNORMAL
BASOPHILS # BLD AUTO: 0.05 THOUSANDS/ΜL (ref 0–0.1)
BASOPHILS NFR BLD AUTO: 1 % (ref 0–1)
BUN SERPL-MCNC: 14 MG/DL (ref 5–25)
CALCIUM SERPL-MCNC: 8.2 MG/DL (ref 8.3–10.1)
CHLORIDE SERPL-SCNC: 104 MMOL/L (ref 96–108)
CO2 SERPL-SCNC: 28 MMOL/L (ref 21–32)
CREAT SERPL-MCNC: 1.49 MG/DL (ref 0.6–1.3)
EOSINOPHIL # BLD AUTO: 0.18 THOUSAND/ΜL (ref 0–0.61)
EOSINOPHIL NFR BLD AUTO: 2 % (ref 0–6)
ERYTHROCYTE [DISTWIDTH] IN BLOOD BY AUTOMATED COUNT: 14.7 % (ref 11.6–15.1)
GFR SERPL CREATININE-BSD FRML MDRD: 48 ML/MIN/1.73SQ M
GLUCOSE SERPL-MCNC: 122 MG/DL (ref 65–140)
HCT VFR BLD AUTO: 27 % (ref 36.5–49.3)
HGB BLD-MCNC: 8.8 G/DL (ref 12–17)
IMM GRANULOCYTES # BLD AUTO: 0.02 THOUSAND/UL (ref 0–0.2)
IMM GRANULOCYTES NFR BLD AUTO: 0 % (ref 0–2)
LYMPHOCYTES # BLD AUTO: 1.41 THOUSANDS/ΜL (ref 0.6–4.47)
LYMPHOCYTES NFR BLD AUTO: 19 % (ref 14–44)
MCH RBC QN AUTO: 31 PG (ref 26.8–34.3)
MCHC RBC AUTO-ENTMCNC: 32.6 G/DL (ref 31.4–37.4)
MCV RBC AUTO: 95 FL (ref 82–98)
MONOCYTES # BLD AUTO: 0.51 THOUSAND/ΜL (ref 0.17–1.22)
MONOCYTES NFR BLD AUTO: 7 % (ref 4–12)
NEUTROPHILS # BLD AUTO: 5.39 THOUSANDS/ΜL (ref 1.85–7.62)
NEUTS SEG NFR BLD AUTO: 71 % (ref 43–75)
NRBC BLD AUTO-RTO: 0 /100 WBCS
PLATELET # BLD AUTO: 229 THOUSANDS/UL (ref 149–390)
PMV BLD AUTO: 8.3 FL (ref 8.9–12.7)
POTASSIUM SERPL-SCNC: 3.9 MMOL/L (ref 3.5–5.3)
RBC # BLD AUTO: 2.84 MILLION/UL (ref 3.88–5.62)
SODIUM SERPL-SCNC: 137 MMOL/L (ref 135–147)
WBC # BLD AUTO: 7.56 THOUSAND/UL (ref 4.31–10.16)

## 2022-10-08 PROCEDURE — 85025 COMPLETE CBC W/AUTO DIFF WBC: CPT | Performed by: SURGERY

## 2022-10-08 PROCEDURE — 99024 POSTOP FOLLOW-UP VISIT: CPT | Performed by: SURGERY

## 2022-10-08 PROCEDURE — 80048 BASIC METABOLIC PNL TOTAL CA: CPT | Performed by: SURGERY

## 2022-10-08 RX ORDER — LANOLIN ALCOHOL/MO/W.PET/CERES
6 CREAM (GRAM) TOPICAL
Status: DISCONTINUED | OUTPATIENT
Start: 2022-10-08 | End: 2022-10-12 | Stop reason: HOSPADM

## 2022-10-08 RX ORDER — HEPARIN SODIUM 5000 [USP'U]/ML
5000 INJECTION, SOLUTION INTRAVENOUS; SUBCUTANEOUS EVERY 8 HOURS SCHEDULED
Status: DISCONTINUED | OUTPATIENT
Start: 2022-10-08 | End: 2022-10-12 | Stop reason: HOSPADM

## 2022-10-08 RX ADMIN — HEPARIN SODIUM 5000 UNITS: 5000 INJECTION INTRAVENOUS; SUBCUTANEOUS at 17:31

## 2022-10-08 RX ADMIN — HEPARIN SODIUM 5000 UNITS: 5000 INJECTION INTRAVENOUS; SUBCUTANEOUS at 22:24

## 2022-10-08 RX ADMIN — LISINOPRIL 20 MG: 20 TABLET ORAL at 09:03

## 2022-10-08 RX ADMIN — SULFAMETHOXAZOLE AND TRIMETHOPRIM 1 TABLET: 800; 160 TABLET ORAL at 20:43

## 2022-10-08 RX ADMIN — NYSTATIN: 100000 POWDER TOPICAL at 09:00

## 2022-10-08 RX ADMIN — ALLOPURINOL 100 MG: 100 TABLET ORAL at 09:01

## 2022-10-08 RX ADMIN — Medication 6 MG: at 22:24

## 2022-10-08 RX ADMIN — NYSTATIN: 100000 POWDER TOPICAL at 17:16

## 2022-10-08 RX ADMIN — FUROSEMIDE 20 MG: 20 TABLET ORAL at 09:03

## 2022-10-08 RX ADMIN — METOPROLOL SUCCINATE 100 MG: 50 TABLET, EXTENDED RELEASE ORAL at 09:03

## 2022-10-08 RX ADMIN — TAMSULOSIN HYDROCHLORIDE 0.4 MG: 0.4 CAPSULE ORAL at 09:01

## 2022-10-08 RX ADMIN — APIXABAN 5 MG: 5 TABLET, FILM COATED ORAL at 09:01

## 2022-10-08 RX ADMIN — TRAVOPROST 1 DROP: 0.04 SOLUTION OPHTHALMIC at 22:24

## 2022-10-08 RX ADMIN — POTASSIUM CITRATE 10 MEQ: 10 TABLET, EXTENDED RELEASE ORAL at 09:00

## 2022-10-08 RX ADMIN — TAMSULOSIN HYDROCHLORIDE 0.4 MG: 0.4 CAPSULE ORAL at 20:44

## 2022-10-08 RX ADMIN — SULFAMETHOXAZOLE AND TRIMETHOPRIM 1 TABLET: 800; 160 TABLET ORAL at 09:01

## 2022-10-08 NOTE — PROGRESS NOTES
Progress Note - General Surgery  : [facility] Resident on Shila Tam 78 y o  male MRN: [id number]  Unit/Bed#: E5 -01 Encounter: [id number]      Assessment:  74yo M with wound drainage  s/p ex-lap, LANETTE, reduction of strangulated hernia, abthera 9/14   then ex-lap with hernia repair and closure 9/15 with Dr Vandana Nicholson:  Regular diet  MWF wound vac changes  Encourage OOB, ambulation  Will discuss possible inpatient debridement of abdominal wound this upcoming week  CM for dispo, likely will go home with a wound vac        Subjective: No acute complaints, wants to go home, tolerating his diet, amenable to increasing ambulation      Objective:     Physical Exam:  GEN: NAD   Ab: Soft, NT/ND, vac sucked down appropriately with tan SS in canister  Lung: Normal effort   CV: RRR   Neuro: A+Ox3       I/O       10/06 0701  10/07 0700 10/07 0701  10/08 0700 10/08 0701  10/09 0700    I V  (mL/kg)  1000 (6 1)     IV Piggyback       Total Intake(mL/kg)  1000 (6 1)     Urine (mL/kg/hr) 300 (0 1) 250 (0 1) 850 (0 5)    Drains 85 200 150    Total Output     Net -385 +550 -1000                 Lab, Imaging and other studies: I have personally reviewed pertinent reports    , CBC with diff:   Lab Results   Component Value Date    WBC 7 56 10/08/2022    HGB 8 8 (L) 10/08/2022    HCT 27 0 (L) 10/08/2022    MCV 95 10/08/2022     10/08/2022    MCH 31 0 10/08/2022    MCHC 32 6 10/08/2022    RDW 14 7 10/08/2022    MPV 8 3 (L) 10/08/2022    NRBC 0 10/08/2022   , BMP/CMP:   Lab Results   Component Value Date    SODIUM 137 10/08/2022    K 3 9 10/08/2022     10/08/2022    CO2 28 10/08/2022    BUN 14 10/08/2022    CREATININE 1 49 (H) 10/08/2022    CALCIUM 8 2 (L) 10/08/2022    EGFR 48 10/08/2022         VTE Pharmacologic Prophylaxis: Heparin        Kyle Springer  10/8/2022 4:26 PM

## 2022-10-08 NOTE — DISCHARGE INSTRUCTIONS
Wound VAC  Keep wound VAC on suction pressure 125mmHg   MWF VAC changes with one piece of black sponge in RLQ wound and one white sponge under one black sponge in the distal aspect of the midline incision  Maxsorb to cover upper midline incision, cover both vac sponges and maxsorb with vac drape  Bridge RLQ and midline vac/maxsorb wounds together with a bridge and apply track pad  Remona Ano Instructions  Dr Tiffany Palumbo MD, [facility]  [phone]    1  General: You will feel pulling sensations around the wound or funny aches and pains around the incisions  This is normal  Even minor surgery is a change in your body and this is your body’s way of reaction to it  If you have had abdominal surgery, it may help to support the incision with a small pillow or blanket for comfort when moving or coughing  2  Wound care:  Okay to shower  The glue will fall off over the next week or 2     3  Water: You may shower over the wound, unless there are drain tubes left in place  Do not bathe or use a pool or hot tub until cleared by the physician  4  Activity: You may go up and down stairs, walk as much as you are comfortable, but walk at least 3 times each day  If you have had abdominal surgery, do not lift anything heavier than 15 pounds for at least 2-4 weeks, unless cleared by the doctor  5  Diet: You may resume a regular diet  If you had a same-day surgery or overnight stay surgery, he may wish to eat lightly for a few days: soups, crackers, and sandwiches  You may resume a regular diet when ready  6  Medications: Resume all of your previous medications, unless told otherwise by the doctor  Avoid aspirin products for 2-3 days after the date of surgery  You may, at that time, began to take them again   Tylenol and Ibuprofen are always fine, unless you are taking any narcotic pain medication containing Tylenol (such as Percocet, Darvocet, Vicodin, or anything containing acetaminophen)  Do not take Tylenol if you're taking these medications  You do not need to take the narcotic pain medications unless you are having significant pain and discomfort  7  Driving: You will need someone to drive you home on the day of surgery  Do not drive or make any important decisions while on narcotic pain medication or 24 hours and after anesthesia or sedation for surgery  Generally, you may drive when your off all narcotic pain medications  8  Upset Stomach: You may take Maalox, Tums, or similar items for an upset stomach  If your narcotic pain medication causes an upset stomach, do not take it on an empty stomach  Try taking it with at least some crackers or toast      9  Constipation: Patients often experienced constipation after surgery  You may take over-the-counter medication for this, such as Metamucil, Senokot, Dulcolax, milk of magnesia, etc  You may take a suppository unless you have had anorectal surgery such as a procedure on your hemorrhoids  If you experience significant nausea or vomiting after abdominal surgery, call the office before trying any of these medications  10  Call the office: If you are experiencing any of the following, fevers above 101 5°, significant nausea or vomiting, if the wound develops drainage and/or is excessive redness around the wound, or if you have significant diarrhea or other worsening symptoms  11  Pain: You may be given a prescription for pain  This will be given to the hospital, the day of surgery  12  Sexual Activity: You may resume sexual activity when you feel ready and comfortable and your incision is sealed and healed without apparent infection risk  13  Urination: If you haven't urinated in 6 hours, go directly to the ER for evaluation for urinary retention  14  Follow-up in 2 weeks

## 2022-10-08 NOTE — PLAN OF CARE
Problem: PAIN - ADULT  Goal: Verbalizes/displays adequate comfort level or baseline comfort level  Description: Interventions:  - Encourage patient to monitor pain and request assistance  - Assess pain using appropriate pain scale  - Administer analgesics based on type and severity of pain and evaluate response  - Implement non-pharmacological measures as appropriate and evaluate response  - Consider cultural and social influences on pain and pain management  - Notify physician/advanced practitioner if interventions unsuccessful or patient reports new pain  Outcome: Progressing     Problem: INFECTION - ADULT  Goal: Absence or prevention of progression during hospitalization  Description: INTERVENTIONS:  - Assess and monitor for signs and symptoms of infection  - Monitor lab/diagnostic results  - Monitor all insertion sites, i e  indwelling lines, tubes, and drains  - Monitor endotracheal if appropriate and nasal secretions for changes in amount and color  - [place] appropriate cooling/warming therapies per order  - Administer medications as ordered  - Instruct and encourage patient and family to use good hand hygiene technique  - Identify and instruct in appropriate isolation precautions for identified infection/condition  Outcome: Progressing     Problem: SAFETY ADULT  Goal: Patient will remain free of falls  Description: INTERVENTIONS:  - Educate patient/family on patient safety including physical limitations  - Instruct patient to call for assistance with activity   - Consult OT/PT to assist with strengthening/mobility   - Keep Call bell within reach  - Keep bed low and locked with side rails adjusted as appropriate  - Keep care items and personal belongings within reach  - Initiate and maintain comfort rounds  - Make Fall Risk Sign visible to staff  - Apply yellow socks and bracelet for high fall risk patients  - Consider moving patient to room near nurses station  Outcome: Progressing  Goal: Maintain or return to baseline ADL function  Description: INTERVENTIONS:  -  Assess patient's ability to carry out ADLs; assess patient's baseline for ADL function and identify physical deficits which impact ability to perform ADLs (bathing, care of mouth/teeth, toileting, grooming, dressing, etc )  - Assess/evaluate cause of self-care deficits   - Assess range of motion  - Assess patient's mobility; develop plan if impaired  - Assess patient's need for assistive devices and provide as appropriate  - Encourage maximum independence but intervene and supervise when necessary  - Involve family in performance of ADLs  - Assess for home care needs following discharge   - Consider OT consult to assist with ADL evaluation and planning for discharge  - Provide patient education as appropriate  Outcome: Progressing  Goal: Maintains/Returns to pre admission functional level  Description: INTERVENTIONS:  - Perform BMAT or MOVE assessment daily    - Set and communicate daily mobility goal to care team and patient/family/caregiver     - Collaborate with rehabilitation services on mobility goals if consulted  - Out of bed for toileting  - Record patient progress and toleration of activity level   Outcome: Progressing     Problem: SKIN/TISSUE INTEGRITY - ADULT  Goal: Skin Integrity remains intact(Skin Breakdown Prevention)  Description: Assess:  -Perform Mic assessment every shift  -Inspect skin when repositioning, toileting, and assisting with ADLS  -Assess extremities for adequate circulation and sensation     Bed Management:  -Have minimal linens on bed & keep smooth, unwrinkled  -Change linens as needed when moist or perspiring    Toileting:  -Offer bedside commode    Activity:  -Encourage activity and walks on unit  -Encourage or provide ROM exercises   -Use appropriate equipment to lift or move patient in bed    Skin Care:  -Avoid use of baby powder, tape, friction and shearing, hot water or constrictive clothing  -Do not massage red bony areas      Outcome: Progressing  Goal: Incision(s), wounds(s) or drain site(s) healing without S/S of infection  Description: INTERVENTIONS  - Assess and document dressing, incision, wound bed, drain sites and surrounding tissue  - Provide patient and family education  - Perform skin care/dressing changes every shift  Outcome: Progressing     Problem: MOBILITY - ADULT  Goal: Maintain or return to baseline ADL function  Description: INTERVENTIONS:  -  Assess patient's ability to carry out ADLs; assess patient's baseline for ADL function and identify physical deficits which impact ability to perform ADLs (bathing, care of mouth/teeth, toileting, grooming, dressing, etc )  - Assess/evaluate cause of self-care deficits   - Assess range of motion  - Assess patient's mobility; develop plan if impaired  - Assess patient's need for assistive devices and provide as appropriate  - Encourage maximum independence but intervene and supervise when necessary  - Involve family in performance of ADLs  - Assess for home care needs following discharge   - Consider OT consult to assist with ADL evaluation and planning for discharge  - Provide patient education as appropriate  Outcome: Progressing  Goal: Maintains/Returns to pre admission functional level  Description: INTERVENTIONS:  - Perform BMAT or MOVE assessment daily    - Set and communicate daily mobility goal to care team and patient/family/caregiver     - Collaborate with rehabilitation services on mobility goals if consulted  - Out of bed for toileting  - Record patient progress and toleration of activity level   Outcome: Progressing     Problem: Potential for Falls  Goal: Patient will remain free of falls  Description: INTERVENTIONS:  - Educate patient/family on patient safety including physical limitations  - Instruct patient to call for assistance with activity   - Consult OT/PT to assist with strengthening/mobility   - Keep Call bell within reach  - Keep bed low and locked with side rails adjusted as appropriate  - Keep care items and personal belongings within reach  - Initiate and maintain comfort rounds  - Make Fall Risk Sign visible to staff  - Apply yellow socks and bracelet for high fall risk patients  - Consider moving patient to room near nurses station  Outcome: Progressing     Problem: Nutrition/Hydration-ADULT  Goal: Nutrient/Hydration intake appropriate for improving, restoring or maintaining nutritional needs  Description: Monitor and assess patient's nutrition/hydration status for malnutrition  Collaborate with interdisciplinary team and initiate plan and interventions as ordered  Monitor patient's weight and dietary intake as ordered or per policy  Utilize nutrition screening tool and intervene as necessary  Determine patient's food preferences and provide high-protein, high-caloric foods as appropriate       INTERVENTIONS:  - Monitor oral intake, urinary output, labs, and treatment plans  - Assess nutrition and hydration status and recommend course of action  - Evaluate amount of meals eaten  - Assist patient with eating if necessary   - Allow adequate time for meals  - Recommend/ encourage appropriate diets, oral nutritional supplements, and vitamin/mineral supplements  - Order, calculate, and assess calorie counts as needed  - Recommend, monitor, and adjust tube feedings and TPN/PPN based on assessed needs  - Assess need for intravenous fluids  - Provide specific nutrition/hydration education as appropriate  - Include patient/family/caregiver in decisions related to nutrition  Outcome: Progressing

## 2022-10-08 NOTE — CASE MANAGEMENT
Case Management Discharge Planning Note    Patient name [facility] /E5 MS [facility] -* MRN [id number]  : 1955 Date 10/8/2022       Current Admission Date: 10/5/2022  Current Admission Diagnosis:Wound drainage   Patient Active Problem List    Diagnosis Date Noted   • Wound drainage 10/06/2022   • Pulmonary embolism ([facility] ) 2022   • Anemia 2022   • Strangulated hernia of abdominal wall 2022   • COVID-19 2022   • Right foot pain 2019   • Class 3 severe obesity due to excess calories with serious comorbidity and body mass index (BMI) of 50 0 to 59 9 in adult ([facility] ) 2019   • Lymphedema of both lower extremities 2019   • CKD (chronic kidney disease), stage III ([facility] ) 2018   • HDL deficiency 2018   • Gout 2018   • DELIA (obstructive sleep apnea) 2018   • Allergic rhinitis 2018   • Essential hypertension 2018   • Kidney stones 2018      LOS (days): 3  Geometric Mean LOS (GMLOS) (days): 2 90  Days to GMLOS:0 2     OBJECTIVE:  Risk of Unplanned Readmission Score: 19 31         Current admission status: Inpatient   Preferred Pharmacy:   [address] [place] [address], [address] [place] [address]  Phone: [phone] Fax: [phone]    [facility]/pharmacy #[id number] [address]  Phone: [phone] Fax: [phone]    Primary Care Provider: Daniela Fajardo MD    Primary Insurance: MEDICARE  Secondary Insurance: [employer]    DISCHARGE DETAILS:    Discharge planning discussed with[de-identified] Patient  Freedom of Choice: Yes  Comments - Freedom of Choice: Pt discharging home today   Pt states it was recommended he stay through the weekend to have an additional surgery on Monday, however Pt is stating that so far he has n ot been able to sleep and wants to go home to get some rest  Pt was tearful when speaking about his wish to return home and wondering out loud why he has had these issues which keep him from living as he used to  CM provided a calming presence and active listening      Contacts  Patient Contacts: Sondra Brucetrevon  [phone] (M)  Relationship to Patient[de-identified] Family  Contact Method: Phone  Phone Number: Sondra Lund  [phone] Cammy Baum  Reason/Outcome: Continuity of Care, Emergency Contact, Discharge Planning    Discharge Destination Plan[de-identified] Home with Marei at Discharge : Family   Transfer Mode: [place] by: Family member     IMM Given (Date):: 10/08/22  IMM Given to[de-identified] Patient

## 2022-10-08 NOTE — PROGRESS NOTES
Progress Note - General Surgery   Benigno Members 78 y o  male MRN: [id number]  Unit/Bed#: E5 -01 Encounter: [id number]    Assessment:  72yo M with wound drainage  s/p ex-lap, LANETTE, reduction of strangulated hernia, abthera 9/14   then ex-lap with hernia repair and closure 9/15 with Dr Vick Blade:  - cont regular diet  - MWF wound vac changes by surgery team  - cont eliquis for PE  - considering continuation of inpatient admission in anticipation of possible debridement in OR next week  - appreciate CM assistance in dispo planning    Subjective/Objective   Subjective:   No acute events overnight  Hasn't been sleeping well, has body aches from not fitting well into the hospital beds  Objective:     Blood pressure 101/51, pulse 86, temperature 98 4 °F (36 9 °C), resp  rate 18, height 5' 9" (1 753 m), weight (!) 165 kg (364 lb 3 2 oz), SpO2 96 %  ,Body mass index is 53 78 kg/m²  Intake/Output Summary (Last 24 hours) at 10/8/2022 1086  Last data filed at 10/7/2022 1830  Gross per 24 hour   Intake 1000 ml   Output 450 ml   Net 550 ml       Invasive Devices  Report    Peripheral Intravenous Line  Duration           Peripheral IV 10/05/22 Left;Upper Arm 2 days                Physical Exam:  General: No acute distress  Neuro: alert and oriented  HEENT: moist mucous membranes  CV: Well perfused, regular rate and rhythm  Lungs: Normal work of breathing, no increased respiratory effort  Abdomen: Soft, non-tender, non-distended   Erythema surrounding incision and vac sites, vac in RLQ and lower midline in place with good seal  Extremities: No edema, clubbing or cyanosis  Skin: Warm, dry    Renee Hoyt MD  General Surgery PGY1

## 2022-10-08 NOTE — QUICK NOTE
Notified by RN that patient had BP of 92/41, manual recheck 112/54, and that patient had some blood in his stool  Patient seen at bedside, states that he has a known hemorrhoid and that his stools are slightly bloody at baseline  Patient states that this one is the same as his normal stools  Denies dizziness/lightheadedness  Will continue to monitor

## 2022-10-09 LAB
ANION GAP SERPL CALCULATED.3IONS-SCNC: 5 MMOL/L (ref 4–13)
BASOPHILS # BLD AUTO: 0.03 THOUSANDS/ΜL (ref 0–0.1)
BASOPHILS NFR BLD AUTO: 0 % (ref 0–1)
BUN SERPL-MCNC: 16 MG/DL (ref 5–25)
CALCIUM SERPL-MCNC: 8.6 MG/DL (ref 8.3–10.1)
CHLORIDE SERPL-SCNC: 103 MMOL/L (ref 96–108)
CO2 SERPL-SCNC: 29 MMOL/L (ref 21–32)
CREAT SERPL-MCNC: 1.73 MG/DL (ref 0.6–1.3)
EOSINOPHIL # BLD AUTO: 0.19 THOUSAND/ΜL (ref 0–0.61)
EOSINOPHIL NFR BLD AUTO: 3 % (ref 0–6)
ERYTHROCYTE [DISTWIDTH] IN BLOOD BY AUTOMATED COUNT: 14.7 % (ref 11.6–15.1)
GFR SERPL CREATININE-BSD FRML MDRD: 40 ML/MIN/1.73SQ M
GLUCOSE SERPL-MCNC: 126 MG/DL (ref 65–140)
HCT VFR BLD AUTO: 26.6 % (ref 36.5–49.3)
HGB BLD-MCNC: 8.5 G/DL (ref 12–17)
IMM GRANULOCYTES # BLD AUTO: 0.03 THOUSAND/UL (ref 0–0.2)
IMM GRANULOCYTES NFR BLD AUTO: 0 % (ref 0–2)
LYMPHOCYTES # BLD AUTO: 1.35 THOUSANDS/ΜL (ref 0.6–4.47)
LYMPHOCYTES NFR BLD AUTO: 19 % (ref 14–44)
MCH RBC QN AUTO: 30.8 PG (ref 26.8–34.3)
MCHC RBC AUTO-ENTMCNC: 32 G/DL (ref 31.4–37.4)
MCV RBC AUTO: 96 FL (ref 82–98)
MONOCYTES # BLD AUTO: 0.5 THOUSAND/ΜL (ref 0.17–1.22)
MONOCYTES NFR BLD AUTO: 7 % (ref 4–12)
NEUTROPHILS # BLD AUTO: 4.95 THOUSANDS/ΜL (ref 1.85–7.62)
NEUTS SEG NFR BLD AUTO: 71 % (ref 43–75)
NRBC BLD AUTO-RTO: 0 /100 WBCS
PLATELET # BLD AUTO: 229 THOUSANDS/UL (ref 149–390)
PMV BLD AUTO: 8.4 FL (ref 8.9–12.7)
POTASSIUM SERPL-SCNC: 4 MMOL/L (ref 3.5–5.3)
RBC # BLD AUTO: 2.76 MILLION/UL (ref 3.88–5.62)
SODIUM SERPL-SCNC: 137 MMOL/L (ref 135–147)
WBC # BLD AUTO: 7.05 THOUSAND/UL (ref 4.31–10.16)

## 2022-10-09 PROCEDURE — 99024 POSTOP FOLLOW-UP VISIT: CPT | Performed by: SURGERY

## 2022-10-09 PROCEDURE — 85025 COMPLETE CBC W/AUTO DIFF WBC: CPT | Performed by: SURGERY

## 2022-10-09 PROCEDURE — 80048 BASIC METABOLIC PNL TOTAL CA: CPT | Performed by: SURGERY

## 2022-10-09 RX ORDER — SODIUM CHLORIDE, SODIUM LACTATE, POTASSIUM CHLORIDE, CALCIUM CHLORIDE 600; 310; 30; 20 MG/100ML; MG/100ML; MG/100ML; MG/100ML
100 INJECTION, SOLUTION INTRAVENOUS CONTINUOUS
Status: DISCONTINUED | OUTPATIENT
Start: 2022-10-09 | End: 2022-10-10

## 2022-10-09 RX ADMIN — POTASSIUM CITRATE 10 MEQ: 10 TABLET, EXTENDED RELEASE ORAL at 09:11

## 2022-10-09 RX ADMIN — TRAVOPROST 1 DROP: 0.04 SOLUTION OPHTHALMIC at 22:09

## 2022-10-09 RX ADMIN — Medication 6 MG: at 22:09

## 2022-10-09 RX ADMIN — ALLOPURINOL 100 MG: 100 TABLET ORAL at 09:12

## 2022-10-09 RX ADMIN — HEPARIN SODIUM 5000 UNITS: 5000 INJECTION INTRAVENOUS; SUBCUTANEOUS at 22:09

## 2022-10-09 RX ADMIN — FUROSEMIDE 20 MG: 20 TABLET ORAL at 09:11

## 2022-10-09 RX ADMIN — METOPROLOL SUCCINATE 100 MG: 50 TABLET, EXTENDED RELEASE ORAL at 09:12

## 2022-10-09 RX ADMIN — HEPARIN SODIUM 5000 UNITS: 5000 INJECTION INTRAVENOUS; SUBCUTANEOUS at 05:27

## 2022-10-09 RX ADMIN — TAMSULOSIN HYDROCHLORIDE 0.4 MG: 0.4 CAPSULE ORAL at 22:09

## 2022-10-09 RX ADMIN — NYSTATIN: 100000 POWDER TOPICAL at 09:12

## 2022-10-09 RX ADMIN — SODIUM CHLORIDE, SODIUM LACTATE, POTASSIUM CHLORIDE, AND CALCIUM CHLORIDE 75 ML/HR: .6; .31; .03; .02 INJECTION, SOLUTION INTRAVENOUS at 17:30

## 2022-10-09 RX ADMIN — SULFAMETHOXAZOLE AND TRIMETHOPRIM 1 TABLET: 800; 160 TABLET ORAL at 22:09

## 2022-10-09 RX ADMIN — LISINOPRIL 20 MG: 20 TABLET ORAL at 09:12

## 2022-10-09 RX ADMIN — TAMSULOSIN HYDROCHLORIDE 0.4 MG: 0.4 CAPSULE ORAL at 09:12

## 2022-10-09 RX ADMIN — SULFAMETHOXAZOLE AND TRIMETHOPRIM 1 TABLET: 800; 160 TABLET ORAL at 09:12

## 2022-10-09 RX ADMIN — HEPARIN SODIUM 5000 UNITS: 5000 INJECTION INTRAVENOUS; SUBCUTANEOUS at 13:49

## 2022-10-09 NOTE — PROGRESS NOTES
Progress Note - General Surgery   enocjoseline Brunson 78 y o  male MRN: [id number]  Unit/Bed#: E5 -01 Encounter: [id number]    Assessment:  74yo M with wound drainages/p ex-lap, LANETTE, reduction of strangulated hernia, abthera 9/14 then ex-lap with hernia repair and closure 9/15  UTI POA treated with abx  Plan:  • OR possible today for abdominal wound debridement/vac placement  • NPO  • IVF  • Please Tiger[employer] on call [facility] surgery resident or AP with any questions     Subjective/Objective     Subjective:   No acute events overnight  Denies pain  No issues with wound vac  Pertinent review of systems as above  All other review of systems negative  Objective:    Blood pressure 111/55, pulse 73, temperature 98 6 °F (37 °C), resp  rate 19, height 5' 9" (1 753 m), weight (!) 165 kg (364 lb 3 2 oz), SpO2 96 %  ,Body mass index is 53 78 kg/m²  Intake/Output Summary (Last 24 hours) at 10/10/2022 4544  Last data filed at 10/9/2022 2209  Gross per 24 hour   Intake --   Output 200 ml   Net -200 ml       Invasive Devices  Report    Peripheral Intravenous Line  Duration           Peripheral IV 10/09/22 Left;Ventral (anterior) Forearm 1 day                Physical Exam:   Gen:  NAD  HEENT: NCAT  MMM  CV: well perfused  Lungs: Normal respiratory effort  Abd: soft, nt/nd,vac intact, dressings cdi  Skin: warm/ dry  Extremities: no peripheral edema, no clubbing or cyanosis  Neuro:  AxO x3      Results from last 7 days   Lab Units 10/09/22  0930 10/08/22  0836 10/07/22  0506   WBC Thousand/uL 7 05 7 56 8 08   HEMOGLOBIN g/dL 8 5* 8 8* 8 6*   HEMATOCRIT % 26 6* 27 0* 27 7*   PLATELETS Thousands/uL 229 229 260     Results from last 7 days   Lab Units 10/09/22  0930 10/08/22  0836 10/07/22  0506   POTASSIUM mmol/L 4 0 3 9 3 8   CHLORIDE mmol/L 103 104 101   CO2 mmol/L 29 28 30   BUN mg/dL 16 14 15   CREATININE mg/dL 1 73* 1 49* 1 29   CALCIUM mg/dL 8 6 8 2* 8 6     Results from last 7 days   Lab Units 10/05/22  1346   INR 1 73*   PTT seconds 49*        I have personally reviewed pertinent films in PACS      Medications:   Scheduled Meds:  Current Facility-Administered Medications   Medication Dose Route Frequency Provider Last Rate   • acetaminophen  975 mg Oral Q6H PRN Giovanni Simental MD     • allopurinol  100 mg Oral Daily Giovanni Simental MD     • furosemide  20 mg Oral Daily Giovanni Simental MD     • heparin (porcine)  5,000 Units Subcutaneous [facility] Casper Castanon MD     • lactated ringers  100 mL/hr Intravenous Continuous Nikhil Riley  mL/hr (10/10/22 0438)   • lisinopril  20 mg Oral Daily Giovanni Simental MD     • melatonin  6 mg Oral HS Casper Castanon MD     • metoprolol succinate  100 mg Oral Daily Giovanni Simental MD     • nystatin   Topical BID Giovanni Simental MD     • ondansetron  4 mg Intravenous Q6H PRN Giovanni Simental MD     • potassium citrate  10 mEq Oral Daily Giovanni Simental MD     • psyllium  1 packet Oral Daily Giovanni Simental MD     • sulfamethoxazole-trimethoprim  1 tablet Oral Q12H [address] Martina España MD     • tamsulosin  0 4 mg Oral BID Giovanni Simental MD     • travoprost  1 drop Both Eyes HS Giovanni Simentla MD       Continuous Infusions:lactated ringers, 100 mL/hr, Last Rate: 100 mL/hr (10/10/22 0438)      PRN Meds:  acetaminophen, 975 mg, Q6H PRN  ondansetron, 4 mg, Q6H PRN      VTE Pharmacologic Prophylaxis: Heparin  VTE Mechanical Prophylaxis: sequential compression device

## 2022-10-09 NOTE — PLAN OF CARE
Problem: PAIN - ADULT  Goal: Verbalizes/displays adequate comfort level or baseline comfort level  Description: Interventions:  - Encourage patient to monitor pain and request assistance  - Assess pain using appropriate pain scale  - Administer analgesics based on type and severity of pain and evaluate response  - Implement non-pharmacological measures as appropriate and evaluate response  - Consider cultural and social influences on pain and pain management  - Notify physician/advanced practitioner if interventions unsuccessful or patient reports new pain  Outcome: Progressing     Problem: INFECTION - ADULT  Goal: Absence or prevention of progression during hospitalization  Description: INTERVENTIONS:  - Assess and monitor for signs and symptoms of infection  - Monitor lab/diagnostic results  - Monitor all insertion sites, i e  indwelling lines, tubes, and drains  - Monitor endotracheal if appropriate and nasal secretions for changes in amount and color  - [place] appropriate cooling/warming therapies per order  - Administer medications as ordered  - Instruct and encourage patient and family to use good hand hygiene technique  - Identify and instruct in appropriate isolation precautions for identified infection/condition  Outcome: Progressing     Problem: SAFETY ADULT  Goal: Patient will remain free of falls  Description: INTERVENTIONS:  - Educate patient/family on patient safety including physical limitations  - Instruct patient to call for assistance with activity   - Consult OT/PT to assist with strengthening/mobility   - Keep Call bell within reach  - Keep bed low and locked with side rails adjusted as appropriate  - Keep care items and personal belongings within reach  - Initiate and maintain comfort rounds  - Make Fall Risk Sign visible to staff  - Apply yellow socks and bracelet for high fall risk patients  - Consider moving patient to room near nurses station  Outcome: Progressing  Goal: Maintain or return to baseline ADL function  Description: INTERVENTIONS:  -  Assess patient's ability to carry out ADLs; assess patient's baseline for ADL function and identify physical deficits which impact ability to perform ADLs (bathing, care of mouth/teeth, toileting, grooming, dressing, etc )  - Assess/evaluate cause of self-care deficits   - Assess range of motion  - Assess patient's mobility; develop plan if impaired  - Assess patient's need for assistive devices and provide as appropriate  - Encourage maximum independence but intervene and supervise when necessary  - Involve family in performance of ADLs  - Assess for home care needs following discharge   - Consider OT consult to assist with ADL evaluation and planning for discharge  - Provide patient education as appropriate  Outcome: Progressing  Goal: Maintains/Returns to pre admission functional level  Description: INTERVENTIONS:  - Perform BMAT or MOVE assessment daily    - Set and communicate daily mobility goal to care team and patient/family/caregiver     - Collaborate with rehabilitation services on mobility goals if consulted  - Out of bed for toileting  - Record patient progress and toleration of activity level   Outcome: Progressing     Problem: SKIN/TISSUE INTEGRITY - ADULT  Goal: Skin Integrity remains intact(Skin Breakdown Prevention)  Description: Assess:  -Perform Mic assessment every shift  -Inspect skin when repositioning, toileting, and assisting with ADLS  -Assess extremities for adequate circulation and sensation     Bed Management:  -Have minimal linens on bed & keep smooth, unwrinkled  -Change linens as needed when moist or perspiring    Toileting:  -Offer bedside commode    Activity:  -Encourage activity and walks on unit  -Encourage or provide ROM exercises   -Use appropriate equipment to lift or move patient in bed    Skin Care:  -Avoid use of baby powder, tape, friction and shearing, hot water or constrictive clothing  -Do not massage red bony areas      Outcome: Progressing  Goal: Incision(s), wounds(s) or drain site(s) healing without S/S of infection  Description: INTERVENTIONS  - Assess and document dressing, incision, wound bed, drain sites and surrounding tissue  - Provide patient and family education  - Perform skin care/dressing changes every shift  Outcome: Progressing     Problem: MOBILITY - ADULT  Goal: Maintain or return to baseline ADL function  Description: INTERVENTIONS:  -  Assess patient's ability to carry out ADLs; assess patient's baseline for ADL function and identify physical deficits which impact ability to perform ADLs (bathing, care of mouth/teeth, toileting, grooming, dressing, etc )  - Assess/evaluate cause of self-care deficits   - Assess range of motion  - Assess patient's mobility; develop plan if impaired  - Assess patient's need for assistive devices and provide as appropriate  - Encourage maximum independence but intervene and supervise when necessary  - Involve family in performance of ADLs  - Assess for home care needs following discharge   - Consider OT consult to assist with ADL evaluation and planning for discharge  - Provide patient education as appropriate  Outcome: Progressing  Goal: Maintains/Returns to pre admission functional level  Description: INTERVENTIONS:  - Perform BMAT or MOVE assessment daily    - Set and communicate daily mobility goal to care team and patient/family/caregiver     - Collaborate with rehabilitation services on mobility goals if consulted  - Out of bed for toileting  - Record patient progress and toleration of activity level   Outcome: Progressing     Problem: Potential for Falls  Goal: Patient will remain free of falls  Description: INTERVENTIONS:  - Educate patient/family on patient safety including physical limitations  - Instruct patient to call for assistance with activity   - Consult OT/PT to assist with strengthening/mobility   - Keep Call bell within reach  - Keep bed low and locked with side rails adjusted as appropriate  - Keep care items and personal belongings within reach  - Initiate and maintain comfort rounds  - Make Fall Risk Sign visible to staff  - Apply yellow socks and bracelet for high fall risk patients  - Consider moving patient to room near nurses station  Outcome: Progressing     Problem: Nutrition/Hydration-ADULT  Goal: Nutrient/Hydration intake appropriate for improving, restoring or maintaining nutritional needs  Description: Monitor and assess patient's nutrition/hydration status for malnutrition  Collaborate with interdisciplinary team and initiate plan and interventions as ordered  Monitor patient's weight and dietary intake as ordered or per policy  Utilize nutrition screening tool and intervene as necessary  Determine patient's food preferences and provide high-protein, high-caloric foods as appropriate       INTERVENTIONS:  - Monitor oral intake, urinary output, labs, and treatment plans  - Assess nutrition and hydration status and recommend course of action  - Evaluate amount of meals eaten  - Assist patient with eating if necessary   - Allow adequate time for meals  - Recommend/ encourage appropriate diets, oral nutritional supplements, and vitamin/mineral supplements  - Order, calculate, and assess calorie counts as needed  - Recommend, monitor, and adjust tube feedings and TPN/PPN based on assessed needs  - Assess need for intravenous fluids  - Provide specific nutrition/hydration education as appropriate  - Include patient/family/caregiver in decisions related to nutrition  Outcome: Progressing

## 2022-10-09 NOTE — PLAN OF CARE
Problem: PAIN - ADULT  Goal: Verbalizes/displays adequate comfort level or baseline comfort level  Description: Interventions:  - Encourage patient to monitor pain and request assistance  - Assess pain using appropriate pain scale  - Administer analgesics based on type and severity of pain and evaluate response  - Implement non-pharmacological measures as appropriate and evaluate response  - Consider cultural and social influences on pain and pain management  - Notify physician/advanced practitioner if interventions unsuccessful or patient reports new pain  Outcome: Progressing     Problem: INFECTION - ADULT  Goal: Absence or prevention of progression during hospitalization  Description: INTERVENTIONS:  - Assess and monitor for signs and symptoms of infection  - Monitor lab/diagnostic results  - Monitor all insertion sites, i e  indwelling lines, tubes, and drains  - Monitor endotracheal if appropriate and nasal secretions for changes in amount and color  - [place] appropriate cooling/warming therapies per order  - Administer medications as ordered  - Instruct and encourage patient and family to use good hand hygiene technique  - Identify and instruct in appropriate isolation precautions for identified infection/condition  Outcome: Progressing     Problem: SAFETY ADULT  Goal: Patient will remain free of falls  Description: INTERVENTIONS:  - Educate patient/family on patient safety including physical limitations  - Instruct patient to call for assistance with activity   - Consult OT/PT to assist with strengthening/mobility   - Keep Call bell within reach  - Keep bed low and locked with side rails adjusted as appropriate  - Keep care items and personal belongings within reach  - Initiate and maintain comfort rounds  - Make Fall Risk Sign visible to staff  - Apply yellow socks and bracelet for high fall risk patients  - Consider moving patient to room near nurses station  Outcome: Progressing  Goal: Maintain or return to baseline ADL function  Description: INTERVENTIONS:  -  Assess patient's ability to carry out ADLs; assess patient's baseline for ADL function and identify physical deficits which impact ability to perform ADLs (bathing, care of mouth/teeth, toileting, grooming, dressing, etc )  - Assess/evaluate cause of self-care deficits   - Assess range of motion  - Assess patient's mobility; develop plan if impaired  - Assess patient's need for assistive devices and provide as appropriate  - Encourage maximum independence but intervene and supervise when necessary  - Involve family in performance of ADLs  - Assess for home care needs following discharge   - Consider OT consult to assist with ADL evaluation and planning for discharge  - Provide patient education as appropriate  Outcome: Progressing  Goal: Maintains/Returns to pre admission functional level  Description: INTERVENTIONS:  - Perform BMAT or MOVE assessment daily    - Set and communicate daily mobility goal to care team and patient/family/caregiver     - Collaborate with rehabilitation services on mobility goals if consulted  - Out of bed for toileting  - Record patient progress and toleration of activity level   Outcome: Progressing     Problem: SKIN/TISSUE INTEGRITY - ADULT  Goal: Skin Integrity remains intact(Skin Breakdown Prevention)  Description: Assess:  -Perform Mic assessment every shift  -Inspect skin when repositioning, toileting, and assisting with ADLS  -Assess extremities for adequate circulation and sensation     Bed Management:  -Have minimal linens on bed & keep smooth, unwrinkled  -Change linens as needed when moist or perspiring    Toileting:  -Offer bedside commode    Activity:  -Encourage activity and walks on unit  -Encourage or provide ROM exercises   -Use appropriate equipment to lift or move patient in bed    Skin Care:  -Avoid use of baby powder, tape, friction and shearing, hot water or constrictive clothing  -Do not massage red bony areas      Outcome: Progressing  Goal: Incision(s), wounds(s) or drain site(s) healing without S/S of infection  Description: INTERVENTIONS  - Assess and document dressing, incision, wound bed, drain sites and surrounding tissue  - Provide patient and family education  - Perform skin care/dressing changes every shift  Outcome: Progressing     Problem: MOBILITY - ADULT  Goal: Maintain or return to baseline ADL function  Description: INTERVENTIONS:  -  Assess patient's ability to carry out ADLs; assess patient's baseline for ADL function and identify physical deficits which impact ability to perform ADLs (bathing, care of mouth/teeth, toileting, grooming, dressing, etc )  - Assess/evaluate cause of self-care deficits   - Assess range of motion  - Assess patient's mobility; develop plan if impaired  - Assess patient's need for assistive devices and provide as appropriate  - Encourage maximum independence but intervene and supervise when necessary  - Involve family in performance of ADLs  - Assess for home care needs following discharge   - Consider OT consult to assist with ADL evaluation and planning for discharge  - Provide patient education as appropriate  Outcome: Progressing  Goal: Maintains/Returns to pre admission functional level  Description: INTERVENTIONS:  - Perform BMAT or MOVE assessment daily    - Set and communicate daily mobility goal to care team and patient/family/caregiver     - Collaborate with rehabilitation services on mobility goals if consulted  - Out of bed for toileting  - Record patient progress and toleration of activity level   Outcome: Progressing     Problem: Potential for Falls  Goal: Patient will remain free of falls  Description: INTERVENTIONS:  - Educate patient/family on patient safety including physical limitations  - Instruct patient to call for assistance with activity   - Consult OT/PT to assist with strengthening/mobility   - Keep Call bell within reach  - Keep bed low and locked with side rails adjusted as appropriate  - Keep care items and personal belongings within reach  - Initiate and maintain comfort rounds  - Make Fall Risk Sign visible to staff  - Apply yellow socks and bracelet for high fall risk patients  - Consider moving patient to room near nurses station  Outcome: Progressing     Problem: Nutrition/Hydration-ADULT  Goal: Nutrient/Hydration intake appropriate for improving, restoring or maintaining nutritional needs  Description: Monitor and assess patient's nutrition/hydration status for malnutrition  Collaborate with interdisciplinary team and initiate plan and interventions as ordered  Monitor patient's weight and dietary intake as ordered or per policy  Utilize nutrition screening tool and intervene as necessary  Determine patient's food preferences and provide high-protein, high-caloric foods as appropriate       INTERVENTIONS:  - Monitor oral intake, urinary output, labs, and treatment plans  - Assess nutrition and hydration status and recommend course of action  - Evaluate amount of meals eaten  - Assist patient with eating if necessary   - Allow adequate time for meals  - Recommend/ encourage appropriate diets, oral nutritional supplements, and vitamin/mineral supplements  - Order, calculate, and assess calorie counts as needed  - Recommend, monitor, and adjust tube feedings and TPN/PPN based on assessed needs  - Assess need for intravenous fluids  - Provide specific nutrition/hydration education as appropriate  - Include patient/family/caregiver in decisions related to nutrition  Outcome: Progressing

## 2022-10-10 LAB
BACTERIA BLD CULT: NORMAL
BACTERIA BLD CULT: NORMAL

## 2022-10-10 PROCEDURE — 99024 POSTOP FOLLOW-UP VISIT: CPT | Performed by: SURGERY

## 2022-10-10 RX ORDER — SODIUM CHLORIDE, SODIUM GLUCONATE, SODIUM ACETATE, POTASSIUM CHLORIDE, MAGNESIUM CHLORIDE, SODIUM PHOSPHATE, DIBASIC, AND POTASSIUM PHOSPHATE .53; .5; .37; .037; .03; .012; .00082 G/100ML; G/100ML; G/100ML; G/100ML; G/100ML; G/100ML; G/100ML
50 INJECTION, SOLUTION INTRAVENOUS CONTINUOUS
Status: DISCONTINUED | OUTPATIENT
Start: 2022-10-10 | End: 2022-10-12

## 2022-10-10 RX ORDER — METOPROLOL SUCCINATE 50 MG/1
100 TABLET, EXTENDED RELEASE ORAL DAILY
Status: DISCONTINUED | OUTPATIENT
Start: 2022-10-11 | End: 2022-10-12 | Stop reason: HOSPADM

## 2022-10-10 RX ADMIN — SULFAMETHOXAZOLE AND TRIMETHOPRIM 1 TABLET: 800; 160 TABLET ORAL at 22:10

## 2022-10-10 RX ADMIN — ALLOPURINOL 100 MG: 100 TABLET ORAL at 08:56

## 2022-10-10 RX ADMIN — HEPARIN SODIUM 5000 UNITS: 5000 INJECTION INTRAVENOUS; SUBCUTANEOUS at 22:10

## 2022-10-10 RX ADMIN — POTASSIUM CITRATE 10 MEQ: 10 TABLET, EXTENDED RELEASE ORAL at 08:56

## 2022-10-10 RX ADMIN — SODIUM CHLORIDE, SODIUM GLUCONATE, SODIUM ACETATE, POTASSIUM CHLORIDE, MAGNESIUM CHLORIDE, SODIUM PHOSPHATE, DIBASIC, AND POTASSIUM PHOSPHATE 50 ML/HR: .53; .5; .37; .037; .03; .012; .00082 INJECTION, SOLUTION INTRAVENOUS at 22:10

## 2022-10-10 RX ADMIN — TAMSULOSIN HYDROCHLORIDE 0.4 MG: 0.4 CAPSULE ORAL at 22:10

## 2022-10-10 RX ADMIN — SODIUM CHLORIDE, SODIUM LACTATE, POTASSIUM CHLORIDE, AND CALCIUM CHLORIDE 100 ML/HR: .6; .31; .03; .02 INJECTION, SOLUTION INTRAVENOUS at 04:38

## 2022-10-10 RX ADMIN — NYSTATIN: 100000 POWDER TOPICAL at 08:57

## 2022-10-10 RX ADMIN — TAMSULOSIN HYDROCHLORIDE 0.4 MG: 0.4 CAPSULE ORAL at 08:56

## 2022-10-10 RX ADMIN — TRAVOPROST 1 DROP: 0.04 SOLUTION OPHTHALMIC at 22:10

## 2022-10-10 RX ADMIN — Medication 6 MG: at 22:10

## 2022-10-10 RX ADMIN — SULFAMETHOXAZOLE AND TRIMETHOPRIM 1 TABLET: 800; 160 TABLET ORAL at 08:56

## 2022-10-10 NOTE — NURSING NOTE
Patient returned to inpatient room for comfort  Dr Terrence Whitaker delayed in previous case, Dr Terrence Whitaker aware

## 2022-10-10 NOTE — PLAN OF CARE
Problem: PAIN - ADULT  Goal: Verbalizes/displays adequate comfort level or baseline comfort level  Description: Interventions:  - Encourage patient to monitor pain and request assistance  - Assess pain using appropriate pain scale  - Administer analgesics based on type and severity of pain and evaluate response  - Implement non-pharmacological measures as appropriate and evaluate response  - Consider cultural and social influences on pain and pain management  - Notify physician/advanced practitioner if interventions unsuccessful or patient reports new pain  Outcome: Progressing     Problem: INFECTION - ADULT  Goal: Absence or prevention of progression during hospitalization  Description: INTERVENTIONS:  - Assess and monitor for signs and symptoms of infection  - Monitor lab/diagnostic results  - Monitor all insertion sites, i e  indwelling lines, tubes, and drains  - Monitor endotracheal if appropriate and nasal secretions for changes in amount and color  - [place] appropriate cooling/warming therapies per order  - Administer medications as ordered  - Instruct and encourage patient and family to use good hand hygiene technique  - Identify and instruct in appropriate isolation precautions for identified infection/condition  Outcome: Progressing     Problem: SAFETY ADULT  Goal: Patient will remain free of falls  Description: INTERVENTIONS:  - Educate patient/family on patient safety including physical limitations  - Instruct patient to call for assistance with activity   - Consult OT/PT to assist with strengthening/mobility   - Keep Call bell within reach  - Keep bed low and locked with side rails adjusted as appropriate  - Keep care items and personal belongings within reach  - Initiate and maintain comfort rounds  - Make Fall Risk Sign visible to staff  - Apply yellow socks and bracelet for high fall risk patients  - Consider moving patient to room near nurses station  Outcome: Progressing  Goal: Maintain or return to baseline ADL function  Description: INTERVENTIONS:  -  Assess patient's ability to carry out ADLs; assess patient's baseline for ADL function and identify physical deficits which impact ability to perform ADLs (bathing, care of mouth/teeth, toileting, grooming, dressing, etc )  - Assess/evaluate cause of self-care deficits   - Assess range of motion  - Assess patient's mobility; develop plan if impaired  - Assess patient's need for assistive devices and provide as appropriate  - Encourage maximum independence but intervene and supervise when necessary  - Involve family in performance of ADLs  - Assess for home care needs following discharge   - Consider OT consult to assist with ADL evaluation and planning for discharge  - Provide patient education as appropriate  Outcome: Progressing  Goal: Maintains/Returns to pre admission functional level  Description: INTERVENTIONS:  - Perform BMAT or MOVE assessment daily    - Set and communicate daily mobility goal to care team and patient/family/caregiver     - Collaborate with rehabilitation services on mobility goals if consulted  - Out of bed for toileting  - Record patient progress and toleration of activity level   Outcome: Progressing     Problem: SKIN/TISSUE INTEGRITY - ADULT  Goal: Skin Integrity remains intact(Skin Breakdown Prevention)  Description: Assess:  -Perform Mic assessment every shift  -Inspect skin when repositioning, toileting, and assisting with ADLS  -Assess extremities for adequate circulation and sensation     Bed Management:  -Have minimal linens on bed & keep smooth, unwrinkled  -Change linens as needed when moist or perspiring    Toileting:  -Offer bedside commode    Activity:  -Encourage activity and walks on unit  -Encourage or provide ROM exercises   -Use appropriate equipment to lift or move patient in bed    Skin Care:  -Avoid use of baby powder, tape, friction and shearing, hot water or constrictive clothing  -Do not massage red bony areas      Outcome: Progressing  Goal: Incision(s), wounds(s) or drain site(s) healing without S/S of infection  Description: INTERVENTIONS  - Assess and document dressing, incision, wound bed, drain sites and surrounding tissue  - Provide patient and family education  - Perform skin care/dressing changes every shift  Outcome: Progressing     Problem: MOBILITY - ADULT  Goal: Maintain or return to baseline ADL function  Description: INTERVENTIONS:  -  Assess patient's ability to carry out ADLs; assess patient's baseline for ADL function and identify physical deficits which impact ability to perform ADLs (bathing, care of mouth/teeth, toileting, grooming, dressing, etc )  - Assess/evaluate cause of self-care deficits   - Assess range of motion  - Assess patient's mobility; develop plan if impaired  - Assess patient's need for assistive devices and provide as appropriate  - Encourage maximum independence but intervene and supervise when necessary  - Involve family in performance of ADLs  - Assess for home care needs following discharge   - Consider OT consult to assist with ADL evaluation and planning for discharge  - Provide patient education as appropriate  Outcome: Progressing  Goal: Maintains/Returns to pre admission functional level  Description: INTERVENTIONS:  - Perform BMAT or MOVE assessment daily    - Set and communicate daily mobility goal to care team and patient/family/caregiver     - Collaborate with rehabilitation services on mobility goals if consulted  - Out of bed for toileting  - Record patient progress and toleration of activity level   Outcome: Progressing     Problem: Potential for Falls  Goal: Patient will remain free of falls  Description: INTERVENTIONS:  - Educate patient/family on patient safety including physical limitations  - Instruct patient to call for assistance with activity   - Consult OT/PT to assist with strengthening/mobility   - Keep Call bell within reach  - Keep bed low and locked with side rails adjusted as appropriate  - Keep care items and personal belongings within reach  - Initiate and maintain comfort rounds  - Make Fall Risk Sign visible to staff  - Apply yellow socks and bracelet for high fall risk patients  - Consider moving patient to room near nurses station  Outcome: Progressing     Problem: Nutrition/Hydration-ADULT  Goal: Nutrient/Hydration intake appropriate for improving, restoring or maintaining nutritional needs  Description: Monitor and assess patient's nutrition/hydration status for malnutrition  Collaborate with interdisciplinary team and initiate plan and interventions as ordered  Monitor patient's weight and dietary intake as ordered or per policy  Utilize nutrition screening tool and intervene as necessary  Determine patient's food preferences and provide high-protein, high-caloric foods as appropriate       INTERVENTIONS:  - Monitor oral intake, urinary output, labs, and treatment plans  - Assess nutrition and hydration status and recommend course of action  - Evaluate amount of meals eaten  - Assist patient with eating if necessary   - Allow adequate time for meals  - Recommend/ encourage appropriate diets, oral nutritional supplements, and vitamin/mineral supplements  - Order, calculate, and assess calorie counts as needed  - Recommend, monitor, and adjust tube feedings and TPN/PPN based on assessed needs  - Assess need for intravenous fluids  - Provide specific nutrition/hydration education as appropriate  - Include patient/family/caregiver in decisions related to nutrition  Outcome: Progressing

## 2022-10-10 NOTE — PLAN OF CARE
Problem: PAIN - ADULT  Goal: Verbalizes/displays adequate comfort level or baseline comfort level  Description: Interventions:  - Encourage patient to monitor pain and request assistance  - Assess pain using appropriate pain scale  - Administer analgesics based on type and severity of pain and evaluate response  - Implement non-pharmacological measures as appropriate and evaluate response  - Consider cultural and social influences on pain and pain management  - Notify physician/advanced practitioner if interventions unsuccessful or patient reports new pain  Outcome: Progressing     Problem: INFECTION - ADULT  Goal: Absence or prevention of progression during hospitalization  Description: INTERVENTIONS:  - Assess and monitor for signs and symptoms of infection  - Monitor lab/diagnostic results  - Monitor all insertion sites, i e  indwelling lines, tubes, and drains  - Monitor endotracheal if appropriate and nasal secretions for changes in amount and color  - [place] appropriate cooling/warming therapies per order  - Administer medications as ordered  - Instruct and encourage patient and family to use good hand hygiene technique  - Identify and instruct in appropriate isolation precautions for identified infection/condition  Outcome: Progressing     Problem: SAFETY ADULT  Goal: Patient will remain free of falls  Description: INTERVENTIONS:  - Educate patient/family on patient safety including physical limitations  - Instruct patient to call for assistance with activity   - Consult OT/PT to assist with strengthening/mobility   - Keep Call bell within reach  - Keep bed low and locked with side rails adjusted as appropriate  - Keep care items and personal belongings within reach  - Initiate and maintain comfort rounds  - Make Fall Risk Sign visible to staff  - Apply yellow socks and bracelet for high fall risk patients  - Consider moving patient to room near nurses station  Outcome: Progressing  Goal: Maintain or return to baseline ADL function  Description: INTERVENTIONS:  -  Assess patient's ability to carry out ADLs; assess patient's baseline for ADL function and identify physical deficits which impact ability to perform ADLs (bathing, care of mouth/teeth, toileting, grooming, dressing, etc )  - Assess/evaluate cause of self-care deficits   - Assess range of motion  - Assess patient's mobility; develop plan if impaired  - Assess patient's need for assistive devices and provide as appropriate  - Encourage maximum independence but intervene and supervise when necessary  - Involve family in performance of ADLs  - Assess for home care needs following discharge   - Consider OT consult to assist with ADL evaluation and planning for discharge  - Provide patient education as appropriate  Outcome: Progressing  Goal: Maintains/Returns to pre admission functional level  Description: INTERVENTIONS:  - Perform BMAT or MOVE assessment daily    - Set and communicate daily mobility goal to care team and patient/family/caregiver     - Collaborate with rehabilitation services on mobility goals if consulted  - Out of bed for toileting  - Record patient progress and toleration of activity level   Outcome: Progressing     Problem: SKIN/TISSUE INTEGRITY - ADULT  Goal: Skin Integrity remains intact(Skin Breakdown Prevention)  Description: Assess:  -Perform Mic assessment every shift  -Inspect skin when repositioning, toileting, and assisting with ADLS  -Assess extremities for adequate circulation and sensation     Bed Management:  -Have minimal linens on bed & keep smooth, unwrinkled  -Change linens as needed when moist or perspiring    Toileting:  -Offer bedside commode    Activity:  -Encourage activity and walks on unit  -Encourage or provide ROM exercises   -Use appropriate equipment to lift or move patient in bed    Skin Care:  -Avoid use of baby powder, tape, friction and shearing, hot water or constrictive clothing  -Do not massage red bony areas      Outcome: Progressing  Goal: Incision(s), wounds(s) or drain site(s) healing without S/S of infection  Description: INTERVENTIONS  - Assess and document dressing, incision, wound bed, drain sites and surrounding tissue  - Provide patient and family education  - Perform skin care/dressing changes every shift  Outcome: Progressing     Problem: MOBILITY - ADULT  Goal: Maintain or return to baseline ADL function  Description: INTERVENTIONS:  -  Assess patient's ability to carry out ADLs; assess patient's baseline for ADL function and identify physical deficits which impact ability to perform ADLs (bathing, care of mouth/teeth, toileting, grooming, dressing, etc )  - Assess/evaluate cause of self-care deficits   - Assess range of motion  - Assess patient's mobility; develop plan if impaired  - Assess patient's need for assistive devices and provide as appropriate  - Encourage maximum independence but intervene and supervise when necessary  - Involve family in performance of ADLs  - Assess for home care needs following discharge   - Consider OT consult to assist with ADL evaluation and planning for discharge  - Provide patient education as appropriate  Outcome: Progressing  Goal: Maintains/Returns to pre admission functional level  Description: INTERVENTIONS:  - Perform BMAT or MOVE assessment daily    - Set and communicate daily mobility goal to care team and patient/family/caregiver     - Collaborate with rehabilitation services on mobility goals if consulted  - Out of bed for toileting  - Record patient progress and toleration of activity level   Outcome: Progressing     Problem: Potential for Falls  Goal: Patient will remain free of falls  Description: INTERVENTIONS:  - Educate patient/family on patient safety including physical limitations  - Instruct patient to call for assistance with activity   - Consult OT/PT to assist with strengthening/mobility   - Keep Call bell within reach  - Keep bed low and locked with side rails adjusted as appropriate  - Keep care items and personal belongings within reach  - Initiate and maintain comfort rounds  - Make Fall Risk Sign visible to staff  - Apply yellow socks and bracelet for high fall risk patients  - Consider moving patient to room near nurses station  Outcome: Progressing     Problem: Nutrition/Hydration-ADULT  Goal: Nutrient/Hydration intake appropriate for improving, restoring or maintaining nutritional needs  Description: Monitor and assess patient's nutrition/hydration status for malnutrition  Collaborate with interdisciplinary team and initiate plan and interventions as ordered  Monitor patient's weight and dietary intake as ordered or per policy  Utilize nutrition screening tool and intervene as necessary  Determine patient's food preferences and provide high-protein, high-caloric foods as appropriate       INTERVENTIONS:  - Monitor oral intake, urinary output, labs, and treatment plans  - Assess nutrition and hydration status and recommend course of action  - Evaluate amount of meals eaten  - Assist patient with eating if necessary   - Allow adequate time for meals  - Recommend/ encourage appropriate diets, oral nutritional supplements, and vitamin/mineral supplements  - Order, calculate, and assess calorie counts as needed  - Recommend, monitor, and adjust tube feedings and TPN/PPN based on assessed needs  - Assess need for intravenous fluids  - Provide specific nutrition/hydration education as appropriate  - Include patient/family/caregiver in decisions related to nutrition  Outcome: Progressing

## 2022-10-11 ENCOUNTER — ANESTHESIA EVENT (INPATIENT)
Dept: PERIOP | Facility: HOSPITAL | Age: 67
DRG: 908 | End: 2022-10-11
Payer: MEDICARE

## 2022-10-11 ENCOUNTER — ANESTHESIA (INPATIENT)
Dept: PERIOP | Facility: HOSPITAL | Age: 67
DRG: 908 | End: 2022-10-11
Payer: MEDICARE

## 2022-10-11 LAB
ANION GAP SERPL CALCULATED.3IONS-SCNC: 7 MMOL/L (ref 4–13)
BASOPHILS # BLD AUTO: 0.05 THOUSANDS/ΜL (ref 0–0.1)
BASOPHILS NFR BLD AUTO: 1 % (ref 0–1)
BUN SERPL-MCNC: 16 MG/DL (ref 5–25)
CALCIUM SERPL-MCNC: 8.8 MG/DL (ref 8.3–10.1)
CHLORIDE SERPL-SCNC: 102 MMOL/L (ref 96–108)
CO2 SERPL-SCNC: 25 MMOL/L (ref 21–32)
CREAT SERPL-MCNC: 1.7 MG/DL (ref 0.6–1.3)
EOSINOPHIL # BLD AUTO: 0.26 THOUSAND/ΜL (ref 0–0.61)
EOSINOPHIL NFR BLD AUTO: 4 % (ref 0–6)
ERYTHROCYTE [DISTWIDTH] IN BLOOD BY AUTOMATED COUNT: 14.9 % (ref 11.6–15.1)
GFR SERPL CREATININE-BSD FRML MDRD: 41 ML/MIN/1.73SQ M
GLUCOSE SERPL-MCNC: 109 MG/DL (ref 65–140)
HCT VFR BLD AUTO: 24.7 % (ref 36.5–49.3)
HGB BLD-MCNC: 7.9 G/DL (ref 12–17)
IMM GRANULOCYTES # BLD AUTO: 0.04 THOUSAND/UL (ref 0–0.2)
IMM GRANULOCYTES NFR BLD AUTO: 1 % (ref 0–2)
LYMPHOCYTES # BLD AUTO: 1.27 THOUSANDS/ΜL (ref 0.6–4.47)
LYMPHOCYTES NFR BLD AUTO: 20 % (ref 14–44)
MCH RBC QN AUTO: 30.3 PG (ref 26.8–34.3)
MCHC RBC AUTO-ENTMCNC: 32 G/DL (ref 31.4–37.4)
MCV RBC AUTO: 95 FL (ref 82–98)
MONOCYTES # BLD AUTO: 0.49 THOUSAND/ΜL (ref 0.17–1.22)
MONOCYTES NFR BLD AUTO: 8 % (ref 4–12)
NEUTROPHILS # BLD AUTO: 4.31 THOUSANDS/ΜL (ref 1.85–7.62)
NEUTS SEG NFR BLD AUTO: 66 % (ref 43–75)
NRBC BLD AUTO-RTO: 0 /100 WBCS
PLATELET # BLD AUTO: 271 THOUSANDS/UL (ref 149–390)
PMV BLD AUTO: 8.6 FL (ref 8.9–12.7)
POTASSIUM SERPL-SCNC: 4.2 MMOL/L (ref 3.5–5.3)
RBC # BLD AUTO: 2.61 MILLION/UL (ref 3.88–5.62)
SODIUM SERPL-SCNC: 134 MMOL/L (ref 135–147)
WBC # BLD AUTO: 6.42 THOUSAND/UL (ref 4.31–10.16)

## 2022-10-11 PROCEDURE — 99024 POSTOP FOLLOW-UP VISIT: CPT | Performed by: SURGERY

## 2022-10-11 PROCEDURE — 11046 DBRDMT MUSC&/FSCA EA ADDL: CPT | Performed by: SURGERY

## 2022-10-11 PROCEDURE — 0KBK0ZZ EXCISION OF RIGHT ABDOMEN MUSCLE, OPEN APPROACH: ICD-10-PCS | Performed by: SURGERY

## 2022-10-11 PROCEDURE — 80048 BASIC METABOLIC PNL TOTAL CA: CPT | Performed by: SURGERY

## 2022-10-11 PROCEDURE — 88304 TISSUE EXAM BY PATHOLOGIST: CPT | Performed by: PATHOLOGY

## 2022-10-11 PROCEDURE — 11043 DBRDMT MUSC&/FSCA 1ST 20/<: CPT | Performed by: SURGERY

## 2022-10-11 PROCEDURE — 85025 COMPLETE CBC W/AUTO DIFF WBC: CPT | Performed by: SURGERY

## 2022-10-11 RX ORDER — LIDOCAINE HYDROCHLORIDE 10 MG/ML
INJECTION, SOLUTION EPIDURAL; INFILTRATION; INTRACAUDAL; PERINEURAL AS NEEDED
Status: DISCONTINUED | OUTPATIENT
Start: 2022-10-11 | End: 2022-10-11

## 2022-10-11 RX ORDER — PROPOFOL 10 MG/ML
INJECTION, EMULSION INTRAVENOUS AS NEEDED
Status: DISCONTINUED | OUTPATIENT
Start: 2022-10-11 | End: 2022-10-11

## 2022-10-11 RX ORDER — MEPERIDINE HYDROCHLORIDE 25 MG/ML
12.5 INJECTION INTRAMUSCULAR; INTRAVENOUS; SUBCUTANEOUS
Status: DISCONTINUED | OUTPATIENT
Start: 2022-10-11 | End: 2022-10-11 | Stop reason: HOSPADM

## 2022-10-11 RX ORDER — DEXAMETHASONE SODIUM PHOSPHATE 10 MG/ML
INJECTION, SOLUTION INTRAMUSCULAR; INTRAVENOUS AS NEEDED
Status: DISCONTINUED | OUTPATIENT
Start: 2022-10-11 | End: 2022-10-11

## 2022-10-11 RX ORDER — SODIUM CHLORIDE 9 MG/ML
125 INJECTION, SOLUTION INTRAVENOUS CONTINUOUS
Status: DISCONTINUED | OUTPATIENT
Start: 2022-10-11 | End: 2022-10-11

## 2022-10-11 RX ORDER — FENTANYL CITRATE/PF 50 MCG/ML
25 SYRINGE (ML) INJECTION
Status: DISCONTINUED | OUTPATIENT
Start: 2022-10-11 | End: 2022-10-11 | Stop reason: HOSPADM

## 2022-10-11 RX ORDER — ROCURONIUM BROMIDE 10 MG/ML
INJECTION, SOLUTION INTRAVENOUS AS NEEDED
Status: DISCONTINUED | OUTPATIENT
Start: 2022-10-11 | End: 2022-10-11

## 2022-10-11 RX ORDER — ONDANSETRON 2 MG/ML
4 INJECTION INTRAMUSCULAR; INTRAVENOUS ONCE AS NEEDED
Status: DISCONTINUED | OUTPATIENT
Start: 2022-10-11 | End: 2022-10-11 | Stop reason: HOSPADM

## 2022-10-11 RX ORDER — ONDANSETRON 2 MG/ML
INJECTION INTRAMUSCULAR; INTRAVENOUS AS NEEDED
Status: DISCONTINUED | OUTPATIENT
Start: 2022-10-11 | End: 2022-10-11

## 2022-10-11 RX ORDER — MIDAZOLAM HYDROCHLORIDE 2 MG/2ML
INJECTION, SOLUTION INTRAMUSCULAR; INTRAVENOUS AS NEEDED
Status: DISCONTINUED | OUTPATIENT
Start: 2022-10-11 | End: 2022-10-11

## 2022-10-11 RX ORDER — FENTANYL CITRATE 50 UG/ML
INJECTION, SOLUTION INTRAMUSCULAR; INTRAVENOUS AS NEEDED
Status: DISCONTINUED | OUTPATIENT
Start: 2022-10-11 | End: 2022-10-11

## 2022-10-11 RX ORDER — HYDROMORPHONE HCL/PF 1 MG/ML
0.5 SYRINGE (ML) INJECTION
Status: DISCONTINUED | OUTPATIENT
Start: 2022-10-11 | End: 2022-10-11 | Stop reason: HOSPADM

## 2022-10-11 RX ORDER — MAGNESIUM HYDROXIDE 1200 MG/15ML
LIQUID ORAL AS NEEDED
Status: DISCONTINUED | OUTPATIENT
Start: 2022-10-11 | End: 2022-10-11 | Stop reason: HOSPADM

## 2022-10-11 RX ADMIN — SODIUM CHLORIDE, SODIUM GLUCONATE, SODIUM ACETATE, POTASSIUM CHLORIDE, MAGNESIUM CHLORIDE, SODIUM PHOSPHATE, DIBASIC, AND POTASSIUM PHOSPHATE 50 ML/HR: .53; .5; .37; .037; .03; .012; .00082 INJECTION, SOLUTION INTRAVENOUS at 20:53

## 2022-10-11 RX ADMIN — SUGAMMADEX 400 MG: 100 INJECTION, SOLUTION INTRAVENOUS at 15:17

## 2022-10-11 RX ADMIN — SODIUM CHLORIDE 125 ML/HR: 0.9 INJECTION, SOLUTION INTRAVENOUS at 14:18

## 2022-10-11 RX ADMIN — ONDANSETRON 4 MG: 2 INJECTION INTRAMUSCULAR; INTRAVENOUS at 14:56

## 2022-10-11 RX ADMIN — PROPOFOL 300 MG: 10 INJECTION, EMULSION INTRAVENOUS at 14:30

## 2022-10-11 RX ADMIN — LIDOCAINE HYDROCHLORIDE 100 MG: 10 INJECTION, SOLUTION EPIDURAL; INFILTRATION; INTRACAUDAL; PERINEURAL at 14:30

## 2022-10-11 RX ADMIN — TAMSULOSIN HYDROCHLORIDE 0.4 MG: 0.4 CAPSULE ORAL at 23:39

## 2022-10-11 RX ADMIN — Medication 3000 MG: at 14:20

## 2022-10-11 RX ADMIN — FENTANYL CITRATE 50 MCG: 50 INJECTION INTRAMUSCULAR; INTRAVENOUS at 14:35

## 2022-10-11 RX ADMIN — Medication 6 MG: at 23:39

## 2022-10-11 RX ADMIN — DEXAMETHASONE SODIUM PHOSPHATE 4 MG: 10 INJECTION, SOLUTION INTRAMUSCULAR; INTRAVENOUS at 14:37

## 2022-10-11 RX ADMIN — MIDAZOLAM 2 MG: 1 INJECTION INTRAMUSCULAR; INTRAVENOUS at 14:20

## 2022-10-11 RX ADMIN — ROCURONIUM BROMIDE 40 MG: 50 INJECTION, SOLUTION INTRAVENOUS at 14:30

## 2022-10-11 RX ADMIN — TAMSULOSIN HYDROCHLORIDE 0.4 MG: 0.4 CAPSULE ORAL at 09:23

## 2022-10-11 RX ADMIN — NYSTATIN: 100000 POWDER TOPICAL at 09:29

## 2022-10-11 RX ADMIN — ALLOPURINOL 100 MG: 100 TABLET ORAL at 09:23

## 2022-10-11 RX ADMIN — SODIUM CHLORIDE 125 ML/HR: 0.9 INJECTION, SOLUTION INTRAVENOUS at 16:10

## 2022-10-11 RX ADMIN — TRAVOPROST 1 DROP: 0.04 SOLUTION OPHTHALMIC at 23:42

## 2022-10-11 RX ADMIN — SULFAMETHOXAZOLE AND TRIMETHOPRIM 1 TABLET: 800; 160 TABLET ORAL at 09:22

## 2022-10-11 RX ADMIN — POTASSIUM CITRATE 10 MEQ: 10 TABLET, EXTENDED RELEASE ORAL at 09:23

## 2022-10-11 RX ADMIN — NYSTATIN: 100000 POWDER TOPICAL at 17:53

## 2022-10-11 RX ADMIN — SULFAMETHOXAZOLE AND TRIMETHOPRIM 1 TABLET: 800; 160 TABLET ORAL at 23:39

## 2022-10-11 RX ADMIN — HEPARIN SODIUM 5000 UNITS: 5000 INJECTION INTRAVENOUS; SUBCUTANEOUS at 23:39

## 2022-10-11 NOTE — ANESTHESIA PREPROCEDURE EVALUATION
Procedure:  DEBRIDEMENT WOUND [facility] OUT) (N/A Abdomen)    Relevant Problems   ANESTHESIA (within normal limits)      CARDIO   (+) Essential hypertension      GI/HEPATIC  remains intubated on postop day 1, will return to OR today for laparotomy  /RENAL   (+) CKD (chronic kidney disease), stage III (HCC)   (+) Kidney stones      HEMATOLOGY   (+) Anemia      MUSCULOSKELETAL   (+) Gout      PULMONARY   (+) DELIA (obstructive sleep apnea)        Physical Exam    Airway  Comment: intubated  Mallampati score: III  TM Distance: >3 FB  Neck ROM: limited     Dental   No notable dental hx     Cardiovascular  Rhythm: regular, Rate: normal, Cardiovascular exam normal    Pulmonary  Pulmonary exam normal Breath sounds clear to auscultation,     Other Findings        Anesthesia Plan  ASA Score- 3     Anesthesia Type- general with ASA Monitors  Additional Monitors:   Airway Plan: ETT  Plan Factors-    Chart reviewed  EKG reviewed  Existing labs reviewed  Patient summary reviewed  Induction- intravenous  Postoperative Plan-     Informed Consent- Anesthetic plan and risks discussed with patient

## 2022-10-11 NOTE — ANESTHESIA POSTPROCEDURE EVALUATION
Post-Op Assessment Note    CV Status:  Stable    Pain management: adequate     Mental Status:  Alert and awake   Hydration Status:  Euvolemic   PONV Controlled:  Controlled   Airway Patency:  Patent      Post Op Vitals Reviewed: Yes      Staff: Anesthesiologist         No complications documented      /59 (10/11/22 1637)    Temp 99 8 °F (37 7 °C) (Warm blankets on head) (10/11/22 1637)    Pulse 84 (10/11/22 1637)   Resp 19 (10/11/22 1637)    SpO2 100 % (10/11/22 1637)

## 2022-10-11 NOTE — PROGRESS NOTES
Progress Note - General Surgery  : [facility] Surgery Resident on Ki Tam 78 y o  male MRN: [id number]  Unit/Bed#: E5 MS [id number]-01 Encounter: [id number]      Assessment:  72yo M with wound drainage  s/p ex-lap, LANETTE, reduction of strangulated hernia, abthera 9/14   then ex-lap with hernia repair and closure 9/15 with Dr Lavonne Nelson:  Held ACE/Lasix and started LR 50 overnight due to Cr of 1 73 on 10/9   F/u am Cr  OR today for debridement, likely vac replacement  Regular diet postop  Encourage OOB, ambulation  CM for dispo, likely will go home with a wound vac        Subjective: No acute complaints       Objective:     Physical Exam:  GEN: NAD   Ab: Soft, NT/ND, vac sucked down appropriately with tan SS in canister  Lung: Normal effort   CV: RRR   Neuro: A+Ox3       I/O       10/06 0701  10/07 0700 10/07 0701  10/08 0700 10/08 0701  10/09 0700    I V  (mL/kg)  1000 (6 1)     IV Piggyback       Total Intake(mL/kg)  1000 (6 1)     Urine (mL/kg/hr) 300 (0 1) 250 (0 1) 850 (0 5)    Drains 85 200 150    Total Output     Net -385 +550 -1000                 Lab, Imaging and other studies: I have personally reviewed pertinent reports    , CBC with diff:   No results found for: WBC, HGB, HCT, MCV, PLT, ADJUSTEDWBC, MCH, MCHC, RDW, MPV, NRBC, BMP/CMP:   No results found for: SODIUM, K, CL, CO2, ANIONGAP, BUN, CREATININE, GLUCOSE, CALCIUM, AST, ALT, ALKPHOS, PROT, BILITOT, EGFR      VTE Pharmacologic Prophylaxis: Heparin        Kandee Mom  10/10/2022 9:03 PM

## 2022-10-11 NOTE — PROGRESS NOTES
Progress Note - General Surgery   Sophy Pan 78 y o  male MRN: [id number]  Unit/Bed#: E5 -01 Encounter: [id number]    Assessment:  72yo M with wound drainages/p ex-lap, LANETTE, reduction of strangulated hernia, abthera 9/14 then ex-lap with hernia repair and closure 9/15  UTI POA treated with abx  Plan:  • Dispo Planning  • VAC changes- will discuss timing with team  • IVF  • Please TigerText on call [facility] surgery resident or AP with any questions     Subjective/Objective     Subjective:   No acute events overnight  Pertinent review of systems as above  All other review of systems negative  Objective:    Blood pressure 104/74, pulse 84, temperature 97 7 °F (36 5 °C), resp  rate 19, height 5' 9" (1 753 m), weight (!) 165 kg (364 lb 3 2 oz), SpO2 100 %  ,Body mass index is 53 78 kg/m²  Intake/Output Summary (Last 24 hours) at 10/12/2022 0550  Last data filed at 10/12/2022 0454  Gross per 24 hour   Intake 900 ml   Output 915 ml   Net -15 ml       Invasive Devices  Report    Peripheral Intravenous Line  Duration           Peripheral IV 10/09/22 Left;Ventral (anterior) Forearm 3 days                Physical Exam:   Gen:  NAD  HEENT: NCAT  MMM  CV: well perfused, pulses palpable  Lungs: Normal respiratory effort  Abd: soft, nt/nd, vac intact  Skin: warm/ dry  Extremities: no peripheral edema, no cyanosis  Neuro:  AxO x3      Results from last 7 days   Lab Units 10/11/22  0542 10/09/22  0930 10/08/22  0836   WBC Thousand/uL 6 42 7 05 7 56   HEMOGLOBIN g/dL 7 9* 8 5* 8 8*   HEMATOCRIT % 24 7* 26 6* 27 0*   PLATELETS Thousands/uL 271 229 229     Results from last 7 days   Lab Units 10/11/22  0542 10/09/22  0930 10/08/22  0836   POTASSIUM mmol/L 4 2 4 0 3 9   CHLORIDE mmol/L 102 103 104   CO2 mmol/L 25 29 28   BUN mg/dL 16 16 14   CREATININE mg/dL 1 70* 1 73* 1 49*   CALCIUM mg/dL 8 8 8 6 8 2*     Results from last 7 days   Lab Units 10/05/22  1346   INR  1 73*   PTT seconds 49*        I have personally reviewed pertinent films in PACS      Medications:   Scheduled Meds:  Current Facility-Administered Medications   Medication Dose Route Frequency Provider Last Rate   • acetaminophen  975 mg Oral Q6H PRN Alana Peñaloza MD     • allopurinol  100 mg Oral Daily Alana Peñaloza MD     • heparin (porcine)  5,000 Units Subcutaneous [facility] Alana Peñaloza MD     • melatonin  6 mg Oral HS Alana Peñaloza MD     • [facility] Hold] metoprolol succinate  100 mg Oral Daily Gladis Servin MD     • multi-electrolyte  50 mL/hr Intravenous Continuous Gladis Servin MD 50 mL/hr (10/11/22 2053)   • nystatin   Topical BID Alana Peñaloza MD     • ondansetron  4 mg Intravenous Q6H PRN Alana Peñaloza MD     • potassium citrate  10 mEq Oral Daily Alana Peñaloza MD     • psyllium  1 packet Oral Daily Alana Peñaloza MD     • sulfamethoxazole-trimethoprim  1 tablet Oral Q12H [address] Alana Peñaloza MD     • tamsulosin  0 4 mg Oral BID Alana Peñaloza MD     • travoprost  1 drop Both Eyes HS Alana Peñaloza MD       Continuous Infusions:multi-electrolyte, 50 mL/hr, Last Rate: 50 mL/hr (10/11/22 2053)      PRN Meds:  acetaminophen, 975 mg, Q6H PRN  ondansetron, 4 mg, Q6H PRN      VTE Pharmacologic Prophylaxis: Heparin  VTE Mechanical Prophylaxis: sequential compression device

## 2022-10-11 NOTE — PLAN OF CARE
Problem: PAIN - ADULT  Goal: Verbalizes/displays adequate comfort level or baseline comfort level  Description: Interventions:  - Encourage patient to monitor pain and request assistance  - Assess pain using appropriate pain scale  - Administer analgesics based on type and severity of pain and evaluate response  - Implement non-pharmacological measures as appropriate and evaluate response  - Consider cultural and social influences on pain and pain management  - Notify physician/advanced practitioner if interventions unsuccessful or patient reports new pain  Outcome: Progressing     Problem: INFECTION - ADULT  Goal: Absence or prevention of progression during hospitalization  Description: INTERVENTIONS:  - Assess and monitor for signs and symptoms of infection  - Monitor lab/diagnostic results  - Monitor all insertion sites, i e  indwelling lines, tubes, and drains  - Monitor endotracheal if appropriate and nasal secretions for changes in amount and color  - [place] appropriate cooling/warming therapies per order  - Administer medications as ordered  - Instruct and encourage patient and family to use good hand hygiene technique  - Identify and instruct in appropriate isolation precautions for identified infection/condition  Outcome: Progressing     Problem: SAFETY ADULT  Goal: Patient will remain free of falls  Description: INTERVENTIONS:  - Educate patient/family on patient safety including physical limitations  - Instruct patient to call for assistance with activity   - Consult OT/PT to assist with strengthening/mobility   - Keep Call bell within reach  - Keep bed low and locked with side rails adjusted as appropriate  - Keep care items and personal belongings within reach  - Initiate and maintain comfort rounds  - Make Fall Risk Sign visible to staff  - Apply yellow socks and bracelet for high fall risk patients  - Consider moving patient to room near nurses station  Outcome: Progressing  Goal: Maintain or return to baseline ADL function  Description: INTERVENTIONS:  -  Assess patient's ability to carry out ADLs; assess patient's baseline for ADL function and identify physical deficits which impact ability to perform ADLs (bathing, care of mouth/teeth, toileting, grooming, dressing, etc )  - Assess/evaluate cause of self-care deficits   - Assess range of motion  - Assess patient's mobility; develop plan if impaired  - Assess patient's need for assistive devices and provide as appropriate  - Encourage maximum independence but intervene and supervise when necessary  - Involve family in performance of ADLs  - Assess for home care needs following discharge   - Consider OT consult to assist with ADL evaluation and planning for discharge  - Provide patient education as appropriate  Outcome: Progressing  Goal: Maintains/Returns to pre admission functional level  Description: INTERVENTIONS:  - Perform BMAT or MOVE assessment daily    - Set and communicate daily mobility goal to care team and patient/family/caregiver     - Collaborate with rehabilitation services on mobility goals if consulted  - Out of bed for toileting  - Record patient progress and toleration of activity level   Outcome: Progressing     Problem: SKIN/TISSUE INTEGRITY - ADULT  Goal: Skin Integrity remains intact(Skin Breakdown Prevention)  Description: Assess:  -Perform Mic assessment every shift  -Inspect skin when repositioning, toileting, and assisting with ADLS  -Assess extremities for adequate circulation and sensation     Bed Management:  -Have minimal linens on bed & keep smooth, unwrinkled  -Change linens as needed when moist or perspiring    Toileting:  -Offer bedside commode    Activity:  -Encourage activity and walks on unit  -Encourage or provide ROM exercises   -Use appropriate equipment to lift or move patient in bed    Skin Care:  -Avoid use of baby powder, tape, friction and shearing, hot water or constrictive clothing  -Do not massage red bony areas      Outcome: Progressing  Goal: Incision(s), wounds(s) or drain site(s) healing without S/S of infection  Description: INTERVENTIONS  - Assess and document dressing, incision, wound bed, drain sites and surrounding tissue  - Provide patient and family education  - Perform skin care/dressing changes every shift  Outcome: Progressing     Problem: MOBILITY - ADULT  Goal: Maintain or return to baseline ADL function  Description: INTERVENTIONS:  -  Assess patient's ability to carry out ADLs; assess patient's baseline for ADL function and identify physical deficits which impact ability to perform ADLs (bathing, care of mouth/teeth, toileting, grooming, dressing, etc )  - Assess/evaluate cause of self-care deficits   - Assess range of motion  - Assess patient's mobility; develop plan if impaired  - Assess patient's need for assistive devices and provide as appropriate  - Encourage maximum independence but intervene and supervise when necessary  - Involve family in performance of ADLs  - Assess for home care needs following discharge   - Consider OT consult to assist with ADL evaluation and planning for discharge  - Provide patient education as appropriate  Outcome: Progressing  Goal: Maintains/Returns to pre admission functional level  Description: INTERVENTIONS:  - Perform BMAT or MOVE assessment daily    - Set and communicate daily mobility goal to care team and patient/family/caregiver     - Collaborate with rehabilitation services on mobility goals if consulted  - Out of bed for toileting  - Record patient progress and toleration of activity level   Outcome: Progressing     Problem: Potential for Falls  Goal: Patient will remain free of falls  Description: INTERVENTIONS:  - Educate patient/family on patient safety including physical limitations  - Instruct patient to call for assistance with activity   - Consult OT/PT to assist with strengthening/mobility   - Keep Call bell within reach  - Keep bed low and locked with side rails adjusted as appropriate  - Keep care items and personal belongings within reach  - Initiate and maintain comfort rounds  - Make Fall Risk Sign visible to staff  - Apply yellow socks and bracelet for high fall risk patients  - Consider moving patient to room near nurses station  Outcome: Progressing     Problem: Nutrition/Hydration-ADULT  Goal: Nutrient/Hydration intake appropriate for improving, restoring or maintaining nutritional needs  Description: Monitor and assess patient's nutrition/hydration status for malnutrition  Collaborate with interdisciplinary team and initiate plan and interventions as ordered  Monitor patient's weight and dietary intake as ordered or per policy  Utilize nutrition screening tool and intervene as necessary  Determine georges's food preferences and provide high-protein, high-caloric foods as appropriate       INTERVENTIONS:  - Monitor oral intake, urinary output, labs, and treatment plans  - Assess nutrition and hydration status and recommend course of action  - Evaluate amount of meals eaten  - Assist patient with eating if necessary   - Allow adequate time for meals  - Recommend/ encourage appropriate diets, oral nutritional supplements, and vitamin/mineral supplements  - Order, calculate, and assess calorie counts as needed  - Recommend, monitor, and adjust tube feedings and TPN/PPN based on assessed needs  - Assess need for intravenous fluids  - Provide specific nutrition/hydration education as appropriate  - Include patient/family/caregiver in decisions related to nutrition  Outcome: Progressing

## 2022-10-11 NOTE — PLAN OF CARE
Problem: PAIN - ADULT  Goal: Verbalizes/displays adequate comfort level or baseline comfort level  Description: Interventions:  - Encourage patient to monitor pain and request assistance  - Assess pain using appropriate pain scale  - Administer analgesics based on type and severity of pain and evaluate response  - Implement non-pharmacological measures as appropriate and evaluate response  - Consider cultural and social influences on pain and pain management  - Notify physician/advanced practitioner if interventions unsuccessful or patient reports new pain  Outcome: Progressing     Problem: INFECTION - ADULT  Goal: Absence or prevention of progression during hospitalization  Description: INTERVENTIONS:  - Assess and monitor for signs and symptoms of infection  - Monitor lab/diagnostic results  - Monitor all insertion sites, i e  indwelling lines, tubes, and drains  - Monitor endotracheal if appropriate and nasal secretions for changes in amount and color  - [place] appropriate cooling/warming therapies per order  - Administer medications as ordered  - Instruct and encourage patient and family to use good hand hygiene technique  - Identify and instruct in appropriate isolation precautions for identified infection/condition  Outcome: Progressing     Problem: SAFETY ADULT  Goal: Patient will remain free of falls  Description: INTERVENTIONS:  - Educate patient/family on patient safety including physical limitations  - Instruct patient to call for assistance with activity   - Consult OT/PT to assist with strengthening/mobility   - Keep Call bell within reach  - Keep bed low and locked with side rails adjusted as appropriate  - Keep care items and personal belongings within reach  - Initiate and maintain comfort rounds  - Make Fall Risk Sign visible to staff  - Apply yellow socks and bracelet for high fall risk patients  - Consider moving patient to room near nurses station  Outcome: Progressing  Goal: Maintain or return to baseline ADL function  Description: INTERVENTIONS:  -  Assess patient's ability to carry out ADLs; assess patient's baseline for ADL function and identify physical deficits which impact ability to perform ADLs (bathing, care of mouth/teeth, toileting, grooming, dressing, etc )  - Assess/evaluate cause of self-care deficits   - Assess range of motion  - Assess patient's mobility; develop plan if impaired  - Assess patient's need for assistive devices and provide as appropriate  - Encourage maximum independence but intervene and supervise when necessary  - Involve family in performance of ADLs  - Assess for home care needs following discharge   - Consider OT consult to assist with ADL evaluation and planning for discharge  - Provide patient education as appropriate  Outcome: Progressing  Goal: Maintains/Returns to pre admission functional level  Description: INTERVENTIONS:  - Perform BMAT or MOVE assessment daily    - Set and communicate daily mobility goal to care team and patient/family/caregiver     - Collaborate with rehabilitation services on mobility goals if consulted  - Out of bed for toileting  - Record patient progress and toleration of activity level   Outcome: Progressing     Problem: SKIN/TISSUE INTEGRITY - ADULT  Goal: Skin Integrity remains intact(Skin Breakdown Prevention)  Description: Assess:  -Perform Mic assessment every shift  -Inspect skin when repositioning, toileting, and assisting with ADLS  -Assess extremities for adequate circulation and sensation     Bed Management:  -Have minimal linens on bed & keep smooth, unwrinkled  -Change linens as needed when moist or perspiring    Toileting:  -Offer bedside commode    Activity:  -Encourage activity and walks on unit  -Encourage or provide ROM exercises   -Use appropriate equipment to lift or move patient in bed    Skin Care:  -Avoid use of baby powder, tape, friction and shearing, hot water or constrictive clothing  -Do not massage red bony areas      Outcome: Progressing  Goal: Incision(s), wounds(s) or drain site(s) healing without S/S of infection  Description: INTERVENTIONS  - Assess and document dressing, incision, wound bed, drain sites and surrounding tissue  - Provide patient and family education  - Perform skin care/dressing changes every shift  Outcome: Progressing     Problem: MOBILITY - ADULT  Goal: Maintain or return to baseline ADL function  Description: INTERVENTIONS:  -  Assess patient's ability to carry out ADLs; assess patient's baseline for ADL function and identify physical deficits which impact ability to perform ADLs (bathing, care of mouth/teeth, toileting, grooming, dressing, etc )  - Assess/evaluate cause of self-care deficits   - Assess range of motion  - Assess patient's mobility; develop plan if impaired  - Assess patient's need for assistive devices and provide as appropriate  - Encourage maximum independence but intervene and supervise when necessary  - Involve family in performance of ADLs  - Assess for home care needs following discharge   - Consider OT consult to assist with ADL evaluation and planning for discharge  - Provide patient education as appropriate  Outcome: Progressing  Goal: Maintains/Returns to pre admission functional level  Description: INTERVENTIONS:  - Perform BMAT or MOVE assessment daily    - Set and communicate daily mobility goal to care team and patient/family/caregiver     - Collaborate with rehabilitation services on mobility goals if consulted  - Out of bed for toileting  - Record patient progress and toleration of activity level   Outcome: Progressing     Problem: Potential for Falls  Goal: Patient will remain free of falls  Description: INTERVENTIONS:  - Educate patient/family on patient safety including physical limitations  - Instruct patient to call for assistance with activity   - Consult OT/PT to assist with strengthening/mobility   - Keep Call bell within reach  - Keep bed low and locked with side rails adjusted as appropriate  - Keep care items and personal belongings within reach  - Initiate and maintain comfort rounds  - Make Fall Risk Sign visible to staff  - Apply yellow socks and bracelet for high fall risk patients  - Consider moving patient to room near nurses station  Outcome: Progressing     Problem: Nutrition/Hydration-ADULT  Goal: Nutrient/Hydration intake appropriate for improving, restoring or maintaining nutritional needs  Description: Monitor and assess patient's nutrition/hydration status for malnutrition  Collaborate with interdisciplinary team and initiate plan and interventions as ordered  Monitor patient's weight and dietary intake as ordered or per policy  Utilize nutrition screening tool and intervene as necessary  Determine patient's food preferences and provide high-protein, high-caloric foods as appropriate       INTERVENTIONS:  - Monitor oral intake, urinary output, labs, and treatment plans  - Assess nutrition and hydration status and recommend course of action  - Evaluate amount of meals eaten  - Assist patient with eating if necessary   - Allow adequate time for meals  - Recommend/ encourage appropriate diets, oral nutritional supplements, and vitamin/mineral supplements  - Order, calculate, and assess calorie counts as needed  - Recommend, monitor, and adjust tube feedings and TPN/PPN based on assessed needs  - Assess need for intravenous fluids  - Provide specific nutrition/hydration education as appropriate  - Include patient/family/caregiver in decisions related to nutrition  Outcome: Progressing

## 2022-10-11 NOTE — OP NOTE
OPERATIVE REPORT  PATIENT NAME: Urvashi Myers    :  1955  MRN: [id number]  Pt Location: [facility] OR ROOM 02    SURGERY DATE: 10/11/2022    Surgeon(s) and Role:     * Vesta Kawasaki, MD - Primary     * Ryan Kelley MD - Assisting    Preop Diagnosis:  Wound drainage [D44  A9]    Post-Op Diagnosis Codes:     * Wound drainage [K44  A9]    Procedure(s) (LRB):  Excisional debridement of abdominal wall nonviable tissue  Skin and subcu tissues were dissected  The wound measured 23 x 18 cm x 4 cm deep  (N/A)    Specimen(s):  ID Type Source Tests Collected by Time Destination   1 : abdominal wall Tissue Abdominal TISSUE EXAM Vesta Kawasaki, MD 10/11/2022 1457        Estimated Blood Loss:   Minimal    Drains:  * No LDAs found *    Anesthesia Type:   General    Operative Indications:  Wound drainage [L24  A9]      Operative Findings:  Necrotic abdominal wall skin and wound  Wound measured 23 x 18 x 4 cm the  Complications:   None    Procedure and Technique:  The patient was seen again in the Holding Room  The risks, benefits, complications, treatment options, and expected outcomes were discussed with the patient  The patient and/or family concurred with the proposed plan, giving informed consent  The site of surgery properly noted/marked  The patient was taken to Operating Room, identified as Urvashi Myers  and the procedure verified  A Time Out was held after prepping and draping in sterile fashion  The above information was confirmed  Prior to the induction of general anesthesia, antibiotic prophylaxis was administered  General endotracheal anesthesia was then administered and tolerated well  After the induction, the abdomen was prepped in the usual sterile fashion  All the retention sutures were removed  The demarcated necrotic skin was excised completely full-thickness abdominal wall down to the underlying muscle and fascial layer  All necrotic tissue was removed    Using sharp dissection and Bovie cautery  There was good hemostasis  The underlying fascia and bridged absorbable mesh was intact  Once wound is irrigated and debris was complete  The wound measured 23 x 18 x 4 cm  A large black sponge was placed in the wound  A medium black sponge was placed in the other wound that was not debrided  The wounds were bridge together with a 3rd piece of black sponge         I was present for the entire procedure    Patient Disposition:  PACU  and intubated and critically ill        SIGNATURE: Heather Lozano MD  DATE: October 11, 2022  TIME: 3:24 PM

## 2022-10-12 VITALS
RESPIRATION RATE: 18 BRPM | OXYGEN SATURATION: 94 % | WEIGHT: 315 LBS | HEART RATE: 100 BPM | SYSTOLIC BLOOD PRESSURE: 120 MMHG | HEIGHT: 69 IN | TEMPERATURE: 98.4 F | DIASTOLIC BLOOD PRESSURE: 56 MMHG | BODY MASS INDEX: 46.65 KG/M2

## 2022-10-12 LAB
ANION GAP SERPL CALCULATED.3IONS-SCNC: 6 MMOL/L (ref 4–13)
BUN SERPL-MCNC: 14 MG/DL (ref 5–25)
CALCIUM SERPL-MCNC: 8.9 MG/DL (ref 8.3–10.1)
CHLORIDE SERPL-SCNC: 103 MMOL/L (ref 96–108)
CO2 SERPL-SCNC: 25 MMOL/L (ref 21–32)
CREAT SERPL-MCNC: 1.48 MG/DL (ref 0.6–1.3)
GFR SERPL CREATININE-BSD FRML MDRD: 48 ML/MIN/1.73SQ M
GLUCOSE SERPL-MCNC: 143 MG/DL (ref 65–140)
POTASSIUM SERPL-SCNC: 4.7 MMOL/L (ref 3.5–5.3)
SODIUM SERPL-SCNC: 134 MMOL/L (ref 135–147)

## 2022-10-12 PROCEDURE — 80048 BASIC METABOLIC PNL TOTAL CA: CPT | Performed by: SURGERY

## 2022-10-12 PROCEDURE — 99024 POSTOP FOLLOW-UP VISIT: CPT | Performed by: SURGERY

## 2022-10-12 RX ORDER — FUROSEMIDE 20 MG/1
20 TABLET ORAL DAILY
Status: DISCONTINUED | OUTPATIENT
Start: 2022-10-12 | End: 2022-10-12 | Stop reason: HOSPADM

## 2022-10-12 RX ADMIN — NYSTATIN: 100000 POWDER TOPICAL at 09:44

## 2022-10-12 RX ADMIN — TAMSULOSIN HYDROCHLORIDE 0.4 MG: 0.4 CAPSULE ORAL at 09:39

## 2022-10-12 RX ADMIN — ALLOPURINOL 100 MG: 100 TABLET ORAL at 09:39

## 2022-10-12 RX ADMIN — SULFAMETHOXAZOLE AND TRIMETHOPRIM 1 TABLET: 800; 160 TABLET ORAL at 09:39

## 2022-10-12 RX ADMIN — POTASSIUM CITRATE 10 MEQ: 10 TABLET, EXTENDED RELEASE ORAL at 09:39

## 2022-10-12 RX ADMIN — HEPARIN SODIUM 5000 UNITS: 5000 INJECTION INTRAVENOUS; SUBCUTANEOUS at 05:38

## 2022-10-12 RX ADMIN — FUROSEMIDE 20 MG: 20 TABLET ORAL at 09:39

## 2022-10-12 RX ADMIN — APIXABAN 10 MG: 5 TABLET, FILM COATED ORAL at 09:39

## 2022-10-12 NOTE — DISCHARGE SUMMARY
Discharge Summary - Alexis Galaviz 78 y o  male MRN: [id number]    Unit/Bed#: E5 -01 Encounter: [id number]    Admission Date:   Admission Orders (From admission, onward)     Ordered        10/05/22 1555  Inpatient Admission  Once                        Admitting Diagnosis: Wound infection [T14  8XXA, L08 9]  Visit for wound check [Z51 89]  Encounter for post surgical wound check [Z48 89]    HPI: Alexis Galaviz is a 78 y o  male who presents with concerns about his midline abdominal wound  For the past few days, there has been increased drainage from the wound and the color changed to a brownish color  Contacted the office, but was told there was no concern for infection  However, decided to seek medical attention when the drainage became more copious  Denies fever, chills, abdominal pain  Procedures Performed:  10/11/2022 Abdominal wound debridement/ vac placement    Summary of Hospital Course:  Patient was admitted on October 5th due to concern about his midline abdominal wound  His dressing was probed bedside and did not reveal infection  Wound VAC was replaced  He underwent some elective debridement the operating room on October 11th, wound VAC was replaced  He was set up with [facility] for wound VAC changes at home and was discharged in good condition on October 12th  Complications: None    Discharge Diagnosis: Poor healing of abdominal surgical incision    Medical Problems             Resolved Problems  Date Reviewed: 10/11/2022   None                 Condition at Discharge: good         Discharge instructions/Information to patient and family:   See after visit summary for information provided to patient and family  Provisions for Follow-Up Care:  See after visit summary for information related to follow-up care and any pertinent home health orders        PCP: Daphney Noel MD    Disposition: Home    Planned Readmission: No      Discharge Statement   I spent 25 minutes discharging the patient  This time was spent on the day of discharge  I had direct contact with the patient on the day of discharge  Additional documentation is required if more than 30 minutes were spent on discharge  Discharge Medications:  See after visit summary for reconciled discharge medications provided to patient and family

## 2022-10-12 NOTE — QUICK NOTE
Post- OP Note - GeneralSurgery   Gio Botello 78 y o  male MRN: [id number]  Unit/Bed#: E5 -01 Encounter: [id number]    Assessment:  78 y o  male 1 Day Post-Op s/p Procedure(s) (LRB):  Excisional debridement of abdominal wall nonviable tissue  Skin and subcu tissues were dissected  The wound measured 23 x 18 cm x 4 cm deep  (N/A)       Plan:  • Continue VAC therapy  • Please Tigertext on call Surgery resident with any questions    Subjective/Objective     Subjective:   Patient alert and oriented  No Nausea  No Vomiting  No chest pains or shortness of breath  Objective:    Blood pressure 104/74, pulse 84, temperature 97 7 °F (36 5 °C), resp  rate 19, height 5' 9" (1 753 m), weight (!) 165 kg (364 lb 3 2 oz), SpO2 100 %  ,Body mass index is 53 78 kg/m²  Physical Exam:   Gen: NAD  HEENT: MMM  CV: well perfused  Lungs: Normal respiratory effort  Abd: soft, appropriately tender, nondistended, vac intact  Skin: warm/ dry  Neuro:  AxO x3

## 2022-10-12 NOTE — CASE MANAGEMENT
Case Management Progress Note    Patient name Gio Botello  [facility]  /E5 MS 21 -* MRN [id number]  : 1955 Date 10/12/2022       LOS (days): 7  Geometric Mean LOS (GMLOS) (days): 4 70  Days to GMLOS:-2 1        OBJECTIVE:        Current admission status: Inpatient  Preferred Pharmacy:   [address] [place] [address], [address] [place] [address]  Phone: [phone] Fax: [phone]    [id number], [address]  [address] [address]  Phone: [phone] Fax: [phone]    Primary Care Provider: Faustina Farr MD    Primary Insurance: MEDICARE  Secondary Insurance: [employer]    PROGRESS NOTE:      Pt will discharge to home today  Patria Rodriguez will resume wound care services on Friday 10/14, when next dressing change is due  CM spoke with spouse to update, and directed spouse to bring Pts wound vac to the hospital for swap out  CM communicating with bedside nurse to coordinate  Pt will transport home with spouse by car  CM continues to follow

## 2022-10-12 NOTE — PLAN OF CARE
Problem: PAIN - ADULT  Goal: Verbalizes/displays adequate comfort level or baseline comfort level  Description: Interventions:  - Encourage patient to monitor pain and request assistance  - Assess pain using appropriate pain scale  - Administer analgesics based on type and severity of pain and evaluate response  - Implement non-pharmacological measures as appropriate and evaluate response  - Consider cultural and social influences on pain and pain management  - Notify physician/advanced practitioner if interventions unsuccessful or patient reports new pain  Outcome: Progressing     Problem: INFECTION - ADULT  Goal: Absence or prevention of progression during hospitalization  Description: INTERVENTIONS:  - Assess and monitor for signs and symptoms of infection  - Monitor lab/diagnostic results  - Monitor all insertion sites, i e  indwelling lines, tubes, and drains  - Monitor endotracheal if appropriate and nasal secretions for changes in amount and color  - [place] appropriate cooling/warming therapies per order  - Administer medications as ordered  - Instruct and encourage patient and family to use good hand hygiene technique  - Identify and instruct in appropriate isolation precautions for identified infection/condition  Outcome: Progressing     Problem: SAFETY ADULT  Goal: Patient will remain free of falls  Description: INTERVENTIONS:  - Educate patient/family on patient safety including physical limitations  - Instruct patient to call for assistance with activity   - Consult OT/PT to assist with strengthening/mobility   - Keep Call bell within reach  - Keep bed low and locked with side rails adjusted as appropriate  - Keep care items and personal belongings within reach  - Initiate and maintain comfort rounds  - Make Fall Risk Sign visible to staff  - Apply yellow socks and bracelet for high fall risk patients  - Consider moving patient to room near nurses station  Outcome: Progressing  Goal: Maintain or return to baseline ADL function  Description: INTERVENTIONS:  -  Assess patient's ability to carry out ADLs; assess patient's baseline for ADL function and identify physical deficits which impact ability to perform ADLs (bathing, care of mouth/teeth, toileting, grooming, dressing, etc )  - Assess/evaluate cause of self-care deficits   - Assess range of motion  - Assess patient's mobility; develop plan if impaired  - Assess patient's need for assistive devices and provide as appropriate  - Encourage maximum independence but intervene and supervise when necessary  - Involve family in performance of ADLs  - Assess for home care needs following discharge   - Consider OT consult to assist with ADL evaluation and planning for discharge  - Provide patient education as appropriate  Outcome: Progressing  Goal: Maintains/Returns to pre admission functional level  Description: INTERVENTIONS:  - Perform BMAT or MOVE assessment daily    - Set and communicate daily mobility goal to care team and patient/family/caregiver     - Collaborate with rehabilitation services on mobility goals if consulted  - Out of bed for toileting  - Record patient progress and toleration of activity level   Outcome: Progressing     Problem: SKIN/TISSUE INTEGRITY - ADULT  Goal: Skin Integrity remains intact(Skin Breakdown Prevention)  Description: Assess:  -Perform Mic assessment every shift  -Inspect skin when repositioning, toileting, and assisting with ADLS  -Assess extremities for adequate circulation and sensation     Bed Management:  -Have minimal linens on bed & keep smooth, unwrinkled  -Change linens as needed when moist or perspiring    Toileting:  -Offer bedside commode    Activity:  -Encourage activity and walks on unit  -Encourage or provide ROM exercises   -Use appropriate equipment to lift or move patient in bed    Skin Care:  -Avoid use of baby powder, tape, friction and shearing, hot water or constrictive clothing  -Do not massage red bony areas      Outcome: Progressing  Goal: Incision(s), wounds(s) or drain site(s) healing without S/S of infection  Description: INTERVENTIONS  - Assess and document dressing, incision, wound bed, drain sites and surrounding tissue  - Provide patient and family education  - Perform skin care/dressing changes every shift  Outcome: Progressing     Problem: MOBILITY - ADULT  Goal: Maintain or return to baseline ADL function  Description: INTERVENTIONS:  -  Assess patient's ability to carry out ADLs; assess patient's baseline for ADL function and identify physical deficits which impact ability to perform ADLs (bathing, care of mouth/teeth, toileting, grooming, dressing, etc )  - Assess/evaluate cause of self-care deficits   - Assess range of motion  - Assess patient's mobility; develop plan if impaired  - Assess patient's need for assistive devices and provide as appropriate  - Encourage maximum independence but intervene and supervise when necessary  - Involve family in performance of ADLs  - Assess for home care needs following discharge   - Consider OT consult to assist with ADL evaluation and planning for discharge  - Provide patient education as appropriate  Outcome: Progressing  Goal: Maintains/Returns to pre admission functional level  Description: INTERVENTIONS:  - Perform BMAT or MOVE assessment daily    - Set and communicate daily mobility goal to care team and patient/family/caregiver     - Collaborate with rehabilitation services on mobility goals if consulted  - Out of bed for toileting  - Record patient progress and toleration of activity level   Outcome: Progressing     Problem: Potential for Falls  Goal: Patient will remain free of falls  Description: INTERVENTIONS:  - Educate patient/family on patient safety including physical limitations  - Instruct patient to call for assistance with activity   - Consult OT/PT to assist with strengthening/mobility   - Keep Call bell within reach  - Keep bed low and locked with side rails adjusted as appropriate  - Keep care items and personal belongings within reach  - Initiate and maintain comfort rounds  - Make Fall Risk Sign visible to staff  - Apply yellow socks and bracelet for high fall risk patients  - Consider moving patient to room near nurses station  Outcome: Progressing     Problem: Nutrition/Hydration-ADULT  Goal: Nutrient/Hydration intake appropriate for improving, restoring or maintaining nutritional needs  Description: Monitor and assess patient's nutrition/hydration status for malnutrition  Collaborate with interdisciplinary team and initiate plan and interventions as ordered  Monitor patient's weight and dietary intake as ordered or per policy  Utilize nutrition screening tool and intervene as necessary  Determine patient's food preferences and provide high-protein, high-caloric foods as appropriate       INTERVENTIONS:  - Monitor oral intake, urinary output, labs, and treatment plans  - Assess nutrition and hydration status and recommend course of action  - Evaluate amount of meals eaten  - Assist patient with eating if necessary   - Allow adequate time for meals  - Recommend/ encourage appropriate diets, oral nutritional supplements, and vitamin/mineral supplements  - Order, calculate, and assess calorie counts as needed  - Recommend, monitor, and adjust tube feedings and TPN/PPN based on assessed needs  - Assess need for intravenous fluids  - Provide specific nutrition/hydration education as appropriate  - Include patient/family/caregiver in decisions related to nutrition  Outcome: Progressing

## 2022-10-13 ENCOUNTER — HOME CARE VISIT (OUTPATIENT)
Dept: HOME HEALTH SERVICES | Facility: HOME HEALTHCARE | Age: 67
End: 2022-10-13
Payer: MEDICARE

## 2022-10-13 NOTE — CASE MANAGEMENT
Case Management Discharge Planning Note    Patient name Kee Pritchett  Location [address] [place] [address] /E5 MS 21 -* MRN [id number]  : 1955 Date 10/13/2022       Current Admission Date: 10/5/2022  Current Admission Diagnosis:Wound drainage   Patient Active Problem List    Diagnosis Date Noted   • Wound drainage 10/06/2022   • Pulmonary embolism ([address] [address] ) 2022   • Anemia 2022   • Strangulated hernia of abdominal wall 2022   • COVID-19 2022   • Right foot pain 2019   • Class 3 severe obesity due to excess calories with serious comorbidity and body mass index (BMI) of 50 0 to 59 9 in adult [facility]) 2019   • Lymphedema of both lower extremities 2019   • CKD (chronic kidney disease), stage III ([address] ) 2018   • HDL deficiency 2018   • Gout 2018   • DELIA (obstructive sleep apnea) 2018   • Allergic rhinitis 2018   • Essential hypertension 2018   • Kidney stones 2018      LOS (days): 7  Geometric Mean LOS (GMLOS) (days): 4 70  Days to GMLOS:-2 3     OBJECTIVE:  Risk of Unplanned Readmission Score: 18 19         Current admission status: Inpatient   Preferred Pharmacy:   [address] [place] [address], [address] [place] [address]  Phone: [phone] Fax: [phone]    [facility]/pharmacy #[id number] Pisano[address] [facility] [address]  Phone: [phone] Fax: [phone]    Primary Care Provider: Quinton Boswell MD    Primary Insurance: MEDICARE  Secondary Insurance: [address] [place] [address] Number: per CM request, contacted rep, Inder Stone at [facility] to get dressing change ordered  Inder Stone stated she would call  and order supplies and loop in Sebastian He, the post acute rep to assist further if anythign else is needed   CM notified

## 2022-10-13 NOTE — CASE COMMUNICATION
Spoke to patient concerning resumption of care visit post hospital discharge  pt stated he has a wc appt tomorrow and won't need a visit until monday  Informed patient that if he has any vac malfuctions over the wknd we will not be able trouble shoot if we do not resume care  sn asked if sat 10/15 if a good day to visit and pt agreed   M102 for 10/15/22

## 2022-10-14 ENCOUNTER — OFFICE VISIT (OUTPATIENT)
Dept: WOUND CARE | Facility: CLINIC | Age: 67
End: 2022-10-14
Payer: MEDICARE

## 2022-10-14 ENCOUNTER — TRANSITIONAL CARE MANAGEMENT (OUTPATIENT)
Dept: FAMILY MEDICINE CLINIC | Facility: CLINIC | Age: 67
End: 2022-10-14

## 2022-10-14 VITALS
RESPIRATION RATE: 18 BRPM | HEART RATE: 78 BPM | SYSTOLIC BLOOD PRESSURE: 130 MMHG | TEMPERATURE: 98 F | DIASTOLIC BLOOD PRESSURE: 70 MMHG

## 2022-10-14 DIAGNOSIS — T14.8XXA SURGICAL WOUND PRESENT: Primary | ICD-10-CM

## 2022-10-14 DIAGNOSIS — I26.99 PULMONARY EMBOLISM, UNSPECIFIED CHRONICITY, UNSPECIFIED PULMONARY EMBOLISM TYPE, UNSPECIFIED WHETHER ACUTE COR PULMONALE PRESENT (HCC): ICD-10-CM

## 2022-10-14 DIAGNOSIS — E66.01 CLASS 3 SEVERE OBESITY DUE TO EXCESS CALORIES WITH SERIOUS COMORBIDITY AND BODY MASS INDEX (BMI) OF 50.0 TO 59.9 IN ADULT (HCC): ICD-10-CM

## 2022-10-14 PROCEDURE — 99024 POSTOP FOLLOW-UP VISIT: CPT | Performed by: SURGERY

## 2022-10-14 PROCEDURE — 97606 NEG PRS WND THER DME>50 SQCM: CPT

## 2022-10-14 PROCEDURE — NC001 PR NO CHARGE: Performed by: SURGERY

## 2022-10-14 RX ORDER — LIDOCAINE HYDROCHLORIDE 40 MG/ML
5 SOLUTION TOPICAL ONCE
Status: COMPLETED | OUTPATIENT
Start: 2022-10-14 | End: 2022-10-14

## 2022-10-14 RX ADMIN — LIDOCAINE HYDROCHLORIDE 5 ML: 40 SOLUTION TOPICAL at 08:56

## 2022-10-14 NOTE — PATIENT INSTRUCTIONS
Orders Placed This Encounter   Procedures    Wound cleansing and dressings     Wash your hands with soap and water  Remove old dressing, discard into plastic bag and place in trash  Cleanse the wound with normal saline solution prior to applying a clean dressing  Do not use tissue or cotton balls  Do not scrub the wound  Pat dry using gauze  Shower YES,on days your dressing gets changed  Apply skin protectant to skin surrounding wound  Apply black granufoam to the both wounds bridging dressing  DO NOT ALLOW SPONGE TO TOUCH THE SKIN    Cover with vac drape  Settings at 125mmHg continuous  Change dressing 3 times per week     Silver alginate (1 whole piece) to cover satelite open areas to upper abdomen    No lifting more than 20lbs  Consume 3-4 servings of protein daily  Continue visiting nurses skilled 3 times per week for wound care dressing changes    May skip days patient goes to Wound Care appointment    Follow up with Wound Management in 1 week    Today's Treatment  Cleansed with NSS and redressed as ordered above     Standing Status:   Future     Standing Expiration Date:   10/14/2023

## 2022-10-14 NOTE — PROGRESS NOTES
Negative Pressure Wound Therapy  Performed by: Sulaiman Luciano RN  Authorized by: Genna Retana MD   Universal Protocol:  Consent: Verbal consent obtained  Consent given by: patient  Time out: Immediately prior to procedure a "time out" was called to verify the correct patient, procedure, equipment, support staff and site/side marked as required    Timeout called at: 10/14/2022 9:00 AM   Patient understanding: patient states understanding of the procedure being performed  Patient identity confirmed: verbally with patient      Performed by[de-identified]  Clinician  Type[de-identified]  [employer]  Coverage Size (sq cm)::  643 8  Pressure Type[de-identified]  Constant  Pressure Setting[de-identified]  125 mmHG  Sponge Type[de-identified]  Black  Sponge Size:  Large  Total Number Of Sponges Removed Prior To Application[de-identified]  3  Total Number Of Sponges Used For This Application[de-identified]  3

## 2022-10-14 NOTE — PROGRESS NOTES
Patient ID: Starlette Scheuermann is a 78 y o  male Date of Birth 1955     Chief Complaint  Chief Complaint   Patient presents with   • New Patient Visit     Surgical wound of anterior abdominal wall, initial encounter       Allergies  Codeine and Seasonal ic [cholestatin]    Assessment:    Surgical wound present  The midline wound is fresh postoperatively  Continue with the VAC dressing  Base the wound is exposed absorbable mesh  The right lower quadrant wound is granulating in nicely and will continue the [facility] dressing as well  Follow-up next week       Diagnoses and all orders for this visit:    Surgical wound present  -     lidocaine (XYLOCAINE) 4 % topical solution 5 mL  -     Cancel: Wound cleansing and dressings; Future  -     Wound cleansing and dressings; Future  -     Negative Pressure Wound Therapy    Class 3 severe obesity due to excess calories with serious comorbidity and body mass index (BMI) of 50 0 to 59 9 in adult [facility])    Pulmonary embolism, unspecified chronicity, unspecified pulmonary embolism type, unspecified whether acute cor pulmonale present [facility])              Procedures    Plan:     Wound 10/14/22 Surgical Abdomen Medial;Lower (Active)   Wound Image Images linked 10/14/22 0831   Wound Description Yellow;Slough;Pink;Granulation tissue 10/14/22 0839   Lis-wound Assessment Dry; Intact 10/14/22 0839   Wound Length (cm) 28 3 cm 10/14/22 0839   Wound Width (cm) 21 7 cm 10/14/22 0839   Wound Depth (cm) 3 7 cm 10/14/22 0839   Wound Surface Area (cm^2) 614 11 cm^2 10/14/22 0839   Wound Volume (cm^3) 2272 207 cm^3 10/14/22 0839   Calculated Wound Volume (cm^3) 2272 21 cm^3 10/14/22 0839   Undermining 5 5 10/14/22 0839   Undermining is depth extending from 5 5cm @ 5-9oclock, 4 8cm @ 3-4oclock 10/14/22 0839   Wound Site Closure Sutures 10/14/22 0839   Drainage Amount Copious 10/14/22 0839   Drainage Description Serosanguineous; Foul smelling 10/14/22 0839   Patient Tolerance Tolerated well 10/14/22 5780   Dressing Status Removed 10/14/22 0839       Wound 10/14/22 Surgical Abdomen Right; Lower (Active)   Wound Image Images linked 10/14/22 0924   Wound Description Granulation tissue;Pink;Yellow;Slough 10/14/22 0835   Lis-wound Assessment Intact;Dry 10/14/22 0835   Wound Length (cm) 5 5 cm 10/14/22 0835   Wound Width (cm) 5 4 cm 10/14/22 0835   Wound Depth (cm) 5 9 cm 10/14/22 0835   Wound Surface Area (cm^2) 29 7 cm^2 10/14/22 0835   Wound Volume (cm^3) 175 23 cm^3 10/14/22 0835   Calculated Wound Volume (cm^3) 175 23 cm^3 10/14/22 0835   Wound Site Closure Sutures 10/14/22 0835   Drainage Amount Copious 10/14/22 0835   Drainage Description Serosanguineous; Foul smelling 10/14/22 0835   Non-staged Wound Description Full thickness 10/14/22 0835   Patient Tolerance Tolerated well 10/14/22 0835   Dressing Status Removed 10/14/22 0835       Wound 10/14/22 Surgical Abdomen Medial;Lower (Active)   Date First Assessed/Time First Assessed: 10/14/22 0828   Primary Wound Type: Surgical  Location: Abdomen  Wound Location Orientation: Medial;Lower       Wound 10/14/22 Surgical Abdomen Right; Lower (Active)   Date First Assessed/Time First Assessed: 10/14/22 0829   Primary Wound Type: Surgical  Location: Abdomen  Wound Location Orientation: Right; Lower       [REMOVED] Wound 07/15/22 Other (comment) Abdomen Right; Lower (Removed)   Resolved Date/Resolved Time: 10/14/22 0827  Date First Assessed/Time First Assessed: 07/15/22 1401   Primary Wound Type: Other (comment)  Location: Abdomen  Wound Location Orientation: Right; Lower  Wound Outcome: Other (Comment)       [REMOVED] Wound 09/15/22 Abdomen N/A (Removed)   Resolved Date/Resolved Time: 10/14/22 0826  Date First Assessed/Time First Assessed: 09/15/22 1359   Location: Abdomen  Wound Location Orientation: N/A  Wound Description (Comments): vac dressing-RLQ (1 black sponge, 1 white sponge), midline- surgical         [REMOVED] Wound 09/15/22 Abdomen Lower;Quadrant;Right (Removed) Resolved Date/Resolved Time: 10/14/22 0827  Date First Assessed: 09/15/22   Location: Abdomen  Wound Location Orientation: Lower;Quadrant;Right  Wound Outcome: Other (Comment)       [REMOVED] Wound 10/11/22 Abdomen N/A (Removed)   Resolved Date/Resolved Time: 10/14/22 0827  Date First Assessed/Time First Assessed: 10/11/22 1545   Location: Abdomen  Wound Location Orientation: N/A  Wound Description (Comments): wound vac 1 incision black sponge 1 incision black sponge one black     Subjective:        Mr aJy Nice is a 67-year-old gentleman here for follow-up from hospitalization for large abdominal wound  He he developed an incarcerated right lower quadrant hernia requiring excision debridement of the wound and bridging closure with absorbable mesh  He had a large retention sutures in the abdominal wall but developed necrosis of the abdominal wall  He was readmitted underwent excisional debridement and is here for evaluation  The following portions of the patient's history were reviewed and updated as appropriate: allergies, current medications, past family history, past medical history, past social history, past surgical history and problem list     Review of Systems   Constitutional: Negative for chills and fever  HENT: Negative for ear pain and sore throat  Eyes: Negative for pain and visual disturbance  Respiratory: Negative for cough and shortness of breath  Cardiovascular: Negative for chest pain and palpitations  Gastrointestinal: Negative for abdominal pain and vomiting  Musculoskeletal: Positive for back pain and gait problem  Negative for arthralgias  Skin: Negative for color change and rash  Neurological: Negative for seizures and syncope  Psychiatric/Behavioral: Negative for agitation and behavioral problems  All other systems reviewed and are negative          Objective:       Wound 10/14/22 Surgical Abdomen Medial;Lower (Active)   Wound Image Images linked 10/14/22 0831   Wound Description Yellow;Slough;Pink;Granulation tissue 10/14/22 0839   Lis-wound Assessment Dry; Intact 10/14/22 0839   Wound Length (cm) 28 3 cm 10/14/22 0839   Wound Width (cm) 21 7 cm 10/14/22 0839   Wound Depth (cm) 3 7 cm 10/14/22 0839   Wound Surface Area (cm^2) 614 11 cm^2 10/14/22 0839   Wound Volume (cm^3) 2272 207 cm^3 10/14/22 0839   Calculated Wound Volume (cm^3) 2272 21 cm^3 10/14/22 0839   Undermining 5 5 10/14/22 0839   Undermining is depth extending from 5 5cm @ 5-9oclock, 4 8cm @ 3-4oclock 10/14/22 0839   Wound Site Closure Sutures 10/14/22 0839   Drainage Amount Copious 10/14/22 0839   Drainage Description Serosanguineous; Foul smelling 10/14/22 0839   Patient Tolerance Tolerated well 10/14/22 0839   Dressing Status Removed 10/14/22 0839       Wound 10/14/22 Surgical Abdomen Right; Lower (Active)   Wound Image Images linked 10/14/22 0924   Wound Description Granulation tissue;Pink;Yellow;Slough 10/14/22 0835   Lis-wound Assessment Intact;Dry 10/14/22 0835   Wound Length (cm) 5 5 cm 10/14/22 0835   Wound Width (cm) 5 4 cm 10/14/22 0835   Wound Depth (cm) 5 9 cm 10/14/22 0835   Wound Surface Area (cm^2) 29 7 cm^2 10/14/22 0835   Wound Volume (cm^3) 175 23 cm^3 10/14/22 0835   Calculated Wound Volume (cm^3) 175 23 cm^3 10/14/22 0835   Wound Site Closure Sutures 10/14/22 0835   Drainage Amount Copious 10/14/22 0835   Drainage Description Serosanguineous; Foul smelling 10/14/22 0835   Non-staged Wound Description Full thickness 10/14/22 0835   Patient Tolerance Tolerated well 10/14/22 0835   Dressing Status Removed 10/14/22 0835       /70   Pulse 78   Temp 98 °F (36 7 °C)   Resp 18     Physical Exam  Vitals and nursing note reviewed  Constitutional:       General: He is not in acute distress  Appearance: He is well-developed  He is obese  He is not diaphoretic  HENT:      Head: Normocephalic and atraumatic     Eyes:      Pupils: Pupils are equal, round, and reactive to light  Cardiovascular:      Rate and Rhythm: Normal rate and regular rhythm  Pulmonary:      Effort: Pulmonary effort is normal  No respiratory distress  Breath sounds: Normal breath sounds  Abdominal:      Palpations: Abdomen is soft  Comments: Obese abdomen  See complete wound assessment for measurements of abdominal wounds   Musculoskeletal:         General: Normal range of motion  Cervical back: Normal range of motion and neck supple  Skin:     General: Skin is warm and dry  Neurological:      Mental Status: He is alert and oriented to person, place, and time  Psychiatric:         Behavior: Behavior normal            Wound Instructions:  Orders Placed This Encounter   Procedures   • Wound cleansing and dressings     Wash your hands with soap and water  Remove old dressing, discard into plastic bag and place in trash  Cleanse the wound with normal saline solution prior to applying a clean dressing  Do not use tissue or cotton balls  Do not scrub the wound  Pat dry using gauze  Shower YES,on days your dressing gets changed  Apply skin protectant to skin surrounding wound  Apply black granufoam to the both wounds bridging dressing  DO NOT ALLOW SPONGE TO TOUCH THE SKIN    Cover with vac drape  Settings at 125mmHg continuous  Change dressing 3 times per week     Silver alginate (1 whole piece) to cover satelite open areas to upper abdomen    No lifting more than 20lbs  Consume 3-4 servings of protein daily  Continue visiting nurses skilled 3 times per week for wound care dressing changes  May skip days patient goes to Wound Care appointment    Follow up with Wound Management in 1 week    Today's Treatment  Cleansed with NSS and redressed as ordered above     Standing Status:   Future     Standing Expiration Date:   10/14/2023   • Negative Pressure Wound Therapy     This order was created via procedure documentation        Diagnosis ICD-10-CM Associated Orders   1   Surgical wound present  T14  8XXA lidocaine (XYLOCAINE) 4 % topical solution 5 mL     Wound cleansing and dressings     Negative Pressure Wound Therapy   2  Class 3 severe obesity due to excess calories with serious comorbidity and body mass index (BMI) of 50 0 to 59 9 in adult ([facility] )  E66 01     Z68 43    3   Pulmonary embolism, unspecified chronicity, unspecified pulmonary embolism type, unspecified whether acute cor pulmonale present ([place] [address] )  I26 99

## 2022-10-14 NOTE — ASSESSMENT & PLAN NOTE
The midline wound is fresh postoperatively  Continue with the VAC dressing  Base the wound is exposed absorbable mesh  The right lower quadrant wound is granulating in nicely and will continue the [facility] dressing as well    Follow-up next week

## 2022-10-15 ENCOUNTER — HOME CARE VISIT (OUTPATIENT)
Dept: HOME HEALTH SERVICES | Facility: HOME HEALTHCARE | Age: 67
End: 2022-10-15
Payer: MEDICARE

## 2022-10-15 VITALS
DIASTOLIC BLOOD PRESSURE: 70 MMHG | TEMPERATURE: 98.1 F | OXYGEN SATURATION: 98 % | SYSTOLIC BLOOD PRESSURE: 144 MMHG | HEART RATE: 88 BPM | RESPIRATION RATE: 18 BRPM

## 2022-10-15 PROCEDURE — G0299 HHS/HOSPICE OF RN EA 15 MIN: HCPCS

## 2022-10-15 NOTE — CASE COMMUNICATION
[facility] has resumed home health services for your patient  with the following disciplines:      SN  only    Patient declined PT OT and MSW  This report is informational only, no responses is needed  Primary focus of home health care wound care dressing change to abdominal wound  Patient stated goals of care wound to heal  Anticipated visit pattern  2 times a week next week as pt has fu with dr Italo Blake and then MWF for wou nd care wound vac dressing chaange  See medication list - all meds in home   Potential barriers to goal achievement none  Thank you for allowing us to participate in the care of your patient

## 2022-10-15 NOTE — CASE COMMUNICATION
As  per  note  from  admitting  clinician  dated  10/15  patient  is  declining  P T   services  at  this  time

## 2022-10-16 ENCOUNTER — HOME CARE VISIT (OUTPATIENT)
Dept: HOME HEALTH SERVICES | Facility: HOME HEALTHCARE | Age: 67
End: 2022-10-16
Payer: MEDICARE

## 2022-10-17 ENCOUNTER — HOME CARE VISIT (OUTPATIENT)
Dept: HOME HEALTH SERVICES | Facility: HOME HEALTHCARE | Age: 67
End: 2022-10-17
Payer: MEDICARE

## 2022-10-17 VITALS — RESPIRATION RATE: 18 BRPM | TEMPERATURE: 97.4 F

## 2022-10-17 PROCEDURE — G0299 HHS/HOSPICE OF RN EA 15 MIN: HCPCS

## 2022-10-18 ENCOUNTER — OFFICE VISIT (OUTPATIENT)
Dept: FAMILY MEDICINE CLINIC | Facility: CLINIC | Age: 67
End: 2022-10-18
Payer: MEDICARE

## 2022-10-18 VITALS
DIASTOLIC BLOOD PRESSURE: 62 MMHG | SYSTOLIC BLOOD PRESSURE: 140 MMHG | OXYGEN SATURATION: 99 % | BODY MASS INDEX: 51.12 KG/M2 | WEIGHT: 315 LBS | TEMPERATURE: 96.7 F | HEART RATE: 87 BPM

## 2022-10-18 DIAGNOSIS — I26.99 ACUTE PULMONARY EMBOLISM WITHOUT ACUTE COR PULMONALE, UNSPECIFIED PULMONARY EMBOLISM TYPE (HCC): ICD-10-CM

## 2022-10-18 DIAGNOSIS — T14.8XXA SURGICAL WOUND PRESENT: Primary | ICD-10-CM

## 2022-10-18 DIAGNOSIS — D50.0 IRON DEFICIENCY ANEMIA DUE TO CHRONIC BLOOD LOSS: ICD-10-CM

## 2022-10-18 PROCEDURE — 99215 OFFICE O/P EST HI 40 MIN: CPT | Performed by: NURSE PRACTITIONER

## 2022-10-18 NOTE — PROGRESS NOTES
Subjective:   Chief Complaint   Patient presents with   • Transition of Care Management     Discuss meds         Patient ID: Pooja Vernon is a 78 y o  male  Presents today for a transitional care appointment  He was admitted on 10/5/2022 until 10/12/2022 for surgical debridement on abdominal wound  He has visiting nurse who is coming 3 days a week for wound vac change  He is going to wound care weekly for assessement and wound vac change  He has appointment on Friday 10/21/2022 with wound care  He was also placed on Eliquis secondary to PE and is needing refill      The following portions of the patient's history were reviewed and updated as appropriate: allergies, current medications, past family history, past medical history, past social history, past surgical history and problem list     Review of Systems   Constitutional: Positive for fatigue  Negative for chills and fever  Respiratory: Positive for shortness of breath  Negative for cough  Cardiovascular: Negative for chest pain and palpitations  Skin: Positive for wound (abdominal)  Psychiatric/Behavioral: Negative for dysphoric mood  The patient is not nervous/anxious  Objective:  Vitals:    10/18/22 1026   BP: 140/62   BP Location: Right arm   Patient Position: Sitting   Cuff Size: Adult   Pulse: 87   Temp: (!) 96 7 °F (35 9 °C)   TempSrc: Temporal   SpO2: 99%   Weight: (!) 157 kg (346 lb 3 2 oz)      Physical Exam  Constitutional:       Appearance: Normal appearance  He is obese  Cardiovascular:      Rate and Rhythm: Normal rate and regular rhythm  Pulses: Normal pulses  Heart sounds: Normal heart sounds  Pulmonary:      Effort: Pulmonary effort is normal       Breath sounds: Normal breath sounds  Skin:     General: Skin is warm and dry  Capillary Refill: Capillary refill takes less than 2 seconds  Neurological:      Mental Status: He is alert and oriented to person, place, and time  Psychiatric:         Mood and Affect: Mood normal          Behavior: Behavior normal            Assessment/Plan:    No problem-specific Assessment & Plan notes found for this encounter  Diagnoses and all orders for this visit:    Surgical wound present  Comments: Folowed by wound care  Orders:  -     Comprehensive metabolic panel;  Future    Iron deficiency anemia due to chronic blood loss  -     CBC and differential; Future

## 2022-10-19 ENCOUNTER — HOME CARE VISIT (OUTPATIENT)
Dept: HOME HEALTH SERVICES | Facility: HOME HEALTHCARE | Age: 67
End: 2022-10-19
Payer: MEDICARE

## 2022-10-19 DIAGNOSIS — I10 ESSENTIAL HYPERTENSION: Primary | ICD-10-CM

## 2022-10-19 PROCEDURE — G0299 HHS/HOSPICE OF RN EA 15 MIN: HCPCS

## 2022-10-20 VITALS
OXYGEN SATURATION: 97 % | DIASTOLIC BLOOD PRESSURE: 66 MMHG | RESPIRATION RATE: 18 BRPM | TEMPERATURE: 97.6 F | SYSTOLIC BLOOD PRESSURE: 118 MMHG | HEART RATE: 78 BPM

## 2022-10-20 RX ORDER — LISINOPRIL 20 MG/1
TABLET ORAL
Qty: 90 TABLET | Refills: 3 | Status: SHIPPED | OUTPATIENT
Start: 2022-10-20

## 2022-10-21 ENCOUNTER — APPOINTMENT (OUTPATIENT)
Dept: LAB | Facility: HOSPITAL | Age: 67
End: 2022-10-21
Payer: MEDICARE

## 2022-10-21 ENCOUNTER — OFFICE VISIT (OUTPATIENT)
Dept: WOUND CARE | Facility: CLINIC | Age: 67
End: 2022-10-21
Payer: MEDICARE

## 2022-10-21 VITALS
HEART RATE: 88 BPM | TEMPERATURE: 99.1 F | DIASTOLIC BLOOD PRESSURE: 60 MMHG | RESPIRATION RATE: 22 BRPM | SYSTOLIC BLOOD PRESSURE: 110 MMHG

## 2022-10-21 DIAGNOSIS — E66.01 CLASS 3 SEVERE OBESITY DUE TO EXCESS CALORIES WITH SERIOUS COMORBIDITY AND BODY MASS INDEX (BMI) OF 50.0 TO 59.9 IN ADULT (HCC): ICD-10-CM

## 2022-10-21 DIAGNOSIS — T14.8XXA SURGICAL WOUND PRESENT: Primary | ICD-10-CM

## 2022-10-21 DIAGNOSIS — T14.8XXA SURGICAL WOUND PRESENT: ICD-10-CM

## 2022-10-21 DIAGNOSIS — D50.0 IRON DEFICIENCY ANEMIA DUE TO CHRONIC BLOOD LOSS: ICD-10-CM

## 2022-10-21 LAB
ALBUMIN SERPL BCP-MCNC: 3.5 G/DL (ref 3.5–5)
ALP SERPL-CCNC: 74 U/L (ref 43–122)
ALT SERPL W P-5'-P-CCNC: 17 U/L
ANION GAP SERPL CALCULATED.3IONS-SCNC: 6 MMOL/L (ref 5–14)
AST SERPL W P-5'-P-CCNC: 22 U/L (ref 17–59)
BASOPHILS # BLD AUTO: 0.05 THOUSANDS/ÂΜL (ref 0–0.1)
BASOPHILS NFR BLD AUTO: 1 % (ref 0–1)
BILIRUB SERPL-MCNC: 0.73 MG/DL (ref 0.2–1)
BUN SERPL-MCNC: 21 MG/DL (ref 5–25)
CALCIUM SERPL-MCNC: 9 MG/DL (ref 8.4–10.2)
CHLORIDE SERPL-SCNC: 104 MMOL/L (ref 96–108)
CO2 SERPL-SCNC: 26 MMOL/L (ref 21–32)
CREAT SERPL-MCNC: 1.44 MG/DL (ref 0.7–1.5)
EOSINOPHIL # BLD AUTO: 0.26 THOUSAND/ÂΜL (ref 0–0.61)
EOSINOPHIL NFR BLD AUTO: 2 % (ref 0–6)
ERYTHROCYTE [DISTWIDTH] IN BLOOD BY AUTOMATED COUNT: 14.7 % (ref 11.6–15.1)
GFR SERPL CREATININE-BSD FRML MDRD: 50 ML/MIN/1.73SQ M
GLUCOSE SERPL-MCNC: 108 MG/DL (ref 70–99)
HCT VFR BLD AUTO: 30.1 % (ref 36.5–49.3)
HGB BLD-MCNC: 9.7 G/DL (ref 12–17)
IMM GRANULOCYTES # BLD AUTO: 0.05 THOUSAND/UL (ref 0–0.2)
IMM GRANULOCYTES NFR BLD AUTO: 1 % (ref 0–2)
LYMPHOCYTES # BLD AUTO: 1.92 THOUSANDS/ÂΜL (ref 0.6–4.47)
LYMPHOCYTES NFR BLD AUTO: 18 % (ref 14–44)
MCH RBC QN AUTO: 30.5 PG (ref 26.8–34.3)
MCHC RBC AUTO-ENTMCNC: 32.2 G/DL (ref 31.4–37.4)
MCV RBC AUTO: 95 FL (ref 82–98)
MONOCYTES # BLD AUTO: 0.67 THOUSAND/ÂΜL (ref 0.17–1.22)
MONOCYTES NFR BLD AUTO: 6 % (ref 4–12)
NEUTROPHILS # BLD AUTO: 7.68 THOUSANDS/ÂΜL (ref 1.85–7.62)
NEUTS SEG NFR BLD AUTO: 72 % (ref 43–75)
NRBC BLD AUTO-RTO: 0 /100 WBCS
PLATELET # BLD AUTO: 516 THOUSANDS/UL (ref 149–390)
PMV BLD AUTO: 8.7 FL (ref 8.9–12.7)
POTASSIUM SERPL-SCNC: 4.2 MMOL/L (ref 3.5–5.3)
PROT SERPL-MCNC: 7.4 G/DL (ref 6.4–8.4)
RBC # BLD AUTO: 3.18 MILLION/UL (ref 3.88–5.62)
SODIUM SERPL-SCNC: 136 MMOL/L (ref 135–147)
WBC # BLD AUTO: 10.63 THOUSAND/UL (ref 4.31–10.16)

## 2022-10-21 PROCEDURE — 36415 COLL VENOUS BLD VENIPUNCTURE: CPT

## 2022-10-21 PROCEDURE — 97606 NEG PRS WND THER DME>50 SQCM: CPT

## 2022-10-21 PROCEDURE — 80053 COMPREHEN METABOLIC PANEL: CPT

## 2022-10-21 PROCEDURE — 85025 COMPLETE CBC W/AUTO DIFF WBC: CPT

## 2022-10-21 PROCEDURE — 11042 DBRDMT SUBQ TIS 1ST 20SQCM/<: CPT | Performed by: SURGERY

## 2022-10-21 PROCEDURE — 11045 DBRDMT SUBQ TISS EACH ADDL: CPT | Performed by: SURGERY

## 2022-10-21 NOTE — PATIENT INSTRUCTIONS
Orders Placed This Encounter   Procedures    Wound cleansing and dressings     Wound cleansing and dressings       Wash your hands with soap and water  Remove old dressing, discard into plastic bag and place in trash  Cleanse the wound with normal saline solution prior to applying a clean dressing  Do not use tissue or cotton balls  Do not scrub the wound  Pat dry using gauze  Shower YES,on days your dressing gets changed  Apply skin protectant to skin surrounding wound  Apply black granufoam to the both wounds bridging dressing  DO NOT ALLOW SPONGE TO TOUCH THE SKIN    Cover with vac drape  Settings at 125mmHg continuous  Change dressing 3 times per week      Silver alginate (1 whole piece) to cover satelite open areas to upper abdomen     No lifting more than 20lbs  Consume 3-4 servings of protein daily  Continue visiting nurses skilled 3 times per week for wound care dressing changes    May skip days patient goes to Wound Care appointment     Follow up with Wound Management in 2 weeks    Today's Treatment  Cleansed with NSS and redressed as ordered above     Standing Status:   Future     Standing Expiration Date:   10/21/2023

## 2022-10-21 NOTE — ASSESSMENT & PLAN NOTE
The main wound is starting to granulate  The edges were debrided with a scalpel wall fat necrosis and some non adherent piece of mesh  The lower quadrant wound is improving as well  Continue the VAC dressing    Follow-up in 2 weeks

## 2022-10-21 NOTE — PROGRESS NOTES
Negative Pressure Wound Therapy  Performed by: Shawna Flor RN  Authorized by: Megan España MD   Universal Protocol:  Consent: Verbal consent obtained  Consent given by: patient  Time out: Immediately prior to procedure a "time out" was called to verify the correct patient, procedure, equipment, support staff and site/side marked as required    Timeout called at: 10/21/2022 11:23 AM   Patient understanding: patient states understanding of the procedure being performed  Patient identity confirmed: verbally with patient      Performed by[de-identified]  Clinician  Type[de-identified]  [facility]  Coverage Size (sq cm)::  625 5  Pressure Type[de-identified]  Constant  Pressure Setting[de-identified]  125 mmHG  Sponge Type[de-identified]  Black  Sponge Size:  Large  Total Number Of Sponges Removed Prior To Application[de-identified]  3  Total Number Of Sponges Used For This Application[de-identified]  3

## 2022-10-21 NOTE — PROGRESS NOTES
Patient ID: Rita Preston is a 78 y o  male Date of Birth 1955     Chief Complaint  Chief Complaint   Patient presents with   • Follow Up Wound Care Visit     Abdominal wound       Allergies  Codeine and Seasonal ic [cholestatin]    Assessment:    Surgical wound present  The main wound is starting to granulate  The edges were debrided with a scalpel wall fat necrosis and some non adherent piece of mesh  The lower quadrant wound is improving as well  Continue the VAC dressing  Follow-up in 2 weeks       Diagnoses and all orders for this visit:    Surgical wound present  -     Wound cleansing and dressings; Future    Class 3 severe obesity due to excess calories with serious comorbidity and body mass index (BMI) of 50 0 to 59 9 in [facility])    Other orders  -     Debridement              Debridement   Wound 10/14/22 Surgical Abdomen Medial;Lower    Universal Protocol:  Consent given by: patient  Time out: Immediately prior to procedure a "time out" was called to verify the correct patient, procedure, equipment, support staff and site/side marked as required  Performed by: physician  Debridement type: surgical  Level of debridement: subcutaneous tissue  Pain control: lidocaine 4%  Post-debridement measurements  Length (cm): 25 8  Width (cm): 23 7  Depth (cm): 2 6  Percent debrided: 10%  Surface Area (cm^2): 611 46  Area debrided (cm^2): 61 15  Volume (cm^3): 1589 8  Tissue and other material debrided: subcutaneous tissue  Devitalized tissue debrided: biofilm, necrotic debris and slough  Instrument(s) utilized: blade  Procedural pain (0-10): 1  Post-procedural pain: 1   Response to treatment: procedure was tolerated well          Plan:     Wound 10/14/22 Surgical Abdomen Medial;Lower (Active)   Wound Image Images linked 10/21/22 1044   Wound Description Granulation tissue;Bleeding;Yellow;Slough 10/21/22 1025   Lis-wound Assessment Scar Tissue; Hyperpigmented 10/21/22 1025   Wound Length (cm) 25 8 cm 10/21/22 1025   Wound Width (cm) 23 7 cm 10/21/22 1025   Wound Depth (cm) 2 6 cm 10/21/22 1025   Wound Surface Area (cm^2) 611 46 cm^2 10/21/22 1025   Wound Volume (cm^3) 1589 796 cm^3 10/21/22 1025   Calculated Wound Volume (cm^3) 1589 8 cm^3 10/21/22 1025   Change in Wound Size % 30 03 10/21/22 1025   Undermining is depth extending from [email or web address]  and 4 8cm @ 7-[address]cock 10/21/22 1025   Wound Site Closure Sutures 10/21/22 1025   Drainage Amount Copious 10/21/22 1025   Drainage Description Serosanguineous; Foul smelling 10/21/22 1025   Dressing Changed Changed 10/21/22 1025   Patient Tolerance Tolerated well 10/21/22 1025   Dressing Status Removed 10/21/22 1025       Wound 10/14/22 Surgical Abdomen Right; Lower (Active)   Wound Image Images linked 10/21/22 1024   Wound Description Pink;Granulation tissue;Bleeding;Slough 10/21/22 1030   Lis-wound Assessment Hyperpigmented 10/21/22 1030   Wound Length (cm) 4 cm 10/21/22 1030   Wound Width (cm) 3 5 cm 10/21/22 1030   Wound Depth (cm) 1 5 cm 10/21/22 1030   Wound Surface Area (cm^2) 14 cm^2 10/21/22 1030   Wound Volume (cm^3) 21 cm^3 10/21/22 1030   Calculated Wound Volume (cm^3) 21 cm^3 10/21/22 1030   Change in Wound Size % 88 02 10/21/22 1030   Wound Site Closure Sutures 10/21/22 1030   Drainage Amount Copious 10/21/22 1030   Drainage Description Serosanguineous; Foul smelling 10/21/22 1030   Non-staged Wound Description Full thickness 10/21/22 1030   Dressing Status Removed 10/21/22 1030       Wound 10/14/22 Surgical Abdomen Medial;Lower (Active)   Date First Assessed/Time First Assessed: 10/14/22 0828   Primary Wound Type: Surgical  Location: Abdomen  Wound Location Orientation: Medial;Lower       Wound 10/14/22 Surgical Abdomen Right; Lower (Active)   Date First Assessed/Time First Assessed: 10/14/22 9819   Primary Wound Type: Surgical  Location: Abdomen  Wound Location Orientation: Right; Lower       [REMOVED] Wound 07/15/22 Other (comment) Abdomen Right; Lower (Removed)   Resolved Date/Resolved Time: 10/14/22 0827  Date First Assessed/Time First Assessed: 07/15/22 1401   Primary Wound Type: Other (comment)  Location: Abdomen  Wound Location Orientation: Right; Lower  Wound Outcome: Other (Comment)       [REMOVED] Wound 09/15/22 Abdomen N/A (Removed)   Resolved Date/Resolved Time: 10/14/22 0826  Date First Assessed/Time First Assessed: 09/15/22 1359   Location: Abdomen  Wound Location Orientation: N/A  Wound Description (Comments): vac dressing-RLQ (1 black sponge, 1 white sponge), midline- surgical  [REMOVED] Wound 09/15/22 Abdomen Lower;Quadrant;Right (Removed)   Resolved Date/Resolved Time: 10/14/22 0827  Date First Assessed: 09/15/22   Location: Abdomen  Wound Location Orientation: Lower;Quadrant;Right  Wound Outcome: Other (Comment)       [REMOVED] Wound 10/11/22 Abdomen N/A (Removed)   Resolved Date/Resolved Time: 10/14/22 0827  Date First Assessed/Time First Assessed: 10/11/22 1545   Location: Abdomen  Wound Location Orientation: N/A  Wound Description (Comments): wound vac 1 incision black sponge 1 incision black sponge one black     Subjective:        Mr Latasha Perez is a 41-year-old gentleman here for follow-up from hospitalization for large abdominal wound  He he developed an incarcerated right lower quadrant hernia requiring excision debridement of the wound and bridging closure with absorbable mesh  He had a large retention sutures in the abdominal wall but developed necrosis of the abdominal wall  He was readmitted underwent excisional debridement and is here for evaluation  10/21/2022  Overall doing okay  No significant pain    Having some issues with the VAC dressing but overall working      The following portions of the patient's history were reviewed and updated as appropriate: allergies, current medications, past family history, past medical history, past social history, past surgical history and problem list     Review of Systems Constitutional: Negative for chills and fever  HENT: Negative for ear pain and sore throat  Eyes: Negative for pain and visual disturbance  Respiratory: Negative for cough and shortness of breath  Cardiovascular: Negative for chest pain and palpitations  Gastrointestinal: Negative for abdominal pain and vomiting  Musculoskeletal: Positive for back pain and gait problem  Negative for arthralgias  Skin: Negative for color change and rash  Neurological: Negative for seizures and syncope  Psychiatric/Behavioral: Negative for agitation and behavioral problems  All other systems reviewed and are negative  Objective:       Wound 10/14/22 Surgical Abdomen Medial;Lower (Active)   Wound Image Images linked 10/21/22 1044   Wound Description Granulation tissue;Bleeding;Yellow;Slough 10/21/22 1025   Lis-wound Assessment Scar Tissue; Hyperpigmented 10/21/22 1025   Wound Length (cm) 25 8 cm 10/21/22 1025   Wound Width (cm) 23 7 cm 10/21/22 1025   Wound Depth (cm) 2 6 cm 10/21/22 1025   Wound Surface Area (cm^2) 611 46 cm^2 10/21/22 1025   Wound Volume (cm^3) 1589 796 cm^3 10/21/22 1025   Calculated Wound Volume (cm^3) 1589 8 cm^3 10/21/22 1025   Change in Wound Size % 30 03 10/21/22 1025   Undermining is depth extending from [email or web address]  and 4 8cm @ [address] [place] [address] 10/21/22 1025   Wound Site Closure Sutures 10/21/22 1025   Drainage Amount Copious 10/21/22 1025   Drainage Description Serosanguineous; Foul smelling 10/21/22 1025   Dressing Changed Changed 10/21/22 1025   Patient Tolerance Tolerated well 10/21/22 1025   Dressing Status Removed 10/21/22 1025       Wound 10/14/22 Surgical Abdomen Right; Lower (Active)   Wound Image Images linked 10/21/22 1024   Wound Description Pink;Granulation tissue;Bleeding;Slough 10/21/22 1030   Lis-wound Assessment Hyperpigmented 10/21/22 1030   Wound Length (cm) 4 cm 10/21/22 1030   Wound Width (cm) 3 5 cm 10/21/22 1030   Wound Depth (cm) 1 5 cm 10/21/22 1030   Wound Surface Area (cm^2) 14 cm^2 10/21/22 1030   Wound Volume (cm^3) 21 cm^3 10/21/22 1030   Calculated Wound Volume (cm^3) 21 cm^3 10/21/22 1030   Change in Wound Size % 88 02 10/21/22 1030   Wound Site Closure Sutures 10/21/22 1030   Drainage Amount Copious 10/21/22 1030   Drainage Description Serosanguineous; Foul smelling 10/21/22 1030   Non-staged Wound Description Full thickness 10/21/22 1030   Dressing Status Removed 10/21/22 1030       /60   Pulse 88   Temp 99 1 °F (37 3 °C)   Resp 22     Physical Exam  Vitals and nursing note reviewed  Constitutional:       General: He is not in acute distress  Appearance: He is well-developed  He is obese  He is not diaphoretic  HENT:      Head: Normocephalic and atraumatic  Eyes:      Pupils: Pupils are equal, round, and reactive to light  Cardiovascular:      Rate and Rhythm: Normal rate and regular rhythm  Pulmonary:      Effort: Pulmonary effort is normal  No respiratory distress  Breath sounds: Normal breath sounds  Abdominal:      Palpations: Abdomen is soft  Comments: Obese abdomen  See complete wound assessment for measurements of abdominal wounds   Musculoskeletal:         General: Normal range of motion  Cervical back: Normal range of motion and neck supple  Skin:     General: Skin is warm and dry  Neurological:      Mental Status: He is alert and oriented to person, place, and time  Psychiatric:         Behavior: Behavior normal            Wound Instructions:  Orders Placed This Encounter   Procedures   • Wound cleansing and dressings     Wound cleansing and dressings       Wash your hands with soap and water  Remove old dressing, discard into plastic bag and place in trash  Cleanse the wound with normal saline solution prior to applying a clean dressing  Do not use tissue or cotton balls  Do not scrub the wound  Pat dry using gauze    Shower YES,on days your dressing gets changed  Apply skin protectant to skin surrounding wound  Apply black granufoam to the both wounds bridging dressing  DO NOT ALLOW SPONGE TO TOUCH THE SKIN    Cover with vac drape  Settings at 125mmHg continuous  Change dressing 3 times per week      Silver alginate (1 whole piece) to cover satelite open areas to upper abdomen     No lifting more than 20lbs  Consume 3-4 servings of protein daily  Continue visiting nurses skilled 3 times per week for wound care dressing changes  May skip days patient goes to Wound Care appointment     Follow up with Wound Management in 2 weeks    Today's Treatment  Cleansed with NSS and redressed as ordered above     Standing Status:   Future     Standing Expiration Date:   10/21/2023   • Debridement     This order was created via procedure documentation        Diagnosis ICD-10-CM Associated Orders   1  Surgical wound present  T14  8XXA Wound cleansing and dressings   2   Class 3 severe obesity due to excess calories with serious comorbidity and body mass index (BMI) of 50 0 to 59 9 in adult [facility])  E66 01     Z68 43

## 2022-10-22 ENCOUNTER — HOME CARE VISIT (OUTPATIENT)
Dept: HOME HEALTH SERVICES | Facility: HOME HEALTHCARE | Age: 67
End: 2022-10-22
Payer: MEDICARE

## 2022-10-24 ENCOUNTER — HOME CARE VISIT (OUTPATIENT)
Dept: HOME HEALTH SERVICES | Facility: HOME HEALTHCARE | Age: 67
End: 2022-10-24
Payer: MEDICARE

## 2022-10-24 VITALS
RESPIRATION RATE: 22 BRPM | HEART RATE: 90 BPM | TEMPERATURE: 98 F | SYSTOLIC BLOOD PRESSURE: 142 MMHG | DIASTOLIC BLOOD PRESSURE: 68 MMHG | OXYGEN SATURATION: 99 %

## 2022-10-24 PROCEDURE — 88304 TISSUE EXAM BY PATHOLOGIST: CPT | Performed by: PATHOLOGY

## 2022-10-24 PROCEDURE — G0300 HHS/HOSPICE OF LPN EA 15 MIN: HCPCS

## 2022-10-26 ENCOUNTER — HOME CARE VISIT (OUTPATIENT)
Dept: HOME HEALTH SERVICES | Facility: HOME HEALTHCARE | Age: 67
End: 2022-10-26
Payer: MEDICARE

## 2022-10-26 VITALS
OXYGEN SATURATION: 99 % | TEMPERATURE: 99.7 F | RESPIRATION RATE: 18 BRPM | SYSTOLIC BLOOD PRESSURE: 134 MMHG | HEART RATE: 88 BPM | DIASTOLIC BLOOD PRESSURE: 78 MMHG

## 2022-10-26 PROCEDURE — G0299 HHS/HOSPICE OF RN EA 15 MIN: HCPCS

## 2022-10-27 NOTE — CASE COMMUNICATION
Ship to Pt  Home  Clinician to take to pt  [email or web address]   Insurance Medicare     Wound 1 abdominal wound      Full x      Wound 2 abdominal wound      Full x         All items are ordered by the each unless otherwise noted    PLEASE DO NOT ORDER BY THE BOX  Request specific quantity needed  For Private Insurances order should be for a 2 week period    Gauze 4x4 N/S 200 4x4s per unit - 2   Alginate with Silver [id number] - [id number]

## 2022-10-28 ENCOUNTER — HOME CARE VISIT (OUTPATIENT)
Dept: HOME HEALTH SERVICES | Facility: HOME HEALTHCARE | Age: 67
End: 2022-10-28
Payer: MEDICARE

## 2022-10-28 VITALS
TEMPERATURE: 97.6 F | RESPIRATION RATE: 18 BRPM | DIASTOLIC BLOOD PRESSURE: 76 MMHG | SYSTOLIC BLOOD PRESSURE: 130 MMHG | OXYGEN SATURATION: 95 % | HEART RATE: 78 BPM

## 2022-10-28 PROCEDURE — G0299 HHS/HOSPICE OF RN EA 15 MIN: HCPCS

## 2022-10-31 ENCOUNTER — HOME CARE VISIT (OUTPATIENT)
Dept: HOME HEALTH SERVICES | Facility: HOME HEALTHCARE | Age: 67
End: 2022-10-31

## 2022-10-31 VITALS
HEART RATE: 88 BPM | TEMPERATURE: 98 F | RESPIRATION RATE: 18 BRPM | SYSTOLIC BLOOD PRESSURE: 142 MMHG | DIASTOLIC BLOOD PRESSURE: 80 MMHG | OXYGEN SATURATION: 100 %

## 2022-11-02 ENCOUNTER — HOME CARE VISIT (OUTPATIENT)
Dept: HOME HEALTH SERVICES | Facility: HOME HEALTHCARE | Age: 67
End: 2022-11-02

## 2022-11-02 VITALS
SYSTOLIC BLOOD PRESSURE: 120 MMHG | RESPIRATION RATE: 20 BRPM | HEART RATE: 88 BPM | DIASTOLIC BLOOD PRESSURE: 68 MMHG | TEMPERATURE: 98 F | OXYGEN SATURATION: 100 %

## 2022-11-04 ENCOUNTER — OFFICE VISIT (OUTPATIENT)
Dept: WOUND CARE | Facility: CLINIC | Age: 67
End: 2022-11-04

## 2022-11-04 VITALS
HEART RATE: 84 BPM | DIASTOLIC BLOOD PRESSURE: 75 MMHG | SYSTOLIC BLOOD PRESSURE: 140 MMHG | TEMPERATURE: 98.4 F | RESPIRATION RATE: 18 BRPM

## 2022-11-04 DIAGNOSIS — E66.01 CLASS 3 SEVERE OBESITY DUE TO EXCESS CALORIES WITH SERIOUS COMORBIDITY AND BODY MASS INDEX (BMI) OF 50.0 TO 59.9 IN ADULT (HCC): ICD-10-CM

## 2022-11-04 DIAGNOSIS — T14.8XXA SURGICAL WOUND PRESENT: Primary | ICD-10-CM

## 2022-11-04 RX ORDER — LIDOCAINE HYDROCHLORIDE 40 MG/ML
5 SOLUTION TOPICAL ONCE
Status: COMPLETED | OUTPATIENT
Start: 2022-11-04 | End: 2022-11-04

## 2022-11-04 RX ADMIN — LIDOCAINE HYDROCHLORIDE 5 ML: 40 SOLUTION TOPICAL at 11:37

## 2022-11-04 NOTE — PROGRESS NOTES
Patient ID: Samuel Gan is a 78 y o  male Date of Birth 1955     Chief Complaint  Chief Complaint   Patient presents with   • Follow Up Wound Care Visit     Surgical wound present       Allergies  Codeine and Seasonal ic [cholestatin]    Assessment:    Surgical wound present  The wound is improving  The edges still had some fibropurulent tissue and nonviable tissue that was debrided  The right lower quadrant wound is doing well  Continue the same VAC dressing  Follow-up in 2 weeks       Diagnoses and all orders for this visit:    Surgical wound present  -     Wound cleansing and dressings; Future  -     lidocaine (XYLOCAINE) 4 % topical solution 5 mL  -     Negative Pressure Wound Therapy    Class 3 severe obesity due to excess calories with serious comorbidity and body mass index (BMI) of 50 0 to 59 9 in [facility])    Other orders  -     Debridement              Debridement   Wound 10/14/22 Surgical Abdomen Medial;Lower    Universal Protocol:  Consent given by: patient  Time out: Immediately prior to procedure a "time out" was called to verify the correct patient, procedure, equipment, support staff and site/side marked as required  Performed by: physician  Debridement type: surgical  Level of debridement: subcutaneous tissue  Pain control: lidocaine 4%  Post-debridement measurements  Length (cm): 18 3  Width (cm): 23 7  Depth (cm): 1 7  Percent debrided: 25%  Surface Area (cm^2): 433 71  Area debrided (cm^2): 108 43  Volume (cm^3): 737 31  Tissue and other material debrided: subcutaneous tissue  Devitalized tissue debrided: biofilm and slough  Instrument(s) utilized: curette  Procedural pain (0-10): 1  Post-procedural pain: 1   Response to treatment: procedure was tolerated well          Plan:     Wound 10/14/22 Surgical Abdomen Medial;Lower (Active)   Wound Image Images linked 11/04/22 1134   Wound Description Granulation tissue; Yellow;Slough;Pink 11/04/22 1106   Lis-wound Assessment Scar Tissue; Hyperpigmented 11/04/22 1106   Wound Length (cm) 18 3 cm 11/04/22 1106   Wound Width (cm) 23 7 cm 11/04/22 1106   Wound Depth (cm) 1 7 cm 11/04/22 1106   Wound Surface Area (cm^2) 433 71 cm^2 11/04/22 1106   Wound Volume (cm^3) 737 307 cm^3 11/04/22 1106   Calculated Wound Volume (cm^3) 737 31 cm^3 11/04/22 1106   Change in Wound Size % 67 55 11/04/22 1106   Undermining is depth extending from 3 1 cm @ 3-4 oclock 11/04/22 1106   Drainage Amount Copious 11/04/22 1106   Drainage Description Tan 11/04/22 1106   Non-staged Wound Description Full thickness 11/04/22 1106   Patient Tolerance Tolerated well 11/04/22 1106   Dressing Status Removed 11/04/22 1106       Wound 10/14/22 Surgical Abdomen Right; Lower (Active)   Wound Image Images linked 11/04/22 1134   Wound Description Pink;Granulation tissue;Slough 11/04/22 1103   Lis-wound Assessment Scar Tissue 11/04/22 1103   Wound Length (cm) 3 cm 11/04/22 1103   Wound Width (cm) 2 5 cm 11/04/22 1103   Wound Depth (cm) 1 cm 11/04/22 1103   Wound Surface Area (cm^2) 7 5 cm^2 11/04/22 1103   Wound Volume (cm^3) 7 5 cm^3 11/04/22 1103   Calculated Wound Volume (cm^3) 7 5 cm^3 11/04/22 1103   Change in Wound Size % 95 72 11/04/22 1103   Undermining 0 5 11/04/22 1103   Undermining is depth extending from 11-1 oclock 11/04/22 1103   Drainage Amount Copious 11/04/22 1103   Drainage Description Tan;Foul smelling 11/04/22 1103   Non-staged Wound Description Full thickness 11/04/22 1103   Patient Tolerance Tolerated well 11/04/22 1103   Dressing Status Removed 11/04/22 1103       Wound 10/14/22 Surgical Abdomen Medial;Lower (Active)   Date First Assessed/Time First Assessed: 10/14/22 0828   Primary Wound Type: Surgical  Location: Abdomen  Wound Location Orientation: Medial;Lower       Wound 10/14/22 Surgical Abdomen Right; Lower (Active)   Date First Assessed/Time First Assessed: 10/14/22 0816   Primary Wound Type: Surgical  Location: Abdomen  Wound Location Orientation: Right; Lower       [REMOVED] Wound 07/15/22 Other (comment) Abdomen Right; Lower (Removed)   Resolved Date/Resolved Time: 10/14/22 0827  Date First Assessed/Time First Assessed: 07/15/22 1401   Primary Wound Type: Other (comment)  Location: Abdomen  Wound Location Orientation: Right; Lower  Wound Outcome: Other (Comment)       [REMOVED] Wound 09/15/22 Abdomen N/A (Removed)   Resolved Date/Resolved Time: 10/14/22 0826  Date First Assessed/Time First Assessed: 09/15/22 1359   Location: Abdomen  Wound Location Orientation: N/A  Wound Description (Comments): vac dressing-RLQ (1 black sponge, 1 white sponge), midline- surgical  [REMOVED] Wound 09/15/22 Abdomen Lower;Quadrant;Right (Removed)   Resolved Date/Resolved Time: 10/14/22 0827  Date First Assessed: 09/15/22   Location: Abdomen  Wound Location Orientation: Lower;Quadrant;Right  Wound Outcome: Other (Comment)       [REMOVED] Wound 10/11/22 Abdomen N/A (Removed)   Resolved Date/Resolved Time: 10/14/22 0827  Date First Assessed/Time First Assessed: 10/11/22 1545   Location: Abdomen  Wound Location Orientation: N/A  Wound Description (Comments): wound vac 1 incision black sponge 1 incision black sponge one black     Subjective:        Mr Savanna Fung is a 44-year-old gentleman here for follow-up from hospitalization for large abdominal wound  He he developed an incarcerated right lower quadrant hernia requiring excision debridement of the wound and bridging closure with absorbable mesh  He had a large retention sutures in the abdominal wall but developed necrosis of the abdominal wall  He was readmitted underwent excisional debridement and is here for evaluation  10/21/2022  Overall doing okay  No significant pain  Having some issues with the VAC dressing but overall working    11/04/2022  Doing well  Trying to increase his protein  Still having significant fatigue episodes    Still going through canisters quickly      The following portions of the patient's history were reviewed and updated as appropriate: allergies, current medications, past family history, past medical history, past social history, past surgical history and problem list     Review of Systems   Constitutional: Negative for chills and fever  HENT: Negative for ear pain and sore throat  Eyes: Negative for pain and visual disturbance  Respiratory: Negative for cough and shortness of breath  Cardiovascular: Negative for chest pain and palpitations  Gastrointestinal: Negative for abdominal pain and vomiting  Musculoskeletal: Positive for back pain and gait problem  Negative for arthralgias  Skin: Negative for color change and rash  Neurological: Negative for seizures and syncope  Psychiatric/Behavioral: Negative for agitation and behavioral problems  All other systems reviewed and are negative  Objective:       Wound 10/14/22 Surgical Abdomen Medial;Lower (Active)   Wound Image Images linked 11/04/22 1134   Wound Description Granulation tissue; Yellow;Slough;Pink 11/04/22 1106   Lis-wound Assessment Scar Tissue; Hyperpigmented 11/04/22 1106   Wound Length (cm) 18 3 cm 11/04/22 1106   Wound Width (cm) 23 7 cm 11/04/22 1106   Wound Depth (cm) 1 7 cm 11/04/22 1106   Wound Surface Area (cm^2) 433 71 cm^2 11/04/22 1106   Wound Volume (cm^3) 737 307 cm^3 11/04/22 1106   Calculated Wound Volume (cm^3) 737 31 cm^3 11/04/22 1106   Change in Wound Size % 67 55 11/04/22 1106   Undermining is depth extending from 3 1 cm @ 3-4 oclock 11/04/22 1106   Drainage Amount Copious 11/04/22 1106   Drainage Description Tan 11/04/22 1106   Non-staged Wound Description Full thickness 11/04/22 1106   Patient Tolerance Tolerated well 11/04/22 1106   Dressing Status Removed 11/04/22 1106       Wound 10/14/22 Surgical Abdomen Right; Lower (Active)   Wound Image Images linked 11/04/22 1134   Wound Description Pink;Granulation tissue;Slough 11/04/22 1103   Lis-wound Assessment Scar Tissue 11/04/22 1103   Wound Length (cm) 3 cm 11/04/22 1103   Wound Width (cm) 2 5 cm 11/04/22 1103   Wound Depth (cm) 1 cm 11/04/22 1103   Wound Surface Area (cm^2) 7 5 cm^2 11/04/22 1103   Wound Volume (cm^3) 7 5 cm^3 11/04/22 1103   Calculated Wound Volume (cm^3) 7 5 cm^3 11/04/22 1103   Change in Wound Size % 95 72 11/04/22 1103   Undermining 0 5 11/04/22 1103   Undermining is depth extending from 11-1 oclock 11/04/22 1103   Drainage Amount Copious 11/04/22 1103   Drainage Description Tan;Foul smelling 11/04/22 1103   Non-staged Wound Description Full thickness 11/04/22 1103   Patient Tolerance Tolerated well 11/04/22 1103   Dressing Status Removed 11/04/22 1103       /75   Pulse 84   Temp 98 4 °F (36 9 °C)   Resp 18     Physical Exam  Vitals and nursing note reviewed  Constitutional:       General: He is not in acute distress  Appearance: He is well-developed  He is obese  He is not diaphoretic  HENT:      Head: Normocephalic and atraumatic  Eyes:      Pupils: Pupils are equal, round, and reactive to light  Cardiovascular:      Rate and Rhythm: Normal rate and regular rhythm  Pulmonary:      Effort: Pulmonary effort is normal  No respiratory distress  Breath sounds: Normal breath sounds  Abdominal:      Palpations: Abdomen is soft  Comments: Obese abdomen  See complete wound assessment for measurements of abdominal wounds   Musculoskeletal:         General: Normal range of motion  Cervical back: Normal range of motion and neck supple  Skin:     General: Skin is warm and dry  Neurological:      Mental Status: He is alert and oriented to person, place, and time  Psychiatric:         Behavior: Behavior normal            Wound Instructions:  Orders Placed This Encounter   Procedures   • Wound cleansing and dressings     Wound cleansing and dressings                                  Wash your hands with soap and water    Remove old dressing, discard into plastic bag and place in trash  Cleanse the wound with normal saline solution prior to applying a clean dressing  Do not use tissue or cotton balls  Do not scrub the wound  Pat dry using gauze  Shower YES,on days your dressing gets changed  Apply skin protectant to skin surrounding wound  Apply black granufoam to the both wounds bridging dressing  DO NOT ALLOW SPONGE TO TOUCH THE SKIN    Cover with vac drape  Settings at 125mmHg continuous  Change dressing 3 times per week      Silver alginate (1 whole piece) to cover satelite open areas to upper abdomen     No lifting more than 20lbs  Consume 3-4 servings of protein daily  Continue visiting nurses skilled 3 times per week for wound care dressing changes  May skip days patient goes to Wound Care appointment     Follow up with Wound Management in 2 weeks     Today's Treatment  Cleansed with NSS and redressed as ordered above     Standing Status:   Future     Standing Expiration Date:   11/4/2023   • Negative Pressure Wound Therapy     This order was created via procedure documentation   • Debridement     This order was created via procedure documentation        Diagnosis ICD-10-CM Associated Orders   1  Surgical wound present  T14  8XXA Wound cleansing and dressings     lidocaine (XYLOCAINE) 4 % topical solution 5 mL     Negative Pressure Wound Therapy   2   Class 3 severe obesity due to excess calories with serious comorbidity and body mass index (BMI) of 50 0 to 59 9 in adult [facility])  E66 01     Z68 43

## 2022-11-04 NOTE — ASSESSMENT & PLAN NOTE
The wound is improving  The edges still had some fibropurulent tissue and nonviable tissue that was debrided  The right lower quadrant wound is doing well  Continue the same VAC dressing    Follow-up in 2 weeks

## 2022-11-04 NOTE — PROGRESS NOTES
Negative Pressure Wound Therapy  Performed by: Aleksander Gutierrez RN  Authorized by: Fortunato Paris MD   Universal Protocol:  Consent: Verbal consent obtained  Consent given by: patient  Time out: Immediately prior to procedure a "time out" was called to verify the correct patient, procedure, equipment, support staff and site/side marked as required  Timeout called at: 11/4/2022 11:25 AM   Patient understanding: patient states understanding of the procedure being performed  Patient identity confirmed: verbally with patient      Performed by[de-identified]  Clinician  Type[de-identified]  [facility]  Coverage Size (sq cm)::  441  2  Pressure Type[de-identified]  Constant  Pressure Setting[de-identified]  125 mmHG  Sponge Type[de-identified]  Black  Sponge Size:  Large  Total Number Of Sponges Removed Prior To Application[de-identified]  3  Total Number Of Sponges Used For This Application[de-identified]  3

## 2022-11-04 NOTE — PATIENT INSTRUCTIONS
Orders Placed This Encounter   Procedures    Wound cleansing and dressings     Wound cleansing and dressings                                  Wash your hands with soap and water  Remove old dressing, discard into plastic bag and place in trash  Cleanse the wound with normal saline solution prior to applying a clean dressing  Do not use tissue or cotton balls  Do not scrub the wound  Pat dry using gauze  Shower YES,on days your dressing gets changed  Apply skin protectant to skin surrounding wound  Apply black granufoam to the both wounds bridging dressing  DO NOT ALLOW SPONGE TO TOUCH THE SKIN    Cover with vac drape  Settings at 125mmHg continuous  Change dressing 3 times per week      Silver alginate (1 whole piece) to cover satelite open areas to upper abdomen     No lifting more than 20lbs  Consume 3-4 servings of protein daily  Continue visiting nurses skilled 3 times per week for wound care dressing changes    May skip days patient goes to Wound Care appointment     Follow up with Wound Management in 2 weeks     Today's Treatment  Cleansed with NSS and redressed as ordered above     Standing Status:   Future     Standing Expiration Date:   11/4/2023    Negative Pressure Wound Therapy     This order was created via procedure documentation

## 2022-11-06 ENCOUNTER — HOME CARE VISIT (OUTPATIENT)
Dept: HOME HEALTH SERVICES | Facility: HOME HEALTHCARE | Age: 67
End: 2022-11-06

## 2022-11-07 ENCOUNTER — HOME CARE VISIT (OUTPATIENT)
Dept: HOME HEALTH SERVICES | Facility: HOME HEALTHCARE | Age: 67
End: 2022-11-07

## 2022-11-07 VITALS
DIASTOLIC BLOOD PRESSURE: 58 MMHG | OXYGEN SATURATION: 96 % | HEART RATE: 87 BPM | RESPIRATION RATE: 18 BRPM | SYSTOLIC BLOOD PRESSURE: 102 MMHG | TEMPERATURE: 97.9 F

## 2022-11-09 ENCOUNTER — HOME CARE VISIT (OUTPATIENT)
Dept: HOME HEALTH SERVICES | Facility: HOME HEALTHCARE | Age: 67
End: 2022-11-09

## 2022-11-09 VITALS
RESPIRATION RATE: 20 BRPM | TEMPERATURE: 98 F | SYSTOLIC BLOOD PRESSURE: 106 MMHG | OXYGEN SATURATION: 97 % | DIASTOLIC BLOOD PRESSURE: 52 MMHG | HEART RATE: 104 BPM

## 2022-11-11 ENCOUNTER — HOME CARE VISIT (OUTPATIENT)
Dept: HOME HEALTH SERVICES | Facility: HOME HEALTHCARE | Age: 67
End: 2022-11-11

## 2022-11-12 VITALS
HEART RATE: 88 BPM | DIASTOLIC BLOOD PRESSURE: 72 MMHG | RESPIRATION RATE: 24 BRPM | TEMPERATURE: 98.4 F | SYSTOLIC BLOOD PRESSURE: 112 MMHG

## 2022-11-14 ENCOUNTER — HOME CARE VISIT (OUTPATIENT)
Dept: HOME HEALTH SERVICES | Facility: HOME HEALTHCARE | Age: 67
End: 2022-11-14

## 2022-11-14 VITALS
DIASTOLIC BLOOD PRESSURE: 60 MMHG | TEMPERATURE: 98.6 F | SYSTOLIC BLOOD PRESSURE: 130 MMHG | HEART RATE: 78 BPM | OXYGEN SATURATION: 100 % | RESPIRATION RATE: 18 BRPM

## 2022-11-15 ENCOUNTER — ANESTHESIA EVENT (INPATIENT)
Dept: PERIOP | Facility: HOSPITAL | Age: 67
End: 2022-11-15

## 2022-11-15 ENCOUNTER — ANESTHESIA (INPATIENT)
Dept: PERIOP | Facility: HOSPITAL | Age: 67
End: 2022-11-15

## 2022-11-15 ENCOUNTER — TELEMEDICINE (OUTPATIENT)
Dept: FAMILY MEDICINE CLINIC | Facility: CLINIC | Age: 67
End: 2022-11-15

## 2022-11-15 ENCOUNTER — APPOINTMENT (INPATIENT)
Dept: RADIOLOGY | Facility: HOSPITAL | Age: 67
End: 2022-11-15

## 2022-11-15 ENCOUNTER — HOSPITAL ENCOUNTER (INPATIENT)
Facility: HOSPITAL | Age: 67
LOS: 4 days | Discharge: HOME HEALTH | End: 2022-11-19
Attending: EMERGENCY MEDICINE | Admitting: INTERNAL MEDICINE

## 2022-11-15 ENCOUNTER — APPOINTMENT (EMERGENCY)
Dept: CT IMAGING | Facility: HOSPITAL | Age: 67
End: 2022-11-15

## 2022-11-15 ENCOUNTER — APPOINTMENT (EMERGENCY)
Dept: RADIOLOGY | Facility: HOSPITAL | Age: 67
End: 2022-11-15

## 2022-11-15 ENCOUNTER — HOME CARE VISIT (OUTPATIENT)
Dept: HOME HEALTH SERVICES | Facility: HOME HEALTHCARE | Age: 67
End: 2022-11-15

## 2022-11-15 ENCOUNTER — PREP FOR PROCEDURE (OUTPATIENT)
Dept: UROLOGY | Facility: CLINIC | Age: 67
End: 2022-11-15

## 2022-11-15 VITALS — TEMPERATURE: 103.4 F

## 2022-11-15 DIAGNOSIS — A41.9 SEVERE SEPSIS (HCC): Primary | ICD-10-CM

## 2022-11-15 DIAGNOSIS — D64.9 ANEMIA: ICD-10-CM

## 2022-11-15 DIAGNOSIS — E86.0 DEHYDRATION: ICD-10-CM

## 2022-11-15 DIAGNOSIS — N13.30 HYDRONEPHROSIS: ICD-10-CM

## 2022-11-15 DIAGNOSIS — R65.20 SEVERE SEPSIS (HCC): Primary | ICD-10-CM

## 2022-11-15 DIAGNOSIS — R50.9 FEVER, UNSPECIFIED FEVER CAUSE: ICD-10-CM

## 2022-11-15 DIAGNOSIS — N20.1 RIGHT URETERAL STONE: Primary | ICD-10-CM

## 2022-11-15 DIAGNOSIS — Z46.89 ENCOUNTER FOR MANAGEMENT OF WOUND VAC: ICD-10-CM

## 2022-11-15 DIAGNOSIS — N17.9 AKI (ACUTE KIDNEY INJURY) (HCC): ICD-10-CM

## 2022-11-15 DIAGNOSIS — N20.0 KIDNEY STONES: ICD-10-CM

## 2022-11-15 DIAGNOSIS — K43.6 STRANGULATED HERNIA OF ABDOMINAL WALL: ICD-10-CM

## 2022-11-15 PROBLEM — R10.9 ABDOMINAL PAIN: Status: ACTIVE | Noted: 2022-11-15

## 2022-11-15 LAB
ALBUMIN SERPL BCP-MCNC: 2.4 G/DL (ref 3.5–5)
ALP SERPL-CCNC: 76 U/L (ref 46–116)
ALT SERPL W P-5'-P-CCNC: 13 U/L (ref 12–78)
ANION GAP SERPL CALCULATED.3IONS-SCNC: 10 MMOL/L (ref 4–13)
APTT PPP: 38 SECONDS (ref 23–37)
AST SERPL W P-5'-P-CCNC: 14 U/L (ref 5–45)
ATRIAL RATE: 98 BPM
BACTERIA UR QL AUTO: ABNORMAL /HPF
BASOPHILS # BLD AUTO: 0.03 THOUSANDS/ÂΜL (ref 0–0.1)
BASOPHILS NFR BLD AUTO: 0 % (ref 0–1)
BILIRUB SERPL-MCNC: 0.58 MG/DL (ref 0.2–1)
BILIRUB UR QL STRIP: NEGATIVE
BUN SERPL-MCNC: 26 MG/DL (ref 5–25)
CALCIUM ALBUM COR SERPL-MCNC: 10.1 MG/DL (ref 8.3–10.1)
CALCIUM SERPL-MCNC: 8.8 MG/DL (ref 8.3–10.1)
CHLORIDE SERPL-SCNC: 103 MMOL/L (ref 96–108)
CLARITY UR: ABNORMAL
CO2 SERPL-SCNC: 23 MMOL/L (ref 21–32)
COLOR UR: YELLOW
CREAT SERPL-MCNC: 2.49 MG/DL (ref 0.6–1.3)
EOSINOPHIL # BLD AUTO: 0.03 THOUSAND/ÂΜL (ref 0–0.61)
EOSINOPHIL NFR BLD AUTO: 0 % (ref 0–6)
ERYTHROCYTE [DISTWIDTH] IN BLOOD BY AUTOMATED COUNT: 14.4 % (ref 11.6–15.1)
GFR SERPL CREATININE-BSD FRML MDRD: 25 ML/MIN/1.73SQ M
GLUCOSE SERPL-MCNC: 112 MG/DL (ref 65–140)
GLUCOSE UR STRIP-MCNC: NEGATIVE MG/DL
HCT VFR BLD AUTO: 28.5 % (ref 36.5–49.3)
HGB BLD-MCNC: 9.2 G/DL (ref 12–17)
HGB UR QL STRIP.AUTO: ABNORMAL
IMM GRANULOCYTES # BLD AUTO: 0.05 THOUSAND/UL (ref 0–0.2)
IMM GRANULOCYTES NFR BLD AUTO: 1 % (ref 0–2)
INR PPP: 1.48 (ref 0.84–1.19)
KETONES UR STRIP-MCNC: NEGATIVE MG/DL
LACTATE SERPL-SCNC: 1.1 MMOL/L (ref 0.5–2)
LACTATE SERPL-SCNC: 2.5 MMOL/L (ref 0.5–2)
LEUKOCYTE ESTERASE UR QL STRIP: NEGATIVE
LYMPHOCYTES # BLD AUTO: 0.58 THOUSANDS/ÂΜL (ref 0.6–4.47)
LYMPHOCYTES NFR BLD AUTO: 6 % (ref 14–44)
MCH RBC QN AUTO: 30.1 PG (ref 26.8–34.3)
MCHC RBC AUTO-ENTMCNC: 32.3 G/DL (ref 31.4–37.4)
MCV RBC AUTO: 93 FL (ref 82–98)
MONOCYTES # BLD AUTO: 0.47 THOUSAND/ÂΜL (ref 0.17–1.22)
MONOCYTES NFR BLD AUTO: 5 % (ref 4–12)
MUCOUS THREADS UR QL AUTO: ABNORMAL
NEUTROPHILS # BLD AUTO: 8.49 THOUSANDS/ÂΜL (ref 1.85–7.62)
NEUTS SEG NFR BLD AUTO: 88 % (ref 43–75)
NITRITE UR QL STRIP: NEGATIVE
NON-SQ EPI CELLS URNS QL MICRO: ABNORMAL /HPF
NRBC BLD AUTO-RTO: 0 /100 WBCS
P AXIS: 56 DEGREES
PH UR STRIP.AUTO: 5.5 [PH]
PLATELET # BLD AUTO: 377 THOUSANDS/UL (ref 149–390)
PMV BLD AUTO: 8.3 FL (ref 8.9–12.7)
POTASSIUM SERPL-SCNC: 4.2 MMOL/L (ref 3.5–5.3)
PR INTERVAL: 130 MS
PROCALCITONIN SERPL-MCNC: 0.15 NG/ML
PROT SERPL-MCNC: 7 G/DL (ref 6.4–8.4)
PROT UR STRIP-MCNC: ABNORMAL MG/DL
PROTHROMBIN TIME: 17.9 SECONDS (ref 11.6–14.5)
QRS AXIS: -5 DEGREES
QRSD INTERVAL: 74 MS
QT INTERVAL: 310 MS
QTC INTERVAL: 395 MS
RBC # BLD AUTO: 3.06 MILLION/UL (ref 3.88–5.62)
RBC #/AREA URNS AUTO: ABNORMAL /HPF
SODIUM SERPL-SCNC: 136 MMOL/L (ref 135–147)
SP GR UR STRIP.AUTO: 1.02 (ref 1–1.03)
T WAVE AXIS: 31 DEGREES
UROBILINOGEN UR QL STRIP.AUTO: 0.2 E.U./DL
VENTRICULAR RATE: 98 BPM
WBC # BLD AUTO: 9.65 THOUSAND/UL (ref 4.31–10.16)
WBC #/AREA URNS AUTO: ABNORMAL /HPF

## 2022-11-15 PROCEDURE — BT1D1ZZ FLUOROSCOPY OF RIGHT KIDNEY, URETER AND BLADDER USING LOW OSMOLAR CONTRAST: ICD-10-PCS | Performed by: UROLOGY

## 2022-11-15 PROCEDURE — 0T768DZ DILATION OF RIGHT URETER WITH INTRALUMINAL DEVICE, VIA NATURAL OR ARTIFICIAL OPENING ENDOSCOPIC: ICD-10-PCS | Performed by: UROLOGY

## 2022-11-15 DEVICE — INLAY OPTIMA URETERAL STENT W/O GUIDEWIRE
Type: IMPLANTABLE DEVICE | Status: FUNCTIONAL
Brand: BARD® INLAY OPTIMA® URETERAL STENT

## 2022-11-15 RX ORDER — TAMSULOSIN HYDROCHLORIDE 0.4 MG/1
0.4 CAPSULE ORAL
Status: DISCONTINUED | OUTPATIENT
Start: 2022-11-16 | End: 2022-11-19 | Stop reason: HOSPADM

## 2022-11-15 RX ORDER — ONDANSETRON 2 MG/ML
4 INJECTION INTRAMUSCULAR; INTRAVENOUS EVERY 6 HOURS PRN
Status: DISCONTINUED | OUTPATIENT
Start: 2022-11-15 | End: 2022-11-15 | Stop reason: HOSPADM

## 2022-11-15 RX ORDER — SUCCINYLCHOLINE/SOD CL,ISO/PF 100 MG/5ML
SYRINGE (ML) INTRAVENOUS AS NEEDED
Status: DISCONTINUED | OUTPATIENT
Start: 2022-11-15 | End: 2022-11-15

## 2022-11-15 RX ORDER — PROPOFOL 10 MG/ML
INJECTION, EMULSION INTRAVENOUS AS NEEDED
Status: DISCONTINUED | OUTPATIENT
Start: 2022-11-15 | End: 2022-11-15

## 2022-11-15 RX ORDER — FENTANYL CITRATE 50 UG/ML
INJECTION, SOLUTION INTRAMUSCULAR; INTRAVENOUS AS NEEDED
Status: DISCONTINUED | OUTPATIENT
Start: 2022-11-15 | End: 2022-11-15

## 2022-11-15 RX ORDER — ACETAMINOPHEN 325 MG/1
650 TABLET ORAL EVERY 6 HOURS PRN
Status: DISCONTINUED | OUTPATIENT
Start: 2022-11-15 | End: 2022-11-19 | Stop reason: HOSPADM

## 2022-11-15 RX ORDER — LIDOCAINE HYDROCHLORIDE 20 MG/ML
INJECTION, SOLUTION EPIDURAL; INFILTRATION; INTRACAUDAL; PERINEURAL AS NEEDED
Status: DISCONTINUED | OUTPATIENT
Start: 2022-11-15 | End: 2022-11-15

## 2022-11-15 RX ORDER — TRAVOPROST OPHTHALMIC SOLUTION 0.04 MG/ML
1 SOLUTION OPHTHALMIC
Status: DISCONTINUED | OUTPATIENT
Start: 2022-11-15 | End: 2022-11-19 | Stop reason: HOSPADM

## 2022-11-15 RX ORDER — SODIUM CHLORIDE 9 MG/ML
75 INJECTION, SOLUTION INTRAVENOUS CONTINUOUS
Status: DISCONTINUED | OUTPATIENT
Start: 2022-11-15 | End: 2022-11-17

## 2022-11-15 RX ORDER — ALLOPURINOL 100 MG/1
100 TABLET ORAL DAILY
Status: DISCONTINUED | OUTPATIENT
Start: 2022-11-16 | End: 2022-11-19 | Stop reason: HOSPADM

## 2022-11-15 RX ORDER — ROCURONIUM BROMIDE 10 MG/ML
INJECTION, SOLUTION INTRAVENOUS AS NEEDED
Status: DISCONTINUED | OUTPATIENT
Start: 2022-11-15 | End: 2022-11-15

## 2022-11-15 RX ORDER — FENTANYL CITRATE/PF 50 MCG/ML
25 SYRINGE (ML) INJECTION
Status: DISCONTINUED | OUTPATIENT
Start: 2022-11-15 | End: 2022-11-15 | Stop reason: HOSPADM

## 2022-11-15 RX ORDER — DEXAMETHASONE SODIUM PHOSPHATE 10 MG/ML
INJECTION, SOLUTION INTRAMUSCULAR; INTRAVENOUS AS NEEDED
Status: DISCONTINUED | OUTPATIENT
Start: 2022-11-15 | End: 2022-11-15

## 2022-11-15 RX ORDER — ONDANSETRON 2 MG/ML
4 INJECTION INTRAMUSCULAR; INTRAVENOUS ONCE AS NEEDED
Status: DISCONTINUED | OUTPATIENT
Start: 2022-11-15 | End: 2022-11-15 | Stop reason: HOSPADM

## 2022-11-15 RX ORDER — MAGNESIUM HYDROXIDE 1200 MG/15ML
LIQUID ORAL AS NEEDED
Status: DISCONTINUED | OUTPATIENT
Start: 2022-11-15 | End: 2022-11-15 | Stop reason: HOSPADM

## 2022-11-15 RX ORDER — METOPROLOL SUCCINATE 100 MG/1
100 TABLET, EXTENDED RELEASE ORAL DAILY
Status: DISCONTINUED | OUTPATIENT
Start: 2022-11-16 | End: 2022-11-19 | Stop reason: HOSPADM

## 2022-11-15 RX ORDER — ONDANSETRON 2 MG/ML
INJECTION INTRAMUSCULAR; INTRAVENOUS AS NEEDED
Status: DISCONTINUED | OUTPATIENT
Start: 2022-11-15 | End: 2022-11-15

## 2022-11-15 RX ORDER — FENTANYL CITRATE 50 UG/ML
50 INJECTION, SOLUTION INTRAMUSCULAR; INTRAVENOUS
Status: DISCONTINUED | OUTPATIENT
Start: 2022-11-15 | End: 2022-11-15 | Stop reason: HOSPADM

## 2022-11-15 RX ADMIN — FENTANYL CITRATE 50 MCG: 50 INJECTION INTRAMUSCULAR; INTRAVENOUS at 22:16

## 2022-11-15 RX ADMIN — ONDANSETRON 4 MG: 2 INJECTION INTRAMUSCULAR; INTRAVENOUS at 22:10

## 2022-11-15 RX ADMIN — ACETAMINOPHEN 650 MG: 325 TABLET ORAL at 20:48

## 2022-11-15 RX ADMIN — ROCURONIUM BROMIDE 30 MG: 50 INJECTION, SOLUTION INTRAVENOUS at 22:15

## 2022-11-15 RX ADMIN — SODIUM CHLORIDE 75 ML/HR: 0.9 INJECTION, SOLUTION INTRAVENOUS at 23:09

## 2022-11-15 RX ADMIN — DEXAMETHASONE SODIUM PHOSPHATE 5 MG: 10 INJECTION, SOLUTION INTRAMUSCULAR; INTRAVENOUS at 22:10

## 2022-11-15 RX ADMIN — CEFTRIAXONE SODIUM 1000 MG: 10 INJECTION, POWDER, FOR SOLUTION INTRAVENOUS at 16:30

## 2022-11-15 RX ADMIN — IOHEXOL 50 ML: 240 INJECTION, SOLUTION INTRATHECAL; INTRAVASCULAR; INTRAVENOUS; ORAL at 22:37

## 2022-11-15 RX ADMIN — SUGAMMADEX 200 MG: 100 INJECTION, SOLUTION INTRAVENOUS at 22:27

## 2022-11-15 RX ADMIN — SODIUM CHLORIDE 1000 ML: 0.9 INJECTION, SOLUTION INTRAVENOUS at 17:19

## 2022-11-15 RX ADMIN — PROPOFOL 150 MG: 10 INJECTION, EMULSION INTRAVENOUS at 22:07

## 2022-11-15 RX ADMIN — FENTANYL CITRATE 50 MCG: 50 INJECTION INTRAMUSCULAR; INTRAVENOUS at 22:18

## 2022-11-15 RX ADMIN — Medication 160 MG: at 22:07

## 2022-11-15 RX ADMIN — SUGAMMADEX 600 MG: 100 INJECTION, SOLUTION INTRAVENOUS at 22:23

## 2022-11-15 RX ADMIN — LIDOCAINE HYDROCHLORIDE 100 MG: 20 INJECTION, SOLUTION EPIDURAL; INFILTRATION; INTRACAUDAL; PERINEURAL at 22:07

## 2022-11-15 RX ADMIN — APIXABAN 5 MG: 5 TABLET, FILM COATED ORAL at 20:49

## 2022-11-15 RX ADMIN — SODIUM CHLORIDE 1200 ML: 0.9 INJECTION, SOLUTION INTRAVENOUS at 17:47

## 2022-11-15 RX ADMIN — SODIUM CHLORIDE 75 ML/HR: 0.9 INJECTION, SOLUTION INTRAVENOUS at 20:45

## 2022-11-15 NOTE — ED PROVIDER NOTES
History  Chief Complaint   Patient presents with   • Weakness - Generalized     Pt c/o generalized weakness and fever today  3g of Tylenol since 730  History provided by:  Patient  Fatigue  Severity:  Moderate  Onset quality:  Gradual  Duration:  2 days  Timing:  Constant  Progression:  Worsening  Chronicity:  New  Relieved by:  Nothing  Associated symptoms: fever    Associated symptoms: no abdominal pain, no arthralgias, no chest pain, no diarrhea, no dysuria, no foul-smelling urine, no frequency, no headaches, no myalgias, no nausea, no shortness of breath, no urgency and no vomiting        Prior to Admission Medications   Prescriptions Last Dose Informant Patient Reported? Taking?    Potassium Citrate ER 15 MEQ (1620 MG) TBCR  Self Yes No   Sig: Take 1 tablet by mouth daily    TRAVATAN Z 0 004 % ophthalmic solution  Self Yes No   Sig: Administer 1 drop to both eyes daily at bedtime    allopurinol (ZYLOPRIM) 100 mg tablet  Self Yes No   Sig: Take 100 mg by mouth daily    ammonium lactate (LAC-HYDRIN) 12 % lotion  Self No No   Sig: Apply topically 2 (two) times a day   apixaban (Eliquis) 5 mg   No No   Sig: Take 1 tablet (5 mg total) by mouth 2 (two) times a day   furosemide (LASIX) 20 mg tablet  Self No No   Sig: Take 1 tablet (20 mg total) by mouth daily For swelling   lisinopril (ZESTRIL) 20 mg tablet   No No   Sig: TAKE 1 TABLET DAILY   metoprolol succinate (TOPROL-XL) 100 mg 24 hr tablet  Self No No   Sig: Take 1 tablet (100 mg total) by mouth daily   nystatin (MYCOSTATIN) powder  Self No No   Sig: Apply topically 2 (two) times a day   tamsulosin (FLOMAX) 0 4 mg  Self No No   Si-2 daily as directed      Facility-Administered Medications: None       Past Medical History:   Diagnosis Date   • Anemia 2022   • Chronic kidney disease    • Hypertension    • Obesity    • Sepsis ([facility] [address] )    • Urinary tract infection without hematuria 4/3/2020       Past Surgical History:   Procedure Laterality Date   • ABDOMINAL ADHESION SURGERY N/A 9/15/2022    Procedure: LYSIS ADHESIONS;  Surgeon: Sara Bailon MD;  [facility];  Service: General   • APPENDECTOMY     • EXPLORATORY LAPAROTOMY W/ BOWEL RESECTION N/A 9/14/2022    Procedure: Exdploratory laparotomy, complex Lysis of adhesions, reduction strangulated hernia, debridement RLQ muscle with counter incision, abthera placement;  Surgeon: Sara Bailon MD;  [facility];  Service: General   • EXPLORATORY LAPAROTOMY W/ BOWEL RESECTION N/A 9/15/2022    Procedure: LAPAROTOMY EXPLORATORY  REPAIR RIGHT LQ HERNIA WITH MESH, REPAIR ANTERIOR WALL HERNIA;  Surgeon: Sara Bailon MD;  [facility];  Service: General   • NASAL SEPTUM SURGERY     • WOUND DEBRIDEMENT N/A 10/11/2022    Procedure: Excisional debridement of abdominal wall nonviable tissue  Skin and subcu tissues were dissected  The wound measured 23 x 18 cm x 4 cm deep ;  Surgeon: Marlene Stewart MD;  [facility];  Service: General       Family History   Problem Relation Age of Onset   • Diabetes Father    • Prostate cancer Brother      I have reviewed and agree with the history as documented  E-Cigarette/Vaping   • E-Cigarette Use Never User      E-Cigarette/Vaping Substances   • Nicotine No    • THC No    • CBD No    • Flavoring No    • Other No    • Unknown No      Social History     Tobacco Use   • Smoking status: Never Smoker   • Smokeless tobacco: Never Used   • Tobacco comment: no exposure to passive smoke   Vaping Use   • Vaping Use: Never used   Substance Use Topics   • Alcohol use: No   • Drug use: Not Currently       Review of Systems   Constitutional: Positive for chills and fever  Negative for activity change, appetite change and fatigue  HENT: Negative for congestion, dental problem, ear pain, rhinorrhea and sore throat  Eyes: Negative for pain and redness  Respiratory: Negative for chest tightness, shortness of breath and wheezing      Cardiovascular: Negative for chest pain and palpitations  Gastrointestinal: Negative for abdominal pain, blood in stool, constipation, diarrhea, nausea and vomiting  Endocrine: Negative for cold intolerance and heat intolerance  Genitourinary: Negative for dysuria, frequency, hematuria and urgency  Musculoskeletal: Negative for arthralgias, myalgias, neck pain and neck stiffness  Skin: Negative for color change, pallor and rash  Neurological: Positive for weakness  Negative for numbness and headaches  Hematological: Does not bruise/bleed easily  Psychiatric/Behavioral: Negative for agitation, hallucinations and suicidal ideas  Physical Exam  Physical Exam  Vitals and nursing note reviewed  Constitutional:       Appearance: Normal appearance  He is well-developed  HENT:      Head: Normocephalic and atraumatic  Right Ear: External ear normal       Left Ear: External ear normal       Mouth/Throat:      Pharynx: No oropharyngeal exudate or posterior oropharyngeal erythema  Eyes:      General: No scleral icterus  Conjunctiva/sclera: Conjunctivae normal    Neck:      Vascular: No JVD  Trachea: No tracheal deviation  Cardiovascular:      Rate and Rhythm: Normal rate and regular rhythm  Pulmonary:      Effort: Pulmonary effort is normal  No respiratory distress  Abdominal:      General: There is no distension  Palpations: There is no mass  Tenderness: There is no abdominal tenderness  Hernia: No hernia is present  Comments: Wound vac in place     Musculoskeletal:         General: No deformity  Normal range of motion  Cervical back: Normal range of motion and neck supple  Right lower leg: No edema  Left lower leg: No edema  Skin:     Capillary Refill: Capillary refill takes less than 2 seconds  Coloration: Skin is not pale  Findings: No erythema or rash  Neurological:      General: No focal deficit present        Mental Status: He is alert and oriented to person, place, and time  Psychiatric:         Mood and Affect: Mood normal          Behavior: Behavior normal          Vital Signs  ED Triage Vitals [11/15/22 1550]   Temperature Pulse Respirations Blood Pressure SpO2   (!) 101 9 °F (38 8 °C) (!) 107 22 122/57 97 %      Temp Source Heart Rate Source Patient Position - Orthostatic VS BP Location FiO2 (%)   Oral Monitor Sitting Right arm --      Pain Score       --           Vitals:    11/15/22 1724 11/15/22 1800 11/15/22 1830 11/15/22 1900   BP: (!) 99/42 119/51 127/60 141/63   Pulse: 105 102 102 (!) 106   Patient Position - Orthostatic VS: Lying Lying Lying Lying         Visual Acuity      ED Medications  Medications   ceftriaxone (ROCEPHIN) 1 g/50 mL in dextrose IVPB (0 mg Intravenous Stopped 11/15/22 1717)   sodium chloride 0 9 % bolus 1,000 mL (0 mL Intravenous Stopped 11/15/22 1746)   sodium chloride 0 9 % bolus 1,200 mL (1,200 mL Intravenous New Bag 11/15/22 1747)       Diagnostic Studies  Results Reviewed     Procedure Component Value Units Date/Time    Lactic acid 2 Hours [[id number]]  (Normal) Collected: 11/15/22 1842    Lab Status: Final result Specimen: Blood from Arm, Left Updated: 11/15/22 1928     LACTIC ACID 1 1 mmol/L     Narrative:      Result may be elevated if tourniquet was used during collection      Urine Microscopic [[id number]]  (Abnormal) Collected: 11/15/22 1722    Lab Status: Final result Specimen: Urine, Clean Catch Updated: 11/15/22 1801     RBC, UA 2-4 /hpf      WBC, UA 2-4 /hpf      Epithelial Cells Occasional /hpf      Bacteria, UA Occasional /hpf      MUCUS THREADS Occasional    UA w Reflex to Microscopic w Reflex to Culture [[id number]]  (Abnormal) Collected: 11/15/22 1722    Lab Status: Final result Specimen: Urine, Clean Catch Updated: 11/15/22 1739     Color, UA Yellow     Clarity, UA Slightly Cloudy     Specific Gravity, UA 1 025     pH, UA 5 5     Leukocytes, UA Negative     Nitrite, UA Negative     Protein, UA Trace mg/dl      Glucose, UA Negative mg/dl      Ketones, UA Negative mg/dl      Urobilinogen, UA 0 2 E U /dl      Bilirubin, UA Negative     Occult Blood, UA Moderate    Protime-INR [[id number]]  (Abnormal) Collected: 11/15/22 1559    Lab Status: Final result Specimen: Blood from Arm, Left Updated: 11/15/22 1646     Protime 17 9 seconds      INR 1 48    APTT [[id number]]  (Abnormal) Collected: 11/15/22 1559    Lab Status: Final result Specimen: Blood from Arm, Left Updated: 11/15/22 1646     PTT 38 seconds     Procalcitonin [[id number]]  (Normal) Collected: 11/15/22 1559    Lab Status: Final result Specimen: Blood from Arm, Left Updated: 11/15/22 1639     Procalcitonin 0 15 ng/ml     Lactic acid [[id number]]  (Abnormal) Collected: 11/15/22 1559    Lab Status: Final result Specimen: Blood from Arm, Left Updated: 11/15/22 1638     LACTIC ACID 2 5 mmol/L     Narrative:      Result may be elevated if tourniquet was used during collection  FLU/COVID - if FLU clinically relevant [[id number]] Collected: 11/15/22 1631    Lab Status:  In process Specimen: Nares from Nose Updated: 11/15/22 1633    Comprehensive metabolic panel [[id number]]  (Abnormal) Collected: 11/15/22 1559    Lab Status: Final result Specimen: Blood from Arm, Left Updated: 11/15/22 1630     Sodium 136 mmol/L      Potassium 4 2 mmol/L      Chloride 103 mmol/L      CO2 23 mmol/L      ANION GAP 10 mmol/L      BUN 26 mg/dL      Creatinine 2 49 mg/dL      Glucose 112 mg/dL      Calcium 8 8 mg/dL      Corrected Calcium 10 1 mg/dL      AST 14 U/L      ALT 13 U/L      Alkaline Phosphatase 76 U/L      Total Protein 7 0 g/dL      Albumin 2 4 g/dL      Total Bilirubin 0 58 mg/dL      eGFR 25 ml/min/1 73sq m     Narrative:      [facility] guidelines for Chronic Kidney Disease (CKD):   •  Stage 1 with normal or high GFR (GFR > 90 mL/min/1 73 square meters)  •  Stage 2 Mild CKD (GFR = 60-89 mL/min/1 73 square meters)  •  Stage 3A Moderate CKD (GFR = 45-59 mL/min/1 73 square meters)  •  Stage 3B Moderate CKD (GFR = 30-44 mL/min/1 73 square meters)  •  Stage 4 Severe CKD (GFR = 15-29 mL/min/1 73 square meters)  •  Stage 5 End Stage CKD (GFR <15 mL/min/1 73 square meters)  Note: GFR calculation is accurate only with a steady state creatinine    CBC and differential [[id number]]  (Abnormal) Collected: 11/15/22 1559    Lab Status: Final result Specimen: Blood from Arm, Left Updated: 11/15/22 1609     WBC 9 65 Thousand/uL      RBC 3 06 Million/uL      Hemoglobin 9 2 g/dL      Hematocrit 28 5 %      MCV 93 fL      MCH 30 1 pg      MCHC 32 3 g/dL      RDW 14 4 %      MPV 8 3 fL      Platelets 182 Thousands/uL      nRBC 0 /100 WBCs      Neutrophils Relative 88 %      Immat GRANS % 1 %      Lymphocytes Relative 6 %      Monocytes Relative 5 %      Eosinophils Relative 0 %      Basophils Relative 0 %      Neutrophils Absolute 8 49 Thousands/µL      Immature Grans Absolute 0 05 Thousand/uL      Lymphocytes Absolute 0 58 Thousands/µL      Monocytes Absolute 0 47 Thousand/µL      Eosinophils Absolute 0 03 Thousand/µL      Basophils Absolute 0 03 Thousands/µL     Blood culture #2 [[id number]] Collected: 11/15/22 1559    Lab Status: In process Specimen: Blood from Hand, Right Updated: 11/15/22 1607    Blood culture #1 [[id number]] Collected: 11/15/22 1559    Lab Status: In process Specimen: Blood from Arm, Left Updated: 11/15/22 1607                 XR chest 1 view portable   ED Interpretation by Luh Askew MD (11/15 1721)   Primary reviewed: no acute abnormality      CT abdomen pelvis wo contrast   Final Result by Pretty Foote MD (11/15 1725)         1  Mild right hydronephrosis  Obstructing 6 mm calculus in the ureter at the L4 level  Pyelonephritis not excluded  2   Subcutaneous fat stranding within the dependent portion of the abdominal pannus may represent edema or cellulitis  No evidence of gas or fluid collection              Workstation performed: [id number] Procedures  ECG 12 Lead Documentation Only    Date/Time: 11/15/2022 4:43 PM  Performed by: Fco Torre MD  Authorized by: Fco Torre MD     ECG reviewed by me, the ED Provider: yes    Patient location:  ED  Rate:     ECG rate:  98    ECG rate assessment: normal    Rhythm:     Rhythm: sinus rhythm    Ectopy:     Ectopy: none    QRS:     QRS axis:  Normal    QRS intervals:  Normal  Conduction:     Conduction: normal    ST segments:     ST segments:  Normal  T waves:     T waves: normal    CriticalCare Time  Performed by: Fco Torre MD  Authorized by: Fco Torre MD     Critical care provider statement:     Critical care time (minutes):  35    Critical care time was exclusive of:  Separately billable procedures and treating other patients and teaching time    Critical care was necessary to treat or prevent imminent or life-threatening deterioration of the following conditions:  Sepsis    Critical care was time spent personally by me on the following activities:  Blood draw for specimens, obtaining history from patient or surrogate, development of treatment plan with patient or surrogate, discussions with consultants, evaluation of patient's response to treatment, examination of patient, interpretation of cardiac output measurements, ordering and performing treatments and interventions, ordering and review of laboratory studies, ordering and review of radiographic studies, re-evaluation of patient's condition and review of old charts             ED Course  ED Course as of 11/15/22 1941   Tue Nov 15, 2022   1742 CT abdomen pelvis wo contrast  Patient does have hydronephrosis  Patient does not have signs of infected stone base on urinalysis                            Initial Sepsis Screening     Row Name 11/15/22 1642                Is the patient's history suggestive of a new or worsening infection?  Yes (Proceed)  -        Suspected source of infection suspect infection, source unknown  -MH        Are two or more of the following signs & symptoms of infection both present and new to the patient? Yes (Proceed)  -MH        Indicate SIRS criteria Hyperthemia > 38 3C (100 9F); Tachycardia > 90 bpm  -MH        If the answer is yes to both questions, suspicion of sepsis is present --        If severe sepsis is present AND tissue hypoperfusion perists in the hour after fluid resuscitation or lactate > 4, the patient meets criteria for SEPTIC SHOCK --        Are any of the following organ dysfunction criteria present within 6 hours of suspected infection and SIRS criteria that are NOT considered to be chronic conditions?  --        Organ dysfunction --        Date of presentation of severe sepsis --        Time of presentation of severe sepsis --        Tissue hypoperfusion persists in the hour after crystalloid fluid administration, evidenced, by either: --        Was hypotension present within one hour of the conclusion of crystalloid fluid administration? --        Date of presentation of septic shock --        Time of presentation of septic shock --              User Key  (r) = Recorded By, (t) = Taken By, (c) = Cosigned By    [facility] [address] Name Provider Type    Robin Linares MD Physician                              MDM  Number of Diagnoses or Management Options  MILTON (acute kidney injury) ([facility] )  Anemia  Dehydration  Encounter for management of wound VAC  Severe sepsis ([facility] )  Diagnosis management comments: Weakness and sepsis-will do septic work up, ct a/p to r/o acute pathology, surgery consult       Amount and/or Complexity of Data Reviewed  Clinical lab tests: ordered and reviewed  Tests in the radiology section of CPT®: ordered and reviewed  Tests in the medicine section of CPT®: reviewed and ordered  Discussion of test results with the performing providers: yes        Disposition  Final diagnoses:   Encounter for management of wound VAC   Severe sepsis ([facility] )   MILTON (acute kidney injury) ([facility] )   Anemia   Dehydration     Time reflects when diagnosis was documented in both MDM as applicable and the Disposition within this note     Time User Action Codes Description Comment    11/15/2022  5:28 PM Mayda Pu Add [Z46 89] Encounter for management of wound VAC     11/15/2022  7:40 PM Joaquim Miramontes Add [A41 9,  R65 20] Severe sepsis ([facility] )     11/15/2022  7:40 PM Izabela Miramontes Add [N17 9] MILTON (acute kidney injury) ([place] [address] )     11/15/2022  7:40 PM Mayda Pu Add [D64 9] Anemia     11/15/2022  7:40 PM Carolynn PALOMARES Add [E86 0] Dehydration     11/15/2022  7:40 PM Mayda Pu Modify [A95 62] Encounter for management of wound VAC     11/15/2022  7:40 PM Mayda Pu Modify [A41 9,  R65 20] Severe sepsis [facility])       ED Disposition     ED Disposition   Admit    Condition   Stable    Date/Time   Tue Nov 15, 2022  7:40 PM    Comment   Case was discussed with Dr Pierre Montilla and the patient's admission status was agreed to be Admission Status: inpatient status to the service of Dr Pierre Montilla   Follow-up Information    None         Patient's Medications   Discharge Prescriptions    No medications on file       No discharge procedures on file      PDMP Review     None          ED Provider  Electronically Signed by           Sabine Vaz MD  11/15/22 0951

## 2022-11-15 NOTE — ASSESSMENT & PLAN NOTE
09/15/2022 Hospital admission   · Post ex-lap repair of RLQ hernia with mesh   · Repair of anterior wall hernia with lysis of adhesions  2 wound vacs in place post discharge: midline abdominal wound VAC, right lower quadrant wound VAC   · Being seen by home health

## 2022-11-15 NOTE — PROGRESS NOTES
Virtual Regular Visit    Verification of patient location:    Patient is located in the following state in which I hold an active license [place]      Assessment/Plan:    Problem List Items Addressed This Visit        Other    Strangulated hernia of abdominal wall     09/15/2022 Hospital admission   · Post ex-lap repair of RLQ hernia with mesh   · Repair of anterior wall hernia with lysis of adhesions  2 wound vacs in place post discharge: midline abdominal wound VAC, right lower quadrant wound VAC   · Being seen by home health         Relevant Orders    Transfer to other facility (Completed)      Other Visit Diagnoses     Fever, unspecified fever cause    -  Primary    Tmax 103 5 after tylenol given  Lethargic  Advised patient's partner to call 911  Facility transfer in place  Relevant Orders    Transfer to other facility (Completed)               Reason for visit is   Chief Complaint   Patient presents with   • Fever        Encounter provider KELSEY Steiner    Provider located at [address] [place] [id number]      Recent Visits  No visits were found meeting these conditions  Showing recent visits within past 7 days and meeting all other requirements  Today's Visits  Date Type Provider Dept   11/15/22 Telemedicine Sophia Fontanez, [address] [place] [address]   Showing today's visits and meeting all other requirements  Future Appointments  No visits were found meeting these conditions  Showing future appointments within next 150 days and meeting all other requirements       The patient was identified by name and date of birth  Gopal Cord was informed that this is a telemedicine visit and that the visit is being conducted through the [address] [employer] platform  He agrees to proceed     My office door was closed  The patient was notified the following individuals were present in the room [place], [place]    He acknowledged consent and understanding of privacy and security of the video platform  The patient has agreed to participate and understands they can discontinue the visit at any time  Patient is aware this is a billable service  Subjective  Remedios Session is a 78 y o  male  Fever and back pain   New fever 103 4- Tylenol given at about 1 hour ago 1400, 103 5 temperature at 1513  Back pain, at home urine dip was light pink  Thought it was a kidney stone  At this point in the evaluation phone was directed to patient where he was lethargic  Was attempted to arouse with multiple times including physical stimuli  He opens his eyes and only said 1 word before he doses back off  At this point in the appointment it was advised that the patient's wife hang up and call 911  Facility transfer in place       Fever  Associated symptoms include abdominal pain, fatigue and a fever  Pertinent negatives include no chest pain          Past Medical History:   Diagnosis Date   • Anemia 9/16/2022   • Chronic kidney disease    • Hypertension    • Obesity    • Sepsis ([facility] [address] )    • Urinary tract infection without hematuria 4/3/2020       Past Surgical History:   Procedure Laterality Date   • ABDOMINAL ADHESION SURGERY N/A 9/15/2022    Procedure: LYSIS ADHESIONS;  Surgeon: Aliza Mckeon MD;  [facility];  Service: General   • APPENDECTOMY     • EXPLORATORY LAPAROTOMY W/ BOWEL RESECTION N/A 9/14/2022    Procedure: Exdploratory laparotomy, complex Lysis of adhesions, reduction strangulated hernia, debridement RLQ muscle with counter incision, abthera placement;  Surgeon: Aliza Mckeon MD;  [facility];  Service: General   • EXPLORATORY LAPAROTOMY W/ BOWEL RESECTION N/A 9/15/2022    Procedure: LAPAROTOMY EXPLORATORY  REPAIR RIGHT LQ HERNIA WITH MESH, REPAIR ANTERIOR WALL HERNIA;  Surgeon: Aliza Mckeon MD;  [facility];  Service: General   • NASAL SEPTUM SURGERY     • WOUND DEBRIDEMENT N/A 10/11/2022 Procedure: Excisional debridement of abdominal wall nonviable tissue  Skin and subcu tissues were dissected  The wound measured 23 x 18 cm x 4 cm deep ;  Surgeon: Elmer Hanson MD;  [facility];  Service: General       Current Outpatient Medications   Medication Sig Dispense Refill   • allopurinol (ZYLOPRIM) 100 mg tablet Take 100 mg by mouth daily      • ammonium lactate (LAC-HYDRIN) 12 % lotion Apply topically 2 (two) times a day 400 g 0   • apixaban (Eliquis) 5 mg Take 1 tablet (5 mg total) by mouth 2 (two) times a day 180 tablet 0   • furosemide (LASIX) 20 mg tablet Take 1 tablet (20 mg total) by mouth daily For swelling 90 tablet 3   • lisinopril (ZESTRIL) 20 mg tablet TAKE 1 TABLET DAILY 90 tablet 3   • metoprolol succinate (TOPROL-XL) 100 mg 24 hr tablet Take 1 tablet (100 mg total) by mouth daily 90 tablet 3   • nystatin (MYCOSTATIN) powder Apply topically 2 (two) times a day 60 g 12   • Potassium Citrate ER 15 MEQ (1620 MG) TBCR Take 1 tablet by mouth daily      • tamsulosin (FLOMAX) 0 4 mg 1-2 daily as directed 180 capsule 3   • TRAVATAN Z 0 004 % ophthalmic solution Administer 1 drop to both eyes daily at bedtime        No current facility-administered medications for this visit  Allergies   Allergen Reactions   • Codeine Swelling     Other reaction(s): SWELLING OF FACE  Other reaction(s): SWELLING OF FACE  Other reaction(s): SWELLING OF FACE  Swollen eyes/swelling   • Seasonal Ic [Cholestatin] Allergic Rhinitis       Review of Systems   Constitutional: Positive for activity change, fatigue and fever  Respiratory: Negative for shortness of breath  Cardiovascular: Negative for chest pain  Gastrointestinal: Positive for abdominal pain  Genitourinary: Positive for difficulty urinating  Musculoskeletal: Positive for back pain  Skin: Positive for pallor  Psychiatric/Behavioral: Positive for decreased concentration         Video Exam    Vitals:    11/15/22 1456   Temp: (!) 103 4 °F (39 7 °C)       Physical Exam  Vitals reviewed  Constitutional:       General: He is in acute distress  Appearance: He is ill-appearing  Pulmonary:      Effort: Pulmonary effort is normal  No respiratory distress  Skin:     Coloration: Skin is pale  Neurological:      Mental Status: He is lethargic            I spent 5 minutes directly with the patient during this visit

## 2022-11-15 NOTE — CONSULTS
Consultation - General Surgery   Malik Mtz 78 y o  male MRN: [id number]  Unit/Bed#: ED 22 Encounter: [id number]    Assessment/Plan     Assessment:  74yo M who presented with sepsis and MILTON, wound unlikely source of infection    Febrile to 101 9, tachycardic  Creatinine: 2 49  WBC 9 65  Lactic acid: 2 5  11/15 CT AP: right hydronephrosis, obstructing 6mm calculus, cannot exclude pyelonephritis; No evidence of gas or fluid collection in abdominal wall, possible edema vs cellulitis    Plan:  - no indication for acute surgical intervention  - removed wound vac, dressed with WTD  Planning for placement of vac tomorrow  - recommend admission to medicine for work up of sepsis, abdominal wound unlikely source  - please tigertext the surgery resident or AP role with any questions    History of Present Illness   HPI:  Malik Mtz is a 78 y o  male who presents with fever, chills, and generalized weakness  The patient started feeling sick all of a sudden today and his wife Ernie Currie measured a temp of 102 at home  Was brought in for evaluation  VNA visited the patient yesterday to replace the wound vac and did not note any issues with the wound  He did, however, have back pain that started about 2 days ago and was concerned about kidney stone  Otherwise has not noticed redness, warmth, or different-looking drainage coming from the wound  The vac has been putting out "the usual reddish liquid" and they haven't noticed changes in the output color  Consult to surgery general  Consult performed by: Elis Redding MD  Consult ordered by: Luh Askew MD          Review of Systems   Constitutional: Positive for chills and fever  HENT: Negative  Respiratory: Negative  Cardiovascular: Negative  Gastrointestinal: Negative  Genitourinary: Negative  Musculoskeletal: Negative  Skin: Negative  Neurological: Negative  Psychiatric/Behavioral: Negative          Historical Information   Past Medical History:   Diagnosis Date   • Anemia 9/16/2022   • Chronic kidney disease    • Hypertension    • Obesity    • Sepsis ([facility] [address] )    • Urinary tract infection without hematuria 4/3/2020     Past Surgical History:   Procedure Laterality Date   • ABDOMINAL ADHESION SURGERY N/A 9/15/2022    Procedure: LYSIS ADHESIONS;  Surgeon: Derrek Ross MD;  [facility];  Service: General   • APPENDECTOMY     • EXPLORATORY LAPAROTOMY W/ BOWEL RESECTION N/A 9/14/2022    Procedure: Exdploratory laparotomy, complex Lysis of adhesions, reduction strangulated hernia, debridement RLQ muscle with counter incision, abthera placement;  Surgeon: Derrek Ross MD;  [facility];  Service: General   • EXPLORATORY LAPAROTOMY W/ BOWEL RESECTION N/A 9/15/2022    Procedure: LAPAROTOMY EXPLORATORY  REPAIR RIGHT LQ HERNIA WITH MESH, REPAIR ANTERIOR WALL HERNIA;  Surgeon: Derrek Ross MD;  [facility];  Service: General   • NASAL SEPTUM SURGERY     • WOUND DEBRIDEMENT N/A 10/11/2022    Procedure: Excisional debridement of abdominal wall nonviable tissue  Skin and subcu tissues were dissected  The wound measured 23 x 18 cm x 4 cm deep ;  Surgeon: Ayden Cutler MD;  [facility];  Service: General     Social History   Social History     Substance and Sexual Activity   Alcohol Use No     Social History     Substance and Sexual Activity   Drug Use Not Currently     E-Cigarette/Vaping   • E-Cigarette Use Never User      E-Cigarette/Vaping Substances   • Nicotine No    • THC No    • CBD No    • Flavoring No    • Other No    • Unknown No      Social History     Tobacco Use   Smoking Status Never Smoker   Smokeless Tobacco Never Used   Tobacco Comment    no exposure to passive smoke     Family History: non-contributory    Meds/Allergies   current meds:   No current facility-administered medications for this encounter      and PTA meds:   Prior to Admission Medications   Prescriptions Last Dose Informant Patient Reported? Taking?    Potassium Citrate ER 15 MEQ (1620 MG) TBCR  Self Yes No   Sig: Take 1 tablet by mouth daily    TRAVATAN Z 0 004 % ophthalmic solution  Self Yes No   Sig: Administer 1 drop to both eyes daily at bedtime    allopurinol (ZYLOPRIM) 100 mg tablet  Self Yes No   Sig: Take 100 mg by mouth daily    ammonium lactate (LAC-HYDRIN) 12 % lotion  Self No No   Sig: Apply topically 2 (two) times a day   apixaban (Eliquis) 5 mg   No No   Sig: Take 1 tablet (5 mg total) by mouth 2 (two) times a day   furosemide (LASIX) 20 mg tablet  Self No No   Sig: Take 1 tablet (20 mg total) by mouth daily For swelling   lisinopril (ZESTRIL) 20 mg tablet   No No   Sig: TAKE 1 TABLET DAILY   metoprolol succinate (TOPROL-XL) 100 mg 24 hr tablet  Self No No   Sig: Take 1 tablet (100 mg total) by mouth daily   nystatin (MYCOSTATIN) powder  Self No No   Sig: Apply topically 2 (two) times a day   tamsulosin (FLOMAX) 0 4 mg  Self No No   Si-2 daily as directed      Facility-Administered Medications: None     Allergies   Allergen Reactions   • Codeine Swelling     Other reaction(s): SWELLING OF FACE  Other reaction(s): SWELLING OF FACE  Other reaction(s): SWELLING OF FACE  Swollen eyes/swelling   • Seasonal Ic [Cholestatin] Allergic Rhinitis       Objective   First Vitals:   Blood Pressure: 122/57 (11/15/22 1550)  Pulse: (!) 107 (11/15/22 1550)  Temperature: (!) 101 9 °F (38 8 °C) (11/15/22 1550)  Temp Source: Oral (11/15/22 1550)  Respirations: 22 (11/15/22 155)  Weight - Scale: (!) 152 kg (335 lb) (11/15/22 1550)  SpO2: 97 % (11/15/22 1550)    Current Vitals:   Blood Pressure: (!) 99/42 (11/15/22 172)  Pulse: 105 (11/15/22 1724)  Temperature: 99 4 °F (37 4 °C) (11/15/22 172)  Temp Source: Oral (11/15/22 1724)  Respirations: 22 (11/15/22 1724)  Weight - Scale: (!) 152 kg (335 lb) (11/15/22 1550)  SpO2: 95 % (11/15/22 1724)    No intake or output data in the 24 hours ending 11/15/22 1727    Invasive Devices  Report    Peripheral Intravenous Line  Duration           Peripheral IV 11/15/22 Left Antecubital <1 day    Peripheral IV 11/15/22 Right Hand <1 day                Physical Exam:  General: No acute distress  Neuro: alert and oriented  HEENT: moist mucous membranes  CV: Well perfused, regular rate and rhythm  Lungs: Normal work of breathing, no increased respiratory effort  Abdomen: Soft, non-tender, non-distended  Mild erythema, both wound beds with healthy vascular granulation tissue and some fibrinous exudate  No evidence of enterocutaneous fistula, no succus expressed from the wound  Wound was probed digitally and there is no evidence of fluid collection or pus pockets  Photos below  Extremities: No edema, clubbing or cyanosis  Skin: Warm, dry                  Lab Results:   CBC:   Lab Results   Component Value Date    WBC 9 65 11/15/2022    HGB 9 2 (L) 11/15/2022    HCT 28 5 (L) 11/15/2022    MCV 93 11/15/2022     11/15/2022    MCH 30 1 11/15/2022    MCHC 32 3 11/15/2022    RDW 14 4 11/15/2022    MPV 8 3 (L) 11/15/2022    NRBC 0 11/15/2022   , CMP:   Lab Results   Component Value Date    SODIUM 136 11/15/2022    K 4 2 11/15/2022     11/15/2022    CO2 23 11/15/2022    BUN 26 (H) 11/15/2022    CREATININE 2 49 (H) 11/15/2022    CALCIUM 8 8 11/15/2022    AST 14 11/15/2022    ALT 13 11/15/2022    ALKPHOS 76 11/15/2022    EGFR 25 11/15/2022     Imaging: I have personally reviewed pertinent reports  EKG, Pathology, and Other Studies: I have personally reviewed pertinent reports  Counseling / Coordination of Care  Total floor / unit time spent today 15 minutes  Greater than 50% of total time was spent with the patient and / or family counseling and / or coordination of care       Emeka Dailey MD  General Surgery PGY1

## 2022-11-16 ENCOUNTER — HOME CARE VISIT (OUTPATIENT)
Dept: HOME HEALTH SERVICES | Facility: HOME HEALTHCARE | Age: 67
End: 2022-11-16

## 2022-11-16 LAB
ALBUMIN SERPL BCP-MCNC: 2.1 G/DL (ref 3.5–5)
ALP SERPL-CCNC: 67 U/L (ref 46–116)
ALT SERPL W P-5'-P-CCNC: 8 U/L (ref 12–78)
ANION GAP SERPL CALCULATED.3IONS-SCNC: 9 MMOL/L (ref 4–13)
AST SERPL W P-5'-P-CCNC: 11 U/L (ref 5–45)
BASOPHILS # BLD MANUAL: 0 THOUSAND/UL (ref 0–0.1)
BASOPHILS NFR MAR MANUAL: 0 % (ref 0–1)
BILIRUB SERPL-MCNC: 0.41 MG/DL (ref 0.2–1)
BUN SERPL-MCNC: 22 MG/DL (ref 5–25)
CALCIUM ALBUM COR SERPL-MCNC: 9.9 MG/DL (ref 8.3–10.1)
CALCIUM SERPL-MCNC: 8.4 MG/DL (ref 8.3–10.1)
CHLORIDE SERPL-SCNC: 106 MMOL/L (ref 96–108)
CO2 SERPL-SCNC: 24 MMOL/L (ref 21–32)
CREAT SERPL-MCNC: 2.08 MG/DL (ref 0.6–1.3)
EOSINOPHIL # BLD MANUAL: 0 THOUSAND/UL (ref 0–0.4)
EOSINOPHIL NFR BLD MANUAL: 0 % (ref 0–6)
ERYTHROCYTE [DISTWIDTH] IN BLOOD BY AUTOMATED COUNT: 14.7 % (ref 11.6–15.1)
FLUAV RNA RESP QL NAA+PROBE: NEGATIVE
FLUBV RNA RESP QL NAA+PROBE: NEGATIVE
GFR SERPL CREATININE-BSD FRML MDRD: 32 ML/MIN/1.73SQ M
GLUCOSE SERPL-MCNC: 139 MG/DL (ref 65–140)
HCT VFR BLD AUTO: 31.9 % (ref 36.5–49.3)
HGB BLD-MCNC: 9.6 G/DL (ref 12–17)
LYMPHOCYTES # BLD AUTO: 0 % (ref 14–44)
LYMPHOCYTES # BLD AUTO: 0 THOUSAND/UL (ref 0.6–4.47)
MAGNESIUM SERPL-MCNC: 1.8 MG/DL (ref 1.6–2.6)
MCH RBC QN AUTO: 29.4 PG (ref 26.8–34.3)
MCHC RBC AUTO-ENTMCNC: 30.1 G/DL (ref 31.4–37.4)
MCV RBC AUTO: 98 FL (ref 82–98)
MONOCYTES # BLD AUTO: 0.11 THOUSAND/UL (ref 0–1.22)
MONOCYTES NFR BLD: 1 % (ref 4–12)
NEUTROPHILS # BLD MANUAL: 10.38 THOUSAND/UL (ref 1.85–7.62)
NEUTS SEG NFR BLD AUTO: 98 % (ref 43–75)
PLATELET # BLD AUTO: 347 THOUSANDS/UL (ref 149–390)
PLATELET BLD QL SMEAR: ADEQUATE
PMV BLD AUTO: 8.3 FL (ref 8.9–12.7)
POTASSIUM SERPL-SCNC: 4.5 MMOL/L (ref 3.5–5.3)
PROT SERPL-MCNC: 6.6 G/DL (ref 6.4–8.4)
RBC # BLD AUTO: 3.26 MILLION/UL (ref 3.88–5.62)
RBC MORPH BLD: NORMAL
SARS-COV-2 RNA RESP QL NAA+PROBE: NEGATIVE
SODIUM SERPL-SCNC: 139 MMOL/L (ref 135–147)
VARIANT LYMPHS # BLD AUTO: 1 %
WBC # BLD AUTO: 10.59 THOUSAND/UL (ref 4.31–10.16)

## 2022-11-16 RX ADMIN — TAMSULOSIN HYDROCHLORIDE 0.4 MG: 0.4 CAPSULE ORAL at 17:29

## 2022-11-16 RX ADMIN — METOPROLOL SUCCINATE 100 MG: 100 TABLET, EXTENDED RELEASE ORAL at 09:38

## 2022-11-16 RX ADMIN — TRAVOPROST 1 DROP: 0.04 SOLUTION OPHTHALMIC at 21:21

## 2022-11-16 RX ADMIN — SODIUM CHLORIDE 75 ML/HR: 0.9 INJECTION, SOLUTION INTRAVENOUS at 09:41

## 2022-11-16 RX ADMIN — CEFTRIAXONE SODIUM 1000 MG: 10 INJECTION, POWDER, FOR SOLUTION INTRAVENOUS at 17:30

## 2022-11-16 RX ADMIN — APIXABAN 5 MG: 5 TABLET, FILM COATED ORAL at 09:38

## 2022-11-16 RX ADMIN — ALLOPURINOL 100 MG: 100 TABLET ORAL at 09:38

## 2022-11-16 RX ADMIN — APIXABAN 5 MG: 5 TABLET, FILM COATED ORAL at 17:29

## 2022-11-16 NOTE — QUICK NOTE
Urology on-call    Patient admitted for hydronephrosis with a creatinine of 2 49, baseline around 1 7  CT scan with 6 mm proximal right ureteral stone with hydronephrosis  Elevated lactic acid, temp 101°, heart rate 110, intermittent soft pressures in the 50H systolic  WBC 9 65  Respiratory rate upper 20s to low 30s  Discussed with Dr Cirilo Null  Patient to OR tonight urgently for stent placement  Slim and primary nurse made aware    Case requested

## 2022-11-16 NOTE — ASSESSMENT & PLAN NOTE
· Patient has history of strangulated hernia abdominal wall status post exploratory laparotomy and repair of right lower quadrant hernia with mesh and right lower quadrant hernia repair on 09/14/2022, with wound VAC in place  · Surgery consultation appreciated  · No indication for acute surgical intervention or concern for infection  · Wound VAC was removed plan for placement of wound VAC tomorrow

## 2022-11-16 NOTE — TREATMENT PLAN
General Surgery  Alexy Zhou 78 y o  male MRN: [id number]  Unit/Bed#: E4 -01 Encounter: [id number]    V  A C  Placement Procedure Note    Date: 11/16/22  Time: 4:02 PM    Location of wound 1:  Midline abdomen  Location of wound 2:  RLQ    Dimensions of wound 1:  18 cm x 27 cm x 1 5 cm   Dimensions of wound 2:  8 cm x 3 cm x 0 25 cm    Description of wound 1:  Wound is almost entirely granulation tissue in the bed, save for a region at 3:00 a m  Of exposed Phasix mesh  This has no purulent drainage, malodor, or erythema to indicate infection  The wound is generally draining serous fluid around the perimeter  Wound edges have some mild satellite scabbed areas, no evidence of necrosis or infection  Description of wound 2:  Wound bed entirely comprised of healthy pink granulation tissue  No fibrinous slough or any areas of concern for infection      Sponges placed 1:  2 Black Sponges  1 White Sponges  Sponges placed 2:  1 Black Sponges  0 White Sponges  One bridge    VAC settings:  125 mmHg  Continuous    A procedure time out was completed per protocol with available nursing staff  LTAC, located within [facility] sticker placed to wound dressing, per protocol  Pt tolerated procedure well      Brandi Sage PA-C  11/16/2022  4:01 PM

## 2022-11-16 NOTE — ASSESSMENT & PLAN NOTE
79-year-old male with history of hypertension, obesity, CKD presenting with fevers, generalized weakness and chills  Etiology possibly due to clinical pyelonephritis as a result of his obstructing stone  Surgery has no suspicion for infection at his previous surgical site  · Continue ceftriaxone, await cultures  · Urology recommendations appreciated   S/p cystoscopy left ureteral stent insertion  · Remove Arias catheter Thursday morning if remains afebrile and creatinine coming down

## 2022-11-16 NOTE — PROGRESS NOTES
[address] [place] [address]  Progress Note - Neelam Granda 1955, 78 y o  male MRN: [id number]  Unit/Bed#: E4 -01 Encounter: [id number]  Primary Care Provider: Quincy Chaudhry MD   Date and time admitted to hospital: 11/15/2022  3:45 PM    * Severe sepsis [facility])  Assessment & Plan  62-year-old male with history of hypertension, obesity, CKD presenting with fevers, generalized weakness and chills  Etiology possibly due to clinical pyelonephritis as a result of his obstructing stone  Surgery has no suspicion for infection at his previous surgical site  · Continue ceftriaxone, await cultures  · Urology recommendations appreciated  S/p cystoscopy left ureteral stent insertion  · Remove Arias catheter Thursday morning if remains afebrile and creatinine coming down    Surgical wound present  Assessment & Plan  · Patient has history of strangulated hernia abdominal wall status post exploratory laparotomy and repair of right lower quadrant hernia with mesh and right lower quadrant hernia repair on 09/14/2022, with wound VAC in place  · Surgery Recommendations appreciated  · No indication for acute surgical intervention or concern for infection    Pulmonary embolism ([address] )  Assessment & Plan  Continue Eliquis    Class 3 severe obesity due to excess calories with serious comorbidity and body mass index (BMI) of 50 0 to 59 9 in adult [facility])  Assessment & Plan  · Encouraged lifestyle modifications    Acute kidney injury superimposed on chronic kidney disease [facility])  Assessment & Plan  Lab Results   Component Value Date    EGFR 32 11/16/2022    EGFR 25 11/15/2022    EGFR 50 10/21/2022    CREATININE 2 08 (H) 11/16/2022    CREATININE 2 49 (H) 11/15/2022    CREATININE 1 44 10/21/2022     · Baseline creatinine 1 4-1 7  · Etiology: Pre-renal / post-obstructive pathology  · Status: Improving  Maintain IV fluids for one more night    · Hold diuretics and ACE-inhibitor      Essential hypertension  Assessment & Plan  · Continue metoprolol with hold parameters  · Hold furosemide and lisinopril given acute kidney injury      VTE Pharmacologic Prophylaxis:   Pharmacologic: Enoxaparin (Lovenox)  Mechanical VTE Prophylaxis in Place: Yes    Discussions with Specialists or Other Care Team Provider: nursing    Education and Discussions with Family / Patient: patient    Time Spent for Care: 30 minutes  More than 50% of total time spent on counseling and coordination of care as described above  Current Length of Stay: 1 day(s)    Current Patient Status: Inpatient   Certification Statement: The patient will continue to require additional inpatient hospital stay due to iv antibiotics, await cultures    Discharge Plan: active    Code Status: Level 1 - Full Code      Subjective:   Patient seen and examined bedside  Feeling better  No new issues overnight  Objective:     Vitals:   Temp (24hrs), Av 1 °F (37 3 °C), Min:97 1 °F (36 2 °C), Max:101 1 °F (38 4 °C)    Temp:  [97 1 °F (36 2 °C)-101 1 °F (38 4 °C)] 97 8 °F (36 6 °C)  HR:  [] 74  Resp:  [18-31] 20  BP: ()/(37-65) 113/56  SpO2:  [94 %-100 %] 95 %  Body mass index is 48 81 kg/m²  Input and Output Summary (last 24 hours): Intake/Output Summary (Last 24 hours) at 2022 1657  Last data filed at 2022 1647  Gross per 24 hour   Intake 1000 ml   Output 1525 ml   Net -525 ml       Physical Exam:     Physical Exam  Vitals reviewed  Constitutional:       General: He is not in acute distress  HENT:      Head: Normocephalic  Nose: Nose normal       Mouth/Throat:      Mouth: Mucous membranes are moist    Eyes:      General: No scleral icterus  Cardiovascular:      Rate and Rhythm: Normal rate  Pulmonary:      Effort: Pulmonary effort is normal  No respiratory distress  Abdominal:      Palpations: Abdomen is soft  Comments: Wound vac in place for midline abdominal wound   Skin:     General: Skin is warm     Neurological:      Mental Status: He is alert  Mental status is at baseline  Psychiatric:         Mood and Affect: Mood normal          Behavior: Behavior normal        Additional Data:     Labs:    Results from last 7 days   Lab Units 11/16/22  0453 11/15/22  1559   WBC Thousand/uL 10 59* 9 65   HEMOGLOBIN g/dL 9 6* 9 2*   HEMATOCRIT % 31 9* 28 5*   PLATELETS Thousands/uL 347 377   NEUTROS PCT %  --  88*   LYMPHS PCT %  --  6*   LYMPHO PCT % 0*  --    MONOS PCT %  --  5   MONO PCT % 1*  --    EOS PCT % 0 0     Results from last 7 days   Lab Units 11/16/22  0453   SODIUM mmol/L 139   POTASSIUM mmol/L 4 5   CHLORIDE mmol/L 106   CO2 mmol/L 24   BUN mg/dL 22   CREATININE mg/dL 2 08*   ANION GAP mmol/L 9   CALCIUM mg/dL 8 4   ALBUMIN g/dL 2 1*   TOTAL BILIRUBIN mg/dL 0 41   ALK PHOS U/L 67   ALT U/L 8*   AST U/L 11   GLUCOSE RANDOM mg/dL 139     Results from last 7 days   Lab Units 11/15/22  1559   INR  1 48*             Results from last 7 days   Lab Units 11/15/22  1842 11/15/22  1559   LACTIC ACID mmol/L 1 1 2 5*   PROCALCITONIN ng/ml  --  0 15           * I Have Reviewed All Lab Data Listed Above  * Additional Pertinent Lab Tests Reviewed: [address] Admission Reviewed      Lines:  Invasive Devices     Peripheral Intravenous Line  Duration           Peripheral IV 11/15/22 Left Antecubital 1 day          Drain  Duration           Ureteral Internal Stent Right ureter 6 Fr  <1 day    Urethral Catheter Coude 18 Fr  <1 day               Imaging:    Imaging Reports Reviewed Today Include: imaging since admission    Recent Cultures (last 7 days):     Results from last 7 days   Lab Units 11/15/22  1559   BLOOD CULTURE  Received in Microbiology Lab  Culture in Progress     GRAM STAIN RESULT  Gram positive cocci in pairs and chains*       Last 24 Hours Medication List:   Current Facility-Administered Medications   Medication Dose Route Frequency Provider Last Rate   • acetaminophen  650 mg Oral Q6H PRN Rudy Castro MD     • allopurinol  100 mg Oral Daily Shahnaz Cabral MD     • apixaban  5 mg Oral BID Shahnaz Cabral MD     • cefTRIAXone  1,000 mg Intravenous Q24H Shahnaz Cabral MD     • metoprolol succinate  100 mg Oral Daily Shahnaz Cabral MD     • sodium chloride  75 mL/hr Intravenous Continuous Shahnaz Cabral MD 75 mL/hr (11/16/22 0941)   • tamsulosin  0 4 mg Oral Daily With Clemente Atkinson MD     • travoprost  1 drop Both Eyes HS Shahnaz Cabral MD          Today, Patient Was Seen By: Wayne Carpio MD    ** Please Note: Dictation voice to text software may have been used in the creation of this document   **

## 2022-11-16 NOTE — ANESTHESIA PREPROCEDURE EVALUATION
Procedure:  CYSTOSCOPY RETROGRADE PYELOGRAM WITH INSERTION STENT URETERAL (Right Bladder)    Relevant Problems   ANESTHESIA (within normal limits)      CARDIO   (+) Essential hypertension      GI/HEPATIC  remains intubated on postop day 1, will return to OR today for laparotomy  /RENAL   (+) CKD (chronic kidney disease), stage III (HCC)   (+) Kidney stones      HEMATOLOGY   (+) Anemia      MUSCULOSKELETAL   (+) Gout      PULMONARY   (+) DELIA (obstructive sleep apnea)        Physical Exam    Airway  Comment: intubated  Mallampati score: III  TM Distance: >3 FB  Neck ROM: limited     Dental   No notable dental hx     Cardiovascular  Rhythm: regular, Rate: normal, Cardiovascular exam normal    Pulmonary  Pulmonary exam normal Breath sounds clear to auscultation,     Other Findings        Anesthesia Plan  ASA Score- 4 Emergent    Anesthesia Type- general with ASA Monitors  Additional Monitors:   Airway Plan: ETT  Plan Factors-    Chart reviewed  EKG reviewed  Existing labs reviewed  Patient summary reviewed  Induction- intravenous  Postoperative Plan-     Informed Consent- Anesthetic plan and risks discussed with patient

## 2022-11-16 NOTE — ASSESSMENT & PLAN NOTE
Lab Results   Component Value Date    EGFR 32 11/16/2022    EGFR 25 11/15/2022    EGFR 50 10/21/2022    CREATININE 2 08 (H) 11/16/2022    CREATININE 2 49 (H) 11/15/2022    CREATININE 1 44 10/21/2022     · Baseline creatinine 1 4-1 7  · Etiology: Pre-renal / post-obstructive pathology  · Status: Improving  Maintain IV fluids for one more night    · Hold diuretics and ACE-inhibitor

## 2022-11-16 NOTE — CONSULTS
UROLOGY INPATIENT CONSULT NOTE     CHIEF COMPLAINT   Lucien Friedman is a 78 y o  male with a complaint of   Chief Complaint   Patient presents with   • Weakness - Generalized     Pt c/o generalized weakness and fever today  3g of Tylenol since 0730  History of Present Illness:   Lucien Friedman is a 78 y o  male here for evaluation of fevers and RIGHT flank pain  Reports subjective fevers reported last evening 102  Patient not known to our urology group from recent history  Last saw 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 imaging  Does have recent history of strangulated hernia surgery in September  Past Medical History:     Past Medical History:   Diagnosis Date   • Anemia 9/16/2022   • Chronic kidney disease    • Hypertension    • Obesity    • Sepsis ([facility] [address] )    • Urinary tract infection without hematuria 4/3/2020       PAST SURGICAL HISTORY:     Past Surgical History:   Procedure Laterality Date   • ABDOMINAL ADHESION SURGERY N/A 9/15/2022    Procedure: LYSIS ADHESIONS;  Surgeon: Ciro Pratt MD;  Location: [facility];  Service: General   • APPENDECTOMY     • EXPLORATORY LAPAROTOMY W/ BOWEL RESECTION N/A 9/14/2022    Procedure: Exdploratory laparotomy, complex Lysis of adhesions, reduction strangulated hernia, debridement RLQ muscle with counter incision, abthera placement;  Surgeon: Ciro Pratt MD;  [facility]: [facility];  Service: General   • EXPLORATORY LAPAROTOMY W/ BOWEL RESECTION N/A 9/15/2022    Procedure: LAPAROTOMY EXPLORATORY  REPAIR RIGHT LQ HERNIA WITH MESH, REPAIR ANTERIOR WALL HERNIA;  Surgeon: Ciro Pratt MD;  [facility]: [facility];  Service: General   • NASAL SEPTUM SURGERY     • WOUND DEBRIDEMENT N/A 10/11/2022    Procedure: Excisional debridement of abdominal wall nonviable tissue  Skin and subcu tissues were dissected    The wound measured 23 x 18 cm x 4 cm deep ;  Surgeon: Mae Grande MD; Location: [facility];  Service: General       CURRENT MEDICATIONS:     Current Facility-Administered Medications   Medication Dose Route Frequency Provider Last Rate Last Admin   • acetaminophen (TYLENOL) tablet 650 mg  650 mg Oral Q6H PRN Vi Hernández MD   650 mg at 11/15/22 2048   • [START ON 11/16/2022] allopurinol (ZYLOPRIM) tablet 100 mg  100 mg Oral Daily Vi Hernández MD       • apixaban (ELIQUIS) tablet 5 mg  5 mg Oral BID Vi Hernández MD   5 mg at 11/15/22 2049   • [START ON 11/16/2022] cefTRIAXone (ROCEPHIN) 1,000 mg in dextrose 5 % 50 mL IVPB  1,000 mg Intravenous Q24H Vi Hernández MD       • [START ON 11/16/2022] metoprolol succinate (TOPROL-XL) 24 hr tablet 100 mg  100 mg Oral Daily Vi Hernández MD       • sodium chloride 0 9 % infusion  75 mL/hr Intravenous Continuous Vi Hernández MD 75 mL/hr at 11/15/22 2045 75 mL/hr at 11/15/22 2045   • [START ON 11/16/2022] tamsulosin (FLOMAX) capsule 0 4 mg  0 4 mg Oral Daily With Latosha Pereyra MD       • travoprost (TRAVATAN-Z) 0 004 % ophthalmic solution 1 drop  1 drop Both Eyes HS Vi Hernández MD           ALLERGIES:     Allergies   Allergen Reactions   • Codeine Swelling     Other reaction(s): SWELLING OF FACE  Other reaction(s): SWELLING OF FACE  Other reaction(s): SWELLING OF FACE  Swollen eyes/swelling   • Seasonal Ic [Cholestatin] Allergic Rhinitis       SOCIAL HISTORY:     Social History     Socioeconomic History   • Marital status: /Civil Union     Spouse name: None   • Number of children: None   • Years of education: None   • Highest education level: None   Occupational History   • None   Tobacco Use   • Smoking status: Never Smoker   • Smokeless tobacco: Never Used   • Tobacco comment: no exposure to passive smoke   Vaping Use   • Vaping Use: Never used   Substance and Sexual Activity   • Alcohol use: No   • Drug use: Not Currently   • Sexual activity: Not Currently   Other Topics Concern   • None   Social History Narrative   • None Social Determinants of Health     Financial Resource Strain: Not on file   Food Insecurity: No Food Insecurity   • Worried About [address] in the Last Year: Never true   • Ran Out of Food in the Last Year: Never true   Transportation Needs: No Transportation Needs   • Lack of Transportation (Medical): No   • Lack of Transportation (Non-Medical): No   Physical Activity: Not on file   Stress: Not on file   Social Connections: Not on file   Intimate Partner Violence: Not on file   Housing Stability: Low Risk    • Unable to Pay for Housing in the Last Year: No   • Number of Places Lived in the Last Year: 1   • Unstable Housing in the Last Year: No       SOCIAL HISTORY:     Family History   Problem Relation Age of Onset   • Diabetes Father    • Prostate cancer Brother        REVIEW OF SYSTEMS:     Review of Systems   Constitutional: Positive for chills and fever  Respiratory: Negative  Cardiovascular: Negative  Gastrointestinal: Negative  Genitourinary: Positive for flank pain  Musculoskeletal: Positive for back pain  Skin: Negative  PHYSICAL EXAM:     /50 (BP Location: Left arm)   Pulse (!) 110   Temp (!) 101 1 °F (38 4 °C) (Temporal)   Resp 22   Ht 5' 10" (1 778 m)   Wt (!) 154 kg (340 lb 2 7 oz)   SpO2 96%   BMI 48 81 kg/m²     Physical Exam  Constitutional:       Appearance: He is obese  He is ill-appearing  HENT:      Head: Normocephalic and atraumatic  Nose: Nose normal       Mouth/Throat:      Mouth: Mucous membranes are dry  Eyes:      Pupils: Pupils are equal, round, and reactive to light  Cardiovascular:      Rate and Rhythm: Tachycardia present  Pulses: Normal pulses  Pulmonary:      Breath sounds: Wheezing present  Abdominal:      Palpations: Abdomen is soft  Genitourinary:     Comments: Deferred until OR  Musculoskeletal:         General: Normal range of motion  Cervical back: Normal range of motion     Skin:     General: Skin is warm    Neurological:      General: No focal deficit present  Mental Status: He is oriented to person, place, and time  Psychiatric:         Mood and Affect: Mood normal          LABS:     CBC:   Lab Results   Component Value Date    WBC 9 65 11/15/2022    HGB 9 2 (L) 11/15/2022    HCT 28 5 (L) 11/15/2022    MCV 93 11/15/2022     11/15/2022       BMP:   Lab Results   Component Value Date    GLUCOSE 129 09/14/2022    CALCIUM 8 8 11/15/2022    K 4 2 11/15/2022    CO2 23 11/15/2022     11/15/2022    BUN 26 (H) 11/15/2022    CREATININE 2 49 (H) 11/15/2022     Urine Culture >100,000 cfu/ml Klebsiella pneumoniae Abnormal     This organism has been edited  The previous result was Gram Negative Bladimir Enter[facility] Like on 10/7/2022 at [address] [facility] [address] EDT  Susceptibility     Klebsiella pneumoniae     TIMOTHY     Amoxicillin + Clavulanate <=8/4 ug/ml Susceptible     Ampicillin ($$) >16 00 ug/ml Resistant     Ampicillin + Sulbactam ($) <=4/2 ug/ml Susceptible     Aztreonam ($$$)  <=4 ug/ml Susceptible     Cefazolin ($) <=2 00 ug/ml Susceptible     Ciprofloxacin ($)  <=0 25 ug/ml Susceptible     Gentamicin ($$) <=2 ug/ml Susceptible     Levofloxacin ($) <=0 50 ug/ml Susceptible     Nitrofurantoin 64 ug/ml Intermediate     Tetracycline <=4 ug/ml Susceptible     Tobramycin ($) <=2 ug/ml Susceptible     Trimethoprim + Sulfamethoxazole ($$$) <=0 5/9 5 u  Susceptible     ZID Performed Yes                    Specimen Collected: 10/06/22 06:42               IMAGING:     CT ABDOMEN AND PELVIS WITHOUT IV CONTRAST     INDICATION:   Sepsis  sepsis, abd wound with wound vac      COMPARISON:  9/21/2022      TECHNIQUE:  CT examination of the abdomen and pelvis was performed without intravenous contrast  Axial, sagittal, and coronal 2D reformatted images were created from the source data and submitted for interpretation       Radiation dose length product (DLP) for this visit:  3168 mGy-cm     This examination, like all CT scans performed in the [place], was performed utilizing techniques to minimize radiation dose exposure, including the use of iterative   reconstruction and automated exposure control       Enteric contrast was administered       FINDINGS:     ABDOMEN     LOWER CHEST:  Clear lung bases      LIVER/BILIARY TREE:  Unremarkable      GALLBLADDER:  Distended gallbladder  Punctate gallstones layering in the dependent portion of the fundus and neck  No evidence of acute cholecystitis      SPLEEN:  Low-attenuation 2 2 cm lesion could represent a cyst      PANCREAS:  Unremarkable      ADRENAL GLANDS:  Unremarkable      KIDNEYS/URETERS:  Mild right hydronephrosis  Obstructing 6 mm calculus in the proximal ureter at the L4 level  Nonobstructing 3 to 4 mm calculi in right renal calyces      Exophytic left renal 3 cm macroscopic fat-containing lesion, stable, suggesting an angiomyolipoma     STOMACH AND BOWEL:  Diverticulosis without evidence of diverticulitis or colitis      APPENDIX:  No findings to suggest appendicitis      ABDOMINOPELVIC CAVITY:  No ascites  No pneumoperitoneum  No lymphadenopathy      VESSELS:  No abdominal aortic aneurysm      PELVIS     REPRODUCTIVE ORGANS:  Mild prostate enlargement      URINARY BLADDER:  Unremarkable      ABDOMINAL WALL/INGUINAL REGIONS:  Extensive subcutaneous fat stranding within the dependent portion of the pannus  No gas or fluid collection      OSSEOUS STRUCTURES:  No acute fracture or destructive osseous lesion      IMPRESSION:        1  Mild right hydronephrosis  Obstructing 6 mm calculus in the ureter at the L4 level  Pyelonephritis not excluded  2   Subcutaneous fat stranding within the dependent portion of the abdominal pannus may represent edema or cellulitis  No evidence of gas or fluid collection            ASSESSMENT:     78 y o  male  with RIGHT proximal stone, fevers and lactic acidosis    PLAN:     Plan for urgent cystoscopy with RIGHTretrograde pyelogram and stent placement given fever and lactic acidosis  Informed consent signed  Received ceftriaxone in ER

## 2022-11-16 NOTE — PLAN OF CARE
Problem: PAIN - ADULT  Goal: Verbalizes/displays adequate comfort level or baseline comfort level  Description: Interventions:  - Encourage patient to monitor pain and request assistance  - Assess pain using appropriate pain scale  - Administer analgesics based on type and severity of pain and evaluate response  - Implement non-pharmacological measures as appropriate and evaluate response  - Consider cultural and social influences on pain and pain management  - Notify physician/advanced practitioner if interventions unsuccessful or patient reports new pain  Outcome: Progressing     Problem: INFECTION - ADULT  Goal: Absence or prevention of progression during hospitalization  Description: INTERVENTIONS:  - Assess and monitor for signs and symptoms of infection  - Monitor lab/diagnostic results  - Monitor all insertion sites, i e  indwelling lines, tubes, and drains  - Monitor endotracheal if appropriate and nasal secretions for changes in amount and color  - [place] appropriate cooling/warming therapies per order  - Administer medications as ordered  - Instruct and encourage patient and family to use good hand hygiene technique  - Identify and instruct in appropriate isolation precautions for identified infection/condition  Outcome: Progressing     Problem: SAFETY ADULT  Goal: Patient will remain free of falls  Description: INTERVENTIONS:  - Educate patient/family on patient safety including physical limitations  - Instruct patient to call for assistance with activity   - Consult OT/PT to assist with strengthening/mobility   - Keep Call bell within reach  - Keep bed low and locked with side rails adjusted as appropriate  - Keep care items and personal belongings within reach  - Initiate and maintain comfort rounds  - Make Fall Risk Sign visible to staff  - Offer Toileting every 2 Hours, in advance of need  - Initiate/Maintain bed alarm  - Obtain necessary fall risk management equipment: bed alarm  - Apply yellow socks and bracelet for high fall risk patients  - Consider moving patient to room near nurses station  Outcome: Progressing  Goal: Maintain or return to baseline ADL function  Description: INTERVENTIONS:  -  Assess patient's ability to carry out ADLs; assess patient's baseline for ADL function and identify physical deficits which impact ability to perform ADLs (bathing, care of mouth/teeth, toileting, grooming, dressing, etc )  - Assess/evaluate cause of self-care deficits   - Assess range of motion  - Assess patient's mobility; develop plan if impaired  - Assess patient's need for assistive devices and provide as appropriate  - Encourage maximum independence but intervene and supervise when necessary  - Involve family in performance of ADLs  - Assess for home care needs following discharge   - Consider OT consult to assist with ADL evaluation and planning for discharge  - Provide patient education as appropriate  Outcome: Progressing  Goal: Maintains/Returns to pre admission functional level  Description: INTERVENTIONS:  - Perform BMAT or MOVE assessment daily    - Set and communicate daily mobility goal to care team and patient/family/caregiver     - Collaborate with rehabilitation services on mobility goals if consulted  - Dangle patient 3 times a day  - Stand patient 3 times a day  - Ambulate patient 3 times a day  - Out of bed to chair 3 times a day   - Out of bed for meals 3 times a day  - Out of bed for toileting  - Record patient progress and toleration of activity level   Outcome: Progressing     Problem: DISCHARGE PLANNING  Goal: Discharge to home or other facility with appropriate resources  Description: INTERVENTIONS:  - Identify barriers to discharge w/patient and caregiver  - Arrange for needed discharge resources and transportation as appropriate  - Identify discharge learning needs (meds, wound care, etc )  - Arrange for interpretive services to assist at discharge as needed  - Refer to Case Management Department for coordinating discharge planning if the patient needs post-hospital services based on physician/advanced practitioner order or complex needs related to functional status, cognitive ability, or social support system  Outcome: Progressing     Problem: Knowledge Deficit  Goal: Patient/family/caregiver demonstrates understanding of disease process, treatment plan, medications, and discharge instructions  Description: Complete learning assessment and assess knowledge base    Interventions:  - Provide teaching at level of understanding  - Provide teaching via preferred learning methods  Outcome: Progressing     Problem: RESPIRATORY - ADULT  Goal: Achieves optimal ventilation and oxygenation  Description: INTERVENTIONS:  - Assess for changes in respiratory status  - Assess for changes in mentation and behavior  - Position to facilitate oxygenation and minimize respiratory effort  - Oxygen administered by appropriate delivery if ordered  - Initiate smoking cessation education as indicated  - Encourage broncho-pulmonary hygiene including cough, deep breathe, Incentive Spirometry  - Assess the need for suctioning and aspirate as needed  - Assess and instruct to report SOB or any respiratory difficulty  - Respiratory Therapy support as indicated  Outcome: Progressing     Problem: GENITOURINARY - ADULT  Goal: Maintains or returns to baseline urinary function  Description: INTERVENTIONS:  - Assess urinary function  - Encourage oral fluids to ensure adequate hydration if ordered  - Administer IV fluids as ordered to ensure adequate hydration  - Administer ordered medications as needed  - Offer frequent toileting  - Follow urinary retention protocol if ordered  Outcome: Progressing  Goal: Absence of urinary retention  Description: INTERVENTIONS:  - Assess patient’s ability to void and empty bladder  - Monitor I/O  - Bladder scan as needed  - Discuss with physician/AP medications to alleviate retention as needed  - Discuss catheterization for long term situations as appropriate  Outcome: Progressing  Goal: Urinary catheter remains patent  Description: INTERVENTIONS:  - Assess patency of urinary catheter  - If patient has a chronic jenkins, consider changing catheter if non-functioning  - Follow guidelines for intermittent irrigation of non-functioning urinary catheter  Outcome: Progressing     Problem: METABOLIC, FLUID AND ELECTROLYTES - ADULT  Goal: Electrolytes maintained within normal limits  Description: INTERVENTIONS:  - Monitor labs and assess patient for signs and symptoms of electrolyte imbalances  - Administer electrolyte replacement as ordered  - Monitor response to electrolyte replacements, including repeat lab results as appropriate  - Instruct patient on fluid and nutrition as appropriate  Outcome: Progressing     Problem: SKIN/TISSUE INTEGRITY - ADULT  Goal: Skin Integrity remains intact(Skin Breakdown Prevention)  Description: Assess:  -Perform Mic assessment every shift  -Clean and moisturize skin every shift   -Inspect skin when repositioning, toileting, and assisting with ADLS  -Assess under medical devices such as dressing every shift  -Assess extremities for adequate circulation and sensation     Bed Management:  -Have minimal linens on bed & keep smooth, unwrinkled  -Change linens as needed when moist or perspiring  -Avoid sitting or lying in one position for more than 2 hours while in bed  -Keep HOB at 30 degrees     Toileting:  -Offer bedside commode  -Assess for incontinence every 2 hours     Activity:  -Mobilize patient 3 times a day  -Encourage activity and walks on unit  -Encourage or provide ROM exercises   -Turn and reposition patient every 2 Hours  -Use appropriate equipment to lift or move patient in bed  -Instruct/ Assist with weight shifting every 2 hours when out of bed in chair  -Consider limitation of chair time 2 hour intervals  Outcome: Progressing  Goal: Incision(s), wounds(s) or drain site(s) healing without S/S of infection  Description: INTERVENTIONS  - Assess and document dressing, incision, wound bed, drain sites and surrounding tissue  - Provide patient and family education  - Perform skin care/dressing changes every day  Outcome: Progressing     Problem: MUSCULOSKELETAL - ADULT  Goal: Maintain or return mobility to safest level of function  Description: INTERVENTIONS:  - Assess patient's ability to carry out ADLs; assess patient's baseline for ADL function and identify physical deficits which impact ability to perform ADLs (bathing, care of mouth/teeth, toileting, grooming, dressing, etc )  - Assess/evaluate cause of self-care deficits   - Assess range of motion  - Assess patient's mobility  - Assess patient's need for assistive devices and provide as appropriate  - Encourage maximum independence but intervene and supervise when necessary  - Involve family in performance of ADLs  - Assess for home care needs following discharge   - Consider OT consult to assist with ADL evaluation and planning for discharge  - Provide patient education as appropriate  Outcome: Progressing

## 2022-11-16 NOTE — OP NOTE
OPERATIVE REPORT  PATIENT NAME: Nader Panda    :  1955  MRN: [id number]  Pt Location: [facility] RO[facility]    SURGERY DATE: 11/15/2022    Surgeon(s) and Role:     * Keara Quiñones MD - Primary    Preop Diagnosis:  Severe sepsis ([address] ) [A41 9, R65 20]  MILTON (acute kidney injury) ([address] ) [N17 9]  Kidney stones [N20 0]    Post-Op Diagnosis Codes: * Severe sepsis ([address] ) [A41 9, R65 20]     * MILTON (acute kidney injury) ([address] ) [N17 9]     * Kidney stones [N20 0]    Procedure(s) (LRB):  CYSTOSCOPY RETROGRADE PYELOGRAM WITH INSERTION STENT URETERAL (Right)    Specimen(s):  ID Type Source Tests Collected by Time Destination   A : Right Kidney Urine Culture Urine Urine, Renal, Right URINE CULTURE Keara Quiñones MD 11/15/2022 [id number]        Estimated Blood Loss:   Minimal    Drains:  6 x 26 right double-J stent, no string  Eighteen [place] coude tip Arias catheter    Anesthesia Type:   General    Operative Indications:  Severe sepsis ([address] ) [A41 9, R65 20]  MILTON (acute kidney injury) ([address] ) [N17 9]  Kidney stones [N20 0]      Operative Findings:  1  Morbidly obese  2  No specific bladder abnormalities noted  3  Moderate hydronephrosis with concentrated particulate urine  4  Six twenty-six double-J stent placed into the right    Complications:   None    Procedure and Technique:  Nader Panda is a 78 y o  male with morbid obesity and recent strangulated hernia surgery in September with return to the operating room  Presents with fevers and right obstructing ureteral stone       The patient was counseled regarding their options and ultimately opted to proceed with cystoscopy with right retrograde pyelogram and stent placement  Risks and benefits of the procedure were described and the patient signed an informed consent  On 11/15/2022 , the patient proceeded to the operating room      They were laid supine on the operating room table and a pre-procedure time out was performed with all parties present and in agreement of the procedure planned and laterality  Patient received intravenous antibiotics in the form of ceftriaxone and sequential compression devices were placed on bilateral lower extremities  They were then induced with a rapid sequence general anesthetic  Patient was placed in dorsal lithotomy with care to pad all pressure points  Perineum and genitalia prepped and draped  Repeat time-out performed  Twenty-two [place] cystoscope sheath and into the patient's urethra  Pan cystoscopy was performed  Right ureteral orifice was identified  Bard solo +wire advanced  Five [employer] May open-ended catheter over the wire and the wire removed  We aspirated concentrated particulate urine  This was sent for culture  Retrograde pyelogram performed  Moderate hydronephrosis  Wire placed to the upper pole  6 x 26 double-J stent without string placed  Continued particulate drainage around the stent noted  Confirmation of placement with fluoroscopy and direct vision  Cystoscope removed and an 25 [place]an coude tip Arias catheter placed with 10 cc in the balloon  This was hooked to gravity drainage  At the completion of the procedure, the patient was extubated and transferred to PACU in good condition  Plan-visual need to be resuscitated and monitored  Will return for definitive secondary surgery at a later date and time      Patient Disposition:  PACU         SIGNATURE: Eden Essex, MD  DATE: November 15, 2022  TIME: 10:24 PM

## 2022-11-16 NOTE — ANESTHESIA POSTPROCEDURE EVALUATION
Post-Op Assessment Note    CV Status:  Stable    Pain management: adequate     Mental Status:  Alert and awake   Hydration Status:  Euvolemic   PONV Controlled:  Controlled   Airway Patency:  Patent      Post Op Vitals Reviewed: Yes      Staff: Anesthesiologist         No notable events documented      BP      Temp      Pulse     Resp      SpO2

## 2022-11-16 NOTE — PROGRESS NOTES
Progress Note - General Surgery   Kael Mackey 78 y o  male MRN: [id number]  Unit/Bed#: E4 -01 Encounter: [id number]    Assessment:  74yo M with sepsis 2/2 pyelonephritis s/p stent placement- urology    Midline and RLQ wounds healing well, no concern for infection at this time    Plan:  - no plans for surgical intervention  - currently dressed with WTD dressing, planning to place wound vac later today or early tomorrow  - the patient follows up regularly at the wound clinic, plan to continue outpatient follow up once discharged  - please tigertext the surgery resident or AP role with any questions    Subjective/Objective   Subjective:   No acute events overnight  Tolerated stent placement last night  Feeling better this morning  Objective:     Blood pressure 121/56, pulse 82, temperature (!) 97 1 °F (36 2 °C), temperature source Temporal, resp  rate 22, height 5' 10" (1 778 m), weight (!) 154 kg (340 lb 2 7 oz), SpO2 96 %  ,Body mass index is 48 81 kg/m²  No intake or output data in the 24 hours ending 11/15/22 2051    Invasive Devices     Peripheral Intravenous Line  Duration           Peripheral IV 11/15/22 Left Antecubital <1 day    Peripheral IV 11/15/22 Right Hand <1 day          Drain  Duration           Ureteral Internal Stent Right ureter 6 Fr  <1 day    Urethral Catheter Coude 18 Fr  <1 day                Physical Exam:  General: No acute distress  Neuro: alert and oriented  HEENT: moist mucous membranes  CV: Well perfused, regular rate and rhythm  Lungs: Normal work of breathing, no increased respiratory effort  Abdomen: Soft, non-tender, non-distended  Mild erythema and chronic edema  Midline and RLQ wounds with healthy granulation tissue at the wound bases, fibrinous exudate seen on the inferior edge of the midline wound  No active drainage, no purulent output, no succus     Extremities: No edema, clubbing or cyanosis  Skin: Warm, dry        Candance Paschal, MD  General Surgery PGY1

## 2022-11-16 NOTE — ASSESSMENT & PLAN NOTE
· Continue metoprolol with hold parameters  · Hold furosemide and lisinopril given acute kidney injury

## 2022-11-16 NOTE — NURSING NOTE
Assumed care of patient at 0300 agree with previous nurse assessment  Call bell within reach and bed alarm activated       Alex Min RN

## 2022-11-16 NOTE — PROGRESS NOTES
Progress Note - Urology  Kael Mackey 1955, 78 y o  male MRN: [id number]    Unit/Bed#: E4 -01 Encounter: [id number]      Assessment  Urosepsis  Left hydronephrosis secondary to ureteral calculus  MILTON    Plan  POD#1 cystoscopy left ureteral stent insertion  MILTON improving, current creatinine 2 0  Leukocytosis improving current WBC 10 5  Preliminary blood cultures with group B strep  Urine culture pending  Tolerating ceftriaxone  Remove Arias catheter Thursday morning if remains afebrile and creatinine coming down  Urology follow-up:  Definitive left ureteroscopy for laser lithotripsy in about 3-6 weeks  Stent to remain in place that duration  Could utilize Flomax, Ditropan, Pyridium as needed for stent discomfort in the flank or bladder  Urology will sign off but remain available for any further inpatient needs  Please feel free to contact the provider [facility] with questions or concerns  Subjective:  Feels better, no further fevers or chills  Nausea and back pain also resolved  Urine clear abdirizak  He does report a prior stone in 2017  Also cryoablation for a left renal mass in 2018  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  Stable no evidence of recurrent disease on yearly CT scans, though he admits he has not seen a urologist since  His primary care team has been doing his surveillance  Review of Systems   Constitutional: Positive for fatigue  Negative for activity change, appetite change, chills, fever and unexpected weight change  HENT: Negative  Respiratory: Negative  Negative for shortness of breath  Cardiovascular: Negative  Negative for chest pain  Gastrointestinal: Negative for abdominal pain, diarrhea, nausea and vomiting  Endocrine: Negative      Genitourinary: Negative for decreased urine volume, difficulty urinating, dysuria, flank pain, frequency, hematuria and urgency  Musculoskeletal: Negative for back pain and gait problem  Skin: Negative  Allergic/Immunologic: Negative  Neurological: Negative  Hematological: Negative for adenopathy  Does not bruise/bleed easily  Objective:  Vitals: Blood pressure 123/65, pulse 70, temperature 97 9 °F (36 6 °C), temperature source Temporal, resp  rate 20, height 5' 10" (1 778 m), weight (!) 154 kg (340 lb 2 7 oz), SpO2 100 %  ,Body mass index is 48 81 kg/m²  Intake/Output Summary (Last 24 hours) at 11/16/2022 1217  Last data filed at 11/16/2022 0601  Gross per 24 hour   Intake 1000 ml   Output 875 ml   Net 125 ml     Invasive Devices     Peripheral Intravenous Line  Duration           Peripheral IV 11/15/22 Left Antecubital <1 day    Peripheral IV 11/15/22 Right Hand <1 day          Drain  Duration           Ureteral Internal Stent Right ureter 6 Fr  <1 day    Urethral Catheter Coude 18 Fr  <1 day                Physical Exam  Vitals and nursing note reviewed  Constitutional:       General: He is not in acute distress  Appearance: He is well-developed and well-nourished  He is not diaphoretic  HENT:      Head: Normocephalic and atraumatic  Cardiovascular:      Rate and Rhythm: Normal rate and regular rhythm  Heart sounds: Normal heart sounds  No murmur heard  Pulmonary:      Effort: Pulmonary effort is normal       Breath sounds: Normal breath sounds  Abdominal:      General: Bowel sounds are normal       Palpations: Abdomen is soft  Genitourinary:     Penis: Normal        Testes: Normal       Comments: Arias per urethra draining clear abdirizak urine  Musculoskeletal:         General: No edema  Normal range of motion  Skin:     General: Skin is warm and dry  Capillary Refill: Capillary refill takes less than 2 seconds  Coloration: Skin is not pale  Neurological:      Mental Status: He is alert and oriented to person, place, and time        Gait: Gait normal  Psychiatric:         Mood and Affect: Mood and affect normal          Speech: Speech normal          Behavior: Behavior normal          Labs:  Recent Labs     11/15/22  1559 11/16/22  0453   WBC 9 65 10 59*     Recent Labs     11/15/22  1559 11/16/22  0453   HGB 9 2* 9 6*       Recent Labs     11/15/22  1559 11/16/22  0453   CREATININE 2 49* 2 08*       History:    Past Medical History:   Diagnosis Date   • Anemia 9/16/2022   • Chronic kidney disease    • Hypertension    • Obesity    • Sepsis ([address] )    • Urinary tract infection without hematuria 4/3/2020     Past Surgical History:   Procedure Laterality Date   • ABDOMINAL ADHESION SURGERY N/A 9/15/2022    Procedure: LYSIS ADHESIONS;  Surgeon: Zuleyma Magdaleno MD;  [facility];  Service: General   • APPENDECTOMY     • EXPLORATORY LAPAROTOMY W/ BOWEL RESECTION N/A 9/14/2022    Procedure: Exdploratory laparotomy, complex Lysis of adhesions, reduction strangulated hernia, debridement RLQ muscle with counter incision, abthera placement;  Surgeon: Zuleyma Magdaleno MD;  [facility];  Service: General   • EXPLORATORY LAPAROTOMY W/ BOWEL RESECTION N/A 9/15/2022    Procedure: LAPAROTOMY EXPLORATORY  REPAIR RIGHT LQ HERNIA WITH MESH, REPAIR ANTERIOR WALL HERNIA;  Surgeon: Zuleyma Magdaleno MD;  [facility];  Service: General   • NASAL SEPTUM SURGERY     • [place] CYSTOURETHROSCOPY,URETER CATHETER Right 11/15/2022    Procedure: CYSTOSCOPY RETROGRADE PYELOGRAM WITH INSERTION STENT URETERAL;  Surgeon: Chiquita Dickson MD;  [facility];  Service: Urology   • WOUND DEBRIDEMENT N/A 10/11/2022    Procedure: Excisional debridement of abdominal wall nonviable tissue  Skin and subcu tissues were dissected    The wound measured 23 x 18 cm x 4 cm deep ;  Surgeon: Leon Michael MD;  [facility];  Service: General     Family History   Problem Relation Age of Onset   • Diabetes Father    • Prostate cancer Brother      Social History Socioeconomic History   • Marital status: /Civil Union     Spouse name: None   • Number of children: None   • Years of education: None   • Highest education level: None   Occupational History   • None   Tobacco Use   • Smoking status: Never   • Smokeless tobacco: Never   • Tobacco comments:     no exposure to passive smoke   Vaping Use   • Vaping Use: Never used   Substance and Sexual Activity   • Alcohol use: Never   • Drug use: Not Currently   • Sexual activity: Not Currently   Other Topics Concern   • None   Social History Narrative   • None     Social Determinants of Health     Financial Resource Strain: Not on file   Food Insecurity: No Food Insecurity   • Worried About Running Out of Food in the Last Year: Never true   • Ran Out of Food in the Last Year: Never true   Transportation Needs: No Transportation Needs   • Lack of Transportation (Medical): No   • Lack of Transportation (Non-Medical):  No   Physical Activity: Not on file   Stress: Not on file   Social Connections: Not on file   Intimate Partner Violence: Not on file   Housing Stability: Low Risk    • Unable to Pay for Housing in the Last Year: No   • Number of Places Lived in the Last Year: 1   • Unstable Housing in the Last Year: No         Doris Flores  Date: 11/16/2022 Time: 12:17 PM

## 2022-11-16 NOTE — ASSESSMENT & PLAN NOTE
This is a 60-year-old male with history of hypertension, obesity, CKD presenting with fevers, generalized weakness and chills  Patient's lives at home with his wife , stated for the past 2 days has been having fevers at home with higher temperatures today with T-max of 102°  Also having chills, nausea and back pain  Denies any chest pain, palpitations or dyspnea  Denies any diarrhea, vomiting, cough or congestion      · Sepsis present on admission criteria met with tachycardia, lactic acidosis, acute kidney injury  · Concern for pyelonephritis from obstructing stone  · CT revealed mild right hydronephrosis with obstructing 6 mm calculus in the ureter at L4 level, pyelonephritis not excluded, subcutaneous fat stranding within the dependent portion of abdominal pannus may represent edema or cellulitis  · Will continue ceftriaxone  ·  NPO, IV fluids  · Discussed with neurology team, plan for urgent OR today for stent placement

## 2022-11-16 NOTE — ASSESSMENT & PLAN NOTE
Lab Results   Component Value Date    EGFR 25 11/15/2022    EGFR 50 10/21/2022    EGFR 48 10/12/2022    CREATININE 2 49 (H) 11/15/2022    CREATININE 1 44 10/21/2022    CREATININE 1 48 (H) 10/12/2022   · Baseline creatinine 1 4-1 7  · Creatinine today 2 49 likely secondary to sepsis and obstructive uropathy  · Continue with IV fluids  · Hold diuretics and ACE-inhibitor  · Monitor renal function closely, monitor I&Os  · If creatinine continues to trend up may need evaluation by Nephrology

## 2022-11-16 NOTE — PROGRESS NOTES
Progress Note - General Surgery  : [facility] Resident on Konrad Tam 78 y o  male MRN: [id number]  Unit/Bed#: E4 -01 Encounter: [id number]      Assessment:  78 y o  male admitted with pyelo, gen surg consult for midline/RLQ abdominal wounds       Plan:  Vac to abdominal wounds  CM for home wound vac        Subjective: No acute complaints      Objective:     Physical Exam:  GEN: NAD   Ab: Soft, NT/ND, vac sucked down  Lung: Normal effort   CV: RRR   Extrem: No CCE   Neuro: A+Ox3       I/O       11/14 0701  11/15 0700 11/15 0701  11/16 0700 11/16 0701 11/17 0700    I V  (mL/kg)  1000 (6 5)     Total Intake(mL/kg)  1000 (6 5)     Urine (mL/kg/hr)  750     Drains  125     Total Output  875     Net  +125                  Lab, Imaging and other studies: I have personally reviewed pertinent reports    , CBC with diff:   Lab Results   Component Value Date    WBC 10 59 (H) 11/16/2022    HGB 9 6 (L) 11/16/2022    HCT 31 9 (L) 11/16/2022    MCV 98 11/16/2022     11/16/2022    MCH 29 4 11/16/2022    MCHC 30 1 (L) 11/16/2022    RDW 14 7 11/16/2022    MPV 8 3 (L) 11/16/2022    NRBC 0 11/15/2022   , BMP/CMP:   Lab Results   Component Value Date    SODIUM 139 11/16/2022    K 4 5 11/16/2022     11/16/2022    CO2 24 11/16/2022    BUN 22 11/16/2022    CREATININE 2 08 (H) 11/16/2022    CALCIUM 8 4 11/16/2022    AST 11 11/16/2022    ALT 8 (L) 11/16/2022    ALKPHOS 67 11/16/2022    EGFR 32 11/16/2022         Ivan Mendez MD  11/16/2022 3:07 PM

## 2022-11-16 NOTE — PLAN OF CARE
Problem: PAIN - ADULT  Goal: Verbalizes/displays adequate comfort level or baseline comfort level  Description: Interventions:  - Encourage patient to monitor pain and request assistance  - Assess pain using appropriate pain scale  - Administer analgesics based on type and severity of pain and evaluate response  - Implement non-pharmacological measures as appropriate and evaluate response  - Consider cultural and social influences on pain and pain management  - Notify physician/advanced practitioner if interventions unsuccessful or patient reports new pain  Outcome: Progressing     Problem: INFECTION - ADULT  Goal: Absence or prevention of progression during hospitalization  Description: INTERVENTIONS:  - Assess and monitor for signs and symptoms of infection  - Monitor lab/diagnostic results  - Monitor all insertion sites, i e  indwelling lines, tubes, and drains  - Monitor endotracheal if appropriate and nasal secretions for changes in amount and color  - [place] appropriate cooling/warming therapies per order  - Administer medications as ordered  - Instruct and encourage patient and family to use good hand hygiene technique  - Identify and instruct in appropriate isolation precautions for identified infection/condition  Outcome: Progressing     Problem: SAFETY ADULT  Goal: Patient will remain free of falls  Description: INTERVENTIONS:  - Educate patient/family on patient safety including physical limitations  - Instruct patient to call for assistance with activity   - Consult OT/PT to assist with strengthening/mobility   - Keep Call bell within reach  - Keep bed low and locked with side rails adjusted as appropriate  - Keep care items and personal belongings within reach  - Initiate and maintain comfort rounds  - Make Fall Risk Sign visible to staff  - Offer Toileting every 2 Hours, in advance of need  - Initiate/Maintain bed alarm  - Obtain necessary fall risk management equipment: bed alarm  - Apply yellow socks and bracelet for high fall risk patients  - Consider moving patient to room near nurses station  Outcome: Progressing  Goal: Maintain or return to baseline ADL function  Description: INTERVENTIONS:  -  Assess patient's ability to carry out ADLs; assess patient's baseline for ADL function and identify physical deficits which impact ability to perform ADLs (bathing, care of mouth/teeth, toileting, grooming, dressing, etc )  - Assess/evaluate cause of self-care deficits   - Assess range of motion  - Assess patient's mobility; develop plan if impaired  - Assess patient's need for assistive devices and provide as appropriate  - Encourage maximum independence but intervene and supervise when necessary  - Involve family in performance of ADLs  - Assess for home care needs following discharge   - Consider OT consult to assist with ADL evaluation and planning for discharge  - Provide patient education as appropriate  Outcome: Progressing  Goal: Maintains/Returns to pre admission functional level  Description: INTERVENTIONS:  - Perform BMAT or MOVE assessment daily    - Set and communicate daily mobility goal to care team and patient/family/caregiver     - Collaborate with rehabilitation services on mobility goals if consulted  - Dangle patient 3 times a day  - Stand patient 3 times a day  - Ambulate patient 3 times a day  - Out of bed to chair 3 times a day   - Out of bed for meals 3 times a day  - Out of bed for toileting  - Record patient progress and toleration of activity level   Outcome: Progressing     Problem: DISCHARGE PLANNING  Goal: Discharge to home or other facility with appropriate resources  Description: INTERVENTIONS:  - Identify barriers to discharge w/patient and caregiver  - Arrange for needed discharge resources and transportation as appropriate  - Identify discharge learning needs (meds, wound care, etc )  - Arrange for interpretive services to assist at discharge as needed  - Refer to Case Management Department for coordinating discharge planning if the patient needs post-hospital services based on physician/advanced practitioner order or complex needs related to functional status, cognitive ability, or social support system  Outcome: Progressing     Problem: Knowledge Deficit  Goal: Patient/family/caregiver demonstrates understanding of disease process, treatment plan, medications, and discharge instructions  Description: Complete learning assessment and assess knowledge base    Interventions:  - Provide teaching at level of understanding  - Provide teaching via preferred learning methods  Outcome: Progressing     Problem: RESPIRATORY - ADULT  Goal: Achieves optimal ventilation and oxygenation  Description: INTERVENTIONS:  - Assess for changes in respiratory status  - Assess for changes in mentation and behavior  - Position to facilitate oxygenation and minimize respiratory effort  - Oxygen administered by appropriate delivery if ordered  - Initiate smoking cessation education as indicated  - Encourage broncho-pulmonary hygiene including cough, deep breathe, Incentive Spirometry  - Assess the need for suctioning and aspirate as needed  - Assess and instruct to report SOB or any respiratory difficulty  - Respiratory Therapy support as indicated  Outcome: Progressing     Problem: GENITOURINARY - ADULT  Goal: Maintains or returns to baseline urinary function  Description: INTERVENTIONS:  - Assess urinary function  - Encourage oral fluids to ensure adequate hydration if ordered  - Administer IV fluids as ordered to ensure adequate hydration  - Administer ordered medications as needed  - Offer frequent toileting  - Follow urinary retention protocol if ordered  Outcome: Progressing  Goal: Absence of urinary retention  Description: INTERVENTIONS:  - Assess patient’s ability to void and empty bladder  - Monitor I/O  - Bladder scan as needed  - Discuss with physician/AP medications to alleviate retention as needed  - Discuss catheterization for long term situations as appropriate  Outcome: Progressing  Goal: Urinary catheter remains patent  Description: INTERVENTIONS:  - Assess patency of urinary catheter  - If patient has a chronic jenkins, consider changing catheter if non-functioning  - Follow guidelines for intermittent irrigation of non-functioning urinary catheter  Outcome: Progressing     Problem: METABOLIC, FLUID AND ELECTROLYTES - ADULT  Goal: Electrolytes maintained within normal limits  Description: INTERVENTIONS:  - Monitor labs and assess patient for signs and symptoms of electrolyte imbalances  - Administer electrolyte replacement as ordered  - Monitor response to electrolyte replacements, including repeat lab results as appropriate  - Instruct patient on fluid and nutrition as appropriate  Outcome: Progressing     Problem: SKIN/TISSUE INTEGRITY - ADULT  Goal: Skin Integrity remains intact(Skin Breakdown Prevention)  Description: Assess:  -Perform Mic assessment every   -Clean and moisturize skin every   -Inspect skin when repositioning, toileting, and assisting with ADLS  -Assess under medical devices such as  every   -Assess extremities for adequate circulation and sensation     Bed Management:  -Have minimal linens on bed & keep smooth, unwrinkled  -Change linens as needed when moist or perspiring  -Avoid sitting or lying in one position for more than  hours while in bed  -Keep HOB at degrees     Toileting:  -Offer bedside commode  -Assess for incontinence every   -Use incontinent care products after each incontinent episode such as     Activity:  -Mobilize patient  times a day  -Encourage activity and walks on unit  -Encourage or provide ROM exercises   -Turn and reposition patient every  Hours  -Use appropriate equipment to lift or move patient in bed  -Instruct/ Assist with weight shifting every  when out of bed in chair  -Consider limitation of chair time  hour intervals    Skin Care:  -Avoid use of baby powder, tape, friction and shearing, hot water or constrictive clothing  -Relieve pressure over bony prominences using   -Do not massage red bony areas    Next Steps:  -Teach patient strategies to minimize risks such as    -Consider consults to  interdisciplinary teams such as   Outcome: Progressing  Goal: Incision(s), wounds(s) or drain site(s) healing without S/S of infection  Description: INTERVENTIONS  - Assess and document dressing, incision, wound bed, drain sites and surrounding tissue  - Provide patient and family education  - Perform skin care/dressing changes every   Outcome: Progressing     Problem: MUSCULOSKELETAL - ADULT  Goal: Maintain or return mobility to safest level of function  Description: INTERVENTIONS:  - Assess patient's ability to carry out ADLs; assess patient's baseline for ADL function and identify physical deficits which impact ability to perform ADLs (bathing, care of mouth/teeth, toileting, grooming, dressing, etc )  - Assess/evaluate cause of self-care deficits   - Assess range of motion  - Assess patient's mobility  - Assess patient's need for assistive devices and provide as appropriate  - Encourage maximum independence but intervene and supervise when necessary  - Involve family in performance of ADLs  - Assess for home care needs following discharge   - Consider OT consult to assist with ADL evaluation and planning for discharge  - Provide patient education as appropriate  Outcome: Progressing     Problem: MOBILITY - ADULT  Goal: Maintain or return to baseline ADL function  Description: INTERVENTIONS:  -  Assess patient's ability to carry out ADLs; assess patient's baseline for ADL function and identify physical deficits which impact ability to perform ADLs (bathing, care of mouth/teeth, toileting, grooming, dressing, etc )  - Assess/evaluate cause of self-care deficits   - Assess range of motion  - Assess patient's mobility; develop plan if impaired  - Assess patient's need for assistive devices and provide as appropriate  - Encourage maximum independence but intervene and supervise when necessary  - Involve family in performance of ADLs  - Assess for home care needs following discharge   - Consider OT consult to assist with ADL evaluation and planning for discharge  - Provide patient education as appropriate  Outcome: Progressing  Goal: Maintains/Returns to pre admission functional level  Description: INTERVENTIONS:  - Perform BMAT or MOVE assessment daily    - Set and communicate daily mobility goal to care team and patient/family/caregiver     - Collaborate with rehabilitation services on mobility goals if consulted  - Dangle patient 3 times a day  - Stand patient 3 times a day  - Ambulate patient 3 times a day  - Out of bed to chair 3 times a day   - Out of bed for meals 3 times a day  - Out of bed for toileting  - Record patient progress and toleration of activity level   Outcome: Progressing     Problem: Prexisting or High Potential for Compromised Skin Integrity  Goal: Skin integrity is maintained or improved  Description: INTERVENTIONS:  - Identify patients at risk for skin breakdown  - Assess and monitor skin integrity  - Assess and monitor nutrition and hydration status  - Monitor labs   - Assess for incontinence   - Turn and reposition patient  - Assist with mobility/ambulation  - Relieve pressure over bony prominences  - Avoid friction and shearing  - Provide appropriate hygiene as needed including keeping skin clean and dry  - Evaluate need for skin moisturizer/barrier cream  - Collaborate with interdisciplinary team   - Patient/family teaching  - Consider wound care consult   Outcome: Progressing

## 2022-11-16 NOTE — H&P
[facility]  H&P- Ed Willa 1955, 78 y o  male MRN: [id number]  Unit/Bed#: E4 -01 Encounter: [id number]  Primary Care Provider: Volodymyr Lazcano MD   Date and time admitted to hospital: 11/15/2022  3:45 PM    * Severe sepsis [facility])  Assessment & Plan  This is a 71-year-old male with history of hypertension, obesity, CKD presenting with fevers, generalized weakness and chills  Patient's lives at home with his wife , stated for the past 2 days has been having fevers at home with higher temperatures today with T-max of 102°  Also having chills, nausea and back pain  Denies any chest pain, palpitations or dyspnea  Denies any diarrhea, vomiting, cough or congestion      · Sepsis present on admission criteria met with tachycardia, lactic acidosis, acute kidney injury  · Concern for pyelonephritis from obstructing stone  · CT revealed mild right hydronephrosis with obstructing 6 mm calculus in the ureter at L4 level, pyelonephritis not excluded, subcutaneous fat stranding within the dependent portion of abdominal pannus may represent edema or cellulitis  · Will continue ceftriaxone  ·  NPO, IV fluids  · Discussed with neurology team, plan for urgent OR today for stent placement    Acute kidney injury superimposed on chronic kidney disease [facility])  Assessment & Plan  Lab Results   Component Value Date    EGFR 25 11/15/2022    EGFR 50 10/21/2022    EGFR 48 10/12/2022    CREATININE 2 49 (H) 11/15/2022    CREATININE 1 44 10/21/2022    CREATININE 1 48 (H) 10/12/2022   · Baseline creatinine 1 4-1 7  · Creatinine today 2 49 likely secondary to sepsis and obstructive uropathy  · Continue with IV fluids  · Hold diuretics and ACE-inhibitor  · Monitor renal function closely, monitor I&Os  · If creatinine continues to trend up may need evaluation by Nephrology    Surgical wound present  Assessment & Plan  · Patient has history of strangulated hernia abdominal wall status post exploratory laparotomy and repair of right lower quadrant hernia with mesh and right lower quadrant hernia repair on 09/14/2022, with wound VAC in place  · Surgery consultation appreciated  · No indication for acute surgical intervention or concern for infection  · Wound VAC was removed plan for placement of wound VAC tomorrow    Pulmonary embolism ([facility] [address] )  Assessment & Plan  · continue Eliquis    Class 3 severe obesity due to excess calories with serious comorbidity and body mass index (BMI) of 50 0 to 59 9 in adult [facility])  Assessment & Plan  · Encouraged lifestyle modifications    Gout  Assessment & Plan  · Continue allopurinol    Essential hypertension  Assessment & Plan  · Continue metoprolol with hold parameters  · Hold furosemide and lisinopril given acute kidney injury        VTE Prophylaxis: Apixaban (Eliquis)  / sequential compression device   Code Status: Full  POLST: There is no POLST form on file for this patient (pre-hospital)    Anticipated Length of Stay:  Patient will be admitted on an Inpatient basis with an anticipated length of stay of  greater than 2 midnights  Justification for Hospital Stay:  Severe sepsis    Total Time for Visit, including Counseling / Coordination of Care: 45 minutes  Greater than 50% of this total time spent on direct patient counseling and coordination of care  Chief Complaint:   Weakness, fever, chills    History of Present Illness:    Ed Willa is a 78 y o  male who presents with generalized weakness, fever and chills at home  Patient has history of hypertension, obesity, CKD presenting with fevers, generalized weakness and chills  Patient's lives at home with his wife , stated for the past 2 days has been having fevers at home with higher temperatures today with T-max of 102°  Also having chills, nausea and back pain  Denies any chest pain, palpitations or dyspnea  Denies any diarrhea, vomiting, cough or congestion      Review of Systems:    Review of Systems Constitutional: Positive for chills and fever  HENT: Negative  Eyes: Negative  Respiratory: Negative  Cardiovascular: Negative  Gastrointestinal: Negative  Endocrine: Negative  Genitourinary: Negative  Musculoskeletal: Positive for back pain  Skin: Positive for wound  Allergic/Immunologic: Negative  Neurological: Positive for weakness  Hematological: Negative  Psychiatric/Behavioral: Negative  Past Medical and Surgical History:     Past Medical History:   Diagnosis Date   • Anemia 9/16/2022   • Chronic kidney disease    • Hypertension    • Obesity    • Sepsis ([facility] [address] )    • Urinary tract infection without hematuria 4/3/2020       Past Surgical History:   Procedure Laterality Date   • ABDOMINAL ADHESION SURGERY N/A 9/15/2022    Procedure: LYSIS ADHESIONS;  Surgeon: Eliezer Correa MD;  Location: [facility];  Service: General   • APPENDECTOMY     • EXPLORATORY LAPAROTOMY W/ BOWEL RESECTION N/A 9/14/2022    Procedure: Exdploratory laparotomy, complex Lysis of adhesions, reduction strangulated hernia, debridement RLQ muscle with counter incision, abthera placement;  Surgeon: Eliezer Correa MD;  [facility];  Service: General   • EXPLORATORY LAPAROTOMY W/ BOWEL RESECTION N/A 9/15/2022    Procedure: LAPAROTOMY EXPLORATORY  REPAIR RIGHT LQ HERNIA WITH MESH, REPAIR ANTERIOR WALL HERNIA;  Surgeon: Eliezer Correa MD;  [facility];  Service: General   • NASAL SEPTUM SURGERY     • WOUND DEBRIDEMENT N/A 10/11/2022    Procedure: Excisional debridement of abdominal wall nonviable tissue  Skin and subcu tissues were dissected  The wound measured 23 x 18 cm x 4 cm deep ;  Surgeon: Ki Kay MD;  Location: [facility];  Service: General       Meds/Allergies:    Prior to Admission medications    Medication Sig Start Date End Date Taking?  Authorizing Provider   allopurinol (ZYLOPRIM) 100 mg tablet Take 100 mg by mouth daily  12/4/18  Yes Historical Provider, MD apixaban (Eliquis) 5 mg Take 1 tablet (5 mg total) by mouth 2 (two) times a day 10/18/22 1/16/23 Yes KELSEY Ulloa   furosemide (LASIX) 20 mg tablet Take 1 tablet (20 mg total) by mouth daily For swelling 7/12/22  Yes KELSEY Gomez   lisinopril (ZESTRIL) 20 mg tablet TAKE 1 TABLET DAILY 10/20/22  Yes Lia Malone MD   metoprolol succinate (TOPROL-XL) 100 mg 24 hr tablet Take 1 tablet (100 mg total) by mouth daily 4/21/22  Yes Lia Malone MD   nystatin (MYCOSTATIN) powder Apply topically 2 (two) times a day 8/18/21  Yes Lia Malone MD   Potassium Citrate ER 15 MEQ (1620 MG) TBCR Take 1 tablet by mouth daily  12/4/18  Yes Historical Provider, MD   tamsulosin (FLOMAX) 0 4 mg 1-2 daily as directed 7/27/22  Yes Lia Malone MD   TRAVATAN Z 0 004 % ophthalmic solution Administer 1 drop to both eyes daily at bedtime  6/1/18  Yes Historical Provider, MD   ammonium lactate (LAC-HYDRIN) 12 % lotion Apply topically 2 (two) times a day  Patient not taking: Reported on 11/15/2022 7/19/21   Elizabeth Morillo, DO     I have reviewed home medications with patient personally  Allergies:    Allergies   Allergen Reactions   • Codeine Swelling     Other reaction(s): SWELLING OF FACE  Other reaction(s): SWELLING OF FACE  Other reaction(s): SWELLING OF FACE  Swollen eyes/swelling   • Seasonal Ic [Cholestatin] Allergic Rhinitis       Social History:     Social History     Substance and Sexual Activity   Alcohol Use No     Social History     Tobacco Use   Smoking Status Never Smoker   Smokeless Tobacco Never Used   Tobacco Comment    no exposure to passive smoke     Social History     Substance and Sexual Activity   Drug Use Not Currently       Family History:    Family History   Problem Relation Age of Onset   • Diabetes Father    • Prostate cancer Brother        Physical Exam:     Vitals:   Blood Pressure: 124/50 (11/15/22 2029)  Pulse: (!) 110 (11/15/22 2029)  Temperature: (!) 101 1 °F (38 4 °C) (11/15/22 2029)  Temp Source: Temporal (11/15/22 2029)  Respirations: 22 (11/15/22 2029)  Height: 5' 10" (177 8 cm) (11/15/22 2029)  Weight - Scale: (!) 154 kg (340 lb 2 7 oz) (11/15/22 2029)  SpO2: 96 % (11/15/22 2029)    Constitutional: Patient is oriented to person, place and time, no acute distress  HEENT:  Normocephalic, atraumatic  Cardiovascular: Normal S1S2, RRR, No murmurs/rubs/gallops appreciated  Pulmonary:  Bilateral air entry, No rhonchi/rales/wheezing appreciated  Abdominal: Soft, Bowel sounds present, abdominal surgical wound with dressing in place  Extremities:  Bilateral lower extremity lymphedema  Neurological:  Awake, alert    Additional Data:     Lab Results: I have personally reviewed pertinent reports  Results from last 7 days   Lab Units 11/15/22  1559   WBC Thousand/uL 9 65   HEMOGLOBIN g/dL 9 2*   HEMATOCRIT % 28 5*   PLATELETS Thousands/uL 377   NEUTROS PCT % 88*   LYMPHS PCT % 6*   MONOS PCT % 5   EOS PCT % 0     Results from last 7 days   Lab Units 11/15/22  1559   POTASSIUM mmol/L 4 2   CHLORIDE mmol/L 103   CO2 mmol/L 23   BUN mg/dL 26*   CREATININE mg/dL 2 49*   CALCIUM mg/dL 8 8   ALK PHOS U/L 76   ALT U/L 13   AST U/L 14     Results from last 7 days   Lab Units 11/15/22  1559   INR  1 48*       Imaging: I have personally reviewed pertinent reports  CT abdomen pelvis wo contrast    Result Date: 11/15/2022  Narrative: CT ABDOMEN AND PELVIS WITHOUT IV CONTRAST INDICATION:   Sepsis sepsis, abd wound with wound vac  COMPARISON:  9/21/2022  TECHNIQUE:  CT examination of the abdomen and pelvis was performed without intravenous contrast  Axial, sagittal, and coronal 2D reformatted images were created from the source data and submitted for interpretation  Radiation dose length product (DLP) for this visit:  3168 mGy-cm     This examination, like all CT scans performed in the [facility], was performed utilizing techniques to minimize radiation dose exposure, including the use of iterative reconstruction and automated exposure control  Enteric contrast was administered  FINDINGS: ABDOMEN LOWER CHEST:  Clear lung bases  LIVER/BILIARY TREE:  Unremarkable  GALLBLADDER:  Distended gallbladder  Punctate gallstones layering in the dependent portion of the fundus and neck  No evidence of acute cholecystitis  SPLEEN:  Low-attenuation 2 2 cm lesion could represent a cyst  PANCREAS:  Unremarkable  ADRENAL GLANDS:  Unremarkable  KIDNEYS/URETERS:  Mild right hydronephrosis  Obstructing 6 mm calculus in the proximal ureter at the L4 level  Nonobstructing 3 to 4 mm calculi in right renal calyces  Exophytic left renal 3 cm macroscopic fat-containing lesion, stable, suggesting an angiomyolipoma STOMACH AND BOWEL:  Diverticulosis without evidence of diverticulitis or colitis  APPENDIX:  No findings to suggest appendicitis  ABDOMINOPELVIC CAVITY:  No ascites  No pneumoperitoneum  No lymphadenopathy  VESSELS:  No abdominal aortic aneurysm  PELVIS REPRODUCTIVE ORGANS:  Mild prostate enlargement  URINARY BLADDER:  Unremarkable  ABDOMINAL WALL/INGUINAL REGIONS:  Extensive subcutaneous fat stranding within the dependent portion of the pannus  No gas or fluid collection  OSSEOUS STRUCTURES:  No acute fracture or destructive osseous lesion  Impression: 1  Mild right hydronephrosis  Obstructing 6 mm calculus in the ureter at the L4 level  Pyelonephritis not excluded  2   Subcutaneous fat stranding within the dependent portion of the abdominal pannus may represent edema or cellulitis  No evidence of gas or fluid collection  Workstation performed: [id number]     Allscripts / Epic Records Reviewed: Yes     ** Please Note: This note has been constructed using a voice recognition system   **

## 2022-11-17 ENCOUNTER — TELEPHONE (OUTPATIENT)
Dept: UROLOGY | Facility: CLINIC | Age: 67
End: 2022-11-17

## 2022-11-17 LAB
ANION GAP SERPL CALCULATED.3IONS-SCNC: 7 MMOL/L (ref 4–13)
BASOPHILS # BLD AUTO: 0.01 THOUSANDS/ÂΜL (ref 0–0.1)
BASOPHILS NFR BLD AUTO: 0 % (ref 0–1)
BUN SERPL-MCNC: 27 MG/DL (ref 5–25)
CALCIUM SERPL-MCNC: 8.7 MG/DL (ref 8.3–10.1)
CHLORIDE SERPL-SCNC: 109 MMOL/L (ref 96–108)
CO2 SERPL-SCNC: 25 MMOL/L (ref 21–32)
CREAT SERPL-MCNC: 1.67 MG/DL (ref 0.6–1.3)
EOSINOPHIL # BLD AUTO: 0 THOUSAND/ÂΜL (ref 0–0.61)
EOSINOPHIL NFR BLD AUTO: 0 % (ref 0–6)
ERYTHROCYTE [DISTWIDTH] IN BLOOD BY AUTOMATED COUNT: 14.5 % (ref 11.6–15.1)
GFR SERPL CREATININE-BSD FRML MDRD: 42 ML/MIN/1.73SQ M
GLUCOSE SERPL-MCNC: 112 MG/DL (ref 65–140)
HCT VFR BLD AUTO: 26.9 % (ref 36.5–49.3)
HGB BLD-MCNC: 8.3 G/DL (ref 12–17)
IMM GRANULOCYTES # BLD AUTO: 0.06 THOUSAND/UL (ref 0–0.2)
IMM GRANULOCYTES NFR BLD AUTO: 1 % (ref 0–2)
LYMPHOCYTES # BLD AUTO: 1.18 THOUSANDS/ÂΜL (ref 0.6–4.47)
LYMPHOCYTES NFR BLD AUTO: 13 % (ref 14–44)
MAGNESIUM SERPL-MCNC: 1.9 MG/DL (ref 1.6–2.6)
MCH RBC QN AUTO: 29.5 PG (ref 26.8–34.3)
MCHC RBC AUTO-ENTMCNC: 30.9 G/DL (ref 31.4–37.4)
MCV RBC AUTO: 96 FL (ref 82–98)
MONOCYTES # BLD AUTO: 0.81 THOUSAND/ÂΜL (ref 0.17–1.22)
MONOCYTES NFR BLD AUTO: 9 % (ref 4–12)
NEUTROPHILS # BLD AUTO: 7.06 THOUSANDS/ÂΜL (ref 1.85–7.62)
NEUTS SEG NFR BLD AUTO: 77 % (ref 43–75)
NRBC BLD AUTO-RTO: 0 /100 WBCS
PLATELET # BLD AUTO: 331 THOUSANDS/UL (ref 149–390)
PMV BLD AUTO: 8.5 FL (ref 8.9–12.7)
POTASSIUM SERPL-SCNC: 4.3 MMOL/L (ref 3.5–5.3)
RBC # BLD AUTO: 2.81 MILLION/UL (ref 3.88–5.62)
SODIUM SERPL-SCNC: 141 MMOL/L (ref 135–147)
WBC # BLD AUTO: 9.12 THOUSAND/UL (ref 4.31–10.16)

## 2022-11-17 RX ADMIN — SODIUM CHLORIDE 75 ML/HR: 0.9 INJECTION, SOLUTION INTRAVENOUS at 00:19

## 2022-11-17 RX ADMIN — ALLOPURINOL 100 MG: 100 TABLET ORAL at 09:04

## 2022-11-17 RX ADMIN — APIXABAN 5 MG: 5 TABLET, FILM COATED ORAL at 09:04

## 2022-11-17 RX ADMIN — ACETAMINOPHEN 650 MG: 325 TABLET ORAL at 09:07

## 2022-11-17 RX ADMIN — METOPROLOL SUCCINATE 100 MG: 100 TABLET, EXTENDED RELEASE ORAL at 09:04

## 2022-11-17 RX ADMIN — TAMSULOSIN HYDROCHLORIDE 0.4 MG: 0.4 CAPSULE ORAL at 15:34

## 2022-11-17 RX ADMIN — APIXABAN 5 MG: 5 TABLET, FILM COATED ORAL at 17:12

## 2022-11-17 RX ADMIN — CEFTRIAXONE SODIUM 1000 MG: 10 INJECTION, POWDER, FOR SOLUTION INTRAVENOUS at 15:35

## 2022-11-17 RX ADMIN — TRAVOPROST 1 DROP: 0.04 SOLUTION OPHTHALMIC at 21:14

## 2022-11-17 NOTE — PROGRESS NOTES
[address] [place] [address]  Progress Note - Kathryn Sol 1955, 78 y o  male MRN: [id number]  Unit/Bed#: E4 -01 Encounter: [id number]  Primary Care Provider: Ca Arevalo MD   Date and time admitted to hospital: 11/15/2022  3:45 PM    * Severe sepsis [facility])  Assessment & Plan  15-year-old male with history of hypertension, obesity, CKD presenting with fevers, generalized weakness and chills  Etiology possibly due to clinical pyelonephritis as a result of his obstructing stone  Surgery has no suspicion for infection at his previous surgical site  · Continue ceftriaxone, await cultures  · Urology recommendations appreciated  S/p cystoscopy left ureteral stent insertion  Trial of void attempted today  Urinary retention protocol    Surgical wound present  Assessment & Plan  · Patient has history of strangulated hernia abdominal wall status post exploratory laparotomy and repair of right lower quadrant hernia with mesh and right lower quadrant hernia repair on 09/14/2022, with wound VAC in place  · Surgery consultation appreciated  · No indication for acute surgical intervention or concern for infection  · Wound VAC was removed plan for placement of wound VAC tomorrow    Pulmonary embolism ([facility] [address] )  Assessment & Plan  Continue Eliquis    Class 3 severe obesity due to excess calories with serious comorbidity and body mass index (BMI) of 50 0 to 59 9 in adult [facility])  Assessment & Plan  · Encouraged lifestyle modifications    Acute kidney injury superimposed on chronic kidney disease [facility])  Assessment & Plan  Lab Results   Component Value Date    EGFR 42 11/17/2022    EGFR 32 11/16/2022    EGFR 25 11/15/2022    CREATININE 1 67 (H) 11/17/2022    CREATININE 2 08 (H) 11/16/2022    CREATININE 2 49 (H) 11/15/2022     · Baseline creatinine 1 4-1 7  · Etiology: Pre-renal / post-obstructive pathology  · Status: Resolved  Back to baseline  Discontinue fluids  Restart diuretics benito    · Hold ACE-inhibitor      Essential hypertension  Assessment & Plan  · Continue metoprolol with hold parameters  · Hold furosemide and lisinopril given acute kidney injury      VTE Pharmacologic Prophylaxis:   Pharmacologic: Apixaban (Eliquis)  Mechanical VTE Prophylaxis in Place: Yes    Discussions with Specialists or Other Care Team Provider: nursing    Education and Discussions with Family / Patient: patient    Time Spent for Care: 30 minutes  More than 50% of total time spent on counseling and coordination of care as described above  Current Length of Stay: 2 day(s)    Current Patient Status: Inpatient   Certification Statement: The patient will continue to require additional inpatient hospital stay due to iv antibiotics, await cultures    Discharge Plan: active    Code Status: Level 1 - Full Code      Subjective:   Patient seen and examined at bedside  No new issues overnight  Objective:     Vitals:   Temp (24hrs), Av 7 °F (36 5 °C), Min:97 °F (36 1 °C), Max:98 3 °F (36 8 °C)    Temp:  [97 °F (36 1 °C)-98 3 °F (36 8 °C)] 97 °F (36 1 °C)  HR:  [66-74] 73  Resp:  [18-20] 18  BP: (113-132)/(56-62) 132/62  SpO2:  [95 %-98 %] 97 %  Body mass index is 48 81 kg/m²  Input and Output Summary (last 24 hours): Intake/Output Summary (Last 24 hours) at 2022 1205  Last data filed at 2022 0601  Gross per 24 hour   Intake 2725 ml   Output 1400 ml   Net 1325 ml       Physical Exam:     Physical Exam  Vitals reviewed  Constitutional:       General: He is not in acute distress  HENT:      Head: Normocephalic  Nose: Nose normal       Mouth/Throat:      Mouth: Mucous membranes are moist    Eyes:      General: No scleral icterus  Cardiovascular:      Rate and Rhythm: Normal rate  Pulmonary:      Effort: Pulmonary effort is normal  No respiratory distress  Abdominal:      General: There is no distension  Palpations: Abdomen is soft        Comments: Wound vac   Skin:     General: Skin is warm    Neurological:      Mental Status: He is alert  Mental status is at baseline  Psychiatric:         Mood and Affect: Mood normal          Behavior: Behavior normal        Additional Data:     Labs:    Results from last 7 days   Lab Units 11/17/22  0548   WBC Thousand/uL 9 12   HEMOGLOBIN g/dL 8 3*   HEMATOCRIT % 26 9*   PLATELETS Thousands/uL 331   NEUTROS PCT % 77*   LYMPHS PCT % 13*   MONOS PCT % 9   EOS PCT % 0     Results from last 7 days   Lab Units 11/17/22  0548 11/16/22  0453   SODIUM mmol/L 141 139   POTASSIUM mmol/L 4 3 4 5   CHLORIDE mmol/L 109* 106   CO2 mmol/L 25 24   BUN mg/dL 27* 22   CREATININE mg/dL 1 67* 2 08*   ANION GAP mmol/L 7 9   CALCIUM mg/dL 8 7 8 4   ALBUMIN g/dL  --  2 1*   TOTAL BILIRUBIN mg/dL  --  0 41   ALK PHOS U/L  --  67   ALT U/L  --  8*   AST U/L  --  11   GLUCOSE RANDOM mg/dL 112 139     Results from last 7 days   Lab Units 11/15/22  1559   INR  1 48*             Results from last 7 days   Lab Units 11/15/22  1842 11/15/22  1559   LACTIC ACID mmol/L 1 1 2 5*   PROCALCITONIN ng/ml  --  0 15           * I Have Reviewed All Lab Data Listed Above  * Additional Pertinent Lab Tests Reviewed: Marcelina 66 Admission Reviewed      Lines:  Invasive Devices     Peripheral Intravenous Line  Duration           Peripheral IV 11/15/22 Left Antecubital 1 day    Peripheral IV 11/16/22 Right;Ventral (anterior) Forearm <1 day          Drain  Duration           Ureteral Internal Stent Right ureter 6 Fr  1 day               Imaging:    Imaging Reports Reviewed Today Include: no new imaging    Recent Cultures (last 7 days):     Results from last 7 days   Lab Units 11/15/22  2218 11/15/22  1559   BLOOD CULTURE   --  Streptococcus agalactiae (Group B)*  No Growth at 24 hrs     GRAM STAIN RESULT   --  Gram positive cocci in pairs and chains*   URINE CULTURE  No Growth <100 cfu/mL  --        Last 24 Hours Medication List:   Current Facility-Administered Medications   Medication Dose Route Frequency Provider Last Rate   • acetaminophen  650 mg Oral Q6H PRN Nikhil Mcpherson MD     • allopurinol  100 mg Oral Daily Nikhil Mcpherson MD     • apixaban  5 mg Oral BID Nikhil Mcpherson MD     • cefTRIAXone  1,000 mg Intravenous Q24H Nikhil Mcpherson MD Stopped (11/16/22 1800)   • metoprolol succinate  100 mg Oral Daily Nikhil Mcpherson MD     • tamsulosin  0 4 mg Oral Daily With [place] Guevara MD     • travoprost  1 drop Both Eyes HS Nikhil Mcpherson MD          Today, Patient Was Seen By: Bradford Gilbert MD    ** Please Note: Dictation voice to text software may have been used in the creation of this document   **

## 2022-11-17 NOTE — ASSESSMENT & PLAN NOTE
55-year-old male with history of hypertension, obesity, CKD presenting with fevers, generalized weakness and chills  Etiology possibly due to clinical pyelonephritis as a result of his obstructing stone  Surgery has no suspicion for infection at his previous surgical site  · Continue ceftriaxone, await cultures  · Urology recommendations appreciated  S/p cystoscopy left ureteral stent insertion  Trial of void attempted today   Urinary retention protocol

## 2022-11-17 NOTE — PLAN OF CARE
Problem: PAIN - ADULT  Goal: Verbalizes/displays adequate comfort level or baseline comfort level  Description: Interventions:  - Encourage patient to monitor pain and request assistance  - Assess pain using appropriate pain scale  - Administer analgesics based on type and severity of pain and evaluate response  - Implement non-pharmacological measures as appropriate and evaluate response  - Consider cultural and social influences on pain and pain management  - Notify physician/advanced practitioner if interventions unsuccessful or patient reports new pain  Outcome: Progressing     Problem: INFECTION - ADULT  Goal: Absence or prevention of progression during hospitalization  Description: INTERVENTIONS:  - Assess and monitor for signs and symptoms of infection  - Monitor lab/diagnostic results  - Monitor all insertion sites, i e  indwelling lines, tubes, and drains  - Monitor endotracheal if appropriate and nasal secretions for changes in amount and color  - Cord appropriate cooling/warming therapies per order  - Administer medications as ordered  - Instruct and encourage patient and family to use good hand hygiene technique  - Identify and instruct in appropriate isolation precautions for identified infection/condition  Outcome: Progressing     Problem: SAFETY ADULT  Goal: Patient will remain free of falls  Description: INTERVENTIONS:  - Educate patient/family on patient safety including physical limitations  - Instruct patient to call for assistance with activity   - Consult OT/PT to assist with strengthening/mobility   - Keep Call bell within reach  - Keep bed low and locked with side rails adjusted as appropriate  - Keep care items and personal belongings within reach  - Initiate and maintain comfort rounds  - Make Fall Risk Sign visible to staff  - Offer Toileting every 2 Hours, in advance of need  - Initiate/Maintain bed alarm  - Obtain necessary fall risk management equipment: bed alarm  - Apply yellow socks and bracelet for high fall risk patients  - Consider moving patient to room near nurses station  Outcome: Progressing  Goal: Maintain or return to baseline ADL function  Description: INTERVENTIONS:  -  Assess patient's ability to carry out ADLs; assess patient's baseline for ADL function and identify physical deficits which impact ability to perform ADLs (bathing, care of mouth/teeth, toileting, grooming, dressing, etc )  - Assess/evaluate cause of self-care deficits   - Assess range of motion  - Assess patient's mobility; develop plan if impaired  - Assess patient's need for assistive devices and provide as appropriate  - Encourage maximum independence but intervene and supervise when necessary  - Involve family in performance of ADLs  - Assess for home care needs following discharge   - Consider OT consult to assist with ADL evaluation and planning for discharge  - Provide patient education as appropriate  Outcome: Progressing  Goal: Maintains/Returns to pre admission functional level  Description: INTERVENTIONS:  - Perform BMAT or MOVE assessment daily    - Set and communicate daily mobility goal to care team and patient/family/caregiver     - Collaborate with rehabilitation services on mobility goals if consulted  - Dangle patient 3 times a day  - Stand patient 3 times a day  - Ambulate patient 3 times a day  - Out of bed to chair 3 times a day   - Out of bed for meals 3 times a day  - Out of bed for toileting  - Record patient progress and toleration of activity level   Outcome: Progressing     Problem: DISCHARGE PLANNING  Goal: Discharge to home or other facility with appropriate resources  Description: INTERVENTIONS:  - Identify barriers to discharge w/patient and caregiver  - Arrange for needed discharge resources and transportation as appropriate  - Identify discharge learning needs (meds, wound care, etc )  - Arrange for interpretive services to assist at discharge as needed  - Refer to Case Management Department for coordinating discharge planning if the patient needs post-hospital services based on physician/advanced practitioner order or complex needs related to functional status, cognitive ability, or social support system  Outcome: Progressing     Problem: Knowledge Deficit  Goal: Patient/family/caregiver demonstrates understanding of disease process, treatment plan, medications, and discharge instructions  Description: Complete learning assessment and assess knowledge base    Interventions:  - Provide teaching at level of understanding  - Provide teaching via preferred learning methods  Outcome: Progressing     Problem: RESPIRATORY - ADULT  Goal: Achieves optimal ventilation and oxygenation  Description: INTERVENTIONS:  - Assess for changes in respiratory status  - Assess for changes in mentation and behavior  - Position to facilitate oxygenation and minimize respiratory effort  - Oxygen administered by appropriate delivery if ordered  - Initiate smoking cessation education as indicated  - Encourage broncho-pulmonary hygiene including cough, deep breathe, Incentive Spirometry  - Assess the need for suctioning and aspirate as needed  - Assess and instruct to report SOB or any respiratory difficulty  - Respiratory Therapy support as indicated  Outcome: Progressing     Problem: GENITOURINARY - ADULT  Goal: Maintains or returns to baseline urinary function  Description: INTERVENTIONS:  - Assess urinary function  - Encourage oral fluids to ensure adequate hydration if ordered  - Administer IV fluids as ordered to ensure adequate hydration  - Administer ordered medications as needed  - Offer frequent toileting  - Follow urinary retention protocol if ordered  Outcome: Progressing  Goal: Absence of urinary retention  Description: INTERVENTIONS:  - Assess patient’s ability to void and empty bladder  - Monitor I/O  - Bladder scan as needed  - Discuss with physician/AP medications to alleviate retention as needed  - Discuss catheterization for long term situations as appropriate  Outcome: Progressing  Goal: Urinary catheter remains patent  Description: INTERVENTIONS:  - Assess patency of urinary catheter  - If patient has a chronic jenkins, consider changing catheter if non-functioning  - Follow guidelines for intermittent irrigation of non-functioning urinary catheter  Outcome: Progressing     Problem: METABOLIC, FLUID AND ELECTROLYTES - ADULT  Goal: Electrolytes maintained within normal limits  Description: INTERVENTIONS:  - Monitor labs and assess patient for signs and symptoms of electrolyte imbalances  - Administer electrolyte replacement as ordered  - Monitor response to electrolyte replacements, including repeat lab results as appropriate  - Instruct patient on fluid and nutrition as appropriate  Outcome: Progressing     Problem: SKIN/TISSUE INTEGRITY - ADULT  Goal: Skin Integrity remains intact(Skin Breakdown Prevention)  Description: Assess:  -Perform Mic assessment every shift  -Clean and moisturize skin every shift  -Inspect skin when repositioning, toileting, and assisting with ADLS  -Assess under medical devices such as IV every shift  -Assess extremities for adequate circulation and sensation     Bed Management:  -Have minimal linens on bed & keep smooth, unwrinkled  -Change linens as needed when moist or perspiring  -Avoid sitting or lying in one position for more than 2 hours while in bed    Toileting:  -Offer bedside commode  -Assess for incontinence every 2 hours    Activity:  -Mobilize patient 3 times a day  -Encourage activity and walks on unit  -Encourage or provide ROM exercises   -Turn and reposition patient every 2 Hours  -Use appropriate equipment to lift or move patient in bed  -Instruct/ Assist with weight shifting every 2 hours when out of bed in chair  -Consider limitation of chair time 2 hour intervals  Outcome: Progressing  Goal: Incision(s), wounds(s) or drain site(s) healing without S/S of infection  Description: INTERVENTIONS  - Assess and document dressing, incision, wound bed, drain sites and surrounding tissue  - Provide patient and family education  - Perform skin care/dressing changes every day  Outcome: Progressing     Problem: MUSCULOSKELETAL - ADULT  Goal: Maintain or return mobility to safest level of function  Description: INTERVENTIONS:  - Assess patient's ability to carry out ADLs; assess patient's baseline for ADL function and identify physical deficits which impact ability to perform ADLs (bathing, care of mouth/teeth, toileting, grooming, dressing, etc )  - Assess/evaluate cause of self-care deficits   - Assess range of motion  - Assess patient's mobility  - Assess patient's need for assistive devices and provide as appropriate  - Encourage maximum independence but intervene and supervise when necessary  - Involve family in performance of ADLs  - Assess for home care needs following discharge   - Consider OT consult to assist with ADL evaluation and planning for discharge  - Provide patient education as appropriate  Outcome: Progressing     Problem: MOBILITY - ADULT  Goal: Maintain or return to baseline ADL function  Description: INTERVENTIONS:  -  Assess patient's ability to carry out ADLs; assess patient's baseline for ADL function and identify physical deficits which impact ability to perform ADLs (bathing, care of mouth/teeth, toileting, grooming, dressing, etc )  - Assess/evaluate cause of self-care deficits   - Assess range of motion  - Assess patient's mobility; develop plan if impaired  - Assess patient's need for assistive devices and provide as appropriate  - Encourage maximum independence but intervene and supervise when necessary  - Involve family in performance of ADLs  - Assess for home care needs following discharge   - Consider OT consult to assist with ADL evaluation and planning for discharge  - Provide patient education as appropriate  Outcome: Progressing  Goal: Maintains/Returns to pre admission functional level  Description: INTERVENTIONS:  - Perform BMAT or MOVE assessment daily    - Set and communicate daily mobility goal to care team and patient/family/caregiver     - Collaborate with rehabilitation services on mobility goals if consulted  - Dangle patient 3 times a day  - Stand patient 3 times a day  - Ambulate patient 3 times a day  - Out of bed to chair 3 times a day   - Out of bed for meals 3 times a day  - Out of bed for toileting  - Record patient progress and toleration of activity level   Outcome: Progressing     Problem: Prexisting or High Potential for Compromised Skin Integrity  Goal: Skin integrity is maintained or improved  Description: INTERVENTIONS:  - Identify patients at risk for skin breakdown  - Assess and monitor skin integrity  - Assess and monitor nutrition and hydration status  - Monitor labs   - Assist with mobility/ambulation  - Avoid friction and shearing  - Provide appropriate hygiene as needed including keeping skin clean and dry  - Evaluate need for skin moisturizer/barrier cream  - Collaborate with interdisciplinary team   - Patient/family teaching  Outcome: Progressing

## 2022-11-17 NOTE — ASSESSMENT & PLAN NOTE
Lab Results   Component Value Date    EGFR 42 11/17/2022    EGFR 32 11/16/2022    EGFR 25 11/15/2022    CREATININE 1 67 (H) 11/17/2022    CREATININE 2 08 (H) 11/16/2022    CREATININE 2 49 (H) 11/15/2022     · Baseline creatinine 1 4-1 7  · Etiology: Pre-renal / post-obstructive pathology  · Status: Resolved  Back to baseline  Discontinue fluids  Restart diuretics benito    · Hold ACE-inhibitor

## 2022-11-17 NOTE — TELEPHONE ENCOUNTER
Dylan Jaeger MD  You 2 days ago     Prior urologic care [address] may prefer surgery there  Navarro Graham confirm with him prior to scheduling with us

## 2022-11-17 NOTE — PLAN OF CARE
Problem: PAIN - ADULT  Goal: Verbalizes/displays adequate comfort level or baseline comfort level  Description: Interventions:  - Encourage patient to monitor pain and request assistance  - Assess pain using appropriate pain scale  - Administer analgesics based on type and severity of pain and evaluate response  - Implement non-pharmacological measures as appropriate and evaluate response  - Consider cultural and social influences on pain and pain management  - Notify physician/advanced practitioner if interventions unsuccessful or patient reports new pain  Outcome: Progressing     Problem: INFECTION - ADULT  Goal: Absence or prevention of progression during hospitalization  Description: INTERVENTIONS:  - Assess and monitor for signs and symptoms of infection  - Monitor lab/diagnostic results  - Monitor all insertion sites, i e  indwelling lines, tubes, and drains  - Monitor endotracheal if appropriate and nasal secretions for changes in amount and color  - [place] appropriate cooling/warming therapies per order  - Administer medications as ordered  - Instruct and encourage patient and family to use good hand hygiene technique  - Identify and instruct in appropriate isolation precautions for identified infection/condition  Outcome: Progressing     Problem: SAFETY ADULT  Goal: Patient will remain free of falls  Description: INTERVENTIONS:  - Educate patient/family on patient safety including physical limitations  - Instruct patient to call for assistance with activity   - Consult OT/PT to assist with strengthening/mobility   - Keep Call bell within reach  - Keep bed low and locked with side rails adjusted as appropriate  - Keep care items and personal belongings within reach  - Initiate and maintain comfort rounds  - Make Fall Risk Sign visible to staff  - Offer Toileting every 2 Hours, in advance of need  - Initiate/Maintain bed alarm  - Obtain necessary fall risk management equipment: bed alarm  - Apply yellow socks and bracelet for high fall risk patients  - Consider moving patient to room near nurses station  Outcome: Progressing  Goal: Maintain or return to baseline ADL function  Description: INTERVENTIONS:  -  Assess patient's ability to carry out ADLs; assess patient's baseline for ADL function and identify physical deficits which impact ability to perform ADLs (bathing, care of mouth/teeth, toileting, grooming, dressing, etc )  - Assess/evaluate cause of self-care deficits   - Assess range of motion  - Assess patient's mobility; develop plan if impaired  - Assess patient's need for assistive devices and provide as appropriate  - Encourage maximum independence but intervene and supervise when necessary  - Involve family in performance of ADLs  - Assess for home care needs following discharge   - Consider OT consult to assist with ADL evaluation and planning for discharge  - Provide patient education as appropriate  Outcome: Progressing  Goal: Maintains/Returns to pre admission functional level  Description: INTERVENTIONS:  - Perform BMAT or MOVE assessment daily    - Set and communicate daily mobility goal to care team and patient/family/caregiver     - Collaborate with rehabilitation services on mobility goals if consulted  - Dangle patient 3 times a day  - Stand patient 3 times a day  - Ambulate patient 3 times a day  - Out of bed to chair 3 times a day   - Out of bed for meals 3 times a day  - Out of bed for toileting  - Record patient progress and toleration of activity level   Outcome: Progressing     Problem: DISCHARGE PLANNING  Goal: Discharge to home or other facility with appropriate resources  Description: INTERVENTIONS:  - Identify barriers to discharge w/patient and caregiver  - Arrange for needed discharge resources and transportation as appropriate  - Identify discharge learning needs (meds, wound care, etc )  - Arrange for interpretive services to assist at discharge as needed  - Refer to Case Management Department for coordinating discharge planning if the patient needs post-hospital services based on physician/advanced practitioner order or complex needs related to functional status, cognitive ability, or social support system  Outcome: Progressing     Problem: Knowledge Deficit  Goal: Patient/family/caregiver demonstrates understanding of disease process, treatment plan, medications, and discharge instructions  Description: Complete learning assessment and assess knowledge base    Interventions:  - Provide teaching at level of understanding  - Provide teaching via preferred learning methods  Outcome: Progressing     Problem: RESPIRATORY - ADULT  Goal: Achieves optimal ventilation and oxygenation  Description: INTERVENTIONS:  - Assess for changes in respiratory status  - Assess for changes in mentation and behavior  - Position to facilitate oxygenation and minimize respiratory effort  - Oxygen administered by appropriate delivery if ordered  - Initiate smoking cessation education as indicated  - Encourage broncho-pulmonary hygiene including cough, deep breathe, Incentive Spirometry  - Assess the need for suctioning and aspirate as needed  - Assess and instruct to report SOB or any respiratory difficulty  - Respiratory Therapy support as indicated  Outcome: Progressing     Problem: GENITOURINARY - ADULT  Goal: Maintains or returns to baseline urinary function  Description: INTERVENTIONS:  - Assess urinary function  - Encourage oral fluids to ensure adequate hydration if ordered  - Administer IV fluids as ordered to ensure adequate hydration  - Administer ordered medications as needed  - Offer frequent toileting  - Follow urinary retention protocol if ordered  Outcome: Progressing  Goal: Absence of urinary retention  Description: INTERVENTIONS:  - Assess patient’s ability to void and empty bladder  - Monitor I/O  - Bladder scan as needed  - Discuss with physician/AP medications to alleviate retention as needed  - Discuss catheterization for long term situations as appropriate  Outcome: Progressing  Goal: Urinary catheter remains patent  Description: INTERVENTIONS:  - Assess patency of urinary catheter  - If patient has a chronic jenkins, consider changing catheter if non-functioning  - Follow guidelines for intermittent irrigation of non-functioning urinary catheter  Outcome: Progressing     Problem: METABOLIC, FLUID AND ELECTROLYTES - ADULT  Goal: Electrolytes maintained within normal limits  Description: INTERVENTIONS:  - Monitor labs and assess patient for signs and symptoms of electrolyte imbalances  - Administer electrolyte replacement as ordered  - Monitor response to electrolyte replacements, including repeat lab results as appropriate  - Instruct patient on fluid and nutrition as appropriate  Outcome: Progressing     Problem: SKIN/TISSUE INTEGRITY - ADULT  Goal: Skin Integrity remains intact(Skin Breakdown Prevention)  Description: Assess:  -Perform Mic assessment every shift  -Clean and moisturize skin every day  -Inspect skin when repositioning, toileting, and assisting with ADLS  -Assess extremities for adequate circulation and sensation     Bed Management:  -Have minimal linens on bed & keep smooth, unwrinkled  -Change linens as needed when moist or perspiring  -Avoid sitting or lying in one position for more than 2 hours while in bed    Activity:  -Mobilize patient 3 times a day  -Encourage activity and walks on unit  -Instruct/ Assist with weight shifting every 2 hours when out of bed in chair  Outcome: Progressing  Goal: Incision(s), wounds(s) or drain site(s) healing without S/S of infection  Description: INTERVENTIONS  - Assess and document dressing, incision, wound bed, drain sites and surrounding tissue  - Provide patient and family education  - Perform skin care/dressing changes every other day  Outcome: Progressing     Problem: MUSCULOSKELETAL - ADULT  Goal: Maintain or return mobility to safest level of function  Description: INTERVENTIONS:  - Assess patient's ability to carry out ADLs; assess patient's baseline for ADL function and identify physical deficits which impact ability to perform ADLs (bathing, care of mouth/teeth, toileting, grooming, dressing, etc )  - Assess/evaluate cause of self-care deficits   - Assess range of motion  - Assess patient's mobility  - Assess patient's need for assistive devices and provide as appropriate  - Encourage maximum independence but intervene and supervise when necessary  - Involve family in performance of ADLs  - Assess for home care needs following discharge   - Consider OT consult to assist with ADL evaluation and planning for discharge  - Provide patient education as appropriate  Outcome: Progressing     Problem: MOBILITY - ADULT  Goal: Maintain or return to baseline ADL function  Description: INTERVENTIONS:  -  Assess patient's ability to carry out ADLs; assess patient's baseline for ADL function and identify physical deficits which impact ability to perform ADLs (bathing, care of mouth/teeth, toileting, grooming, dressing, etc )  - Assess/evaluate cause of self-care deficits   - Assess range of motion  - Assess patient's mobility; develop plan if impaired  - Assess patient's need for assistive devices and provide as appropriate  - Encourage maximum independence but intervene and supervise when necessary  - Involve family in performance of ADLs  - Assess for home care needs following discharge   - Consider OT consult to assist with ADL evaluation and planning for discharge  - Provide patient education as appropriate  Outcome: Progressing  Goal: Maintains/Returns to pre admission functional level  Description: INTERVENTIONS:  - Perform BMAT or MOVE assessment daily    - Set and communicate daily mobility goal to care team and patient/family/caregiver     - Collaborate with rehabilitation services on mobility goals if consulted  - Dangle patient 3 times a day  - Stand patient 3 times a day  - Ambulate patient 3 times a day  - Out of bed to chair 3 times a day   - Out of bed for meals 3 times a day  - Out of bed for toileting  - Record patient progress and toleration of activity level   Outcome: Progressing     Problem: Prexisting or High Potential for Compromised Skin Integrity  Goal: Skin integrity is maintained or improved  Description: INTERVENTIONS:  - Identify patients at risk for skin breakdown  - Assess and monitor skin integrity  - Assess and monitor nutrition and hydration status  - Monitor labs   - Assess for incontinence   - Turn and reposition patient  - Assist with mobility/ambulation  - Relieve pressure over bony prominences  - Avoid friction and shearing  - Provide appropriate hygiene as needed including keeping skin clean and dry  - Evaluate need for skin moisturizer/barrier cream  - Collaborate with interdisciplinary team   - Patient/family teaching  - Consider wound care consult   Outcome: Progressing     Problem: Potential for Falls  Goal: Patient will remain free of falls  Description: INTERVENTIONS:  - Educate patient/family on patient safety including physical limitations  - Instruct patient to call for assistance with activity   - Consult OT/PT to assist with strengthening/mobility   - Keep Call bell within reach  - Keep bed low and locked with side rails adjusted as appropriate  - Keep care items and personal belongings within reach  - Initiate and maintain comfort rounds  - Make Fall Risk Sign visible to staff  - Offer Toileting every 2 Hours, in advance of need  - Initiate/Maintain bed alarm  - Obtain necessary fall risk management equipment: bed alarm  - Apply yellow socks and bracelet for high fall risk patients  - Consider moving patient to room near nurses station  Outcome: Progressing

## 2022-11-18 ENCOUNTER — APPOINTMENT (INPATIENT)
Dept: NON INVASIVE DIAGNOSTICS | Facility: HOSPITAL | Age: 67
End: 2022-11-18

## 2022-11-18 LAB
ANION GAP SERPL CALCULATED.3IONS-SCNC: 8 MMOL/L (ref 4–13)
BACTERIA BLD CULT: ABNORMAL
BACTERIA UR CULT: NORMAL
BASOPHILS # BLD AUTO: 0.03 THOUSANDS/ÂΜL (ref 0–0.1)
BASOPHILS NFR BLD AUTO: 0 % (ref 0–1)
BUN SERPL-MCNC: 22 MG/DL (ref 5–25)
CALCIUM SERPL-MCNC: 8.8 MG/DL (ref 8.3–10.1)
CHLORIDE SERPL-SCNC: 107 MMOL/L (ref 96–108)
CO2 SERPL-SCNC: 25 MMOL/L (ref 21–32)
CREAT SERPL-MCNC: 1.52 MG/DL (ref 0.6–1.3)
EOSINOPHIL # BLD AUTO: 0.07 THOUSAND/ÂΜL (ref 0–0.61)
EOSINOPHIL NFR BLD AUTO: 1 % (ref 0–6)
ERYTHROCYTE [DISTWIDTH] IN BLOOD BY AUTOMATED COUNT: 14.6 % (ref 11.6–15.1)
GFR SERPL CREATININE-BSD FRML MDRD: 47 ML/MIN/1.73SQ M
GLUCOSE SERPL-MCNC: 102 MG/DL (ref 65–140)
GP B STREP DNA BLD POS QL NAA+NON-PROBE: DETECTED
GRAM STN SPEC: ABNORMAL
HCT VFR BLD AUTO: 28.5 % (ref 36.5–49.3)
HGB BLD-MCNC: 8.8 G/DL (ref 12–17)
IMM GRANULOCYTES # BLD AUTO: 0.04 THOUSAND/UL (ref 0–0.2)
IMM GRANULOCYTES NFR BLD AUTO: 1 % (ref 0–2)
LYMPHOCYTES # BLD AUTO: 1.99 THOUSANDS/ÂΜL (ref 0.6–4.47)
LYMPHOCYTES NFR BLD AUTO: 26 % (ref 14–44)
MAGNESIUM SERPL-MCNC: 1.6 MG/DL (ref 1.6–2.6)
MCH RBC QN AUTO: 29.7 PG (ref 26.8–34.3)
MCHC RBC AUTO-ENTMCNC: 30.9 G/DL (ref 31.4–37.4)
MCV RBC AUTO: 96 FL (ref 82–98)
MONOCYTES # BLD AUTO: 0.47 THOUSAND/ÂΜL (ref 0.17–1.22)
MONOCYTES NFR BLD AUTO: 6 % (ref 4–12)
NEUTROPHILS # BLD AUTO: 4.98 THOUSANDS/ÂΜL (ref 1.85–7.62)
NEUTS SEG NFR BLD AUTO: 66 % (ref 43–75)
NRBC BLD AUTO-RTO: 0 /100 WBCS
PLATELET # BLD AUTO: 316 THOUSANDS/UL (ref 149–390)
PMV BLD AUTO: 8.3 FL (ref 8.9–12.7)
POTASSIUM SERPL-SCNC: 3.9 MMOL/L (ref 3.5–5.3)
RBC # BLD AUTO: 2.96 MILLION/UL (ref 3.88–5.62)
SODIUM SERPL-SCNC: 140 MMOL/L (ref 135–147)
WBC # BLD AUTO: 7.58 THOUSAND/UL (ref 4.31–10.16)

## 2022-11-18 RX ADMIN — CEFTRIAXONE SODIUM 2000 MG: 10 INJECTION, POWDER, FOR SOLUTION INTRAVENOUS at 14:41

## 2022-11-18 RX ADMIN — APIXABAN 5 MG: 5 TABLET, FILM COATED ORAL at 17:20

## 2022-11-18 RX ADMIN — TAMSULOSIN HYDROCHLORIDE 0.4 MG: 0.4 CAPSULE ORAL at 17:20

## 2022-11-18 RX ADMIN — ALLOPURINOL 100 MG: 100 TABLET ORAL at 08:35

## 2022-11-18 RX ADMIN — APIXABAN 5 MG: 5 TABLET, FILM COATED ORAL at 08:35

## 2022-11-18 RX ADMIN — PERFLUTREN 0.8 ML/MIN: 6.52 INJECTION, SUSPENSION INTRAVENOUS at 15:43

## 2022-11-18 RX ADMIN — TRAVOPROST 1 DROP: 0.04 SOLUTION OPHTHALMIC at 21:39

## 2022-11-18 RX ADMIN — METOPROLOL SUCCINATE 100 MG: 100 TABLET, EXTENDED RELEASE ORAL at 08:35

## 2022-11-18 NOTE — ASSESSMENT & PLAN NOTE
50-year-old male with history of hypertension, obesity, CKD presenting with fevers, generalized weakness and chills  Etiology possibly due to clinical pyelonephritis as a result of his obstructing stone  Surgery has no suspicion for infection at his previous surgical site  · Urine culture negative  Blood culture 1/2 Strep agalactiae  Sensitive to ceftriaxone  ID consulted  TTE ordered to rule out endocarditis  · Urology recommendations appreciated  S/p cystoscopy left ureteral stent insertion  Successfully passed trial of void    · Repeat blood cultures pending

## 2022-11-18 NOTE — ASSESSMENT & PLAN NOTE
Lab Results   Component Value Date    EGFR 47 11/18/2022    EGFR 42 11/17/2022    EGFR 32 11/16/2022    CREATININE 1 52 (H) 11/18/2022    CREATININE 1 67 (H) 11/17/2022    CREATININE 2 08 (H) 11/16/2022     · Baseline creatinine 1 4-1 7  · Etiology: Pre-renal / post-obstructive pathology  · Status: Resolved  Back to baseline  · Hold ACE-inhibitor   Restart diuretics on discharge

## 2022-11-18 NOTE — PROCEDURES
Acute Care Surgery  Bedside V A C  Procedure Note    A timeout was performed with the patient's nurse, Monse BOYKIN, prior to beginning the dressing change  The nurse remained present to confirm the correct dressing counts on removal of the VAC dressing and was debriefed at the completion of the dressing change  Location of wound: abdomen    Dressings and Foam removed:   2 Dressings  3 Black Foam- 2 in larger and 1 in smaller wound  1 White Foam- in larger wound    Dimensions of wound:   8 cm x 3 cm x 1 cm  26 cm x 14 cm x 1 cm  Description of wound: On inspection of the wound today, healing well  There was no necrotic or nonviable tissue requiring debridement  Approximately 100% of the wound base is now pink and healthy and there is granulation tissue  Granulation tissue over mesh medially  The periwound skin remains clean and intact and unremarkable  VAC dressing application:  The periwound skin was cleaned and dried  2 total dressings, 3 black foam, 1 white foam were cut to size of the wound and placed into the wound  The dressings were then covered with VAC drape  The VAC was then set to 125 mmHg low Continuous suction  The patient tolerated the procedure well and there were no complications  The patient did not require any excisional debridement during today's dressing change  VAC settings:  125 mmHg  Continuous    Wound Images: Additional Notes: [facility] sticker placed over the dressing per protocol  The next [facility] dressing change will be planned for Monday, Nov 14  Home VAC is in the room for discharge       This dressing change took greater than 20 minutes to complete      Ignacio Hayes PA-C  11/18/2022 2:22 PM

## 2022-11-18 NOTE — PLAN OF CARE
Problem: PAIN - ADULT  Goal: Verbalizes/displays adequate comfort level or baseline comfort level  Description: Interventions:  - Encourage patient to monitor pain and request assistance  - Assess pain using appropriate pain scale  - Administer analgesics based on type and severity of pain and evaluate response  - Implement non-pharmacological measures as appropriate and evaluate response  - Consider cultural and social influences on pain and pain management  - Notify physician/advanced practitioner if interventions unsuccessful or patient reports new pain  Outcome: Progressing     Problem: INFECTION - ADULT  Goal: Absence or prevention of progression during hospitalization  Description: INTERVENTIONS:  - Assess and monitor for signs and symptoms of infection  - Monitor lab/diagnostic results  - Monitor all insertion sites, i e  indwelling lines, tubes, and drains  - Monitor endotracheal if appropriate and nasal secretions for changes in amount and color  - [place] appropriate cooling/warming therapies per order  - Administer medications as ordered  - Instruct and encourage patient and family to use good hand hygiene technique  - Identify and instruct in appropriate isolation precautions for identified infection/condition  Outcome: Progressing     Problem: SAFETY ADULT  Goal: Patient will remain free of falls  Description: INTERVENTIONS:  - Educate patient/family on patient safety including physical limitations  - Instruct patient to call for assistance with activity   - Consult OT/PT to assist with strengthening/mobility   - Keep Call bell within reach  - Keep bed low and locked with side rails adjusted as appropriate  - Keep care items and personal belongings within reach  - Initiate and maintain comfort rounds  - Make Fall Risk Sign visible to staff  - Offer Toileting every 2 Hours, in advance of need  - Initiate/Maintain bed alarm  - Obtain necessary fall risk management equipment: bed alarm  - Apply yellow socks and bracelet for high fall risk patients  - Consider moving patient to room near nurses station  Outcome: Progressing  Goal: Maintain or return to baseline ADL function  Description: INTERVENTIONS:  -  Assess patient's ability to carry out ADLs; assess patient's baseline for ADL function and identify physical deficits which impact ability to perform ADLs (bathing, care of mouth/teeth, toileting, grooming, dressing, etc )  - Assess/evaluate cause of self-care deficits   - Assess range of motion  - Assess patient's mobility; develop plan if impaired  - Assess patient's need for assistive devices and provide as appropriate  - Encourage maximum independence but intervene and supervise when necessary  - Involve family in performance of ADLs  - Assess for home care needs following discharge   - Consider OT consult to assist with ADL evaluation and planning for discharge  - Provide patient education as appropriate  Outcome: Progressing  Goal: Maintains/Returns to pre admission functional level  Description: INTERVENTIONS:  - Perform BMAT or MOVE assessment daily    - Set and communicate daily mobility goal to care team and patient/family/caregiver     - Collaborate with rehabilitation services on mobility goals if consulted  - Dangle patient 3 times a day  - Stand patient 3 times a day  - Ambulate patient 3 times a day  - Out of bed to chair 3 times a day   - Out of bed for meals 3 times a day  - Out of bed for toileting  - Record patient progress and toleration of activity level   Outcome: Progressing     Problem: DISCHARGE PLANNING  Goal: Discharge to home or other facility with appropriate resources  Description: INTERVENTIONS:  - Identify barriers to discharge w/patient and caregiver  - Arrange for needed discharge resources and transportation as appropriate  - Identify discharge learning needs (meds, wound care, etc )  - Arrange for interpretive services to assist at discharge as needed  - Refer to Case Management Department for coordinating discharge planning if the patient needs post-hospital services based on physician/advanced practitioner order or complex needs related to functional status, cognitive ability, or social support system  Outcome: Progressing     Problem: Knowledge Deficit  Goal: Patient/family/caregiver demonstrates understanding of disease process, treatment plan, medications, and discharge instructions  Description: Complete learning assessment and assess knowledge base    Interventions:  - Provide teaching at level of understanding  - Provide teaching via preferred learning methods  Outcome: Progressing     Problem: RESPIRATORY - ADULT  Goal: Achieves optimal ventilation and oxygenation  Description: INTERVENTIONS:  - Assess for changes in respiratory status  - Assess for changes in mentation and behavior  - Position to facilitate oxygenation and minimize respiratory effort  - Oxygen administered by appropriate delivery if ordered  - Initiate smoking cessation education as indicated  - Encourage broncho-pulmonary hygiene including cough, deep breathe, Incentive Spirometry  - Assess the need for suctioning and aspirate as needed  - Assess and instruct to report SOB or any respiratory difficulty  - Respiratory Therapy support as indicated  Outcome: Progressing     Problem: GENITOURINARY - ADULT  Goal: Maintains or returns to baseline urinary function  Description: INTERVENTIONS:  - Assess urinary function  - Encourage oral fluids to ensure adequate hydration if ordered  - Administer IV fluids as ordered to ensure adequate hydration  - Administer ordered medications as needed  - Offer frequent toileting  - Follow urinary retention protocol if ordered  Outcome: Progressing  Goal: Absence of urinary retention  Description: INTERVENTIONS:  - Assess patient’s ability to void and empty bladder  - Monitor I/O  - Bladder scan as needed  - Discuss with physician/AP medications to alleviate retention as needed  - Discuss catheterization for long term situations as appropriate  Outcome: Progressing  Goal: Urinary catheter remains patent  Description: INTERVENTIONS:  - Assess patency of urinary catheter  - If patient has a chronic jenkins, consider changing catheter if non-functioning  - Follow guidelines for intermittent irrigation of non-functioning urinary catheter  Outcome: Progressing     Problem: METABOLIC, FLUID AND ELECTROLYTES - ADULT  Goal: Electrolytes maintained within normal limits  Description: INTERVENTIONS:  - Monitor labs and assess patient for signs and symptoms of electrolyte imbalances  - Administer electrolyte replacement as ordered  - Monitor response to electrolyte replacements, including repeat lab results as appropriate  - Instruct patient on fluid and nutrition as appropriate  Outcome: Progressing     Problem: SKIN/TISSUE INTEGRITY - ADULT  Goal: Skin Integrity remains intact(Skin Breakdown Prevention)  Description: Assess:  -Perform Mic assessment every shift  -Clean and moisturize skin every day  -Inspect skin when repositioning, toileting, and assisting with ADLS  -Assess extremities for adequate circulation and sensation     Bed Management:  -Have minimal linens on bed & keep smooth, unwrinkled  -Change linens as needed when moist or perspiring  -Avoid sitting or lying in one position for more than 2 hours while in bed    Activity:  -Mobilize patient 3 times a day  -Encourage activity and walks on unit  -Instruct/ Assist with weight shifting every 2 hours when out of bed in chair  Outcome: Progressing  Goal: Incision(s), wounds(s) or drain site(s) healing without S/S of infection  Description: INTERVENTIONS  - Assess and document dressing, incision, wound bed, drain sites and surrounding tissue  - Provide patient and family education  - Perform skin care/dressing changes every other day  Outcome: Progressing     Problem: MUSCULOSKELETAL - ADULT  Goal: Maintain or return mobility to safest level of function  Description: INTERVENTIONS:  - Assess patient's ability to carry out ADLs; assess patient's baseline for ADL function and identify physical deficits which impact ability to perform ADLs (bathing, care of mouth/teeth, toileting, grooming, dressing, etc )  - Assess/evaluate cause of self-care deficits   - Assess range of motion  - Assess patient's mobility  - Assess patient's need for assistive devices and provide as appropriate  - Encourage maximum independence but intervene and supervise when necessary  - Involve family in performance of ADLs  - Assess for home care needs following discharge   - Consider OT consult to assist with ADL evaluation and planning for discharge  - Provide patient education as appropriate  Outcome: Progressing     Problem: MOBILITY - ADULT  Goal: Maintain or return to baseline ADL function  Description: INTERVENTIONS:  -  Assess patient's ability to carry out ADLs; assess patient's baseline for ADL function and identify physical deficits which impact ability to perform ADLs (bathing, care of mouth/teeth, toileting, grooming, dressing, etc )  - Assess/evaluate cause of self-care deficits   - Assess range of motion  - Assess patient's mobility; develop plan if impaired  - Assess patient's need for assistive devices and provide as appropriate  - Encourage maximum independence but intervene and supervise when necessary  - Involve family in performance of ADLs  - Assess for home care needs following discharge   - Consider OT consult to assist with ADL evaluation and planning for discharge  - Provide patient education as appropriate  Outcome: Progressing  Goal: Maintains/Returns to pre admission functional level  Description: INTERVENTIONS:  - Perform BMAT or MOVE assessment daily    - Set and communicate daily mobility goal to care team and patient/family/caregiver     - Collaborate with rehabilitation services on mobility goals if consulted  - Dangle patient 3 times a day  - Stand patient 3 times a day  - Ambulate patient 3 times a day  - Out of bed to chair 3 times a day   - Out of bed for meals 3 times a day  - Out of bed for toileting  - Record patient progress and toleration of activity level   Outcome: Progressing     Problem: Prexisting or High Potential for Compromised Skin Integrity  Goal: Skin integrity is maintained or improved  Description: INTERVENTIONS:  - Identify patients at risk for skin breakdown  - Assess and monitor skin integrity  - Assess and monitor nutrition and hydration status  - Monitor labs   - Assess for incontinence   - Turn and reposition patient  - Assist with mobility/ambulation  - Relieve pressure over bony prominences  - Avoid friction and shearing  - Provide appropriate hygiene as needed including keeping skin clean and dry  - Evaluate need for skin moisturizer/barrier cream  - Collaborate with interdisciplinary team   - Patient/family teaching  - Consider wound care consult   Outcome: Progressing     Problem: Potential for Falls  Goal: Patient will remain free of falls  Description: INTERVENTIONS:  - Educate patient/family on patient safety including physical limitations  - Instruct patient to call for assistance with activity   - Consult OT/PT to assist with strengthening/mobility   - Keep Call bell within reach  - Keep bed low and locked with side rails adjusted as appropriate  - Keep care items and personal belongings within reach  - Initiate and maintain comfort rounds  - Make Fall Risk Sign visible to staff  - Offer Toileting every 2 Hours, in advance of need  - Initiate/Maintain bed alarm  - Obtain necessary fall risk management equipment: bed alarm  - Apply yellow socks and bracelet for high fall risk patients  - Consider moving patient to room near nurses station  Outcome: Progressing

## 2022-11-18 NOTE — DISCHARGE SUMMARY
[facility]  Discharge- Leslye Cage 1955, 78 y o  male MRN: [id number]  Unit/Bed#: E4 -01 Encounter: [id number]  Primary Care Provider: Juliana Schroeder MD   Date and time admitted to hospital: 11/15/2022  3:45 PM    * Severe sepsis [facility])  Assessment & Plan  60-year-old male with history of hypertension, obesity, CKD presenting with fevers, generalized weakness and chills  Etiology possibly due to clinical pyelonephritis as a result of his obstructing stone  Surgery has no suspicion for infection at his previous surgical site  Urine culture negative  Blood culture 1/2 Strep agalactiae  Sensitive to ceftriaxone / penicillin  Discharge patient with amoxicillin to complete a 14 day course for bacteremia  TTE without evidence of vegetation  Urology recommendations appreciated  S/p cystoscopy left ureteral stent insertion  Successfully passed trial of void  Repeat blood cultures pending   Outpatient PCP follow-up    Surgical wound present  Assessment & Plan  Patient has history of strangulated hernia abdominal wall status post exploratory laparotomy and repair of right lower quadrant hernia with mesh and right lower quadrant hernia repair on 09/14/2022, with wound VAC in place  Surgery consultation appreciated  No indication for acute surgical intervention or concern for infection  Continue outpatient surgery follow-up    Pulmonary embolism ([address] )  Assessment & Plan  Continue Eliquis    Class 3 severe obesity due to excess calories with serious comorbidity and body mass index (BMI) of 50 0 to 59 9 in adult [facility])  Assessment & Plan  Encouraged lifestyle modifications    Acute kidney injury superimposed on chronic kidney disease [facility])  Assessment & Plan  Lab Results   Component Value Date    EGFR 47 11/18/2022    EGFR 42 11/17/2022    EGFR 32 11/16/2022    CREATININE 1 52 (H) 11/18/2022    CREATININE 1 67 (H) 11/17/2022    CREATININE 2 08 (H) 11/16/2022     Baseline creatinine 1 4-1 7  Etiology: Pre-renal / post-obstructive pathology  Status: Resolved  Back to baseline  Hold ACE-inhibitor  Restart diuretics on discharge      Essential hypertension  Assessment & Plan  Continue metoprolol with hold parameters  Restart lasix on discharge  Hold lisinopril given acute kidney injury      Discharging Physician / Practitioner: Dank Chua MD  PCP: Jericho Nolasco MD  Admission Date:   Admission Orders (From admission, onward)       Ordered        11/15/22 1941  INPATIENT ADMISSION  Once                          Discharge Date: 11/19/22    Medical Problems       Resolved Problems  Date Reviewed: 11/18/2022   None         Consultations During Hospital Stay:  Urology, surgery-general    Procedures Performed:   CYSTOSCOPY RETROGRADE PYELOGRAM WITH INSERTION STENT URETERAL (Right)    Significant Findings / Test Results:   Imaging  1/2: Blood culture: Strep Agalactiae  CT Abdomen pelvis wo contrast:    1  Mild right hydronephrosis  Obstructing 6 mm calculus in the ureter at the L4 level  Pyelonephritis not excluded  2   Subcutaneous fat stranding within the dependent portion of the abdominal pannus may represent edema or cellulitis  No evidence of gas or fluid collection  xr chest:    No active pulmonary disease    Incidental Findings:   As written above    Test Results Pending at Discharge (will require follow up):   cultures     Outpatient Tests Requested:  PCP, urology  CBC, BMP  Blood cultures (Around 24/0/2613)    Complications:  none    Reason for Admission: sepsis    Hospital Course:     Jose Ball is a 78 y o  male patient who originally presented to the hospital on 11/15/2022 due to weakness, fever, chills  Patient is 81-year-old male with history of hypertension, obesity, CKD, and pulmonary embolism on Eliquis  Patient presented to our institution due to weakness, fever, chills    CT scan was performed which showed evidence of mild right hydronephrosis with an obstructing 6 mm calculus in the ureter at the L4 level  He was started on IV antibiotic therapy neurology was consulted  Urology performed cystoscopy, retrograde pyelogram, and insertion of a right ureteral stent  Patient did well postoperatively  1/2 cultures came back positive for strep agalactiae  Trial of void was performed after his Arias catheter was removed postoperatively  No urinary retention was noted  Infectious Disease was consulted due to 1/2 cultures which showed sensitive to penicillin strep agalactiae  Patient will be discharged with p o  Antibiotics to complete a 14 day course through 11/30/2022  So far repeat cultures came back negative  Infectious Disease recommends that surveillance cultures be done 1 week after completion around December 7, 2022  Of note, patient has a wound VAC  Management per surgery  Surgery not concerned that this is infected  Continue outpatient follow-up with surgery  Patient agrees to follow-up with his providers as scheduled and to take his medications as prescribed  All questions were addressed  he understood the need to seek immediate medical attention should he develop any chest pain, shortness of breath, severe pain, fever, chills, or any other concerning symptoms  Please see above list of diagnoses and related plan for additional information  Condition at Discharge: fair     Discharge Day Visit / Exam:     Subjective:  Patient seen and examined at bedside  Comfortable  Currently without any pain  Vitals: Blood Pressure: 116/56 (11/19/22 0749)  Pulse: 62 (11/19/22 0749)  Temperature: 98 4 °F (36 9 °C) (11/19/22 0749)  Temp Source: Temporal (11/19/22 0749)  Respirations: 18 (11/19/22 0749)  Height: 5' 10" (177 8 cm) (11/18/22 1530)  Weight - Scale: (!) 154 kg (340 lb) (11/18/22 1530)  SpO2: 95 % (11/19/22 0749)  Exam:   Physical Exam  Vitals reviewed  Constitutional:       General: He is not in acute distress    HENT:      Head: Normocephalic  Nose: Nose normal       Mouth/Throat:      Mouth: Mucous membranes are moist    Eyes:      General: No scleral icterus  Cardiovascular:      Rate and Rhythm: Normal rate  Pulmonary:      Effort: Pulmonary effort is normal  No respiratory distress  Abdominal:      General: There is no distension  Palpations: Abdomen is soft  Tenderness: There is no abdominal tenderness  Comments: Wound vac in place   Skin:     General: Skin is warm  Neurological:      Mental Status: He is alert  Mental status is at baseline  Psychiatric:         Mood and Affect: Mood normal          Behavior: Behavior normal        Discharge instructions/Information to patient and family:   See after visit summary for information provided to patient and family  Provisions for Follow-Up Care:  See after visit summary for information related to follow-up care and any pertinent home health orders  Disposition:     Home with vna    Planned Readmission: No     Discharge Statement:  I spent 37 minutes discharging the patient  This time was spent on the day of discharge  I had direct contact with the patient on the day of discharge  Greater than 50% of the total time was spent examining patient, answering all patient questions, arranging and discussing plan of care with patient as well as directly providing post-discharge instructions  Additional time then spent on discharge activities  Discharge Medications:  See after visit summary for reconciled discharge medications provided to patient and family        ** Please Note: This note has been constructed using a voice recognition system **

## 2022-11-18 NOTE — ASSESSMENT & PLAN NOTE
· Continue metoprolol with hold parameters  Restart lasix on discharge    · Hold lisinopril given acute kidney injury

## 2022-11-18 NOTE — CASE MANAGEMENT
Case Management Discharge Planning Note    Patient name [place] [address] /E4 MS [phone] MRN [id number]  : 1955 Date 2022       Current Admission Date: 11/15/2022  Current Admission Diagnosis:Severe sepsis [facility])   Patient Active Problem List    Diagnosis Date Noted   • Abdominal pain 11/15/2022   • Surgical wound present 10/14/2022   • Wound drainage 10/06/2022   • Pulmonary embolism ([facility] [address] [facility] ) 2022   • Acute kidney injury ([facility] ) 2022   • Anemia 2022   • Strangulated hernia of abdominal wall 2022   • COVID-19 2022   • Severe sepsis ([facility] [facility] ) 2021   • Right foot pain 2019   • Class 3 severe obesity due to excess calories with serious comorbidity and body mass index (BMI) of 50 0 to 59 9 in adult ([facility] [facility] ) 2019   • Lymphedema of both lower extremities 2019   • Acute kidney injury superimposed on chronic kidney disease ([facility] [facility] ) 2018   • HDL deficiency 2018   • Gout 2018   • DELIA (obstructive sleep apnea) 2018   • Allergic rhinitis 2018   • Essential hypertension 2018   • Kidney stones 2018      LOS (days): 3  Geometric Mean LOS (GMLOS) (days): 5 10  Days to GMLOS:2 4     OBJECTIVE:  Risk of Unplanned Readmission Score: 23 78         Current admission status: Inpatient   Preferred Pharmacy:   [facility] , [address] [place] [address]  Phone: [phone] Fax: [phone]    [facility]/pharmacy #[id number] [address]  Phone: [phone] Fax: [phone]    Primary Care Provider: Taylor Restrepo MD    Primary Insurance: MEDICARE  Secondary Insurance: CIG[employer]    DISCHARGE DETAILS:    Discharge planning discussed with[de-identified] pt and spouse  Freedom of Choice: Yes  Comments - Freedom of Choice: Pt and spouse both stated pt is open with [facility] for RN and spouse brought in wound vac    CM contacted family/caregiver?: Yes  Were Treatment Team discharge recommendations reviewed with patient/caregiver?: Yes  Did patient/caregiver verbalize understanding of patient care needs?: Yes  Were patient/caregiver advised of the risks associated with not following Treatment Team discharge recommendations?: Yes         [address]         Is the patient interested in [place] [address] at discharge?: Yes  [facility] [place] requested[de-identified] [address] Name[de-identified] [facility] Provider[de-identified] PCP  Home Health Services Needed[de-identified] Wound/Ostomy Care (Has wound vac)  Homebound Criteria Met[de-identified] Requires the Assistance of Another Person for Safe Ambulation or to Leave the Home  Supporting Clincal Findings[de-identified] Limited Endurance, Fatigues Easliy in Short Distances                   Treatment Team Recommendation: Home with [employer]  Discharge Destination Plan[de-identified] Home with [employer]             Additional Comments: LSW covering case today  MD planning on discharging pt home today  Pt is open with [facility] for RN and RN comes out M-W-F to change out wound vac  Referral made back to [facility]  Spouse brought in wound vac from home to switch out wound vac  Pt stated he was to have appointment with surgery today to look at wound  Text surgery resident and someone will come to clean out wound and put on the wound vac from home  MD is aware of this

## 2022-11-18 NOTE — CONSULTS
Consultation - Infectious Disease   Arnold Dean 78 y o  male MRN: [id number]  Unit/Bed#: E4 -01 Encounter: [id number]      IMPRESSION & RECOMMENDATIONS:     1  GBS Bacteremia:  - This bacteria may part of normal opal and may cause bacteremia, wound infections, endocarditis, meningitis, pneumonia, septic arthritis, skin/soft tissue infection and UTI  Grew in 1/2 sets of blood cx from 11/15  Suspect likely from renal source in setting of obstructing stone/hydronephrosis  Initial UA was unremarkable with minimal WBCs but was taken before obstruction was corrected thus may not have reflected urine from right kidney  Urine cx sent in OR 11/15 negative, but was done after he received IV Ceftriaxone already and thus may have been sterilized  He has no current signs of skin or joint infection nor any evidence of meningitis  Surgery not concerned for abdominal wound infection  No evidence clinically of PNA and CXR without pulmonary disease  He has no endovascular hardware  - follow up blood cx 11/17 to ensure stay negative  - for now continue Ceftriaxone, increase dose to 2g daily  - follow up TTE  - if TTE without vegetation and blood cx negative at 48 hrs, anticipate can transition to PO Amoxicillin 1g q8h  - would complete 14 day course from negative blood culture, through 11/30/22  - would check surveillance blood cultures 1 week after completion, around 12/7/22    2  Obstructive Right Sided Nephrolithiasis with Hydronephrosis:  - Patient has prior history of kidney stones  Presented with acute nausea, chills, fever and back pain  CT scan showed obstructing right stone in ureter with resultant hydronephrosis  S/p OR with Urology and placement of ureteral stent on 11/15  Doing well overall currently, voiding on his own  No clear evidence of urinary infection  - follow up outpatient with Urology    3  Abdominal Wound/Mesh s/p Wound VAC; in setting of #4:  - Wound VAC last changed today   Surgery evaluated this admission, no concern for infection  CT a/p without evidence of infection    - outpatient wound care follow up    4  Hx of Incarcerated Hernia s/p surgical repair 9/15/22:  - noted  - surgery follow up    5  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    6  Lymphedema of bilateral Lower Extremities:  - noted  No evidence of active infection of legs currently  I have discussed the above management plan in detail with the primary service  ID will follow  I have performed an extensive review of the medical records in Epic including review of the notes, radiographs, and laboratory results     HISTORY OF PRESENT ILLNESS:  Reason for Consult: GBS Bactermia    HPI: Bro Ku is a 78y o  year old male PMHx HTN, CKD, Hx of RCC s/p cryoablation, Hx of chronically incarcerated incisional hernia at site of prior open appendectomy s/p surgical repair with mesh and wound VAC (9/15/22) who presented on 11/15 to [facility] ED with complaints of generalized weakness, fever, and chills along with back pain  Symptoms started about 2 days prior to arrival   Per patient was eating breakfast then had acute episode of nausea followed by back pain  He said felt similar to prior kidney stone  Then at home developed chills and fever, temp up to 103 taken by his wife  This is when they decided to come to ED  Febrile to 103 4 on arrival  CT a/p 11/15 showed mild right sided hydronephrosis and obstructing 6 mm calculus in the ureter  There was subcutaneous fat stranding noted within the abdominal pannus noted as edema vs  cellulitis, but no fluid collection or gas  UA was sent on 11/15 with 2-4 WBCS, before stent was placed  Surgery evaluated patient and not concerned for abdominal wound infection  Urology took to OR 11/15 urgently for stent placement  Urine cx sent from OR 11/15 around 10 pm which was negative but taken after receiving ceftriaxone in ED  Blood cultures 11/15 grew GBS in 1/2 sets only  Patient now has been afebrile with normal WBC  Has received 3 days of IV Ceftriaxone  Repeat blood cx 11/17 thus far negative  He denies any recent redness of legs or arms, denies leg pain  Denies current fever/chills, no dysuria or flank pain  Denies cough/SOB  He denies having any endovascular hardware  Denies chronic fevers at home  Reports gets occasional chills but has been happening for a very long time  Regarding recent medical history he was admitted 9/14/22 to [facility] and taken to OR for exploratory laparotomy, reduction of strangulated ventral hernia and debridement and wound VAC placement  On 9/15 had right lower quadrant hernia repair with mesh and complex anterior abdominal wall repair with mesh  Wound VAC placed to right lower quadrant, mesh placed in midline  He was found to have bilateral PEs on 9/19  Readmitted 10/5 with concerns for increased drainage from midline abdominal wound  He underwent elective debridement on 10/11 with wound VAC replaced, no concern for wound infection at this time  REVIEW OF SYSTEMS:  A complete review of systems is negative other than that noted in the HPI      PAST MEDICAL HISTORY:  Past Medical History:   Diagnosis Date   • Anemia 9/16/2022   • Chronic kidney disease    • Hypertension    • Obesity    • Sepsis ([facility] [address] )    • Urinary tract infection without hematuria 4/3/2020     Past Surgical History:   Procedure Laterality Date   • ABDOMINAL ADHESION SURGERY N/A 9/15/2022    Procedure: LYSIS ADHESIONS;  Surgeon: Maddy Seo MD;  [facility];  Service: General   • APPENDECTOMY     • EXPLORATORY LAPAROTOMY W/ BOWEL RESECTION N/A 9/14/2022    Procedure: Exdploratory laparotomy, complex Lysis of adhesions, reduction strangulated hernia, debridement RLQ muscle with counter incision, abthera placement;  Surgeon: Maddy Seo MD;  [facility];  Service: General   • EXPLORATORY LAPAROTOMY W/ BOWEL RESECTION N/A 9/15/2022    Procedure: LAPAROTOMY EXPLORATORY  REPAIR RIGHT LQ HERNIA WITH MESH, REPAIR ANTERIOR WALL HERNIA;  Surgeon: Mary Wang MD;  [facility];  Service: General   • FL RETROGRADE PYELOGRAM  11/15/2022   • NASAL SEPTUM SURGERY     • MS CYSTOURETHROSCOPY,URETER CATHETER Right 11/15/2022    Procedure: CYSTOSCOPY RETROGRADE PYELOGRAM WITH INSERTION STENT URETERAL;  Surgeon: Julito Ludwig MD;  [facility];  Service: Urology   • WOUND DEBRIDEMENT N/A 10/11/2022    Procedure: Excisional debridement of abdominal wall nonviable tissue  Skin and subcu tissues were dissected  The wound measured 23 x 18 cm x 4 cm deep ;  Surgeon: April Vargas MD;  [facility];  Service: General       FAMILY HISTORY:  Non-contributory    SOCIAL HISTORY:  Social History   Social History     Substance and Sexual Activity   Alcohol Use Never     Social History     Substance and Sexual Activity   Drug Use Not Currently     Social History     Tobacco Use   Smoking Status Never   Smokeless Tobacco Never   Tobacco Comments    no exposure to passive smoke       ALLERGIES:  Allergies   Allergen Reactions   • Codeine Swelling     Other reaction(s): SWELLING OF FACE  Other reaction(s): SWELLING OF FACE  Other reaction(s): SWELLING OF FACE  Swollen eyes/swelling   • Seasonal Ic [Cholestatin] Allergic Rhinitis       MEDICATIONS:  All current active medications have been reviewed      PHYSICAL EXAM:  Temp:  [97 5 °F (36 4 °C)-97 8 °F (36 6 °C)] 97 5 °F (36 4 °C)  HR:  [69-78] 69  Resp:  [16] 16  BP: (127-139)/(62-66) 127/62  SpO2:  [98 %-99 %] 98 %  Temp (24hrs), Av 7 °F (36 5 °C), Min:97 5 °F (36 4 °C), Max:97 8 °F (36 6 °C)  Current: Temperature: 97 5 °F (36 4 °C)    Intake/Output Summary (Last 24 hours) at 2022 1340  Last data filed at 2022 0501  Gross per 24 hour   Intake 720 ml   Output 1156 ml   Net -436 ml       General Appearance:  Appearing well, nontoxic, and in no distress   Head:  Normocephalic, without obvious abnormality, atraumatic   Eyes:  Conjunctiva pink and sclera anicteric, both eyes   Nose: Nares normal, mucosa normal, no drainage   Throat: Oropharynx moist without lesions   Neck: Supple, symmetrical, no adenopathy, no tenderness/mass/nodules   Back:   Symmetric, no curvature, ROM normal, no CVA tenderness   Lungs:   Clear to auscultation bilaterally, respirations unlabored   Chest Wall:  No tenderness or deformity   Heart:  RRR; no murmur, rub or gallop   Abdomen:   Soft, non-tender, non-distended, wound VAC in place; no evidence of gross infection on review of wound images from Surgical note today   Extremities: Bilateral LE edema, no erythema or wounds   Skin: No rashes or lesions  No draining wounds noted  Lymph nodes: Cervical, supraclavicular nodes normal   Neurologic: Alert and oriented        LABS, IMAGING, & OTHER STUDIES:  Lab Results:  I have personally reviewed pertinent labs  Results from last 7 days   Lab Units 11/18/22  0525 11/17/22  0548 11/16/22  0453   WBC Thousand/uL 7 58 9 12 10 59*   HEMOGLOBIN g/dL 8 8* 8 3* 9 6*   PLATELETS Thousands/uL 316 331 347     Results from last 7 days   Lab Units 11/18/22  0525 11/17/22  0548 11/16/22  0453 11/15/22  1559   SODIUM mmol/L 140 141 139 136   POTASSIUM mmol/L 3 9 4 3 4 5 4 2   CHLORIDE mmol/L 107 109* 106 103   CO2 mmol/L 25 25 24 23   BUN mg/dL 22 27* 22 26*   CREATININE mg/dL 1 52* 1 67* 2 08* 2 49*   EGFR ml/min/1 73sq m 47 42 32 25   CALCIUM mg/dL 8 8 8 7 8 4 8 8   AST U/L  --   --  11 14   ALT U/L  --   --  8* 13   ALK PHOS U/L  --   --  67 76     Results from last 7 days   Lab Units 11/17/22  1403 11/17/22  1351 11/15/22  2218 11/15/22  1559   BLOOD CULTURE  Received in Microbiology Lab  Culture in Progress  Received in Microbiology Lab  Culture in Progress  --  Streptococcus agalactiae (Group B)*  No Growth at 48 hrs     GRAM STAIN RESULT   --   --   --  Gram positive cocci in pairs and chains*   URINE CULTURE   --   --  No Growth <100 cfu/mL  --      Results from last 7 days   Lab Units 11/15/22  1559   PROCALCITONIN ng/ml 0 15           Imaging Studies:   I have personally reviewed pertinent imaging study reports and images in PACS  CT a/p w/o contrast 11/15/22:     1  Mild right hydronephrosis  Obstructing 6 mm calculus in the ureter at the L4 level  Pyelonephritis not excluded  2   Subcutaneous fat stranding within the dependent portion of the abdominal pannus may represent edema or cellulitis  No evidence of gas or fluid collection  CXR 11/15/22: The lungs are clear  No pneumothorax or pleural effusion  Osseous structures appear within normal limits for patient age    Other Studies:   I have personally reviewed pertinent reports        Lazaro Geiger MD  [facility]

## 2022-11-18 NOTE — CASE MANAGEMENT
Case Management Discharge Planning Note    Patient name Max Troncoso  Location East 4 /E4 [address] [facility]-* MRN [id number]  : 1955 Date 2022       Current Admission Date: 11/15/2022  Current Admission Diagnosis:Severe sepsis [facility])   Patient Active Problem List    Diagnosis Date Noted   • Abdominal pain 11/15/2022   • Surgical wound present 10/14/2022   • Wound drainage 10/06/2022   • Pulmonary embolism ([facility] [address] ) 2022   • Acute kidney injury ([facility] [address] ) 2022   • Anemia 2022   • Strangulated hernia of abdominal wall 2022   • COVID-19 2022   • Severe sepsis ([facility] ) 2021   • Right foot pain 2019   • Class 3 severe obesity due to excess calories with serious comorbidity and body mass index (BMI) of 50 0 to 59 9 in adult ([facility] [address] ) 2019   • Lymphedema of both lower extremities 2019   • Acute kidney injury superimposed on chronic kidney disease ([facility] [address] ) 2018   • HDL deficiency 2018   • Gout 2018   • DELIA (obstructive sleep apnea) 2018   • Allergic rhinitis 2018   • Essential hypertension 2018   • Kidney stones 2018      LOS (days): 3  Geometric Mean LOS (GMLOS) (days): 5 10  Days to GMLOS:2 2     OBJECTIVE:  Risk of Unplanned Readmission Score: 21 52         Current admission status: Inpatient   Preferred Pharmacy:   [facility] , [address] [place] [address]  Phone: [phone] Fax: [phone]    [facility]/pharmacy #[id number], [place] - [address] [facility] [place] [address]  Phone: [phone] Fax: [phone]    Primary Care Provider: Ian Garcia MD    Primary Insurance: MEDICARE  Secondary Insurance: CIGNA    DISCHARGE DETAILS:                                   IMM Given (Date):: 22  IMM Given to[de-identified] Patient (Read IMM, copy given and copy put in bin to be scan )     Additional Comments: MD keeping pt overnight and pt will be discharge tomorrow  Pt will be here 3 days now and completed IMM with him

## 2022-11-18 NOTE — ASSESSMENT & PLAN NOTE
· Patient has history of strangulated hernia abdominal wall status post exploratory laparotomy and repair of right lower quadrant hernia with mesh and right lower quadrant hernia repair on 09/14/2022, with wound VAC in place  · Surgery consultation appreciated  · No indication for acute surgical intervention or concern for infection  · Continue outpatient surgery follow-up

## 2022-11-18 NOTE — PROGRESS NOTES
[address] [place] [address]  Progress Note - Kathryn Sol 1955, 78 y o  male MRN: [id number]  Unit/Bed#: E4 -01 Encounter: [id number]  Primary Care Provider: Ca Arevalo MD   Date and time admitted to hospital: 11/15/2022  3:45 PM    * Severe sepsis [facility])  Assessment & Plan  78-year-old male with history of hypertension, obesity, CKD presenting with fevers, generalized weakness and chills  Etiology possibly due to clinical pyelonephritis as a result of his obstructing stone  Surgery has no suspicion for infection at his previous surgical site  · Urine culture negative  Blood culture 1/2 Strep agalactiae  Sensitive to ceftriaxone  ID consulted  TTE ordered to rule out endocarditis  · Urology recommendations appreciated  S/p cystoscopy left ureteral stent insertion  Successfully passed trial of void    · Repeat blood cultures pending    Surgical wound present  Assessment & Plan  · Patient has history of strangulated hernia abdominal wall status post exploratory laparotomy and repair of right lower quadrant hernia with mesh and right lower quadrant hernia repair on 09/14/2022, with wound VAC in place  · Surgery consultation appreciated  · No indication for acute surgical intervention or concern for infection  · Continue outpatient surgery follow-up    Pulmonary embolism ([address] )  Assessment & Plan  Continue Eliquis    Class 3 severe obesity due to excess calories with serious comorbidity and body mass index (BMI) of 50 0 to 59 9 in adult [facility])  Assessment & Plan  · Encouraged lifestyle modifications    Acute kidney injury superimposed on chronic kidney disease [facility])  Assessment & Plan  Lab Results   Component Value Date    EGFR 47 11/18/2022    EGFR 42 11/17/2022    EGFR 32 11/16/2022    CREATININE 1 52 (H) 11/18/2022    CREATININE 1 67 (H) 11/17/2022    CREATININE 2 08 (H) 11/16/2022     · Baseline creatinine 1 4-1 7  · Etiology: Pre-renal / post-obstructive pathology  · Status: Resolved  Back to baseline  · Hold ACE-inhibitor  Restart diuretics on discharge      Essential hypertension  Assessment & Plan  · Continue metoprolol with hold parameters  Restart lasix on discharge  · Hold lisinopril given acute kidney injury    VTE Pharmacologic Prophylaxis:   Pharmacologic: Apixaban (Eliquis)  Mechanical VTE Prophylaxis in Place: Yes    Discussions with Specialists or Other Care Team Provider: nursing    Education and Discussions with Family / Patient: patient    Time Spent for Care: 30 minutes  More than 50% of total time spent on counseling and coordination of care as described above  Current Length of Stay: 3 day(s)    Current Patient Status: Inpatient   Certification Statement: The patient will continue to require additional inpatient hospital stay due to iv antibiotics, echo    Discharge Plan: 24 hours    Code Status: Level 1 - Full Code      Subjective:   Patient seen and examined at bedside  Comfortable  Without any new complaints  Objective:     Vitals:   Temp (24hrs), Av 7 °F (36 5 °C), Min:97 5 °F (36 4 °C), Max:97 8 °F (36 6 °C)    Temp:  [97 5 °F (36 4 °C)-97 8 °F (36 6 °C)] 97 5 °F (36 4 °C)  HR:  [69-78] 69  Resp:  [16] 16  BP: (127-139)/(62-66) 127/62  SpO2:  [98 %-99 %] 98 %  Body mass index is 48 81 kg/m²  Input and Output Summary (last 24 hours): Intake/Output Summary (Last 24 hours) at 2022 1228  Last data filed at 2022 0501  Gross per 24 hour   Intake 720 ml   Output 1156 ml   Net -436 ml       Physical Exam:     Physical Exam  Vitals reviewed  Constitutional:       General: He is not in acute distress  HENT:      Head: Normocephalic  Nose: Nose normal       Mouth/Throat:      Mouth: Mucous membranes are moist    Eyes:      General: No scleral icterus  Cardiovascular:      Rate and Rhythm: Normal rate and regular rhythm  Pulmonary:      Effort: Pulmonary effort is normal  No respiratory distress     Abdominal:      General: There is no distension  Palpations: Abdomen is soft  Tenderness: There is no abdominal tenderness  Comments: Wound vac in place   Skin:     General: Skin is warm  Neurological:      Mental Status: He is alert  Mental status is at baseline  Psychiatric:         Mood and Affect: Mood normal          Behavior: Behavior normal        Additional Data:     Labs:    Results from last 7 days   Lab Units 11/18/22  0525   WBC Thousand/uL 7 58   HEMOGLOBIN g/dL 8 8*   HEMATOCRIT % 28 5*   PLATELETS Thousands/uL 316   NEUTROS PCT % 66   LYMPHS PCT % 26   MONOS PCT % 6   EOS PCT % 1     Results from last 7 days   Lab Units 11/18/22  0525 11/17/22  0548 11/16/22  0453   SODIUM mmol/L 140   < > 139   POTASSIUM mmol/L 3 9   < > 4 5   CHLORIDE mmol/L 107   < > 106   CO2 mmol/L 25   < > 24   BUN mg/dL 22   < > 22   CREATININE mg/dL 1 52*   < > 2 08*   ANION GAP mmol/L 8   < > 9   CALCIUM mg/dL 8 8   < > 8 4   ALBUMIN g/dL  --   --  2 1*   TOTAL BILIRUBIN mg/dL  --   --  0 41   ALK PHOS U/L  --   --  67   ALT U/L  --   --  8*   AST U/L  --   --  11   GLUCOSE RANDOM mg/dL 102   < > 139    < > = values in this interval not displayed  Results from last 7 days   Lab Units 11/15/22  1559   INR  1 48*             Results from last 7 days   Lab Units 11/15/22  1842 11/15/22  1559   LACTIC ACID mmol/L 1 1 2 5*   PROCALCITONIN ng/ml  --  0 15           * I Have Reviewed All Lab Data Listed Above  * Additional Pertinent Lab Tests Reviewed:  [address] Admission Reviewed      Lines:  Invasive Devices     Peripheral Intravenous Line  Duration           Peripheral IV 11/15/22 Left Antecubital 2 days    Peripheral IV 11/16/22 Right;Ventral (anterior) Forearm 1 day          Drain  Duration           Ureteral Internal Stent Right ureter 6 Fr  2 days               Imaging:    Imaging Reports Reviewed Today Include: No new imaging    Recent Cultures (last 7 days):     Results from last 7 days   Lab Units 11/17/22  1403 11/17/22  1351 11/15/22  2218 11/15/22  1559   BLOOD CULTURE  Received in Microbiology Lab  Culture in Progress  Received in Microbiology Lab  Culture in Progress  --  Streptococcus agalactiae (Group B)*  No Growth at 48 hrs  GRAM STAIN RESULT   --   --   --  Gram positive cocci in pairs and chains*   URINE CULTURE   --   --  No Growth <100 cfu/mL  --        Last 24 Hours Medication List:   Current Facility-Administered Medications   Medication Dose Route Frequency Provider Last Rate   • acetaminophen  650 mg Oral Q6H PRN Audria Nyhan, MD     • allopurinol  100 mg Oral Daily Audria Nyhan, MD     • apixaban  5 mg Oral BID Audria Nyhan, MD     • cefTRIAXone  1,000 mg Intravenous Q24H Audria Nyhan, MD 1,000 mg (11/17/22 1535)   • metoprolol succinate  100 mg Oral Daily Audria Nyhan, MD     • tamsulosin  0 4 mg Oral Daily With Mike Moore MD     • travoprost  1 drop Both Eyes HS Audria Nyhan, MD          Today, Patient Was Seen By: Arizona Dakins, MD    ** Please Note: Dictation voice to text software may have been used in the creation of this document   **

## 2022-11-18 NOTE — PLAN OF CARE
Problem: PAIN - ADULT  Goal: Verbalizes/displays adequate comfort level or baseline comfort level  Description: Interventions:  - Encourage patient to monitor pain and request assistance  - Assess pain using appropriate pain scale  - Administer analgesics based on type and severity of pain and evaluate response  - Implement non-pharmacological measures as appropriate and evaluate response  - Consider cultural and social influences on pain and pain management  - Notify physician/advanced practitioner if interventions unsuccessful or patient reports new pain  Outcome: Progressing     Problem: INFECTION - ADULT  Goal: Absence or prevention of progression during hospitalization  Description: INTERVENTIONS:  - Assess and monitor for signs and symptoms of infection  - Monitor lab/diagnostic results  - Monitor all insertion sites, i e  indwelling lines, tubes, and drains  - Monitor endotracheal if appropriate and nasal secretions for changes in amount and color  - [place] appropriate cooling/warming therapies per order  - Administer medications as ordered  - Instruct and encourage patient and family to use good hand hygiene technique  - Identify and instruct in appropriate isolation precautions for identified infection/condition  Outcome: Progressing     Problem: SAFETY ADULT  Goal: Patient will remain free of falls  Description: INTERVENTIONS:  - Educate patient/family on patient safety including physical limitations  - Instruct patient to call for assistance with activity   - Consult OT/PT to assist with strengthening/mobility   - Keep Call bell within reach  - Keep bed low and locked with side rails adjusted as appropriate  - Keep care items and personal belongings within reach  - Initiate and maintain comfort rounds  - Make Fall Risk Sign visible to staff  - Offer Toileting every 2 Hours, in advance of need  - Initiate/Maintain bed alarm  - Obtain necessary fall risk management equipment: bed alarm  - Apply yellow socks and bracelet for high fall risk patients  - Consider moving patient to room near nurses station  Outcome: Progressing  Goal: Maintain or return to baseline ADL function  Description: INTERVENTIONS:  -  Assess patient's ability to carry out ADLs; assess patient's baseline for ADL function and identify physical deficits which impact ability to perform ADLs (bathing, care of mouth/teeth, toileting, grooming, dressing, etc )  - Assess/evaluate cause of self-care deficits   - Assess range of motion  - Assess patient's mobility; develop plan if impaired  - Assess patient's need for assistive devices and provide as appropriate  - Encourage maximum independence but intervene and supervise when necessary  - Involve family in performance of ADLs  - Assess for home care needs following discharge   - Consider OT consult to assist with ADL evaluation and planning for discharge  - Provide patient education as appropriate  Outcome: Progressing  Goal: Maintains/Returns to pre admission functional level  Description: INTERVENTIONS:  - Perform BMAT or MOVE assessment daily    - Set and communicate daily mobility goal to care team and patient/family/caregiver     - Collaborate with rehabilitation services on mobility goals if consulted  - Dangle patient 3 times a day  - Stand patient 3 times a day  - Ambulate patient 3 times a day  - Out of bed to chair 3 times a day   - Out of bed for meals 3 times a day  - Out of bed for toileting  - Record patient progress and toleration of activity level   Outcome: Progressing     Problem: DISCHARGE PLANNING  Goal: Discharge to home or other facility with appropriate resources  Description: INTERVENTIONS:  - Identify barriers to discharge w/patient and caregiver  - Arrange for needed discharge resources and transportation as appropriate  - Identify discharge learning needs (meds, wound care, etc )  - Arrange for interpretive services to assist at discharge as needed  - Refer to Case Management Department for coordinating discharge planning if the patient needs post-hospital services based on physician/advanced practitioner order or complex needs related to functional status, cognitive ability, or social support system  Outcome: Progressing     Problem: Knowledge Deficit  Goal: Patient/family/caregiver demonstrates understanding of disease process, treatment plan, medications, and discharge instructions  Description: Complete learning assessment and assess knowledge base    Interventions:  - Provide teaching at level of understanding  - Provide teaching via preferred learning methods  Outcome: Progressing     Problem: RESPIRATORY - ADULT  Goal: Achieves optimal ventilation and oxygenation  Description: INTERVENTIONS:  - Assess for changes in respiratory status  - Assess for changes in mentation and behavior  - Position to facilitate oxygenation and minimize respiratory effort  - Oxygen administered by appropriate delivery if ordered  - Initiate smoking cessation education as indicated  - Encourage broncho-pulmonary hygiene including cough, deep breathe, Incentive Spirometry  - Assess the need for suctioning and aspirate as needed  - Assess and instruct to report SOB or any respiratory difficulty  - Respiratory Therapy support as indicated  Outcome: Progressing     Problem: GENITOURINARY - ADULT  Goal: Maintains or returns to baseline urinary function  Description: INTERVENTIONS:  - Assess urinary function  - Encourage oral fluids to ensure adequate hydration if ordered  - Administer IV fluids as ordered to ensure adequate hydration  - Administer ordered medications as needed  - Offer frequent toileting  - Follow urinary retention protocol if ordered  Outcome: Progressing  Goal: Absence of urinary retention  Description: INTERVENTIONS:  - Assess patient’s ability to void and empty bladder  - Monitor I/O  - Bladder scan as needed  - Discuss with physician/AP medications to alleviate retention as needed  - Discuss catheterization for long term situations as appropriate  Outcome: Progressing  Goal: Urinary catheter remains patent  Description: INTERVENTIONS:  - Assess patency of urinary catheter  - If patient has a chronic jenkins, consider changing catheter if non-functioning  - Follow guidelines for intermittent irrigation of non-functioning urinary catheter  Outcome: Progressing     Problem: METABOLIC, FLUID AND ELECTROLYTES - ADULT  Goal: Electrolytes maintained within normal limits  Description: INTERVENTIONS:  - Monitor labs and assess patient for signs and symptoms of electrolyte imbalances  - Administer electrolyte replacement as ordered  - Monitor response to electrolyte replacements, including repeat lab results as appropriate  - Instruct patient on fluid and nutrition as appropriate  Outcome: Progressing     Problem: SKIN/TISSUE INTEGRITY - ADULT  Goal: Skin Integrity remains intact(Skin Breakdown Prevention)  Description: Assess:  -Perform Mic assessment every shift  -Clean and moisturize skin every day  -Inspect skin when repositioning, toileting, and assisting with ADLS  -Assess extremities for adequate circulation and sensation     Bed Management:  -Have minimal linens on bed & keep smooth, unwrinkled  -Change linens as needed when moist or perspiring  -Avoid sitting or lying in one position for more than 2 hours while in bed    Activity:  -Mobilize patient 3 times a day  -Encourage activity and walks on unit  -Instruct/ Assist with weight shifting every 2 hours when out of bed in chair  Outcome: Progressing  Goal: Incision(s), wounds(s) or drain site(s) healing without S/S of infection  Description: INTERVENTIONS  - Assess and document dressing, incision, wound bed, drain sites and surrounding tissue  - Provide patient and family education  - Perform skin care/dressing changes every other day  Outcome: Progressing     Problem: MUSCULOSKELETAL - ADULT  Goal: Maintain or return mobility to safest level of function  Description: INTERVENTIONS:  - Assess patient's ability to carry out ADLs; assess patient's baseline for ADL function and identify physical deficits which impact ability to perform ADLs (bathing, care of mouth/teeth, toileting, grooming, dressing, etc )  - Assess/evaluate cause of self-care deficits   - Assess range of motion  - Assess patient's mobility  - Assess patient's need for assistive devices and provide as appropriate  - Encourage maximum independence but intervene and supervise when necessary  - Involve family in performance of ADLs  - Assess for home care needs following discharge   - Consider OT consult to assist with ADL evaluation and planning for discharge  - Provide patient education as appropriate  Outcome: Progressing     Problem: MOBILITY - ADULT  Goal: Maintain or return to baseline ADL function  Description: INTERVENTIONS:  -  Assess patient's ability to carry out ADLs; assess patient's baseline for ADL function and identify physical deficits which impact ability to perform ADLs (bathing, care of mouth/teeth, toileting, grooming, dressing, etc )  - Assess/evaluate cause of self-care deficits   - Assess range of motion  - Assess patient's mobility; develop plan if impaired  - Assess patient's need for assistive devices and provide as appropriate  - Encourage maximum independence but intervene and supervise when necessary  - Involve family in performance of ADLs  - Assess for home care needs following discharge   - Consider OT consult to assist with ADL evaluation and planning for discharge  - Provide patient education as appropriate  Outcome: Progressing  Goal: Maintains/Returns to pre admission functional level  Description: INTERVENTIONS:  - Perform BMAT or MOVE assessment daily    - Set and communicate daily mobility goal to care team and patient/family/caregiver     - Collaborate with rehabilitation services on mobility goals if consulted  - Dangle patient 3 times a day  - Stand patient 3 times a day  - Ambulate patient 3 times a day  - Out of bed to chair 3 times a day   - Out of bed for meals 3 times a day  - Out of bed for toileting  - Record patient progress and toleration of activity level   Outcome: Progressing     Problem: Prexisting or High Potential for Compromised Skin Integrity  Goal: Skin integrity is maintained or improved  Description: INTERVENTIONS:  - Identify patients at risk for skin breakdown  - Assess and monitor skin integrity  - Assess and monitor nutrition and hydration status  - Monitor labs   - Assess for incontinence   - Turn and reposition patient  - Assist with mobility/ambulation  - Relieve pressure over bony prominences  - Avoid friction and shearing  - Provide appropriate hygiene as needed including keeping skin clean and dry  - Evaluate need for skin moisturizer/barrier cream  - Collaborate with interdisciplinary team   - Patient/family teaching  - Consider wound care consult   Outcome: Progressing     Problem: Potential for Falls  Goal: Patient will remain free of falls  Description: INTERVENTIONS:  - Educate patient/family on patient safety including physical limitations  - Instruct patient to call for assistance with activity   - Consult OT/PT to assist with strengthening/mobility   - Keep Call bell within reach  - Keep bed low and locked with side rails adjusted as appropriate  - Keep care items and personal belongings within reach  - Initiate and maintain comfort rounds  - Make Fall Risk Sign visible to staff  - Offer Toileting every 2 Hours, in advance of need  - Initiate/Maintain bed alarm  - Obtain necessary fall risk management equipment: bed alarm  - Apply yellow socks and bracelet for high fall risk patients  - Consider moving patient to room near nurses station  Outcome: Progressing

## 2022-11-19 VITALS
SYSTOLIC BLOOD PRESSURE: 116 MMHG | RESPIRATION RATE: 18 BRPM | WEIGHT: 315 LBS | HEART RATE: 62 BPM | HEIGHT: 70 IN | DIASTOLIC BLOOD PRESSURE: 56 MMHG | BODY MASS INDEX: 45.1 KG/M2 | OXYGEN SATURATION: 95 % | TEMPERATURE: 98.4 F

## 2022-11-19 LAB
AORTIC ROOT: 3.4 CM
AORTIC VALVE MEAN VELOCITY: 12.1 M/S
APICAL FOUR CHAMBER EJECTION FRACTION: 55 %
ASCENDING AORTA: 3.7 CM
AV LVOT MEAN GRADIENT: 3 MMHG
AV LVOT PEAK GRADIENT: 6 MMHG
AV MEAN GRADIENT: 7 MMHG
AV PEAK GRADIENT: 12 MMHG
AV VELOCITY RATIO: 0.71
DOP CALC AO PEAK VEL: 1.76 M/S
DOP CALC AO VTI: 33.63 CM
DOP CALC LVOT PEAK VEL VTI: 23.59 CM
DOP CALC LVOT PEAK VEL: 1.25 M/S
E WAVE DECELERATION TIME: 262 MS
FRACTIONAL SHORTENING: 28 (ref 28–44)
GLUCOSE SERPL-MCNC: 95 MG/DL (ref 65–140)
INTERVENTRICULAR SEPTUM IN DIASTOLE (PARASTERNAL SHORT AXIS VIEW): 1.3 CM
INTERVENTRICULAR SEPTUM: 1.3 CM (ref 0.6–1.1)
IVC: 28.6 MM
LAAS-AP2: 21.4 CM2
LAAS-AP4: 23.4 CM2
LEFT ATRIUM SIZE: 4.6 CM
LEFT INTERNAL DIMENSION IN SYSTOLE: 4.1 CM (ref 2.1–4)
LEFT VENTRICULAR INTERNAL DIMENSION IN DIASTOLE: 5.7 CM (ref 3.5–6)
LEFT VENTRICULAR POSTERIOR WALL IN END DIASTOLE: 1.2 CM
LEFT VENTRICULAR STROKE VOLUME: 89 ML
LVSV (TEICH): 89 ML
MV E'TISSUE VEL-LAT: 9 CM/S
MV E'TISSUE VEL-SEP: 8 CM/S
MV PEAK A VEL: 1.16 M/S
MV PEAK E VEL: 107 CM/S
MV STENOSIS PRESSURE HALF TIME: 76 MS
MV VALVE AREA P 1/2 METHOD: 2.89
PA SYSTOLIC PRESSURE: 54 MMHG
RA PRESSURE ESTIMATED: 10 MMHG
RIGHT ATRIAL 2D VOLUME: 44 ML
RIGHT ATRIUM AREA SYSTOLE A4C: 18.7 CM2
RIGHT VENTRICLE ID DIMENSION: 4.1 CM
RV PSP: 54 MMHG
SL CV LEFT ATRIUM LENGTH A2C: 5.9 CM
SL CV LV EF: 55
SL CV PED ECHO LEFT VENTRICLE DIASTOLIC VOLUME (MOD BIPLANE) 2D: 163 ML
SL CV PED ECHO LEFT VENTRICLE SYSTOLIC VOLUME (MOD BIPLANE) 2D: 74 ML
TR MAX PG: 44 MMHG
TR PEAK VELOCITY: 3.3 M/S
TRICUSPID ANNULAR PLANE SYSTOLIC EXCURSION: 2.7 CM
TRICUSPID VALVE PEAK REGURGITATION VELOCITY: 3.32 M/S

## 2022-11-19 RX ORDER — AMOXICILLIN 500 MG/1
1000 CAPSULE ORAL EVERY 8 HOURS SCHEDULED
Qty: 66 CAPSULE | Refills: 0 | Status: SHIPPED | OUTPATIENT
Start: 2022-11-19 | End: 2022-11-19 | Stop reason: SDUPTHER

## 2022-11-19 RX ORDER — AMOXICILLIN 500 MG/1
1000 CAPSULE ORAL EVERY 8 HOURS SCHEDULED
Qty: 66 CAPSULE | Refills: 0 | Status: SHIPPED | OUTPATIENT
Start: 2022-11-19 | End: 2022-11-30

## 2022-11-19 RX ADMIN — METOPROLOL SUCCINATE 100 MG: 100 TABLET, EXTENDED RELEASE ORAL at 09:14

## 2022-11-19 RX ADMIN — CEFTRIAXONE SODIUM 2000 MG: 10 INJECTION, POWDER, FOR SOLUTION INTRAVENOUS at 13:31

## 2022-11-19 RX ADMIN — ALLOPURINOL 100 MG: 100 TABLET ORAL at 09:14

## 2022-11-19 RX ADMIN — APIXABAN 5 MG: 5 TABLET, FILM COATED ORAL at 09:15

## 2022-11-19 NOTE — NURSING NOTE
Discussed d/c instructions and follow up appointment with patient and spouse  Both patient and spouse verbalize understanding of instructions  IV removed  Patient left with all belongings and home wound vac applied to patient

## 2022-11-19 NOTE — PLAN OF CARE
Problem: PAIN - ADULT  Goal: Verbalizes/displays adequate comfort level or baseline comfort level  Description: Interventions:  - Encourage patient to monitor pain and request assistance  - Assess pain using appropriate pain scale  - Administer analgesics based on type and severity of pain and evaluate response  - Implement non-pharmacological measures as appropriate and evaluate response  - Consider cultural and social influences on pain and pain management  - Notify physician/advanced practitioner if interventions unsuccessful or patient reports new pain  Outcome: Progressing     Problem: INFECTION - ADULT  Goal: Absence or prevention of progression during hospitalization  Description: INTERVENTIONS:  - Assess and monitor for signs and symptoms of infection  - Monitor lab/diagnostic results  - Monitor all insertion sites, i e  indwelling lines, tubes, and drains  - Monitor endotracheal if appropriate and nasal secretions for changes in amount and color  - [place] appropriate cooling/warming therapies per order  - Administer medications as ordered  - Instruct and encourage patient and family to use good hand hygiene technique  - Identify and instruct in appropriate isolation precautions for identified infection/condition  Outcome: Progressing     Problem: SAFETY ADULT  Goal: Patient will remain free of falls  Description: INTERVENTIONS:  - Educate patient/family on patient safety including physical limitations  - Instruct patient to call for assistance with activity   - Consult OT/PT to assist with strengthening/mobility   - Keep Call bell within reach  - Keep bed low and locked with side rails adjusted as appropriate  - Keep care items and personal belongings within reach  - Initiate and maintain comfort rounds  - Make Fall Risk Sign visible to staff  - Offer Toileting every 2 Hours, in advance of need  - Initiate/Maintain bed alarm  - Obtain necessary fall risk management equipment: bed alarm  - Apply yellow socks and bracelet for high fall risk patients  - Consider moving patient to room near nurses station  Outcome: Progressing  Goal: Maintain or return to baseline ADL function  Description: INTERVENTIONS:  -  Assess patient's ability to carry out ADLs; assess patient's baseline for ADL function and identify physical deficits which impact ability to perform ADLs (bathing, care of mouth/teeth, toileting, grooming, dressing, etc )  - Assess/evaluate cause of self-care deficits   - Assess range of motion  - Assess patient's mobility; develop plan if impaired  - Assess patient's need for assistive devices and provide as appropriate  - Encourage maximum independence but intervene and supervise when necessary  - Involve family in performance of ADLs  - Assess for home care needs following discharge   - Consider OT consult to assist with ADL evaluation and planning for discharge  - Provide patient education as appropriate  Outcome: Progressing  Goal: Maintains/Returns to pre admission functional level  Description: INTERVENTIONS:  - Perform BMAT or MOVE assessment daily    - Set and communicate daily mobility goal to care team and patient/family/caregiver     - Collaborate with rehabilitation services on mobility goals if consulted  - Dangle patient 3 times a day  - Stand patient 3 times a day  - Ambulate patient 3 times a day  - Out of bed to chair 3 times a day   - Out of bed for meals 3 times a day  - Out of bed for toileting  - Record patient progress and toleration of activity level   Outcome: Progressing     Problem: DISCHARGE PLANNING  Goal: Discharge to home or other facility with appropriate resources  Description: INTERVENTIONS:  - Identify barriers to discharge w/patient and caregiver  - Arrange for needed discharge resources and transportation as appropriate  - Identify discharge learning needs (meds, wound care, etc )  - Arrange for interpretive services to assist at discharge as needed  - Refer to Case Management Department for coordinating discharge planning if the patient needs post-hospital services based on physician/advanced practitioner order or complex needs related to functional status, cognitive ability, or social support system  Outcome: Progressing     Problem: Knowledge Deficit  Goal: Patient/family/caregiver demonstrates understanding of disease process, treatment plan, medications, and discharge instructions  Description: Complete learning assessment and assess knowledge base    Interventions:  - Provide teaching at level of understanding  - Provide teaching via preferred learning methods  Outcome: Progressing     Problem: RESPIRATORY - ADULT  Goal: Achieves optimal ventilation and oxygenation  Description: INTERVENTIONS:  - Assess for changes in respiratory status  - Assess for changes in mentation and behavior  - Position to facilitate oxygenation and minimize respiratory effort  - Oxygen administered by appropriate delivery if ordered  - Initiate smoking cessation education as indicated  - Encourage broncho-pulmonary hygiene including cough, deep breathe, Incentive Spirometry  - Assess the need for suctioning and aspirate as needed  - Assess and instruct to report SOB or any respiratory difficulty  - Respiratory Therapy support as indicated  Outcome: Progressing     Problem: GENITOURINARY - ADULT  Goal: Maintains or returns to baseline urinary function  Description: INTERVENTIONS:  - Assess urinary function  - Encourage oral fluids to ensure adequate hydration if ordered  - Administer IV fluids as ordered to ensure adequate hydration  - Administer ordered medications as needed  - Offer frequent toileting  - Follow urinary retention protocol if ordered  Outcome: Progressing  Goal: Absence of urinary retention  Description: INTERVENTIONS:  - Assess patient’s ability to void and empty bladder  - Monitor I/O  - Bladder scan as needed  - Discuss with physician/AP medications to alleviate retention as needed  - Discuss catheterization for long term situations as appropriate  Outcome: Progressing  Goal: Urinary catheter remains patent  Description: INTERVENTIONS:  - Assess patency of urinary catheter  - If patient has a chronic jenkins, consider changing catheter if non-functioning  - Follow guidelines for intermittent irrigation of non-functioning urinary catheter  Outcome: Progressing     Problem: METABOLIC, FLUID AND ELECTROLYTES - ADULT  Goal: Electrolytes maintained within normal limits  Description: INTERVENTIONS:  - Monitor labs and assess patient for signs and symptoms of electrolyte imbalances  - Administer electrolyte replacement as ordered  - Monitor response to electrolyte replacements, including repeat lab results as appropriate  - Instruct patient on fluid and nutrition as appropriate  Outcome: Progressing     Problem: SKIN/TISSUE INTEGRITY - ADULT  Goal: Skin Integrity remains intact(Skin Breakdown Prevention)  Description: Assess:  -Perform Mic assessment every shift  -Clean and moisturize skin every day  -Inspect skin when repositioning, toileting, and assisting with ADLS  -Assess extremities for adequate circulation and sensation     Bed Management:  -Have minimal linens on bed & keep smooth, unwrinkled  -Change linens as needed when moist or perspiring  -Avoid sitting or lying in one position for more than 2 hours while in bed    Activity:  -Mobilize patient 3 times a day  -Encourage activity and walks on unit  -Instruct/ Assist with weight shifting every 2 hours when out of bed in chair  Outcome: Progressing  Goal: Incision(s), wounds(s) or drain site(s) healing without S/S of infection  Description: INTERVENTIONS  - Assess and document dressing, incision, wound bed, drain sites and surrounding tissue  - Provide patient and family education  - Perform skin care/dressing changes every other day  Outcome: Progressing     Problem: MUSCULOSKELETAL - ADULT  Goal: Maintain or return mobility to safest level of function  Description: INTERVENTIONS:  - Assess patient's ability to carry out ADLs; assess patient's baseline for ADL function and identify physical deficits which impact ability to perform ADLs (bathing, care of mouth/teeth, toileting, grooming, dressing, etc )  - Assess/evaluate cause of self-care deficits   - Assess range of motion  - Assess patient's mobility  - Assess patient's need for assistive devices and provide as appropriate  - Encourage maximum independence but intervene and supervise when necessary  - Involve family in performance of ADLs  - Assess for home care needs following discharge   - Consider OT consult to assist with ADL evaluation and planning for discharge  - Provide patient education as appropriate  Outcome: Progressing     Problem: MOBILITY - ADULT  Goal: Maintain or return to baseline ADL function  Description: INTERVENTIONS:  -  Assess patient's ability to carry out ADLs; assess patient's baseline for ADL function and identify physical deficits which impact ability to perform ADLs (bathing, care of mouth/teeth, toileting, grooming, dressing, etc )  - Assess/evaluate cause of self-care deficits   - Assess range of motion  - Assess patient's mobility; develop plan if impaired  - Assess patient's need for assistive devices and provide as appropriate  - Encourage maximum independence but intervene and supervise when necessary  - Involve family in performance of ADLs  - Assess for home care needs following discharge   - Consider OT consult to assist with ADL evaluation and planning for discharge  - Provide patient education as appropriate  Outcome: Progressing  Goal: Maintains/Returns to pre admission functional level  Description: INTERVENTIONS:  - Perform BMAT or MOVE assessment daily    - Set and communicate daily mobility goal to care team and patient/family/caregiver     - Collaborate with rehabilitation services on mobility goals if consulted  - Dangle patient 3 times a day  - Stand patient 3 times a day  - Ambulate patient 3 times a day  - Out of bed to chair 3 times a day   - Out of bed for meals 3 times a day  - Out of bed for toileting  - Record patient progress and toleration of activity level   Outcome: Progressing     Problem: Prexisting or High Potential for Compromised Skin Integrity  Goal: Skin integrity is maintained or improved  Description: INTERVENTIONS:  - Identify patients at risk for skin breakdown  - Assess and monitor skin integrity  - Assess and monitor nutrition and hydration status  - Monitor labs   - Assess for incontinence   - Turn and reposition patient  - Assist with mobility/ambulation  - Relieve pressure over bony prominences  - Avoid friction and shearing  - Provide appropriate hygiene as needed including keeping skin clean and dry  - Evaluate need for skin moisturizer/barrier cream  - Collaborate with interdisciplinary team   - Patient/family teaching  - Consider wound care consult   Outcome: Progressing     Problem: Potential for Falls  Goal: Patient will remain free of falls  Description: INTERVENTIONS:  - Educate patient/family on patient safety including physical limitations  - Instruct patient to call for assistance with activity   - Consult OT/PT to assist with strengthening/mobility   - Keep Call bell within reach  - Keep bed low and locked with side rails adjusted as appropriate  - Keep care items and personal belongings within reach  - Initiate and maintain comfort rounds  - Make Fall Risk Sign visible to staff  - Offer Toileting every 2 Hours, in advance of need  - Initiate/Maintain bed alarm  - Obtain necessary fall risk management equipment: bed alarm  - Apply yellow socks and bracelet for high fall risk patients  - Consider moving patient to room near nurses station  Outcome: Progressing

## 2022-11-19 NOTE — CASE MANAGEMENT
Case Management Discharge Planning Note    Patient name Nader Cousin  Location East  /E4 [address] [facility]-* MRN [id number]  : 1955 Date 2022       Current Admission Date: 11/15/2022  Current Admission Diagnosis:Severe sepsis [facility])   Patient Active Problem List    Diagnosis Date Noted   • Abdominal pain 11/15/2022   • Surgical wound present 10/14/2022   • Wound drainage 10/06/2022   • Pulmonary embolism ([facility] [address] ) 2022   • Acute kidney injury ([facility] [address] ) 2022   • Anemia 2022   • Strangulated hernia of abdominal wall 2022   • COVID-19 2022   • Severe sepsis ([facility] [address] ) 2021   • Right foot pain 2019   • Class 3 severe obesity due to excess calories with serious comorbidity and body mass index (BMI) of 50 0 to 59 9 in adult ([facility] [address] ) 2019   • Lymphedema of both lower extremities 2019   • Acute kidney injury superimposed on chronic kidney disease ([facility] [address] ) 2018   • HDL deficiency 2018   • Gout 2018   • DELIA (obstructive sleep apnea) 2018   • Allergic rhinitis 2018   • Essential hypertension 2018   • Kidney stones 2018      LOS (days): 4  Geometric Mean LOS (GMLOS) (days): 9 60  Days to GMLOS:5 9     OBJECTIVE:  Risk of Unplanned Readmission Score: 21 77         Current admission status: Inpatient   Preferred Pharmacy:   [facility] , [address] [place] [id number]  Phone: [phone] Fax: [phone]    [id number] [place] [address]  Phone: [phone] Fax: [phone]    Primary Care Provider: Francisco Javier Seals MD    Primary Insurance: MEDICARE  Secondary Insurance: [employer]    DISCHARGE DETAILS:     CM notified by [facility] that patient is being discharged today  CM sent a message to [facility] them of patient's discharge

## 2022-11-20 LAB — BACTERIA BLD CULT: NORMAL

## 2022-11-21 ENCOUNTER — HOME CARE VISIT (OUTPATIENT)
Dept: HOME HEALTH SERVICES | Facility: HOME HEALTHCARE | Age: 67
End: 2022-11-21

## 2022-11-21 ENCOUNTER — TRANSITIONAL CARE MANAGEMENT (OUTPATIENT)
Dept: FAMILY MEDICINE CLINIC | Facility: CLINIC | Age: 67
End: 2022-11-21

## 2022-11-21 DIAGNOSIS — R78.81 BACTEREMIA: Primary | ICD-10-CM

## 2022-11-22 ENCOUNTER — OFFICE VISIT (OUTPATIENT)
Dept: FAMILY MEDICINE CLINIC | Facility: CLINIC | Age: 67
End: 2022-11-22

## 2022-11-22 VITALS
WEIGHT: 315 LBS | HEIGHT: 70 IN | SYSTOLIC BLOOD PRESSURE: 118 MMHG | RESPIRATION RATE: 17 BRPM | HEART RATE: 79 BPM | OXYGEN SATURATION: 97 % | DIASTOLIC BLOOD PRESSURE: 52 MMHG | BODY MASS INDEX: 45.1 KG/M2 | TEMPERATURE: 97.9 F

## 2022-11-22 VITALS
DIASTOLIC BLOOD PRESSURE: 72 MMHG | OXYGEN SATURATION: 97 % | SYSTOLIC BLOOD PRESSURE: 126 MMHG | RESPIRATION RATE: 18 BRPM | TEMPERATURE: 97.2 F | HEART RATE: 71 BPM

## 2022-11-22 DIAGNOSIS — N18.9 ACUTE KIDNEY INJURY SUPERIMPOSED ON CHRONIC KIDNEY DISEASE (HCC): Primary | ICD-10-CM

## 2022-11-22 DIAGNOSIS — N20.0 KIDNEY STONES: ICD-10-CM

## 2022-11-22 DIAGNOSIS — L24.A9 WOUND DRAINAGE: ICD-10-CM

## 2022-11-22 DIAGNOSIS — I10 ESSENTIAL HYPERTENSION: ICD-10-CM

## 2022-11-22 DIAGNOSIS — T14.8XXA SURGICAL WOUND PRESENT: ICD-10-CM

## 2022-11-22 DIAGNOSIS — N17.9 ACUTE KIDNEY INJURY SUPERIMPOSED ON CHRONIC KIDNEY DISEASE (HCC): Primary | ICD-10-CM

## 2022-11-22 DIAGNOSIS — I26.99 PULMONARY EMBOLISM, UNSPECIFIED CHRONICITY, UNSPECIFIED PULMONARY EMBOLISM TYPE, UNSPECIFIED WHETHER ACUTE COR PULMONALE PRESENT (HCC): ICD-10-CM

## 2022-11-22 LAB
BACTERIA BLD CULT: NORMAL
BACTERIA BLD CULT: NORMAL

## 2022-11-22 NOTE — CASE COMMUNICATION
[facility] has admitted your patient to [address] [place] [address] service with the following disciplines:      SN, PT, OT and MSW  This report is informational only, no responses is needed  Primary focus of home health care    INTEGUMENTARY  Maria G Jackson Patient stated goals of care    WOUND TO HEAL AND BE INFECTION FREE  Anticipated visit pattern and next visit date    2W1  1E1  NEXT VISIT WEDS  See medication list - meds in home differ from AVS  ALL EMDS A T HOME    Other pertinent information    GOT A VERBAL FROM DR Elvin Akbar TO CONTINUE PREVIOUS WOUND VAC ORDER  Thank you for allowing us to participate in the care of your patient

## 2022-11-22 NOTE — PROGRESS NOTES
Assessment & Plan     1  Acute kidney injury superimposed on chronic kidney disease ([facility] [address] )    2  Essential hypertension  -     Basic metabolic panel; Future; Expected date: 12/06/2022  -     CBC and differential; Future; Expected date: 12/06/2022    3  Kidney stones  -     Basic metabolic panel; Future; Expected date: 12/06/2022  -     CBC and differential; Future; Expected date: 12/06/2022    4  Surgical wound present    5  Wound drainage    6  Pulmonary embolism, unspecified chronicity, unspecified pulmonary embolism type, unspecified whether acute cor pulmonale present [facility])         Subjective     Transitional Care Management Review:   Obie Ramos is a 78 y o  male here for TCM follow up  During the TCM phone call patient stated:  TCM Call     Date and time call was made  11/21/2022 11:47 AM    Hospital care reviewed  Records reviewed    Patient was hospitialized at  [address]    Date of Admission  11/15/22    Date of discharge  11/19/22    Diagnosis  Strangulated hernia of abdominal wall    Disposition  Home    Were the patients medications reviewed and updated  No    Current Symptoms  None    Fatigue severity  Mild      TCM Call     Post hospital issues  None    Should patient be enrolled in anticoag monitoring? No    Scheduled for follow up?   No    Did you obtain your prescribed medications  Yes    Do you need help managing your prescriptions or medications  No    Is transportation to your appointment needed  No    I have advised the patient to call PCP with any new or worsening symptoms  dana bahena    Living Arrangements  Spouse or Significiant other; Family members    Support System  Family    The type of support provided  Emotional; Physical; Financial    Do you have social support  Yes, as much as I need    Are you recieving any outpatient services  No    Are you recieving home care services  Yes    Types of home care services  Nurse visit    Are you using any community resources  No    Current waiver services  No    Have you fallen in the last 12 months  No    Interperter language line needed  No    Counseling  Patient    Counseling topics  Activities of daily living; Importance of RX compliance; Home health agency benefits        Patient is here for T cm visit less than 7  He was admitted with high fevers possibly sepsis workup showed a obstructing kidney stone on the right side as well as mild hydronephrosis  He was treated empirically with antibiotics blood culture did show positive he is on amoxicillin for a total of 14 days  he had a ureteral stent placed while in the hospital   He had a Arias in briefly that has been removed  He has no current urinary symptoms his flank pain has been improved nicely he has had no further fever since being home  It is recommended repeat blood cultures 7 days after the antibiotics  He had mild renal insufficiency initially that resolved  Visiting nurses continue to follow with the abdominal wound which by all reports seems to be improving  Review of Systems   Constitutional: Positive for fatigue  Negative for activity change, appetite change and fever  HENT: Negative for trouble swallowing  Eyes: Negative for visual disturbance  Respiratory: Negative for cough and shortness of breath  Cardiovascular: Negative for chest pain, palpitations and leg swelling  Gastrointestinal: Negative for abdominal pain and blood in stool  Endocrine: Negative for polyuria  Genitourinary: Negative for difficulty urinating and hematuria  Skin: Negative for rash  Neurological: Negative for dizziness  Psychiatric/Behavioral: Negative for behavioral problems         Objective     /52 (BP Location: Left arm, Patient Position: Sitting, Cuff Size: Large)   Pulse 79   Temp 97 9 °F (36 6 °C)   Resp 17   Ht 5' 10" (1 778 m)   Wt (!) 153 kg (337 lb 9 6 oz)   SpO2 97%   BMI 48 44 kg/m²      Physical Exam  Vitals and nursing note reviewed  Constitutional:       General: He is not in acute distress  Appearance: He is well-developed  HENT:      Head: Normocephalic and atraumatic  Eyes:      Conjunctiva/sclera: Conjunctivae normal    Cardiovascular:      Rate and Rhythm: Normal rate and regular rhythm  Heart sounds: No murmur heard  Pulmonary:      Effort: Pulmonary effort is normal  No respiratory distress  Breath sounds: Normal breath sounds  Abdominal:      Palpations: Abdomen is soft  Tenderness: There is no abdominal tenderness  Musculoskeletal:         General: No swelling  Cervical back: Neck supple  Skin:     General: Skin is warm and dry  Capillary Refill: Capillary refill takes less than 2 seconds  Neurological:      Mental Status: He is alert  Psychiatric:         Mood and Affect: Mood normal          Sepsis event related to kidney stone and obstruction  seems improving            Ronald Webb MD

## 2022-11-22 NOTE — PATIENT INSTRUCTIONS
You may take 2 of the water pills (furosemide) in the morning for several days if the swelling does not improve

## 2022-11-22 NOTE — CASE COMMUNICATION
PT and OT were not ordered at [facility]  Please enter 1x1 visit set for each if patient needs therapy   Thank you

## 2022-11-23 ENCOUNTER — HOME CARE VISIT (OUTPATIENT)
Dept: HOME HEALTH SERVICES | Facility: HOME HEALTHCARE | Age: 67
End: 2022-11-23

## 2022-11-23 VITALS
TEMPERATURE: 97.5 F | DIASTOLIC BLOOD PRESSURE: 65 MMHG | RESPIRATION RATE: 20 BRPM | OXYGEN SATURATION: 99 % | HEART RATE: 85 BPM | SYSTOLIC BLOOD PRESSURE: 120 MMHG

## 2022-11-24 NOTE — CASE COMMUNICATION
Ship to     Home     Insurance Medicare     Wound 1 2 abd wounds      Full x             [place] [id number]      Alginate with Silver [id number]      Adapt skin barrier no sting wipes 50 per box  [id number]

## 2022-11-25 ENCOUNTER — OFFICE VISIT (OUTPATIENT)
Dept: WOUND CARE | Facility: CLINIC | Age: 67
End: 2022-11-25

## 2022-11-25 VITALS
HEART RATE: 64 BPM | RESPIRATION RATE: 18 BRPM | SYSTOLIC BLOOD PRESSURE: 150 MMHG | TEMPERATURE: 98.8 F | DIASTOLIC BLOOD PRESSURE: 82 MMHG

## 2022-11-25 DIAGNOSIS — E66.01 CLASS 3 SEVERE OBESITY DUE TO EXCESS CALORIES WITH SERIOUS COMORBIDITY AND BODY MASS INDEX (BMI) OF 50.0 TO 59.9 IN ADULT (HCC): ICD-10-CM

## 2022-11-25 DIAGNOSIS — T14.8XXA SURGICAL WOUND PRESENT: Primary | ICD-10-CM

## 2022-11-25 RX ORDER — LIDOCAINE HYDROCHLORIDE 40 MG/ML
5 SOLUTION TOPICAL ONCE
Status: COMPLETED | OUTPATIENT
Start: 2022-11-25 | End: 2022-11-25

## 2022-11-25 RX ADMIN — LIDOCAINE HYDROCHLORIDE 5 ML: 40 SOLUTION TOPICAL at 09:38

## 2022-11-25 NOTE — PATIENT INSTRUCTIONS
Orders Placed This Encounter   Procedures    Wound cleansing and dressings     Wound cleansing and dressings          Wash your hands with soap and water  Remove old dressing, discard into plastic bag and place in trash  Cleanse the wound with normal saline solution prior to applying a clean dressing  Do not use tissue or cotton balls  Do not scrub the wound  Pat dry using gauze  Shower YES,on days your dressing gets changed  DO NOT USE SKIN PROTECTANT TO SKIN UNDER DERMATAC  Apply black granufoam to the both wounds bridging dressing  DO NOT ALLOW SPONGE TO TOUCH THE SKIN    Cover with dermatac vac drape  May use regular drape over sponge and dermatac  Settings at 125mmHg continuous  Change dressing 3 times per week      Silver alginate to cover satelite open areas to upper abdomen     No lifting more than 20lbs  Consume 3-4 servings of protein daily  Continue visiting nurses skilled 3 times per week for wound care dressing changes    May skip days patient goes to Wound Care appointment     Follow up with Wound Management in 2 weeks  Plastics consult with Dr Libra Tao on 12/22/22 at [address] [place] [address]  Cleansed with NSS and redressed as ordered above     Standing Status:   Future     Standing Expiration Date:   11/25/2023

## 2022-11-25 NOTE — ASSESSMENT & PLAN NOTE
The wounds continue to improve  The right lower quadrant wound has filled in and doing very well  The main midline wound is also improving  All the edges have flattened down and uniformity granulation tissue  The left side of the wound still has some disconnect and a portion of the total mesh was removed with scissors  Continue the same care  I did discuss them long-term options and the possibility of skin grafting to help expedite healing  I will refer him to Plastic surgery next month for evaluation and consultation

## 2022-11-25 NOTE — PROGRESS NOTES
Negative Pressure Wound Therapy  Performed by: Ana Sebastian RN  Authorized by: Lou Leggett MD   Universal Protocol:  Consent: Verbal consent obtained  Consent given by: patient  Time out: Immediately prior to procedure a "time out" was called to verify the correct patient, procedure, equipment, support staff and site/side marked as required    Timeout called at: 11/25/2022 9:40 AM   Patient understanding: patient states understanding of the procedure being performed  Patient identity confirmed: verbally with patient      Performed by[de-identified]  Clinician  Type[de-identified]  [facility]  Coverage Size (sq cm)::  379 3  Pressure Type[de-identified]  Constant  Pressure Setting[de-identified]  125 mmHG  Sponge Type[de-identified]  Black  Sponge Size:  Large  Total Number Of Sponges Removed Prior To Application[de-identified]  3  Total Number Of Sponges Used For This Application[de-identified]  3

## 2022-11-28 ENCOUNTER — HOME CARE VISIT (OUTPATIENT)
Dept: HOME HEALTH SERVICES | Facility: HOME HEALTHCARE | Age: 67
End: 2022-11-28

## 2022-11-28 VITALS
TEMPERATURE: 98.2 F | DIASTOLIC BLOOD PRESSURE: 62 MMHG | HEART RATE: 91 BPM | SYSTOLIC BLOOD PRESSURE: 132 MMHG | OXYGEN SATURATION: 95 % | RESPIRATION RATE: 18 BRPM

## 2022-11-28 NOTE — CASE COMMUNICATION
Home Health need continues for: wound vac changes  Current level of functional ability: requires assistance   Homebound status and living arrangements: lives with family   has decreased endurance and is a fall risk   Goals accomplished and/or measurable progress toward unmet goals in past 60 days: pt continues to work towards goal of wound healing  Focus of care for next 60 days for each discipline ordered: wound vac changes MWF  Skin in tegrity/wound status: 2 abdominal wounds present  Code status:   Most recent fall risk: high

## 2022-11-30 ENCOUNTER — HOME CARE VISIT (OUTPATIENT)
Dept: HOME HEALTH SERVICES | Facility: HOME HEALTHCARE | Age: 67
End: 2022-11-30

## 2022-11-30 VITALS
HEART RATE: 74 BPM | BODY MASS INDEX: 47.59 KG/M2 | OXYGEN SATURATION: 97 % | RESPIRATION RATE: 18 BRPM | WEIGHT: 315 LBS | TEMPERATURE: 98 F | DIASTOLIC BLOOD PRESSURE: 70 MMHG | SYSTOLIC BLOOD PRESSURE: 130 MMHG

## 2022-12-02 ENCOUNTER — HOME CARE VISIT (OUTPATIENT)
Dept: HOME HEALTH SERVICES | Facility: HOME HEALTHCARE | Age: 67
End: 2022-12-02

## 2022-12-02 VITALS
DIASTOLIC BLOOD PRESSURE: 68 MMHG | SYSTOLIC BLOOD PRESSURE: 110 MMHG | HEART RATE: 86 BPM | RESPIRATION RATE: 20 BRPM | OXYGEN SATURATION: 98 % | TEMPERATURE: 98 F

## 2022-12-05 ENCOUNTER — HOME CARE VISIT (OUTPATIENT)
Dept: HOME HEALTH SERVICES | Facility: HOME HEALTHCARE | Age: 67
End: 2022-12-05

## 2022-12-05 VITALS
TEMPERATURE: 98.2 F | SYSTOLIC BLOOD PRESSURE: 130 MMHG | HEART RATE: 85 BPM | RESPIRATION RATE: 18 BRPM | DIASTOLIC BLOOD PRESSURE: 70 MMHG | OXYGEN SATURATION: 98 %

## 2022-12-07 ENCOUNTER — HOME CARE VISIT (OUTPATIENT)
Dept: HOME HEALTH SERVICES | Facility: HOME HEALTHCARE | Age: 67
End: 2022-12-07

## 2022-12-07 VITALS
RESPIRATION RATE: 20 BRPM | SYSTOLIC BLOOD PRESSURE: 132 MMHG | TEMPERATURE: 97 F | DIASTOLIC BLOOD PRESSURE: 72 MMHG | HEART RATE: 80 BPM

## 2022-12-08 ENCOUNTER — RA CDI HCC (OUTPATIENT)
Dept: OTHER | Facility: HOSPITAL | Age: 67
End: 2022-12-08

## 2022-12-09 ENCOUNTER — APPOINTMENT (OUTPATIENT)
Dept: LAB | Facility: HOSPITAL | Age: 67
End: 2022-12-09

## 2022-12-09 ENCOUNTER — OFFICE VISIT (OUTPATIENT)
Dept: WOUND CARE | Facility: CLINIC | Age: 67
End: 2022-12-09

## 2022-12-09 ENCOUNTER — HOME CARE VISIT (OUTPATIENT)
Dept: HOME HEALTH SERVICES | Facility: HOME HEALTHCARE | Age: 67
End: 2022-12-09

## 2022-12-09 VITALS
WEIGHT: 315 LBS | TEMPERATURE: 98.6 F | DIASTOLIC BLOOD PRESSURE: 76 MMHG | RESPIRATION RATE: 22 BRPM | HEART RATE: 104 BPM | BODY MASS INDEX: 47.54 KG/M2 | SYSTOLIC BLOOD PRESSURE: 120 MMHG

## 2022-12-09 DIAGNOSIS — R39.89 SUSPECTED UTI: ICD-10-CM

## 2022-12-09 DIAGNOSIS — I10 ESSENTIAL HYPERTENSION: ICD-10-CM

## 2022-12-09 DIAGNOSIS — N20.0 KIDNEY STONES: ICD-10-CM

## 2022-12-09 DIAGNOSIS — T14.8XXA SURGICAL WOUND PRESENT: Primary | ICD-10-CM

## 2022-12-09 DIAGNOSIS — E66.01 CLASS 3 SEVERE OBESITY DUE TO EXCESS CALORIES WITH SERIOUS COMORBIDITY AND BODY MASS INDEX (BMI) OF 50.0 TO 59.9 IN ADULT (HCC): ICD-10-CM

## 2022-12-09 LAB
ANION GAP SERPL CALCULATED.3IONS-SCNC: 4 MMOL/L (ref 5–14)
BASOPHILS # BLD AUTO: 0.07 THOUSANDS/ÂΜL (ref 0–0.1)
BASOPHILS NFR BLD AUTO: 1 % (ref 0–1)
BUN SERPL-MCNC: 17 MG/DL (ref 5–25)
CALCIUM SERPL-MCNC: 9 MG/DL (ref 8.4–10.2)
CHLORIDE SERPL-SCNC: 103 MMOL/L (ref 96–108)
CO2 SERPL-SCNC: 31 MMOL/L (ref 21–32)
CREAT SERPL-MCNC: 1.46 MG/DL (ref 0.7–1.5)
EOSINOPHIL # BLD AUTO: 0.43 THOUSAND/ÂΜL (ref 0–0.61)
EOSINOPHIL NFR BLD AUTO: 4 % (ref 0–6)
ERYTHROCYTE [DISTWIDTH] IN BLOOD BY AUTOMATED COUNT: 14.4 % (ref 11.6–15.1)
GFR SERPL CREATININE-BSD FRML MDRD: 49 ML/MIN/1.73SQ M
GLUCOSE P FAST SERPL-MCNC: 118 MG/DL (ref 70–99)
HCT VFR BLD AUTO: 32.6 % (ref 36.5–49.3)
HGB BLD-MCNC: 10 G/DL (ref 12–17)
IMM GRANULOCYTES # BLD AUTO: 0.05 THOUSAND/UL (ref 0–0.2)
IMM GRANULOCYTES NFR BLD AUTO: 1 % (ref 0–2)
LYMPHOCYTES # BLD AUTO: 1.99 THOUSANDS/ÂΜL (ref 0.6–4.47)
LYMPHOCYTES NFR BLD AUTO: 20 % (ref 14–44)
MCH RBC QN AUTO: 29.5 PG (ref 26.8–34.3)
MCHC RBC AUTO-ENTMCNC: 30.7 G/DL (ref 31.4–37.4)
MCV RBC AUTO: 96 FL (ref 82–98)
MONOCYTES # BLD AUTO: 0.86 THOUSAND/ÂΜL (ref 0.17–1.22)
MONOCYTES NFR BLD AUTO: 9 % (ref 4–12)
NEUTROPHILS # BLD AUTO: 6.44 THOUSANDS/ÂΜL (ref 1.85–7.62)
NEUTS SEG NFR BLD AUTO: 65 % (ref 43–75)
NRBC BLD AUTO-RTO: 0 /100 WBCS
PLATELET # BLD AUTO: 476 THOUSANDS/UL (ref 149–390)
PMV BLD AUTO: 8.4 FL (ref 8.9–12.7)
POTASSIUM SERPL-SCNC: 3.8 MMOL/L (ref 3.5–5.3)
RBC # BLD AUTO: 3.39 MILLION/UL (ref 3.88–5.62)
SODIUM SERPL-SCNC: 138 MMOL/L (ref 135–147)
WBC # BLD AUTO: 9.84 THOUSAND/UL (ref 4.31–10.16)

## 2022-12-09 RX ORDER — LIDOCAINE HYDROCHLORIDE 40 MG/ML
5 SOLUTION TOPICAL ONCE
Status: COMPLETED | OUTPATIENT
Start: 2022-12-09 | End: 2022-12-09

## 2022-12-09 RX ADMIN — LIDOCAINE HYDROCHLORIDE 5 ML: 40 SOLUTION TOPICAL at 08:57

## 2022-12-09 NOTE — CASE COMMUNICATION
Called pt to schedule his SN visit for 12/12/22 and was notified by his wife that pt is running low on his wound vac canisters, has only 2 left  Called [facility] to place new order for 1 case of canisters (5 count) to be delivered to pt tomorrow, 12/10/12  Ref#: [id number]  Per [employer], need to reach out to them next week, no earlier than 12/14, to place additional order for canisters

## 2022-12-09 NOTE — PROGRESS NOTES
Negative Pressure Wound Therapy  Performed by: Radha Acevedo RN  Authorized by: Savi Duggan MD   Universal Protocol:  Consent: Verbal consent obtained  Consent given by: patient  Time out: Immediately prior to procedure a "time out" was called to verify the correct patient, procedure, equipment, support staff and site/side marked as required    Timeout called at: 12/9/2022 9:01 AM   Patient understanding: patient states understanding of the procedure being performed  Patient identity confirmed: verbally with patient      Performed by[de-identified]  Clinician  Type[de-identified]  [facility]  Coverage Size (sq cm)::  333 3  Pressure Type[de-identified]  Constant  Pressure Setting[de-identified]  125 mmHG  Sponge Type[de-identified]  Black  Sponge Size:  Large  Total Number Of Sponges Removed Prior To Application[de-identified]  5  Total Number Of Sponges Used For This Application[de-identified]  3

## 2022-12-09 NOTE — PATIENT INSTRUCTIONS
Orders Placed This Encounter   Procedures    Wound cleansing and dressings     Wound cleansing and dressings          Wash your hands with soap and water  Remove old dressing, discard into plastic bag and place in trash  Cleanse the wound with normal saline solution prior to applying a clean dressing  Do not use tissue or cotton balls  Do not scrub the wound  Pat dry using gauze  Shower YES,on days your dressing gets changed  DO NOT USE SKIN PROTECTANT TO SKIN UNDER DERMATAC  Apply black granufoam to the both wounds bridging dressing  DO NOT ALLOW SPONGE TO TOUCH THE SKIN    Cover with dermatac vac drape  May use regular drape over sponge and dermatac  Settings at 125mmHg continuous  Change dressing 3 times per week      Silver alginate to cover satelite open areas to upper abdomen     No lifting more than 20lbs  Consume 3-4 servings of protein daily  Continue visiting nurses skilled 3 times per week for wound care dressing changes    May skip days patient goes to Wound Care appointment     Follow up with Wound Management in 2 weeks after Plastics Consult  Plastics consult with Dr Zelda Mcginnis on 12/22/22 at [address] [facility]  Cleansed with NSS and redressed as ordered above     Standing Status:   Future     Standing Expiration Date:   12/9/2023

## 2022-12-09 NOTE — ASSESSMENT & PLAN NOTE
Addended by: Leeann Mcgarry on: 7/3/2018 11:07 AM     Modules accepted: Mira The wound continues to do well and granulate in  The larger wound he is almost flush completely circumferentially with the surrounding skin  He has appointment plastic surgery in 2 weeks for evaluation for possible grafting  Seem to except that unless he is scheduled for surgery  We will continue the [facility] dressing for now however with decreasing depth may need to stop the VAC and switch to alginate

## 2022-12-09 NOTE — PROGRESS NOTES
Patient ID: Kee Pritchett is a 78 y o  male Date of Birth 1955     Chief Complaint  Chief Complaint   Patient presents with   • Follow Up Wound Care Visit     Surgical wound present       Allergies  Codeine and Seasonal ic [cholestatin]    Assessment:    Surgical wound present  The wound continues to do well and granulate in  The larger wound he is almost flush completely circumferentially with the surrounding skin  He has appointment plastic surgery in 2 weeks for evaluation for possible grafting  Seem to except that unless he is scheduled for surgery  We will continue the [facility] ED dressing for now however with decreasing depth may need to stop the VAC and switch to alginate  Diagnoses and all orders for this visit:    Surgical wound present  -     lidocaine (XYLOCAINE) 4 % topical solution 5 mL  -     Wound cleansing and dressings; Future  -     Negative Pressure Wound Therapy    Class 3 severe obesity due to excess calories with serious comorbidity and body mass index (BMI) of 50 0 to 59 9 in [facility])    Other orders  -     Debridement  -     Debridement              Debridement   Wound 10/14/22 Surgical Abdomen Right; Lower    Universal Protocol:  Consent given by: patient  Time out: Immediately prior to procedure a "time out" was called to verify the correct patient, procedure, equipment, support staff and site/side marked as required  Performed by: physician  Debridement type: surgical  Level of debridement: subcutaneous tissue  Pain control: lidocaine 4%  Post-debridement measurements  Length (cm): 2  Width (cm): 2 5  Depth (cm): 0 2  Percent debrided: 100%  Surface Area (cm^2): 5  Area debrided (cm^2): 5  Volume (cm^3):  1  Tissue and other material debrided: subcutaneous tissue  Devitalized tissue debrided: biofilm and slough  Instrument(s) utilized: curette  Procedural pain (0-10): 1  Post-procedural pain: 1   Response to treatment: procedure was tolerated well    Debridement   Wound 10/14/22 Surgical Abdomen Medial;Lower    Universal Protocol:  Consent given by: patient  Time out: Immediately prior to procedure a "time out" was called to verify the correct patient, procedure, equipment, support staff and site/side marked as required  Performed by: physician  Debridement type: surgical  Level of debridement: subcutaneous tissue  Pain control: lidocaine 4%  Post-debridement measurements  Length (cm): 14 4  Width (cm): 22 8  Depth (cm): 0 6  Percent debrided: 10%  Surface Area (cm^2): 328 32  Area debrided (cm^2): 32 83  Volume (cm^3): 196 99  Tissue and other material debrided: subcutaneous tissue  Devitalized tissue debrided: biofilm and slough  Instrument(s) utilized: curette  Procedural pain (0-10): 1  Post-procedural pain: 1   Response to treatment: procedure was tolerated well          Plan:     Wound 10/14/22 Surgical Abdomen Medial;Lower (Active)   Wound Image Images linked 12/09/22 0925   Wound Description Beefy red;Pink;Yellow;Bleeding;Granulation tissue; Hypergranulation 12/09/22 0833   Lis-wound Assessment Scar Tissue;Rash 12/09/22 0833   Wound Length (cm) 14 4 cm 12/09/22 0833   Wound Width (cm) 22 8 cm 12/09/22 0833   Wound Depth (cm) 0 6 cm 12/09/22 0833   Wound Surface Area (cm^2) 328 32 cm^2 12/09/22 0833   Wound Volume (cm^3) 196 992 cm^3 12/09/22 0833   Calculated Wound Volume (cm^3) 196 99 cm^3 12/09/22 0833   Change in Wound Size % 91 33 12/09/22 0833   Drainage Amount Large 12/09/22 0833   Drainage Description Serosanguineous 12/09/22 0833   Dressing Wound V A C  12/09/22 0833   Patient Tolerance Tolerated well 12/09/22 0833   Dressing Status Removed 12/09/22 0833       Wound 10/14/22 Surgical Abdomen Right; Lower (Active)   Wound Image Images linked 12/09/22 0856   Wound Description Granulation tissue; Beefy red;Pink 12/09/22 0827   Lis-wound Assessment Pink;Rash 12/09/22 0827   Wound Length (cm) 2 cm 12/09/22 0827   Wound Width (cm) 2 5 cm 12/09/22 0827   Wound Depth (cm) 0 2 cm 12/09/22 0827   Wound Surface Area (cm^2) 5 cm^2 12/09/22 0827   Wound Volume (cm^3) 1 cm^3 12/09/22 0827   Calculated Wound Volume (cm^3) 1 cm^3 12/09/22 0827   Change in Wound Size % 99 43 12/09/22 0827   Drainage Amount Large 12/09/22 0827   Drainage Description Bloody 12/09/22 0827   Non-staged Wound Description Full thickness 12/09/22 0827   Treatments Irrigation with NSS 12/09/22 0827   Dressing Wound V A C  12/09/22 0827   Patient Tolerance Tolerated well 12/09/22 0827   Dressing Status Removed 12/09/22 0827       Wound 10/14/22 Surgical Abdomen Medial;Lower (Active)   Date First Assessed/Time First Assessed: 10/14/22 0828   Primary Wound Type: Surgical  Location: Abdomen  Wound Location Orientation: Medial;Lower       Wound 10/14/22 Surgical Abdomen Right; Lower (Active)   Date First Assessed/Time First Assessed: 10/14/22 0829   Primary Wound Type: Surgical  Location: Abdomen  Wound Location Orientation: Right; Lower       [REMOVED] Wound 07/15/22 Other (comment) Abdomen Right; Lower (Removed)   Resolved Date/Resolved Time: 10/14/22 0827  Date First Assessed/Time First Assessed: 07/15/22 1401   Primary Wound Type: Other (comment)  Location: Abdomen  Wound Location Orientation: Right; Lower  Wound Outcome: Other (Comment)       [REMOVED] Wound 09/15/22 Abdomen N/A (Removed)   Resolved Date/Resolved Time: 10/14/22 0826  Date First Assessed/Time First Assessed: 09/15/22 1359   Location: Abdomen  Wound Location Orientation: N/A  Wound Description (Comments): vac dressing-RLQ (1 black sponge, 1 white sponge), midline- surgical         [REMOVED] Wound 09/15/22 Abdomen Lower;Quadrant;Right (Removed)   Resolved Date/Resolved Time: 10/14/22 0827  Date First Assessed: 09/15/22   Location: Abdomen  Wound Location Orientation: Lower;Quadrant;Right  Wound Outcome: Other (Comment)       [REMOVED] Wound 10/11/22 Abdomen N/A (Removed)   Resolved Date/Resolved Time: 10/14/22 0827  Date First Assessed/Time First Assessed: 10/11/22 1545   Location: Abdomen  Wound Location Orientation: N/A  Wound Description (Comments): wound vac 1 incision black sponge 1 incision black sponge one black     [REMOVED] Wound 11/15/22 Penis Right (Removed)   Resolved Date/Resolved Time: 11/19/22 3102  Date First Assessed/Time First Assessed: 11/15/22 2250   Location: Penis  Wound Location Orientation: Right  Wound Outcome: Healed       Subjective:        Mr Virginia Gill is a 60-year-old gentleman here for follow-up from hospitalization for large abdominal wound  He he developed an incarcerated right lower quadrant hernia requiring excision debridement of the wound and bridging closure with absorbable mesh  He had a large retention sutures in the abdominal wall but developed necrosis of the abdominal wall  He was readmitted underwent excisional debridement and is here for evaluation  10/21/2022  Overall doing okay  No significant pain  Having some issues with the VAC dressing but overall working    11/04/2022  Doing well  Trying to increase his protein  Still having significant fatigue episodes  Still going through canisters quickly    11/25/2022  He was hospitalized last week for severe sepsis and hydronephrosis and acute kidney injury from an obstructing ureteral stone and underwent cystoscopy and stent placement  He is at home now and doing well  No other issues    12/9/2022  Doing well  Was scheduled for urology but got delayed January  Denies fevers or chills      The following portions of the patient's history were reviewed and updated as appropriate: allergies, current medications, past family history, past medical history, past social history, past surgical history and problem list     Review of Systems   Constitutional: Negative for chills and fever  HENT: Negative for ear pain and sore throat  Eyes: Negative for pain and visual disturbance  Respiratory: Negative for cough and shortness of breath  Cardiovascular: Negative for chest pain and palpitations  Gastrointestinal: Negative for abdominal pain and vomiting  Musculoskeletal: Positive for back pain and gait problem  Negative for arthralgias  Skin: Negative for color change and rash  Neurological: Negative for seizures and syncope  Psychiatric/Behavioral: Negative for agitation and behavioral problems  All other systems reviewed and are negative  Objective:       Wound 10/14/22 Surgical Abdomen Medial;Lower (Active)   Wound Image Images linked 12/09/22 0925   Wound Description Beefy red;Pink;Yellow;Bleeding;Granulation tissue; Hypergranulation 12/09/22 0833   Lis-wound Assessment Scar Tissue;Rash 12/09/22 0833   Wound Length (cm) 14 4 cm 12/09/22 0833   Wound Width (cm) 22 8 cm 12/09/22 0833   Wound Depth (cm) 0 6 cm 12/09/22 0833   Wound Surface Area (cm^2) 328 32 cm^2 12/09/22 0833   Wound Volume (cm^3) 196 992 cm^3 12/09/22 0833   Calculated Wound Volume (cm^3) 196 99 cm^3 12/09/22 0833   Change in Wound Size % 91 33 12/09/22 0833   Drainage Amount Large 12/09/22 0833   Drainage Description Serosanguineous 12/09/22 0833   Dressing Wound V A C  12/09/22 0833   Patient Tolerance Tolerated well 12/09/22 0833   Dressing Status Removed 12/09/22 0833       Wound 10/14/22 Surgical Abdomen Right; Lower (Active)   Wound Image Images linked 12/09/22 0856   Wound Description Granulation tissue; Beefy red;Pink 12/09/22 0827   Lis-wound Assessment Pink;Rash 12/09/22 0827   Wound Length (cm) 2 cm 12/09/22 0827   Wound Width (cm) 2 5 cm 12/09/22 0827   Wound Depth (cm) 0 2 cm 12/09/22 0827   Wound Surface Area (cm^2) 5 cm^2 12/09/22 0827   Wound Volume (cm^3) 1 cm^3 12/09/22 0827   Calculated Wound Volume (cm^3) 1 cm^3 12/09/22 0827   Change in Wound Size % 99 43 12/09/22 0827   Drainage Amount Large 12/09/22 0827   Drainage Description Bloody 12/09/22 0827   Non-staged Wound Description Full thickness 12/09/22 0827   Treatments Irrigation with NSS 12/09/22 0827   Dressing Wound V A C  12/09/22 0827   Patient Tolerance Tolerated well 12/09/22 0827   Dressing Status Removed 12/09/22 0827       /76   Pulse 104   Temp 98 6 °F (37 °C)   Resp 22   Wt (!) 150 kg (331 lb 5 6 oz)   BMI 47 54 kg/m²     Physical Exam  Vitals and nursing note reviewed  Constitutional:       General: He is not in acute distress  Appearance: He is well-developed  He is obese  He is not diaphoretic  HENT:      Head: Normocephalic and atraumatic  Eyes:      Pupils: Pupils are equal, round, and reactive to light  Cardiovascular:      Rate and Rhythm: Normal rate and regular rhythm  Pulmonary:      Effort: Pulmonary effort is normal  No respiratory distress  Breath sounds: Normal breath sounds  Abdominal:      Palpations: Abdomen is soft  Comments: Obese abdomen  See complete wound assessment for measurements of abdominal wounds   Musculoskeletal:         General: Normal range of motion  Cervical back: Normal range of motion and neck supple  Skin:     General: Skin is warm and dry  Neurological:      Mental Status: He is alert and oriented to person, place, and time  Psychiatric:         Behavior: Behavior normal            Wound Instructions:  Orders Placed This Encounter   Procedures   • Wound cleansing and dressings     Wound cleansing and dressings          Wash your hands with soap and water  Remove old dressing, discard into plastic bag and place in trash  Cleanse the wound with normal saline solution prior to applying a clean dressing  Do not use tissue or cotton balls  Do not scrub the wound  Pat dry using gauze  Shower YES,on days your dressing gets changed  DO NOT USE SKIN PROTECTANT TO SKIN UNDER DERMATAC  Apply black granufoam to the both wounds bridging dressing  DO NOT ALLOW SPONGE TO TOUCH THE SKIN    Cover with dermatac vac drape  May use regular drape over sponge and dermatac    Settings at 125mmHg continuous  Change dressing 3 times per week      Silver alginate to cover satelite open areas to upper abdomen     No lifting more than 20lbs  Consume 3-4 servings of protein daily  Continue visiting nurses skilled 3 times per week for wound care dressing changes  May skip days patient goes to Wound Care appointment     Follow up with Wound Management in 2 weeks after Plastics Consult  Plastics consult with Dr Donovan Shore on 12/22/22 at 0830     Today's Treatment  Cleansed with NSS and redressed as ordered above     Standing Status:   Future     Standing Expiration Date:   12/9/2023   • Negative Pressure Wound Therapy     This order was created via procedure documentation   • Debridement     This order was created via procedure documentation   • Debridement     This order was created via procedure documentation        Diagnosis ICD-10-CM Associated Orders   1  Surgical wound present  T14  8XXA lidocaine (XYLOCAINE) 4 % topical solution 5 mL     Wound cleansing and dressings     Negative Pressure Wound Therapy      2   Class 3 severe obesity due to excess calories with serious comorbidity and body mass index (BMI) of 50 0 to 59 9 in adult [facility])  E66 01     Z68 43

## 2022-12-10 LAB — BACTERIA UR CULT: NORMAL

## 2022-12-12 ENCOUNTER — HOME CARE VISIT (OUTPATIENT)
Dept: HOME HEALTH SERVICES | Facility: HOME HEALTHCARE | Age: 67
End: 2022-12-12

## 2022-12-12 VITALS
HEART RATE: 91 BPM | SYSTOLIC BLOOD PRESSURE: 118 MMHG | TEMPERATURE: 97.3 F | DIASTOLIC BLOOD PRESSURE: 75 MMHG | RESPIRATION RATE: 18 BRPM | OXYGEN SATURATION: 98 %

## 2022-12-14 ENCOUNTER — HOME CARE VISIT (OUTPATIENT)
Dept: HOME HEALTH SERVICES | Facility: HOME HEALTHCARE | Age: 67
End: 2022-12-14

## 2022-12-14 VITALS
DIASTOLIC BLOOD PRESSURE: 70 MMHG | HEART RATE: 86 BPM | SYSTOLIC BLOOD PRESSURE: 125 MMHG | TEMPERATURE: 97.4 F | OXYGEN SATURATION: 97 % | RESPIRATION RATE: 20 BRPM

## 2022-12-15 ENCOUNTER — CONSULT (OUTPATIENT)
Dept: FAMILY MEDICINE CLINIC | Facility: CLINIC | Age: 67
End: 2022-12-15

## 2022-12-15 VITALS
WEIGHT: 315 LBS | RESPIRATION RATE: 16 BRPM | TEMPERATURE: 97.1 F | HEART RATE: 102 BPM | DIASTOLIC BLOOD PRESSURE: 66 MMHG | SYSTOLIC BLOOD PRESSURE: 128 MMHG | OXYGEN SATURATION: 96 % | HEIGHT: 70 IN | BODY MASS INDEX: 45.1 KG/M2

## 2022-12-15 DIAGNOSIS — I10 ESSENTIAL HYPERTENSION: Primary | ICD-10-CM

## 2022-12-15 DIAGNOSIS — T14.8XXA SURGICAL WOUND PRESENT: ICD-10-CM

## 2022-12-15 DIAGNOSIS — I26.99 PULMONARY EMBOLISM, UNSPECIFIED CHRONICITY, UNSPECIFIED PULMONARY EMBOLISM TYPE, UNSPECIFIED WHETHER ACUTE COR PULMONALE PRESENT (HCC): ICD-10-CM

## 2022-12-15 DIAGNOSIS — N20.0 KIDNEY STONES: ICD-10-CM

## 2022-12-15 DIAGNOSIS — N17.9 ACUTE KIDNEY INJURY (HCC): ICD-10-CM

## 2022-12-15 NOTE — LETTER
December 15, 2022     Lani Marcial MD  [facility]  [address]  [place] [address]  [id number]    Patient: Bala Jackson   YOB: 1955   Date of Visit: 12/15/2022       Dear Dr Zay Johnson: Thank you for referring Jaison Bradley to me for evaluation  Below are my notes for this consultation  If you have questions, please do not hesitate to call me  I look forward to following your patient along with you  Sincerely,        Sulaiman Miranda MD        CC: No Recipients  Sulaiman Miranda MD  12/15/2022 12:03 PM  Sign when Signing Visit  Marie    NAME: Bala Jackson  AGE: 78 y o  SEX: male  : 1955     DATE: 12/15/2022    Family Practice Pre-Operative Evaluation      Chief Complaint: Pre-operative Evaluation     Surgery: Ureteral stint removal / kidney stone   Anticipated Date of Surgery: 1/3/23    Asked for pre-op evaluation by Dr Zay Johnson  History of Present Illness:     Patient has no prior history of bleeding issues or blood clots  Chronic conditions, medications and allergies were reviewed  There is no currently active infectious process  Assessment of Cardiac Risk:  · No unstable or severe angina or MI in the last 6 weeks or history of stent placement in the last year   · No decompensated heart failure (e g  New onset heart failure, NYHA functional class IV heart failure, or worsening existing heart failure) in past 3 mos  · No severe heart valve disease including aortic stenosis or symptomatic mitral stenosis     Exercise Capacity:  · Able to walk 4 blocks without symptoms  · Able to walk 2 flights without symptoms    NO Prior Anesthesia Reactions       No prolonged steroid use in past 6 mos  P A T  If done reviewed  Review of Systems:     Review of Systems   Constitutional: Negative for activity change and appetite change     HENT: Negative for trouble swallowing  Eyes: Negative for visual disturbance  Respiratory: Negative for cough and shortness of breath  Cardiovascular: Negative for chest pain, palpitations and leg swelling  Gastrointestinal: Negative for abdominal pain and blood in stool  Endocrine: Negative for polyuria  Genitourinary: Negative for difficulty urinating and hematuria  Skin: Negative for rash  Neurological: Negative for dizziness  Psychiatric/Behavioral: Negative for behavioral problems  Current Problem List:     Patient Active Problem List   Diagnosis   • Essential hypertension   • Kidney stones   • Acute kidney injury superimposed on chronic kidney disease ([facility] )   • HDL deficiency   • Gout   • DELIA (obstructive sleep apnea)   • Allergic rhinitis   • Right foot pain   • Class 3 severe obesity due to excess calories with serious comorbidity and body mass index (BMI) of 50 0 to 59 9 in [facility])   • Lymphedema of both lower extremities   • Severe sepsis ([place] [address] )   • COVID-19   • Strangulated hernia of abdominal wall   • Acute kidney injury ([place] [address] )   • Anemia   • Pulmonary embolism ([facility])   • Wound drainage   • Surgical wound present   • Abdominal pain       Allergies:      Allergies   Allergen Reactions   • Codeine Swelling     Other reaction(s): SWELLING OF FACE  Other reaction(s): SWELLING OF FACE  Other reaction(s): SWELLING OF FACE  Swollen eyes/swelling   • Seasonal Ic [Cholestatin] Allergic Rhinitis       Current Medications:       Current Outpatient Medications:   •  allopurinol (ZYLOPRIM) 100 mg tablet, Take 100 mg by mouth daily , Disp: , Rfl:   •  apixaban (Eliquis) 5 mg, Take 1 tablet (5 mg total) by mouth 2 (two) times a day, Disp: 180 tablet, Rfl: 0  •  furosemide (LASIX) 20 mg tablet, Take 1 tablet (20 mg total) by mouth daily For swelling, Disp: 90 tablet, Rfl: 3  •  metoprolol succinate (TOPROL-XL) 100 mg 24 hr tablet, Take 1 tablet (100 mg total) by mouth daily, Disp: 90 tablet, Rfl: 3  • nystatin (MYCOSTATIN) powder, Apply topically 2 (two) times a day, Disp: 60 g, Rfl: 12  •  Potassium Citrate ER 15 MEQ (1620 MG) TBCR, Take 1 tablet by mouth daily , Disp: , Rfl:   •  tamsulosin (FLOMAX) 0 4 mg, 1-2 daily as directed, Disp: 180 capsule, Rfl: 3  •  TRAVATAN Z 0 004 % ophthalmic solution, Administer 1 drop to both eyes daily at bedtime , Disp: , Rfl:     Past Medical History:       Past Medical History:   Diagnosis Date   • Anemia 9/16/2022   • Chronic kidney disease    • Hypertension    • Obesity    • Sepsis ([facility] [address] )    • Urinary tract infection without hematuria 4/3/2020        Past Surgical History:   Procedure Laterality Date   • ABDOMINAL ADHESION SURGERY N/A 9/15/2022    Procedure: LYSIS ADHESIONS;  Surgeon: Jason Tierney MD;  [facility];  Service: General   • APPENDECTOMY     • EXPLORATORY LAPAROTOMY W/ BOWEL RESECTION N/A 9/14/2022    Procedure: Exdploratory laparotomy, complex Lysis of adhesions, reduction strangulated hernia, debridement RLQ muscle with counter incision, abthera placement;  Surgeon: Jason Tierney MD;  [facility];  Service: General   • EXPLORATORY LAPAROTOMY W/ BOWEL RESECTION N/A 9/15/2022    Procedure: LAPAROTOMY EXPLORATORY  REPAIR RIGHT LQ HERNIA WITH MESH, REPAIR ANTERIOR WALL HERNIA;  Surgeon: Jason Tierney MD;  [facility];  Service: General   • FL RETROGRADE PYELOGRAM  11/15/2022   • NASAL SEPTUM SURGERY     • ND CYSTOURETHROSCOPY,URETER CATHETER Right 11/15/2022    Procedure: CYSTOSCOPY RETROGRADE PYELOGRAM WITH INSERTION STENT URETERAL;  Surgeon: Tip Kurtz MD;  [facility];  Service: Urology   • WOUND DEBRIDEMENT N/A 10/11/2022    Procedure: Excisional debridement of abdominal wall nonviable tissue  Skin and subcu tissues were dissected    The wound measured 23 x 18 cm x 4 cm deep ;  Surgeon: Edmund Grant MD;  [facility];  Service: General        Family History   Problem Relation Age of Onset   • Diabetes Father    • Prostate cancer Brother         Social History     Socioeconomic History   • Marital status: /Civil Union     Spouse name: Not on file   • Number of children: Not on file   • Years of education: Not on file   • Highest education level: Not on file   Occupational History   • Not on file   Tobacco Use   • Smoking status: Never   • Smokeless tobacco: Never   • Tobacco comments:     no exposure to passive smoke   Vaping Use   • Vaping Use: Never used   Substance and Sexual Activity   • Alcohol use: Never   • Drug use: Not Currently   • Sexual activity: Not Currently   Other Topics Concern   • Not on file   Social History Narrative   • Not on file     Social Determinants of Health     Financial Resource Strain: Not on file   Food Insecurity: No Food Insecurity   • Worried About Running Out of Food in the Last Year: Never true   • Ran Out of Food in the Last Year: Never true   Transportation Needs: No Transportation Needs   • Lack of Transportation (Medical): No   • Lack of Transportation (Non-Medical): No   Physical Activity: Not on file   Stress: Not on file   Social Connections: Not on file   Intimate Partner Violence: Not on file   Housing Stability: Low Risk    • Unable to Pay for Housing in the Last Year: No   • Number of Places Lived in the Last Year: 1   • Unstable Housing in the Last Year: No        Physical Exam:     /66 (BP Location: Left arm, Patient Position: Sitting, Cuff Size: Large)   Pulse 102   Temp (!) 97 1 °F (36 2 °C)   Resp 16   Ht 5' 10" (1 778 m)   Wt (!) 149 kg (328 lb)   SpO2 96%   BMI 47 06 kg/m²     Physical Exam  Constitutional:       Appearance: He is well-developed  HENT:      Head: Normocephalic and atraumatic  Eyes:      Conjunctiva/sclera: Conjunctivae normal    Neck:      Thyroid: No thyromegaly  Cardiovascular:      Rate and Rhythm: Normal rate and regular rhythm  Heart sounds: Normal heart sounds  No murmur heard    Pulmonary: Effort: Pulmonary effort is normal  No respiratory distress  Breath sounds: Normal breath sounds  Musculoskeletal:      Cervical back: Neck supple  Lymphadenopathy:      Cervical: No cervical adenopathy  Skin:     General: Skin is warm and dry  Psychiatric:         Behavior: Behavior normal          Operative site has been examined and clear of skin infection and inflammation  Assessment & Recommendations:     Patient is cleared for surgery as detailed above  Surgical Procedure risk category: low    Patient specific operative risk categegory: moderate  Will discuss with you the anticoag status   Labs ordered for 12/27/22

## 2022-12-15 NOTE — PROGRESS NOTES
[facility] PRE-OPERATIVE EVALUATION  [facility] PHYSICIAN GROUP - [facility]    NAME: Ethan Tristan  AGE: 78 y o  SEX: male  : 1955     DATE: 12/15/2022    [facility] Pre-Operative Evaluation      Chief Complaint: Pre-operative Evaluation     Surgery: Ureteral stint removal / kidney stone   Anticipated Date of Surgery: 1/3/23    Asked for pre-op evaluation by Dr Berhane Benitez  History of Present Illness:     Patient has no prior history of bleeding issues or blood clots  Chronic conditions, medications and allergies were reviewed  There is no currently active infectious process  Assessment of Cardiac Risk:  · No unstable or severe angina or MI in the last 6 weeks or history of stent placement in the last year   · No decompensated heart failure (e g  New onset heart failure, NYHA functional class IV heart failure, or worsening existing heart failure) in past 3 mos  · No severe heart valve disease including aortic stenosis or symptomatic mitral stenosis     Exercise Capacity:  · Able to walk 4 blocks without symptoms  · Able to walk 2 flights without symptoms    NO Prior Anesthesia Reactions       No prolonged steroid use in past 6 mos  P A T  If done reviewed  Review of Systems:     Review of Systems   Constitutional: Negative for activity change and appetite change  HENT: Negative for trouble swallowing  Eyes: Negative for visual disturbance  Respiratory: Negative for cough and shortness of breath  Cardiovascular: Negative for chest pain, palpitations and leg swelling  Gastrointestinal: Negative for abdominal pain and blood in stool  Endocrine: Negative for polyuria  Genitourinary: Negative for difficulty urinating and hematuria  Skin: Negative for rash  Neurological: Negative for dizziness  Psychiatric/Behavioral: Negative for behavioral problems         Current Problem List:     Patient Active Problem List   Diagnosis   • Essential hypertension   • Kidney stones   • Acute kidney injury superimposed on chronic kidney disease ([facility] [address] )   • HDL deficiency   • Gout   • DELIA (obstructive sleep apnea)   • Allergic rhinitis   • Right foot pain   • Class 3 severe obesity due to excess calories with serious comorbidity and body mass index (BMI) of 50 0 to 59 9 in adult [facility])   • Lymphedema of both lower extremities   • Severe sepsis ([facility] [address] )   • COVID-19   • Strangulated hernia of abdominal wall   • Acute kidney injury ([facility] [address] )   • Anemia   • Pulmonary embolism (HCC)   • Wound drainage   • Surgical wound present   • Abdominal pain       Allergies:      Allergies   Allergen Reactions   • Codeine Swelling     Other reaction(s): SWELLING OF FACE  Other reaction(s): SWELLING OF FACE  Other reaction(s): SWELLING OF FACE  Swollen eyes/swelling   • Seasonal Ic [Cholestatin] Allergic Rhinitis       Current Medications:       Current Outpatient Medications:   •  allopurinol (ZYLOPRIM) 100 mg tablet, Take 100 mg by mouth daily , Disp: , Rfl:   •  apixaban (Eliquis) 5 mg, Take 1 tablet (5 mg total) by mouth 2 (two) times a day, Disp: 180 tablet, Rfl: 0  •  furosemide (LASIX) 20 mg tablet, Take 1 tablet (20 mg total) by mouth daily For swelling, Disp: 90 tablet, Rfl: 3  •  metoprolol succinate (TOPROL-XL) 100 mg 24 hr tablet, Take 1 tablet (100 mg total) by mouth daily, Disp: 90 tablet, Rfl: 3  •  nystatin (MYCOSTATIN) powder, Apply topically 2 (two) times a day, Disp: 60 g, Rfl: 12  •  Potassium Citrate ER 15 MEQ (1620 MG) TBCR, Take 1 tablet by mouth daily , Disp: , Rfl:   •  tamsulosin (FLOMAX) 0 4 mg, 1-2 daily as directed, Disp: 180 capsule, Rfl: 3  •  TRAVATAN Z 0 004 % ophthalmic solution, Administer 1 drop to both eyes daily at bedtime , Disp: , Rfl:     Past Medical History:       Past Medical History:   Diagnosis Date   • Anemia 9/16/2022   • Chronic kidney disease    • Hypertension    • Obesity    • Sepsis ([facility] [address] )    • Urinary tract infection without hematuria 4/3/2020        Past Surgical History:   Procedure Laterality Date   • ABDOMINAL ADHESION SURGERY N/A 9/15/2022    Procedure: LYSIS ADHESIONS;  Surgeon: Jagdeep Hayes MD;  [facility];  Service: General   • APPENDECTOMY     • EXPLORATORY LAPAROTOMY W/ BOWEL RESECTION N/A 9/14/2022    Procedure: Exdploratory laparotomy, complex Lysis of adhesions, reduction strangulated hernia, debridement RLQ muscle with counter incision, abthera placement;  Surgeon: Jagdeep Hayes MD;  [facility];  Service: General   • EXPLORATORY LAPAROTOMY W/ BOWEL RESECTION N/A 9/15/2022    Procedure: LAPAROTOMY EXPLORATORY  REPAIR RIGHT LQ HERNIA WITH MESH, REPAIR ANTERIOR WALL HERNIA;  Surgeon: Jagdeep Hayes MD;  [facility];  Service: General   • FL RETROGRADE PYELOGRAM  11/15/2022   • NASAL SEPTUM SURGERY     • IN CYSTOURETHROSCOPY,URETER CATHETER Right 11/15/2022    Procedure: CYSTOSCOPY RETROGRADE PYELOGRAM WITH INSERTION STENT URETERAL;  Surgeon: Verdell Gilford, MD;  [facility];  Service: Urology   • WOUND DEBRIDEMENT N/A 10/11/2022    Procedure: Excisional debridement of abdominal wall nonviable tissue  Skin and subcu tissues were dissected    The wound measured 23 x 18 cm x 4 cm deep ;  Surgeon: Megan España MD;  [facility];  Service: General        Family History   Problem Relation Age of Onset   • Diabetes Father    • Prostate cancer Brother         Social History     Socioeconomic History   • Marital status: /Civil Union     Spouse name: Not on file   • Number of children: Not on file   • Years of education: Not on file   • Highest education level: Not on file   Occupational History   • Not on file   Tobacco Use   • Smoking status: Never   • Smokeless tobacco: Never   • Tobacco comments:     no exposure to passive smoke   Vaping Use   • Vaping Use: Never used   Substance and Sexual Activity   • Alcohol use: Never   • Drug use: Not Currently   • Sexual activity: Not Currently   Other Topics Concern   • Not on file   Social History Narrative   • Not on file     Social Determinants of Health     Financial Resource Strain: Not on file   Food Insecurity: No Food Insecurity   • Worried About Running Out of Food in the Last Year: Never true   • Ran Out of Food in the Last Year: Never true   Transportation Needs: No Transportation Needs   • Lack of Transportation (Medical): No   • Lack of Transportation (Non-Medical): No   Physical Activity: Not on file   Stress: Not on file   Social Connections: Not on file   Intimate Partner Violence: Not on file   Housing Stability: Low Risk    • Unable to Pay for Housing in the Last Year: No   • Number of Places Lived in the Last Year: 1   • Unstable Housing in the Last Year: No        Physical Exam:     /66 (BP Location: Left arm, Patient Position: Sitting, Cuff Size: Large)   Pulse 102   Temp (!) 97 1 °F (36 2 °C)   Resp 16   Ht 5' 10" (1 778 m)   Wt (!) 149 kg (328 lb)   SpO2 96%   BMI 47 06 kg/m²     Physical Exam  Constitutional:       Appearance: He is well-developed  HENT:      Head: Normocephalic and atraumatic  Eyes:      Conjunctiva/sclera: Conjunctivae normal    Neck:      Thyroid: No thyromegaly  Cardiovascular:      Rate and Rhythm: Normal rate and regular rhythm  Heart sounds: Normal heart sounds  No murmur heard  Pulmonary:      Effort: Pulmonary effort is normal  No respiratory distress  Breath sounds: Normal breath sounds  Musculoskeletal:      Cervical back: Neck supple  Lymphadenopathy:      Cervical: No cervical adenopathy  Skin:     General: Skin is warm and dry  Psychiatric:         Behavior: Behavior normal          Operative site has been examined and clear of skin infection and inflammation  Assessment & Recommendations:     Patient is cleared for surgery as detailed above       Surgical Procedure risk category: low    Patient specific operative risk categegory: moderate  Will discuss with you the anticoag status   Labs ordered for 12/27/22

## 2022-12-16 ENCOUNTER — HOME CARE VISIT (OUTPATIENT)
Dept: HOME HEALTH SERVICES | Facility: HOME HEALTHCARE | Age: 67
End: 2022-12-16

## 2022-12-16 VITALS
TEMPERATURE: 98.7 F | DIASTOLIC BLOOD PRESSURE: 70 MMHG | RESPIRATION RATE: 20 BRPM | SYSTOLIC BLOOD PRESSURE: 120 MMHG | HEART RATE: 85 BPM | OXYGEN SATURATION: 98 %

## 2022-12-16 NOTE — CASE COMMUNICATION
Assessed if pt needs PT/OT ordered, and pt declined at this time  He will consider evaluation after he has his cystoscopy procedure done on 1/3/23  Patient called and stated that she was here last week and was given an antibiotic that did not work  She is still having congestion, post nasal drip, chest tightness and coughing    Please call to advise

## 2022-12-16 NOTE — CASE COMMUNICATION
Ship to    Home   Branch: [facility]    Wound 1 abdominal wound Full x             Wound 2 abdominal wound Full x                                 Alginate with Silver [id number] - [facility]

## 2022-12-19 ENCOUNTER — HOME CARE VISIT (OUTPATIENT)
Dept: HOME HEALTH SERVICES | Facility: HOME HEALTHCARE | Age: 67
End: 2022-12-19

## 2022-12-19 VITALS
HEART RATE: 74 BPM | DIASTOLIC BLOOD PRESSURE: 75 MMHG | RESPIRATION RATE: 20 BRPM | SYSTOLIC BLOOD PRESSURE: 120 MMHG | TEMPERATURE: 97.4 F | OXYGEN SATURATION: 97 %

## 2022-12-19 NOTE — CASE COMMUNICATION
Called [facility] to order Wound vac canisters  Ordered 2 boxes (5 count each), ref # [id number]  Estimated delivery date: 12/22/22

## 2022-12-21 ENCOUNTER — HOME CARE VISIT (OUTPATIENT)
Dept: HOME HEALTH SERVICES | Facility: HOME HEALTHCARE | Age: 67
End: 2022-12-21

## 2022-12-22 ENCOUNTER — HOME CARE VISIT (OUTPATIENT)
Dept: HOME HEALTH SERVICES | Facility: HOME HEALTHCARE | Age: 67
End: 2022-12-22

## 2022-12-22 ENCOUNTER — OFFICE VISIT (OUTPATIENT)
Dept: WOUND CARE | Facility: CLINIC | Age: 67
End: 2022-12-22

## 2022-12-22 ENCOUNTER — OFFICE VISIT (OUTPATIENT)
Dept: UROLOGY | Facility: CLINIC | Age: 67
End: 2022-12-22

## 2022-12-22 VITALS
OXYGEN SATURATION: 98 % | SYSTOLIC BLOOD PRESSURE: 140 MMHG | HEIGHT: 70 IN | BODY MASS INDEX: 45.1 KG/M2 | HEART RATE: 95 BPM | WEIGHT: 315 LBS | DIASTOLIC BLOOD PRESSURE: 72 MMHG

## 2022-12-22 VITALS
OXYGEN SATURATION: 98 % | TEMPERATURE: 97.8 F | DIASTOLIC BLOOD PRESSURE: 62 MMHG | SYSTOLIC BLOOD PRESSURE: 126 MMHG | HEART RATE: 83 BPM | RESPIRATION RATE: 18 BRPM

## 2022-12-22 VITALS
SYSTOLIC BLOOD PRESSURE: 140 MMHG | TEMPERATURE: 98.9 F | HEART RATE: 80 BPM | RESPIRATION RATE: 18 BRPM | DIASTOLIC BLOOD PRESSURE: 78 MMHG

## 2022-12-22 DIAGNOSIS — N20.1 URETERAL STONE: Primary | ICD-10-CM

## 2022-12-22 DIAGNOSIS — T14.8XXA SURGICAL WOUND PRESENT: Primary | ICD-10-CM

## 2022-12-22 RX ORDER — ACETAMINOPHEN 500 MG
500 TABLET ORAL EVERY 6 HOURS PRN
COMMUNITY

## 2022-12-22 NOTE — PRE-PROCEDURE INSTRUCTIONS
Pre-Surgery Instructions:   Medication Instructions   • acetaminophen (TYLENOL) 500 mg tablet Uses PRN- OK to take day of surgery   • allopurinol (ZYLOPRIM) 100 mg tablet Take night before surgery   • apixaban (Eliquis) 5 mg Stop taking 3 days prior to surgery  as per pt instructed per urology last dose 12/30/22 PM-pt verbally understood   • furosemide (LASIX) 20 mg tablet Hold day of surgery  • metoprolol succinate (TOPROL-XL) 100 mg 24 hr tablet Take night before surgery   • nystatin (MYCOSTATIN) powder Uses PRN- DO NOT take day of surgery   • Potassium Citrate ER 15 MEQ (1620 MG) TBCR Hold day of surgery  • tamsulosin (FLOMAX) 0 4 mg Take night before surgery   • TRAVATAN Z 0 004 % ophthalmic solution Take night before surgery    Reviewed preoperative medication and showering(Dial antibac and pt sponge bathing-has wound vac) instructions with patient-Pt verbalized understanding  Instructed pt to stop taking ASA/NSAIDS and non prescribed vitamins and herbal supplements 7 days before surgery or per Dr Meaghan Myles  May take Tylenol if needed and Instructed to take DOS medications with small sip of water  Instructed NPO past midnight prior to surgery ---surgery department will call the day before surgery with arrival time for DOS  Instructed to notify surgeon and family doctor office with any changes in health prior to surgery      Patient (Pt ) verbalized understanding of all instructions

## 2022-12-22 NOTE — PROGRESS NOTES
Plastic Surgery Consult    Reason for visit: chronic, open abdominal wound    HPI:  Patient is a 79 y/o male who presents with chronic open abdominal wound  He suffered strangulated abdominal wall hernia that was repaired with mesh and subsequently dehisced and has been treated for the last two months with VAC therapy  He has been followed in wound care and the wound has significantly improved in depth and character  It remains clean  He presents today for possible surgical coverage of the open abdominal wound       ROS: 12 pt ROS negative, except as otherwise noted in HPI    Past Medical History:   Diagnosis Date   • Anemia 9/16/2022   • Chronic kidney disease    • Hypertension    • Obesity    • Sepsis ([facility] [address] )    • Urinary tract infection without hematuria 4/3/2020       FamHx: non-contrib  SurgHx: appendectomy, ureteral stent, exlap for strangulated abdominal wall hernia  SocHx: no tobacco, no ETOH  Meds: eliquis for PEs, no aspirin  Allergies: codeine    PE:  Vitals:    12/22/22 0910   BP: 140/78   Pulse: 80   Resp: 18   Temp: 98 9 °F (37 2 °C)       General: NC/AT, breathing comfortably on RA  Neuro: CN II-XII grossly intact, symmetric reflexes  HEENT: PERRLA, EOMI, external ears normal, no lesions or deformities, neck supple, trachea midline  Respiratory: CTAB, normal respiratory effort  Cardio: RRR, normal S1, S2, no murmur, rubs, gallops  GI: soft, non-tender, non-distended  Extremities/MSK: normal alignment, mobility, gait, no edema  Skin: no rashes, lesions, subcutaneous nodules    BMI 47    Abdomen:  Large 25x10 cm open abdominal wound with good granulation tissue, clean, no purulence, no cellulitis, no signs of infection  Smaller satellite area 5 cm from larger wound with good granulation tissue, clean, no purulence    Last albumin on 11/16/22: 2 1    A/P: 79 y/o male with large abdominal open wound with good granulation tissue   -Discussed with patient condition and coverage with split-thickness skin graft with VAC bolster  -I discussed the procedure, risks and complications including but not limited to infection, bleeding, scarring, partial vs complete graft loss   Patient acknowledged   -I discussed that even with 100% graft take, it would take a few weeks for the skin graft to completely heal  Patient acknowledged   -Patient's albumin was poor at 2 1, instructed to family and patient importance of aggressive nutritional supplementation with protein shakes   -All questions answered, concerns addressed, consent obtained  -Will obtain medical clearance, eliquis hold, CMP, prealbumin, and plan for surgery at [address] [address] and Reconstructive Surgery   [address], Amina [address] [facility], [address]   Office: [phone]

## 2022-12-22 NOTE — PATIENT INSTRUCTIONS
Orders Placed This Encounter   Procedures    Wound cleansing and dressings     Wound cleansing and dressings   Wash your hands with soap and water  Remove old dressing, discard into plastic bag and place in trash  Cleanse the wound with normal saline solution prior to applying a clean dressing  Do not use tissue or cotton balls  Do not scrub the wound  Pat dry using gauze  Shower YES,on days your dressing gets changed  DO NOT USE SKIN PROTECTANT ON SKIN UNDER DERMATAC  Continue NPWT  Apply black granufoam to the both wounds bridging dressing  DO NOT PUT SPONGE OVER INTACT SKIN    Cover with dermatac vac drape  May use regular drape over sponge and dermatac  NPWT at 125mmHg continuous  Change dressing 3 times per week      Silver alginate to cover satelite open areas to upper abdomen     No lifting more than 20lbs  Increase protein in diet as much as possible to at least 4-5 servings per day; add protein supplements such as Ensure Max or Premier Protein with each meal, increase protein snacks such as yougurt, cheese sticks, peanut butter  Consider adding Lance supplement to diet twice per day in addition to protein supplements     Continue visiting nurses skilled 3 times per week for wound care dressing changes    May skip days patient goes to Wound Care appointment     Follow up Dr Piyush Burnham in 2 weeks        Today's Treatment  Cleansed with NSS and redressed as ordered above  Good seal obtained at 125 mmHg     Standing Status:   Future     Standing Expiration Date:   12/22/2023

## 2022-12-22 NOTE — PROGRESS NOTES
Negative Pressure Wound Therapy  Performed by: Ruel Lerner RN  Authorized by: Jojo Berkowitz MD   [facility] Protocol:  Consent: Verbal consent obtained    Consent given by: patient  Patient identity confirmed: verbally with patient      Performed by[de-identified]  Clinician  Type[de-identified]  [facility]  Coverage Size (sq cm)::  130 6  Pressure Type[de-identified]  Constant  Pressure Setting[de-identified]  125 mmHG  Sponge Type[de-identified]  Black  Sponge Size:  Medium  Total Number Of Sponges Removed Prior To Application[de-identified]  3  Total Number Of Sponges Used For This Application[de-identified]  5  Comments[de-identified]  Good seal obtained at 125 mmHG; silver alginate to sattelite wounds prior to NPWT application

## 2022-12-22 NOTE — PROGRESS NOTES
Pre-op visit  2022      No chief complaint on file  Assessment and Plan     78 y o  male managed by Dr Radha Key    1  Right ureteral calculus    History and physical was performed for the patients upcoming cystoscopy right ureteroscopy laser lithotripsy, retrograde pyelogram, ureteral stent exchange scheduled for 1/3/2023 with Dr Fatoumata Finney  All questions and concerns regarding surgery and perioperative expectations have been addressed and answered  No overall changes in their health since last visit, denies any prior complications with anesthesia  He did have recent EKG, echo, PCP clearance last week  He has chronic bilateral lymphedema of the legs which is unchanged and stable  He has no chest pain shortness of breath cough  He can ambulate comfortably around his home  He was able to ambulate comfortably from the front door to the exam room today, with the assistance of a walker  Did discuss the option to delay surgery sometime while he is healing his abdominal wound, but he prefers to move forward as he is bothered by his urgency and hematuria with a stent  He is supplementing with Ensure protein drinks  Will proceed with surgery as planned  Urine culture 2022 less than 10K mixed contaminants  Repeat urine culture and PAT lab work, EKG scheduled for 2022    Unable to provide urine specimen in the office today  We did cut the visit short because his wound VAC appliance battery  and he needed to get home  He spends about 3 hours in doctors offices all morning and did not have the [facility]  We did not get a chance to sign a consent prior to his departure today  This will be obtained on the day of planned surgery  History of Present Illness  Leland Arriola is a 78 y o  male here for history and physical prior to their upcoming surgery      Urologic history as below: Complications of recent general surgeries in the past 3 months bowel obstruction and hernia repairs  He has a large abdominal laparotomy wound is being managed by wound VAC  In the interim in November he was readmitted with sepsis secondary to an obstructing right proximal ureteral stone  He was stented urgently during that hospitalization  Presents today to discuss his return trip to the operating room to treat the stone and exchange his stent on January 3  He continues to have some urinary urgency and intermittent gross hematuria  Has had no fevers no dysuria  Review of Systems   Constitutional: Positive for fatigue  Negative for activity change, appetite change, chills, fever and unexpected weight change  HENT: Negative  Respiratory: Negative  Negative for cough and shortness of breath  Cardiovascular: Positive for leg swelling  Negative for chest pain  Gastrointestinal: Negative for abdominal pain, diarrhea, nausea and vomiting  Endocrine: Negative  Genitourinary: Negative for decreased urine volume, difficulty urinating, dysuria, flank pain, frequency, hematuria and urgency  Musculoskeletal: Negative for back pain and gait problem  Skin: Positive for wound (  abdominal wound vac)  Allergic/Immunologic: Negative  Neurological: Negative  Hematological: Negative for adenopathy  Does not bruise/bleed easily  Vitals  Vitals:    12/22/22 1123   BP: 140/72   BP Location: Left arm   Patient Position: Sitting   Cuff Size: Adult   Pulse: 95   SpO2: 98%   Weight: (!) 149 kg (328 lb)   Height: 5' 10" (1 778 m)       Physical Exam  Constitutional:       General: He is not in acute distress  Appearance: Normal appearance  He is not ill-appearing or diaphoretic  Comments: Obese white male   HENT:      Head: Normocephalic and atraumatic  Cardiovascular:      Rate and Rhythm: Normal rate and regular rhythm  Pulses: Normal pulses  Heart sounds: Normal heart sounds     Pulmonary:      Effort: Pulmonary effort is normal       Breath sounds: Normal breath sounds  Abdominal:      General: Abdomen is flat  Palpations: Abdomen is soft  Comments: Large abdominal wound, wound VAC in place   Musculoskeletal:      Right lower leg: No edema  Left lower leg: No edema  Skin:     Coloration: Skin is not pale  Findings: No rash  Neurological:      General: No focal deficit present  Mental Status: He is alert  Mental status is at baseline        Gait: Gait normal       Comments: Steady with rolling walker   Psychiatric:         Mood and Affect: Mood normal              Past Medical History  Past Medical History:   Diagnosis Date   • Anemia 09/16/2022    pt unsure of this   • Cataract    • Chronic kidney disease    • Colon polyp    • Dental crowns present    • History of COVID-19 05/2022    per pt mild symptoms -recovered at home-   • History of pneumonia as a child    • Hypertension    • Kidney stone    • Morbid obesity ([facility] [address] )    • Risk for falls    • Sepsis ([facility] [address] )     pt unsure of this   • Shortness of breath     per pt "gets winded when walking with walker and wound vac around the house"   • Tooth missing    • Urinary tract infection without hematuria 04/03/2020   • Uses walker    • Wears glasses    • Weight loss     per pt since 1/2022 approx 80lb weight loss-had been 410lbs   • Wound of abdomen     wound vac in place-receives homecare 3x/week       Past Social History  Past Surgical History:   Procedure Laterality Date   • ABDOMINAL ADHESION SURGERY N/A 09/15/2022    Procedure: LYSIS ADHESIONS;  Surgeon: Elli Conner MD;  [facility];  Service: General   • APPENDECTOMY     • COLONOSCOPY     • EXPLORATORY LAPAROTOMY W/ BOWEL RESECTION N/A 09/14/2022    Procedure: Exdploratory laparotomy, complex Lysis of adhesions, reduction strangulated hernia, debridement RLQ muscle with counter incision, abthera placement;  Surgeon: Elli Conner MD;  [facility];  Service: General   • EXPLORATORY LAPAROTOMY W/ BOWEL RESECTION N/A 09/15/2022    Procedure: LAPAROTOMY EXPLORATORY  REPAIR RIGHT LQ HERNIA WITH MESH, REPAIR ANTERIOR WALL HERNIA;  Surgeon: Lesly Orourke MD;  [facility];  Service: General   • FL RETROGRADE PYELOGRAM  11/15/2022   • HERNIA REPAIR      per pt there is Mesh implanted   • NASAL SEPTUM SURGERY     • AR CYSTO BLADDER W/URETERAL CATHETERIZATION Right 11/15/2022    Procedure: CYSTOSCOPY RETROGRADE PYELOGRAM WITH INSERTION STENT URETERAL;  Surgeon: Alexey Brian MD;  [facility];  Service: Urology   • TONSILLECTOMY     • WISDOM TOOTH EXTRACTION     • WOUND DEBRIDEMENT N/A 10/11/2022    Procedure: Excisional debridement of abdominal wall nonviable tissue  Skin and subcu tissues were dissected  The wound measured 23 x 18 cm x 4 cm deep ;  Surgeon: Imelda Luis MD;  [facility];  Service: General       Past Family History  Family History   Problem Relation Age of Onset   • Diabetes Father    • Prostate cancer Brother        Past Social history  Social History     Socioeconomic History   • Marital status: /Civil Union     Spouse name: Not on file   • Number of children: Not on file   • Years of education: Not on file   • Highest education level: Not on file   Occupational History   • Not on file   Tobacco Use   • Smoking status: Never   • Smokeless tobacco: Never   • Tobacco comments:     no exposure to passive smoke   Vaping Use   • Vaping Use: Never used   Substance and Sexual Activity   • Alcohol use: Never   • Drug use: Never   • Sexual activity: Not on file     Comment: defer   Other Topics Concern   • Not on file   Social History Narrative   • Not on file     Social Determinants of Health     Financial Resource Strain: Not on file   Food Insecurity: No Food Insecurity   • Worried About Running Out of Food in the Last Year: Never true   • Ran Out of Food in the Last Year: Never true   Transportation Needs: No Transportation Needs   • Lack of Transportation (Medical):  No   • Lack of Transportation (Non-Medical): No   Physical Activity: Not on file   Stress: Not on file   Social Connections: Not on file   Intimate Partner Violence: Not on file   Housing Stability: Low Risk    • Unable to Pay for Housing in the Last Year: No   • Number of Places Lived in the Last Year: 1   • Unstable Housing in the Last Year: No       Current Medications  Current Outpatient Medications   Medication Sig Dispense Refill   • allopurinol (ZYLOPRIM) 100 mg tablet Take 100 mg by mouth daily      • apixaban (Eliquis) 5 mg Take 1 tablet (5 mg total) by mouth 2 (two) times a day 180 tablet 0   • furosemide (LASIX) 20 mg tablet Take 1 tablet (20 mg total) by mouth daily For swelling 90 tablet 3   • metoprolol succinate (TOPROL-XL) 100 mg 24 hr tablet Take 1 tablet (100 mg total) by mouth daily (Patient taking differently: Take 100 mg by mouth every evening) 90 tablet 3   • nystatin (MYCOSTATIN) powder Apply topically 2 (two) times a day (Patient taking differently: Apply topically 2 (two) times a day as needed) 60 g 12   • Potassium Citrate ER 15 MEQ (1620 MG) TBCR Take 1 tablet by mouth daily      • tamsulosin (FLOMAX) 0 4 mg 1-2 daily as directed (Patient taking differently: daily with dinner 1-2 daily as directed) 180 capsule 3   • TRAVATAN Z 0 004 % ophthalmic solution Administer 1 drop to both eyes daily at bedtime      • acetaminophen (TYLENOL) 500 mg tablet Take 500 mg by mouth every 6 (six) hours as needed for mild pain       No current facility-administered medications for this visit  Allergies  Allergies   Allergen Reactions   • Codeine Swelling     Other reaction(s): SWELLING OF FACE    Swollen eyes/swelling--age 11         Past Medical History, Social History, Family History, medications and allergies were reviewed

## 2022-12-23 ENCOUNTER — HOME CARE VISIT (OUTPATIENT)
Dept: HOME HEALTH SERVICES | Facility: HOME HEALTHCARE | Age: 67
End: 2022-12-23

## 2022-12-23 ENCOUNTER — PREP FOR PROCEDURE (OUTPATIENT)
Dept: PLASTIC SURGERY | Facility: CLINIC | Age: 67
End: 2022-12-23

## 2022-12-23 VITALS
TEMPERATURE: 97.9 F | OXYGEN SATURATION: 97 % | SYSTOLIC BLOOD PRESSURE: 118 MMHG | RESPIRATION RATE: 18 BRPM | HEART RATE: 88 BPM | DIASTOLIC BLOOD PRESSURE: 72 MMHG

## 2022-12-23 DIAGNOSIS — T14.8XXA SURGICAL WOUND PRESENT: Primary | ICD-10-CM

## 2022-12-23 NOTE — CASE COMMUNICATION
Ship to  Pt  Home   Insurance medicare    Wound 1 mid abdomen        Wound 2 R lower abdomen         All items are ordered by the each unless otherwise noted    PLEASE DO NOT ORDER BY THE BOX  Request specific quantity needed  For Private Insurances order should be for a 2 week period    Saud Taylor [id number]  Gauze 4x4 N/S 200 4x4s per unit  [id number]

## 2022-12-26 ENCOUNTER — HOME CARE VISIT (OUTPATIENT)
Dept: HOME HEALTH SERVICES | Facility: HOME HEALTHCARE | Age: 67
End: 2022-12-26

## 2022-12-26 VITALS — RESPIRATION RATE: 16 BRPM

## 2022-12-26 NOTE — DISCHARGE INSTRUCTIONS
Ureteral Stent Placement   WHAT YOU NEED TO KNOW:   Ureteral stent placement is a procedure to open a blocked or narrow ureter  The ureter is the tube that carries urine from your kidney into your bladder  A stent is a thin hollow plastic tube used to hold your ureter open and allow urine to flow  The stent may stay in for several weeks  Long-term stents will stay in longer and need to be replaced within a certain period of time  DISCHARGE INSTRUCTIONS:   Seek care immediately if:   You urinate very little or not at all  You have severe pain in your abdomen, even after you take medicine  You have heavy bleeding from your urethra  You see large blood clots in your urine, or your urine is bright red  Contact your healthcare provider or urologist if:   You have a fever or chills  You feel like you need to urinate very often  Your symptoms get worse, or you develop new symptoms  You have questions or concerns about your condition or care  Medicines: You may  need any of the following:  Acetaminophen  decreases pain and fever  It is available without a doctor's order  Ask how much to take and how often to take it  Follow directions  Read the labels of all other medicines you are using to see if they also contain acetaminophen, or ask your doctor or pharmacist  Acetaminophen can cause liver damage if not taken correctly  Do not use more than 4 grams (4,000 milligrams) total of acetaminophen in one day  Prescription pain medicine  may be given  Ask your healthcare provider how to take this medicine safely  Some prescription pain medicines contain acetaminophen  Do not take other medicines that contain acetaminophen without talking to your healthcare provider  Too much acetaminophen may cause liver damage  Prescription pain medicine may cause constipation  Ask your healthcare provider how to prevent or treat constipation  Antibiotics  help prevent or treat bacterial infections   Your healthcare provider may prescribe these for you while you have a ureteral stent  Take your medicine as directed  Contact your healthcare provider if you think your medicine is not helping or if you have side effects  Tell him or her if you are allergic to any medicine  Keep a list of the medicines, vitamins, and herbs you take  Include the amounts, and when and why you take them  Bring the list or the pill bottles to follow-up visits  Carry your medicine list with you in case of an emergency  Self-care:   Drink liquids as directed  Liquids will help flush your urinary tract and prevent infection  Ask your healthcare provider how much liquid to drink each day and which liquids are best for you  Ask when you can return to daily activities  You may be able to return to normal activities the day after your stent placement  Follow up with your urologist as directed: You will need regular follow-up visits with your urologist as long as you have a stent  He or she will check to make sure the stent is working properly  He or she will remove your temporary stent in several weeks  Your provider may do urine cultures to check for infection  Write down your questions so you remember to ask them during your visits  © Copyright [employer] 2022 Information is for End User's use only and may not be sold, redistributed or otherwise used for commercial purposes  All illustrations and images included in CareNotes® are the copyrighted property of A D A M , Inc  or [facility] Dang   The above information is an  only  It is not intended as medical advice for individual conditions or treatments  Talk to your doctor, nurse or pharmacist before following any medical regimen to see if it is safe and effective for you  Ureteral Stones   WHAT YOU NEED TO KNOW:   A ureteral stone forms in the kidney and moves down the ureter and gets stuck there   The ureter is the tube that takes urine from the kidney to the bladder  Stones can form in the urinary system when your urine has high levels of minerals and salts  Urinary stones can be made of uric acid, calcium, phosphate, or oxalate crystals  DISCHARGE INSTRUCTIONS:   Seek care immediately if:   You have severe pain that does not improve, even after you take medicine  You have vomiting that is not relieved by medicine  Call your doctor if:   You develop a fever  You have questions or concerns about your condition or care  Medicines: You may need any of the following:  NSAIDs , such as ibuprofen, help decrease swelling, pain, and fever  This medicine is available with or without a doctor's order  NSAIDs can cause stomach bleeding or kidney problems in certain people  If you take blood thinner medicine, always ask your healthcare provider if NSAIDs are safe for you  Always read the medicine label and follow directions  Prescription pain medicine  may be given  Ask your healthcare provider how to take this medicine safely  Some prescription pain medicines contain acetaminophen  Do not take other medicines that contain acetaminophen without talking to your healthcare provider  Too much acetaminophen may cause liver damage  Prescription pain medicine may cause constipation  Ask your healthcare provider how to prevent or treat constipation  Nausea medicine  may help calm your stomach and prevent vomiting  Take your medicine as directed  Contact your healthcare provider if you think your medicine is not helping or if you have side effects  Tell him or her if you are allergic to any medicine  Keep a list of the medicines, vitamins, and herbs you take  Include the amounts, and when and why you take them  Bring the list or the pill bottles to follow-up visits  Carry your medicine list with you in case of an emergency  What you can do to manage uretal stones:   Drink more liquids    Your healthcare provider may tell you to drink at least 8 to 12 (eight-ounce) cups of liquid each day  This helps flush out the stones when you urinate  Water is the best liquid to drink  Your urine will be clear (not yellow) if you are drinking enough liquid  Strain your urine every time you go to the bathroom  Urinate through a strainer or a piece of thin cloth to catch the stone  Take the stone to your healthcare provider so it can be sent to a lab for tests  This will help your healthcare providers plan the best treatment for you  Ask your healthcare provider about any nutrition changes you need to make  You may need to limit certain foods, such as foods high in sodium (salt) or protein  You may need to limit leafy green vegetables, carbonated drinks, or beer  These changes will depend on the kind of stones you have  Stay active  Your stones may pass more easily if you stay active  Exercise can also help you manage your weight  Ask about the best activities for you  After you pass the ureteral stone: Your healthcare provider may  order a 24-hour urine test  Results from a 24-hour urine test will help your healthcare provider plan ways to prevent more stones from forming  Your healthcare provider will give you more instructions  Follow up with your doctor as directed: You may need to return for more tests  Write down your questions so you remember to ask them during your visits  © Copyright [employer] 2022 Information is for End User's use only and may not be sold, redistributed or otherwise used for commercial purposes  All illustrations and images included in CareNotes® are the copyrighted property of [employer]  or Alta Bejarano  The above information is an  only  It is not intended as medical advice for individual conditions or treatments  Talk to your doctor, nurse or pharmacist before following any medical regimen to see if it is safe and effective for you

## 2022-12-26 NOTE — H&P
History and physical examination     I met the patient in the holding area prior to the procedure and reviewed his urologic past history and the proposed procedure with him step-by-step and answered all patient questions discussing potential risks of bleeding infection retained stone or stone fragments perforation contracture stricture difficulty urinating potential need for a catheter need for stent and possible need for additional operative interventions  The patient expressed understanding and provided verbal and signed informed consent  I performed history and physical and reviewed the history and physical below performed 12/22/2022 and agree with his findings          Gregory Hall PA-C  Physician Assistant  Specialty:  Urology  Progress Notes      Cosign Needed  Creation Time:  12/22/2022 11:36 AM   Related encounter: Office Visit from 12/22/2022 in [facility] [place] [facility]     Cosign Needed        Expand All Collapse All  Pre-op visit  12/22/2022        No chief complaint on file  Assessment and Plan     78 y o  male managed by Dr Monroe Whitmore     1  Right ureteral calculus     History and physical was performed for the patients upcoming cystoscopy right ureteroscopy laser lithotripsy, retrograde pyelogram, ureteral stent exchange scheduled for 1/3/2023 with Dr Lester Patel  All questions and concerns regarding surgery and perioperative expectations have been addressed and answered  No overall changes in their health since last visit, denies any prior complications with anesthesia  He did have recent EKG, echo, PCP clearance last week  He has chronic bilateral lymphedema of the legs which is unchanged and stable  He has no chest pain shortness of breath cough  He can ambulate comfortably around his home  He was able to ambulate comfortably from the front door to the exam room today, with the assistance of a walker    Did discuss the option to delay surgery sometime while he is healing his abdominal wound, but he prefers to move forward as he is bothered by his urgency and hematuria with a stent  He is supplementing with Ensure protein drinks  Will proceed with surgery as planned  Urine culture 2022 less than 10K mixed contaminants  Repeat urine culture and PAT lab work, EKG scheduled for 2022     Unable to provide urine specimen in the office today  We did cut the visit short because his wound VAC appliance battery  and he needed to get home  He spends about 3 hours in doctors offices all morning and did not have the [facility]  We did not get a chance to sign a consent prior to his departure today  This will be obtained on the day of planned surgery  History of Present Illness  Cayla Palencia is a 78 y o  male here for history and physical prior to their upcoming surgery  Urologic history as below: Complications of recent general surgeries in the past 3 months bowel obstruction and hernia repairs  He has a large abdominal laparotomy wound is being managed by wound VAC  In the interim in November he was readmitted with sepsis secondary to an obstructing right proximal ureteral stone  He was stented urgently during that hospitalization  Presents today to discuss his return trip to the operating room to treat the stone and exchange his stent on January 3  He continues to have some urinary urgency and intermittent gross hematuria  Has had no fevers no dysuria  Review of Systems   Constitutional: Positive for fatigue  Negative for activity change, appetite change, chills, fever and unexpected weight change  HENT: Negative  Respiratory: Negative  Negative for cough and shortness of breath  Cardiovascular: Positive for leg swelling  Negative for chest pain  Gastrointestinal: Negative for abdominal pain, diarrhea, nausea and vomiting  Endocrine: Negative      Genitourinary: Negative for decreased urine volume, difficulty urinating, dysuria, flank pain, frequency, hematuria and urgency  Musculoskeletal: Negative for back pain and gait problem  Skin: Positive for wound (  abdominal wound vac)  Allergic/Immunologic: Negative  Neurological: Negative  Hematological: Negative for adenopathy  Does not bruise/bleed easily  Vitals  Vitals       Vitals:     12/22/22 1123   BP: 140/72   BP Location: Left arm   Patient Position: Sitting   Cuff Size: Adult   Pulse: 95   SpO2: 98%   Weight: (!) 149 kg (328 lb)   Height: 5' 10" (1 778 m)            Physical Exam  Constitutional:       General: He is not in acute distress  Appearance: Normal appearance  He is not ill-appearing or diaphoretic  Comments: Obese white male   HENT:      Head: Normocephalic and atraumatic  Cardiovascular:      Rate and Rhythm: Normal rate and regular rhythm  Pulses: Normal pulses  Heart sounds: Normal heart sounds  Pulmonary:      Effort: Pulmonary effort is normal       Breath sounds: Normal breath sounds  Abdominal:      General: Abdomen is flat  Palpations: Abdomen is soft  Comments: Large abdominal wound, wound VAC in place   Musculoskeletal:      Right lower leg: No edema  Left lower leg: No edema  Skin:     Coloration: Skin is not pale  Findings: No rash  Neurological:      General: No focal deficit present  Mental Status: He is alert  Mental status is at baseline        Gait: Gait normal       Comments: Steady with rolling walker   Psychiatric:         Mood and Affect: Mood normal                   Past Medical History  Medical History        Past Medical History:   Diagnosis Date    Anemia 09/16/2022     pt unsure of this    Cataract      Chronic kidney disease      Colon polyp      Dental crowns present      History of COVID-19 05/2022     per pt mild symptoms -recovered at home-    History of pneumonia as a child      Hypertension      Kidney stone      Morbid obesity ([address] )      Risk for falls      Sepsis [address] [address] )       pt unsure of this    Shortness of breath       per pt "gets winded when walking with walker and wound vac around the house"    Tooth missing      Urinary tract infection without hematuria 04/03/2020    Uses walker      Wears glasses      Weight loss       per pt since 1/2022 approx 80lb weight loss-had been 410lbs    Wound of abdomen       wound vac in place-receives homecare 3x/week            Past Social History  Surgical History         Past Surgical History:   Procedure Laterality Date    ABDOMINAL ADHESION SURGERY N/A 09/15/2022     Procedure: LYSIS ADHESIONS;  Surgeon: Charmayne Honey, MD;  [facility];  Service: General    APPENDECTOMY        COLONOSCOPY        EXPLORATORY LAPAROTOMY W/ BOWEL RESECTION N/A 09/14/2022     Procedure: Exdploratory laparotomy, complex Lysis of adhesions, reduction strangulated hernia, debridement RLQ muscle with counter incision, abthera placement;  Surgeon: Charmayne Honey, MD;  [facility];  Service: General    EXPLORATORY LAPAROTOMY W/ BOWEL RESECTION N/A 09/15/2022     Procedure: LAPAROTOMY EXPLORATORY  REPAIR RIGHT LQ HERNIA WITH MESH, REPAIR ANTERIOR WALL HERNIA;  Surgeon: Charmayne Honey, MD;  [facility];  Service: General    FL RETROGRADE PYELOGRAM   11/15/2022    HERNIA REPAIR         per pt there is Mesh implanted    NASAL SEPTUM SURGERY        NM CYSTO BLADDER W/URETERAL CATHETERIZATION Right 11/15/2022     Procedure: CYSTOSCOPY RETROGRADE PYELOGRAM WITH INSERTION STENT URETERAL;  Surgeon: Kenna Spann MD;  [facility];  Service: Urology    TONSILLECTOMY        WISDOM TOOTH EXTRACTION        WOUND DEBRIDEMENT N/A 10/11/2022     Procedure: Excisional debridement of abdominal wall nonviable tissue  Skin and subcu tissues were dissected    The wound measured 23 x 18 cm x 4 cm deep ;  Surgeon: Milo Givens MD;  [facility];  Service: General            Past Family History  Family History         Family History   Problem Relation Age of Onset    Diabetes Father      Prostate cancer Brother              Past Social history  Social History               Socioeconomic History    Marital status: /Civil Union       Spouse name: Not on file    Number of children: Not on file    Years of education: Not on file    Highest education level: Not on file   Occupational History    Not on file   Tobacco Use    Smoking status: Never    Smokeless tobacco: Never    Tobacco comments:       no exposure to passive smoke   Vaping Use    Vaping Use: Never used   Substance and Sexual Activity    Alcohol use: Never    Drug use: Never    Sexual activity: Not on file       Comment: defer   Other Topics Concern    Not on file   Social History Narrative    Not on file      Social Determinants of Health          Financial Resource Strain: Not on file   Food Insecurity: No Food Insecurity    Worried About Running Out of Food in the Last Year: Never true    [address] in the Last Year: Never true   Transportation Needs: No Transportation Needs    Lack of Transportation (Medical): No    Lack of Transportation (Non-Medical):  No   Physical Activity: Not on file   Stress: Not on file   Social Connections: Not on file   Intimate Partner Violence: Not on file   Housing Stability: Low Risk     Unable to Pay for Housing in the Last Year: No    Number of Places Lived in the Last Year: 1    Unstable Housing in the Last Year: No            Current Medications  Current Medications          Current Outpatient Medications   Medication Sig Dispense Refill    allopurinol (ZYLOPRIM) 100 mg tablet Take 100 mg by mouth daily         apixaban (Eliquis) 5 mg Take 1 tablet (5 mg total) by mouth 2 (two) times a day 180 tablet 0    furosemide (LASIX) 20 mg tablet Take 1 tablet (20 mg total) by mouth daily For swelling 90 tablet 3    metoprolol succinate (TOPROL-XL) 100 mg 24 hr tablet Take 1 tablet (100 mg total) by mouth daily (Patient taking differently: Take 100 mg by mouth every evening) 90 tablet 3    nystatin (MYCOSTATIN) powder Apply topically 2 (two) times a day (Patient taking differently: Apply topically 2 (two) times a day as needed) 60 g 12    Potassium Citrate ER 15 MEQ (1620 MG) TBCR Take 1 tablet by mouth daily         tamsulosin (FLOMAX) 0 4 mg 1-2 daily as directed (Patient taking differently: daily with dinner 1-2 daily as directed) 180 capsule 3    TRAVATAN Z 0 004 % ophthalmic solution Administer 1 drop to both eyes daily at bedtime         acetaminophen (TYLENOL) 500 mg tablet Take 500 mg by mouth every 6 (six) hours as needed for mild pain          No current facility-administered medications for this visit  Allergies        Allergies   Allergen Reactions    Codeine Swelling       Other reaction(s): SWELLING OF FACE     Swollen eyes/swelling--age 11            Past Medical History, Social History, Family History, medications and allergies were reviewed

## 2022-12-27 ENCOUNTER — TELEPHONE (OUTPATIENT)
Dept: FAMILY MEDICINE CLINIC | Facility: CLINIC | Age: 67
End: 2022-12-27

## 2022-12-27 ENCOUNTER — APPOINTMENT (OUTPATIENT)
Dept: LAB | Facility: HOSPITAL | Age: 67
End: 2022-12-27

## 2022-12-27 DIAGNOSIS — I26.99 PULMONARY EMBOLISM, UNSPECIFIED CHRONICITY, UNSPECIFIED PULMONARY EMBOLISM TYPE, UNSPECIFIED WHETHER ACUTE COR PULMONALE PRESENT (HCC): ICD-10-CM

## 2022-12-27 DIAGNOSIS — T14.8XXA SURGICAL WOUND PRESENT: ICD-10-CM

## 2022-12-27 DIAGNOSIS — N20.1 URETERAL STONE: ICD-10-CM

## 2022-12-27 DIAGNOSIS — I10 ESSENTIAL HYPERTENSION: ICD-10-CM

## 2022-12-27 LAB
ALBUMIN SERPL BCP-MCNC: 3.6 G/DL (ref 3.5–5)
ALP SERPL-CCNC: 71 U/L (ref 43–122)
ALT SERPL W P-5'-P-CCNC: 12 U/L
ANION GAP SERPL CALCULATED.3IONS-SCNC: 6 MMOL/L (ref 5–14)
AST SERPL W P-5'-P-CCNC: 16 U/L (ref 17–59)
BASOPHILS # BLD AUTO: 0.06 THOUSANDS/ÂΜL (ref 0–0.1)
BASOPHILS NFR BLD AUTO: 1 % (ref 0–1)
BILIRUB SERPL-MCNC: 0.53 MG/DL (ref 0.2–1)
BUN SERPL-MCNC: 20 MG/DL (ref 5–25)
CALCIUM SERPL-MCNC: 9 MG/DL (ref 8.4–10.2)
CHLORIDE SERPL-SCNC: 102 MMOL/L (ref 96–108)
CO2 SERPL-SCNC: 28 MMOL/L (ref 21–32)
CREAT SERPL-MCNC: 1.32 MG/DL (ref 0.7–1.5)
EOSINOPHIL # BLD AUTO: 0.26 THOUSAND/ÂΜL (ref 0–0.61)
EOSINOPHIL NFR BLD AUTO: 2 % (ref 0–6)
ERYTHROCYTE [DISTWIDTH] IN BLOOD BY AUTOMATED COUNT: 14.1 % (ref 11.6–15.1)
GFR SERPL CREATININE-BSD FRML MDRD: 55 ML/MIN/1.73SQ M
GLUCOSE P FAST SERPL-MCNC: 112 MG/DL (ref 70–99)
HCT VFR BLD AUTO: 31.2 % (ref 36.5–49.3)
HGB BLD-MCNC: 9.3 G/DL (ref 12–17)
IMM GRANULOCYTES # BLD AUTO: 0.08 THOUSAND/UL (ref 0–0.2)
IMM GRANULOCYTES NFR BLD AUTO: 1 % (ref 0–2)
LYMPHOCYTES # BLD AUTO: 2.22 THOUSANDS/ÂΜL (ref 0.6–4.47)
LYMPHOCYTES NFR BLD AUTO: 19 % (ref 14–44)
MCH RBC QN AUTO: 27.6 PG (ref 26.8–34.3)
MCHC RBC AUTO-ENTMCNC: 29.8 G/DL (ref 31.4–37.4)
MCV RBC AUTO: 93 FL (ref 82–98)
MONOCYTES # BLD AUTO: 0.76 THOUSAND/ÂΜL (ref 0.17–1.22)
MONOCYTES NFR BLD AUTO: 6 % (ref 4–12)
NEUTROPHILS # BLD AUTO: 8.44 THOUSANDS/ÂΜL (ref 1.85–7.62)
NEUTS SEG NFR BLD AUTO: 71 % (ref 43–75)
NRBC BLD AUTO-RTO: 0 /100 WBCS
PLATELET # BLD AUTO: 542 THOUSANDS/UL (ref 149–390)
PMV BLD AUTO: 8.1 FL (ref 8.9–12.7)
POTASSIUM SERPL-SCNC: 3.9 MMOL/L (ref 3.5–5.3)
PREALB SERPL-MCNC: 13.6 MG/DL (ref 18–40)
PROT SERPL-MCNC: 7.7 G/DL (ref 6.4–8.4)
RBC # BLD AUTO: 3.37 MILLION/UL (ref 3.88–5.62)
SODIUM SERPL-SCNC: 136 MMOL/L (ref 135–147)
WBC # BLD AUTO: 11.82 THOUSAND/UL (ref 4.31–10.16)

## 2022-12-27 NOTE — TELEPHONE ENCOUNTER
----- Message from Gideon Sarmiento MD sent at 12/27/2022 12:15 PM EST -----  B/w from today reviewed and OK    Good luck on surgery   ----- Message -----  From: Lab, Background User  Sent: 12/27/2022  11:34 AM EST  To: Gideon Sarmiento MD

## 2022-12-28 ENCOUNTER — TELEPHONE (OUTPATIENT)
Dept: FAMILY MEDICINE CLINIC | Facility: CLINIC | Age: 67
End: 2022-12-28

## 2022-12-28 ENCOUNTER — HOME CARE VISIT (OUTPATIENT)
Dept: HOME HEALTH SERVICES | Facility: HOME HEALTHCARE | Age: 67
End: 2022-12-28

## 2022-12-28 VITALS
DIASTOLIC BLOOD PRESSURE: 80 MMHG | TEMPERATURE: 97.6 F | OXYGEN SATURATION: 98 % | SYSTOLIC BLOOD PRESSURE: 110 MMHG | HEART RATE: 78 BPM | RESPIRATION RATE: 20 BRPM

## 2022-12-28 LAB — BACTERIA UR CULT: NORMAL

## 2022-12-28 NOTE — TELEPHONE ENCOUNTER
Rosario Perez was made aware patient should No eliquus on 1/9 or 1/10 or 1/11;  resume the day after surgery

## 2022-12-29 ENCOUNTER — TELEPHONE (OUTPATIENT)
Dept: FAMILY MEDICINE CLINIC | Facility: CLINIC | Age: 67
End: 2022-12-29

## 2022-12-30 ENCOUNTER — ANESTHESIA EVENT (OUTPATIENT)
Dept: PERIOP | Facility: HOSPITAL | Age: 67
End: 2022-12-30

## 2022-12-30 ENCOUNTER — HOME CARE VISIT (OUTPATIENT)
Dept: HOME HEALTH SERVICES | Facility: HOME HEALTHCARE | Age: 67
End: 2022-12-30

## 2022-12-30 VITALS
SYSTOLIC BLOOD PRESSURE: 130 MMHG | RESPIRATION RATE: 20 BRPM | DIASTOLIC BLOOD PRESSURE: 65 MMHG | HEART RATE: 84 BPM | OXYGEN SATURATION: 98 % | TEMPERATURE: 97.5 F

## 2023-01-01 LAB — BACTERIA BLD CULT: NORMAL

## 2023-01-02 ENCOUNTER — HOME CARE VISIT (OUTPATIENT)
Dept: HOME HEALTH SERVICES | Facility: HOME HEALTHCARE | Age: 68
End: 2023-01-02

## 2023-01-02 VITALS
RESPIRATION RATE: 18 BRPM | SYSTOLIC BLOOD PRESSURE: 110 MMHG | TEMPERATURE: 97.8 F | HEART RATE: 80 BPM | DIASTOLIC BLOOD PRESSURE: 60 MMHG | OXYGEN SATURATION: 96 %

## 2023-01-03 ENCOUNTER — ANESTHESIA (OUTPATIENT)
Dept: PERIOP | Facility: HOSPITAL | Age: 68
End: 2023-01-03

## 2023-01-03 ENCOUNTER — APPOINTMENT (OUTPATIENT)
Dept: RADIOLOGY | Facility: HOSPITAL | Age: 68
End: 2023-01-03

## 2023-01-03 ENCOUNTER — HOSPITAL ENCOUNTER (OUTPATIENT)
Facility: HOSPITAL | Age: 68
Setting detail: OUTPATIENT SURGERY
Discharge: HOME | End: 2023-01-03
Attending: UROLOGY | Admitting: UROLOGY

## 2023-01-03 VITALS
OXYGEN SATURATION: 97 % | DIASTOLIC BLOOD PRESSURE: 53 MMHG | RESPIRATION RATE: 18 BRPM | TEMPERATURE: 97.9 F | BODY MASS INDEX: 45.1 KG/M2 | HEIGHT: 70 IN | HEART RATE: 100 BPM | SYSTOLIC BLOOD PRESSURE: 112 MMHG | WEIGHT: 315 LBS

## 2023-01-03 DIAGNOSIS — N20.1 RIGHT URETERAL STONE: ICD-10-CM

## 2023-01-03 DIAGNOSIS — I10 ESSENTIAL HYPERTENSION: ICD-10-CM

## 2023-01-03 DEVICE — VARIABLE LENGTH INJECTION STENT SET
Type: IMPLANTABLE DEVICE | Site: URETER | Status: FUNCTIONAL
Brand: CONTOUR VL™ INJECTION STENT SET

## 2023-01-03 RX ORDER — ONDANSETRON 2 MG/ML
4 INJECTION INTRAMUSCULAR; INTRAVENOUS ONCE AS NEEDED
Status: DISCONTINUED | OUTPATIENT
Start: 2023-01-03 | End: 2023-01-03 | Stop reason: HOSPADM

## 2023-01-03 RX ORDER — FENTANYL CITRATE/PF 50 MCG/ML
25 SYRINGE (ML) INJECTION
Status: DISCONTINUED | OUTPATIENT
Start: 2023-01-03 | End: 2023-01-03 | Stop reason: HOSPADM

## 2023-01-03 RX ORDER — ROCURONIUM BROMIDE 10 MG/ML
INJECTION, SOLUTION INTRAVENOUS AS NEEDED
Status: DISCONTINUED | OUTPATIENT
Start: 2023-01-03 | End: 2023-01-03

## 2023-01-03 RX ORDER — ONDANSETRON 2 MG/ML
INJECTION INTRAMUSCULAR; INTRAVENOUS AS NEEDED
Status: DISCONTINUED | OUTPATIENT
Start: 2023-01-03 | End: 2023-01-03

## 2023-01-03 RX ORDER — SULFAMETHOXAZOLE AND TRIMETHOPRIM 800; 160 MG/1; MG/1
1 TABLET ORAL DAILY
Qty: 7 TABLET | Refills: 0 | Status: SHIPPED | OUTPATIENT
Start: 2023-01-03 | End: 2023-01-11

## 2023-01-03 RX ORDER — FENTANYL CITRATE 50 UG/ML
INJECTION, SOLUTION INTRAMUSCULAR; INTRAVENOUS AS NEEDED
Status: DISCONTINUED | OUTPATIENT
Start: 2023-01-03 | End: 2023-01-03

## 2023-01-03 RX ORDER — LIDOCAINE HYDROCHLORIDE 10 MG/ML
INJECTION, SOLUTION EPIDURAL; INFILTRATION; INTRACAUDAL; PERINEURAL AS NEEDED
Status: DISCONTINUED | OUTPATIENT
Start: 2023-01-03 | End: 2023-01-03

## 2023-01-03 RX ORDER — HEPARIN SODIUM 5000 [USP'U]/ML
5000 INJECTION, SOLUTION INTRAVENOUS; SUBCUTANEOUS ONCE
Status: COMPLETED | OUTPATIENT
Start: 2023-01-03 | End: 2023-01-03

## 2023-01-03 RX ORDER — MAGNESIUM HYDROXIDE 1200 MG/15ML
LIQUID ORAL AS NEEDED
Status: DISCONTINUED | OUTPATIENT
Start: 2023-01-03 | End: 2023-01-03 | Stop reason: HOSPADM

## 2023-01-03 RX ORDER — SODIUM CHLORIDE 9 MG/ML
INJECTION, SOLUTION INTRAVENOUS AS NEEDED
Status: DISCONTINUED | OUTPATIENT
Start: 2023-01-03 | End: 2023-01-03 | Stop reason: HOSPADM

## 2023-01-03 RX ORDER — PROPOFOL 10 MG/ML
INJECTION, EMULSION INTRAVENOUS AS NEEDED
Status: DISCONTINUED | OUTPATIENT
Start: 2023-01-03 | End: 2023-01-03

## 2023-01-03 RX ORDER — MIDAZOLAM HYDROCHLORIDE 2 MG/2ML
INJECTION, SOLUTION INTRAMUSCULAR; INTRAVENOUS AS NEEDED
Status: DISCONTINUED | OUTPATIENT
Start: 2023-01-03 | End: 2023-01-03

## 2023-01-03 RX ORDER — SODIUM CHLORIDE 9 MG/ML
125 INJECTION, SOLUTION INTRAVENOUS CONTINUOUS
Status: DISCONTINUED | OUTPATIENT
Start: 2023-01-03 | End: 2023-01-03

## 2023-01-03 RX ADMIN — ROCURONIUM BROMIDE 20 MG: 10 INJECTION, SOLUTION INTRAVENOUS at 17:10

## 2023-01-03 RX ADMIN — LIDOCAINE HYDROCHLORIDE 100 MG: 10 INJECTION, SOLUTION EPIDURAL; INFILTRATION; INTRACAUDAL; PERINEURAL at 16:31

## 2023-01-03 RX ADMIN — SODIUM CHLORIDE: 0.9 INJECTION, SOLUTION INTRAVENOUS at 16:57

## 2023-01-03 RX ADMIN — ROCURONIUM BROMIDE 50 MG: 10 INJECTION, SOLUTION INTRAVENOUS at 16:31

## 2023-01-03 RX ADMIN — PROPOFOL 200 MG: 10 INJECTION, EMULSION INTRAVENOUS at 16:31

## 2023-01-03 RX ADMIN — SODIUM CHLORIDE 125 ML/HR: 0.9 INJECTION, SOLUTION INTRAVENOUS at 13:34

## 2023-01-03 RX ADMIN — GENTAMICIN SULFATE 210 MG: 40 INJECTION, SOLUTION INTRAMUSCULAR; INTRAVENOUS at 15:50

## 2023-01-03 RX ADMIN — SUGAMMADEX 400 MG: 100 INJECTION, SOLUTION INTRAVENOUS at 17:42

## 2023-01-03 RX ADMIN — HEPARIN SODIUM 5000 UNITS: 5000 INJECTION INTRAVENOUS; SUBCUTANEOUS at 15:55

## 2023-01-03 RX ADMIN — FENTANYL CITRATE 50 MCG: 50 INJECTION INTRAMUSCULAR; INTRAVENOUS at 17:38

## 2023-01-03 RX ADMIN — MIDAZOLAM 2 MG: 1 INJECTION INTRAMUSCULAR; INTRAVENOUS at 16:23

## 2023-01-03 RX ADMIN — FENTANYL CITRATE 50 MCG: 50 INJECTION INTRAMUSCULAR; INTRAVENOUS at 17:10

## 2023-01-03 RX ADMIN — ONDANSETRON 4 MG: 2 INJECTION INTRAMUSCULAR; INTRAVENOUS at 16:31

## 2023-01-03 RX ADMIN — CEFTRIAXONE SODIUM 1000 MG: 10 INJECTION, POWDER, FOR SOLUTION INTRAVENOUS at 16:39

## 2023-01-03 RX ADMIN — FENTANYL CITRATE 50 MCG: 50 INJECTION INTRAMUSCULAR; INTRAVENOUS at 16:31

## 2023-01-03 NOTE — INTERVAL H&P NOTE
H&P reviewed  After examining the patient I find no changes in the patients condition since the H&P had been written      Vitals:    01/03/23 1315   BP: 143/68   Pulse: 85   Resp: 16   Temp: 98 1 °F (36 7 °C)   SpO2: 98%

## 2023-01-03 NOTE — ANESTHESIA PREPROCEDURE EVALUATION
Procedure:  CYSTO, U-SCOPE, LASER, RETROGRADE, STENT (Right: Bladder)    Relevant Problems   CARDIO   (+) Essential hypertension      GI/HEPATIC  multiple recent abdominal surgeries still with wound vac in place      /RENAL   (+) Acute kidney injury ([address] )   (+) Acute kidney injury superimposed on chronic kidney disease (HCC)   (+) Kidney stones      HEMATOLOGY   (+) Anemia      MUSCULOSKELETAL   (+) Gout      PULMONARY   (+) DELIA (obstructive sleep apnea) (suspected by patient denies history of sleep study)        Physical Exam    Airway    Mallampati score: II  TM Distance: >3 FB  Neck ROM: full     Dental   No notable dental hx     Cardiovascular  Cardiovascular exam normal    Pulmonary  Pulmonary exam normal     Other Findings        Anesthesia Plan  ASA Score- 3     Anesthesia Type- general with ASA Monitors  Additional Monitors:   Airway Plan:           Plan Factors-    Chart reviewed  Existing labs reviewed  Patient summary reviewed  Induction- intravenous  Postoperative Plan-     Informed Consent- Anesthetic plan and risks discussed with patient

## 2023-01-03 NOTE — OP NOTE
OPERATIVE REPORT  PATIENT NAME: Clifford Raya    :  1955  MRN: [id number]  Pt Location: [facility] 04    SURGERY DATE: 1/3/2023    Surgeon(s) and Role:     * Kam Dozier MD - Primary    Preop Diagnosis:  Right ureteral stone [N20 1]    Post-Op Diagnosis Codes:     * Right ureteral stone [N20 1]    Procedure(s) (LRB):  CYSTO, U-SCOPE, LASER, RETROGRADE, STENT (Right) cystoscopy right retrograde pyelogram right ureteroscopic holmium laser lithotripsy and stent exchange    Specimen(s):  ID Type Source Tests Collected by Time Destination   A :  Calculus Ureter, Right STONE ANALYSIS Kam Dozier MD 1/3/2023 3543        Estimated Blood Loss:   Minimal    Drains:  Ureteral Internal Stent Right ureter 6 Fr  (Active)   Number of days: 0       Anesthesia Type:   General    Operative Indications:  Right ureteral stone [N20 1]       Operative Findings:  See operative note below    Complications:   None    Procedure and Technique: I saw the patient in the holding area and discussed the procedure step-by-step answering all patient questions and obtaining verbal and signed informed consent  The patient was made aware of the possible risks and complications as noted in history and physical which was also performed at the bedside  The patient was taken to the operating room placed in the dorsolithotomy position on the operating table and general intubational anesthesia was induced  Using a 25 Pashto cystoscope after prepping and draping and appropriate timeout the urethra was intubated into the urinary bladder  The anterior urethra was within normal limits without stricture or lesion  The prostatic urethra revealed mild to moderate by lobar enlargement of the lateral lobes of the prostate with a high median bar and moderate visual occlusion to the bladder outlet    Urinary bladder was within normal limits except for hyperplastic reactive tissue around the right ureteral orifice with no evidence of intrinsic lesions or extrinsic mass compression and there was a normal ureteral orifice in position and configuration with clear E flux  The right ureteral orifice was identified and a stent was grasped exiting the ureteral orifice and brought to the urethral meatus along with the cystoscope  The cystoscope was set aside and a 0 035 Marcial coated floppy tip guidewire was inserted up the stent under fluoroscopic control into the renal pelvis and then the stent was removed  A long semirigid Aryan Sovereign ureteroscope was then placed per urethra into the urinary bladder into the right ureteral orifice  The semirigid ureteroscope was negotiated up to the level of L3-L4 where there was a impacted stone next to the previously placed wire  The 365 holmium laser fiber was then used to dust the stone in its entirety with no fragments larger than 1 mm left by visualization  There is no injury to the ureteral mucosa  After completion of laser lithotripsy with no further large stone fragments within the ureter a flat wire [place] stone basket was used to brush stone fragments into the urinary bladder from the distal ureter  Ureteroscopy at that point was performed from UVJ to UPJ on the right with no abnormalities noted with regard to the ureteral mucosa and no residual significant stone fragments noted  At this point the wire was backloaded into the 25 Chinese cystoscope which was placed per urethra into the urinary bladder and over the wire a 6 Western May quart stent was placed and after contrast injection revealed good proximal placement and no extravasation the stent was internalized with a transurethral Dangler suture tape to the phallus  Other than the stent all other equipment was removed from the patient's urinary tract  There were no complications  The patient was extubated and transferred the PACU in stable condition    The patient was discharged that same day and will return in approximately 1 week for stent removal via the Dangler suture  Follow-up 3 months afterwards with urologic provider prior to which time CT stone study PTH uric acid level comprehensive metabolic panel and stone risk profile should be performed  There were no complications     I was present for the entire procedure and I was present for all critical portions of the procedure    Patient Disposition:  PACU         SIGNATURE: Cyndi Bah MD  DATE: January 3, 2023  TIME: 6:04 PM

## 2023-01-04 ENCOUNTER — TELEPHONE (OUTPATIENT)
Dept: UROLOGY | Facility: MEDICAL CENTER | Age: 68
End: 2023-01-04

## 2023-01-04 ENCOUNTER — HOME CARE VISIT (OUTPATIENT)
Dept: HOME HEALTH SERVICES | Facility: HOME HEALTHCARE | Age: 68
End: 2023-01-04

## 2023-01-04 VITALS
DIASTOLIC BLOOD PRESSURE: 60 MMHG | OXYGEN SATURATION: 99 % | TEMPERATURE: 98 F | SYSTOLIC BLOOD PRESSURE: 128 MMHG | HEART RATE: 90 BPM | RESPIRATION RATE: 22 BRPM

## 2023-01-04 NOTE — ANESTHESIA POSTPROCEDURE EVALUATION
Post-Op Assessment Note    CV Status:  Stable    Pain management: adequate     Mental Status:  Alert and awake   Hydration Status:  Euvolemic   PONV Controlled:  Controlled   Airway Patency:  Patent      Post Op Vitals Reviewed: Yes      Staff: Anesthesiologist         No notable events documented      BP      Temp      Pulse     Resp      SpO2      /53   Pulse 100   Temp 97 9 °F (36 6 °C) (Temporal)   Resp 18   Ht 5' 10" (1 778 m)   Wt (!) 147 kg (324 lb 8 3 oz)   SpO2 97%   BMI 46 56 kg/m²

## 2023-01-04 NOTE — TELEPHONE ENCOUNTER
Post Op Note    Audi Kasper is a 78 y o  male s/p CYSTO, U-SCOPE, LASER, RETROGRADE, STENT (Right) cystoscopy right retrograde pyelogram right ureteroscopic holmium laser lithotripsy and stent exchange performed 01/03/2023  Audi Kasper is a patient of Dr Mick Gill and is seen at the [facility] office  How would you rate your pain on a scale from 1 to 10, 10 being the worst pain ever? 0  Have you had a fever? No  Have your bowel movements been regular? Yes  Do you have any difficulty urinating? No  Do you have any other questions or concerns that I can address at this time? No      I spoke with patient to see how he was doing since his procedure yesterday  He stated he is doing fine only having some burning with urination  I did let him know this is normal with a stent being placed  Encouraged him to stay hydrated  Stent removal appt made for 1/10/23 at [facility]  He will need a 3 month post op appt scheduled which will be done when he checks out  ED precautions reviewed

## 2023-01-05 ENCOUNTER — CONSULT (OUTPATIENT)
Dept: FAMILY MEDICINE CLINIC | Facility: CLINIC | Age: 68
End: 2023-01-05

## 2023-01-05 VITALS
BODY MASS INDEX: 47.41 KG/M2 | HEART RATE: 79 BPM | WEIGHT: 315 LBS | SYSTOLIC BLOOD PRESSURE: 130 MMHG | OXYGEN SATURATION: 98 % | DIASTOLIC BLOOD PRESSURE: 70 MMHG | TEMPERATURE: 94.6 F

## 2023-01-05 DIAGNOSIS — I10 ESSENTIAL HYPERTENSION: ICD-10-CM

## 2023-01-05 DIAGNOSIS — I26.99 PULMONARY EMBOLISM, UNSPECIFIED CHRONICITY, UNSPECIFIED PULMONARY EMBOLISM TYPE, UNSPECIFIED WHETHER ACUTE COR PULMONALE PRESENT (HCC): ICD-10-CM

## 2023-01-05 DIAGNOSIS — E66.01 OBESITY, CLASS III, BMI 40-49.9 (MORBID OBESITY) (HCC): ICD-10-CM

## 2023-01-05 DIAGNOSIS — Z01.818 PREPROCEDURAL GENERAL PHYSICAL EXAMINATION: Primary | ICD-10-CM

## 2023-01-05 NOTE — PROGRESS NOTES
[facility] PRE-OPERATIVE EVALUATION  [facility] - [facility]    NAME: Irineo Colon  AGE: 78 y o  SEX: male  : 1955     DATE: 2023    [facility] Pre-Operative Evaluation      Chief Complaint: Pre-operative Evaluation     Surgery: Skin graft split thickness abdominal wound with VAC placement  Anticipated Date of Surgery: 2023  Referring Provider: No ref  provider found       History of Present Illness:     Irineo Colon is a 78 y o  male who presents to the office today for a preoperative consultation at the request of surgeon, Dr Fatmata Basurto, who plans on performing skin graft split thickness abdominal wound with VAC placement on 2023  Planned anesthesia is general  Patient has a bleeding risk of: no recent abnormal bleeding, no remote history of abnormal bleeding and no use of Ca-channel blockers  Patient does not have objections to receiving blood products if needed  Current anti-platelet/anti-coagulation medications that the patient is prescribed includes: Apixaban (Eliquis)   Patient to stop Apixaban 3 days prior to surgery     Assessment of Chronic Conditions:   - Hypertension: Well controlled  - See problem list     Assessment of Cardiac Risk:  · Denies unstable or severe angina or MI in the last 6 weeks or history of stent placement in the last year   · Denies decompensated heart failure (e g  New onset heart failure, NYHA functional class IV heart failure, or worsening existing heart failure)  · Denies significant arrhythmias such as high grade AV block, symptomatic ventricular arrhythmia, newly recognized ventricular tachycardia, supraventricular tachycardia with resting heart rate >100, or symptomatic bradycardia  · Denies severe heart valve disease including aortic stenosis or symptomatic mitral stenosis     Exercise Capacity:  · Able to walk 4 blocks without symptoms?: Yes  · Able to walk 2 flights without symptoms?: Uses walker    Prior Anesthesia Reactions: No     Personal history of venous thromboembolic disease? Yes    History of steroid use for >2 weeks within last year? No         Review of Systems:     Review of Systems   Constitutional: Positive for fatigue  Negative for chills and fever  HENT: Negative for congestion, ear pain, postnasal drip, rhinorrhea, sinus pressure and sore throat  Eyes: Negative for pain and visual disturbance  Respiratory: Negative for cough, shortness of breath and wheezing  Cardiovascular: Positive for leg swelling (known lymphodema )  Negative for chest pain and palpitations  Gastrointestinal: Negative for abdominal pain, blood in stool, constipation, diarrhea, nausea and vomiting  Endocrine: Negative for cold intolerance, heat intolerance, polydipsia, polyphagia and polyuria  Genitourinary: Positive for dysuria (stent placed 1/3/2023)  Negative for flank pain, frequency, hematuria, penile discharge, scrotal swelling, testicular pain and urgency  Musculoskeletal: Positive for gait problem (uses walker)  Negative for arthralgias and back pain  Skin: Positive for wound (Abdomen)  Negative for rash  Allergic/Immunologic: Negative for environmental allergies and food allergies  Neurological: Negative for dizziness and headaches  Hematological: Does not bruise/bleed easily  Psychiatric/Behavioral: Negative for dysphoric mood  The patient is not nervous/anxious          Current Problem List:     Patient Active Problem List   Diagnosis   • Essential hypertension   • Kidney stones   • Acute kidney injury superimposed on chronic kidney disease ([facility] )   • HDL deficiency   • Gout   • DELIA (obstructive sleep apnea)   • Allergic rhinitis   • Right foot pain   • Obesity, Class III, BMI 40-49 9 (morbid obesity) ([facility] )   • Lymphedema of both lower extremities   • Severe sepsis ([facility] )   • COVID-19   • Strangulated hernia of abdominal wall   • Acute kidney injury ([facility] )   • Anemia   • Pulmonary embolism (HCC)   • Wound drainage   • Surgical wound present   • Abdominal pain       Allergies:      Allergies   Allergen Reactions   • Codeine Swelling     Other reaction(s): SWELLING OF FACE    Swollen eyes/swelling--age 11       Current Medications:       Current Outpatient Medications:   •  acetaminophen (TYLENOL) 500 mg tablet, Take 500 mg by mouth every 6 (six) hours as needed for mild pain, Disp: , Rfl:   •  allopurinol (ZYLOPRIM) 100 mg tablet, Take 100 mg by mouth daily , Disp: , Rfl:   •  apixaban (Eliquis) 5 mg, Take 1 tablet (5 mg total) by mouth 2 (two) times a day, Disp: 180 tablet, Rfl: 0  •  furosemide (LASIX) 20 mg tablet, Take 1 tablet (20 mg total) by mouth daily For swelling, Disp: 90 tablet, Rfl: 3  •  metoprolol succinate (TOPROL-XL) 100 mg 24 hr tablet, Take 1 tablet (100 mg total) by mouth daily (Patient taking differently: Take 100 mg by mouth every evening), Disp: 90 tablet, Rfl: 3  •  nystatin (MYCOSTATIN) powder, Apply topically 2 (two) times a day (Patient taking differently: Apply topically 2 (two) times a day as needed), Disp: 60 g, Rfl: 12  •  Potassium Citrate ER 15 MEQ (1620 MG) TBCR, Take 1 tablet by mouth daily , Disp: , Rfl:   •  sulfamethoxazole-trimethoprim (BACTRIM DS) 800-160 mg per tablet, Take 1 tablet by mouth daily for 7 days, Disp: 7 tablet, Rfl: 0  •  tamsulosin (FLOMAX) 0 4 mg, 1-2 daily as directed (Patient taking differently: daily with dinner 1-2 daily as directed), Disp: 180 capsule, Rfl: 3  •  TRAVATAN Z 0 004 % ophthalmic solution, Administer 1 drop to both eyes daily at bedtime , Disp: , Rfl:     Past Medical History:       Past Medical History:   Diagnosis Date   • Anemia 09/16/2022    pt unsure of this   • Cataract    • Chronic kidney disease    • Colon polyp    • Dental crowns present    • History of COVID-19 05/2022    per pt mild symptoms -recovered at home-   • History of pneumonia as a child    • Hypertension    • Kidney stone    • Morbid obesity ([address] )    • Risk for falls    • Sepsis ([facility] )     pt unsure of this   • Shortness of breath     per pt "gets winded when walking with walker and wound vac around the house"   • Tooth missing    • Urinary tract infection without hematuria 04/03/2020   • Uses walker    • Wears glasses    • Weight loss     per pt since 1/2022 approx 80lb weight loss-had been 410lbs   • Wound of abdomen     wound vac in place-receives homecare 3x/week        Past Surgical History:   Procedure Laterality Date   • ABDOMINAL ADHESION SURGERY N/A 09/15/2022    Procedure: LYSIS ADHESIONS;  Surgeon: Alex Burgess MD;  [facility];  Service: General   • APPENDECTOMY     • COLONOSCOPY     • EXPLORATORY LAPAROTOMY W/ BOWEL RESECTION N/A 09/14/2022    Procedure: Exdploratory laparotomy, complex Lysis of adhesions, reduction strangulated hernia, debridement RLQ muscle with counter incision, abthera placement;  Surgeon: Alex Burgess MD;  [facility];  Service: General   • EXPLORATORY LAPAROTOMY W/ BOWEL RESECTION N/A 09/15/2022    Procedure: LAPAROTOMY EXPLORATORY  REPAIR RIGHT LQ HERNIA WITH MESH, REPAIR ANTERIOR WALL HERNIA;  Surgeon: Alex Burgess MD;  [facility];  Service: General   • FL RETROGRADE PYELOGRAM  11/15/2022   • HERNIA REPAIR      per pt there is Mesh implanted   • NASAL SEPTUM SURGERY     • OK CYSTO BLADDER W/URETERAL CATHETERIZATION Right 11/15/2022    Procedure: CYSTOSCOPY RETROGRADE PYELOGRAM WITH INSERTION STENT URETERAL;  Surgeon: Sammi Tang MD;  [facility];  Service: Urology   • OK CYSTO/URETERO W/LITHOTRIPSY &INDWELL STENT INSRT Right 1/3/2023    Procedure: CYSTO, U-SCOPE, LASER, RETROGRADE, STENT;  Surgeon: Danica Tran MD;  [facility];  Service: Urology   • TONSILLECTOMY     • WISDOM TOOTH EXTRACTION     • WOUND DEBRIDEMENT N/A 10/11/2022    Procedure: Excisional debridement of abdominal wall nonviable tissue    Skin and subcu tissues were dissected  The wound measured 23 x 18 cm x 4 cm deep ;  Surgeon: Elmer Hanson MD;  [facility];  Service: General        Family History   Problem Relation Age of Onset   • Diabetes Father    • Prostate cancer Brother         Social History     Socioeconomic History   • Marital status: /Civil Union     Spouse name: Not on file   • Number of children: Not on file   • Years of education: Not on file   • Highest education level: Not on file   Occupational History   • Not on file   Tobacco Use   • Smoking status: Never   • Smokeless tobacco: Never   • Tobacco comments:     no exposure to passive smoke   Vaping Use   • Vaping Use: Never used   Substance and Sexual Activity   • Alcohol use: Never   • Drug use: Never   • Sexual activity: Not on file     Comment: defer   Other Topics Concern   • Not on file   Social History Narrative   • Not on file     Social Determinants of Health     Financial Resource Strain: Not on file   Food Insecurity: No Food Insecurity   • Worried About Running Out of Food in the Last Year: Never true   • Ran Out of Food in the Last Year: Never true   Transportation Needs: No Transportation Needs   • Lack of Transportation (Medical): No   • Lack of Transportation (Non-Medical): No   Physical Activity: Not on file   Stress: Not on file   Social Connections: Not on file   Intimate Partner Violence: Not on file   Housing Stability: Low Risk    • Unable to Pay for Housing in the Last Year: No   • Number of Places Lived in the Last Year: 1   • Unstable Housing in the Last Year: No        Physical Exam:     /70 (BP Location: Left arm, Patient Position: Sitting, Cuff Size: Large)   Pulse 79   Temp (!) 94 6 °F (34 8 °C) (Temporal)   Wt (!) 150 kg (330 lb 6 4 oz)   SpO2 98%   BMI 47 41 kg/m²     Physical Exam  Constitutional:       Appearance: Normal appearance  He is well-developed  He is obese  HENT:      Head: Normocephalic        Right Ear: Tympanic membrane, ear canal and external ear normal       Left Ear: Tympanic membrane, ear canal and external ear normal       Nose: Nose normal       Mouth/Throat:      Mouth: Mucous membranes are moist       Pharynx: Oropharynx is clear  Eyes:      Extraocular Movements: Extraocular movements intact  Conjunctiva/sclera: Conjunctivae normal       Pupils: Pupils are equal, round, and reactive to light  Neck:      Thyroid: No thyromegaly  Cardiovascular:      Rate and Rhythm: Normal rate and regular rhythm  Heart sounds: Normal heart sounds  Pulmonary:      Effort: Pulmonary effort is normal       Breath sounds: Normal breath sounds  Abdominal:      General: Bowel sounds are normal  There is no distension  Palpations: Abdomen is soft  There is no mass  Tenderness: There is no abdominal tenderness  There is no guarding or rebound  Comments: Abdominal wound vac intact   Musculoskeletal:         General: Normal range of motion  Cervical back: Normal range of motion and neck supple  Lymphadenopathy:      Cervical: No cervical adenopathy  Skin:     General: Skin is warm and dry  Capillary Refill: Capillary refill takes less than 2 seconds  Neurological:      Mental Status: He is alert and oriented to person, place, and time  Deep Tendon Reflexes: Reflexes are normal and symmetric  Psychiatric:         Mood and Affect: Mood normal          Behavior: Behavior normal           Data:     Pre-operative work-up    Laboratory Results: I have personally reviewed the pertinent laboratory results/reports      EKG: I have personally reviewed pertinent reports  Chest x-ray: I have personally reviewed pertinent reports  Previous cardiopulmonary studies within the past year:  · Echocardiogram: 11/18/2022  · Cardiac Catheterization: No  · Stress Test: No  · Pulmonary Function Testing: No      Assessment & Recommendations:     1  Preprocedural general physical examination        2   Pulmonary embolism, unspecified chronicity, unspecified pulmonary embolism type, unspecified whether acute cor pulmonale present ([facility] )      From 9/2022 on Eliquis      3  Essential hypertension        4  Obesity, Class III, BMI 40-49 9 (morbid obesity) ([facility])            Pre-Op Evaluation Assessment  78 y o  male with planned surgery: Skin graft split thickness abdominal wound with VAC placement  Known risk factors for perioperative complications: None  Current medications which may produce withdrawal symptoms if withheld perioperatively: none  Pre-Op Evaluation Plan  1  Further preoperative workup as follows:   - None; no further preoperative work-up is required    2  Medication Management/Recommendations:   - Regarding anti-platelet agents: Stop 3 days prior to surgery  3  Prophylaxis for cardiac events with perioperative beta-blockers: should be considered, specific regimen per anesthesia  4  Patient requires further consultation with: None    Clearance  Patient is CLEARED for surgery without any additional cardiac testing       [place]ir[place] [address] [place] [id number]  Phone#  [phone]  Fax#  [phone]

## 2023-01-05 NOTE — LETTER
2023       No Recipients    Patient: Josh Brunson   YOB: 1955   Date of Visit: 2023       Dear Dr Chuyita Cheatham Recipients: Thank you for referring Dannielle Matthew to me for evaluation  Below are my notes for this consultation  If you have questions, please do not hesitate to call me  I look forward to following your patient along with you  Sincerely,        KELSEY Pink        CC:   No Recipients  KELSEY Pink  2023 10:32 AM  Incomplete  [facility] PRE-OPERATIVE EVALUATION  [facility] - [address] [place] [address]    NAME: Josh Brunson  AGE: 78 y o  SEX: male  : 1955     DATE: 2023    [facility] Pre-Operative Evaluation      Chief Complaint: Pre-operative Evaluation     Surgery: Skin graft split thickness abdominal wound with VAC placement  Anticipated Date of Surgery: 2023  Referring Provider: No ref  provider found       History of Present Illness:     Josh Brunson is a 78 y o  male who presents to the office today for a preoperative consultation at the request of surgeon, Dr Alonzo Plata, who plans on performing skin graft split thickness abdominal wound with VAC placement on 2023  Planned anesthesia is general  Patient has a bleeding risk of: no recent abnormal bleeding, no remote history of abnormal bleeding and no use of Ca-channel blockers  Patient does not have objections to receiving blood products if needed  Current anti-platelet/anti-coagulation medications that the patient is prescribed includes: Apixaban (Eliquis)   Patient to stop Apixaban 3 days prior to surgery     Assessment of Chronic Conditions:   - Hypertension: Well controlled  - See problem list     Assessment of Cardiac Risk:  · Denies unstable or severe angina or MI in the last 6 weeks or history of stent placement in the last year   · Denies decompensated heart failure (e g  New onset heart failure, NYHA functional class IV heart failure, or worsening existing heart failure)  · Denies significant arrhythmias such as high grade AV block, symptomatic ventricular arrhythmia, newly recognized ventricular tachycardia, supraventricular tachycardia with resting heart rate >100, or symptomatic bradycardia  · Denies severe heart valve disease including aortic stenosis or symptomatic mitral stenosis     Exercise Capacity:  · Able to walk 4 blocks without symptoms?: Yes  · Able to walk 2 flights without symptoms?: Uses walker    Prior Anesthesia Reactions: No     Personal history of venous thromboembolic disease? Yes    History of steroid use for >2 weeks within last year? No         Review of Systems:     Review of Systems   Constitutional: Positive for fatigue  Negative for chills and fever  HENT: Negative for congestion, ear pain, postnasal drip, rhinorrhea, sinus pressure and sore throat  Eyes: Negative for pain and visual disturbance  Respiratory: Negative for cough, shortness of breath and wheezing  Cardiovascular: Positive for leg swelling (known lymphodema )  Negative for chest pain and palpitations  Gastrointestinal: Negative for abdominal pain, blood in stool, constipation, diarrhea, nausea and vomiting  Endocrine: Negative for cold intolerance, heat intolerance, polydipsia, polyphagia and polyuria  Genitourinary: Positive for dysuria (stent placed 1/3/2023)  Negative for flank pain, frequency, hematuria, penile discharge, scrotal swelling, testicular pain and urgency  Musculoskeletal: Positive for gait problem (uses walker)  Negative for arthralgias and back pain  Skin: Positive for wound (Abdomen)  Negative for rash  Allergic/Immunologic: Negative for environmental allergies and food allergies  Neurological: Negative for dizziness and headaches  Hematological: Does not bruise/bleed easily  Psychiatric/Behavioral: Negative for dysphoric mood  The patient is not nervous/anxious          Current Problem List:     Patient Active Problem List   Diagnosis   • Essential hypertension   • Kidney stones   • Acute kidney injury superimposed on chronic kidney disease ([facility] )   • HDL deficiency   • Gout   • DELIA (obstructive sleep apnea)   • Allergic rhinitis   • Right foot pain   • Class 3 severe obesity due to excess calories with serious comorbidity and body mass index (BMI) of 50 0 to 59 9 in adult [facility])   • Lymphedema of both lower extremities   • Severe sepsis ([facility] [address] )   • COVID-19   • Strangulated hernia of abdominal wall   • Acute kidney injury ([facility] )   • Anemia   • Pulmonary embolism (HCC)   • Wound drainage   • Surgical wound present   • Abdominal pain       Allergies:      Allergies   Allergen Reactions   • Codeine Swelling     Other reaction(s): SWELLING OF FACE    Swollen eyes/swelling--age 11       Current Medications:       Current Outpatient Medications:   •  acetaminophen (TYLENOL) 500 mg tablet, Take 500 mg by mouth every 6 (six) hours as needed for mild pain, Disp: , Rfl:   •  allopurinol (ZYLOPRIM) 100 mg tablet, Take 100 mg by mouth daily , Disp: , Rfl:   •  apixaban (Eliquis) 5 mg, Take 1 tablet (5 mg total) by mouth 2 (two) times a day, Disp: 180 tablet, Rfl: 0  •  furosemide (LASIX) 20 mg tablet, Take 1 tablet (20 mg total) by mouth daily For swelling, Disp: 90 tablet, Rfl: 3  •  metoprolol succinate (TOPROL-XL) 100 mg 24 hr tablet, Take 1 tablet (100 mg total) by mouth daily (Patient taking differently: Take 100 mg by mouth every evening), Disp: 90 tablet, Rfl: 3  •  nystatin (MYCOSTATIN) powder, Apply topically 2 (two) times a day (Patient taking differently: Apply topically 2 (two) times a day as needed), Disp: 60 g, Rfl: 12  •  Potassium Citrate ER 15 MEQ (1620 MG) TBCR, Take 1 tablet by mouth daily , Disp: , Rfl:   •  sulfamethoxazole-trimethoprim (BACTRIM DS) 800-160 mg per tablet, Take 1 tablet by mouth daily for 7 days, Disp: 7 tablet, Rfl: 0  •  tamsulosin (FLOMAX) 0 4 mg, 1-2 daily as directed (Patient taking differently: daily with dinner 1-2 daily as directed), Disp: 180 capsule, Rfl: 3  •  TRAVATAN Z 0 004 % ophthalmic solution, Administer 1 drop to both eyes daily at bedtime , Disp: , Rfl:     Past Medical History:       Past Medical History:   Diagnosis Date   • Anemia 09/16/2022    pt unsure of this   • Cataract    • Chronic kidney disease    • Colon polyp    • Dental crowns present    • History of COVID-19 05/2022    per pt mild symptoms -recovered at home-   • History of pneumonia as a child    • Hypertension    • Kidney stone    • Morbid obesity ([facility] [address] )    • Risk for falls    • Sepsis ([facility] )     pt unsure of this   • Shortness of breath     per pt "gets winded when walking with walker and wound vac around the house"   • Tooth missing    • Urinary tract infection without hematuria 04/03/2020   • Uses walker    • Wears glasses    • Weight loss     per pt since 1/2022 approx 80lb weight loss-had been 410lbs   • Wound of abdomen     wound vac in place-receives homecare 3x/week        Past Surgical History:   Procedure Laterality Date   • ABDOMINAL ADHESION SURGERY N/A 09/15/2022    Procedure: LYSIS ADHESIONS;  Surgeon: Alex Burgess MD;  [facility];  Service: General   • APPENDECTOMY     • COLONOSCOPY     • EXPLORATORY LAPAROTOMY W/ BOWEL RESECTION N/A 09/14/2022    Procedure: Exdploratory laparotomy, complex Lysis of adhesions, reduction strangulated hernia, debridement RLQ muscle with counter incision, abthera placement;  Surgeon: Alex Burgess MD;  [facility];  Service: General   • EXPLORATORY LAPAROTOMY W/ BOWEL RESECTION N/A 09/15/2022    Procedure: LAPAROTOMY EXPLORATORY  REPAIR RIGHT LQ HERNIA WITH MESH, REPAIR ANTERIOR WALL HERNIA;  Surgeon: Alex Burgess MD;  [facility];  Service: General   • FL RETROGRADE PYELOGRAM  11/15/2022   • HERNIA REPAIR      per pt there is Mesh implanted   • NASAL SEPTUM SURGERY     • NE CYSTO BLADDER W/URETERAL CATHETERIZATION Right 11/15/2022    Procedure: CYSTOSCOPY RETROGRADE PYELOGRAM WITH INSERTION STENT URETERAL;  Surgeon: Carin Rudd MD;  [facility];  Service: Urology   • NM CYSTO/URETERO W/LITHOTRIPSY &INDWELL STENT INSRT Right 1/3/2023    Procedure: CYSTO, U-SCOPE, LASER, RETROGRADE, STENT;  Surgeon: Rajiv Martinez MD;  [facility];  Service: Urology   • TONSILLECTOMY     • WISDOM TOOTH EXTRACTION     • WOUND DEBRIDEMENT N/A 10/11/2022    Procedure: Excisional debridement of abdominal wall nonviable tissue  Skin and subcu tissues were dissected  The wound measured 23 x 18 cm x 4 cm deep ;  Surgeon: Genna Retana MD;  [facility];  Service: General        Family History   Problem Relation Age of Onset   • Diabetes Father    • Prostate cancer Brother         Social History     Socioeconomic History   • Marital status: /Civil Union     Spouse name: Not on file   • Number of children: Not on file   • Years of education: Not on file   • Highest education level: Not on file   Occupational History   • Not on file   Tobacco Use   • Smoking status: Never   • Smokeless tobacco: Never   • Tobacco comments:     no exposure to passive smoke   Vaping Use   • Vaping Use: Never used   Substance and Sexual Activity   • Alcohol use: Never   • Drug use: Never   • Sexual activity: Not on file     Comment: defer   Other Topics Concern   • Not on file   Social History Narrative   • Not on file     Social Determinants of Health     Financial Resource Strain: Not on file   Food Insecurity: No Food Insecurity   • Worried About Running Out of Food in the Last Year: Never true   • Ran Out of Food in the Last Year: Never true   Transportation Needs: No Transportation Needs   • Lack of Transportation (Medical): No   • Lack of Transportation (Non-Medical):  No   Physical Activity: Not on file   Stress: Not on file   Social Connections: Not on file   Intimate Partner Violence: Not on file   Housing Stability: Low Risk    • Unable to Pay for Housing in the Last Year: No   • Number of Places Lived in the Last Year: 1   • Unstable Housing in the Last Year: No        Physical Exam:     /70 (BP Location: Left arm, Patient Position: Sitting, Cuff Size: Large)   Pulse 79   Temp (!) 94 6 °F (34 8 °C) (Temporal)   Wt (!) 150 kg (330 lb 6 4 oz)   SpO2 98%   BMI 47 41 kg/m²     Physical Exam  Constitutional:       Appearance: Normal appearance  He is well-developed  He is obese  HENT:      Head: Normocephalic  Right Ear: Tympanic membrane, ear canal and external ear normal       Left Ear: Tympanic membrane, ear canal and external ear normal       Nose: Nose normal       Mouth/Throat:      Mouth: Mucous membranes are moist       Pharynx: Oropharynx is clear  Eyes:      Extraocular Movements: Extraocular movements intact  Conjunctiva/sclera: Conjunctivae normal       Pupils: Pupils are equal, round, and reactive to light  Neck:      Thyroid: No thyromegaly  Cardiovascular:      Rate and Rhythm: Normal rate and regular rhythm  Heart sounds: Normal heart sounds  Pulmonary:      Effort: Pulmonary effort is normal       Breath sounds: Normal breath sounds  Abdominal:      General: Bowel sounds are normal  There is no distension  Palpations: Abdomen is soft  There is no mass  Tenderness: There is no abdominal tenderness  There is no guarding or rebound  Comments: Abdominal wound vac intact   Musculoskeletal:         General: Normal range of motion  Cervical back: Normal range of motion and neck supple  Lymphadenopathy:      Cervical: No cervical adenopathy  Skin:     General: Skin is warm and dry  Capillary Refill: Capillary refill takes less than 2 seconds  Neurological:      Mental Status: He is alert and oriented to person, place, and time  Deep Tendon Reflexes: Reflexes are normal and symmetric     Psychiatric:         Mood and Affect: Mood normal  Behavior: Behavior normal           Data:     Pre-operative work-up    Laboratory Results: I have personally reviewed the pertinent laboratory results/reports      EKG: I have personally reviewed pertinent reports  Chest x-ray: I have personally reviewed pertinent reports  Previous cardiopulmonary studies within the past year:  · Echocardiogram: 2022  · Cardiac Catheterization: No  · Stress Test: No  · Pulmonary Function Testing: No      Assessment & Recommendations:     No diagnosis found  Pre-Op Evaluation Assessment  78 y o  male with planned surgery: Skin graft split thickness abdominal wound with VAC placement  Known risk factors for perioperative complications: None  Current medications which may produce withdrawal symptoms if withheld perioperatively: none  Pre-Op Evaluation Plan  1  Further preoperative workup as follows:   - None; no further preoperative work-up is required    2  Medication Management/Recommendations:   - Regarding anti-platelet agents: Stop 3 days prior to surgery  3  Prophylaxis for cardiac events with perioperative beta-blockers: should be considered, specific regimen per anesthesia  4  Patient requires further consultation with: None    Clearance  Patient is CLEARED for surgery without any additional cardiac testing  KELSEY Calvo  [facility] [address]  Phone#  [phone]  Fax#  [phone]      Maria Guadalupe Calvo  2023 10:29 AM  Incomplete  [facility] PRE-OPERATIVE EVALUATION  [facility] PHYSICIAN [facility]    NAME: Clifford Raya  AGE: 78 y o  SEX: male  : 1955     DATE: 2023    [facility] Pre-Operative Evaluation      Chief Complaint: Pre-operative Evaluation     Surgery: Skin graft split thickness abdominal wound with VAC placement  Anticipated Date of Surgery: 2023  Referring Provider: No ref  provider found       History of Present Illness:     Damaris Cox is a 78 y o  male who presents to the office today for a preoperative consultation at the request of surgeon, Dr Janae Nicole, who plans on performing skin graft split thickness abdominal wound with VAC placement on January 11, 2023  Planned anesthesia is general  Patient has a bleeding risk of: no recent abnormal bleeding, no remote history of abnormal bleeding and no use of Ca-channel blockers  Patient does not have objections to receiving blood products if needed  Current anti-platelet/anti-coagulation medications that the patient is prescribed includes: Apixaban (Eliquis)  Patient to stop Apixaban 3 days prior to surgery     Assessment of Chronic Conditions:   - Hypertension: Well controlled  - See problem list     Assessment of Cardiac Risk:  · Denies unstable or severe angina or MI in the last 6 weeks or history of stent placement in the last year   · Denies decompensated heart failure (e g  New onset heart failure, NYHA functional class IV heart failure, or worsening existing heart failure)  · Denies significant arrhythmias such as high grade AV block, symptomatic ventricular arrhythmia, newly recognized ventricular tachycardia, supraventricular tachycardia with resting heart rate >100, or symptomatic bradycardia  · Denies severe heart valve disease including aortic stenosis or symptomatic mitral stenosis     Exercise Capacity:  · Able to walk 4 blocks without symptoms?: Yes  · Able to walk 2 flights without symptoms?: Uses walker    Prior Anesthesia Reactions: No     Personal history of venous thromboembolic disease? Yes    History of steroid use for >2 weeks within last year? No         Review of Systems:     Review of Systems   Constitutional: Positive for fatigue  Negative for chills and fever  HENT: Negative for congestion, ear pain, postnasal drip, rhinorrhea, sinus pressure and sore throat  Eyes: Negative for pain and visual disturbance  Respiratory: Negative for cough, shortness of breath and wheezing  Cardiovascular: Positive for leg swelling (known lymphodema )  Negative for chest pain and palpitations  Gastrointestinal: Negative for abdominal pain, blood in stool, constipation, diarrhea, nausea and vomiting  Endocrine: Negative for cold intolerance, heat intolerance, polydipsia, polyphagia and polyuria  Genitourinary: Positive for dysuria (stent placed 1/3/2023)  Negative for flank pain, frequency, hematuria, penile discharge, scrotal swelling, testicular pain and urgency  Musculoskeletal: Positive for gait problem (uses walker)  Negative for arthralgias and back pain  Skin: Positive for wound (Abdomen)  Negative for rash  Allergic/Immunologic: Negative for environmental allergies and food allergies  Neurological: Negative for dizziness and headaches  Hematological: Does not bruise/bleed easily  Psychiatric/Behavioral: Negative for dysphoric mood  The patient is not nervous/anxious  Current Problem List:     Patient Active Problem List   Diagnosis   • Essential hypertension   • Kidney stones   • Acute kidney injury superimposed on chronic kidney disease ([facility] )   • HDL deficiency   • Gout   • DELIA (obstructive sleep apnea)   • Allergic rhinitis   • Right foot pain   • Class 3 severe obesity due to excess calories with serious comorbidity and body mass index (BMI) of 50 0 to 59 9 in [facility])   • Lymphedema of both lower extremities   • Severe sepsis ([facility] )   • COVID-19   • Strangulated hernia of abdominal wall   • Acute kidney injury ([facility] )   • Anemia   • Pulmonary embolism (HCC)   • Wound drainage   • Surgical wound present   • Abdominal pain       Allergies:      Allergies   Allergen Reactions   • Codeine Swelling     Other reaction(s): SWELLING OF FACE    Swollen eyes/swelling--age 11       Current Medications:       Current Outpatient Medications:   •  acetaminophen (TYLENOL) 500 mg tablet, Take 500 mg by mouth every 6 (six) hours as needed for mild pain, Disp: , Rfl:   •  allopurinol (ZYLOPRIM) 100 mg tablet, Take 100 mg by mouth daily , Disp: , Rfl:   •  apixaban (Eliquis) 5 mg, Take 1 tablet (5 mg total) by mouth 2 (two) times a day, Disp: 180 tablet, Rfl: 0  •  furosemide (LASIX) 20 mg tablet, Take 1 tablet (20 mg total) by mouth daily For swelling, Disp: 90 tablet, Rfl: 3  •  metoprolol succinate (TOPROL-XL) 100 mg 24 hr tablet, Take 1 tablet (100 mg total) by mouth daily (Patient taking differently: Take 100 mg by mouth every evening), Disp: 90 tablet, Rfl: 3  •  nystatin (MYCOSTATIN) powder, Apply topically 2 (two) times a day (Patient taking differently: Apply topically 2 (two) times a day as needed), Disp: 60 g, Rfl: 12  •  Potassium Citrate ER 15 MEQ (1620 MG) TBCR, Take 1 tablet by mouth daily , Disp: , Rfl:   •  sulfamethoxazole-trimethoprim (BACTRIM DS) 800-160 mg per tablet, Take 1 tablet by mouth daily for 7 days, Disp: 7 tablet, Rfl: 0  •  tamsulosin (FLOMAX) 0 4 mg, 1-2 daily as directed (Patient taking differently: daily with dinner 1-2 daily as directed), Disp: 180 capsule, Rfl: 3  •  TRAVATAN Z 0 004 % ophthalmic solution, Administer 1 drop to both eyes daily at bedtime , Disp: , Rfl:     Past Medical History:       Past Medical History:   Diagnosis Date   • Anemia 09/16/2022    pt unsure of this   • Cataract    • Chronic kidney disease    • Colon polyp    • Dental crowns present    • History of COVID-19 05/2022    per pt mild symptoms -recovered at home-   • History of pneumonia as a child    • Hypertension    • Kidney stone    • Morbid obesity ([address] )    • Risk for falls    • Sepsis ([facility] [address] )     pt unsure of this   • Shortness of breath     per pt "gets winded when walking with walker and wound vac around the house"   • Tooth missing    • Urinary tract infection without hematuria 04/03/2020   • Uses walker    • Wears glasses    • Weight loss     per pt since 1/2022 approx 80lb weight loss-had been 410lbs • Wound of abdomen     wound vac in place-receives homecare 3x/week        Past Surgical History:   Procedure Laterality Date   • ABDOMINAL ADHESION SURGERY N/A 09/15/2022    Procedure: LYSIS ADHESIONS;  Surgeon: Tracey Starks MD;  [facility];  Service: General   • APPENDECTOMY     • COLONOSCOPY     • EXPLORATORY LAPAROTOMY W/ BOWEL RESECTION N/A 09/14/2022    Procedure: Exdploratory laparotomy, complex Lysis of adhesions, reduction strangulated hernia, debridement RLQ muscle with counter incision, abthera placement;  Surgeon: Tracey Starks MD;  [facility];  Service: General   • EXPLORATORY LAPAROTOMY W/ BOWEL RESECTION N/A 09/15/2022    Procedure: LAPAROTOMY EXPLORATORY  REPAIR RIGHT LQ HERNIA WITH MESH, REPAIR ANTERIOR WALL HERNIA;  Surgeon: Tracey Starks MD;  [facility];  Service: General   • FL RETROGRADE PYELOGRAM  11/15/2022   • HERNIA REPAIR      per pt there is Mesh implanted   • NASAL SEPTUM SURGERY     • [place] CYSTO BLADDER W/URETERAL CATHETERIZATION Right 11/15/2022    Procedure: CYSTOSCOPY RETROGRADE PYELOGRAM WITH INSERTION STENT URETERAL;  Surgeon: Mirtha Ballesteros MD;  [facility];  Service: Urology   • [place] CYSTO/URETERO W/LITHOTRIPSY &INDWELL STENT INSRT Right 1/3/2023    Procedure: CYSTO, U-SCOPE, LASER, RETROGRADE, STENT;  Surgeon: Katelynn Blount MD;  [facility];  Service: Urology   • TONSILLECTOMY     • WISDOM TOOTH EXTRACTION     • WOUND DEBRIDEMENT N/A 10/11/2022    Procedure: Excisional debridement of abdominal wall nonviable tissue  Skin and subcu tissues were dissected    The wound measured 23 x 18 cm x 4 cm deep ;  Surgeon: Elmer Hanson MD;  [facility];  Service: General        Family History   Problem Relation Age of Onset   • Diabetes Father    • Prostate cancer Brother         Social History     Socioeconomic History   • Marital status: /Civil Union     Spouse name: Not on file   • Number of children: Not on file   • Years of education: Not on file   • Highest education level: Not on file   Occupational History   • Not on file   Tobacco Use   • Smoking status: Never   • Smokeless tobacco: Never   • Tobacco comments:     no exposure to passive smoke   Vaping Use   • Vaping Use: Never used   Substance and Sexual Activity   • Alcohol use: Never   • Drug use: Never   • Sexual activity: Not on file     Comment: defer   Other Topics Concern   • Not on file   Social History Narrative   • Not on file     Social Determinants of Health     Financial Resource Strain: Not on file   Food Insecurity: No Food Insecurity   • Worried About Running Out of Food in the Last Year: Never true   • Ran Out of Food in the Last Year: Never true   Transportation Needs: No Transportation Needs   • Lack of Transportation (Medical): No   • Lack of Transportation (Non-Medical): No   Physical Activity: Not on file   Stress: Not on file   Social Connections: Not on file   Intimate Partner Violence: Not on file   Housing Stability: Low Risk    • Unable to Pay for Housing in the Last Year: No   • Number of Places Lived in the Last Year: 1   • Unstable Housing in the Last Year: No        Physical Exam:     /70 (BP Location: Left arm, Patient Position: Sitting, Cuff Size: Large)   Pulse 79   Temp (!) 94 6 °F (34 8 °C) (Temporal)   Wt (!) 150 kg (330 lb 6 4 oz)   SpO2 98%   BMI 47 41 kg/m²     Physical Exam     Data:     Pre-operative work-up    Laboratory Results: I have personally reviewed the pertinent laboratory results/reports      EKG: I have personally reviewed pertinent reports  Chest x-ray: I have personally reviewed pertinent reports  Previous cardiopulmonary studies within the past year:  · Echocardiogram: 11/18/2022  · Cardiac Catheterization: No  · Stress Test: No  · Pulmonary Function Testing: No      Assessment & Recommendations:     No diagnosis found      Pre-Op Evaluation Assessment  78 y o  male with planned surgery: Skin graft split thickness abdominal wound with VAC placement  Known risk factors for perioperative complications: None  Current medications which may produce withdrawal symptoms if withheld perioperatively: none  Pre-Op Evaluation Plan  1  Further preoperative workup as follows:   - None; no further preoperative work-up is required    2  Medication Management/Recommendations:   - Regarding anti-platelet agents: Stop 3 days prior to surgery  3  Prophylaxis for cardiac events with perioperative beta-blockers: should be considered, specific regimen per anesthesia  4  Patient requires further consultation with: None    Clearance  Patient is CLEARED for surgery without any additional cardiac testing       MAMADOU Brodyεγάλη Άμ[place] [address] [place] [id number]  Phone#  [phone]  Fax#  [phone]

## 2023-01-06 ENCOUNTER — HOME CARE VISIT (OUTPATIENT)
Dept: HOME HEALTH SERVICES | Facility: HOME HEALTHCARE | Age: 68
End: 2023-01-06

## 2023-01-06 ENCOUNTER — OFFICE VISIT (OUTPATIENT)
Dept: WOUND CARE | Facility: CLINIC | Age: 68
End: 2023-01-06

## 2023-01-06 ENCOUNTER — TELEPHONE (OUTPATIENT)
Dept: PLASTIC SURGERY | Facility: CLINIC | Age: 68
End: 2023-01-06

## 2023-01-06 ENCOUNTER — NURSE TRIAGE (OUTPATIENT)
Dept: OTHER | Facility: OTHER | Age: 68
End: 2023-01-06

## 2023-01-06 ENCOUNTER — ANESTHESIA EVENT (OUTPATIENT)
Dept: PERIOP | Facility: HOSPITAL | Age: 68
End: 2023-01-06

## 2023-01-06 VITALS
HEART RATE: 88 BPM | SYSTOLIC BLOOD PRESSURE: 128 MMHG | RESPIRATION RATE: 18 BRPM | TEMPERATURE: 99.5 F | DIASTOLIC BLOOD PRESSURE: 68 MMHG

## 2023-01-06 DIAGNOSIS — E66.01 CLASS 3 SEVERE OBESITY DUE TO EXCESS CALORIES WITH SERIOUS COMORBIDITY AND BODY MASS INDEX (BMI) OF 50.0 TO 59.9 IN ADULT (HCC): ICD-10-CM

## 2023-01-06 DIAGNOSIS — T14.8XXA SURGICAL WOUND PRESENT: Primary | ICD-10-CM

## 2023-01-06 DIAGNOSIS — E66.01 OBESITY, CLASS III, BMI 40-49.9 (MORBID OBESITY) (HCC): ICD-10-CM

## 2023-01-06 DIAGNOSIS — K43.9 ABDOMINAL WALL HERNIA: ICD-10-CM

## 2023-01-06 RX ORDER — LIDOCAINE 40 MG/G
CREAM TOPICAL ONCE
Status: COMPLETED | OUTPATIENT
Start: 2023-01-06 | End: 2023-01-06

## 2023-01-06 RX ADMIN — LIDOCAINE: 40 CREAM TOPICAL at 09:25

## 2023-01-06 NOTE — PATIENT INSTRUCTIONS
Orders Placed This Encounter   Procedures    Wound cleansing and dressings     Wash your hands with soap and water  Remove old dressing, discard into plastic bag and place in trash  Cleanse the wound with normal saline solution prior to applying a clean dressing  Do not use tissue or cotton balls  Do not scrub the wound  Pat dry using gauze  Shower YES,on days your dressing gets changed  Mid abdominal wound:  DO NOT USE SKIN PROTECTANT ON SKIN UNDER DERMATAC  Continue NPWT  Silver alginate to satelite areas around wound  Apply black granufoam to the both wounds bridging dressing  DO NOT PUT SPONGE OVER INTACT SKIN    Cover with dermatac vac drape  May use regular drape over sponge and dermatac  NPWT at 125mmHg continuous  Change dressing 3 times per week  Right lower abdominal wound:  Apply alginate AG to the wound bed  Cover with bordered foam   Change dressing three times a week  No lifting more than 20lbs  Increase protein in diet as much as possible to at least 4-5 servings per day; add protein supplements such as Ensure Max or Premier Protein with each meal, increase protein snacks such as yougurt, cheese sticks, peanut butter  Consider adding Lance supplement to diet twice per day in addition to protein supplements     Continue visiting nurses skilled 3 times per week for wound care dressing changes    May skip days patient goes to Wound Care appointment     Follow up at the wound center if needed after surgery per Dr Oswaldo Prather  Cleansed with NSS and redressed as ordered above  Good seal obtained at 125 mmHg     Standing Status:   Future     Standing Expiration Date:   1/6/2024    Debridement     This order was created via procedure documentation

## 2023-01-06 NOTE — CASE COMMUNICATION
Ship to    Home   Insurance      Wound 1 RLQ abd   Alginate 4x4 [id number]      Silicone Foam with border 4x4 NOT STOCKED [id number]  10

## 2023-01-06 NOTE — PROGRESS NOTES
Negative Pressure Wound Therapy  Performed by: Bernarda Jennings RN  Authorized by: Keli Tiwari MD   Universal Protocol:  Consent: Verbal consent obtained    Consent given by: patient  Patient identity confirmed: verbally with patient      Performed by[de-identified]  Clinician  Type[de-identified]  [facility]  Coverage Size (sq cm)::  310 5  Pressure Type[de-identified]  Constant  Pressure Setting[de-identified]  125 mmHG  Sponge Type[de-identified]  Black  Sponge Size:  Large  Total Number Of Sponges Removed Prior To Application[de-identified]  2  Total Number Of Sponges Used For This Application[de-identified]  1

## 2023-01-06 NOTE — TELEPHONE ENCOUNTER
Left detailed message for patient in regard to med hold, we would like patient to stop taking Eliqis 3 day prior to his procedure on 1/11/23 and may resume 1/12/23

## 2023-01-06 NOTE — ASSESSMENT & PLAN NOTE
The wounds continue to improve and are all healthy granulation tissue  The right lower quadrant wound has some mild slough that was removed and will use silver alginate to this wound  The main wound itself is filled in but we will continue the [facility] for 1 more week until his skin grafting by plastic surgery  After the grafting depending on success he may finish up with plastic surgery or may return to the wound center  Once he has had a surgery and has had his directions from plastic surgery he will let us know what the plan is

## 2023-01-06 NOTE — PROGRESS NOTES
Patient ID: Kee Pritchett is a 78 y o  male Date of Birth 1955     Chief Complaint  Chief Complaint   Patient presents with   • Follow Up Wound Care Visit     Abdominal and perineal wounds       Allergies  Codeine    Assessment:    Surgical wound present  The wounds continue to improve and are all healthy granulation tissue  The right lower quadrant wound has some mild slough that was removed and will use silver alginate to this wound  The main wound itself is filled in but we will continue the [facility] for 1 more week until his skin grafting by plastic surgery  After the grafting depending on success he may finish up with plastic surgery or may return to the wound center  Once he has had a surgery and has had his directions from plastic surgery he will let us know what the plan is  Diagnoses and all orders for this visit:    Surgical wound present  -     lidocaine (LMX) 4 % cream  -     Cancel: Wound cleansing and dressings; Future  -     Wound cleansing and dressings; Future  -     Negative Pressure Wound Therapy    Class 3 severe obesity due to excess calories with serious comorbidity and body mass index (BMI) of 50 0 to 59 9 in [facility])  -     Cancel: Wound cleansing and dressings; Future  -     Wound cleansing and dressings; Future    Abdominal wall hernia  -     Cancel: Wound cleansing and dressings; Future  -     Wound cleansing and dressings; Future    Obesity, Class III, BMI 40-49 9 (morbid obesity) ([facility])    Other orders  -     Debridement              Debridement   Wound 10/14/22 Surgical Abdomen Right; Lower    Universal Protocol:  Consent given by: patient  Time out: Immediately prior to procedure a "time out" was called to verify the correct patient, procedure, equipment, support staff and site/side marked as required      Performed by: physician  Debridement type: selective  Pain control: lidocaine 4%  Post-debridement measurements  Length (cm): 1 9  Width (cm): 1 8  Depth (cm): 0 1  Percent debrided: 100%  Surface Area (cm^2): 3 42  Area debrided (cm^2): 3 42  Volume (cm^3): 0 34  Devitalized tissue debrided: biofilm and slough  Instrument(s) utilized: curette  Procedural pain (0-10): 1  Post-procedural pain: 1   Response to treatment: procedure was tolerated well          Plan:     Wound 10/14/22 Surgical Abdomen Medial;Lower (Active)   Wound Image Images linked 01/06/23 0854   Wound Description Epithelialization; Hypergranulation;Pink 01/06/23 0901   Lis-wound Assessment Scar Tissue; Intact 01/06/23 0901   Wound Length (cm) 13 8 cm 01/06/23 0901   Wound Width (cm) 22 5 cm 01/06/23 0901   Wound Depth (cm) 0 3 cm 01/06/23 0901   Wound Surface Area (cm^2) 310 5 cm^2 01/06/23 0901   Wound Volume (cm^3) 93 15 cm^3 01/06/23 0901   Calculated Wound Volume (cm^3) 93 15 cm^3 01/06/23 0901   Change in Wound Size % 95 9 01/06/23 0901   Drainage Amount Moderate 01/06/23 0901   Drainage Description Serosanguineous 01/06/23 0901   Non-staged Wound Description Full thickness 01/06/23 0901   Treatments Irrigation with NSS 01/06/23 0901       Wound 10/14/22 Surgical Abdomen Right; Lower (Active)   Wound Image Images linked 01/06/23 0854   Wound Description Slough;Pink;Granulation tissue; Epithelialization 01/06/23 0857   Lis-wound Assessment Scar Tissue; Intact 01/06/23 0857   Wound Length (cm) 1 9 cm 01/06/23 0857   Wound Width (cm) 1 8 cm 01/06/23 0857   Wound Depth (cm) 0 1 cm 01/06/23 0857   Wound Surface Area (cm^2) 3 42 cm^2 01/06/23 0857   Wound Volume (cm^3) 0 342 cm^3 01/06/23 0857   Calculated Wound Volume (cm^3) 0 34 cm^3 01/06/23 0857   Change in Wound Size % 99 81 01/06/23 0857   Drainage Amount Moderate 01/06/23 0857   Drainage Description Serosanguineous 01/06/23 0857   Non-staged Wound Description Full thickness 01/06/23 0857   Treatments Irrigation with NSS 01/06/23 0857       Wound 10/14/22 Surgical Abdomen Medial;Lower (Active)   Date First Assessed/Time First Assessed: 10/14/22 6067 Primary Wound Type: Surgical  Location: Abdomen  Wound Location Orientation: Medial;Lower       Wound 10/14/22 Surgical Abdomen Right; Lower (Active)   Date First Assessed/Time First Assessed: 10/14/22 0829   Primary Wound Type: Surgical  Location: Abdomen  Wound Location Orientation: Right; Lower       Wound 01/03/23 Penis Right (Active)   Date First Assessed/Time First Assessed: 01/03/23 1735   Location: Penis  Wound Location Orientation: Right  Wound Description (Comments): benzoin, tegaderm        [REMOVED] Wound 07/15/22 Other (comment) Abdomen Right; Lower (Removed)   Resolved Date/Resolved Time: 10/14/22 0827  Date First Assessed/Time First Assessed: 07/15/22 1401   Primary Wound Type: Other (comment)  Location: Abdomen  Wound Location Orientation: Right; Lower  Wound Outcome: Other (Comment)       [REMOVED] Wound 09/15/22 Abdomen N/A (Removed)   Resolved Date/Resolved Time: 10/14/22 0826  Date First Assessed/Time First Assessed: 09/15/22 1359   Location: Abdomen  Wound Location Orientation: N/A  Wound Description (Comments): vac dressing-RLQ (1 black sponge, 1 white sponge), midline- surgical  [REMOVED] Wound 09/15/22 Abdomen Lower;Quadrant;Right (Removed)   Resolved Date/Resolved Time: 10/14/22 0827  Date First Assessed: 09/15/22   Location: Abdomen  Wound Location Orientation: Lower;Quadrant;Right  Wound Outcome: Other (Comment)       [REMOVED] Wound 10/11/22 Abdomen N/A (Removed)   Resolved Date/Resolved Time: 10/14/22 0827  Date First Assessed/Time First Assessed: 10/11/22 1545   Location: Abdomen  Wound Location Orientation: N/A  Wound Description (Comments): wound vac 1 incision black sponge 1 incision black sponge one black            [REMOVED] Wound 11/15/22 Penis Right (Removed)   Resolved Date/Resolved Time: 11/19/22 6248  Date First Assessed/Time First Assessed: 11/15/22 2250   Location: Penis  Wound Location Orientation: Right  Wound Outcome: Healed       [REMOVED] Wound 01/03/23 Perineum Right (Removed)   Resolved Date: 01/06/23  Date First Assessed/Time First Assessed: 01/03/23 0808   Location: Perineum  Wound Location Orientation: Right  Wound Outcome: Other (Comment)       Subjective:        Mr Cassy Aguilar is a 42-year-old gentleman here for follow-up from hospitalization for large abdominal wound  He he developed an incarcerated right lower quadrant hernia requiring excision debridement of the wound and bridging closure with absorbable mesh  He had a large retention sutures in the abdominal wall but developed necrosis of the abdominal wall  He was readmitted underwent excisional debridement and is here for evaluation  10/21/2022  Overall doing okay  No significant pain  Having some issues with the VAC dressing but overall working    11/04/2022  Doing well  Trying to increase his protein  Still having significant fatigue episodes  Still going through canisters quickly    11/25/2022  He was hospitalized last week for severe sepsis and hydronephrosis and acute kidney injury from an obstructing ureteral stone and underwent cystoscopy and stent placement  He is at home now and doing well  No other issues    12/9/2022  Doing well  Was scheduled for urology but got delayed January  Denies fevers or chills    1/6/2023  He has been doing well  He was seen by plastic surgery and is scheduled for skin grafting next week  He also underwent repeat cystoscopy and stent      The following portions of the patient's history were reviewed and updated as appropriate: allergies, current medications, past family history, past medical history, past social history, past surgical history and problem list     Review of Systems   Constitutional: Negative for chills and fever  HENT: Negative for ear pain and sore throat  Eyes: Negative for pain and visual disturbance  Respiratory: Negative for cough and shortness of breath  Cardiovascular: Negative for chest pain and palpitations  Gastrointestinal: Negative for abdominal pain and vomiting  Musculoskeletal: Positive for back pain and gait problem  Negative for arthralgias  Skin: Negative for color change and rash  Neurological: Negative for seizures and syncope  Psychiatric/Behavioral: Negative for agitation and behavioral problems  All other systems reviewed and are negative  Objective:       Wound 10/14/22 Surgical Abdomen Medial;Lower (Active)   Wound Image Images linked 01/06/23 0854   Wound Description Epithelialization; Hypergranulation;Pink 01/06/23 0901   Lis-wound Assessment Scar Tissue; Intact 01/06/23 0901   Wound Length (cm) 13 8 cm 01/06/23 0901   Wound Width (cm) 22 5 cm 01/06/23 0901   Wound Depth (cm) 0 3 cm 01/06/23 0901   Wound Surface Area (cm^2) 310 5 cm^2 01/06/23 0901   Wound Volume (cm^3) 93 15 cm^3 01/06/23 0901   Calculated Wound Volume (cm^3) 93 15 cm^3 01/06/23 0901   Change in Wound Size % 95 9 01/06/23 0901   Drainage Amount Moderate 01/06/23 0901   Drainage Description Serosanguineous 01/06/23 0901   Non-staged Wound Description Full thickness 01/06/23 0901   Treatments Irrigation with NSS 01/06/23 0901       Wound 10/14/22 Surgical Abdomen Right; Lower (Active)   Wound Image Images linked 01/06/23 0854   Wound Description Slough;Pink;Granulation tissue; Epithelialization 01/06/23 0857   Lis-wound Assessment Scar Tissue; Intact 01/06/23 0857   Wound Length (cm) 1 9 cm 01/06/23 0857   Wound Width (cm) 1 8 cm 01/06/23 0857   Wound Depth (cm) 0 1 cm 01/06/23 0857   Wound Surface Area (cm^2) 3 42 cm^2 01/06/23 0857   Wound Volume (cm^3) 0 342 cm^3 01/06/23 0857   Calculated Wound Volume (cm^3) 0 34 cm^3 01/06/23 0857   Change in Wound Size % 99 81 01/06/23 0857   Drainage Amount Moderate 01/06/23 0857   Drainage Description Serosanguineous 01/06/23 0857   Non-staged Wound Description Full thickness 01/06/23 0857   Treatments Irrigation with NSS 01/06/23 0857       /68   Pulse 88   Temp 99 5 °F (37 5 °C)   Resp 18     Physical Exam  Vitals and nursing note reviewed  Constitutional:       General: He is not in acute distress  Appearance: He is well-developed  He is obese  He is not diaphoretic  HENT:      Head: Normocephalic and atraumatic  Eyes:      Pupils: Pupils are equal, round, and reactive to light  Cardiovascular:      Rate and Rhythm: Normal rate and regular rhythm  Pulmonary:      Effort: Pulmonary effort is normal  No respiratory distress  Breath sounds: Normal breath sounds  Abdominal:      Palpations: Abdomen is soft  Comments: Obese abdomen  See complete wound assessment for measurements of abdominal wounds   Musculoskeletal:         General: Normal range of motion  Cervical back: Normal range of motion and neck supple  Skin:     General: Skin is warm and dry  Neurological:      Mental Status: He is alert and oriented to person, place, and time  Psychiatric:         Behavior: Behavior normal            Wound Instructions:  Orders Placed This Encounter   Procedures   • Wound cleansing and dressings     Wash your hands with soap and water  Remove old dressing, discard into plastic bag and place in trash  Cleanse the wound with normal saline solution prior to applying a clean dressing  Do not use tissue or cotton balls  Do not scrub the wound  Pat dry using gauze  Shower YES,on days your dressing gets changed  Mid abdominal wound:  DO NOT USE SKIN PROTECTANT ON SKIN UNDER DERMATAC  Continue NPWT  Silver alginate to satelite areas around wound  Apply black granufoam to the both wounds bridging dressing  DO NOT PUT SPONGE OVER INTACT SKIN    Cover with dermatac vac drape  May use regular drape over sponge and dermatac  NPWT at 125mmHg continuous  Change dressing 3 times per week  Right lower abdominal wound:  Apply alginate AG to the wound bed    Cover with bordered foam   Change dressing three times a week         No lifting more than 20lbs  Increase protein in diet as much as possible to at least 4-5 servings per day; add protein supplements such as Ensure Max or Premier Protein with each meal, increase protein snacks such as yougurt, cheese sticks, peanut butter  Consider adding Lance supplement to diet twice per day in addition to protein supplements     Continue visiting nurses skilled 3 times per week for wound care dressing changes  May skip days patient goes to Wound Care appointment     Follow up at the wound center if needed after surgery per Dr Oswaldo Prather  Cleansed with NSS and redressed as ordered above  Good seal obtained at 125 mmHg     Standing Status:   Future     Standing Expiration Date:   1/6/2024   • Debridement     This order was created via procedure documentation   • Negative Pressure Wound Therapy     This order was created via procedure documentation        Diagnosis ICD-10-CM Associated Orders   1  Surgical wound present  T14  8XXA lidocaine (LMX) 4 % cream     Wound cleansing and dressings     Negative Pressure Wound Therapy      2  Class 3 severe obesity due to excess calories with serious comorbidity and body mass index (BMI) of 50 0 to 59 9 in adult [facility])  E66 01 Wound cleansing and dressings    Z68 43       3  Abdominal wall hernia  K43 9 Wound cleansing and dressings      4   Obesity, Class III, BMI 40-49 9 (morbid obesity) ([facility])  E66 01

## 2023-01-07 ENCOUNTER — TELEPHONE (OUTPATIENT)
Dept: OTHER | Facility: HOSPITAL | Age: 68
End: 2023-01-07

## 2023-01-07 ENCOUNTER — NURSE TRIAGE (OUTPATIENT)
Dept: OTHER | Facility: OTHER | Age: 68
End: 2023-01-07

## 2023-01-07 DIAGNOSIS — N32.89 BLADDER SPASMS: Primary | ICD-10-CM

## 2023-01-07 RX ORDER — OXYBUTYNIN CHLORIDE 5 MG/1
5 TABLET ORAL 3 TIMES DAILY PRN
Qty: 15 TABLET | Refills: 0 | Status: SHIPPED | OUTPATIENT
Start: 2023-01-07

## 2023-01-07 NOTE — TELEPHONE ENCOUNTER
Reason for Disposition  • [1] Caller has URGENT question AND [2] triager unable to answer question    Answer Assessment - Initial Assessment Questions  1  SYMPTOM: "What's the main symptom you're concerned about?" (e g , pain, fever, vomiting)     "I think my stent fell out"  2  ONSET: "When did *No Answer*  start?"      @7am   3  SURGERY: "What surgery was performed?"      Stent placed for Kidney stones   4  DATE of SURGERY: "When was surgery performed?"       1 3 23  6  PAIN: "Is there any pain?" If Yes, ask: "How bad is it?"  (Scale 1-10; or mild, moderate, severe)      Denies  7  FEVER: "Do you have a fever?" If Yes, ask: "What is your temperature, how was it measured, and when did it start?"    Denies  9  BLEEDING: "Is there any bleeding?" If Yes, ask: "How much?" and "Where?"     Denies  Urine looks clear  Green with black dots and coiled up at both ends and a black string      Protocols used: POST-OP SYMPTOMS AND QUESTIONS-[facility]

## 2023-01-07 NOTE — TELEPHONE ENCOUNTER
Regarding: stent came out while peeig  ----- Message from Karen Shea sent at 1/7/2023  7:27 AM EST -----  ' I have peed in a cup and I think the stent came out "

## 2023-01-07 NOTE — TELEPHONE ENCOUNTER
Reason for Disposition  • [1] Caller has URGENT question AND [2] triager unable to answer question    Answer Assessment - Initial Assessment Questions  1  SYMPTOM: "What's the main symptom you're concerned about?" (e g , pain, fever, vomiting)   new onset complete urine incontinence   2  ONSET: "When did it  start?"     1/6  3  SURGERY: "What surgery was performed?"     CYSTO, U-SCOPE, LASER, RETROGRADE, STENT (Right: Bladder)   4  DATE of SURGERY: "When was surgery performed?"      1/3  5  ANESTHESIA: " What type of anesthesia did you have?" (e g , general, spinal, epidural, local)      General   6  PAIN: "Is there any pain?" If Yes, ask: "How bad is it?"  (Scale 1-10; or mild, moderate, severe)     Denies   7  FEVER: "Do you have a fever?" If Yes, ask: "What is your temperature, how was it measured, and when did it start?"      Denies   8  VOMITING: "Is there any vomiting?" If yes, ask: "How many times?"     Denies   9  BLEEDING: "Is there any bleeding?" If Yes, ask: "How much?" and "Where?"      Denies   10   OTHER SYMPTOMS: "Do you have any other symptoms?" (e g , drainage from wound, painful urination, constipation)     Other    Protocols used: POST-OP SYMPTOMS AND QUESTIONS-[facility]

## 2023-01-07 NOTE — TELEPHONE ENCOUNTER
Pt called in stating he believes his stent fell out this morning after urinating into a cup  Per pt he had ongoing frequent urination throughout the night and had a large amount of urine come out recently when he saw the stent  Pt had is placed on 1 3 23 and was due to be removed on 1 10 23  Pt denies any blood or pain  TT out to on call provider to make aware and see if it is okay for pt to follow up with the office on Tuesday  Per on call provider okay to follow up with the office on Tuesday   Pt given signs and sx's to call back for

## 2023-01-07 NOTE — TELEPHONE ENCOUNTER
Patient calling back r/t continued incontinence and requesting medication suggested earlier  No new symptoms  TC to purnima  On-call to send medication  Patient informed  No further questions

## 2023-01-07 NOTE — TELEPHONE ENCOUNTER
Reason for Disposition  • [1] Caller has URGENT medicine question about med that PCP or specialist prescribed AND [2] triager unable to answer question    Answer Assessment - Initial Assessment Questions  1  NAME of MEDICATION: "What medicine are you calling about?"      oxybutynin      2  QUESTION: "What is your question?" (e g , medication refill, side effect)      Requesting medication offered earlier   3  PRESCRIBING HCP: "Who prescribed it?" Reason: if prescribed by specialist, call should be referred to that group  Urology   4  SYMPTOMS: "Do you have any symptoms?"     Incontinence, bladder spasm   5   SEVERITY: If symptoms are present, ask "Are they mild, moderate or severe?"     Mild- Urology aware    Protocols used: MEDICATION QUESTION CALL-ADULT-

## 2023-01-07 NOTE — TELEPHONE ENCOUNTER
s/p CYSTO, U-SCOPE, LASER, RETROGRADE, STENT (Right: Bladder) on 1/3/23 with John Aguilar MD  c/o new onset complete incontinence of urine  No additional symptoms  On call Provider contacted and informed of patient’s concerns and status  Provider advises as follow: Very common with stent, that they cause bladder spasms that can cause incontinence  Should increase water consumption and if it continues we can prescribe oxybutynin    Patient informed of provider’s recommendation, along with care advice  Verbalized understanding and agreeable with disposition   No further questions

## 2023-01-07 NOTE — TELEPHONE ENCOUNTER
Patient spoke to Health call nurse triage complaining of incontinence secondary to bladder spasms  Instructed this is due to ureteral stent  Recommend increasing hydration  Prescribed Oxybutynin 5mg TID as needed

## 2023-01-07 NOTE — TELEPHONE ENCOUNTER
Regarding: stent in, peeing everywhere  ----- Message from Supa Wyatt sent at 1/6/2023  9:54 PM EST -----  " I had a stent put in and now I dont know when I am peeing , I keep peeing everywhere "

## 2023-01-08 LAB
COLOR STONE: NORMAL
COM MFR STONE: 70 %
COMMENT-STONE3: NORMAL
COMPOSITION: NORMAL
LABORATORY COMMENT REPORT: NORMAL
PHOTO: NORMAL
SIZE STONE: NORMAL MM
SPEC SOURCE SUBJ: NORMAL
STONE ANALYSIS-IMP: NORMAL
STONE ANALYSIS-IMP: NORMAL
URATE MFR STONE: 30 %
WT STONE: 10 MG

## 2023-01-09 ENCOUNTER — HOME CARE VISIT (OUTPATIENT)
Dept: HOME HEALTH SERVICES | Facility: HOME HEALTHCARE | Age: 68
End: 2023-01-09

## 2023-01-09 VITALS
OXYGEN SATURATION: 96 % | DIASTOLIC BLOOD PRESSURE: 62 MMHG | TEMPERATURE: 97.6 F | SYSTOLIC BLOOD PRESSURE: 128 MMHG | HEART RATE: 84 BPM | RESPIRATION RATE: 18 BRPM

## 2023-01-09 DIAGNOSIS — N20.1 URETERAL STONE: Primary | ICD-10-CM

## 2023-01-09 NOTE — CASE COMMUNICATION
Ship to    Home  Insurance medicare    Wound 1 Abdomen mid        Wound 2 abdomen R lower    Gauze 4x4 N/S 200 4x4s per unit  [id number] 1   Alginate 4x4 [id number] 2  Silicone Foam with border 4x4 NOT STOCKED [id number] 10

## 2023-01-09 NOTE — PRE-PROCEDURE INSTRUCTIONS
Pre-Surgery Instructions:   Medication Instructions   • acetaminophen (TYLENOL) 500 mg tablet Uses PRN- OK to take day of surgery, if needed with sip of water    • allopurinol (ZYLOPRIM) 100 mg tablet Take night before surgery- takes at 4pm    • apixaban (Eliquis) 5 mg Stop taking 3 days prior to surgery  • furosemide (LASIX) 20 mg tablet Take night before surgery- takes at 4 pm    • metoprolol succinate (TOPROL-XL) 100 mg 24 hr tablet Take night before surgery- takes at 4pm    • nystatin (MYCOSTATIN) powder Hold day of surgery  • Potassium Citrate ER 15 MEQ (1620 MG) TBCR Take night before surgery- takes at 4pm    • sulfamethoxazole-trimethoprim (BACTRIM DS) 800-160 mg per tablet Continue as prescribed -Pt states will complete prior to surgery    • tamsulosin (FLOMAX) 0 4 mg Take night before surgery   • TRAVATAN Z 0 004 % ophthalmic solution Take night before surgery     Reviewed all medications and instructions for DOS  Reviewed all showering instructions and COVID visitation policy  Pt aware that [facility]  is location for DOS, instructed that pt pre op nurse will call on 1/10/23  to give specific instructions for DOS  Pt instructed to bring photo ID and insurance card for DOS, remove all jewelry and  NO valuables for DOS  Pt instructed to use only Tylenol between now 1/9/23 and DOS, NO NSAID products  Pt informed transport is needed for DOS due to receiving anesthesia  Instructed patient to minimize alcohol use prior to surgery  No alcohol 24 hours prior to surgery to reduce risk of dehydration  Pt verbalized understanding of all instructions given and reviewed for DOS       Pt reports is vaccinated for COVID   Pt has cleanser - reviewed instructions of use with pt for DOS   Pt instructed should any health changes, illness - exposure to COVID occur between now and DOS -notify surgeon office  Pt instructed to bring wound vac supplies with him for DOS   My Surgical Experience    The following information was developed to assist you to prepare for your operation  What do I need to do before coming to the hospital?  • Arrange for a responsible person to drive you to and from the hospital   • Arrange care for your children at home  Children are not allowed in the recovery areas of the hospital  • Plan to wear clothing that is easy to put on and take off  If you are having shoulder surgery, wear a shirt that buttons or zippers in the front  Bathing  o Shower the evening before and the morning of your surgery with an antibacterial soap  Please refer to the Pre Op Showering Instructions for Surgery Patients Sheet   o Remove nail polish and all body piercing jewelry  o Do not shave any body part for at least 24 hours before surgery-this includes face, arms, legs and upper body  Food  o Nothing to eat or drink after midnight the night before your surgery  This includes candy and chewing gum  o Exception: If your surgery is after 12:00pm (noon), you may have clear liquids such as 7-Up®, ginger ale, apple or cranberry juice, Jell-O®, water, or clear broth until 8:00 am  o Do not drink milk or juice with pulp on the morning before surgery  o Do not drink alcohol 24 hours before surgery  Medicine  o Follow instructions you received from your surgeon about which medicines you may take on the day of surgery  o If instructed to take medicine on the morning of surgery, take pills with just a small sip of water  Call your prescribing doctor for specific infroamtion on what to do if you take insulin    What should I bring to the hospital?    Bring:  • Crutches or a walker, if you have them, for foot or knee surgery  • A list of the daily medicines, vitamins, minerals, herbals and nutritional supplements you take   Include the dosages of medicines and the time you take them each day  • Glasses, dentures or hearing aids  • Minimal clothing; you will be wearing hospital sleepwear  • Photo ID; required to verify your identity  • If you have a Living Will or Power of , bring a copy of the documents  • If you have an ostomy, bring an extra pouch and any supplies you use    Do not bring  • Medicines or inhalers  • Money, valuables or jewelry    What other information should I know about the day of surgery? • Notify your surgeons if you develop a cold, sore throat, cough, fever, rash or any other illness  • Report to the Ambulatory Surgical/Same Day Surgery Unit  • You will be instructed to stop at Registration only if you have not been pre-registered  • Inform your  fi they do not stay that they will be asked by the staff to leave a phone number where they can be reached  • Be available to be reached before surgery  In the event the operating room schedule changes, you may be asked to come in earlier or later than expected    *It is important to tell your doctor and others involved in your health care if you are taking or have been taking any non-prescription drugs, vitamins, minerals, herbals or other nutritional supplements   Any of these may interact with some food or medicines and cause a reaction

## 2023-01-09 NOTE — TELEPHONE ENCOUNTER
Reviewed on-call notes  His stent on string came out while urinating over the weekend  He does not need a visit this week   Followup in 3months with KUB/US as planned

## 2023-01-09 NOTE — TELEPHONE ENCOUNTER
Contacted patient and cancelled appointment for stent with string removal for tomorrow as stent came out over the weekend  Advised that patient will need a 3 month follow up with renal ultrasound and KUB prior  He was scheduled for an appointment with Kaitlin Velasco in April and was provided with central scheduling phone number to call and schedule imaging about 1-2 weeks prior to visit

## 2023-01-09 NOTE — TELEPHONE ENCOUNTER
Patient called stating he was to come in for stent removal tomorrow but it came out on Saturday on its own  Does he need to keep appointment       Patient can be reached at [phone]

## 2023-01-10 ENCOUNTER — HOME CARE VISIT (OUTPATIENT)
Dept: HOME HEALTH SERVICES | Facility: HOME HEALTHCARE | Age: 68
End: 2023-01-10

## 2023-01-11 ENCOUNTER — HOME CARE VISIT (OUTPATIENT)
Dept: HOME HEALTH SERVICES | Facility: HOME HEALTHCARE | Age: 68
End: 2023-01-11

## 2023-01-11 ENCOUNTER — ANESTHESIA (OUTPATIENT)
Dept: PERIOP | Facility: HOSPITAL | Age: 68
End: 2023-01-11

## 2023-01-11 ENCOUNTER — HOSPITAL ENCOUNTER (OUTPATIENT)
Facility: HOSPITAL | Age: 68
Setting detail: OUTPATIENT SURGERY
Discharge: HOME | End: 2023-01-11
Attending: STUDENT IN AN ORGANIZED HEALTH CARE EDUCATION/TRAINING PROGRAM | Admitting: STUDENT IN AN ORGANIZED HEALTH CARE EDUCATION/TRAINING PROGRAM

## 2023-01-11 VITALS
HEIGHT: 70 IN | TEMPERATURE: 97 F | RESPIRATION RATE: 18 BRPM | SYSTOLIC BLOOD PRESSURE: 124 MMHG | DIASTOLIC BLOOD PRESSURE: 58 MMHG | BODY MASS INDEX: 45.1 KG/M2 | OXYGEN SATURATION: 96 % | WEIGHT: 315 LBS | HEART RATE: 96 BPM

## 2023-01-11 DIAGNOSIS — I26.99 ACUTE PULMONARY EMBOLISM WITHOUT ACUTE COR PULMONALE, UNSPECIFIED PULMONARY EMBOLISM TYPE (HCC): ICD-10-CM

## 2023-01-11 DIAGNOSIS — T14.8XXA SURGICAL WOUND PRESENT: Primary | ICD-10-CM

## 2023-01-11 RX ORDER — SODIUM CHLORIDE, SODIUM LACTATE, POTASSIUM CHLORIDE, CALCIUM CHLORIDE 600; 310; 30; 20 MG/100ML; MG/100ML; MG/100ML; MG/100ML
125 INJECTION, SOLUTION INTRAVENOUS CONTINUOUS
Status: DISCONTINUED | OUTPATIENT
Start: 2023-01-11 | End: 2023-01-11 | Stop reason: HOSPADM

## 2023-01-11 RX ORDER — ONDANSETRON 2 MG/ML
4 INJECTION INTRAMUSCULAR; INTRAVENOUS ONCE AS NEEDED
Status: DISCONTINUED | OUTPATIENT
Start: 2023-01-11 | End: 2023-01-11 | Stop reason: HOSPADM

## 2023-01-11 RX ORDER — ACETAMINOPHEN 325 MG/1
975 TABLET ORAL ONCE
Status: COMPLETED | OUTPATIENT
Start: 2023-01-11 | End: 2023-01-11

## 2023-01-11 RX ORDER — CEPHALEXIN 500 MG/1
500 CAPSULE ORAL EVERY 6 HOURS SCHEDULED
Qty: 28 CAPSULE | Refills: 0 | Status: SHIPPED | OUTPATIENT
Start: 2023-01-11 | End: 2023-01-18

## 2023-01-11 RX ORDER — PROPOFOL 10 MG/ML
INJECTION, EMULSION INTRAVENOUS AS NEEDED
Status: DISCONTINUED | OUTPATIENT
Start: 2023-01-11 | End: 2023-01-11

## 2023-01-11 RX ORDER — LIDOCAINE HYDROCHLORIDE 10 MG/ML
INJECTION, SOLUTION EPIDURAL; INFILTRATION; INTRACAUDAL; PERINEURAL AS NEEDED
Status: DISCONTINUED | OUTPATIENT
Start: 2023-01-11 | End: 2023-01-11

## 2023-01-11 RX ORDER — GLYCOPYRROLATE 0.2 MG/ML
INJECTION INTRAMUSCULAR; INTRAVENOUS AS NEEDED
Status: DISCONTINUED | OUTPATIENT
Start: 2023-01-11 | End: 2023-01-11

## 2023-01-11 RX ORDER — ROCURONIUM BROMIDE 10 MG/ML
INJECTION, SOLUTION INTRAVENOUS AS NEEDED
Status: DISCONTINUED | OUTPATIENT
Start: 2023-01-11 | End: 2023-01-11

## 2023-01-11 RX ORDER — MINERAL OIL
OIL (ML) MISCELLANEOUS AS NEEDED
Status: DISCONTINUED | OUTPATIENT
Start: 2023-01-11 | End: 2023-01-11 | Stop reason: HOSPADM

## 2023-01-11 RX ORDER — GABAPENTIN 100 MG/1
100 CAPSULE ORAL 3 TIMES DAILY
Qty: 15 CAPSULE | Refills: 0 | Status: SHIPPED | OUTPATIENT
Start: 2023-01-11 | End: 2023-01-16

## 2023-01-11 RX ORDER — GABAPENTIN 300 MG/1
300 CAPSULE ORAL ONCE
Status: COMPLETED | OUTPATIENT
Start: 2023-01-11 | End: 2023-01-11

## 2023-01-11 RX ORDER — SUCCINYLCHOLINE/SOD CL,ISO/PF 100 MG/5ML
SYRINGE (ML) INTRAVENOUS AS NEEDED
Status: DISCONTINUED | OUTPATIENT
Start: 2023-01-11 | End: 2023-01-11

## 2023-01-11 RX ORDER — NEOSTIGMINE METHYLSULFATE 1 MG/ML
INJECTION INTRAVENOUS AS NEEDED
Status: DISCONTINUED | OUTPATIENT
Start: 2023-01-11 | End: 2023-01-11

## 2023-01-11 RX ORDER — LIDOCAINE HYDROCHLORIDE AND EPINEPHRINE 10; 10 MG/ML; UG/ML
INJECTION, SOLUTION INFILTRATION; PERINEURAL AS NEEDED
Status: DISCONTINUED | OUTPATIENT
Start: 2023-01-11 | End: 2023-01-11 | Stop reason: HOSPADM

## 2023-01-11 RX ORDER — FENTANYL CITRATE 50 UG/ML
INJECTION, SOLUTION INTRAMUSCULAR; INTRAVENOUS AS NEEDED
Status: DISCONTINUED | OUTPATIENT
Start: 2023-01-11 | End: 2023-01-11

## 2023-01-11 RX ORDER — DEXAMETHASONE SODIUM PHOSPHATE 10 MG/ML
INJECTION, SOLUTION INTRAMUSCULAR; INTRAVENOUS AS NEEDED
Status: DISCONTINUED | OUTPATIENT
Start: 2023-01-11 | End: 2023-01-11

## 2023-01-11 RX ORDER — ONDANSETRON 2 MG/ML
INJECTION INTRAMUSCULAR; INTRAVENOUS AS NEEDED
Status: DISCONTINUED | OUTPATIENT
Start: 2023-01-11 | End: 2023-01-11

## 2023-01-11 RX ORDER — MAGNESIUM HYDROXIDE 1200 MG/15ML
LIQUID ORAL AS NEEDED
Status: DISCONTINUED | OUTPATIENT
Start: 2023-01-11 | End: 2023-01-11 | Stop reason: HOSPADM

## 2023-01-11 RX ORDER — FENTANYL CITRATE/PF 50 MCG/ML
25 SYRINGE (ML) INJECTION
Status: DISCONTINUED | OUTPATIENT
Start: 2023-01-11 | End: 2023-01-11 | Stop reason: HOSPADM

## 2023-01-11 RX ADMIN — GLYCOPYRROLATE 0.4 MG: 0.2 INJECTION, SOLUTION INTRAMUSCULAR; INTRAVENOUS at 12:56

## 2023-01-11 RX ADMIN — NEOSTIGMINE 3 MG: 1 INJECTION INTRAVENOUS at 12:56

## 2023-01-11 RX ADMIN — CEFAZOLIN 3000 MG: 1 INJECTION, POWDER, FOR SOLUTION INTRAMUSCULAR; INTRAVENOUS; PARENTERAL at 11:10

## 2023-01-11 RX ADMIN — GABAPENTIN 300 MG: 300 CAPSULE ORAL at 09:34

## 2023-01-11 RX ADMIN — SODIUM CHLORIDE, SODIUM LACTATE, POTASSIUM CHLORIDE, AND CALCIUM CHLORIDE: .6; .31; .03; .02 INJECTION, SOLUTION INTRAVENOUS at 11:05

## 2023-01-11 RX ADMIN — FENTANYL CITRATE 50 MCG: 50 INJECTION, SOLUTION INTRAMUSCULAR; INTRAVENOUS at 11:16

## 2023-01-11 RX ADMIN — LIDOCAINE HYDROCHLORIDE 50 MG: 10 INJECTION, SOLUTION EPIDURAL; INFILTRATION; INTRACAUDAL; PERINEURAL at 11:16

## 2023-01-11 RX ADMIN — PROPOFOL 200 MG: 10 INJECTION, EMULSION INTRAVENOUS at 11:16

## 2023-01-11 RX ADMIN — ACETAMINOPHEN 975 MG: 325 TABLET ORAL at 09:34

## 2023-01-11 RX ADMIN — FENTANYL CITRATE 50 MCG: 50 INJECTION, SOLUTION INTRAMUSCULAR; INTRAVENOUS at 12:32

## 2023-01-11 RX ADMIN — ONDANSETRON 4 MG: 2 INJECTION INTRAMUSCULAR; INTRAVENOUS at 11:17

## 2023-01-11 RX ADMIN — ROCURONIUM BROMIDE 30 MG: 10 INJECTION, SOLUTION INTRAVENOUS at 11:23

## 2023-01-11 RX ADMIN — DEXAMETHASONE SODIUM PHOSPHATE 10 MG: 10 INJECTION, SOLUTION INTRAMUSCULAR; INTRAVENOUS at 11:30

## 2023-01-11 RX ADMIN — Medication 140 MG: at 11:16

## 2023-01-11 NOTE — H&P
Plastic Surgery Attending    H&P reviewed, no new changes  Eliquis has been held for 48 hours  Man Gardiner MD   [facility] Plastic and Reconstructive Surgery   [address], [address]   Office: [phone]      Plastic Surgery Consult     Reason for visit: chronic, open abdominal wound     HPI:  Patient is a 79 y/o male who presents with chronic open abdominal wound  He suffered strangulated abdominal wall hernia that was repaired with mesh and subsequently dehisced and has been treated for the last two months with VAC therapy  He has been followed in wound care and the wound has significantly improved in depth and character  It remains clean   He presents today for possible surgical coverage of the open abdominal wound       ROS: 12 pt ROS negative, except as otherwise noted in HPI     Medical History        Past Medical History:   Diagnosis Date   • Anemia 9/16/2022   • Chronic kidney disease     • Hypertension     • Obesity     • Sepsis ([facility] [address] )     • Urinary tract infection without hematuria 4/3/2020            FamHx: non-contrib  SurgHx: appendectomy, ureteral stent, exlap for strangulated abdominal wall hernia  SocHx: no tobacco, no ETOH  Meds: eliquis for PEs, no aspirin  Allergies: codeine         PE:  Vitals:     12/22/22 0910   BP: 140/78   Pulse: 80   Resp: 18   Temp: 98 9 °F (37 2 °C)         General: NC/AT, breathing comfortably on RA  Neuro: CN II-XII grossly intact, symmetric reflexes  HEENT: PERRLA, EOMI, external ears normal, no lesions or deformities, neck supple, trachea midline  Respiratory: CTAB, normal respiratory effort  Cardio: RRR, normal S1, S2, no murmur, rubs, gallops  GI: soft, non-tender, non-distended  Extremities/MSK: normal alignment, mobility, gait, no edema  Skin: no rashes, lesions, subcutaneous nodules     BMI 47     Abdomen:  Large 25x10 cm open abdominal wound with good granulation tissue, clean, no purulence, no cellulitis, no signs of infection  Smaller satellite area 5 cm from larger wound with good granulation tissue, clean, no purulence     Last albumin on 11/16/22: 2 1     A/P: 79 y/o male with large abdominal open wound with good granulation tissue   -Discussed with patient condition and coverage with split-thickness skin graft with VAC bolster  -I discussed the procedure, risks and complications including but not limited to infection, bleeding, scarring, partial vs complete graft loss   Patient acknowledged   -I discussed that even with 100% graft take, it would take a few weeks for the skin graft to completely heal  Patient acknowledged   -Patient's albumin was poor at 2 1, instructed to family and patient importance of aggressive nutritional supplementation with protein shakes   -All questions answered, concerns addressed, consent obtained  -Will obtain medical clearance, eliquis hold, CMP, prealbumin, and plan for surgery at [address] [place] [address] 43 and Reconstructive Surgery   [address] [facility] [address], [address]    Office: [phone]

## 2023-01-11 NOTE — NURSING NOTE
Patient tolerated po food/fluids  Dressings remain dry/intact  Wound vac system intact and functioning without issues  Patient and wife verbalized understanding of discharge instructions

## 2023-01-11 NOTE — DISCHARGE INSTR - AVS FIRST PAGE
Plastic Surgery Discharge Instructions    Leave VAC on your abdomen  The donor site dressing on the left thigh should remain intact until you return to the office  If it needs to be removed or comes off, then apply a tegaderm dressing alone over the wound  Follow up in our office in 1 week  Call to schedule follow-up appointment  If  you experience fever greater than 100 5°, increasing redness, increasing drainage or swelling, call our office at [phone]

## 2023-01-11 NOTE — ANESTHESIA PREPROCEDURE EVALUATION
Procedure:  SKIN GRAFT SPLIT THICKNESS ABDOMINAL WOUND WITH VAC PLACEMENT (Abdomen)    Relevant Problems   CARDIO   (+) Essential hypertension   (+) Pulmonary embolism (HCC)      /RENAL   (+) Acute kidney injury ([address] )   (+) Acute kidney injury superimposed on chronic kidney disease (HCC)   (+) Kidney stones      HEMATOLOGY   (+) Anemia      MUSCULOSKELETAL   (+) Gout      PULMONARY   (+) DELIA (obstructive sleep apnea)      Respiratory   (+) Allergic rhinitis      Other   (+) Obesity, Class III, BMI 40-49 9 (morbid obesity) ([facility] [address] )      ECHO 11/18/22:    Left Ventricle Left ventricular cavity size is mildly dilated  Wall thickness is mildly increased  There is mild concentric hypertrophy  The left ventricular ejection fraction is 55% by single dimension measurement  Systolic function is normal   Wall motion is normal  Diastolic function is normal   Left atrial filling pressure is normal    Right Ventricle Right ventricular cavity size is mildly dilated  Systolic function is normal  Normal tricuspid annular plane systolic excursion (TAPSE) > 1 7 cm  Left Atrium The atrium is normal in size (16-34 mL/m2)  Right Atrium The atrium is mildly dilated  Aortic Valve The aortic valve is trileaflet  The leaflets are not thickened  The leaflets are not calcified  The leaflets exhibit normal mobility  There is no evidence of regurgitation  The aortic valve has no significant stenosis  Mitral Valve Mitral valve structure is normal  Unable to assess mitral valve regurgitation due to poor Doppler exam  There is no evidence of stenosis  Tricuspid Valve Tricuspid valve structure is normal  There is mild regurgitation  There is no evidence of stenosis  The right ventricular systolic pressure is moderately elevated  The estimated right ventricular systolic pressure is 74 58 mmHg  Pulmonic Valve There is trace regurgitation  There is no evidence of stenosis  Ascending Aorta The aortic root is normal in size   The ascending aorta is normal in size  IVC/SVC The inferior vena cava size is 28 6 mm  The right atrial pressure is estimated at 10 0 mmHg  Pericardium There is no pericardial effusion  The pericardium is normal in appearance  Pulmonary Artery The estimated pulmonary artery systolic pressure is 32 8 mmHg  The pulmonary artery systolic pressure is moderately increased  Physical Exam    Airway    Mallampati score: II  TM Distance: >3 FB  Neck ROM: full     Dental       Cardiovascular      Pulmonary      Other Findings        Anesthesia Plan  ASA Score- 3     Anesthesia Type- general with ASA Monitors  Additional Monitors:   Airway Plan: ETT  Plan Factors-Exercise tolerance (METS): <4 METS  Chart reviewed  Patient summary reviewed  Patient is not a current smoker  Patient did not smoke on day of surgery  Obstructive sleep apnea risk education given perioperatively  Induction- intravenous  Postoperative Plan- Plan for postoperative opioid use  Planned trial extubation    Informed Consent- Anesthetic plan and risks discussed with patient  I personally reviewed this patient with the CRNA  Discussed and agreed on the Anesthesia Plan with the CRNA  Sarah Shah

## 2023-01-11 NOTE — NURSING NOTE
Patient returned to [facility] via litter  Awake/alert  Skin warm/dry  Respirations easy/unlabored  Pain 4/10 to left thigh  No complaints of abdominal pain  Wound vac system intact  Dressings to abdomen and left thigh dry and intact    Patient and family seen by Dr Janae Nicole

## 2023-01-11 NOTE — ANESTHESIA POSTPROCEDURE EVALUATION
Post-Op Assessment Note    CV Status:  Stable  Pain Score: 0    Pain management: adequate     Mental Status:  Alert and awake   Hydration Status:  Euvolemic   PONV Controlled:  Controlled   Airway Patency:  Patent  Airway: intubated   Two or more mitigation strategies used for obstructive sleep apnea   Post Op Vitals Reviewed: Yes            No notable events documented      BP (!) 176/76 (01/11/23 1314)    Temp (!) 97 4 °F (36 3 °C) (01/11/23 1314)    Pulse 99 (01/11/23 1314)   Resp 16 (01/11/23 1314)    SpO2 98 % (01/11/23 1314)

## 2023-01-11 NOTE — OP NOTE
OPERATIVE REPORT  PATIENT NAME: Nnamdi Christensen    :  1955  MRN: [id number]  Pt Location:  OR ROOM 10    SURGERY DATE: 2023    Surgeon(s) and Role:     * Ayanna Carpenter MD - Primary     * Su Montero PA-C - Abdelrahman Reynolds MD - Assisting    Preop Diagnosis:  Surgical wound present [T14  8XXA]    Post-Op Diagnosis Codes:     * Surgical wound present [T14  8XXA]    Procedure(s) (LRB):  1  Surgical preparation of abdominal wound bed (25x13 cm, 325 cm2)  2  Split-thickness skin graft to abdomen (25x13 cm, 325 cm2)  3  Abdominal VAC bolster (25x13 cm, 325 cm2)    Specimen(s):  * No specimens in log *    Estimated Blood Loss:   0 mL    Drains:  Ureteral Internal Stent Right ureter 6 Fr  (Active)   Number of days: 8       Anesthesia Type:   General    Operative Indications:  Surgical wound present [T14  8XXA]    Operative Findings:  Abdominal open wound 25x13 cm  No signs of infection  Complications:   None    Procedure and Technique:  Patient was brought to the operating room, transferred to the operating table in supine fashion  After undergoing general anesthesia, a timeout was performed at which point all patient identifiers were deemed to be correct  The abdomen was prepped and draped in the normal sterile fashion  I first began by surgically preparing the wound bed  The wound was scrubbed with betadine scrub brush  The fibrinous material was sharply excised using curette to healthy bleeding granulation tissue  The wound was irrigated  Next, a split-thickness skin graft at 14/1000th of an inch was harvested with saulo dermatome from left thigh and meshed at 3:1 ratio and secured to the abdominal open wound defect with staples  There was a small 3x2 cm satellite open wound that was also grafted and bridged with the [facility]  Xeroform was placed over the skin graft followed by [facility] with good suction, no leak   The left thigh donor site was dressed with nabil, optilock, and tegederm dressing  This concluded the procedure  Patient tolerated the procedure well without complications  At the end of the case, all sponge, needle, and instrument counts were correct  Patient was awakened from anesthesia and taken to the PACU in stable condition      I was present for the entire procedure, A qualified resident physician was not available and A physician assistant was required during the procedure for retraction, tissue handling, dissection and suturing        Patient Disposition:  PACU         SIGNATURE: Priscila Cantu MD  DATE: January 11, 2023  TIME: 1:10 PM

## 2023-01-13 ENCOUNTER — HOME CARE VISIT (OUTPATIENT)
Dept: HOME HEALTH SERVICES | Facility: HOME HEALTHCARE | Age: 68
End: 2023-01-13

## 2023-01-14 PROBLEM — R65.20 SEVERE SEPSIS (HCC): Status: RESOLVED | Noted: 2021-07-12 | Resolved: 2023-01-14

## 2023-01-14 PROBLEM — A41.9 SEVERE SEPSIS (HCC): Status: RESOLVED | Noted: 2021-07-12 | Resolved: 2023-01-14

## 2023-01-16 ENCOUNTER — HOME CARE VISIT (OUTPATIENT)
Dept: HOME HEALTH SERVICES | Facility: HOME HEALTHCARE | Age: 68
End: 2023-01-16

## 2023-01-18 ENCOUNTER — OFFICE VISIT (OUTPATIENT)
Dept: PLASTIC SURGERY | Facility: CLINIC | Age: 68
End: 2023-01-18

## 2023-01-18 ENCOUNTER — HOME CARE VISIT (OUTPATIENT)
Dept: HOME HEALTH SERVICES | Facility: HOME HEALTHCARE | Age: 68
End: 2023-01-18

## 2023-01-18 DIAGNOSIS — T81.89XD NON-HEALING SURGICAL WOUND, SUBSEQUENT ENCOUNTER: Primary | ICD-10-CM

## 2023-01-18 PROBLEM — T81.89XA NONHEALING SURGICAL WOUND: Status: ACTIVE | Noted: 2023-01-18

## 2023-01-18 NOTE — PROGRESS NOTES
Assessment/Plan:     Patient is a 52-year-old male who is status post surgical preparation of an abdominal wound with split-thickness skin graft to abdomen and VAC placement by Dr Dionne Dandy on 11/11/2023  Please see HPI  Patient returns to the office today for first postoperative visit and VAC removal   Neck was removed without complication  Graft is well adhered and viable  There is a drawn foul odor from the wound site  Patient has had no fever, chills, nausea, vomiting  There is no purulence present at the site  The patient will apply bacitracin and Xeroform followed by an ABD to his abdominal graft sites as well as his left thigh donor site  This should be changed daily or every other day  The patient will return to the office in approximately 1 week for a wound check  The patient was advised that he may not get his graft wet  There are no diagnoses linked to this encounter  Subjective:     Patient ID: Jenaro Rincon is a 78 y o  male  HPI     Patient presents to the office today with his wife  They do report a foul odor in his graft site  They state that he has had quite a bit of exudate from his left thigh donor site  No additional issues or concerns today  Review of Systems    See HPI     Objective:     Physical Exam      Bilateral abdominal grafts are well adhered and viable  Foul odor noted as above  Left thigh donor site is healing appropriately  Please see photo in media

## 2023-01-19 ENCOUNTER — HOME CARE VISIT (OUTPATIENT)
Dept: HOME HEALTH SERVICES | Facility: HOME HEALTHCARE | Age: 68
End: 2023-01-19

## 2023-01-19 NOTE — CASE COMMUNICATION
Ship to Pt   Home   Insurance mc    Wound 1     ABD 5x9 [id number]     14       Gauze Xeroform 5x9 [id number]   4x9 or biggest size (14)      medifix tape   1

## 2023-01-20 ENCOUNTER — HOME CARE VISIT (OUTPATIENT)
Dept: HOME HEALTH SERVICES | Facility: HOME HEALTHCARE | Age: 68
End: 2023-01-20

## 2023-01-20 VITALS
DIASTOLIC BLOOD PRESSURE: 68 MMHG | OXYGEN SATURATION: 96 % | SYSTOLIC BLOOD PRESSURE: 134 MMHG | HEART RATE: 71 BPM | RESPIRATION RATE: 18 BRPM | TEMPERATURE: 97.9 F

## 2023-01-23 ENCOUNTER — HOME CARE VISIT (OUTPATIENT)
Dept: HOME HEALTH SERVICES | Facility: HOME HEALTHCARE | Age: 68
End: 2023-01-23

## 2023-01-23 VITALS
OXYGEN SATURATION: 93 % | SYSTOLIC BLOOD PRESSURE: 128 MMHG | DIASTOLIC BLOOD PRESSURE: 78 MMHG | HEART RATE: 88 BPM | TEMPERATURE: 97.9 F | RESPIRATION RATE: 18 BRPM

## 2023-01-23 NOTE — CASE COMMUNICATION
Home Health need continues for: wound care  Current level of functional ability: requires walker to ambulate and human assistance  Homebound status and living arrangements: lives with family   has decreased balance and decreased endurance when ambulate, is a fall risk, and requires assistance to ambulate  Goals accomplished and/or measurable progress toward unmet goals in past 60 days: pt would like to get stronger and wound to heal  Foc us of care for next 60 days for each discipline ordered: wound care as ordered  Skin integrity/wound status: wound present on mid abdomen, R lower abdomen, and L thigh  Code status:   Most recent fall risk: high

## 2023-01-24 ENCOUNTER — RA CDI HCC (OUTPATIENT)
Dept: OTHER | Facility: HOSPITAL | Age: 68
End: 2023-01-24

## 2023-01-25 ENCOUNTER — OFFICE VISIT (OUTPATIENT)
Dept: PLASTIC SURGERY | Facility: CLINIC | Age: 68
End: 2023-01-25

## 2023-01-25 ENCOUNTER — HOME CARE VISIT (OUTPATIENT)
Dept: HOME HEALTH SERVICES | Facility: HOME HEALTHCARE | Age: 68
End: 2023-01-25

## 2023-01-25 DIAGNOSIS — T81.89XD NON-HEALING SURGICAL WOUND, SUBSEQUENT ENCOUNTER: Primary | ICD-10-CM

## 2023-01-25 NOTE — PROGRESS NOTES
Assessment/Plan:     Patient is a 59-year-old male who is status post surgical preparation of an abdominal wound with split-thickness skin graft to abdomen and VAC placement by Dr Mason Moscoso on 11/11/2023  Please see HPI  The patient returns to the office today for a graft check  Patient's abdominal graft is well adhered, viable, and healing appropriately  Foul odor has resolved  Please see photo in media  Patient's left thigh donor site is completely healed  The patient will continue to apply bacitracin and Xeroform to the graft site  He may not get the area wet at this time  He may apply Aquaphor or other lotion to his left thigh donor site  The patient will return to the office in approximately 1 week for a graft check and staple removal      Diagnoses and all orders for this visit:    Non-healing surgical wound, subsequent encounter          Subjective:     Patient ID: Mari Reynolds is a 78 y o  male  HPI     Patient presents to the office today with his wife  They state that they are very pleased with the healing progress of the grafts and wound  No issues or concerns  Review of Systems    See HPI    Objective:     Physical Exam      Graft is well adhered and viable  Please see photo in media

## 2023-01-27 ENCOUNTER — HOME CARE VISIT (OUTPATIENT)
Dept: HOME HEALTH SERVICES | Facility: HOME HEALTHCARE | Age: 68
End: 2023-01-27

## 2023-01-27 VITALS
TEMPERATURE: 98.7 F | HEART RATE: 80 BPM | DIASTOLIC BLOOD PRESSURE: 78 MMHG | OXYGEN SATURATION: 100 % | RESPIRATION RATE: 22 BRPM | SYSTOLIC BLOOD PRESSURE: 132 MMHG

## 2023-01-30 ENCOUNTER — HOME CARE VISIT (OUTPATIENT)
Dept: HOME HEALTH SERVICES | Facility: HOME HEALTHCARE | Age: 68
End: 2023-01-30

## 2023-01-31 ENCOUNTER — OFFICE VISIT (OUTPATIENT)
Dept: PLASTIC SURGERY | Facility: CLINIC | Age: 68
End: 2023-01-31

## 2023-01-31 ENCOUNTER — OFFICE VISIT (OUTPATIENT)
Dept: FAMILY MEDICINE CLINIC | Facility: CLINIC | Age: 68
End: 2023-01-31

## 2023-01-31 VITALS
DIASTOLIC BLOOD PRESSURE: 62 MMHG | TEMPERATURE: 97.5 F | OXYGEN SATURATION: 96 % | HEART RATE: 86 BPM | SYSTOLIC BLOOD PRESSURE: 110 MMHG | RESPIRATION RATE: 18 BRPM

## 2023-01-31 VITALS
BODY MASS INDEX: 45.1 KG/M2 | WEIGHT: 315 LBS | DIASTOLIC BLOOD PRESSURE: 66 MMHG | OXYGEN SATURATION: 98 % | HEIGHT: 70 IN | HEART RATE: 95 BPM | SYSTOLIC BLOOD PRESSURE: 134 MMHG | TEMPERATURE: 97.6 F | RESPIRATION RATE: 16 BRPM

## 2023-01-31 DIAGNOSIS — N20.0 KIDNEY STONES: ICD-10-CM

## 2023-01-31 DIAGNOSIS — Z23 ENCOUNTER FOR IMMUNIZATION: ICD-10-CM

## 2023-01-31 DIAGNOSIS — N17.9 ACUTE KIDNEY INJURY (HCC): ICD-10-CM

## 2023-01-31 DIAGNOSIS — T81.89XD NON-HEALING SURGICAL WOUND, SUBSEQUENT ENCOUNTER: Primary | ICD-10-CM

## 2023-01-31 DIAGNOSIS — E66.01 OBESITY, CLASS III, BMI 40-49.9 (MORBID OBESITY) (HCC): ICD-10-CM

## 2023-01-31 DIAGNOSIS — G47.33 OSA (OBSTRUCTIVE SLEEP APNEA): ICD-10-CM

## 2023-01-31 DIAGNOSIS — N18.31 STAGE 3A CHRONIC KIDNEY DISEASE (HCC): ICD-10-CM

## 2023-01-31 DIAGNOSIS — G89.29 CHRONIC PAIN OF RIGHT KNEE: ICD-10-CM

## 2023-01-31 DIAGNOSIS — M25.561 CHRONIC PAIN OF RIGHT KNEE: ICD-10-CM

## 2023-01-31 DIAGNOSIS — T81.89XD NON-HEALING SURGICAL WOUND, SUBSEQUENT ENCOUNTER: ICD-10-CM

## 2023-01-31 DIAGNOSIS — I89.0 LYMPHEDEMA OF BOTH LOWER EXTREMITIES: ICD-10-CM

## 2023-01-31 DIAGNOSIS — I26.99 ACUTE PULMONARY EMBOLISM WITHOUT ACUTE COR PULMONALE, UNSPECIFIED PULMONARY EMBOLISM TYPE (HCC): ICD-10-CM

## 2023-01-31 DIAGNOSIS — U07.1 COVID-19: Chronic | ICD-10-CM

## 2023-01-31 DIAGNOSIS — I10 ESSENTIAL HYPERTENSION: Primary | ICD-10-CM

## 2023-01-31 DIAGNOSIS — M10.9 GOUT, UNSPECIFIED CAUSE, UNSPECIFIED CHRONICITY, UNSPECIFIED SITE: ICD-10-CM

## 2023-01-31 PROBLEM — T14.8XXA WOUND DRAINAGE: Status: RESOLVED | Noted: 2022-10-06 | Resolved: 2023-01-31

## 2023-01-31 PROBLEM — L24.A9 WOUND DRAINAGE: Status: RESOLVED | Noted: 2022-10-06 | Resolved: 2023-01-31

## 2023-01-31 RX ADMIN — LIDOCAINE HYDROCHLORIDE 2 ML: 10 INJECTION, SOLUTION INFILTRATION; PERINEURAL at 11:44

## 2023-01-31 RX ADMIN — TRIAMCINOLONE ACETONIDE 20 MG: 40 INJECTION, SUSPENSION INTRA-ARTICULAR; INTRAMUSCULAR at 11:44

## 2023-01-31 NOTE — PROGRESS NOTES
Assessment/Plan:    No problem-specific Assessment & Plan notes found for this encounter  Diagnoses and all orders for this visit:    Essential hypertension    Acute pulmonary embolism without acute cor pulmonale, unspecified pulmonary embolism type (HCC)    DELIA (obstructive sleep apnea)    Non-healing surgical wound, subsequent encounter    Obesity, Class III, BMI 40-49 9 (morbid obesity) ([facility] )    Lymphedema of both lower extremities    Gout, unspecified cause, unspecified chronicity, unspecified site    COVID-19    Kidney stones    Acute kidney injury ([facility] )    Chronic pain of right knee    Other orders  -     Large joint arthrocentesis          Subjective:      Patient ID: Leland Arriola is a 78 y o  male  PATIENT RETURNS FOR FOLLOW-UP OF CHRONIC MEDICAL CONDITIONS  ANY HOSPITAL VISITS, EMERGENCY VISITS AND OTHER PROVIDER VISITS SINCE LAST TIME WERE REVIEWED  MEDS WERE REVIEWED AND NO SIDE EFFECTS  NO NEW ISSUES  UNLESS NOTED BELOW  NO NEW MEDICAL PROVIDER REPORTED  THE CHRONIC DISEASES LISTED ABOVE ARE STABLE AND UNCHANGED/ THE PLAN OF CARE FOR THOSE WILL REMAIN UNCHANGED UNLESS NOTED BELOW  R knee pain is impacting mobility / no known DJD ;     rECENT STONE ANALYSIS = 30% U  ACID 70 % calcium  The following portions of the patient's history were reviewed and updated as appropriate: allergies, current medications, past family history, past medical history, past social history, past surgical history and problem list     Review of Systems   Constitutional: Negative for activity change and appetite change  HENT: Negative for trouble swallowing  Eyes: Negative for visual disturbance  Respiratory: Negative for cough and shortness of breath  Cardiovascular: Negative for chest pain, palpitations and leg swelling  Gastrointestinal: Negative for abdominal pain and blood in stool  Endocrine: Negative for polyuria  Genitourinary: Negative for difficulty urinating and hematuria  Musculoskeletal: Positive for arthralgias, gait problem and joint swelling  Skin: Negative for rash  Neurological: Negative for dizziness  Psychiatric/Behavioral: Negative for behavioral problems  Objective:  Vitals:    01/31/23 1057   BP: 134/66   BP Location: Left arm   Patient Position: Sitting   Cuff Size: Large   Pulse: 95   Resp: 16   Temp: 97 6 °F (36 4 °C)   SpO2: 98%   Weight: (!) 153 kg (337 lb 6 4 oz)   Height: 5' 10" (1 778 m)        Physical Exam  Constitutional:       Appearance: He is well-developed  HENT:      Head: Normocephalic and atraumatic  Eyes:      Conjunctiva/sclera: Conjunctivae normal    Neck:      Thyroid: No thyromegaly  Cardiovascular:      Rate and Rhythm: Normal rate and regular rhythm  Heart sounds: Normal heart sounds  No murmur heard  Pulmonary:      Effort: Pulmonary effort is normal  No respiratory distress  Breath sounds: Normal breath sounds  Musculoskeletal:      Cervical back: Neck supple  Lymphadenopathy:      Cervical: No cervical adenopathy  Skin:     General: Skin is warm and dry  Psychiatric:         Behavior: Behavior normal              Large joint arthrocentesis: R knee  Universal Protocol:  Consent given by: patient  Site marked: the operative site was marked (PREPPED USING STERILE TECHNIQUE)  Supporting Documentation  Indications: pain   Procedure Details  Location: knee - R knee  Needle size: 25 G  Approach: anterolateral  Medications administered: 20 mg triamcinolone acetonide 40 mg/mL; 2 mL lidocaine 1 %              Patient's chronic problems that were reviewed today are stable  Recent hospital stays reviewed  Recent labs and imaging reviewed  Recent visits to other providers reviewed  Meds reviewed and no changes made  Appropriate labs and imaging were ordered  Preventive measures appropriate for age and sex were reviewed with patient  Immunizations were updated as appropriate          Major factor at this time is right knee pain which could reflect some beginning DJD  We will attempt intra-articular steroid injection for relief

## 2023-01-31 NOTE — PROGRESS NOTES
Assessment/Plan:     Patient is a 79-year-old male who is status post surgical preparation of an abdominal wound with split-thickness skin graft to abdomen and VAC placement by Dr Madisyn Grady on 11/11/202Veronica Gillis see HPI  Patient returns to the office today for a graft check  Graft is fully integrated into surrounding skin  There are some, small areas of hypergranulation which were debrided with silver nitrate today  Patient's perigraft staples were also removed today  Please see photos in media  The patient may apply Aquaphor to the graft and donor site  He will return to our office in 2 to 3 weeks for a final graft check  He may shower  No restrictions  Diagnoses and all orders for this visit:    Non-healing surgical wound, subsequent encounter          Subjective:     Patient ID: Laine Solis is a 78 y o  male  HPI     Patient reports he has been doing well  No issues or concerns today  Review of Systems    See HPI    Objective:     Physical Exam      Graft is fully adhered, viable, and integrated into surrounding skin  Areas of hypergranulation as noted above  Please see photo in media  Left thigh donor site is completely healed

## 2023-01-31 NOTE — PATIENT INSTRUCTIONS
Update your Covid booster in 1-2 wks ;     Try Crystal light as your fluid replacer    No calories and prevents kidney stones

## 2023-02-01 ENCOUNTER — HOME CARE VISIT (OUTPATIENT)
Dept: HOME HEALTH SERVICES | Facility: HOME HEALTHCARE | Age: 68
End: 2023-02-01

## 2023-02-01 ENCOUNTER — TELEPHONE (OUTPATIENT)
Dept: FAMILY MEDICINE CLINIC | Facility: CLINIC | Age: 68
End: 2023-02-01

## 2023-02-01 VITALS
HEART RATE: 80 BPM | DIASTOLIC BLOOD PRESSURE: 68 MMHG | TEMPERATURE: 98 F | SYSTOLIC BLOOD PRESSURE: 120 MMHG | OXYGEN SATURATION: 100 % | RESPIRATION RATE: 20 BRPM

## 2023-02-01 NOTE — TELEPHONE ENCOUNTER
----- Message from Lauri Robles MD sent at 2/1/2023  1:35 PM EST -----  Call patient  After doing some updated research I think it is reasonable to stop the Eliquis after he completes his current course  Most sources suggest 3 to 6 months so he is in that window

## 2023-02-02 RX ORDER — LIDOCAINE HYDROCHLORIDE 10 MG/ML
2 INJECTION, SOLUTION INFILTRATION; PERINEURAL
Status: COMPLETED | OUTPATIENT
Start: 2023-01-31 | End: 2023-01-31

## 2023-02-02 RX ORDER — TRIAMCINOLONE ACETONIDE 40 MG/ML
20 INJECTION, SUSPENSION INTRA-ARTICULAR; INTRAMUSCULAR
Status: COMPLETED | OUTPATIENT
Start: 2023-01-31 | End: 2023-01-31

## 2023-02-03 ENCOUNTER — HOME CARE VISIT (OUTPATIENT)
Dept: HOME HEALTH SERVICES | Facility: HOME HEALTHCARE | Age: 68
End: 2023-02-03

## 2023-02-03 VITALS
OXYGEN SATURATION: 96 % | HEART RATE: 76 BPM | SYSTOLIC BLOOD PRESSURE: 120 MMHG | RESPIRATION RATE: 18 BRPM | TEMPERATURE: 97.9 F | DIASTOLIC BLOOD PRESSURE: 50 MMHG

## 2023-02-03 NOTE — CASE COMMUNICATION
Pt is being discharged from [facility] as of today  Pt no longer requires wound care and is independent with applying Aquaphor to graft and donor site

## 2023-02-14 ENCOUNTER — OFFICE VISIT (OUTPATIENT)
Dept: PLASTIC SURGERY | Facility: CLINIC | Age: 68
End: 2023-02-14

## 2023-02-14 DIAGNOSIS — T81.89XD NON-HEALING SURGICAL WOUND, SUBSEQUENT ENCOUNTER: Primary | ICD-10-CM

## 2023-02-14 NOTE — PROGRESS NOTES
Assessment/Plan:      Patient is a 81-year-old male who is status post surgical preparation of an abdominal wound with split-thickness skin graft to abdomen and VAC placement by Dr Melinda Salcedo on 11/11/2023Cgretchen Arzate see HPI  Patient returns to the office today for a graft check  Graft is completely healed and integrated into surrounding skin  The patient may continue to moisturize the area as needed  He will follow-up in our office as needed with any questions or concerns  Diagnoses and all orders for this visit:    Non-healing surgical wound, subsequent encounter          Subjective:     Patient ID: Shira Justice is a 78 y o  male  HPI     Patient reports no issues or concerns today  He has begun showering again, which he was unable to do for the past several months due to open abdominal wounds  Review of Systems    See HPI    Objective:     Physical Exam      Skin grafts are completely healed and integrated into surrounding skin

## 2023-02-21 ENCOUNTER — OFFICE VISIT (OUTPATIENT)
Dept: FAMILY MEDICINE CLINIC | Facility: CLINIC | Age: 68
End: 2023-02-21

## 2023-02-21 VITALS
RESPIRATION RATE: 20 BRPM | WEIGHT: 315 LBS | BODY MASS INDEX: 48.18 KG/M2 | TEMPERATURE: 97.9 F | OXYGEN SATURATION: 99 % | DIASTOLIC BLOOD PRESSURE: 68 MMHG | HEART RATE: 87 BPM | SYSTOLIC BLOOD PRESSURE: 120 MMHG

## 2023-02-21 DIAGNOSIS — L02.91 ABSCESS: Primary | ICD-10-CM

## 2023-02-21 RX ORDER — DOXYCYCLINE HYCLATE 100 MG/1
100 CAPSULE ORAL EVERY 12 HOURS SCHEDULED
Qty: 14 CAPSULE | Refills: 0 | Status: SHIPPED | OUTPATIENT
Start: 2023-02-21 | End: 2023-02-28

## 2023-02-21 NOTE — ASSESSMENT & PLAN NOTE
6cm x 3cm Abscess on right back   Patient noted this skin abnormality a couple of days ago   · Tender to touch   · Denies fever/chills   I&D performed with moderate amount of bloody purulent drainage   · Wound left open to drain   · 7 days of doxycycline ordered   Recheck wound in 7 days

## 2023-02-21 NOTE — PROGRESS NOTES
Name: Jesus Tavarez      : 1955      MRN: [id number]  Encounter Provider: KELSEY Mcdaniels  Encounter Date: 2023   Encounter department: [facility] [place] [address]  Abscess  Assessment & Plan:  6cm x 3cm Abscess on right back   Patient noted this skin abnormality a couple of days ago   · Tender to touch   · Denies fever/chills   I&D performed with moderate amount of bloody purulent drainage   · Wound left open to drain   · 7 days of doxycycline ordered   Recheck wound in 7 days    Orders:  -     doxycycline hyclate (VIBRAMYCIN) 100 mg capsule; Take 1 capsule (100 mg total) by mouth every 12 (twelve) hours for 7 days  -     Wound culture and Gram stain    BMI Counseling: Body mass index is 48 18 kg/m²  The BMI is above normal  Nutrition recommendations include decreasing portion sizes, encouraging healthy choices of fruits and vegetables, decreasing fast food intake, consuming healthier snacks, limiting drinks that contain sugar, moderation in carbohydrate intake and reducing intake of saturated and trans fat  Exercise recommendations include exercising 3-5 times per week  No pharmacotherapy was ordered  Rationale for BMI follow-up plan is due to patient being overweight or obese  Depression Screening and Follow-up Plan: Patient was screened for depression during today's encounter  They screened negative with a PHQ-2 score of 0  Subjective      Skin abnormality on the back causing pain and disruption in every day life  Red, warm, irritated  Started a few days ago  Denies fevers/chills     Therapies- hot compress/cortizone cream     Review of Systems    Current Outpatient Medications on File Prior to Visit   Medication Sig   • acetaminophen (TYLENOL) 500 mg tablet Take 500 mg by mouth every 6 (six) hours as needed for mild pain   • allopurinol (ZYLOPRIM) 100 mg tablet Take 100 mg by mouth daily 23 Per pt takes at 4pm in the afternoon     • furosemide (LASIX) 20 mg tablet Take 1 tablet (20 mg total) by mouth daily For swelling (Patient taking differently: Take 20 mg by mouth daily For swelling, per pt takes @ 4pm)   • metoprolol succinate (TOPROL-XL) 100 mg 24 hr tablet Take 1 tablet (100 mg total) by mouth daily (Patient taking differently: Take 100 mg by mouth every evening)   • Potassium Citrate ER 15 MEQ (1620 MG) TBCR Take 1 tablet by mouth daily 1/9/23 Per pt - takes at 4pm daily   • tamsulosin (FLOMAX) 0 4 mg 1-2 daily as directed (Patient taking differently: daily with dinner 1-2 daily as directed, 1/9/23 Per pt taking one tab at this time  at 4pm)   • TRAVATAN Z 0 004 % ophthalmic solution Administer 1 drop to both eyes daily at bedtime    • apixaban (ELIQUIS) 5 mg Take 1 tablet (5 mg total) by mouth 2 (two) times a day Do not start before January 13, 2023  (Patient not taking: Reported on 2/21/2023)   • gabapentin (NEURONTIN) 100 mg capsule Take 1 capsule (100 mg total) by mouth 3 (three) times a day for 5 days       Objective     /68 (BP Location: Left arm, Patient Position: Sitting, Cuff Size: Large)   Pulse 87   Temp 97 9 °F (36 6 °C) (Temporal)   Resp 20   Wt (!) 152 kg (335 lb 12 8 oz)   SpO2 99%   BMI 48 18 kg/m²     Physical Exam  Vitals and nursing note reviewed  Constitutional:       Appearance: Normal appearance  He is obese  HENT:      Nose: Nose normal       Mouth/Throat:      Mouth: Mucous membranes are moist       Pharynx: Oropharynx is clear  Cardiovascular:      Rate and Rhythm: Normal rate and regular rhythm  Pulses: Normal pulses  Heart sounds: Normal heart sounds  Pulmonary:      Effort: Pulmonary effort is normal       Breath sounds: Normal breath sounds  Abdominal:      General: Bowel sounds are normal       Palpations: Abdomen is soft  Musculoskeletal:         General: Normal range of motion  Cervical back: Normal range of motion  Skin:     General: Skin is warm        Capillary Refill: Capillary refill takes less than 2 seconds  Findings: Abscess present  Neurological:      General: No focal deficit present  Mental Status: He is alert and oriented to person, place, and time  Mental status is at baseline  Psychiatric:         Mood and Affect: Mood normal          Behavior: Behavior normal          Thought Content: Thought content normal          Judgment: Judgment normal      Incision and Drainage    Date/Time: 2/21/2023 9:58 AM  Performed by: KELESY Bahena  Authorized by: KELSEY Bahena   Universal Protocol:  Consent: Verbal consent obtained  Risks and benefits: risks, benefits and alternatives were discussed  Consent given by: patient  Time out: Immediately prior to procedure a "time out" was called to verify the correct patient, procedure, equipment, support staff and site/side marked as required  Timeout called at: 2/21/2023 8:30 AM   Patient understanding: patient states understanding of the procedure being performed  Site marked: the operative site was marked  Patient identity confirmed: verbally with patient      Patient location:  Clinic  Location:     Type:  Abscess    Size:  6cm x 3 cm    Location:  Trunk    Trunk location:  Back  Pre-procedure details:     Skin preparation:  Betadine  Anesthesia (see MAR for exact dosages): Anesthesia method:  None  Procedure details:     Complexity:  Simple    Needle aspiration: no      Incision types:  Single straight    Approach:  Open    Incision depth:  Subcutaneous    Drainage:  Bloody and purulent    Drainage amount: Moderate    Wound treatment:  Wound left open    Packing materials:  None  Post-procedure details:     Patient tolerance of procedure:   Tolerated well, no immediate complications        KELSEY Bahena

## 2023-02-23 LAB
BACTERIA WND AEROBE CULT: ABNORMAL
GRAM STN SPEC: ABNORMAL
GRAM STN SPEC: ABNORMAL

## 2023-02-27 NOTE — ASSESSMENT & PLAN NOTE
2/21  · 6cm x 3cm Abscess on right back   · I&D performed with moderate amount of bloody purulent drainage   · Wound left open to drain   · 7 days of doxycycline ordered     2/28   · Wound recheck   · Well approximated & healed

## 2023-02-28 ENCOUNTER — OFFICE VISIT (OUTPATIENT)
Dept: FAMILY MEDICINE CLINIC | Facility: CLINIC | Age: 68
End: 2023-02-28

## 2023-02-28 VITALS
BODY MASS INDEX: 45.1 KG/M2 | HEIGHT: 70 IN | HEART RATE: 94 BPM | WEIGHT: 315 LBS | OXYGEN SATURATION: 96 % | SYSTOLIC BLOOD PRESSURE: 130 MMHG | TEMPERATURE: 97.5 F | DIASTOLIC BLOOD PRESSURE: 62 MMHG

## 2023-02-28 DIAGNOSIS — L02.91 ABSCESS: Primary | ICD-10-CM

## 2023-02-28 DIAGNOSIS — T14.8XXA SURGICAL WOUND PRESENT: ICD-10-CM

## 2023-02-28 RX ORDER — GABAPENTIN 100 MG/1
100 CAPSULE ORAL 3 TIMES DAILY
Qty: 15 CAPSULE | Refills: 0 | Status: SHIPPED | OUTPATIENT
Start: 2023-02-28 | End: 2023-03-05

## 2023-02-28 NOTE — PROGRESS NOTES
Name: Christine Negron      : 1955      MRN: [id number]  Encounter Provider: KELSEY Ortega  Encounter Date: 2023   Encounter department: [facility] [place] [address]  Abscess  Assessment & Plan:    · 6cm x 3cm Abscess on right back   · I&D performed with moderate amount of bloody purulent drainage   · Wound left open to drain   · 7 days of doxycycline ordered        · Wound recheck   · Well approximated & healed      2  Surgical wound present  Assessment & Plan:  Healthy granulation tissue present   Wound redressed today     Orders:  -     gabapentin (NEURONTIN) 100 mg capsule; Take 1 capsule (100 mg total) by mouth 3 (three) times a day for 5 days         Subjective      Still having oozing coming from site     Review of Systems   Constitutional: Negative for activity change, chills, fatigue and fever  HENT: Negative for congestion, ear pain, rhinorrhea and sore throat  Eyes: Negative for pain and visual disturbance  Respiratory: Negative for cough, chest tightness and shortness of breath  Cardiovascular: Negative for chest pain, palpitations and leg swelling  Gastrointestinal: Negative for abdominal pain, constipation, diarrhea, nausea and vomiting  Genitourinary: Negative for decreased urine volume, dysuria, frequency, hematuria and urgency  Musculoskeletal: Negative for arthralgias and back pain  Skin: Negative for color change and rash  Neurological: Negative for seizures, syncope and headaches  Psychiatric/Behavioral: Negative for dysphoric mood  The patient is not nervous/anxious  All other systems reviewed and are negative  Current Outpatient Medications on File Prior to Visit   Medication Sig   • acetaminophen (TYLENOL) 500 mg tablet Take 500 mg by mouth every 6 (six) hours as needed for mild pain   • allopurinol (ZYLOPRIM) 100 mg tablet Take 100 mg by mouth daily 23 Per pt takes at 4pm in the afternoon  • apixaban (ELIQUIS) 5 mg Take 1 tablet (5 mg total) by mouth 2 (two) times a day Do not start before January 13, 2023  (Patient not taking: Reported on 2/21/2023)   • doxycycline hyclate (VIBRAMYCIN) 100 mg capsule Take 1 capsule (100 mg total) by mouth every 12 (twelve) hours for 7 days   • furosemide (LASIX) 20 mg tablet Take 1 tablet (20 mg total) by mouth daily For swelling (Patient taking differently: Take 20 mg by mouth daily For swelling, per pt takes @ 4pm)   • metoprolol succinate (TOPROL-XL) 100 mg 24 hr tablet Take 1 tablet (100 mg total) by mouth daily (Patient taking differently: Take 100 mg by mouth every evening)   • Potassium Citrate ER 15 MEQ (1620 MG) TBCR Take 1 tablet by mouth daily 1/9/23 Per pt - takes at 4pm daily   • tamsulosin (FLOMAX) 0 4 mg 1-2 daily as directed (Patient taking differently: daily with dinner 1-2 daily as directed, 1/9/23 Per pt taking one tab at this time  at 4pm)   • TRAVATAN Z 0 004 % ophthalmic solution Administer 1 drop to both eyes daily at bedtime    • [DISCONTINUED] gabapentin (NEURONTIN) 100 mg capsule Take 1 capsule (100 mg total) by mouth 3 (three) times a day for 5 days       Objective     /62 (BP Location: Left arm, Patient Position: Sitting, Cuff Size: Large)   Pulse 94   Temp 97 5 °F (36 4 °C)   Ht 5' 10" (1 778 m)   Wt (!) 153 kg (336 lb 12 8 oz)   SpO2 96%   BMI 48 33 kg/m²     Physical Exam  Vitals and nursing note reviewed  Constitutional:       Appearance: Normal appearance  He is normal weight  HENT:      Head: Normocephalic  Right Ear: Tympanic membrane, ear canal and external ear normal       Left Ear: Tympanic membrane, ear canal and external ear normal       Nose: Nose normal       Mouth/Throat:      Mouth: Mucous membranes are moist       Pharynx: Oropharynx is clear  Eyes:      Extraocular Movements: Extraocular movements intact        Conjunctiva/sclera: Conjunctivae normal       Pupils: Pupils are equal, round, and reactive to light  Cardiovascular:      Rate and Rhythm: Normal rate and regular rhythm  Pulses: Normal pulses  Heart sounds: Normal heart sounds  Pulmonary:      Effort: Pulmonary effort is normal       Breath sounds: Normal breath sounds  Abdominal:      General: Bowel sounds are normal       Palpations: Abdomen is soft  Musculoskeletal:         General: Normal range of motion  Cervical back: Normal range of motion  Skin:     General: Skin is warm  Neurological:      General: No focal deficit present  Mental Status: He is alert and oriented to person, place, and time  Mental status is at baseline  Psychiatric:         Mood and Affect: Mood normal          Behavior: Behavior normal          Thought Content:  Thought content normal          Judgment: Judgment normal        KELSEY Davila

## 2023-03-02 DIAGNOSIS — T14.8XXA SURGICAL WOUND PRESENT: Primary | ICD-10-CM

## 2023-03-02 RX ORDER — BISMUTH TRIBROMOPH/PETROLATUM 4" X 4"
1 BANDAGE TOPICAL DAILY
Qty: 30 EACH | Refills: 2 | Status: SHIPPED | OUTPATIENT
Start: 2023-03-02

## 2023-04-04 ENCOUNTER — OFFICE VISIT (OUTPATIENT)
Dept: PLASTIC SURGERY | Facility: CLINIC | Age: 68
End: 2023-04-04

## 2023-04-04 DIAGNOSIS — K43.6 STRANGULATED HERNIA OF ABDOMINAL WALL: Primary | ICD-10-CM

## 2023-04-04 DIAGNOSIS — T14.8XXA SURGICAL WOUND PRESENT: ICD-10-CM

## 2023-04-04 NOTE — PROGRESS NOTES
"POST-OP EVALUATION  4/4/2023    Maikel Beverly is a 80 y o  male is here today for routine post-operative follow up  He is status post surgical preparation of an abdominal wound with split-thickness skin graft to abdomen and VAC placement by Dr Rafaela Ling on 11/11/2023           Past Medical History:   Diagnosis Date   • Anemia 09/16/2022    pt unsure of this   • Cataract    • Chronic kidney disease    • Colon polyp     1/9/23 Benign polyp   • Dental crowns present    • Encounter for management of wound VAC     1/9/23 wound vac in place   • History of COVID-19 05/2022    per pt mild symptoms -recovered at home-   • History of pneumonia as a child    • Hypertension    • Kidney stone    • Morbid obesity ([facility] )    • Risk for falls     1/9/23 Uses walker   • Sepsis ([facility] )     pt unsure of this   • Shortness of breath     per pt \"gets winded when walking with walker and wound vac around the house\"   • Tooth missing    • Urinary tract infection without hematuria 04/03/2020   • Uses walker    • Wears glasses    • Weight loss     per pt since 1/2022 approx 80lb weight loss-had been 410lbs   • Wound of abdomen     wound vac in place-receives homecare 3x/week     Past Surgical History:   Procedure Laterality Date   • ABDOMINAL ADHESION SURGERY N/A 09/15/2022    Procedure: LYSIS ADHESIONS;  Surgeon: Nithin Doshi MD;  [facility]: [facility];  Service: General   • APPENDECTOMY     • COLONOSCOPY     • CRYOABLATION Left     1/9/23 Left kidney - had cyroablation of left kidney   • EXPLORATORY LAPAROTOMY W/ BOWEL RESECTION N/A 09/14/2022    Procedure: Exdploratory laparotomy, complex Lysis of adhesions, reduction strangulated hernia, debridement RLQ muscle with counter incision, abthera placement;  Surgeon: Nithin Doshi MD;  Location: [facility];  Service: General   • EXPLORATORY LAPAROTOMY W/ BOWEL RESECTION N/A 09/15/2022    Procedure: LAPAROTOMY EXPLORATORY  REPAIR RIGHT LQ HERNIA WITH MESH, REPAIR ANTERIOR WALL " HERNIA;  Surgeon: Gabino Israel MD;  [facility];  Service: General   • FL RETROGRADE PYELOGRAM  11/15/2022   • FL RETROGRADE PYELOGRAM  01/03/2023   • HERNIA REPAIR      per pt there is Mesh implanted   • NASAL SEPTUM SURGERY     • IA CYSTO BLADDER W/URETERAL CATHETERIZATION Right 11/15/2022    Procedure: CYSTOSCOPY RETROGRADE PYELOGRAM WITH INSERTION STENT URETERAL;  Surgeon: Emily Shields MD;  [facility];  Service: Urology   • IA CYSTO/URETERO W/LITHOTRIPSY &INDWELL STENT INSRT Right 01/03/2023    Procedure: CYSTO, U-SCOPE, LASER, RETROGRADE, STENT;  Surgeon: Meredith Steele MD;  [facility];  Service: Urology   • IA SPLIT AGRFT T/A/L 1ST 100 CM/&/1% BDY INFT/CHLD N/A 1/11/2023    Procedure: SKIN GRAFT SPLIT THICKNESS ABDOMINAL WOUND WITH VAC PLACEMENT;  Surgeon: Chaparro Potter MD;  Location: [address] [place] [address] MAIN OR;  Service: Plastics   • TONSILLECTOMY     • WISDOM TOOTH EXTRACTION     • WOUND DEBRIDEMENT N/A 10/11/2022    Procedure: Excisional debridement of abdominal wall nonviable tissue  Skin and subcu tissues were dissected  The wound measured 23 x 18 cm x 4 cm deep ;  Surgeon: Petra Devlin MD;  Location: [facility];  Service: General        Current Outpatient Medications:   •  acetaminophen (TYLENOL) 500 mg tablet, Take 500 mg by mouth every 6 (six) hours as needed for mild pain, Disp: , Rfl:   •  allopurinol (ZYLOPRIM) 100 mg tablet, Take 100 mg by mouth daily 1/9/23 Per pt takes at 4pm in the afternoon  , Disp: , Rfl:   •  apixaban (ELIQUIS) 5 mg, Take 1 tablet (5 mg total) by mouth 2 (two) times a day Do not start before January 13, 2023   (Patient not taking: Reported on 2/21/2023), Disp: 180 tablet, Rfl: 0  •  Bismuth Tribromoph-Petrolatum (Xeroform Occlusive Gauze Patch) PADS, Apply 1 patch topically in the morning, Disp: 30 each, Rfl: 2  •  furosemide (LASIX) 20 mg tablet, Take 1 tablet (20 mg total) by mouth daily For swelling (Patient taking differently: Take 20 mg by mouth daily For swelling, per pt takes @ 4pm), Disp: 90 tablet, Rfl: 3  •  gabapentin (NEURONTIN) 100 mg capsule, Take 1 capsule (100 mg total) by mouth 3 (three) times a day for 5 days, Disp: 15 capsule, Rfl: 0  •  metoprolol succinate (TOPROL-XL) 100 mg 24 hr tablet, Take 1 tablet (100 mg total) by mouth daily (Patient taking differently: Take 100 mg by mouth every evening), Disp: 90 tablet, Rfl: 3  •  Potassium Citrate ER 15 MEQ (1620 MG) TBCR, Take 1 tablet by mouth daily 1/9/23 Per pt - takes at 4pm daily, Disp: , Rfl:   •  tamsulosin (FLOMAX) 0 4 mg, 1-2 daily as directed (Patient taking differently: daily with dinner 1-2 daily as directed, 1/9/23 Per pt taking one tab at this time  at 4pm), Disp: 180 capsule, Rfl: 3  •  TRAVATAN Z 0 004 % ophthalmic solution, Administer 1 drop to both eyes daily at bedtime , Disp: , Rfl:   Allergies: Codeine    Objective      Open wound on the left edge of the skin graft  There is some foreign material in the wound with epithelium overgrowing  It looks like gauze  Underneath this material is healthy epithelial? tissue  I debrided this where it was visible  (see media pic)    There is another open wound at the 7 oclock position, with granulation tissue in the base  Assessment/Plan   Diagnoses and all orders for this visit:    Strangulated hernia of abdominal wall    Surgical wound present    Wounds were dressed with promogran nabil, covered with silicone border dressing  Change every 2 days, wash wounds, pat dry and reapply  Followup in 1 week      Nishant Voss PA-C

## 2023-04-06 ENCOUNTER — HOSPITAL ENCOUNTER (OUTPATIENT)
Dept: ULTRASOUND IMAGING | Facility: HOSPITAL | Age: 68
End: 2023-04-06

## 2023-04-26 ENCOUNTER — RA CDI HCC (OUTPATIENT)
Dept: OTHER | Facility: HOSPITAL | Age: 68
End: 2023-04-26

## 2023-04-28 ENCOUNTER — OFFICE VISIT (OUTPATIENT)
Dept: PLASTIC SURGERY | Facility: CLINIC | Age: 68
End: 2023-04-28

## 2023-04-28 DIAGNOSIS — T14.8XXA SURGICAL WOUND PRESENT: Primary | ICD-10-CM

## 2023-04-28 DIAGNOSIS — T81.89XD NON-HEALING SURGICAL WOUND, SUBSEQUENT ENCOUNTER: ICD-10-CM

## 2023-04-28 NOTE — PROGRESS NOTES
Assessment/Plan:     Patient is a 69-year-old male who is status post surgical preparation of an abdominal wound with split-thickness skin graft to abdomen and VAC placement by Dr Gabriel Brooks on 11/11/2023Gregorio Poole see HPI  Returns to the office today for wound check  Again, there is a meshlike substance noted within the left lateral portion of the patient's abdominal skin graft  Discussed with Dr Gabriel Brooks, who states that this is absorbable mesh  Continue with local wound care to the area with Xeroform and bandage  Patient will return to the office in approximately 6 weeks for a wound check  Diagnoses and all orders for this visit:    Surgical wound present    Non-healing surgical wound, subsequent encounter          Subjective:     Patient ID: Mila Greco is a 80 y o  male  HPI     Patient reports continued nonhealing of his abdominal skin graft wound  This is improved from previous  Please see photo in media  Review of Systems    See HPI    Objective:     Physical Exam      Superficial, none tunneling wounds of patient's left lateral edge of skin graft  There is additional small round, opening in the inferior portion of the skin graft  Meshlike substance noted on the left lateral edge  Please see photos in media

## 2023-05-03 ENCOUNTER — OFFICE VISIT (OUTPATIENT)
Dept: FAMILY MEDICINE CLINIC | Facility: CLINIC | Age: 68
End: 2023-05-03

## 2023-05-03 VITALS
SYSTOLIC BLOOD PRESSURE: 128 MMHG | HEART RATE: 67 BPM | TEMPERATURE: 97.2 F | OXYGEN SATURATION: 96 % | HEIGHT: 70 IN | WEIGHT: 315 LBS | DIASTOLIC BLOOD PRESSURE: 60 MMHG | BODY MASS INDEX: 45.1 KG/M2

## 2023-05-03 DIAGNOSIS — I10 ESSENTIAL HYPERTENSION: Primary | ICD-10-CM

## 2023-05-03 DIAGNOSIS — E66.01 OBESITY, CLASS III, BMI 40-49.9 (MORBID OBESITY) (HCC): ICD-10-CM

## 2023-05-03 DIAGNOSIS — N18.31 STAGE 3A CHRONIC KIDNEY DISEASE (HCC): ICD-10-CM

## 2023-05-03 DIAGNOSIS — M10.9 GOUT, UNSPECIFIED CAUSE, UNSPECIFIED CHRONICITY, UNSPECIFIED SITE: ICD-10-CM

## 2023-05-03 DIAGNOSIS — T81.89XD NON-HEALING SURGICAL WOUND, SUBSEQUENT ENCOUNTER: ICD-10-CM

## 2023-05-03 DIAGNOSIS — G60.3 IDIOPATHIC PROGRESSIVE NEUROPATHY: ICD-10-CM

## 2023-05-03 DIAGNOSIS — T14.8XXA SURGICAL WOUND PRESENT: ICD-10-CM

## 2023-05-03 DIAGNOSIS — I26.99 PULMONARY EMBOLISM, UNSPECIFIED CHRONICITY, UNSPECIFIED PULMONARY EMBOLISM TYPE, UNSPECIFIED WHETHER ACUTE COR PULMONALE PRESENT (HCC): ICD-10-CM

## 2023-05-03 DIAGNOSIS — N20.0 KIDNEY STONES: ICD-10-CM

## 2023-05-03 PROBLEM — G57.93 NEUROPATHY OF BOTH FEET: Chronic | Status: ACTIVE | Noted: 2023-05-03

## 2023-05-03 RX ORDER — GABAPENTIN 100 MG/1
200 CAPSULE ORAL 3 TIMES DAILY
Qty: 540 CAPSULE | Refills: 3 | Status: SHIPPED | OUTPATIENT
Start: 2023-05-03

## 2023-05-08 ENCOUNTER — TELEPHONE (OUTPATIENT)
Dept: FAMILY MEDICINE CLINIC | Facility: CLINIC | Age: 68
End: 2023-05-08

## 2023-05-10 ENCOUNTER — TRANSCRIBE ORDERS (OUTPATIENT)
Dept: FAMILY MEDICINE CLINIC | Facility: CLINIC | Age: 68
End: 2023-05-10

## 2023-05-10 DIAGNOSIS — I89.0 LYMPHEDEMA OF BOTH LOWER EXTREMITIES: ICD-10-CM

## 2023-05-10 RX ORDER — NYSTATIN 100000 [USP'U]/G
POWDER TOPICAL
Qty: 180 G | Refills: 3 | Status: SHIPPED | OUTPATIENT
Start: 2023-05-10

## 2023-05-11 ENCOUNTER — TRANSCRIBE ORDERS (OUTPATIENT)
Dept: FAMILY MEDICINE CLINIC | Facility: CLINIC | Age: 68
End: 2023-05-11

## 2023-05-11 ENCOUNTER — TELEPHONE (OUTPATIENT)
Dept: FAMILY MEDICINE CLINIC | Facility: CLINIC | Age: 68
End: 2023-05-11

## 2023-05-11 DIAGNOSIS — T14.8XXA SURGICAL WOUND PRESENT: ICD-10-CM

## 2023-05-11 RX ORDER — GABAPENTIN 100 MG/1
200 CAPSULE ORAL 3 TIMES DAILY
Qty: 540 CAPSULE | Refills: 3 | Status: SHIPPED | OUTPATIENT
Start: 2023-05-11

## 2023-06-02 ENCOUNTER — OFFICE VISIT (OUTPATIENT)
Dept: PLASTIC SURGERY | Facility: CLINIC | Age: 68
End: 2023-06-02

## 2023-06-02 DIAGNOSIS — T81.89XD NON-HEALING SURGICAL WOUND, SUBSEQUENT ENCOUNTER: Primary | ICD-10-CM

## 2023-06-02 NOTE — PROGRESS NOTES
Assessment/Plan:     Patient is a 61-year-old male who is status post surgical preparation of an abdominal wound with split-thickness skin graft to abdomen and VAC placement by Dr Chase Patrick on 11/11/2023Mayelin Grier see HPI  Patient returns to the office today for graft/wound check  The patient continues to have superficial open wounds of the left abdomen, and 1 hyper granulated area of the lower abdomen  Left abdomen was debrided as able of visible dissolvable mesh  Lower abdominal wound was cauterized with silver nitrate  Patient will apply bacitracin to the open areas for the next 2 to 3 days, then may switch to Aquaphor  He may leave the areas open to air when he is at home  He will return to our office in approximately 2 weeks for a wound check  Diagnoses and all orders for this visit:    Non-healing surgical wound, subsequent encounter          Subjective:     Patient ID: Alessio Wright is a 80 y o  male  HPI     Patient presents to the office today with his wife  They are frustrated that wound has not completely healed  I reassured the patient that the areas are open superficially only  The patient does have dissolvable mesh that continues to become more superficial, which interrupts normal skin healing  Patient and his wife voiced understanding  Review of Systems    See HPI    Objective:     Physical Exam      Superficial open wounds of the abdomen as described above  No tracking/tunneling  No erythema, purulence, cellulitis  Please see photos in media

## 2023-06-12 DIAGNOSIS — I10 ESSENTIAL HYPERTENSION: ICD-10-CM

## 2023-06-13 DIAGNOSIS — I10 ESSENTIAL HYPERTENSION: ICD-10-CM

## 2023-06-13 RX ORDER — METOPROLOL SUCCINATE 100 MG/1
100 TABLET, EXTENDED RELEASE ORAL DAILY
Qty: 15 TABLET | Refills: 0 | OUTPATIENT
Start: 2023-06-13

## 2023-06-13 RX ORDER — METOPROLOL SUCCINATE 100 MG/1
TABLET, EXTENDED RELEASE ORAL
Qty: 90 TABLET | Refills: 3 | Status: SHIPPED | OUTPATIENT
Start: 2023-06-13 | End: 2023-06-14 | Stop reason: SDUPTHER

## 2023-06-14 RX ORDER — METOPROLOL SUCCINATE 100 MG/1
100 TABLET, EXTENDED RELEASE ORAL DAILY
Qty: 30 TABLET | Refills: 1 | Status: SHIPPED | OUTPATIENT
Start: 2023-06-14

## 2023-06-19 ENCOUNTER — OFFICE VISIT (OUTPATIENT)
Dept: PLASTIC SURGERY | Facility: CLINIC | Age: 68
End: 2023-06-19
Payer: MEDICARE

## 2023-06-19 DIAGNOSIS — T81.89XD NON-HEALING SURGICAL WOUND, SUBSEQUENT ENCOUNTER: Primary | ICD-10-CM

## 2023-06-19 DIAGNOSIS — T14.8XXA SURGICAL WOUND PRESENT: ICD-10-CM

## 2023-06-19 PROCEDURE — 99211 OFF/OP EST MAY X REQ PHY/QHP: CPT | Performed by: PHYSICIAN ASSISTANT

## 2023-06-19 RX ORDER — GABAPENTIN 100 MG/1
200 CAPSULE ORAL 3 TIMES DAILY
Qty: 540 CAPSULE | Refills: 3 | Status: SHIPPED | OUTPATIENT
Start: 2023-06-19

## 2023-06-19 NOTE — PROGRESS NOTES
Assessment/Plan:     Patient is a 71-year-old male who is status post surgical preparation of an abdominal wound with split-thickness skin graft to abdomen and VAC placement by Dr Ramila Roa on 11/11/2023  Patient's postoperative course has been complicated by visible dissolvable mesh preventing wound healing   Please see HPI  Patient returns to the office today for wound check  He continues to have small pieces of visible dissolvable mesh which removed today  The patient will return to the office in 2 weeks for an additional wound check  There are no diagnoses linked to this encounter  Subjective:     Patient ID: Celso Diaz is a 80 y o  male  HPI     Patient reports no new issues or concerns  Review of Systems    See HPI    Objective:     Physical Exam      Wound continues to have small pieces of visible mesh noted  Decreased in size from previous visit  Small area of nonhealing on the inferior, medial portion of the skin graft  Unchanged from previous

## 2023-07-06 DIAGNOSIS — I10 ESSENTIAL HYPERTENSION: ICD-10-CM

## 2023-07-06 RX ORDER — METOPROLOL SUCCINATE 100 MG/1
TABLET, EXTENDED RELEASE ORAL
Qty: 90 TABLET | Refills: 1 | Status: SHIPPED | OUTPATIENT
Start: 2023-07-06

## 2023-07-07 ENCOUNTER — OFFICE VISIT (OUTPATIENT)
Dept: PLASTIC SURGERY | Facility: CLINIC | Age: 68
End: 2023-07-07

## 2023-07-07 DIAGNOSIS — T81.89XD NON-HEALING SURGICAL WOUND, SUBSEQUENT ENCOUNTER: Primary | ICD-10-CM

## 2023-07-07 NOTE — PROGRESS NOTES
Assessment/Plan:     Patient is a 52-year-old male who is status post surgical preparation of an abdominal wound with split-thickness skin graft to abdomen and VAC placement by Dr. Debi Denson on 11/11/2023. Patient's postoperative course has been complicated by visible dissolvable mesh preventing wound healing.  Please see HPI. Patient returns to the office today for a wound check. Patient continues to have some hypergranulation tissue on his left abdomen, and visible dissolvable mesh on the left portion of the abdomen. This was gently debrided today. Discussed with patient, at this time there is no further surgical intervention required. Patient will follow-up with wound care for continued wound management of abdominal wounds. He will call the office with any questions or concerns and follow-up with Plastic and Reconstructive Surgery as needed. Diagnoses and all orders for this visit:    Non-healing surgical wound, subsequent encounter          Subjective:     Patient ID: Jarrett Rodriguez is a 80 y.o. male. HPI     Patient reports continued slow progress of wound healing on his abdomen. Review of Systems    See HPI     Objective:     Physical Exam      Small area of hypergranulation tissue on the right abdomen, superficial, wounds with visible dissolvable mesh on left abdomen. Please see photo in media.

## 2023-07-17 ENCOUNTER — TELEPHONE (OUTPATIENT)
Dept: PLASTIC SURGERY | Facility: CLINIC | Age: 68
End: 2023-07-17

## 2023-07-17 DIAGNOSIS — T81.89XD NON-HEALING SURGICAL WOUND, SUBSEQUENT ENCOUNTER: Primary | ICD-10-CM

## 2023-07-17 DIAGNOSIS — T14.8XXA SURGICAL WOUND PRESENT: ICD-10-CM

## 2023-07-17 NOTE — TELEPHONE ENCOUNTER
Pts wife called stating she has tried contacting wound care but has been unsuccessful. She said that when she calls the recording says to contact the office they wish to contact directly but the only number she has is [phone]. I don't see a referral for wound care but if someone can please call her to answer wound care questions she has.

## 2023-07-27 ENCOUNTER — RA CDI HCC (OUTPATIENT)
Dept: OTHER | Facility: HOSPITAL | Age: 68
End: 2023-07-27

## 2023-07-27 NOTE — PROGRESS NOTES
[address] [facility] coding opportunities       Chart reviewed, no opportunity found: CHART REVIEWED, NO OPPORTUNITY FOUND        Patients Insurance     Medicare Insurance: Medicare

## 2023-07-28 ENCOUNTER — APPOINTMENT (OUTPATIENT)
Dept: LAB | Facility: HOSPITAL | Age: 68
End: 2023-07-28
Payer: MEDICARE

## 2023-07-28 ENCOUNTER — OFFICE VISIT (OUTPATIENT)
Dept: WOUND CARE | Facility: CLINIC | Age: 68
End: 2023-07-28
Payer: MEDICARE

## 2023-07-28 VITALS
RESPIRATION RATE: 18 BRPM | HEART RATE: 72 BPM | TEMPERATURE: 97.5 F | SYSTOLIC BLOOD PRESSURE: 130 MMHG | DIASTOLIC BLOOD PRESSURE: 70 MMHG

## 2023-07-28 DIAGNOSIS — G60.3 IDIOPATHIC PROGRESSIVE NEUROPATHY: ICD-10-CM

## 2023-07-28 DIAGNOSIS — T81.89XA NON-HEALING SURGICAL WOUND, INITIAL ENCOUNTER: Primary | ICD-10-CM

## 2023-07-28 DIAGNOSIS — N20.0 NEPHROLITHIASIS: ICD-10-CM

## 2023-07-28 DIAGNOSIS — N18.31 STAGE 3A CHRONIC KIDNEY DISEASE (HCC): ICD-10-CM

## 2023-07-28 DIAGNOSIS — D64.9 ANEMIA, UNSPECIFIED TYPE: ICD-10-CM

## 2023-07-28 DIAGNOSIS — M10.9 GOUT, UNSPECIFIED CAUSE, UNSPECIFIED CHRONICITY, UNSPECIFIED SITE: ICD-10-CM

## 2023-07-28 DIAGNOSIS — I10 ESSENTIAL HYPERTENSION: ICD-10-CM

## 2023-07-28 DIAGNOSIS — E66.01 OBESITY, CLASS III, BMI 40-49.9 (MORBID OBESITY) (HCC): ICD-10-CM

## 2023-07-28 LAB
ALBUMIN SERPL BCP-MCNC: 4.1 G/DL (ref 3.5–5)
ALP SERPL-CCNC: 77 U/L (ref 34–104)
ALT SERPL W P-5'-P-CCNC: 12 U/L (ref 7–52)
ANION GAP SERPL CALCULATED.3IONS-SCNC: 6 MMOL/L
AST SERPL W P-5'-P-CCNC: 11 U/L (ref 13–39)
BASOPHILS # BLD AUTO: 0.05 THOUSANDS/ÂΜL (ref 0–0.1)
BASOPHILS NFR BLD AUTO: 1 % (ref 0–1)
BILIRUB SERPL-MCNC: 0.63 MG/DL (ref 0.2–1)
BUN SERPL-MCNC: 22 MG/DL (ref 5–25)
CALCIUM SERPL-MCNC: 9.4 MG/DL (ref 8.4–10.2)
CHLORIDE SERPL-SCNC: 105 MMOL/L (ref 96–108)
CO2 SERPL-SCNC: 29 MMOL/L (ref 21–32)
CREAT SERPL-MCNC: 1.22 MG/DL (ref 0.6–1.3)
EOSINOPHIL # BLD AUTO: 0.25 THOUSAND/ÂΜL (ref 0–0.61)
EOSINOPHIL NFR BLD AUTO: 3 % (ref 0–6)
ERYTHROCYTE [DISTWIDTH] IN BLOOD BY AUTOMATED COUNT: 15.7 % (ref 11.6–15.1)
FERRITIN SERPL-MCNC: 10 NG/ML (ref 24–336)
GFR SERPL CREATININE-BSD FRML MDRD: 60 ML/MIN/1.73SQ M
GLUCOSE P FAST SERPL-MCNC: 106 MG/DL (ref 65–99)
HCT VFR BLD AUTO: 39.1 % (ref 36.5–49.3)
HGB BLD-MCNC: 12.1 G/DL (ref 12–17)
IMM GRANULOCYTES # BLD AUTO: 0.03 THOUSAND/UL (ref 0–0.2)
IMM GRANULOCYTES NFR BLD AUTO: 0 % (ref 0–2)
LYMPHOCYTES # BLD AUTO: 1.62 THOUSANDS/ÂΜL (ref 0.6–4.47)
LYMPHOCYTES NFR BLD AUTO: 21 % (ref 14–44)
MCH RBC QN AUTO: 27.3 PG (ref 26.8–34.3)
MCHC RBC AUTO-ENTMCNC: 30.9 G/DL (ref 31.4–37.4)
MCV RBC AUTO: 88 FL (ref 82–98)
MONOCYTES # BLD AUTO: 0.68 THOUSAND/ÂΜL (ref 0.17–1.22)
MONOCYTES NFR BLD AUTO: 9 % (ref 4–12)
NEUTROPHILS # BLD AUTO: 4.94 THOUSANDS/ÂΜL (ref 1.85–7.62)
NEUTS SEG NFR BLD AUTO: 66 % (ref 43–75)
NRBC BLD AUTO-RTO: 0 /100 WBCS
PLATELET # BLD AUTO: 263 THOUSANDS/UL (ref 149–390)
PMV BLD AUTO: 9.4 FL (ref 8.9–12.7)
POTASSIUM SERPL-SCNC: 4.4 MMOL/L (ref 3.5–5.3)
PROT SERPL-MCNC: 7.3 G/DL (ref 6.4–8.4)
PTH-INTACT SERPL-MCNC: 70.4 PG/ML (ref 12–88)
RBC # BLD AUTO: 4.44 MILLION/UL (ref 3.88–5.62)
SODIUM SERPL-SCNC: 140 MMOL/L (ref 135–147)
TSH SERPL DL<=0.05 MIU/L-ACNC: 2.46 UIU/ML (ref 0.45–4.5)
URATE SERPL-MCNC: 7.5 MG/DL (ref 3.5–8.5)
VIT B12 SERPL-MCNC: 113 PG/ML (ref 180–914)
WBC # BLD AUTO: 7.57 THOUSAND/UL (ref 4.31–10.16)

## 2023-07-28 PROCEDURE — 82728 ASSAY OF FERRITIN: CPT

## 2023-07-28 PROCEDURE — 84443 ASSAY THYROID STIM HORMONE: CPT

## 2023-07-28 PROCEDURE — 11042 DBRDMT SUBQ TIS 1ST 20SQCM/<: CPT | Performed by: SURGERY

## 2023-07-28 PROCEDURE — 36415 COLL VENOUS BLD VENIPUNCTURE: CPT

## 2023-07-28 PROCEDURE — 80053 COMPREHEN METABOLIC PANEL: CPT

## 2023-07-28 PROCEDURE — 84550 ASSAY OF BLOOD/URIC ACID: CPT

## 2023-07-28 PROCEDURE — 99214 OFFICE O/P EST MOD 30 MIN: CPT | Performed by: SURGERY

## 2023-07-28 PROCEDURE — 85025 COMPLETE CBC W/AUTO DIFF WBC: CPT

## 2023-07-28 PROCEDURE — 82607 VITAMIN B-12: CPT

## 2023-07-28 PROCEDURE — 83970 ASSAY OF PARATHORMONE: CPT

## 2023-07-28 RX ORDER — LIDOCAINE 40 MG/G
CREAM TOPICAL ONCE
Status: COMPLETED | OUTPATIENT
Start: 2023-07-28 | End: 2023-07-28

## 2023-07-28 RX ADMIN — LIDOCAINE: 40 CREAM TOPICAL at 09:59

## 2023-07-28 NOTE — PATIENT INSTRUCTIONS
Orders Placed This Encounter   Procedures    Wound cleansing and dressings     Wash your hands with soap and water. Remove old dressing, discard into plastic bag and place in trash. Cleanse the wound with mild soap(Dove) and water prior to applying a clean dressing. Do not use tissue or cotton balls. Do not scrub the wound. Pat dry using gauze. Shower yes, on the days you change the dressing. Abdominal Wounds:   Apply dermagran to the wound beds. Cover with dry gauze. Secure with paper tape or medfix tape. Change dressing three times a week. Follow up at the wound center in two weeks. Treatment today: Cleansed with NSS, and dressed as above.      Standing Status:   Future     Standing Expiration Date:   7/28/2024

## 2023-07-28 NOTE — LETTER
August 4, 2023     Stephanie Mustafa MD  [address] [facility]  [address] [facility] [address] [address]    Patient: Antelmo Michael   YOB: 1955   Date of Visit: 7/28/2023       Dear Dr. Alexis Henley:    Thank you for referring Miriam Wilson to me for evaluation. Below are my notes for this consultation. If you have questions, please do not hesitate to call me. I look forward to following your patient along with you. Sincerely,        Titi Oro MD        CC: COURTNEY Mcclure MD  8/4/2023  8:49 AM  Sign when Signing Visit  Patient ID: Antelmo Michael is a 80 y.o. male Date of Birth 1955     Chief Complaint  Chief Complaint   Patient presents with   • New Patient Visit     Abdominal wounds, 3 months, bacitracin, no dressing in place today       Allergies  Codeine    Assessment:    Nonhealing surgical wound  A few open areas are mostly related to residual mesh at the base of the wound. The mesh was aggressively pulled out and debrided as best as possible with scissors. Any nonviable overlying epithelium was also debrided with a curette. We will have him follow-up in 2 weeks for evaluation. Diagnoses and all orders for this visit:    Non-healing surgical wound, initial encounter  -     lidocaine (LMX) 4 % cream  -     Cancel: Wound cleansing and dressings; Future  -     Wound cleansing and dressings; Future    Obesity, Class III, BMI 40-49.9 (morbid obesity) ([address])    Other orders  -     Debridement  -     Debridement             Debridement   Wound 07/28/23 Surgical Abdomen Left; Lower    Universal Protocol:  Consent given by: patient  Time out: Immediately prior to procedure a "time out" was called to verify the correct patient, procedure, equipment, support staff and site/side marked as required.     Performed by: physician  Debridement type: surgical  Level of debridement: subcutaneous tissue  Pain control: lidocaine 4%  Post-debridement measurements  Length (cm): 6. 3  Width (cm): 3  Depth (cm): 0.1  Percent debrided: 75%  Surface Area (cm^2): 18.9  Area debrided (cm^2): 14.18  Volume (cm^3): 1.89  Tissue and other material debrided: subcutaneous tissue  Devitalized tissue debrided: biofilm and slough  Instrument(s) utilized: curette  Procedural pain (0-10): 1  Post-procedural pain: 1   Response to treatment: procedure was tolerated well    Debridement   Wound 07/28/23 Surgical Abdomen Lower;Right    Universal Protocol:  Consent given by: patient  Time out: Immediately prior to procedure a "time out" was called to verify the correct patient, procedure, equipment, support staff and site/side marked as required. Performed by: physician  Debridement type: surgical  Level of debridement: subcutaneous tissue  Pain control: lidocaine 4%  Post-debridement measurements  Length (cm): 1  Width (cm): 0.6  Depth (cm): 0.1  Percent debrided: 100%  Surface Area (cm^2): 0.6  Area debrided (cm^2): 0.6  Volume (cm^3): 0.06  Tissue and other material debrided: subcutaneous tissue  Devitalized tissue debrided: biofilm and slough  Instrument(s) utilized: curette  Procedural pain (0-10): 1  Post-procedural pain: 1   Response to treatment: procedure was tolerated well          Plan:     Wound 07/28/23 Surgical Abdomen Lower;Right (Active)   Wound Image Images linked 07/28/23 0949   Wound Description Pink;Dry 07/28/23 0924   Lis-wound Assessment Intact;Scar Tissue;Dry 07/28/23 0924   Wound Length (cm) 1 cm 07/28/23 0924   Wound Width (cm) 0.6 cm 07/28/23 0924   Wound Depth (cm) 0.1 cm 07/28/23 0924   Wound Surface Area (cm^2) 0.6 cm^2 07/28/23 0924   Wound Volume (cm^3) 0.06 cm^3 07/28/23 0924   Calculated Wound Volume (cm^3) 0.06 cm^3 07/28/23 0924   Drainage Amount Small 07/28/23 0924   Drainage Description Purulent 07/28/23 0924   Treatments Irrigation with NSS 07/28/23 0924       Wound 07/28/23 Surgical Abdomen Left; Lower (Active)   Wound Image Images linked 07/28/23 [id number]   Wound Description Pink;Dry 07/28/23 0925   Lis-wound Assessment Intact;Dry;Scar Tissue 07/28/23 0925   Wound Length (cm) 6.3 cm 07/28/23 0925   Wound Width (cm) 3 cm 07/28/23 0925   Wound Depth (cm) 0.1 cm 07/28/23 0925   Wound Surface Area (cm^2) 18.9 cm^2 07/28/23 0925   Wound Volume (cm^3) 1.89 cm^3 07/28/23 0925   Calculated Wound Volume (cm^3) 1.89 cm^3 07/28/23 0925   Drainage Amount Small 07/28/23 0925   Drainage Description Serous 07/28/23 0925   Treatments Irrigation with NSS 07/28/23 0925       Wound 07/28/23 Surgical Abdomen Mid (Active)   Wound Image Images linked 07/28/23 0954   Wound Description Beefy red 07/28/23 0953   Lis-wound Assessment Intact;Scar Tissue 07/28/23 0953   Wound Length (cm) 8 cm 07/28/23 0953   Wound Width (cm) 0.4 cm 07/28/23 0953   Wound Depth (cm) 0.1 cm 07/28/23 0953   Wound Surface Area (cm^2) 3.2 cm^2 07/28/23 0953   Wound Volume (cm^3) 0.32 cm^3 07/28/23 0953   Calculated Wound Volume (cm^3) 0.32 cm^3 07/28/23 0953   Drainage Amount Small 07/28/23 0953   Drainage Description Bloody 07/28/23 0953   Treatments Irrigation with NSS 07/28/23 0953       Wound 07/28/23 Surgical Abdomen Lower;Right (Active)   Date First Assessed: 07/28/23   Present on Original Admission: Yes  Primary Wound Type: Surgical  Location: Abdomen  Wound Location Orientation: Lower;Right       Wound 07/28/23 Surgical Abdomen Left; Lower (Active)   Date First Assessed: 07/28/23   Present on Original Admission: Yes  Primary Wound Type: Surgical  Location: Abdomen  Wound Location Orientation: Left; Lower       Wound 07/28/23 Surgical Abdomen Mid (Active)   Date First Assessed: 07/28/23   Present on Original Admission: Yes  Primary Wound Type: Surgical  Location: Abdomen  Wound Location Orientation: Mid       [REMOVED] Wound 07/15/22 Other (comment) Abdomen Right; Lower (Removed)   Resolved Date/Resolved Time: 10/14/22 0858  Date First Assessed/Time First Assessed: 07/15/22 1406   Primary Wound Type:  Other (comment) Location: Abdomen  Wound Location Orientation: Right; Lower  Wound Outcome: Other (Comment)       [REMOVED] Wound 09/15/22 Abdomen N/A (Removed)   Resolved Date/Resolved Time: 10/14/22 0826  Date First Assessed/Time First Assessed: 09/15/22 1359   Location: Abdomen  Wound Location Orientation: N/A  Wound Description (Comments): vac dressing-RLQ (1 black sponge, 1 white sponge), midline- surgical... [REMOVED] Wound 09/15/22 Abdomen Lower;Quadrant;Right (Removed)   Resolved Date/Resolved Time: 10/14/22 0827  Date First Assessed: 09/15/22   Location: Abdomen  Wound Location Orientation: Lower;Quadrant;Right  Wound Outcome: Other (Comment)       [REMOVED] Wound 10/11/22 Abdomen N/A (Removed)   Resolved Date/Resolved Time: 10/14/22 0827  Date First Assessed/Time First Assessed: 10/11/22 1545   Location: Abdomen  Wound Location Orientation: N/A  Wound Description (Comments): wound vac 1 incision black sponge 1 incision black sponge one black . .. [REMOVED] Wound 10/14/22 Surgical Abdomen Medial;Lower (Removed)   Resolved Date: 07/28/23  Date First Assessed/Time First Assessed: 10/14/22 0828   Primary Wound Type: Surgical  Location: Abdomen  Wound Location Orientation: Medial;Lower  Dressing Status: Other (Comment)       [REMOVED] Wound 10/14/22 Surgical Abdomen Right; Lower (Removed)   Resolved Date: 07/28/23  Date First Assessed/Time First Assessed: 10/14/22 0829   Primary Wound Type: Surgical  Location: Abdomen  Wound Location Orientation: Right; Lower  Wound Outcome: Other (Comment)       [REMOVED] Wound 11/15/22 Penis Right (Removed)   Resolved Date/Resolved Time: 11/19/22 0218  Date First Assessed/Time First Assessed: 11/15/22 2250   Location: Penis  Wound Location Orientation: Right  Wound Outcome: Healed       [REMOVED] Wound 01/03/23 Penis Right (Removed)   Resolved Date: 07/28/23  Date First Assessed/Time First Assessed: 01/03/23 1735   Location: Penis  Wound Location Orientation: Right  Wound Description (Comments): benzoin, tegaderm   Dressing Status: Other (Comment)       [REMOVED] Wound 01/03/23 Perineum Right (Removed)   Resolved Date: 01/06/23  Date First Assessed/Time First Assessed: 01/03/23 1735   Location: Perineum  Wound Location Orientation: Right  Wound Outcome: Other (Comment)       [REMOVED] Wound 01/11/23 Thigh Left (Removed)   Resolved Date: 07/28/23  Date First Assessed/Time First Assessed: 01/11/23 1152   Location: Thigh  Wound Location Orientation: Left  Wound Description (Comments): PROMOGRAN OSMIN 4.3 X 4.3IN X 9 DRESSINGS  Incision's 1st Dressing: DRESSING OPTILOCK 6... [REMOVED] Wound 01/11/23 Abdomen N/A (Removed)   Resolved Date: 01/27/23  Date First Assessed/Time First Assessed: 01/11/23 1232   Location: Abdomen  Wound Location Orientation: N/A  Wound Description (Comments): WOUND VAC APPLIED USING LARGE DRESSING  Incision's 1st Dressing: DRESSING XEROFORM 5 X . .. Subjective:     . Mr. Bella Montes is a 64-year-old gentleman here for follow-up from hospitalization for large abdominal wound. He he developed an incarcerated right lower quadrant hernia requiring excision debridement of the wound and bridging closure with absorbable mesh. He had a large retention sutures in the abdominal wall but developed necrosis of the abdominal wall. He was readmitted underwent excisional debridement and is here for evaluation. 10/21/2022. Overall doing okay. No significant pain. Having some issues with the VAC dressing but overall working    11/04/2022. Doing well. Trying to increase his protein. Still having significant fatigue episodes. Still going through canisters quickly    11/25/2022. He was hospitalized last week for severe sepsis and hydronephrosis and acute kidney injury from an obstructing ureteral stone and underwent cystoscopy and stent placement. He is at home now and doing well. No other issues    12/9/2022. Doing well.   Was scheduled for urology but got delayed January. Denies fevers or chills    1/6/2023. He has been doing well. He was seen by plastic surgery and is scheduled for skin grafting next week. He also underwent repeat cystoscopy and stent    7/28/2023 he returns now for reevaluation. After being seen here last in January he was seen by plastic surgery he underwent split-thickness skin grafting. The wound has healed since significantly except for a few corners that have not healed so returns for reevaluation. The following portions of the patient's history were reviewed and updated as appropriate: allergies, current medications, past family history, past medical history, past social history, past surgical history and problem list.    Review of Systems   Constitutional: Negative for chills and fever. HENT: Negative for ear pain and sore throat. Eyes: Negative for pain and visual disturbance. Respiratory: Negative for cough and shortness of breath. Cardiovascular: Negative for chest pain and palpitations. Gastrointestinal: Negative for abdominal pain and vomiting. Musculoskeletal: Positive for back pain and gait problem. Negative for arthralgias. Skin: Negative for color change and rash. Neurological: Negative for seizures and syncope. Psychiatric/Behavioral: Negative for agitation and behavioral problems. All other systems reviewed and are negative.        Objective:      Wound 07/28/23 Surgical Abdomen Lower;Right (Active)   Wound Image Images linked 07/28/23 0949   Wound Description Pink;Dry 07/28/23 0924   Lis-wound Assessment Intact;Scar Tissue;Dry 07/28/23 0924   Wound Length (cm) 1 cm 07/28/23 0924   Wound Width (cm) 0.6 cm 07/28/23 0924   Wound Depth (cm) 0.1 cm 07/28/23 0924   Wound Surface Area (cm^2) 0.6 cm^2 07/28/23 0924   Wound Volume (cm^3) 0.06 cm^3 07/28/23 0924   Calculated Wound Volume (cm^3) 0.06 cm^3 07/28/23 0924   Drainage Amount Small 07/28/23 0924   Drainage Description Purulent 07/28/23 0924   Treatments Irrigation with NSS 07/28/23 0924       Wound 07/28/23 Surgical Abdomen Left; Lower (Active)   Wound Image Images linked 07/28/23 0948   Wound Description Pink;Dry 07/28/23 0925   Lis-wound Assessment Intact;Dry;Scar Tissue 07/28/23 0925   Wound Length (cm) 6.3 cm 07/28/23 0925   Wound Width (cm) 3 cm 07/28/23 0925   Wound Depth (cm) 0.1 cm 07/28/23 0925   Wound Surface Area (cm^2) 18.9 cm^2 07/28/23 0925   Wound Volume (cm^3) 1.89 cm^3 07/28/23 0925   Calculated Wound Volume (cm^3) 1.89 cm^3 07/28/23 0925   Drainage Amount Small 07/28/23 0925   Drainage Description Serous 07/28/23 0925   Treatments Irrigation with NSS 07/28/23 0925       Wound 07/28/23 Surgical Abdomen Mid (Active)   Wound Image Images linked 07/28/23 0954   Wound Description Beefy red 07/28/23 0953   Lis-wound Assessment Intact;Scar Tissue 07/28/23 0953   Wound Length (cm) 8 cm 07/28/23 0953   Wound Width (cm) 0.4 cm 07/28/23 0953   Wound Depth (cm) 0.1 cm 07/28/23 0953   Wound Surface Area (cm^2) 3.2 cm^2 07/28/23 0953   Wound Volume (cm^3) 0.32 cm^3 07/28/23 0953   Calculated Wound Volume (cm^3) 0.32 cm^3 07/28/23 0953   Drainage Amount Small 07/28/23 0953   Drainage Description Bloody 07/28/23 0953   Treatments Irrigation with NSS 07/28/23 0953       /70   Pulse 72   Temp 97.5 °F (36.4 °C)   Resp 18     Physical Exam  Vitals and nursing note reviewed. Constitutional:       General: He is not in acute distress. Appearance: He is well-developed. He is obese. He is not diaphoretic. HENT:      Head: Normocephalic and atraumatic. Eyes:      Pupils: Pupils are equal, round, and reactive to light. Cardiovascular:      Rate and Rhythm: Normal rate and regular rhythm. Pulmonary:      Effort: Pulmonary effort is normal. No respiratory distress. Breath sounds: Normal breath sounds. Abdominal:      Palpations: Abdomen is soft. Comments: Obese abdomen.   See complete wound assessment for measurements of abdominal wounds Musculoskeletal:         General: Normal range of motion. Cervical back: Normal range of motion and neck supple. Skin:     General: Skin is warm and dry. Neurological:      Mental Status: He is alert and oriented to person, place, and time. Psychiatric:         Behavior: Behavior normal.          Wound Instructions:  Orders Placed This Encounter   Procedures   • Wound cleansing and dressings     Wash your hands with soap and water. Remove old dressing, discard into plastic bag and place in trash. Cleanse the wound with mild soap(Dove) and water prior to applying a clean dressing. Do not use tissue or cotton balls. Do not scrub the wound. Pat dry using gauze. Shower yes, on the days you change the dressing. Abdominal Wounds:   Apply dermagran to the wound beds. Cover with dry gauze. Secure with paper tape or medfix tape. Change dressing three times a week. Follow up at the wound center in two weeks. Treatment today: Cleansed with NSS, and dressed as above. Standing Status:   Future     Standing Expiration Date:   7/28/2024   • Debridement     This order was created via procedure documentation   • Debridement     This order was created via procedure documentation        Diagnosis ICD-10-CM Associated Orders   1. Non-healing surgical wound, initial encounter  T81.89XA lidocaine (LMX) 4 % cream     Wound cleansing and dressings      2.  Obesity, Class III, BMI 40-49.9 (morbid obesity) ([facility])  E66.01

## 2023-07-29 ENCOUNTER — TRANSCRIBE ORDERS (OUTPATIENT)
Dept: FAMILY MEDICINE CLINIC | Facility: CLINIC | Age: 68
End: 2023-07-29

## 2023-07-29 DIAGNOSIS — E53.8 VITAMIN B 12 DEFICIENCY: Primary | ICD-10-CM

## 2023-07-31 DIAGNOSIS — E53.8 VITAMIN B 12 DEFICIENCY: ICD-10-CM

## 2023-08-03 ENCOUNTER — OFFICE VISIT (OUTPATIENT)
Dept: FAMILY MEDICINE CLINIC | Facility: CLINIC | Age: 68
End: 2023-08-03
Payer: MEDICARE

## 2023-08-03 VITALS
HEIGHT: 70 IN | HEART RATE: 60 BPM | BODY MASS INDEX: 45.1 KG/M2 | SYSTOLIC BLOOD PRESSURE: 140 MMHG | WEIGHT: 315 LBS | DIASTOLIC BLOOD PRESSURE: 80 MMHG | OXYGEN SATURATION: 97 % | TEMPERATURE: 97.7 F

## 2023-08-03 DIAGNOSIS — G57.93 NEUROPATHY OF BOTH FEET: Chronic | ICD-10-CM

## 2023-08-03 DIAGNOSIS — I26.99 PULMONARY EMBOLISM, UNSPECIFIED CHRONICITY, UNSPECIFIED PULMONARY EMBOLISM TYPE, UNSPECIFIED WHETHER ACUTE COR PULMONALE PRESENT (HCC): ICD-10-CM

## 2023-08-03 DIAGNOSIS — D64.9 ANEMIA, UNSPECIFIED TYPE: ICD-10-CM

## 2023-08-03 DIAGNOSIS — I10 ESSENTIAL HYPERTENSION: Primary | ICD-10-CM

## 2023-08-03 DIAGNOSIS — N20.0 KIDNEY STONES: ICD-10-CM

## 2023-08-03 DIAGNOSIS — N18.31 STAGE 3A CHRONIC KIDNEY DISEASE (HCC): ICD-10-CM

## 2023-08-03 DIAGNOSIS — M10.9 GOUT, UNSPECIFIED CAUSE, UNSPECIFIED CHRONICITY, UNSPECIFIED SITE: ICD-10-CM

## 2023-08-03 DIAGNOSIS — I89.0 LYMPHEDEMA OF BOTH LOWER EXTREMITIES: ICD-10-CM

## 2023-08-03 PROBLEM — L02.91 ABSCESS: Status: RESOLVED | Noted: 2023-02-21 | Resolved: 2023-08-03

## 2023-08-03 PROCEDURE — G0439 PPPS, SUBSEQ VISIT: HCPCS | Performed by: FAMILY MEDICINE

## 2023-08-03 PROCEDURE — 99214 OFFICE O/P EST MOD 30 MIN: CPT | Performed by: FAMILY MEDICINE

## 2023-08-03 PROCEDURE — G0444 DEPRESSION SCREEN ANNUAL: HCPCS | Performed by: FAMILY MEDICINE

## 2023-08-03 NOTE — ASSESSMENT & PLAN NOTE
Lab Results   Component Value Date    EGFR 60 07/28/2023    EGFR 70 04/05/2023    EGFR 55 12/27/2022    CREATININE 1.22 07/28/2023    CREATININE 1.08 04/05/2023    CREATININE 1.32 12/27/2022       Improved lately .

## 2023-08-03 NOTE — PROGRESS NOTES
Assessment/Plan:    Stage 3a chronic kidney disease ([address])  Lab Results   Component Value Date    EGFR 60 07/28/2023    EGFR 70 04/05/2023    EGFR 55 12/27/2022    CREATININE 1.22 07/28/2023    CREATININE 1.08 04/05/2023    CREATININE 1.32 12/27/2022       Improved lately . Diagnoses and all orders for this visit:    Essential hypertension    Pulmonary embolism, unspecified chronicity, unspecified pulmonary embolism type, unspecified whether acute cor pulmonale present ([address])    Kidney stones    Stage 3a chronic kidney disease ([address])    Lymphedema of both lower extremities    Gout, unspecified cause, unspecified chronicity, unspecified site    Anemia, unspecified type    Neuropathy of both feet          Subjective:      Patient ID: Aly Canales is a 80 y.o. male. PATIENT RETURNS FOR FOLLOW-UP OF CHRONIC MEDICAL CONDITIONS. ANY HOSPITAL VISITS, EMERGENCY VISITS AND OTHER PROVIDER VISITS SINCE LAST TIME WERE REVIEWED. MEDS WERE REVIEWED AND NO SIDE EFFECTS. NO NEW ISSUES  UNLESS NOTED BELOW. NO NEW MEDICAL PROVIDER REPORTED. THE CHRONIC DISEASES LISTED ABOVE ARE STABLE AND UNCHANGED/ THE PLAN OF CARE FOR THOSE WILL REMAIN UNCHANGED UNLESS NOTED BELOW.    AWv/sub           The following portions of the patient's history were reviewed and updated as appropriate: allergies, current medications, past family history, past medical history, past social history, past surgical history and problem list.    Review of Systems   Constitutional: Negative for activity change and appetite change. HENT: Negative for trouble swallowing. Eyes: Negative for visual disturbance. Respiratory: Negative for cough and shortness of breath. Cardiovascular: Negative for chest pain, palpitations and leg swelling. Gastrointestinal: Negative for abdominal pain and blood in stool. Endocrine: Negative for polyuria. Genitourinary: Negative for difficulty urinating and hematuria. Skin: Negative for rash.    Neurological: Negative for dizziness. Psychiatric/Behavioral: Negative for behavioral problems. Objective:  Vitals:    08/03/23 1103   BP: 140/80   BP Location: Left arm   Patient Position: Sitting   Cuff Size: Standard   Pulse: 60   Temp: 97.7 °F (36.5 °C)   TempSrc: Temporal   SpO2: 97%   Weight: (!) 154 kg (340 lb)   Height: 5' 10" (1.778 m)      Physical Exam  Constitutional:       Appearance: He is well-developed. HENT:      Head: Normocephalic and atraumatic. Eyes:      Conjunctiva/sclera: Conjunctivae normal.   Neck:      Thyroid: No thyromegaly. Cardiovascular:      Rate and Rhythm: Normal rate and regular rhythm. Heart sounds: Normal heart sounds. No murmur heard. Pulmonary:      Effort: Pulmonary effort is normal. No respiratory distress. Breath sounds: Normal breath sounds. Musculoskeletal:      Cervical back: Neck supple. Lymphadenopathy:      Cervical: No cervical adenopathy. Skin:     General: Skin is warm and dry. Psychiatric:         Behavior: Behavior normal.           Patient's chronic problems that were reviewed today are stable. Recent hospital stays reviewed. Recent labs and imaging reviewed. Recent visits to other providers reviewed. Meds reviewed and no changes made. Appropriate labs and imaging were ordered. Preventive measures appropriate for age and sex were reviewed with patient. Immunizations were updated as appropriate.

## 2023-08-03 NOTE — LETTER
Elizabeth Daily  1955      To whom it may concern:     Motorized scooter evaluation     [facility]      Severe ambulatory dysfunction : chronic , multifactorial.                             Heidi Alonso M.D.

## 2023-08-03 NOTE — PATIENT INSTRUCTIONS
Call [facility] for evaluation for the motorized scooter. I will provide you with a written referral.    Given the low B12 if we are able to get this back into a more normal range the neuropathy in the feet which is relatively new should be improving. I strongly encourage updating your COVID booster as soon as you can. It is still free at the local drug stores. I recommend resuming the good leg and foot care that you were doing before to try to keep that in good order. Medicare Preventive Visit Patient Instructions  Thank you for completing your Welcome to Medicare Visit or Medicare Annual Wellness Visit today. Your next wellness visit will be due in one year (8/3/2024). The screening/preventive services that you may require over the next 5-10 years are detailed below. Some tests may not apply to you based off risk factors and/or age. Screening tests ordered at today's visit but not completed yet may show as past due. Also, please note that scanned in results may not display below. Preventive Screenings:  Service Recommendations Previous Testing/Comments   Colorectal Cancer Screening  · Colonoscopy    · Fecal Occult Blood Test (FOBT)/Fecal Immunochemical Test (FIT)  · Fecal DNA/Cologuard Test  · Flexible Sigmoidoscopy Age: 43-73 years old   Colonoscopy: every 10 years (May be performed more frequently if at higher risk)  OR  FOBT/FIT: every 1 year  OR  Cologuard: every 3 years  OR  Sigmoidoscopy: every 5 years  Screening may be recommended earlier than age 39 if at higher risk for colorectal cancer. Also, an individualized decision between you and your healthcare provider will decide whether screening between the ages of 77-80 would be appropriate.  Colonoscopy: 03/04/2022  FOBT/FIT: Not on file  Cologuard: Not on file  Sigmoidoscopy: Not on file    Screening Current  Screening Current     Prostate Cancer Screening Individualized decision between patient and health care provider in men between ages of 53-66   Medicare will cover every 12 months beginning on the day after your 50th birthday PSA: No results in last 5 years     Screening Not Indicated     Hepatitis C Screening Once for adults born between 1945 and 1965  More frequently in patients at high risk for Hepatitis C Hep C Antibody: 05/11/2021    Screening Current  Screening Current   Diabetes Screening 1-2 times per year if you're at risk for diabetes or have pre-diabetes Fasting glucose: 106 mg/dL (7/28/2023)  A1C: 5.6 % (5/11/2021)  Screening Current  Screening Current   Cholesterol Screening Once every 5 years if you don't have a lipid disorder. May order more often based on risk factors. Lipid panel: 05/11/2021  Screening Current  Screening Current      Other Preventive Screenings Covered by Medicare:  1. Abdominal Aortic Aneurysm (AAA) Screening: covered once if your at risk. You're considered to be at risk if you have a family history of AAA or a male between the age of 70-76 who smoking at least 100 cigarettes in your lifetime. 2. Lung Cancer Screening: covers low dose CT scan once per year if you meet all of the following conditions: (1) Age 48-67; (2) No signs or symptoms of lung cancer; (3) Current smoker or have quit smoking within the last 15 years; (4) You have a tobacco smoking history of at least 20 pack years (packs per day x number of years you smoked); (5) You get a written order from a healthcare provider. 3. Glaucoma Screening: covered annually if you're considered high risk: (1) You have diabetes OR (2) Family history of glaucoma OR (3)  aged 48 and older OR (3)  American aged 72 and older  3.  Osteoporosis Screening: covered every 2 years if you meet one of the following conditions: (1) Have a vertebral abnormality; (2) On glucocorticoid therapy for more than 3 months; (3) Have primary hyperparathyroidism; (4) On osteoporosis medications and need to assess response to drug therapy. 5. HIV Screening: covered annually if you're between the age of 14-79. Also covered annually if you are younger than 13 and older than 72 with risk factors for HIV infection. For pregnant patients, it is covered up to 3 times per pregnancy. Immunizations:  Immunization Recommendations   Influenza Vaccine Annual influenza vaccination during flu season is recommended for all persons aged >= 6 months who do not have contraindications   Pneumococcal Vaccine   * Pneumococcal conjugate vaccine = PCV13 (Prevnar 13), PCV15 (Vaxneuvance), PCV20 (Prevnar 20)  * Pneumococcal polysaccharide vaccine = PPSV23 (Pneumovax) Adults 20-63 years old: 1-3 doses may be recommended based on certain risk factors  Adults 72 years old: 1-2 doses may be recommended based off what pneumonia vaccine you previously received   Hepatitis B Vaccine 3 dose series if at intermediate or high risk (ex: diabetes, end stage renal disease, liver disease)   Tetanus (Td) Vaccine - COST NOT COVERED BY MEDICARE PART B Following completion of primary series, a booster dose should be given every 10 years to maintain immunity against tetanus. Td may also be given as tetanus wound prophylaxis. Tdap Vaccine - COST NOT COVERED BY MEDICARE PART B Recommended at least once for all adults. For pregnant patients, recommended with each pregnancy. Shingles Vaccine (Shingrix) - COST NOT COVERED BY MEDICARE PART B  2 shot series recommended in those aged 48 and above     Health Maintenance Due:      Topic Date Due   • Colorectal Cancer Screening  03/04/2032   • Hepatitis C Screening  Completed     Immunizations Due:      Topic Date Due   • COVID-19 Vaccine (4 - Moderna series) 02/14/2022   • Influenza Vaccine (1) 09/01/2023     Advance Directives   What are advance directives? Advance directives are legal documents that state your wishes and plans for medical care.  These plans are made ahead of time in case you lose your ability to make decisions for yourself. Advance directives can apply to any medical decision, such as the treatments you want, and if you want to donate organs. What are the types of advance directives? There are many types of advance directives, and each state has rules about how to use them. You may choose a combination of any of the following:  · Living will: This is a written record of the treatment you want. You can also choose which treatments you do not want, which to limit, and which to stop at a certain time. This includes surgery, medicine, IV fluid, and tube feedings. · Durable power of  for healthcare [facility]): This is a written record that states who you want to make healthcare choices for you when you are unable to make them for yourself. This person, called a proxy, is usually a family member or a friend. You may choose more than 1 proxy. · Do not resuscitate (DNR) order:  A DNR order is used in case your heart stops beating or you stop breathing. It is a request not to have certain forms of treatment, such as CPR. A DNR order may be included in other types of advance directives. · Medical directive: This covers the care that you want if you are in a coma, near death, or unable to make decisions for yourself. You can list the treatments you want for each condition. Treatment may include pain medicine, surgery, blood transfusions, dialysis, IV or tube feedings, and a ventilator (breathing machine). · Values history: This document has questions about your views, beliefs, and how you feel and think about life. This information can help others choose the care that you would choose. Why are advance directives important? An advance directive helps you control your care. Although spoken wishes may be used, it is better to have your wishes written down. Spoken wishes can be misunderstood, or not followed. Treatments may be given even if you do not want them.  An advance directive may make it easier for your family to make difficult choices about your care. Fall Prevention    Fall prevention  includes ways to make your home and other areas safer. It also includes ways you can move more carefully to prevent a fall. Health conditions that cause changes in your blood pressure, vision, or muscle strength and coordination may increase your risk for falls. Medicines may also increase your risk for falls if they make you dizzy, weak, or sleepy. Fall prevention tips:   · Stand or sit up slowly. · Use assistive devices as directed. · Wear shoes that fit well and have soles that . · Wear a personal alarm. · Stay active. · Manage your medical conditions. Home Safety Tips:  · Add items to prevent falls in the bathroom. · Keep paths clear. · Install bright lights in your home. · Keep items you use often on shelves within reach. · Paint or place reflective tape on the edges of your stairs. Weight Management   Why it is important to manage your weight:  Being overweight increases your risk of health conditions such as heart disease, high blood pressure, type 2 diabetes, and certain types of cancer. It can also increase your risk for osteoarthritis, sleep apnea, and other respiratory problems. Aim for a slow, steady weight loss. Even a small amount of weight loss can lower your risk of health problems. How to lose weight safely:  A safe and healthy way to lose weight is to eat fewer calories and get regular exercise. You can lose up about 1 pound a week by decreasing the number of calories you eat by 500 calories each day. Healthy meal plan for weight management:  A healthy meal plan includes a variety of foods, contains fewer calories, and helps you stay healthy. A healthy meal plan includes the following:  · Eat whole-grain foods more often. A healthy meal plan should contain fiber. Fiber is the part of grains, fruits, and vegetables that is not broken down by your body.  Whole-grain foods are healthy and provide extra fiber in your diet. Some examples of whole-grain foods are whole-wheat breads and pastas, oatmeal, brown rice, and bulgur. · Eat a variety of vegetables every day. Include dark, leafy greens such as spinach, kale, kavya greens, and mustard greens. Eat yellow and orange vegetables such as carrots, sweet potatoes, and winter squash. · Eat a variety of fruits every day. Choose fresh or canned fruit (canned in its own juice or light syrup) instead of juice. Fruit juice has very little or no fiber. · Eat low-fat dairy foods. Drink fat-free (skim) milk or 1% milk. Eat fat-free yogurt and low-fat cottage cheese. Try low-fat cheeses such as mozzarella and other reduced-fat cheeses. · Choose meat and other protein foods that are low in fat. Choose beans or other legumes such as split peas or lentils. Choose fish, skinless poultry (chicken or turkey), or lean cuts of red meat (beef or pork). Before you cook meat or poultry, cut off any visible fat. · Use less fat and oil. Try baking foods instead of frying them. Add less fat, such as margarine, sour cream, regular salad dressing and mayonnaise to foods. Eat fewer high-fat foods. Some examples of high-fat foods include french fries, doughnuts, ice cream, and cakes. · Eat fewer sweets. Limit foods and drinks that are high in sugar. This includes candy, cookies, regular soda, and sweetened drinks. Exercise:  Exercise at least 30 minutes per day on most days of the week. Some examples of exercise include walking, biking, dancing, and swimming. You can also fit in more physical activity by taking the stairs instead of the elevator or parking farther away from stores. Ask your healthcare provider about the best exercise plan for you. © Copyright [employer] 2018 Information is for End User's use only and may not be sold, redistributed or otherwise used for commercial purposes.  All illustrations and images included in [facility] are the copyrighted property of [employer] or [address] [place] [address]

## 2023-08-03 NOTE — PROGRESS NOTES
Assessment and Plan:     Problem List Items Addressed This Visit        Cardiovascular and Mediastinum    Essential hypertension - Primary    Pulmonary embolism (HCC)       Nervous and Auditory    Neuropathy of both feet (Chronic)       Genitourinary    Kidney stones    Stage 3a chronic kidney disease ([address])     Lab Results   Component Value Date    EGFR 60 07/28/2023    EGFR 70 04/05/2023    EGFR 55 12/27/2022    CREATININE 1.22 07/28/2023    CREATININE 1.08 04/05/2023    CREATININE 1.32 12/27/2022       Improved lately . Other    Gout    Lymphedema of both lower extremities    Anemia        Preventive health issues were discussed with patient, and age appropriate screening tests were ordered as noted in patient's After Visit Summary. Personalized health advice and appropriate referrals for health education or preventive services given if needed, as noted in patient's After Visit Summary.      History of Present Illness:     Patient presents for a Medicare Wellness Visit    HPI   Patient Care Team:  Alvira Denver, MD as PCP - General (Family Medicine)     Review of Systems:     Review of Systems     Problem List:     Patient Active Problem List   Diagnosis   • Essential hypertension   • Kidney stones   • Acute kidney injury superimposed on chronic kidney disease ([address])   • HDL deficiency   • Gout   • DELIA (obstructive sleep apnea)   • Allergic rhinitis   • Right foot pain   • Obesity, Class III, BMI 40-49.9 (morbid obesity) ([address])   • Lymphedema of both lower extremities   • COVID-19   • Strangulated hernia of abdominal wall   • Acute kidney injury ([address])   • Anemia   • Pulmonary embolism ([address])   • Surgical wound present   • Abdominal pain   • Nonhealing surgical wound   • Chronic pain of right knee   • Stage 3a chronic kidney disease ([address])   • Neuropathy of both feet      Past Medical and Surgical History:     Past Medical History:   Diagnosis Date   • Anemia 09/16/2022    pt unsure of this   • Cataract • Chronic kidney disease    • Colon polyp     1/9/23 Benign polyp   • Dental crowns present    • Encounter for management of wound VAC     1/9/23 wound vac in place   • History of COVID-19 05/2022    per pt mild symptoms -recovered at home-   • History of pneumonia as a child    • Hypertension    • Kidney stone    • Morbid obesity ([address])    • Risk for falls     1/9/23 Uses walker   • Sepsis ([address])     pt unsure of this   • Shortness of breath     per pt "gets winded when walking with walker and wound vac around the house"   • Tooth missing    • Urinary tract infection without hematuria 04/03/2020   • Uses walker    • Wears glasses    • Weight loss     per pt since 1/2022 approx 80lb weight loss-had been 410lbs   • Wound of abdomen     wound vac in place-receives homecare 3x/week     Past Surgical History:   Procedure Laterality Date   • ABDOMINAL ADHESION SURGERY N/A 09/15/2022    Procedure: LYSIS ADHESIONS;  Surgeon: Iban Castle MD;  [facility];  Service: General   • APPENDECTOMY     • COLONOSCOPY     • CRYOABLATION Left     1/9/23 Left kidney - had cyroablation of left kidney   • EXPLORATORY LAPAROTOMY W/ BOWEL RESECTION N/A 09/14/2022    Procedure: Exdploratory laparotomy, complex Lysis of adhesions, reduction strangulated hernia, debridement RLQ muscle with counter incision, abthera placement;  Surgeon: Iban Castle MD;  [facility];  Service: General   • EXPLORATORY LAPAROTOMY W/ BOWEL RESECTION N/A 09/15/2022    Procedure: LAPAROTOMY EXPLORATORY  REPAIR RIGHT LQ HERNIA WITH MESH, REPAIR ANTERIOR WALL HERNIA;  Surgeon: Iban Castle MD;  [facility];  Service: General   • FL RETROGRADE PYELOGRAM  11/15/2022   • FL RETROGRADE PYELOGRAM  01/03/2023   • HERNIA REPAIR      per pt there is Mesh implanted   • NASAL SEPTUM SURGERY     • ND CYSTO BLADDER W/URETERAL CATHETERIZATION Right 11/15/2022    Procedure: CYSTOSCOPY RETROGRADE PYELOGRAM WITH INSERTION STENT URETERAL; Surgeon: Jeremiah Ann MD;  [facility];  Service: Urology   • AL CYSTO/URETERO W/LITHOTRIPSY &INDWELL STENT INSRT Right 01/03/2023    Procedure: CYSTO, U-SCOPE, LASER, RETROGRADE, STENT;  Surgeon: Jeannette Frances MD;  [facility];  Service: Urology   • AL SPLIT AGRFT T/A/L 1ST 100 CM/&/1% BDY INFT/CHLD N/A 1/11/2023    Procedure: SKIN GRAFT SPLIT THICKNESS ABDOMINAL WOUND WITH VAC PLACEMENT;  Surgeon: Patrick Roblero MD;  Location: [address] [place] [address] MAIN OR;  Service: Plastics   • TONSILLECTOMY     • WISDOM TOOTH EXTRACTION     • WOUND DEBRIDEMENT N/A 10/11/2022    Procedure: Excisional debridement of abdominal wall nonviable tissue. Skin and subcu tissues were dissected.   The wound measured 23 x 18 cm x 4 cm deep.;  Surgeon: Doris Dillard MD;  [facility];  Service: General      Family History:     Family History   Problem Relation Age of Onset   • Diabetes Father    • Prostate cancer Brother    • Anesthesia problems Neg Hx       Social History:     Social History     Socioeconomic History   • Marital status: /Civil Union     Spouse name: None   • Number of children: None   • Years of education: None   • Highest education level: None   Occupational History   • None   Tobacco Use   • Smoking status: Never   • Smokeless tobacco: Never   • Tobacco comments:     no exposure to passive smoke-never a smoker or tobacco use    Vaping Use   • Vaping Use: Never used   Substance and Sexual Activity   • Alcohol use: Not Currently     Comment: Denies any alcohol use at this time   • Drug use: Never     Comment: Denies any former or current drug use   • Sexual activity: None     Comment: defer-- not active at this time since Sept surgery   Other Topics Concern   • None   Social History Narrative   • None     Social Determinants of Health     Financial Resource Strain: Low Risk  (8/3/2023)    Overall Financial Resource Strain (CARDIA)    • Difficulty of Paying Living Expenses: Not hard at all Food Insecurity: No Food Insecurity (9/15/2022)    Hunger Vital Sign    • Worried About Running Out of Food in the Last Year: Never true    • Ran Out of Food in the Last Year: Never true   Transportation Needs: No Transportation Needs (8/3/2023)    PRAPARE - Transportation    • Lack of Transportation (Medical): No    • Lack of Transportation (Non-Medical): No   Physical Activity: Not on file   Stress: Not on file   Social Connections: Not on file   Intimate Partner Violence: Not on file   Housing Stability: Low Risk  (9/15/2022)    Housing Stability Vital Sign    • Unable to Pay for Housing in the Last Year: No    • Number of Places Lived in the Last Year: 1    • Unstable Housing in the Last Year: No      Medications and Allergies:     Current Outpatient Medications   Medication Sig Dispense Refill   • acetaminophen (TYLENOL) 500 mg tablet Take 500 mg by mouth every 6 (six) hours as needed for mild pain     • allopurinol (ZYLOPRIM) 100 mg tablet Take 100 mg by mouth daily 1/9/23 Per pt takes at 4pm in the afternoon.      • Bismuth Tribromoph-Petrolatum (Xeroform Occlusive Gauze Patch) PADS Apply 1 patch topically in the morning 30 each 2   • cyanocobalamin (VITAMIN B-12) 1000 MCG tablet Take 1 tablet (1,000 mcg total) by mouth daily 100 tablet 3   • furosemide (LASIX) 20 mg tablet Take 1 tablet (20 mg total) by mouth daily For swelling (Patient taking differently: Take 20 mg by mouth daily For swelling, per pt takes @ 4pm) 90 tablet 3   • gabapentin (NEURONTIN) 100 mg capsule Take 2 capsules (200 mg total) by mouth 3 (three) times a day 540 capsule 3   • metoprolol succinate (TOPROL-XL) 100 mg 24 hr tablet TAKE 1 TABLET BY MOUTH EVERY DAY 90 tablet 1   • nystatin (MYCOSTATIN) powder Use bid to rash 180 g 3   • Potassium Citrate ER 15 MEQ (1620 MG) TBCR Take 1 tablet by mouth daily 1/9/23 Per pt - takes at 4pm daily     • tamsulosin (FLOMAX) 0.4 mg 1-2 daily as directed (Patient taking differently: daily with dinner 1-2 daily as directed, 1/9/23 Per pt taking one tab at this time  at 4pm) 180 capsule 3   • TRAVATAN Z 0.004 % ophthalmic solution Administer 1 drop to both eyes daily at bedtime        No current facility-administered medications for this visit. Allergies   Allergen Reactions   • Codeine Swelling     Other reaction(s): SWELLING OF FACE    Swollen eyes/swelling--age 11      Immunizations:     Immunization History   Administered Date(s) Administered   • COVID-19 MODERNA VACC 0.5 ML IM 02/03/2021, 02/28/2021, 12/20/2021   • INFLUENZA 12/10/2014, 12/10/2015, 12/10/2015, 11/29/2016, 11/29/2016, 11/30/2017, 11/30/2017, 12/27/2018, 12/30/2019, 10/26/2020, 01/31/2023   • Influenza Split 11/08/2010, 11/14/2011, 12/11/2012, 12/10/2013   • Influenza, high dose seasonal 0.7 mL 12/20/2021, 01/31/2023   • Influenza, injectable, quadrivalent, preservative free 0.5 mL 12/27/2018   • Influenza, recombinant, quadrivalent,injectable, preservative free 12/30/2019   • Influenza, seasonal, injectable 12/10/2014   • Pneumococcal Conjugate 13-Valent 05/11/2021   • Pneumococcal Conjugate Vaccine 20-valent (Pcv20), Polysace 07/07/2022      Health Maintenance:         Topic Date Due   • Colorectal Cancer Screening  03/04/2032   • Hepatitis C Screening  Completed         Topic Date Due   • COVID-19 Vaccine (4 - Moderna series) 02/14/2022   • Influenza Vaccine (1) 09/01/2023      Medicare Screening Tests and Risk Assessments:     Clemente Souza is here for his Subsequent Wellness visit. Last Medicare Wellness visit information reviewed, patient interviewed, no change since last AWV.      Previous Hospitalizations:       Hospitalization Comments: As above     Eyes and dental [facility] Planning:   Living will: No    Durable POA for healthcare: No    Advanced directive: No      Comments: Has documents     Cognitive Screening:   Provider or family/friend/caregiver concerned regarding cognition?: No    PREVENTIVE SCREENINGS      Cardiovascular Screening:    General: Screening Current      Diabetes Screening:     General: Screening Current      Colorectal Cancer Screening:     General: Screening Current      Prostate Cancer Screening:    General: Screening Not Indicated      Osteoporosis Screening:    General: Screening Not Indicated      Abdominal Aortic Aneurysm (AAA) Screening:    Risk factors include: age between 70-75 yo        Lung Cancer Screening:     General: Screening Not Indicated      Hepatitis C Screening:    General: Screening Current    Screening, Brief Intervention, and Referral to Treatment (SBIRT)      Brief Intervention  Alcohol & drug use screenings were reviewed. No concerns regarding substance use disorder identified. No results found.      Physical Exam:     /80 (BP Location: Left arm, Patient Position: Sitting, Cuff Size: Standard)   Pulse 60   Temp 97.7 °F (36.5 °C) (Temporal)   Ht 5' 10" (1.778 m)   Wt (!) 154 kg (340 lb)   SpO2 97%   BMI 48.78 kg/m²     Physical Exam     Gabino Sepulveda MD

## 2023-08-03 NOTE — PROGRESS NOTES
Assessment and Plan:     Problem List Items Addressed This Visit    None       Preventive health issues were discussed with patient, and age appropriate screening tests were ordered as noted in patient's After Visit Summary. Personalized health advice and appropriate referrals for health education or preventive services given if needed, as noted in patient's After Visit Summary.      History of Present Illness:     Patient presents for a Medicare Wellness Visit    HPI   Patient Care Team:  Mary Finch MD as PCP - General (Family Medicine)     Review of Systems:     Review of Systems     Problem List:     Patient Active Problem List   Diagnosis   • Essential hypertension   • Kidney stones   • Acute kidney injury superimposed on chronic kidney disease ([address])   • HDL deficiency   • Gout   • DELIA (obstructive sleep apnea)   • Allergic rhinitis   • Right foot pain   • Obesity, Class III, BMI 40-49.9 (morbid obesity) ([address])   • Lymphedema of both lower extremities   • COVID-19   • Strangulated hernia of abdominal wall   • Acute kidney injury ([address])   • Anemia   • Pulmonary embolism ([address])   • Surgical wound present   • Abdominal pain   • Nonhealing surgical wound   • Chronic pain of right knee   • Stage 3a chronic kidney disease ([address])   • Abscess   • Neuropathy of both feet      Past Medical and Surgical History:     Past Medical History:   Diagnosis Date   • Anemia 09/16/2022    pt unsure of this   • Cataract    • Chronic kidney disease    • Colon polyp     1/9/23 Benign polyp   • Dental crowns present    • Encounter for management of wound VAC     1/9/23 wound vac in place   • History of COVID-19 05/2022    per pt mild symptoms -recovered at home-   • History of pneumonia as a child    • Hypertension    • Kidney stone    • Morbid obesity ([address])    • Risk for falls     1/9/23 Uses walker   • Sepsis ([address])     pt unsure of this   • Shortness of breath     per pt "gets winded when walking with walker and wound vac around the house"   • Tooth missing    • Urinary tract infection without hematuria 04/03/2020   • Uses walker    • Wears glasses    • Weight loss     per pt since 1/2022 approx 80lb weight loss-had been 410lbs   • Wound of abdomen     wound vac in place-receives homecare 3x/week     Past Surgical History:   Procedure Laterality Date   • ABDOMINAL ADHESION SURGERY N/A 09/15/2022    Procedure: LYSIS ADHESIONS;  Surgeon: Lavonne Meyers MD;  [facility];  Service: General   • APPENDECTOMY     • COLONOSCOPY     • CRYOABLATION Left     1/9/23 Left kidney - had cyroablation of left kidney   • EXPLORATORY LAPAROTOMY W/ BOWEL RESECTION N/A 09/14/2022    Procedure: Exdploratory laparotomy, complex Lysis of adhesions, reduction strangulated hernia, debridement RLQ muscle with counter incision, abthera placement;  Surgeon: Lavonne Meyers MD;  [facility];  Service: General   • EXPLORATORY LAPAROTOMY W/ BOWEL RESECTION N/A 09/15/2022    Procedure: LAPAROTOMY EXPLORATORY  REPAIR RIGHT LQ HERNIA WITH MESH, REPAIR ANTERIOR WALL HERNIA;  Surgeon: Lavonne Meyers MD;  [facility];  Service: General   • FL RETROGRADE PYELOGRAM  11/15/2022   • FL RETROGRADE PYELOGRAM  01/03/2023   • HERNIA REPAIR      per pt there is Mesh implanted   • NASAL SEPTUM SURGERY     • [place] CYSTO BLADDER W/URETERAL CATHETERIZATION Right 11/15/2022    Procedure: CYSTOSCOPY RETROGRADE PYELOGRAM WITH INSERTION STENT URETERAL;  Surgeon: Jeremiah Ann MD;  [facility];  Service: Urology   • TN CYSTO/URETERO W/LITHOTRIPSY &INDWELL STENT INSRT Right 01/03/2023    Procedure: CYSTO, U-SCOPE, LASER, RETROGRADE, STENT;  Surgeon: Jeannette Frances MD;  [facility];  Service: Urology   • TN SPLIT AGRFT T/A/L 1ST 100 CM/&/1% BDY INFT/CHLD N/A 1/11/2023    Procedure: SKIN GRAFT SPLIT THICKNESS ABDOMINAL WOUND WITH VAC PLACEMENT;  Surgeon: Patrick Roblero MD;  Location:  MAIN OR;  Service: Plastics   • TONSILLECTOMY     • WISDOM TOOTH EXTRACTION     • WOUND DEBRIDEMENT N/A 10/11/2022    Procedure: Excisional debridement of abdominal wall nonviable tissue. Skin and subcu tissues were dissected. The wound measured 23 x 18 cm x 4 cm deep.;  Surgeon: Trerell Benítez MD;  [facility];  Service: General      Family History:     Family History   Problem Relation Age of Onset   • Diabetes Father    • Prostate cancer Brother    • Anesthesia problems Neg Hx       Social History:     Social History     Socioeconomic History   • Marital status: /Civil Union     Spouse name: None   • Number of children: None   • Years of education: None   • Highest education level: None   Occupational History   • None   Tobacco Use   • Smoking status: Never   • Smokeless tobacco: Never   • Tobacco comments:     no exposure to passive smoke-never a smoker or tobacco use    Vaping Use   • Vaping Use: Never used   Substance and Sexual Activity   • Alcohol use: Not Currently     Comment: Denies any alcohol use at this time   • Drug use: Never     Comment: Denies any former or current drug use   • Sexual activity: None     Comment: defer-- not active at this time since Sept surgery   Other Topics Concern   • None   Social History Narrative   • None     Social Determinants of Health     Financial Resource Strain: Not on file   Food Insecurity: No Food Insecurity (9/15/2022)    Hunger Vital Sign    • Worried About Running Out of Food in the Last Year: Never true    • Ran Out of Food in the Last Year: Never true   Transportation Needs: No Transportation Needs (9/15/2022)    PRAPARE - Transportation    • Lack of Transportation (Medical): No    • Lack of Transportation (Non-Medical):  No   Physical Activity: Not on file   Stress: Not on file   Social Connections: Not on file   Intimate Partner Violence: Not on file   Housing Stability: Low Risk  (9/15/2022)    Housing Stability Vital Sign    • Unable to Pay for Housing in the Last Year: No    • Number of Places Lived in the Last Year: 1    • Unstable Housing in the Last Year: No      Medications and Allergies:     Current Outpatient Medications   Medication Sig Dispense Refill   • acetaminophen (TYLENOL) 500 mg tablet Take 500 mg by mouth every 6 (six) hours as needed for mild pain     • allopurinol (ZYLOPRIM) 100 mg tablet Take 100 mg by mouth daily 1/9/23 Per pt takes at 4pm in the afternoon. • Bismuth Tribromoph-Petrolatum (Xeroform Occlusive Gauze Patch) PADS Apply 1 patch topically in the morning 30 each 2   • cyanocobalamin (VITAMIN B-12) 1000 MCG tablet Take 1 tablet (1,000 mcg total) by mouth daily 100 tablet 3   • furosemide (LASIX) 20 mg tablet Take 1 tablet (20 mg total) by mouth daily For swelling (Patient taking differently: Take 20 mg by mouth daily For swelling, per pt takes @ 4pm) 90 tablet 3   • gabapentin (NEURONTIN) 100 mg capsule Take 2 capsules (200 mg total) by mouth 3 (three) times a day 540 capsule 3   • metoprolol succinate (TOPROL-XL) 100 mg 24 hr tablet TAKE 1 TABLET BY MOUTH EVERY DAY 90 tablet 1   • nystatin (MYCOSTATIN) powder Use bid to rash 180 g 3   • Potassium Citrate ER 15 MEQ (1620 MG) TBCR Take 1 tablet by mouth daily 1/9/23 Per pt - takes at 4pm daily     • tamsulosin (FLOMAX) 0.4 mg 1-2 daily as directed (Patient taking differently: daily with dinner 1-2 daily as directed, 1/9/23 Per pt taking one tab at this time  at 4pm) 180 capsule 3   • TRAVATAN Z 0.004 % ophthalmic solution Administer 1 drop to both eyes daily at bedtime        No current facility-administered medications for this visit.      Allergies   Allergen Reactions   • Codeine Swelling     Other reaction(s): SWELLING OF FACE    Swollen eyes/swelling--age 11      Immunizations:     Immunization History   Administered Date(s) Administered   • COVID-19 MODERNA VACC 0.5 ML IM 02/03/2021, 02/28/2021, 12/20/2021   • INFLUENZA 12/10/2014, 12/10/2015, 12/10/2015, 11/29/2016, 11/29/2016, 11/30/2017, 11/30/2017, 12/27/2018, 12/30/2019, 10/26/2020, 01/31/2023   • Influenza Split 11/08/2010, 11/14/2011, 12/11/2012, 12/10/2013   • Influenza, high dose seasonal 0.7 mL 12/20/2021, 01/31/2023   • Influenza, injectable, quadrivalent, preservative free 0.5 mL 12/27/2018   • Influenza, recombinant, quadrivalent,injectable, preservative free 12/30/2019   • Influenza, seasonal, injectable 12/10/2014   • Pneumococcal Conjugate 13-Valent 05/11/2021   • Pneumococcal Conjugate Vaccine 20-valent (Pcv20), Polysace 07/07/2022      Health Maintenance:         Topic Date Due   • Colorectal Cancer Screening  03/04/2032   • Hepatitis C Screening  Completed         Topic Date Due   • COVID-19 Vaccine (4 - Moderna series) 02/14/2022   • Influenza Vaccine (1) 09/01/2023      Medicare Screening Tests and Risk Assessments:     Kirk Garland is here for his Initial Wellness visit. Health Risk Assessment:   Patient rates overall health as fair. Patient feels that their physical health rating is slightly worse. Patient is satisfied with their life. Eyesight was rated as same. Hearing was rated as same. Patient feels that their emotional and mental health rating is same. Patients states they are sometimes angry. Patient states they are sometimes unusually tired/fatigued. Pain experienced in the last 7 days has been some. Patient's pain rating has been 3/10. Patient states that he has experienced no weight loss or gain in last 6 months. Fall Risk Screening: In the past year, patient has experienced: history of falling in past year    Number of falls: 1  Injured during fall?: No    Feels unsteady when standing or walking?: Yes    Worried about falling?: No      Home Safety:  Patient has trouble with stairs inside or outside of their home. Patient has working smoke alarms and has working carbon monoxide detector. Home safety hazards include: none. Medications:   Patient is not currently taking any over-the-counter supplements.  Patient is able to manage medications. Activities of Daily Living (ADLs)/Instrumental Activities of Daily Living (IADLs):   Walk and transfer into and out of bed and chair?: Yes  Dress and groom yourself?: Yes    Bathe or shower yourself?: Yes    Feed yourself? Yes  Do your laundry/housekeeping?: Yes  Manage your money, pay your bills and track your expenses?: Yes  Make your own meals?: Yes    Do your own shopping?: Yes    Previous Hospitalizations:   Any hospitalizations or ED visits within the last 12 months?: Yes    How many hospitalizations have you had in the last year?: 3-4    Advance Care Planning:   Living will: No      PREVENTIVE SCREENINGS      Cardiovascular Screening:    General: Screening Current      Diabetes Screening:     General: Screening Current      Colorectal Cancer Screening:     General: Screening Current      Abdominal Aortic Aneurysm (AAA) Screening:    Risk factors include: age between 70-75 yo        Lung Cancer Screening:     General: Screening Not Indicated      Hepatitis C Screening:    General: Screening Current    No results found. Physical Exam:     There were no vitals taken for this visit.     Physical Exam     Nia Gleason MD

## 2023-08-04 DIAGNOSIS — I10 ESSENTIAL HYPERTENSION: ICD-10-CM

## 2023-08-04 NOTE — PROGRESS NOTES
Patient ID: Mendel Alpers is a 80 y.o. male Date of Birth 1955     Chief Complaint  Chief Complaint   Patient presents with   • New Patient Visit     Abdominal wounds, 3 months, bacitracin, no dressing in place today       Allergies  Codeine    Assessment:    Nonhealing surgical wound  A few open areas are mostly related to residual mesh at the base of the wound. The mesh was aggressively pulled out and debrided as best as possible with scissors. Any nonviable overlying epithelium was also debrided with a curette. We will have him follow-up in 2 weeks for evaluation. Diagnoses and all orders for this visit:    Non-healing surgical wound, initial encounter  -     lidocaine (LMX) 4 % cream  -     Cancel: Wound cleansing and dressings; Future  -     Wound cleansing and dressings; Future    Obesity, Class III, BMI 40-49.9 (morbid obesity) ([address])    Other orders  -     Debridement  -     Debridement              Debridement   Wound 07/28/23 Surgical Abdomen Left; Lower    Universal Protocol:  Consent given by: patient  Time out: Immediately prior to procedure a "time out" was called to verify the correct patient, procedure, equipment, support staff and site/side marked as required.     Performed by: physician  Debridement type: surgical  Level of debridement: subcutaneous tissue  Pain control: lidocaine 4%  Post-debridement measurements  Length (cm): 6.3  Width (cm): 3  Depth (cm): 0.1  Percent debrided: 75%  Surface Area (cm^2): 18.9  Area debrided (cm^2): 14.18  Volume (cm^3): 1.89  Tissue and other material debrided: subcutaneous tissue  Devitalized tissue debrided: biofilm and slough  Instrument(s) utilized: curette  Procedural pain (0-10): 1  Post-procedural pain: 1   Response to treatment: procedure was tolerated well    Debridement   Wound 07/28/23 Surgical Abdomen Lower;Right    Universal Protocol:  Consent given by: patient  Time out: Immediately prior to procedure a "time out" was called to verify the correct patient, procedure, equipment, support staff and site/side marked as required. Performed by: physician  Debridement type: surgical  Level of debridement: subcutaneous tissue  Pain control: lidocaine 4%  Post-debridement measurements  Length (cm): 1  Width (cm): 0.6  Depth (cm): 0.1  Percent debrided: 100%  Surface Area (cm^2): 0.6  Area debrided (cm^2): 0.6  Volume (cm^3): 0.06  Tissue and other material debrided: subcutaneous tissue  Devitalized tissue debrided: biofilm and slough  Instrument(s) utilized: curette  Procedural pain (0-10): 1  Post-procedural pain: 1   Response to treatment: procedure was tolerated well          Plan:     Wound 07/28/23 Surgical Abdomen Lower;Right (Active)   Wound Image Images linked 07/28/23 0949   Wound Description Pink;Dry 07/28/23 0924   Lis-wound Assessment Intact;Scar Tissue;Dry 07/28/23 0924   Wound Length (cm) 1 cm 07/28/23 0924   Wound Width (cm) 0.6 cm 07/28/23 0924   Wound Depth (cm) 0.1 cm 07/28/23 0924   Wound Surface Area (cm^2) 0.6 cm^2 07/28/23 0924   Wound Volume (cm^3) 0.06 cm^3 07/28/23 0924   Calculated Wound Volume (cm^3) 0.06 cm^3 07/28/23 0924   Drainage Amount Small 07/28/23 0924   Drainage Description Purulent 07/28/23 0924   Treatments Irrigation with NSS 07/28/23 0924       Wound 07/28/23 Surgical Abdomen Left; Lower (Active)   Wound Image Images linked 07/28/23 0948   Wound Description Pink;Dry 07/28/23 0925   Lis-wound Assessment Intact;Dry;Scar Tissue 07/28/23 0925   Wound Length (cm) 6.3 cm 07/28/23 0925   Wound Width (cm) 3 cm 07/28/23 0925   Wound Depth (cm) 0.1 cm 07/28/23 0925   Wound Surface Area (cm^2) 18.9 cm^2 07/28/23 0925   Wound Volume (cm^3) 1.89 cm^3 07/28/23 0925   Calculated Wound Volume (cm^3) 1.89 cm^3 07/28/23 0925   Drainage Amount Small 07/28/23 0925   Drainage Description Serous 07/28/23 0925   Treatments Irrigation with NSS 07/28/23 0925       Wound 07/28/23 Surgical Abdomen Mid (Active)   Wound Image Images linked 07/28/23 0954   Wound Description Beefy red 07/28/23 0953   Lis-wound Assessment Intact;Scar Tissue 07/28/23 0953   Wound Length (cm) 8 cm 07/28/23 0953   Wound Width (cm) 0.4 cm 07/28/23 0953   Wound Depth (cm) 0.1 cm 07/28/23 0953   Wound Surface Area (cm^2) 3.2 cm^2 07/28/23 0953   Wound Volume (cm^3) 0.32 cm^3 07/28/23 0953   Calculated Wound Volume (cm^3) 0.32 cm^3 07/28/23 0953   Drainage Amount Small 07/28/23 0953   Drainage Description Bloody 07/28/23 0953   Treatments Irrigation with NSS 07/28/23 0953       Wound 07/28/23 Surgical Abdomen Lower;Right (Active)   Date First Assessed: 07/28/23   Present on Original Admission: Yes  Primary Wound Type: Surgical  Location: Abdomen  Wound Location Orientation: Lower;Right       Wound 07/28/23 Surgical Abdomen Left; Lower (Active)   Date First Assessed: 07/28/23   Present on Original Admission: Yes  Primary Wound Type: Surgical  Location: Abdomen  Wound Location Orientation: Left; Lower       Wound 07/28/23 Surgical Abdomen Mid (Active)   Date First Assessed: 07/28/23   Present on Original Admission: Yes  Primary Wound Type: Surgical  Location: Abdomen  Wound Location Orientation: Mid       [REMOVED] Wound 07/15/22 Other (comment) Abdomen Right; Lower (Removed)   Resolved Date/Resolved Time: 10/14/22 0827  Date First Assessed/Time First Assessed: 07/15/22 1401   Primary Wound Type: Other (comment)  Location: Abdomen  Wound Location Orientation: Right; Lower  Wound Outcome: Other (Comment)       [REMOVED] Wound 09/15/22 Abdomen N/A (Removed)   Resolved Date/Resolved Time: 10/14/22 0826  Date First Assessed/Time First Assessed: 09/15/22 1359   Location: Abdomen  Wound Location Orientation: N/A  Wound Description (Comments): vac dressing-RLQ (1 black sponge, 1 white sponge), midline- surgical...        [REMOVED] Wound 09/15/22 Abdomen Lower;Quadrant;Right (Removed)   Resolved Date/Resolved Time: 10/14/22 0827  Date First Assessed: 09/15/22   Location: Abdomen  Wound Location Orientation: Lower;Quadrant;Right  Wound Outcome: Other (Comment)       [REMOVED] Wound 10/11/22 Abdomen N/A (Removed)   Resolved Date/Resolved Time: 10/14/22 0827  Date First Assessed/Time First Assessed: 10/11/22 1545   Location: Abdomen  Wound Location Orientation: N/A  Wound Description (Comments): wound vac 1 incision black sponge 1 incision black sponge one black . .. [REMOVED] Wound 10/14/22 Surgical Abdomen Medial;Lower (Removed)   Resolved Date: 07/28/23  Date First Assessed/Time First Assessed: 10/14/22 0828   Primary Wound Type: Surgical  Location: Abdomen  Wound Location Orientation: Medial;Lower  Dressing Status: Other (Comment)       [REMOVED] Wound 10/14/22 Surgical Abdomen Right; Lower (Removed)   Resolved Date: 07/28/23  Date First Assessed/Time First Assessed: 10/14/22 0829   Primary Wound Type: Surgical  Location: Abdomen  Wound Location Orientation: Right; Lower  Wound Outcome: Other (Comment)       [REMOVED] Wound 11/15/22 Penis Right (Removed)   Resolved Date/Resolved Time: 11/19/22 0218  Date First Assessed/Time First Assessed: 11/15/22 2250   Location: Penis  Wound Location Orientation: Right  Wound Outcome: Healed       [REMOVED] Wound 01/03/23 Penis Right (Removed)   Resolved Date: 07/28/23  Date First Assessed/Time First Assessed: 01/03/23 1735   Location: Penis  Wound Location Orientation: Right  Wound Description (Comments): benzoin, tegaderm   Dressing Status: Other (Comment)       [REMOVED] Wound 01/03/23 Perineum Right (Removed)   Resolved Date: 01/06/23  Date First Assessed/Time First Assessed: 01/03/23 1735   Location: Perineum  Wound Location Orientation: Right  Wound Outcome: Other (Comment)       [REMOVED] Wound 01/11/23 Thigh Left (Removed)   Resolved Date: 07/28/23  Date First Assessed/Time First Assessed: 01/11/23 1152   Location: Thigh  Wound Location Orientation: Left  Wound Description (Comments):  PROMOGRAN OSMIN 4.3 X 4.3IN X 9 DRESSINGS  Incision's 1st Dressing: DRESSING OPTILOCK 6... [REMOVED] Wound 01/11/23 Abdomen N/A (Removed)   Resolved Date: 01/27/23  Date First Assessed/Time First Assessed: 01/11/23 1232   Location: Abdomen  Wound Location Orientation: N/A  Wound Description (Comments): WOUND VAC APPLIED USING LARGE DRESSING  Incision's 1st Dressing: DRESSING XEROFORM 5 X . .. Subjective:      . Mr. Cruz Jules is a 30-year-old gentleman here for follow-up from hospitalization for large abdominal wound. He he developed an incarcerated right lower quadrant hernia requiring excision debridement of the wound and bridging closure with absorbable mesh. He had a large retention sutures in the abdominal wall but developed necrosis of the abdominal wall. He was readmitted underwent excisional debridement and is here for evaluation. 10/21/2022. Overall doing okay. No significant pain. Having some issues with the VAC dressing but overall working    11/04/2022. Doing well. Trying to increase his protein. Still having significant fatigue episodes. Still going through canisters quickly    11/25/2022. He was hospitalized last week for severe sepsis and hydronephrosis and acute kidney injury from an obstructing ureteral stone and underwent cystoscopy and stent placement. He is at home now and doing well. No other issues    12/9/2022. Doing well. Was scheduled for urology but got delayed January. Denies fevers or chills    1/6/2023. He has been doing well. He was seen by plastic surgery and is scheduled for skin grafting next week. He also underwent repeat cystoscopy and stent    7/28/2023 he returns now for reevaluation. After being seen here last in January he was seen by plastic surgery he underwent split-thickness skin grafting. The wound has healed since significantly except for a few corners that have not healed so returns for reevaluation.       The following portions of the patient's history were reviewed and updated as appropriate: allergies, current medications, past family history, past medical history, past social history, past surgical history and problem list.    Review of Systems   Constitutional: Negative for chills and fever. HENT: Negative for ear pain and sore throat. Eyes: Negative for pain and visual disturbance. Respiratory: Negative for cough and shortness of breath. Cardiovascular: Negative for chest pain and palpitations. Gastrointestinal: Negative for abdominal pain and vomiting. Musculoskeletal: Positive for back pain and gait problem. Negative for arthralgias. Skin: Negative for color change and rash. Neurological: Negative for seizures and syncope. Psychiatric/Behavioral: Negative for agitation and behavioral problems. All other systems reviewed and are negative. Objective:       Wound 07/28/23 Surgical Abdomen Lower;Right (Active)   Wound Image Images linked 07/28/23 0949   Wound Description Pink;Dry 07/28/23 0924   Lis-wound Assessment Intact;Scar Tissue;Dry 07/28/23 0924   Wound Length (cm) 1 cm 07/28/23 0924   Wound Width (cm) 0.6 cm 07/28/23 0924   Wound Depth (cm) 0.1 cm 07/28/23 0924   Wound Surface Area (cm^2) 0.6 cm^2 07/28/23 0924   Wound Volume (cm^3) 0.06 cm^3 07/28/23 0924   Calculated Wound Volume (cm^3) 0.06 cm^3 07/28/23 0924   Drainage Amount Small 07/28/23 0924   Drainage Description Purulent 07/28/23 0924   Treatments Irrigation with NSS 07/28/23 0924       Wound 07/28/23 Surgical Abdomen Left; Lower (Active)   Wound Image Images linked 07/28/23 0948   Wound Description Pink;Dry 07/28/23 0925   Lis-wound Assessment Intact;Dry;Scar Tissue 07/28/23 0925   Wound Length (cm) 6.3 cm 07/28/23 0925   Wound Width (cm) 3 cm 07/28/23 0925   Wound Depth (cm) 0.1 cm 07/28/23 0925   Wound Surface Area (cm^2) 18.9 cm^2 07/28/23 0925   Wound Volume (cm^3) 1.89 cm^3 07/28/23 0925   Calculated Wound Volume (cm^3) 1.89 cm^3 07/28/23 0925   Drainage Amount Small 07/28/23 0956 Drainage Description Serous 07/28/23 0925   Treatments Irrigation with NSS 07/28/23 0925       Wound 07/28/23 Surgical Abdomen Mid (Active)   Wound Image Images linked 07/28/23 0954   Wound Description Beefy red 07/28/23 0953   Lis-wound Assessment Intact;Scar Tissue 07/28/23 0953   Wound Length (cm) 8 cm 07/28/23 0953   Wound Width (cm) 0.4 cm 07/28/23 0953   Wound Depth (cm) 0.1 cm 07/28/23 0953   Wound Surface Area (cm^2) 3.2 cm^2 07/28/23 0953   Wound Volume (cm^3) 0.32 cm^3 07/28/23 0953   Calculated Wound Volume (cm^3) 0.32 cm^3 07/28/23 0953   Drainage Amount Small 07/28/23 0953   Drainage Description Bloody 07/28/23 0953   Treatments Irrigation with NSS 07/28/23 0953       /70   Pulse 72   Temp 97.5 °F (36.4 °C)   Resp 18     Physical Exam  Vitals and nursing note reviewed. Constitutional:       General: He is not in acute distress. Appearance: He is well-developed. He is obese. He is not diaphoretic. HENT:      Head: Normocephalic and atraumatic. Eyes:      Pupils: Pupils are equal, round, and reactive to light. Cardiovascular:      Rate and Rhythm: Normal rate and regular rhythm. Pulmonary:      Effort: Pulmonary effort is normal. No respiratory distress. Breath sounds: Normal breath sounds. Abdominal:      Palpations: Abdomen is soft. Comments: Obese abdomen. See complete wound assessment for measurements of abdominal wounds   Musculoskeletal:         General: Normal range of motion. Cervical back: Normal range of motion and neck supple. Skin:     General: Skin is warm and dry. Neurological:      Mental Status: He is alert and oriented to person, place, and time. Psychiatric:         Behavior: Behavior normal.           Wound Instructions:  Orders Placed This Encounter   Procedures   • Wound cleansing and dressings     Wash your hands with soap and water. Remove old dressing, discard into plastic bag and place in trash.   Cleanse the wound with mild soap(Dove) and water prior to applying a clean dressing. Do not use tissue or cotton balls. Do not scrub the wound. Pat dry using gauze. Shower yes, on the days you change the dressing. Abdominal Wounds:   Apply dermagran to the wound beds. Cover with dry gauze. Secure with paper tape or medfix tape. Change dressing three times a week. Follow up at the wound center in two weeks. Treatment today: Cleansed with NSS, and dressed as above. Standing Status:   Future     Standing Expiration Date:   7/28/2024   • Debridement     This order was created via procedure documentation   • Debridement     This order was created via procedure documentation        Diagnosis ICD-10-CM Associated Orders   1. Non-healing surgical wound, initial encounter  T81.89XA lidocaine (LMX) 4 % cream     Wound cleansing and dressings      2.  Obesity, Class III, BMI 40-49.9 (morbid obesity) ([facility])  E66.01

## 2023-08-04 NOTE — ASSESSMENT & PLAN NOTE
A few open areas are mostly related to residual mesh at the base of the wound. The mesh was aggressively pulled out and debrided as best as possible with scissors. Any nonviable overlying epithelium was also debrided with a curette. We will have him follow-up in 2 weeks for evaluation.

## 2023-08-07 RX ORDER — FUROSEMIDE 20 MG/1
TABLET ORAL
Qty: 90 TABLET | Refills: 3 | Status: SHIPPED | OUTPATIENT
Start: 2023-08-07

## 2023-08-11 ENCOUNTER — OFFICE VISIT (OUTPATIENT)
Dept: WOUND CARE | Facility: CLINIC | Age: 68
End: 2023-08-11
Payer: MEDICARE

## 2023-08-11 VITALS
SYSTOLIC BLOOD PRESSURE: 142 MMHG | TEMPERATURE: 98 F | RESPIRATION RATE: 18 BRPM | HEART RATE: 60 BPM | DIASTOLIC BLOOD PRESSURE: 80 MMHG

## 2023-08-11 DIAGNOSIS — E66.01 OBESITY, CLASS III, BMI 40-49.9 (MORBID OBESITY) (HCC): ICD-10-CM

## 2023-08-11 DIAGNOSIS — T81.89XA NON-HEALING SURGICAL WOUND, INITIAL ENCOUNTER: Primary | ICD-10-CM

## 2023-08-11 PROCEDURE — 11042 DBRDMT SUBQ TIS 1ST 20SQCM/<: CPT | Performed by: SURGERY

## 2023-08-11 RX ORDER — LIDOCAINE 40 MG/G
CREAM TOPICAL ONCE
Status: COMPLETED | OUTPATIENT
Start: 2023-08-11 | End: 2023-08-11

## 2023-08-11 RX ADMIN — LIDOCAINE: 40 CREAM TOPICAL at 10:44

## 2023-08-11 NOTE — PATIENT INSTRUCTIONS
Orders Placed This Encounter   Procedures    Wound cleansing and dressings     Wash your hands with soap and water. Remove old dressing, discard into plastic bag and place in trash. Cleanse the wound with mild soap(Dove) and water prior to applying a clean dressing. Do not use tissue or cotton balls. Do not scrub the wound. Pat dry using gauze. Shower yes, on the days you change the dressing. Abdominal Wounds:     Apply dermagran to the wound beds. Cover with dry gauze. Secure with paper tape or medfix tape. Change dressing three times a week. Treatment today: Cleansed with NSS, and dressed as above.                Standing Status:   Future     Standing Expiration Date:   8/11/2024

## 2023-08-11 NOTE — PROGRESS NOTES
Patient ID: Mauri Perkins is a 80 y.o. male Date of Birth 1955     Chief Complaint  Chief Complaint   Patient presents with   • Follow Up Wound Care Visit     Abdominal wounds        Allergies  Codeine    Assessment:    Nonhealing surgical wound  There is been improvement in a lot of the areas. A few more areas with mesh exposure that were removed. Continue the same care. Follow-up in 2 weeks       Diagnoses and all orders for this visit:    Non-healing surgical wound, initial encounter  -     lidocaine (LMX) 4 % cream  -     Wound cleansing and dressings; Future    Obesity, Class III, BMI 40-49.9 (morbid obesity) ([address] [facility])    Other orders  -     Debridement              Debridement   Wound 07/28/23 Surgical Abdomen Left; Lower    Universal Protocol:  Consent given by: patient  Time out: Immediately prior to procedure a "time out" was called to verify the correct patient, procedure, equipment, support staff and site/side marked as required. Performed by: physician  Debridement type: surgical  Level of debridement: subcutaneous tissue  Pain control: lidocaine 4%  Post-debridement measurements  Length (cm): 8.3  Width (cm): 4  Depth (cm): 0.2  Percent debrided: 10%  Surface Area (cm^2): 33.2  Area debrided (cm^2): 3.32  Volume (cm^3): 6.64    Tissue and other material debrided: subcutaneous tissue  Devitalized tissue debrided: biofilm and slough  Instrument(s) utilized: curette, forceps and scissors  Procedural pain (0-10): 1  Post-procedural pain: 1   Response to treatment: procedure was tolerated well  Debridement Comments: Portions of old mesh were excised from the open areas.         Plan:     Wound 07/28/23 Surgical Abdomen Lower;Right (Active)   Wound Image Images linked 08/11/23 1030   Wound Description Pink 08/11/23 1037   Lis-wound Assessment Intact;Scar Tissue;Dry 08/11/23 1037   Wound Length (cm) 0.3 cm 08/11/23 1037   Wound Width (cm) 0.2 cm 08/11/23 1037   Wound Depth (cm) 0.1 cm 08/11/23 1037 Wound Surface Area (cm^2) 0.06 cm^2 08/11/23 1037   Wound Volume (cm^3) 0.006 cm^3 08/11/23 1037   Calculated Wound Volume (cm^3) 0.01 cm^3 08/11/23 1037   Change in Wound Size % 83.33 08/11/23 1037   Drainage Amount Scant 08/11/23 1037   Drainage Description Serous 08/11/23 1037   Dressing Status Intact 08/11/23 1037       Wound 07/28/23 Surgical Abdomen Left; Lower (Active)   Wound Image Images linked 08/11/23 1053   Wound Description Epithelialization;Pink;Yellow; Other (Comment) (mesh) 08/11/23 1037   Lis-wound Assessment Intact;Dry;Scar Tissue 08/11/23 1037   Wound Length (cm) 8.3 cm 08/11/23 1037   Wound Width (cm) 4 cm 08/11/23 1037   Wound Depth (cm) 0.2 cm 08/11/23 1037   Wound Surface Area (cm^2) 33.2 cm^2 08/11/23 1037   Wound Volume (cm^3) 6.64 cm^3 08/11/23 1037   Calculated Wound Volume (cm^3) 6.64 cm^3 08/11/23 1037   Change in Wound Size % -251.32 08/11/23 1037   Drainage Amount Small 08/11/23 1037   Drainage Description Serosanguineous; Yellow 08/11/23 1037   Dressing Status Intact 08/11/23 1037       Wound 07/28/23 Surgical Abdomen Mid (Active)   Wound Image Images linked 08/11/23 1031   Wound Description Pink 08/11/23 1036   Lis-wound Assessment Intact;Scar Tissue;Dry 08/11/23 1036   Wound Length (cm) 0.3 cm 08/11/23 1036   Wound Width (cm) 0.7 cm 08/11/23 1036   Wound Depth (cm) 0.1 cm 08/11/23 1036   Wound Surface Area (cm^2) 0.21 cm^2 08/11/23 1036   Wound Volume (cm^3) 0.021 cm^3 08/11/23 1036   Calculated Wound Volume (cm^3) 0.02 cm^3 08/11/23 1036   Change in Wound Size % 93.75 08/11/23 1036   Drainage Amount Small 08/11/23 1036   Drainage Description Serous; Yellow 08/11/23 1036   Dressing Status Intact 08/11/23 1036       Wound 07/28/23 Surgical Abdomen Lower;Right (Active)   Date First Assessed: 07/28/23   Present on Original Admission: Yes  Primary Wound Type: Surgical  Location: Abdomen  Wound Location Orientation: Lower;Right       Wound 07/28/23 Surgical Abdomen Left; Lower (Active) Date First Assessed: 07/28/23   Present on Original Admission: Yes  Primary Wound Type: Surgical  Location: Abdomen  Wound Location Orientation: Left; Lower       Wound 07/28/23 Surgical Abdomen Mid (Active)   Date First Assessed: 07/28/23   Present on Original Admission: Yes  Primary Wound Type: Surgical  Location: Abdomen  Wound Location Orientation: Mid       [REMOVED] Wound 07/15/22 Other (comment) Abdomen Right; Lower (Removed)   Resolved Date/Resolved Time: 10/14/22 0827  Date First Assessed/Time First Assessed: 07/15/22 1401   Primary Wound Type: Other (comment)  Location: Abdomen  Wound Location Orientation: Right; Lower  Wound Outcome: Other (Comment)       [REMOVED] Wound 09/15/22 Abdomen N/A (Removed)   Resolved Date/Resolved Time: 10/14/22 0826  Date First Assessed/Time First Assessed: 09/15/22 1359   Location: Abdomen  Wound Location Orientation: N/A  Wound Description (Comments): vac dressing-RLQ (1 black sponge, 1 white sponge), midline- surgical... [REMOVED] Wound 09/15/22 Abdomen Lower;Quadrant;Right (Removed)   Resolved Date/Resolved Time: 10/14/22 0827  Date First Assessed: 09/15/22   Location: Abdomen  Wound Location Orientation: Lower;Quadrant;Right  Wound Outcome: Other (Comment)       [REMOVED] Wound 10/11/22 Abdomen N/A (Removed)   Resolved Date/Resolved Time: 10/14/22 0827  Date First Assessed/Time First Assessed: 10/11/22 1545   Location: Abdomen  Wound Location Orientation: N/A  Wound Description (Comments): wound vac 1 incision black sponge 1 incision black sponge one black . .. [REMOVED] Wound 10/14/22 Surgical Abdomen Medial;Lower (Removed)   Resolved Date: 07/28/23  Date First Assessed/Time First Assessed: 10/14/22 0828   Primary Wound Type: Surgical  Location: Abdomen  Wound Location Orientation: Medial;Lower  Dressing Status: Other (Comment)       [REMOVED] Wound 10/14/22 Surgical Abdomen Right; Lower (Removed)   Resolved Date: 07/28/23  Date First Assessed/Time First Assessed: 10/14/22 0829   Primary Wound Type: Surgical  Location: Abdomen  Wound Location Orientation: Right; Lower  Wound Outcome: Other (Comment)       [REMOVED] Wound 11/15/22 Penis Right (Removed)   Resolved Date/Resolved Time: 11/19/22 0218  Date First Assessed/Time First Assessed: 11/15/22 2250   Location: Penis  Wound Location Orientation: Right  Wound Outcome: Healed       [REMOVED] Wound 01/03/23 Penis Right (Removed)   Resolved Date: 07/28/23  Date First Assessed/Time First Assessed: 01/03/23 1735   Location: Penis  Wound Location Orientation: Right  Wound Description (Comments): benzoin, tegaderm   Dressing Status: Other (Comment)       [REMOVED] Wound 01/03/23 Perineum Right (Removed)   Resolved Date: 01/06/23  Date First Assessed/Time First Assessed: 01/03/23 1735   Location: Perineum  Wound Location Orientation: Right  Wound Outcome: Other (Comment)       [REMOVED] Wound 01/11/23 Thigh Left (Removed)   Resolved Date: 07/28/23  Date First Assessed/Time First Assessed: 01/11/23 1152   Location: Thigh  Wound Location Orientation: Left  Wound Description (Comments): PROMOGRAN OSMIN 4.3 X 4.3IN X 9 DRESSINGS  Incision's 1st Dressing: DRESSING OPTILOCK 6... [REMOVED] Wound 01/11/23 Abdomen N/A (Removed)   Resolved Date: 01/27/23  Date First Assessed/Time First Assessed: 01/11/23 1232   Location: Abdomen  Wound Location Orientation: N/A  Wound Description (Comments): WOUND VAC APPLIED USING LARGE DRESSING  Incision's 1st Dressing: DRESSING XEROFORM 5 X . .. Subjective:      . Mr. Robel Olvera is a 56-year-old gentleman here for follow-up from hospitalization for large abdominal wound. He he developed an incarcerated right lower quadrant hernia requiring excision debridement of the wound and bridging closure with absorbable mesh. He had a large retention sutures in the abdominal wall but developed necrosis of the abdominal wall.   He was readmitted underwent excisional debridement and is here for evaluation. 10/21/2022. Overall doing okay. No significant pain. Having some issues with the VAC dressing but overall working    11/04/2022. Doing well. Trying to increase his protein. Still having significant fatigue episodes. Still going through canisters quickly    11/25/2022. He was hospitalized last week for severe sepsis and hydronephrosis and acute kidney injury from an obstructing ureteral stone and underwent cystoscopy and stent placement. He is at home now and doing well. No other issues    12/9/2022. Doing well. Was scheduled for urology but got delayed January. Denies fevers or chills    1/6/2023. He has been doing well. He was seen by plastic surgery and is scheduled for skin grafting next week. He also underwent repeat cystoscopy and stent    7/28/2023 he returns now for reevaluation. After being seen here last in January he was seen by plastic surgery he underwent split-thickness skin grafting. The wound has healed since significantly except for a few corners that have not healed so returns for reevaluation. 8/11/2023 overall he feels there is been some improvement of the wounds. He does notice a new area on his left upper side. The following portions of the patient's history were reviewed and updated as appropriate: allergies, current medications, past family history, past medical history, past social history, past surgical history and problem list.    Review of Systems   Constitutional: Negative for chills and fever. HENT: Negative for ear pain and sore throat. Eyes: Negative for pain and visual disturbance. Respiratory: Negative for cough and shortness of breath. Cardiovascular: Negative for chest pain and palpitations. Gastrointestinal: Negative for abdominal pain and vomiting. Musculoskeletal: Positive for back pain and gait problem. Negative for arthralgias. Skin: Negative for color change and rash. Neurological: Negative for seizures and syncope. Psychiatric/Behavioral: Negative for agitation and behavioral problems. All other systems reviewed and are negative. Objective:       Wound 07/28/23 Surgical Abdomen Lower;Right (Active)   Wound Image Images linked 08/11/23 1030   Wound Description Pink 08/11/23 1037   Lis-wound Assessment Intact;Scar Tissue;Dry 08/11/23 1037   Wound Length (cm) 0.3 cm 08/11/23 1037   Wound Width (cm) 0.2 cm 08/11/23 1037   Wound Depth (cm) 0.1 cm 08/11/23 1037   Wound Surface Area (cm^2) 0.06 cm^2 08/11/23 1037   Wound Volume (cm^3) 0.006 cm^3 08/11/23 1037   Calculated Wound Volume (cm^3) 0.01 cm^3 08/11/23 1037   Change in Wound Size % 83.33 08/11/23 1037   Drainage Amount Scant 08/11/23 1037   Drainage Description Serous 08/11/23 1037   Dressing Status Intact 08/11/23 1037       Wound 07/28/23 Surgical Abdomen Left; Lower (Active)   Wound Image Images linked 08/11/23 1053   Wound Description Epithelialization;Pink;Yellow; Other (Comment) (mesh) 08/11/23 1037   Lis-wound Assessment Intact;Dry;Scar Tissue 08/11/23 1037   Wound Length (cm) 8.3 cm 08/11/23 1037   Wound Width (cm) 4 cm 08/11/23 1037   Wound Depth (cm) 0.2 cm 08/11/23 1037   Wound Surface Area (cm^2) 33.2 cm^2 08/11/23 1037   Wound Volume (cm^3) 6.64 cm^3 08/11/23 1037   Calculated Wound Volume (cm^3) 6.64 cm^3 08/11/23 1037   Change in Wound Size % -251.32 08/11/23 1037   Drainage Amount Small 08/11/23 1037   Drainage Description Serosanguineous; Yellow 08/11/23 1037   Dressing Status Intact 08/11/23 1037       Wound 07/28/23 Surgical Abdomen Mid (Active)   Wound Image Images linked 08/11/23 1031   Wound Description Pink 08/11/23 1036   Lis-wound Assessment Intact;Scar Tissue;Dry 08/11/23 1036   Wound Length (cm) 0.3 cm 08/11/23 1036   Wound Width (cm) 0.7 cm 08/11/23 1036   Wound Depth (cm) 0.1 cm 08/11/23 1036   Wound Surface Area (cm^2) 0.21 cm^2 08/11/23 1036   Wound Volume (cm^3) 0.021 cm^3 08/11/23 1036   Calculated Wound Volume (cm^3) 0.02 cm^3 08/11/23 1036   Change in Wound Size % 93.75 08/11/23 1036   Drainage Amount Small 08/11/23 1036   Drainage Description Serous; Yellow 08/11/23 1036   Dressing Status Intact 08/11/23 1036       /80   Pulse 60   Temp 98 °F (36.7 °C)   Resp 18     Physical Exam  Vitals and nursing note reviewed. Constitutional:       General: He is not in acute distress. Appearance: He is well-developed. He is obese. He is not diaphoretic. HENT:      Head: Normocephalic and atraumatic. Eyes:      Pupils: Pupils are equal, round, and reactive to light. Cardiovascular:      Rate and Rhythm: Normal rate and regular rhythm. Pulmonary:      Effort: Pulmonary effort is normal. No respiratory distress. Breath sounds: Normal breath sounds. Abdominal:      Palpations: Abdomen is soft. Comments: Obese abdomen. See complete wound assessment for measurements of abdominal wounds   Musculoskeletal:         General: Normal range of motion. Cervical back: Normal range of motion and neck supple. Skin:     General: Skin is warm and dry. Neurological:      Mental Status: He is alert and oriented to person, place, and time. Psychiatric:         Behavior: Behavior normal.           Wound Instructions:  Orders Placed This Encounter   Procedures   • Wound cleansing and dressings     Wash your hands with soap and water. Remove old dressing, discard into plastic bag and place in trash. Cleanse the wound with mild soap(Dove) and water prior to applying a clean dressing. Do not use tissue or cotton balls. Do not scrub the wound. Pat dry using gauze. Shower yes, on the days you change the dressing.        Abdominal Wounds:     Apply dermagran to the wound beds. Cover with dry gauze. Secure with paper tape or medfix tape.   Change dressing three times a week.         Treatment today: Cleansed with NSS, and dressed as above.               Standing Status:   Future     Standing Expiration Date:   8/11/2024   • Debridement     This order was created via procedure documentation        Diagnosis ICD-10-CM Associated Orders   1. Non-healing surgical wound, initial encounter  T81.89XA lidocaine (LMX) 4 % cream     Wound cleansing and dressings      2.  Obesity, Class III, BMI 40-49.9 (morbid obesity) ([facility])  E66.01

## 2023-08-11 NOTE — ASSESSMENT & PLAN NOTE
There is been improvement in a lot of the areas. A few more areas with mesh exposure that were removed. Continue the same care.   Follow-up in 2 weeks

## 2023-08-15 DIAGNOSIS — N18.9 CHRONIC RENAL FAILURE, UNSPECIFIED CKD STAGE: ICD-10-CM

## 2023-08-15 RX ORDER — TAMSULOSIN HYDROCHLORIDE 0.4 MG/1
CAPSULE ORAL
Qty: 180 CAPSULE | Refills: 3 | Status: SHIPPED | OUTPATIENT
Start: 2023-08-15

## 2023-08-21 DIAGNOSIS — T14.8XXA SURGICAL WOUND PRESENT: ICD-10-CM

## 2023-08-21 RX ORDER — GABAPENTIN 100 MG/1
200 CAPSULE ORAL 3 TIMES DAILY
Qty: 540 CAPSULE | Refills: 3 | Status: SHIPPED | OUTPATIENT
Start: 2023-08-21

## 2023-08-25 ENCOUNTER — OFFICE VISIT (OUTPATIENT)
Dept: WOUND CARE | Facility: CLINIC | Age: 68
End: 2023-08-25
Payer: MEDICARE

## 2023-08-25 VITALS
TEMPERATURE: 97.5 F | DIASTOLIC BLOOD PRESSURE: 80 MMHG | SYSTOLIC BLOOD PRESSURE: 122 MMHG | HEART RATE: 58 BPM | RESPIRATION RATE: 18 BRPM

## 2023-08-25 DIAGNOSIS — T81.89XA NON-HEALING SURGICAL WOUND, INITIAL ENCOUNTER: Primary | ICD-10-CM

## 2023-08-25 DIAGNOSIS — E66.01 OBESITY, CLASS III, BMI 40-49.9 (MORBID OBESITY) (HCC): ICD-10-CM

## 2023-08-25 PROCEDURE — 11042 DBRDMT SUBQ TIS 1ST 20SQCM/<: CPT | Performed by: SURGERY

## 2023-08-25 RX ORDER — LIDOCAINE 40 MG/G
CREAM TOPICAL ONCE
Status: COMPLETED | OUTPATIENT
Start: 2023-08-25 | End: 2023-08-25

## 2023-08-25 RX ADMIN — LIDOCAINE: 40 CREAM TOPICAL at 10:51

## 2023-08-25 NOTE — PATIENT INSTRUCTIONS
Orders Placed This Encounter   Procedures    Wound cleansing and dressings         Wash your hands with soap and water. Remove old dressing, discard into plastic bag and place in trash. Cleanse the wound with mild soap(Dove) and water prior to applying a clean dressing. Do not use tissue or cotton balls. Do not scrub the wound. Pat dry using gauze. Shower yes, on the days you change the dressing. Abdominal Wounds:      Apply dermagran to the wound beds. Cover with ABD. Secure with paper tape or medfix tape. Only put tape on good skin. Change dressing three times a week. Follow up at the wound center in three weeks. If wounds get worse, contact the wound center for an earlier appointment. Treatment today: Cleansed with NSS, and dressed as above.      Standing Status:   Future     Standing Expiration Date:   8/25/2024

## 2023-09-01 NOTE — PROGRESS NOTES
Patient ID: James Rajput is a 80 y.o. male Date of Birth 1955     Chief Complaint  Chief Complaint   Patient presents with   • Follow Up Wound Care Visit     Abdominal wounds       Allergies  Codeine    Assessment:    Nonhealing surgical wound  There has been some improvement in the areas where the mesh was removed on the last visit. There are a few new areas where mesh was exposed and removed again. Continue the same care. Follow-up in 2 to 3 weeks       Diagnoses and all orders for this visit:    Non-healing surgical wound, initial encounter  -     lidocaine (LMX) 4 % cream  -     Wound cleansing and dressings; Future    Obesity, Class III, BMI 40-49.9 (morbid obesity) ([address])    Other orders  -     Debridement  -     Debridement              Debridement   Wound 07/28/23 Surgical Abdomen Lower;Right    Universal Protocol:  Consent given by: patient  Time out: Immediately prior to procedure a "time out" was called to verify the correct patient, procedure, equipment, support staff and site/side marked as required. Performed by: physician  Debridement type: surgical  Level of debridement: subcutaneous tissue  Pain control: lidocaine 4%  Post-debridement measurements  Length (cm): 0.7  Width (cm): 0.6  Depth (cm): 0.1  Percent debrided: 100%  Surface Area (cm^2): 0.42  Area debrided (cm^2): 0.42  Volume (cm^3): 0.04  Tissue and other material debrided: subcutaneous tissue  Devitalized tissue debrided: biofilm and slough  Instrument(s) utilized: curette  Procedural pain (0-10): 1  Post-procedural pain: 1   Response to treatment: procedure was tolerated well    Debridement   Wound 07/28/23 Surgical Abdomen Left; Lower    Universal Protocol:  Consent given by: patient  Time out: Immediately prior to procedure a "time out" was called to verify the correct patient, procedure, equipment, support staff and site/side marked as required.     Performed by: physician  Debridement type: surgical  Level of debridement: subcutaneous tissue  Pain control: lidocaine 4%  Post-debridement measurements  Length (cm): 8  Width (cm): 5.5  Depth (cm): 0.1  Percent debrided: 20%  Surface Area (cm^2): 44  Area debrided (cm^2): 8.8  Volume (cm^3): 4.4  Tissue and other material debrided: subcutaneous tissue  Devitalized tissue debrided: biofilm and slough  Instrument(s) utilized: curette  Procedural pain (0-10): 1  Post-procedural pain: 1   Response to treatment: procedure was tolerated well          Plan:     Wound 07/28/23 Surgical Abdomen Lower;Right (Active)   Wound Image Images linked 08/25/23 1049   Wound Description Beefy red;Bleeding 08/25/23 1023   Lis-wound Assessment Intact;Scar Tissue;Dry 08/25/23 1023   Wound Length (cm) 0.7 cm 08/25/23 1023   Wound Width (cm) 0.6 cm 08/25/23 1023   Wound Depth (cm) 0.1 cm 08/25/23 1023   Wound Surface Area (cm^2) 0.42 cm^2 08/25/23 1023   Wound Volume (cm^3) 0.042 cm^3 08/25/23 1023   Calculated Wound Volume (cm^3) 0.04 cm^3 08/25/23 1023   Change in Wound Size % 33.33 08/25/23 1023   Drainage Amount Small 08/25/23 1023   Drainage Description Serosanguineous; Yellow 08/25/23 1023   Treatments Irrigation with NSS 08/25/23 1023       Wound 07/28/23 Surgical Abdomen Left; Lower (Active)   Wound Image Images linked 08/25/23 1049   Wound Description Beefy red;Granulation tissue;Bleeding 08/25/23 1026   Lis-wound Assessment Intact;Dry;Scar Tissue 08/25/23 1026   Wound Length (cm) 8 cm 08/25/23 1026   Wound Width (cm) 5.5 cm 08/25/23 1026   Wound Depth (cm) 0.1 cm 08/25/23 1026   Wound Surface Area (cm^2) 44 cm^2 08/25/23 1026   Wound Volume (cm^3) 4.4 cm^3 08/25/23 1026   Calculated Wound Volume (cm^3) 4.4 cm^3 08/25/23 1026   Change in Wound Size % -132.8 08/25/23 1026   Drainage Amount Small 08/25/23 1026   Drainage Description Serosanguineous; Yellow 08/25/23 1026   Treatments Irrigation with NSS 08/25/23 1026       Wound 07/28/23 Surgical Abdomen Mid (Active)   Wound Image Images linked 08/25/23 1021   Wound Description Pink;Granulation tissue 08/25/23 1025   Lis-wound Assessment Intact;Scar Tissue;Dry 08/25/23 1025   Wound Length (cm) 0.2 cm 08/25/23 1025   Wound Width (cm) 0.3 cm 08/25/23 1025   Wound Depth (cm) 0.1 cm 08/25/23 1025   Wound Surface Area (cm^2) 0.06 cm^2 08/25/23 1025   Wound Volume (cm^3) 0.006 cm^3 08/25/23 1025   Calculated Wound Volume (cm^3) 0.01 cm^3 08/25/23 1025   Change in Wound Size % 96.88 08/25/23 1025   Drainage Amount Scant 08/25/23 1025   Drainage Description Serosanguineous; Yellow 08/25/23 1025   Treatments Irrigation with NSS 08/25/23 1025       Wound 07/28/23 Surgical Abdomen Lower;Right (Active)   Date First Assessed: 07/28/23   Present on Original Admission: Yes  Primary Wound Type: Surgical  Location: Abdomen  Wound Location Orientation: Lower;Right       Wound 07/28/23 Surgical Abdomen Left; Lower (Active)   Date First Assessed: 07/28/23   Present on Original Admission: Yes  Primary Wound Type: Surgical  Location: Abdomen  Wound Location Orientation: Left; Lower       Wound 07/28/23 Surgical Abdomen Mid (Active)   Date First Assessed: 07/28/23   Present on Original Admission: Yes  Primary Wound Type: Surgical  Location: Abdomen  Wound Location Orientation: Mid       [REMOVED] Wound 10/14/22 Surgical Abdomen Medial;Lower (Removed)   Resolved Date: 07/28/23  Date First Assessed/Time First Assessed: 10/14/22 0828   Primary Wound Type: Surgical  Location: Abdomen  Wound Location Orientation: Medial;Lower  Dressing Status: Other (Comment)       [REMOVED] Wound 10/14/22 Surgical Abdomen Right; Lower (Removed)   Resolved Date: 07/28/23  Date First Assessed/Time First Assessed: 10/14/22 0829   Primary Wound Type: Surgical  Location: Abdomen  Wound Location Orientation: Right; Lower  Wound Outcome: Other (Comment)       [REMOVED] Wound 01/03/23 Penis Right (Removed)   Resolved Date: 07/28/23  Date First Assessed/Time First Assessed: 01/03/23 0904   Location: Penis  Wound Location Orientation: Right  Wound Description (Comments): benzoin, tegaderm   Dressing Status: Other (Comment)       [REMOVED] Wound 01/11/23 Thigh Left (Removed)   Resolved Date: 07/28/23  Date First Assessed/Time First Assessed: 01/11/23 1152   Location: Thigh  Wound Location Orientation: Left  Wound Description (Comments): PROMOGRAN OSMIN 4.3 X 4.3IN X 9 DRESSINGS  Incision's 1st Dressing: DRESSING OPTILOCK 6... Subjective:      . Mr. Sal Skelton is a 79-year-old gentleman here for follow-up from hospitalization for large abdominal wound. He he developed an incarcerated right lower quadrant hernia requiring excision debridement of the wound and bridging closure with absorbable mesh. He had a large retention sutures in the abdominal wall but developed necrosis of the abdominal wall. He was readmitted underwent excisional debridement and is here for evaluation. 10/21/2022. Overall doing okay. No significant pain. Having some issues with the VAC dressing but overall working    11/04/2022. Doing well. Trying to increase his protein. Still having significant fatigue episodes. Still going through canisters quickly    11/25/2022. He was hospitalized last week for severe sepsis and hydronephrosis and acute kidney injury from an obstructing ureteral stone and underwent cystoscopy and stent placement. He is at home now and doing well. No other issues    12/9/2022. Doing well. Was scheduled for urology but got delayed January. Denies fevers or chills    1/6/2023. He has been doing well. He was seen by plastic surgery and is scheduled for skin grafting next week. He also underwent repeat cystoscopy and stent    7/28/2023 he returns now for reevaluation. After being seen here last in January he was seen by plastic surgery he underwent split-thickness skin grafting. The wound has healed since significantly except for a few corners that have not healed so returns for reevaluation.     8/11/2023 overall he feels there is been some improvement of the wounds. He does notice a new area on his left upper side. 8/25/2023. Doing well. Had some improvement in certain areas still some protruding mesh others      The following portions of the patient's history were reviewed and updated as appropriate: allergies, current medications, past family history, past medical history, past social history, past surgical history and problem list.    Review of Systems   Constitutional: Negative for chills and fever. HENT: Negative for ear pain and sore throat. Eyes: Negative for pain and visual disturbance. Respiratory: Negative for cough and shortness of breath. Cardiovascular: Negative for chest pain and palpitations. Gastrointestinal: Negative for abdominal pain and vomiting. Musculoskeletal: Positive for back pain and gait problem. Negative for arthralgias. Skin: Negative for color change and rash. Neurological: Negative for seizures and syncope. Psychiatric/Behavioral: Negative for agitation and behavioral problems. All other systems reviewed and are negative. Objective:       Wound 07/28/23 Surgical Abdomen Lower;Right (Active)   Wound Image Images linked 08/25/23 1049   Wound Description Beefy red;Bleeding 08/25/23 1023   Lis-wound Assessment Intact;Scar Tissue;Dry 08/25/23 1023   Wound Length (cm) 0.7 cm 08/25/23 1023   Wound Width (cm) 0.6 cm 08/25/23 1023   Wound Depth (cm) 0.1 cm 08/25/23 1023   Wound Surface Area (cm^2) 0.42 cm^2 08/25/23 1023   Wound Volume (cm^3) 0.042 cm^3 08/25/23 1023   Calculated Wound Volume (cm^3) 0.04 cm^3 08/25/23 1023   Change in Wound Size % 33.33 08/25/23 1023   Drainage Amount Small 08/25/23 1023   Drainage Description Serosanguineous; Yellow 08/25/23 1023   Treatments Irrigation with NSS 08/25/23 1023       Wound 07/28/23 Surgical Abdomen Left; Lower (Active)   Wound Image Images linked 08/25/23 1049   Wound Description Beefy red;Granulation tissue;Bleeding 08/25/23 1026   Lis-wound Assessment Intact;Dry;Scar Tissue 08/25/23 1026   Wound Length (cm) 8 cm 08/25/23 1026   Wound Width (cm) 5.5 cm 08/25/23 1026   Wound Depth (cm) 0.1 cm 08/25/23 1026   Wound Surface Area (cm^2) 44 cm^2 08/25/23 1026   Wound Volume (cm^3) 4.4 cm^3 08/25/23 1026   Calculated Wound Volume (cm^3) 4.4 cm^3 08/25/23 1026   Change in Wound Size % -132.8 08/25/23 1026   Drainage Amount Small 08/25/23 1026   Drainage Description Serosanguineous; Yellow 08/25/23 1026   Treatments Irrigation with NSS 08/25/23 1026       Wound 07/28/23 Surgical Abdomen Mid (Active)   Wound Image Images linked 08/25/23 1021   Wound Description Pink;Granulation tissue 08/25/23 1025   Lis-wound Assessment Intact;Scar Tissue;Dry 08/25/23 1025   Wound Length (cm) 0.2 cm 08/25/23 1025   Wound Width (cm) 0.3 cm 08/25/23 1025   Wound Depth (cm) 0.1 cm 08/25/23 1025   Wound Surface Area (cm^2) 0.06 cm^2 08/25/23 1025   Wound Volume (cm^3) 0.006 cm^3 08/25/23 1025   Calculated Wound Volume (cm^3) 0.01 cm^3 08/25/23 1025   Change in Wound Size % 96.88 08/25/23 1025   Drainage Amount Scant 08/25/23 1025   Drainage Description Serosanguineous; Yellow 08/25/23 1025   Treatments Irrigation with NSS 08/25/23 1025       /80   Pulse 58   Temp 97.5 °F (36.4 °C)   Resp 18     Physical Exam  Vitals and nursing note reviewed. Constitutional:       General: He is not in acute distress. Appearance: He is well-developed. He is obese. He is not diaphoretic. HENT:      Head: Normocephalic and atraumatic. Eyes:      Pupils: Pupils are equal, round, and reactive to light. Cardiovascular:      Rate and Rhythm: Normal rate and regular rhythm. Pulmonary:      Effort: Pulmonary effort is normal. No respiratory distress. Breath sounds: Normal breath sounds. Abdominal:      Palpations: Abdomen is soft. Comments: Obese abdomen.   See complete wound assessment for measurements of abdominal wounds   Musculoskeletal: General: Normal range of motion. Cervical back: Normal range of motion and neck supple. Skin:     General: Skin is warm and dry. Neurological:      Mental Status: He is alert and oriented to person, place, and time. Psychiatric:         Behavior: Behavior normal.           Wound Instructions:  Orders Placed This Encounter   Procedures   • Wound cleansing and dressings         Wash your hands with soap and water. Remove old dressing, discard into plastic bag and place in trash. Cleanse the wound with mild soap(Dove) and water prior to applying a clean dressing. Do not use tissue or cotton balls. Do not scrub the wound. Pat dry using gauze. Shower yes, on the days you change the dressing.        Abdominal Wounds:      Apply dermagran to the wound beds. Cover with ABD. Secure with paper tape or medfix tape. Only put tape on good skin. Change dressing three times a week. Follow up at the wound center in three weeks. If wounds get worse, contact the wound center for an earlier appointment.        Treatment today: Cleansed with NSS, and dressed as above. Standing Status:   Future     Standing Expiration Date:   8/25/2024   • Debridement     This order was created via procedure documentation   • Debridement     This order was created via procedure documentation        Diagnosis ICD-10-CM Associated Orders   1. Non-healing surgical wound, initial encounter  T81.89XA lidocaine (LMX) 4 % cream     Wound cleansing and dressings      2.  Obesity, Class III, BMI 40-49.9 (morbid obesity) ([facility])  E66.01

## 2023-09-01 NOTE — ASSESSMENT & PLAN NOTE
There has been some improvement in the areas where the mesh was removed on the last visit. There are a few new areas where mesh was exposed and removed again. Continue the same care.   Follow-up in 2 to 3 weeks

## 2023-09-15 ENCOUNTER — OFFICE VISIT (OUTPATIENT)
Dept: WOUND CARE | Facility: CLINIC | Age: 68
End: 2023-09-15
Payer: MEDICARE

## 2023-09-15 VITALS
SYSTOLIC BLOOD PRESSURE: 128 MMHG | TEMPERATURE: 97.6 F | DIASTOLIC BLOOD PRESSURE: 78 MMHG | HEART RATE: 58 BPM | RESPIRATION RATE: 18 BRPM

## 2023-09-15 DIAGNOSIS — E66.01 OBESITY, CLASS III, BMI 40-49.9 (MORBID OBESITY) (HCC): ICD-10-CM

## 2023-09-15 DIAGNOSIS — T81.89XA NON-HEALING SURGICAL WOUND, INITIAL ENCOUNTER: Primary | ICD-10-CM

## 2023-09-15 PROCEDURE — 11042 DBRDMT SUBQ TIS 1ST 20SQCM/<: CPT | Performed by: SURGERY

## 2023-09-15 RX ORDER — LIDOCAINE 40 MG/G
CREAM TOPICAL ONCE
Status: COMPLETED | OUTPATIENT
Start: 2023-09-15 | End: 2023-09-15

## 2023-09-15 RX ADMIN — LIDOCAINE: 40 CREAM TOPICAL at 10:05

## 2023-09-15 NOTE — PATIENT INSTRUCTIONS
Orders Placed This Encounter   Procedures    Wound cleansing and dressings                                  Wash your hands with soap and water. Remove old dressing, discard into plastic bag and place in trash. Cleanse the wound with mild soap(Dove) and water prior to applying a clean dressing. Do not use tissue or cotton balls. Do not scrub the wound. Pat dry using gauze. Shower yes, on the days you change the dressing. Abdominal Wounds:   Apply dermagran to the wound beds. Cover with ABD. Secure with paper tape or medfix tape. Only put tape on good skin. Change dressing three times a week. Follow up at the wound center in 4 weeks. If wounds get worse, contact the wound center for an earlier appointment. Treatment today: Cleansed with NSS, and dressed as above.      Standing Status:   Future     Standing Expiration Date:   9/15/2024

## 2023-09-15 NOTE — PROGRESS NOTES
Patient ID: Jody Velasquez is a 80 y.o. male Date of Birth 1955     Chief Complaint  Chief Complaint   Patient presents with   • Follow Up Wound Care Visit     Abdominal wounds       Allergies  Codeine    Assessment:    Nonhealing surgical wound  There was more mesh on the left lower extremity wound. The rest the left side has improved. The middle and the right sided wounds were hypergranular and were cauterized. Continue the same care       Diagnoses and all orders for this visit:    Non-healing surgical wound, initial encounter  -     lidocaine (LMX) 4 % cream  -     Wound cleansing and dressings; Future    Obesity, Class III, BMI 40-49.9 (morbid obesity) ([facility])  -     Wound cleansing and dressings; Future    Other orders  -     Debridement  -     Chemical Caut Of A Wound              Debridement   Wound 07/28/23 Surgical Abdomen Left; Lower    Universal Protocol:  Consent given by: patient  Time out: Immediately prior to procedure a "time out" was called to verify the correct patient, procedure, equipment, support staff and site/side marked as required.     Performed by: physician  Debridement type: surgical  Level of debridement: subcutaneous tissue  Pain control: lidocaine 4%  Post-debridement measurements  Length (cm): 0.9  Width (cm): 2  Depth (cm): 0.1  Percent debrided: 100%  Surface Area (cm^2): 1.8  Area debrided (cm^2): 1.8  Volume (cm^3): 0.18  Tissue and other material debrided: subcutaneous tissue  Devitalized tissue debrided: biofilm and slough  Instrument(s) utilized: curette and scissors  Bleeding: small  Procedural pain (0-10): 1  Post-procedural pain: 1   Response to treatment: procedure was tolerated well  Debridement Comments: Removal of mesh from base of wound        Chemical Caut Of A Wound     Date/Time 9/15/2023 9:30 AM     Performed by  Nader Hurst MD   Authorized by Nader Hurst MD      Associated wounds:   Wound 07/28/23 Surgical Abdomen Lower;Right   [place] Protocol Consent: Verbal consent obtained. Risks and benefits: risks, benefits and alternatives were discussed  Consent given by: patient  Time out: Immediately prior to procedure a "time out" was called to verify the correct patient, procedure, equipment, support staff and site/side marked as required. Patient understanding: patient states understanding of the procedure being performed  Patient identity confirmed: verbally with patient        Local anesthesia used: yes     Anesthesia   Local anesthesia used: yes  Local Anesthetic: topical anesthetic     Procedure Details   Patient tolerance: patient tolerated the procedure well with no immediate complications             Plan:     Wound 07/28/23 Surgical Abdomen Lower;Right (Active)   Wound Image Images linked 09/15/23 0947   Wound Description Hypergranulation; Beefy red 09/15/23 0949   Lis-wound Assessment Intact;Scar Tissue;Dry 09/15/23 0949   Wound Length (cm) 1 cm 09/15/23 0949   Wound Width (cm) 1.2 cm 09/15/23 0949   Wound Depth (cm) 0.1 cm 09/15/23 0949   Wound Surface Area (cm^2) 1.2 cm^2 09/15/23 0949   Wound Volume (cm^3) 0.12 cm^3 09/15/23 0949   Calculated Wound Volume (cm^3) 0.12 cm^3 09/15/23 0949   Change in Wound Size % -100 09/15/23 0949   Drainage Amount Small 09/15/23 0949   Drainage Description Serosanguineous 09/15/23 0949   Non-staged Wound Description Full thickness 09/15/23 0949   Treatments Irrigation with NSS 09/15/23 0949       Wound 07/28/23 Surgical Abdomen Left; Lower (Active)   Wound Image Images linked 09/15/23 1003   Wound Description Yellow;Pink;Granulation tissue; Epithelialization 09/15/23 0951   Lis-wound Assessment Intact;Dry;Scar Tissue 09/15/23 0951   Wound Length (cm) 0.9 cm 09/15/23 0951   Wound Width (cm) 2 cm 09/15/23 0951   Wound Depth (cm) 0.1 cm 09/15/23 0951   Wound Surface Area (cm^2) 1.8 cm^2 09/15/23 0951   Wound Volume (cm^3) 0.18 cm^3 09/15/23 0951   Calculated Wound Volume (cm^3) 0.18 cm^3 09/15/23 0951   Change in Wound Size % 90.48 09/15/23 0951   Drainage Amount Small 09/15/23 0951   Drainage Description Serosanguineous 09/15/23 0951   Non-staged Wound Description Full thickness 09/15/23 0951   Treatments Irrigation with NSS 09/15/23 0951       Wound 07/28/23 Surgical Abdomen Mid (Active)   Wound Image Images linked 09/15/23 0947   Wound Description Hypergranulation;Yellow;Pink 09/15/23 0949   Lis-wound Assessment Intact;Scar Tissue;Dry 09/15/23 0949   Wound Length (cm) 0.5 cm 09/15/23 0949   Wound Width (cm) 1.1 cm 09/15/23 0949   Wound Depth (cm) 0.1 cm 09/15/23 0949   Wound Surface Area (cm^2) 0.55 cm^2 09/15/23 0949   Wound Volume (cm^3) 0.055 cm^3 09/15/23 0949   Calculated Wound Volume (cm^3) 0.06 cm^3 09/15/23 0949   Change in Wound Size % 81.25 09/15/23 0949   Drainage Amount Small 09/15/23 0949   Drainage Description Serosanguineous 09/15/23 0949   Non-staged Wound Description Full thickness 09/15/23 0949   Treatments Irrigation with NSS 09/15/23 0949       Wound 07/28/23 Surgical Abdomen Lower;Right (Active)   Date First Assessed: 07/28/23   Present on Original Admission: Yes  Primary Wound Type: Surgical  Location: Abdomen  Wound Location Orientation: Lower;Right       Wound 07/28/23 Surgical Abdomen Left; Lower (Active)   Date First Assessed: 07/28/23   Present on Original Admission: Yes  Primary Wound Type: Surgical  Location: Abdomen  Wound Location Orientation: Left; Lower       Wound 07/28/23 Surgical Abdomen Mid (Active)   Date First Assessed: 07/28/23   Present on Original Admission: Yes  Primary Wound Type: Surgical  Location: Abdomen  Wound Location Orientation: Mid       [REMOVED] Wound 07/15/22 Other (comment) Abdomen Right; Lower (Removed)   Resolved Date/Resolved Time: 10/14/22 0827  Date First Assessed/Time First Assessed: 07/15/22 1401   Primary Wound Type: Other (comment)  Location: Abdomen  Wound Location Orientation: Right; Lower  Wound Outcome: Other (Comment)       [REMOVED] Wound 09/15/22 Abdomen N/A (Removed)   Resolved Date/Resolved Time: 10/14/22 0826  Date First Assessed/Time First Assessed: 09/15/22 1359   Location: Abdomen  Wound Location Orientation: N/A  Wound Description (Comments): vac dressing-RLQ (1 black sponge, 1 white sponge), midline- surgical... [REMOVED] Wound 09/15/22 Abdomen Lower;Quadrant;Right (Removed)   Resolved Date/Resolved Time: 10/14/22 0827  Date First Assessed: 09/15/22   Location: Abdomen  Wound Location Orientation: Lower;Quadrant;Right  Wound Outcome: Other (Comment)       [REMOVED] Wound 10/11/22 Abdomen N/A (Removed)   Resolved Date/Resolved Time: 10/14/22 0827  Date First Assessed/Time First Assessed: 10/11/22 1545   Location: Abdomen  Wound Location Orientation: N/A  Wound Description (Comments): wound vac 1 incision black sponge 1 incision black sponge one black . .. [REMOVED] Wound 10/14/22 Surgical Abdomen Medial;Lower (Removed)   Resolved Date: 07/28/23  Date First Assessed/Time First Assessed: 10/14/22 0828   Primary Wound Type: Surgical  Location: Abdomen  Wound Location Orientation: Medial;Lower  Dressing Status: Other (Comment)       [REMOVED] Wound 10/14/22 Surgical Abdomen Right; Lower (Removed)   Resolved Date: 07/28/23  Date First Assessed/Time First Assessed: 10/14/22 0829   Primary Wound Type: Surgical  Location: Abdomen  Wound Location Orientation: Right; Lower  Wound Outcome: Other (Comment)       [REMOVED] Wound 11/15/22 Penis Right (Removed)   Resolved Date/Resolved Time: 11/19/22 0218  Date First Assessed/Time First Assessed: 11/15/22 2250   Location: Penis  Wound Location Orientation: Right  Wound Outcome: Healed       [REMOVED] Wound 01/03/23 Penis Right (Removed)   Resolved Date: 07/28/23  Date First Assessed/Time First Assessed: 01/03/23 1735   Location: Penis  Wound Location Orientation: Right  Wound Description (Comments): benzoin, tegaderm   Dressing Status: Other (Comment)       [REMOVED] Wound 01/03/23 Perineum Right (Removed) Resolved Date: 01/06/23  Date First Assessed/Time First Assessed: 01/03/23 1735   Location: Perineum  Wound Location Orientation: Right  Wound Outcome: Other (Comment)       [REMOVED] Wound 01/11/23 Thigh Left (Removed)   Resolved Date: 07/28/23  Date First Assessed/Time First Assessed: 01/11/23 1152   Location: Thigh  Wound Location Orientation: Left  Wound Description (Comments): PROMOGRAN OSMIN 4.3 X 4.3IN X 9 DRESSINGS  Incision's 1st Dressing: DRESSING OPTILOCK 6... [REMOVED] Wound 01/11/23 Abdomen N/A (Removed)   Resolved Date: 01/27/23  Date First Assessed/Time First Assessed: 01/11/23 1232   Location: Abdomen  Wound Location Orientation: N/A  Wound Description (Comments): WOUND VAC APPLIED USING LARGE DRESSING  Incision's 1st Dressing: DRESSING XEROFORM 5 X . .. Subjective:      . Mr. Anup Tinsley is a 80-year-old gentleman here for follow-up from hospitalization for large abdominal wound. He he developed an incarcerated right lower quadrant hernia requiring excision debridement of the wound and bridging closure with absorbable mesh. He had a large retention sutures in the abdominal wall but developed necrosis of the abdominal wall. He was readmitted underwent excisional debridement and is here for evaluation. 10/21/2022. Overall doing okay. No significant pain. Having some issues with the VAC dressing but overall working    11/04/2022. Doing well. Trying to increase his protein. Still having significant fatigue episodes. Still going through canisters quickly    11/25/2022. He was hospitalized last week for severe sepsis and hydronephrosis and acute kidney injury from an obstructing ureteral stone and underwent cystoscopy and stent placement. He is at home now and doing well. No other issues    12/9/2022. Doing well. Was scheduled for urology but got delayed January. Denies fevers or chills    1/6/2023. He has been doing well.   He was seen by plastic surgery and is scheduled for skin grafting next week. He also underwent repeat cystoscopy and stent    7/28/2023 he returns now for reevaluation. After being seen here last in January he was seen by plastic surgery he underwent split-thickness skin grafting. The wound has healed since significantly except for a few corners that have not healed so returns for reevaluation. 8/11/2023 overall he feels there is been some improvement of the wounds. He does notice a new area on his left upper side. 8/25/2023. Doing well. Had some improvement in certain areas still some protruding mesh others    9/15/2023 doing well. No issues. Some wounds are improving some but not changing      The following portions of the patient's history were reviewed and updated as appropriate: allergies, current medications, past family history, past medical history, past social history, past surgical history and problem list.    Review of Systems   Constitutional: Negative for chills and fever. HENT: Negative for ear pain and sore throat. Eyes: Negative for pain and visual disturbance. Respiratory: Negative for cough and shortness of breath. Cardiovascular: Negative for chest pain and palpitations. Gastrointestinal: Negative for abdominal pain and vomiting. Musculoskeletal: Positive for back pain and gait problem. Negative for arthralgias. Skin: Negative for color change and rash. Neurological: Negative for seizures and syncope. Psychiatric/Behavioral: Negative for agitation and behavioral problems. All other systems reviewed and are negative. Objective:       Wound 07/28/23 Surgical Abdomen Lower;Right (Active)   Wound Image Images linked 09/15/23 0947   Wound Description Hypergranulation; Beefy red 09/15/23 0949   Lis-wound Assessment Intact;Scar Tissue;Dry 09/15/23 0949   Wound Length (cm) 1 cm 09/15/23 0949   Wound Width (cm) 1.2 cm 09/15/23 0949   Wound Depth (cm) 0.1 cm 09/15/23 0949   Wound Surface Area (cm^2) 1.2 cm^2 09/15/23 0949   Wound Volume (cm^3) 0.12 cm^3 09/15/23 0949   Calculated Wound Volume (cm^3) 0.12 cm^3 09/15/23 0949   Change in Wound Size % -100 09/15/23 0949   Drainage Amount Small 09/15/23 0949   Drainage Description Serosanguineous 09/15/23 0949   Non-staged Wound Description Full thickness 09/15/23 0949   Treatments Irrigation with NSS 09/15/23 0949       Wound 07/28/23 Surgical Abdomen Left; Lower (Active)   Wound Image Images linked 09/15/23 1003   Wound Description Yellow;Pink;Granulation tissue; Epithelialization 09/15/23 0951   Lis-wound Assessment Intact;Dry;Scar Tissue 09/15/23 0951   Wound Length (cm) 0.9 cm 09/15/23 0951   Wound Width (cm) 2 cm 09/15/23 0951   Wound Depth (cm) 0.1 cm 09/15/23 0951   Wound Surface Area (cm^2) 1.8 cm^2 09/15/23 0951   Wound Volume (cm^3) 0.18 cm^3 09/15/23 0951   Calculated Wound Volume (cm^3) 0.18 cm^3 09/15/23 0951   Change in Wound Size % 90.48 09/15/23 0951   Drainage Amount Small 09/15/23 0951   Drainage Description Serosanguineous 09/15/23 0951   Non-staged Wound Description Full thickness 09/15/23 0951   Treatments Irrigation with NSS 09/15/23 0951       Wound 07/28/23 Surgical Abdomen Mid (Active)   Wound Image Images linked 09/15/23 0947   Wound Description Hypergranulation;Yellow;Pink 09/15/23 0949   Lis-wound Assessment Intact;Scar Tissue;Dry 09/15/23 0949   Wound Length (cm) 0.5 cm 09/15/23 0949   Wound Width (cm) 1.1 cm 09/15/23 0949   Wound Depth (cm) 0.1 cm 09/15/23 0949   Wound Surface Area (cm^2) 0.55 cm^2 09/15/23 0949   Wound Volume (cm^3) 0.055 cm^3 09/15/23 0949   Calculated Wound Volume (cm^3) 0.06 cm^3 09/15/23 0949   Change in Wound Size % 81.25 09/15/23 0949   Drainage Amount Small 09/15/23 0949   Drainage Description Serosanguineous 09/15/23 0949   Non-staged Wound Description Full thickness 09/15/23 0949   Treatments Irrigation with NSS 09/15/23 0949       /78   Pulse 58   Temp 97.6 °F (36.4 °C)   Resp 18     Physical Exam  Vitals and nursing note reviewed. Constitutional:       General: He is not in acute distress. Appearance: He is well-developed. He is obese. He is not diaphoretic. HENT:      Head: Normocephalic and atraumatic. Eyes:      Pupils: Pupils are equal, round, and reactive to light. Cardiovascular:      Rate and Rhythm: Normal rate and regular rhythm. Pulmonary:      Effort: Pulmonary effort is normal. No respiratory distress. Breath sounds: Normal breath sounds. Abdominal:      Palpations: Abdomen is soft. Comments: Obese abdomen. See complete wound assessment for measurements of abdominal wounds   Musculoskeletal:         General: Normal range of motion. Cervical back: Normal range of motion and neck supple. Skin:     General: Skin is warm and dry. Neurological:      Mental Status: He is alert and oriented to person, place, and time. Psychiatric:         Behavior: Behavior normal.           Wound Instructions:  Orders Placed This Encounter   Procedures   • Wound cleansing and dressings                                  Wash your hands with soap and water. Remove old dressing, discard into plastic bag and place in trash. Cleanse the wound with mild soap(Dove) and water prior to applying a clean dressing. Do not use tissue or cotton balls. Do not scrub the wound. Pat dry using gauze. Shower yes, on the days you change the dressing.        Abdominal Wounds:   Apply dermagran to the wound beds. Cover with ABD. Secure with paper tape or medfix tape. Only put tape on good skin. Change dressing three times a week.            Follow up at the wound center in 4 weeks. If wounds get worse, contact the wound center for an earlier appointment.        Treatment today: Cleansed with NSS, and dressed as above.      Standing Status:   Future     Standing Expiration Date:   9/15/2024   • Debridement     This order was created via procedure documentation   • Chemical Caut Of A Wound     This order was created via procedure documentation        Diagnosis ICD-10-CM Associated Orders   1. Non-healing surgical wound, initial encounter  T81.89XA lidocaine (LMX) 4 % cream     Wound cleansing and dressings      2.  Obesity, Class III, BMI 40-49.9 (morbid obesity) ([facility])  E66.01 Wound cleansing and dressings

## 2023-09-15 NOTE — ASSESSMENT & PLAN NOTE
There was more mesh on the left lower extremity wound. The rest the left side has improved. The middle and the right sided wounds were hypergranular and were cauterized.   Continue the same care

## 2023-10-03 DIAGNOSIS — I10 ESSENTIAL HYPERTENSION: ICD-10-CM

## 2023-10-04 RX ORDER — METOPROLOL SUCCINATE 100 MG/1
100 TABLET, EXTENDED RELEASE ORAL DAILY
Qty: 90 TABLET | Refills: 3 | Status: SHIPPED | OUTPATIENT
Start: 2023-10-04

## 2023-10-09 ENCOUNTER — TREATMENT (OUTPATIENT)
Dept: FAMILY MEDICINE CLINIC | Facility: CLINIC | Age: 68
End: 2023-10-09

## 2023-10-09 ENCOUNTER — TELEPHONE (OUTPATIENT)
Dept: FAMILY MEDICINE CLINIC | Facility: CLINIC | Age: 68
End: 2023-10-09

## 2023-10-09 DIAGNOSIS — I50.9 CONGESTIVE HEART FAILURE, UNSPECIFIED HF CHRONICITY, UNSPECIFIED HEART FAILURE TYPE (HCC): Primary | ICD-10-CM

## 2023-10-13 ENCOUNTER — APPOINTMENT (OUTPATIENT)
Dept: LAB | Facility: HOSPITAL | Age: 68
End: 2023-10-13
Payer: MEDICARE

## 2023-10-13 ENCOUNTER — OFFICE VISIT (OUTPATIENT)
Dept: WOUND CARE | Facility: CLINIC | Age: 68
End: 2023-10-13
Payer: MEDICARE

## 2023-10-13 VITALS
HEART RATE: 82 BPM | TEMPERATURE: 97.9 F | RESPIRATION RATE: 22 BRPM | DIASTOLIC BLOOD PRESSURE: 76 MMHG | SYSTOLIC BLOOD PRESSURE: 126 MMHG

## 2023-10-13 DIAGNOSIS — N20.0 NEPHROLITHIASIS: ICD-10-CM

## 2023-10-13 DIAGNOSIS — T81.89XA NON-HEALING SURGICAL WOUND, INITIAL ENCOUNTER: Primary | ICD-10-CM

## 2023-10-13 DIAGNOSIS — E66.01 OBESITY, CLASS III, BMI 40-49.9 (MORBID OBESITY) (HCC): ICD-10-CM

## 2023-10-13 DIAGNOSIS — E53.8 VITAMIN B 12 DEFICIENCY: ICD-10-CM

## 2023-10-13 LAB
ALBUMIN SERPL BCP-MCNC: 4.2 G/DL (ref 3.5–5)
ALP SERPL-CCNC: 81 U/L (ref 34–104)
ALT SERPL W P-5'-P-CCNC: 13 U/L (ref 7–52)
ANION GAP SERPL CALCULATED.3IONS-SCNC: 7 MMOL/L
AST SERPL W P-5'-P-CCNC: 14 U/L (ref 13–39)
BILIRUB SERPL-MCNC: 0.67 MG/DL (ref 0.2–1)
BUN SERPL-MCNC: 18 MG/DL (ref 5–25)
CALCIUM SERPL-MCNC: 9.3 MG/DL (ref 8.4–10.2)
CHLORIDE SERPL-SCNC: 105 MMOL/L (ref 96–108)
CO2 SERPL-SCNC: 29 MMOL/L (ref 21–32)
CREAT SERPL-MCNC: 1.24 MG/DL (ref 0.6–1.3)
GFR SERPL CREATININE-BSD FRML MDRD: 59 ML/MIN/1.73SQ M
GLUCOSE P FAST SERPL-MCNC: 88 MG/DL (ref 65–99)
POTASSIUM SERPL-SCNC: 4.4 MMOL/L (ref 3.5–5.3)
PROT SERPL-MCNC: 7.2 G/DL (ref 6.4–8.4)
SODIUM SERPL-SCNC: 141 MMOL/L (ref 135–147)
URATE SERPL-MCNC: 7.5 MG/DL (ref 3.5–8.5)
VIT B12 SERPL-MCNC: 300 PG/ML (ref 180–914)

## 2023-10-13 PROCEDURE — 11042 DBRDMT SUBQ TIS 1ST 20SQCM/<: CPT | Performed by: SURGERY

## 2023-10-13 PROCEDURE — 82607 VITAMIN B-12: CPT

## 2023-10-13 PROCEDURE — 80053 COMPREHEN METABOLIC PANEL: CPT

## 2023-10-13 PROCEDURE — 36415 COLL VENOUS BLD VENIPUNCTURE: CPT

## 2023-10-13 PROCEDURE — 84550 ASSAY OF BLOOD/URIC ACID: CPT

## 2023-10-13 RX ORDER — LIDOCAINE 40 MG/G
CREAM TOPICAL ONCE
Status: COMPLETED | OUTPATIENT
Start: 2023-10-13 | End: 2023-10-13

## 2023-10-13 RX ADMIN — LIDOCAINE: 40 CREAM TOPICAL at 10:15

## 2023-10-13 NOTE — PROGRESS NOTES
Patient ID: Shawnee Araiza is a 80 y.o. male Date of Birth 1955     Chief Complaint  Chief Complaint   Patient presents with    Follow Up Wound Care Visit       Allergies  Codeine    Assessment:    Nonhealing surgical wound  All the open areas were explored and any mesh that was then defied was removed. Continue with Dermagran gauze. Follow-up in 1 month       Diagnoses and all orders for this visit:    Non-healing surgical wound, initial encounter  -     lidocaine (LMX) 4 % cream  -     Wound cleansing and dressings; Future    Obesity, Class III, BMI 40-49.9 (morbid obesity) ([address])    Other orders  -     Debridement              Debridement    Universal Protocol:  Consent given by: patient  Time out: Immediately prior to procedure a "time out" was called to verify the correct patient, procedure, equipment, support staff and site/side marked as required. Performed by: physician  Debridement type: surgical  Level of debridement: subcutaneous tissue  Pain control: lidocaine 4%  Post-debridement measurements  Length (cm): 11.4  Width (cm): 1.2  Depth (cm): 0.2  Percent debrided: 10%  Surface Area (cm^2): 13.7  Area debrided (cm^2): 1.4  Volume (cm^3): 2.7  Tissue and other material debrided: subcutaneous tissue  Devitalized tissue debrided: biofilm and slough  Instrument(s) utilized: curette  Response to treatment: procedure was tolerated well        Plan:     Wound 07/28/23 Surgical Abdomen Lower;Right (Active)   Wound Image Images linked 10/13/23 0936   Wound Description Hypergranulation; Beefy red 10/13/23 0937   Lis-wound Assessment Intact;Scar Tissue;Dry 10/13/23 0937   Wound Length (cm) 1 cm 10/13/23 0937   Wound Width (cm) 1 cm 10/13/23 0937   Wound Depth (cm) 0.1 cm 10/13/23 0937   Wound Surface Area (cm^2) 1 cm^2 10/13/23 0937   Wound Volume (cm^3) 0.1 cm^3 10/13/23 0937   Calculated Wound Volume (cm^3) 0.1 cm^3 10/13/23 0937   Change in Wound Size % -66.67 10/13/23 0937   Drainage Amount Small 10/13/23 0937   Drainage Description Serosanguineous 10/13/23 0937   Non-staged Wound Description Full thickness 10/13/23 0937       Wound 07/28/23 Surgical Abdomen Left; Lower (Active)   Wound Image Images linked 10/13/23 1008   Wound Description Yellow;Pink;Granulation tissue; Epithelialization (mesh exposed, large epithelial measured) 10/13/23 0937   Lis-wound Assessment Intact;Dry;Scar Tissue 10/13/23 0937   Wound Length (cm) 11.4 cm 10/13/23 0937   Wound Width (cm) 1.2 cm 10/13/23 0937   Wound Depth (cm) 0.2 cm 10/13/23 0937   Wound Surface Area (cm^2) 13.68 cm^2 10/13/23 0937   Wound Volume (cm^3) 2.736 cm^3 10/13/23 0937   Calculated Wound Volume (cm^3) 2.74 cm^3 10/13/23 0937   Change in Wound Size % -44.97 10/13/23 0937   Drainage Amount Small 10/13/23 0937   Drainage Description Serosanguineous 10/13/23 0937   Non-staged Wound Description Full thickness 10/13/23 0937   Treatments Cleansed 10/13/23 0937       Wound 07/28/23 Surgical Abdomen Mid (Active)   Wound Image Images linked 10/13/23 0933   Wound Description Hypergranulation;Yellow;Pink 10/13/23 0933   Lis-wound Assessment Intact;Scar Tissue;Dry 10/13/23 0933   Wound Length (cm) 0.9 cm 10/13/23 0933   Wound Width (cm) 1 cm 10/13/23 0933   Wound Depth (cm) 0.1 cm 10/13/23 0933   Wound Surface Area (cm^2) 0.9 cm^2 10/13/23 0933   Wound Volume (cm^3) 0.09 cm^3 10/13/23 0933   Calculated Wound Volume (cm^3) 0.09 cm^3 10/13/23 0933   Change in Wound Size % 71.88 10/13/23 0933   Drainage Amount Small 10/13/23 0933   Drainage Description Serosanguineous 10/13/23 0933   Non-staged Wound Description Full thickness 10/13/23 0933   Treatments Cleansed 10/13/23 0933       Wound 07/28/23 Surgical Abdomen Lower;Right (Active)   Date First Assessed: 07/28/23   Present on Original Admission: Yes  Primary Wound Type: Surgical  Location: Abdomen  Wound Location Orientation: Lower;Right       Wound 07/28/23 Surgical Abdomen Left; Lower (Active)   Date First Assessed: 07/28/23   Present on Original Admission: Yes  Primary Wound Type: Surgical  Location: Abdomen  Wound Location Orientation: Left; Lower       Wound 07/28/23 Surgical Abdomen Mid (Active)   Date First Assessed: 07/28/23   Present on Original Admission: Yes  Primary Wound Type: Surgical  Location: Abdomen  Wound Location Orientation: Mid       [REMOVED] Wound 07/15/22 Other (comment) Abdomen Right; Lower (Removed)   Resolved Date/Resolved Time: 10/14/22 0827  Date First Assessed/Time First Assessed: 07/15/22 1401   Primary Wound Type: Other (comment)  Location: Abdomen  Wound Location Orientation: Right; Lower  Wound Outcome: Other (Comment)       [REMOVED] Wound 09/15/22 Abdomen N/A (Removed)   Resolved Date/Resolved Time: 10/14/22 0826  Date First Assessed/Time First Assessed: 09/15/22 1359   Location: Abdomen  Wound Location Orientation: N/A  Wound Description (Comments): vac dressing-RLQ (1 black sponge, 1 white sponge), midline- surgical... [REMOVED] Wound 09/15/22 Abdomen Lower;Quadrant;Right (Removed)   Resolved Date/Resolved Time: 10/14/22 0827  Date First Assessed: 09/15/22   Location: Abdomen  Wound Location Orientation: Lower;Quadrant;Right  Wound Outcome: Other (Comment)       [REMOVED] Wound 10/11/22 Abdomen N/A (Removed)   Resolved Date/Resolved Time: 10/14/22 0827  Date First Assessed/Time First Assessed: 10/11/22 1545   Location: Abdomen  Wound Location Orientation: N/A  Wound Description (Comments): wound vac 1 incision black sponge 1 incision black sponge one black . .. [REMOVED] Wound 10/14/22 Surgical Abdomen Medial;Lower (Removed)   Resolved Date: 07/28/23  Date First Assessed/Time First Assessed: 10/14/22 0828   Primary Wound Type: Surgical  Location: Abdomen  Wound Location Orientation: Medial;Lower  Dressing Status: Other (Comment)       [REMOVED] Wound 10/14/22 Surgical Abdomen Right; Lower (Removed)   Resolved Date: 07/28/23  Date First Assessed/Time First Assessed: 10/14/22 8890 Primary Wound Type: Surgical  Location: Abdomen  Wound Location Orientation: Right; Lower  Wound Outcome: Other (Comment)       [REMOVED] Wound 11/15/22 Penis Right (Removed)   Resolved Date/Resolved Time: 11/19/22 0218  Date First Assessed/Time First Assessed: 11/15/22 2250   Location: Penis  Wound Location Orientation: Right  Wound Outcome: Healed       [REMOVED] Wound 01/03/23 Penis Right (Removed)   Resolved Date: 07/28/23  Date First Assessed/Time First Assessed: 01/03/23 1735   Location: Penis  Wound Location Orientation: Right  Wound Description (Comments): benzoin, tegaderm   Dressing Status: Other (Comment)       [REMOVED] Wound 01/03/23 Perineum Right (Removed)   Resolved Date: 01/06/23  Date First Assessed/Time First Assessed: 01/03/23 1735   Location: Perineum  Wound Location Orientation: Right  Wound Outcome: Other (Comment)       [REMOVED] Wound 01/11/23 Thigh Left (Removed)   Resolved Date: 07/28/23  Date First Assessed/Time First Assessed: 01/11/23 1152   Location: Thigh  Wound Location Orientation: Left  Wound Description (Comments): PROMOGRAN OSMIN 4.3 X 4.3IN X 9 DRESSINGS  Incision's 1st Dressing: DRESSING OPTILOCK 6... [REMOVED] Wound 01/11/23 Abdomen N/A (Removed)   Resolved Date: 01/27/23  Date First Assessed/Time First Assessed: 01/11/23 1232   Location: Abdomen  Wound Location Orientation: N/A  Wound Description (Comments): WOUND VAC APPLIED USING LARGE DRESSING  Incision's 1st Dressing: DRESSING XEROFORM 5 X . .. Subjective:      . Mr. Sunny Saini is a 79-year-old gentleman here for follow-up from hospitalization for large abdominal wound. He he developed an incarcerated right lower quadrant hernia requiring excision debridement of the wound and bridging closure with absorbable mesh. He had a large retention sutures in the abdominal wall but developed necrosis of the abdominal wall. He was readmitted underwent excisional debridement and is here for evaluation. 10/21/2022. Overall doing okay. No significant pain. Having some issues with the VAC dressing but overall working    11/04/2022. Doing well. Trying to increase his protein. Still having significant fatigue episodes. Still going through canisters quickly    11/25/2022. He was hospitalized last week for severe sepsis and hydronephrosis and acute kidney injury from an obstructing ureteral stone and underwent cystoscopy and stent placement. He is at home now and doing well. No other issues    12/9/2022. Doing well. Was scheduled for urology but got delayed January. Denies fevers or chills    1/6/2023. He has been doing well. He was seen by plastic surgery and is scheduled for skin grafting next week. He also underwent repeat cystoscopy and stent    7/28/2023 he returns now for reevaluation. After being seen here last in January he was seen by plastic surgery he underwent split-thickness skin grafting. The wound has healed since significantly except for a few corners that have not healed so returns for reevaluation. 8/11/2023 overall he feels there is been some improvement of the wounds. He does notice a new area on his left upper side. 8/25/2023. Doing well. Had some improvement in certain areas still some protruding mesh others    9/15/2023 doing well. No issues. Some wounds are improving some but not changing    10/13/2023. Some wounds have improved some wounds are about the same. Some areas with mesh exposed again. The following portions of the patient's history were reviewed and updated as appropriate: allergies, current medications, past family history, past medical history, past social history, past surgical history and problem list.    Review of Systems      Objective:       Wound 07/28/23 Surgical Abdomen Lower;Right (Active)   Wound Image Images linked 10/13/23 0936   Wound Description Hypergranulation; Beefy red 10/13/23 0983   Lis-wound Assessment Intact;Scar Tissue;Dry 10/13/23 3493 Wound Length (cm) 1 cm 10/13/23 0937   Wound Width (cm) 1 cm 10/13/23 0937   Wound Depth (cm) 0.1 cm 10/13/23 0937   Wound Surface Area (cm^2) 1 cm^2 10/13/23 0937   Wound Volume (cm^3) 0.1 cm^3 10/13/23 0937   Calculated Wound Volume (cm^3) 0.1 cm^3 10/13/23 0937   Change in Wound Size % -66.67 10/13/23 0937   Drainage Amount Small 10/13/23 0937   Drainage Description Serosanguineous 10/13/23 0937   Non-staged Wound Description Full thickness 10/13/23 0937       Wound 07/28/23 Surgical Abdomen Left; Lower (Active)   Wound Image Images linked 10/13/23 1008   Wound Description Yellow;Pink;Granulation tissue; Epithelialization (mesh exposed, large epithelial measured) 10/13/23 0937   Lis-wound Assessment Intact;Dry;Scar Tissue 10/13/23 0937   Wound Length (cm) 11.4 cm 10/13/23 0937   Wound Width (cm) 1.2 cm 10/13/23 0937   Wound Depth (cm) 0.2 cm 10/13/23 0937   Wound Surface Area (cm^2) 13.68 cm^2 10/13/23 0937   Wound Volume (cm^3) 2.736 cm^3 10/13/23 0937   Calculated Wound Volume (cm^3) 2.74 cm^3 10/13/23 0937   Change in Wound Size % -44.97 10/13/23 0937   Drainage Amount Small 10/13/23 0937   Drainage Description Serosanguineous 10/13/23 0937   Non-staged Wound Description Full thickness 10/13/23 0937   Treatments Cleansed 10/13/23 0937       Wound 07/28/23 Surgical Abdomen Mid (Active)   Wound Image Images linked 10/13/23 0933   Wound Description Hypergranulation;Yellow;Pink 10/13/23 0933   Lis-wound Assessment Intact;Scar Tissue;Dry 10/13/23 0933   Wound Length (cm) 0.9 cm 10/13/23 0933   Wound Width (cm) 1 cm 10/13/23 0933   Wound Depth (cm) 0.1 cm 10/13/23 0933   Wound Surface Area (cm^2) 0.9 cm^2 10/13/23 0933   Wound Volume (cm^3) 0.09 cm^3 10/13/23 0933   Calculated Wound Volume (cm^3) 0.09 cm^3 10/13/23 0933   Change in Wound Size % 71.88 10/13/23 0933   Drainage Amount Small 10/13/23 0933   Drainage Description Serosanguineous 10/13/23 0933   Non-staged Wound Description Full thickness 10/13/23 0933 Treatments Cleansed 10/13/23 0933       /76   Pulse 82   Temp 97.9 °F (36.6 °C)   Resp 22     Physical Exam      Wound Instructions:  Orders Placed This Encounter   Procedures    Wound cleansing and dressings     Wash your hands with soap and water. Remove old dressing, discard into plastic bag and place in trash. Cleanse the wound with mild soap(Dove) and water prior to applying a clean dressing. Do not use tissue or cotton balls. Do not scrub the wound. Pat dry using gauze. Shower yes, on the days you change the dressing. Abdominal Wounds:   Apply dermagran to the wound beds. Cover with bordered gauze. Change dressing three times a week. Follow up at the wound center in 4 weeks. If wounds get worse, contact the wound center for an earlier appointment. Treatment today: Cleansed with NSS, and dressed as above. Cosmopor used today. Standing Status:   Future     Standing Expiration Date:   10/13/2024    Debridement     This order was created via procedure documentation        Diagnosis ICD-10-CM Associated Orders   1. Non-healing surgical wound, initial encounter  T81.89XA lidocaine (LMX) 4 % cream     Wound cleansing and dressings      2.  Obesity, Class III, BMI 40-49.9 (morbid obesity) ([facility])  E66.01

## 2023-10-13 NOTE — PATIENT INSTRUCTIONS
Orders Placed This Encounter   Procedures    Wound cleansing and dressings     Wash your hands with soap and water. Remove old dressing, discard into plastic bag and place in trash. Cleanse the wound with mild soap(Dove) and water prior to applying a clean dressing. Do not use tissue or cotton balls. Do not scrub the wound. Pat dry using gauze. Shower yes, on the days you change the dressing. Abdominal Wounds:   Apply dermagran to the wound beds. Cover with bordered gauze. Change dressing three times a week. Follow up at the wound center in 4 weeks. If wounds get worse, contact the wound center for an earlier appointment. Treatment today: Cleansed with NSS, and dressed as above. Cosmopor used today.      Standing Status:   Future     Standing Expiration Date:   10/13/2024

## 2023-10-13 NOTE — ASSESSMENT & PLAN NOTE
All the open areas were explored and any mesh that was then defied was removed. Continue with Dermagran gauze.   Follow-up in 1 month

## 2023-11-08 ENCOUNTER — TELEPHONE (OUTPATIENT)
Dept: FAMILY MEDICINE CLINIC | Facility: CLINIC | Age: 68
End: 2023-11-08

## 2023-11-10 ENCOUNTER — OFFICE VISIT (OUTPATIENT)
Dept: WOUND CARE | Facility: CLINIC | Age: 68
End: 2023-11-10
Payer: MEDICARE

## 2023-11-10 VITALS
SYSTOLIC BLOOD PRESSURE: 141 MMHG | HEART RATE: 62 BPM | DIASTOLIC BLOOD PRESSURE: 63 MMHG | TEMPERATURE: 97.8 F | RESPIRATION RATE: 18 BRPM

## 2023-11-10 DIAGNOSIS — T81.89XA NON-HEALING SURGICAL WOUND, INITIAL ENCOUNTER: Primary | ICD-10-CM

## 2023-11-10 PROCEDURE — 97597 DBRDMT OPN WND 1ST 20 CM/<: CPT | Performed by: SURGERY

## 2023-11-10 RX ORDER — LIDOCAINE 40 MG/G
CREAM TOPICAL ONCE
Status: COMPLETED | OUTPATIENT
Start: 2023-11-10 | End: 2023-11-10

## 2023-11-10 RX ADMIN — LIDOCAINE: 40 CREAM TOPICAL at 09:39

## 2023-11-10 NOTE — PATIENT INSTRUCTIONS
Orders Placed This Encounter   Procedures    Wound cleansing and dressings     Wash your hands with soap and water. Remove old dressing, discard into plastic bag and place in trash. Cleanse the wound with mild soap(Dove) and water prior to applying a clean dressing. Do not use tissue or cotton balls. Do not scrub the wound. Pat dry using gauze. Shower yes, on the days you change the dressing. Abdominal Wounds:   Apply dermagran to the wound beds. Cover with bordered gauze. Change dressing three times a week. Follow up at the wound center in 4 weeks. If wounds get worse, contact the wound center for an earlier appointment. Treatment today: Cleansed with NSS, and dressed as above. Cosmopor used today.      Standing Status:   Future     Standing Expiration Date:   11/10/2024

## 2023-11-10 NOTE — PROGRESS NOTES
Patient ID: West Tavarez is a 80 y.o. male Date of Birth 1955     Chief Complaint  Chief Complaint   Patient presents with    Follow Up Wound Care Visit     Abdominal wound       Allergies  Codeine    Assessment:    Nonhealing surgical wound  The wound is improving. Few small piece of mesh removed but overall significant improvement. Follow-up in 3 to 4 weeks continue same care       Diagnoses and all orders for this visit:    Non-healing surgical wound, initial encounter  -     lidocaine (LMX) 4 % cream  -     Wound cleansing and dressings; Future    Other orders  -     Debridement              Debridement    Universal Protocol:  Consent given by: patient  Time out: Immediately prior to procedure a "time out" was called to verify the correct patient, procedure, equipment, support staff and site/side marked as required. Performed by: physician  Debridement type: selective  Pain control: lidocaine 4%  Post-debridement measurements  Length (cm): 0.3  Width (cm): 0.4  Depth (cm): 0.1  Percent debrided: 100%  Surface Area (cm^2): 0.1  Area debrided (cm^2): 0.1  Volume (cm^3): 0  Devitalized tissue debrided: biofilm and slough  Instrument(s) utilized: curette and scissors  Response to treatment: procedure was tolerated well  Debridement Comments:  Old pieces of mesh were removed with scissors      Plan:     Wound 07/28/23 Surgical Abdomen Lower;Right (Active)   Wound Image Images linked 11/10/23 0932   Wound Description Epithelialization 11/10/23 0934   Lis-wound Assessment Intact;Scar Tissue;Dry 11/10/23 0934   Wound Length (cm) 0 cm 11/10/23 0934   Wound Width (cm) 0 cm 11/10/23 0934   Wound Depth (cm) 0 cm 11/10/23 0934   Wound Surface Area (cm^2) 0 cm^2 11/10/23 0934   Wound Volume (cm^3) 0 cm^3 11/10/23 0934   Calculated Wound Volume (cm^3) 0 cm^3 11/10/23 0934   Change in Wound Size % 100 11/10/23 0934   Drainage Amount None 11/10/23 0934   Non-staged Wound Description Not applicable 83/72/33 7830 Treatments Cleansed 11/10/23 0934       Wound 07/28/23 Surgical Abdomen Left; Lower (Active)   Wound Image Images linked 11/10/23 0933   Wound Description Beefy red 11/10/23 0936   Lis-wound Assessment Intact;Dry;Scar Tissue 11/10/23 0936   Wound Length (cm) 0.2 cm 11/10/23 0936   Wound Width (cm) 0.2 cm 11/10/23 0936   Wound Depth (cm) 0.1 cm 11/10/23 0936   Wound Surface Area (cm^2) 0.04 cm^2 11/10/23 0936   Wound Volume (cm^3) 0.004 cm^3 11/10/23 0936   Calculated Wound Volume (cm^3) 0 cm^3 11/10/23 0936   Change in Wound Size % 100 11/10/23 0936   Drainage Amount Scant 11/10/23 0936   Drainage Description Bloody 11/10/23 0936   Non-staged Wound Description Full thickness 11/10/23 0936   Treatments Cleansed 11/10/23 0936       Wound 07/28/23 Surgical Abdomen Mid (Active)   Wound Image Images linked 11/10/23 0934   Wound Description Pink;Yellow 11/10/23 0937   Lis-wound Assessment Intact;Scar Tissue;Dry 11/10/23 0937   Wound Length (cm) 0.3 cm 11/10/23 0937   Wound Width (cm) 0.4 cm 11/10/23 0937   Wound Depth (cm) 0.1 cm 11/10/23 0937   Wound Surface Area (cm^2) 0.12 cm^2 11/10/23 0937   Wound Volume (cm^3) 0.012 cm^3 11/10/23 0937   Calculated Wound Volume (cm^3) 0.01 cm^3 11/10/23 0937   Change in Wound Size % 96.88 11/10/23 0937   Drainage Amount Small 11/10/23 0937   Drainage Description Serous 11/10/23 0937   Non-staged Wound Description Full thickness 11/10/23 0937   Treatments Cleansed 11/10/23 0937       Wound 07/28/23 Surgical Abdomen Lower;Right (Active)   Date First Assessed: 07/28/23   Present on Original Admission: Yes  Primary Wound Type: Surgical  Location: Abdomen  Wound Location Orientation: Lower;Right       Wound 07/28/23 Surgical Abdomen Left; Lower (Active)   Date First Assessed: 07/28/23   Present on Original Admission: Yes  Primary Wound Type: Surgical  Location: Abdomen  Wound Location Orientation: Left; Lower       Wound 07/28/23 Surgical Abdomen Mid (Active)   Date First Assessed: 07/28/23   Present on Original Admission: Yes  Primary Wound Type: Surgical  Location: Abdomen  Wound Location Orientation: Mid       [REMOVED] Wound 07/15/22 Other (comment) Abdomen Right; Lower (Removed)   Resolved Date/Resolved Time: 10/14/22 0827  Date First Assessed/Time First Assessed: 07/15/22 1401   Primary Wound Type: Other (comment)  Location: Abdomen  Wound Location Orientation: Right; Lower  Wound Outcome: Other (Comment)       [REMOVED] Wound 09/15/22 Abdomen N/A (Removed)   Resolved Date/Resolved Time: 10/14/22 0826  Date First Assessed/Time First Assessed: 09/15/22 1359   Location: Abdomen  Wound Location Orientation: N/A  Wound Description (Comments): vac dressing-RLQ (1 black sponge, 1 white sponge), midline- surgical... [REMOVED] Wound 09/15/22 Abdomen Lower;Quadrant;Right (Removed)   Resolved Date/Resolved Time: 10/14/22 0827  Date First Assessed: 09/15/22   Location: Abdomen  Wound Location Orientation: Lower;Quadrant;Right  Wound Outcome: Other (Comment)       [REMOVED] Wound 10/11/22 Abdomen N/A (Removed)   Resolved Date/Resolved Time: 10/14/22 0827  Date First Assessed/Time First Assessed: 10/11/22 1545   Location: Abdomen  Wound Location Orientation: N/A  Wound Description (Comments): wound vac 1 incision black sponge 1 incision black sponge one black . .. [REMOVED] Wound 10/14/22 Surgical Abdomen Medial;Lower (Removed)   Resolved Date: 07/28/23  Date First Assessed/Time First Assessed: 10/14/22 0828   Primary Wound Type: Surgical  Location: Abdomen  Wound Location Orientation: Medial;Lower  Dressing Status: Other (Comment)       [REMOVED] Wound 10/14/22 Surgical Abdomen Right; Lower (Removed)   Resolved Date: 07/28/23  Date First Assessed/Time First Assessed: 10/14/22 0829   Primary Wound Type: Surgical  Location: Abdomen  Wound Location Orientation: Right; Lower  Wound Outcome: Other (Comment)       [REMOVED] Wound 11/15/22 Penis Right (Removed)   Resolved Date/Resolved Time: 11/19/22 0218  Date First Assessed/Time First Assessed: 11/15/22 2250   Location: Penis  Wound Location Orientation: Right  Wound Outcome: Healed       [REMOVED] Wound 01/03/23 Penis Right (Removed)   Resolved Date: 07/28/23  Date First Assessed/Time First Assessed: 01/03/23 1735   Location: Penis  Wound Location Orientation: Right  Wound Description (Comments): benzoin, tegaderm   Dressing Status: Other (Comment)       [REMOVED] Wound 01/03/23 Perineum Right (Removed)   Resolved Date: 01/06/23  Date First Assessed/Time First Assessed: 01/03/23 1735   Location: Perineum  Wound Location Orientation: Right  Wound Outcome: Other (Comment)       [REMOVED] Wound 01/11/23 Thigh Left (Removed)   Resolved Date: 07/28/23  Date First Assessed/Time First Assessed: 01/11/23 1152   Location: Thigh  Wound Location Orientation: Left  Wound Description (Comments): PROMOGRAN OSMIN 4.3 X 4.3IN X 9 DRESSINGS  Incision's 1st Dressing: DRESSING OPTILOCK 6... [REMOVED] Wound 01/11/23 Abdomen N/A (Removed)   Resolved Date: 01/27/23  Date First Assessed/Time First Assessed: 01/11/23 1232   Location: Abdomen  Wound Location Orientation: N/A  Wound Description (Comments): WOUND VAC APPLIED USING LARGE DRESSING  Incision's 1st Dressing: DRESSING XEROFORM 5 X . .. Subjective:      . Mr. Santino Currie is a 71-year-old gentleman here for follow-up from hospitalization for large abdominal wound. He he developed an incarcerated right lower quadrant hernia requiring excision debridement of the wound and bridging closure with absorbable mesh. He had a large retention sutures in the abdominal wall but developed necrosis of the abdominal wall. He was readmitted underwent excisional debridement and is here for evaluation. 10/21/2022. Overall doing okay. No significant pain. Having some issues with the VAC dressing but overall working    11/04/2022. Doing well. Trying to increase his protein.   Still having significant fatigue episodes. Still going through canisters quickly    11/25/2022. He was hospitalized last week for severe sepsis and hydronephrosis and acute kidney injury from an obstructing ureteral stone and underwent cystoscopy and stent placement. He is at home now and doing well. No other issues    12/9/2022. Doing well. Was scheduled for urology but got delayed January. Denies fevers or chills    1/6/2023. He has been doing well. He was seen by plastic surgery and is scheduled for skin grafting next week. He also underwent repeat cystoscopy and stent    7/28/2023 he returns now for reevaluation. After being seen here last in January he was seen by plastic surgery he underwent split-thickness skin grafting. The wound has healed since significantly except for a few corners that have not healed so returns for reevaluation. 8/11/2023 overall he feels there is been some improvement of the wounds. He does notice a new area on his left upper side. 8/25/2023. Doing well. Had some improvement in certain areas still some protruding mesh others    9/15/2023 doing well. No issues. Some wounds are improving some but not changing    10/13/2023. Some wounds have improved some wounds are about the same. Some areas with mesh exposed again. 11/10/2023. Things improved significantly.   Only few small open areas remaining        The following portions of the patient's history were reviewed and updated as appropriate: allergies, current medications, past family history, past medical history, past social history, past surgical history and problem list.    Review of Systems      Objective:       Wound 07/28/23 Surgical Abdomen Lower;Right (Active)   Wound Image Images linked 11/10/23 0932   Wound Description Epithelialization 11/10/23 0934   Lis-wound Assessment Intact;Scar Tissue;Dry 11/10/23 0934   Wound Length (cm) 0 cm 11/10/23 0934   Wound Width (cm) 0 cm 11/10/23 0934   Wound Depth (cm) 0 cm 11/10/23 0934   Wound Surface Area (cm^2) 0 cm^2 11/10/23 0934   Wound Volume (cm^3) 0 cm^3 11/10/23 0934   Calculated Wound Volume (cm^3) 0 cm^3 11/10/23 0934   Change in Wound Size % 100 11/10/23 0934   Drainage Amount None 11/10/23 0934   Non-staged Wound Description Not applicable 37/75/58 5810   Treatments Cleansed 11/10/23 0934       Wound 07/28/23 Surgical Abdomen Left; Lower (Active)   Wound Image Images linked 11/10/23 0933   Wound Description Beefy red 11/10/23 0936   Lis-wound Assessment Intact;Dry;Scar Tissue 11/10/23 0936   Wound Length (cm) 0.2 cm 11/10/23 0936   Wound Width (cm) 0.2 cm 11/10/23 0936   Wound Depth (cm) 0.1 cm 11/10/23 0936   Wound Surface Area (cm^2) 0.04 cm^2 11/10/23 0936   Wound Volume (cm^3) 0.004 cm^3 11/10/23 0936   Calculated Wound Volume (cm^3) 0 cm^3 11/10/23 0936   Change in Wound Size % 100 11/10/23 0936   Drainage Amount Scant 11/10/23 0936   Drainage Description Bloody 11/10/23 0936   Non-staged Wound Description Full thickness 11/10/23 0936   Treatments Cleansed 11/10/23 0936       Wound 07/28/23 Surgical Abdomen Mid (Active)   Wound Image Images linked 11/10/23 0934   Wound Description Pink;Yellow 11/10/23 0937   Lis-wound Assessment Intact;Scar Tissue;Dry 11/10/23 0937   Wound Length (cm) 0.3 cm 11/10/23 0937   Wound Width (cm) 0.4 cm 11/10/23 0937   Wound Depth (cm) 0.1 cm 11/10/23 0937   Wound Surface Area (cm^2) 0.12 cm^2 11/10/23 0937   Wound Volume (cm^3) 0.012 cm^3 11/10/23 0937   Calculated Wound Volume (cm^3) 0.01 cm^3 11/10/23 0937   Change in Wound Size % 96.88 11/10/23 0937   Drainage Amount Small 11/10/23 0937   Drainage Description Serous 11/10/23 0937   Non-staged Wound Description Full thickness 11/10/23 0937   Treatments Cleansed 11/10/23 0937       /63   Pulse 62   Temp 97.8 °F (36.6 °C)   Resp 18     Physical Exam      Wound Instructions:  Orders Placed This Encounter   Procedures    Wound cleansing and dressings     Wash your hands with soap and water.   Remove old dressing, discard into plastic bag and place in trash. Cleanse the wound with mild soap(Dove) and water prior to applying a clean dressing. Do not use tissue or cotton balls. Do not scrub the wound. Pat dry using gauze. Shower yes, on the days you change the dressing. Abdominal Wounds:   Apply dermagran to the wound beds. Cover with bordered gauze. Change dressing three times a week. Follow up at the wound center in 4 weeks. If wounds get worse, contact the wound center for an earlier appointment. Treatment today: Cleansed with NSS, and dressed as above. Cosmopor used today. Standing Status:   Future     Standing Expiration Date:   11/10/2024    Debridement     This order was created via procedure documentation        Diagnosis ICD-10-CM Associated Orders   1.  Non-healing surgical wound, initial encounter  T81.89XA lidocaine (LMX) 4 % cream     Wound cleansing and dressings

## 2023-11-10 NOTE — ASSESSMENT & PLAN NOTE
The wound is improving. Few small piece of mesh removed but overall significant improvement.   Follow-up in 3 to 4 weeks continue same care

## 2023-12-01 ENCOUNTER — OFFICE VISIT (OUTPATIENT)
Dept: WOUND CARE | Facility: CLINIC | Age: 68
End: 2023-12-01
Payer: MEDICARE

## 2023-12-01 VITALS
TEMPERATURE: 96.8 F | DIASTOLIC BLOOD PRESSURE: 84 MMHG | SYSTOLIC BLOOD PRESSURE: 138 MMHG | HEART RATE: 60 BPM | RESPIRATION RATE: 18 BRPM

## 2023-12-01 DIAGNOSIS — T81.89XA NON-HEALING SURGICAL WOUND, INITIAL ENCOUNTER: Primary | ICD-10-CM

## 2023-12-01 PROCEDURE — 97597 DBRDMT OPN WND 1ST 20 CM/<: CPT | Performed by: SURGERY

## 2023-12-01 RX ORDER — LIDOCAINE 40 MG/G
CREAM TOPICAL ONCE
Status: COMPLETED | OUTPATIENT
Start: 2023-12-01 | End: 2023-12-01

## 2023-12-01 RX ADMIN — LIDOCAINE: 40 CREAM TOPICAL at 10:24

## 2023-12-01 NOTE — PATIENT INSTRUCTIONS
Orders Placed This Encounter   Procedures    Wound cleansing and dressings     Wash your hands with soap and water. Remove old dressing, discard into plastic bag and place in trash. Cleanse the wound with mild soap(Dove) and water prior to applying a clean dressing. Do not use tissue or cotton balls. Do not scrub the wound. Pat dry using gauze. Shower yes, on the days you change the dressing. Abdominal Wounds:   Apply dermagran to the wound beds  Cover with bordered gauze. Change dressing three times a week. Above performed, covered with gauze and tape      Follow up at the wound center in 4 weeks. If wounds get worse, contact the wound center for an earlier appointment.     Increase protein intake 3-4 servings daily     Standing Status:   Future     Standing Expiration Date:   12/1/2024

## 2023-12-01 NOTE — PROGRESS NOTES
Patient ID: Shahla Sheets is a 80 y.o. male Date of Birth 1955     Chief Complaint  Chief Complaint   Patient presents with    Follow Up Wound Care Visit     Abdominal wounds        Allergies  Codeine    Assessment:    Nonhealing surgical wound  Again a few areas were still open they were cleaned out and some residual piece of mesh were removed. Continue the same care. Follow-up in 1 month. Diagnoses and all orders for this visit:    Non-healing surgical wound, initial encounter  -     lidocaine (LMX) 4 % cream  -     Wound cleansing and dressings; Future    Other orders  -     Debridement              Debridement   Wound 07/28/23 Surgical Abdomen Left; Lower    Universal Protocol:  Consent given by: patient  Time out: Immediately prior to procedure a "time out" was called to verify the correct patient, procedure, equipment, support staff and site/side marked as required.     Performed by: physician  Debridement type: selective  Pain control: lidocaine 4%  Post-debridement measurements  Length (cm): 0.6  Width (cm): 2.1  Depth (cm): 0.1  Percent debrided: 100%  Surface Area (cm^2): 1.26  Area debrided (cm^2): 1.26  Volume (cm^3): 0.13  Devitalized tissue debrided: biofilm and slough  Instrument(s) utilized: curette  Response to treatment: procedure was tolerated well        Plan:     Wound 07/28/23 Surgical Abdomen Lower;Right (Active)   Wound Image Images linked 12/01/23 1008   Wound Description Epithelialization;Pink;Brown;Eschar 12/01/23 1032   Lis-wound Assessment Scar Tissue 12/01/23 1032   Wound Length (cm) 0.5 cm 12/01/23 1032   Wound Width (cm) 2 cm 12/01/23 1032   Wound Depth (cm) 0 cm 12/01/23 1032   Wound Surface Area (cm^2) 1 cm^2 12/01/23 1032   Wound Volume (cm^3) 0 cm^3 12/01/23 1032   Calculated Wound Volume (cm^3) 0 cm^3 12/01/23 1032   Change in Wound Size % 100 12/01/23 1032   Drainage Amount None 12/01/23 1032   Dressing Status Other (Comment) (cheryl) 12/01/23 1032       Wound 07/28/23 Surgical Abdomen Left; Lower (Active)   Wound Image Images linked 12/01/23 1009   Wound Description Pink;Yellow;Slough; Beefy red 12/01/23 1014   Lis-wound Assessment Erythema;Scar Tissue 12/01/23 1014   Wound Length (cm) 0.6 cm 12/01/23 1014   Wound Width (cm) 2.1 cm 12/01/23 1014   Wound Depth (cm) 0.1 cm 12/01/23 1014   Wound Surface Area (cm^2) 1.26 cm^2 12/01/23 1014   Wound Volume (cm^3) 0.126 cm^3 12/01/23 1014   Calculated Wound Volume (cm^3) 0.13 cm^3 12/01/23 1014   Change in Wound Size % 93.12 12/01/23 1014   Drainage Amount Moderate 12/01/23 1014   Drainage Description Serosanguineous; Yellow;Green 12/01/23 1014   Non-staged Wound Description Full thickness 12/01/23 1014   Dressing Status Intact 12/01/23 1014       Wound 07/28/23 Surgical Abdomen Mid (Active)   Wound Image Images linked 12/01/23 1009   Wound Description Epithelialization;Slough; Yellow;Pink 12/01/23 1015   Lis-wound Assessment Erythema;Scar Tissue 12/01/23 1015   Wound Length (cm) 2 cm 12/01/23 1015   Wound Width (cm) 2.7 cm 12/01/23 1015   Wound Depth (cm) 0.2 cm 12/01/23 1015   Wound Surface Area (cm^2) 5.4 cm^2 12/01/23 1015   Wound Volume (cm^3) 1.08 cm^3 12/01/23 1015   Calculated Wound Volume (cm^3) 1.08 cm^3 12/01/23 1015   Change in Wound Size % -237.5 12/01/23 1015   Drainage Amount Moderate 12/01/23 1015   Drainage Description Serosanguineous; Yellow;Green 12/01/23 1015   Non-staged Wound Description Full thickness 12/01/23 1015   Dressing Status Intact 12/01/23 1015       Wound 07/28/23 Surgical Abdomen Lower;Right (Active)   Date First Assessed: 07/28/23   Present on Original Admission: Yes  Primary Wound Type: Surgical  Location: Abdomen  Wound Location Orientation: Lower;Right       Wound 07/28/23 Surgical Abdomen Left; Lower (Active)   Date First Assessed: 07/28/23   Present on Original Admission: Yes  Primary Wound Type: Surgical  Location: Abdomen  Wound Location Orientation: Left; Lower       Wound 07/28/23 Surgical Abdomen Mid (Active)   Date First Assessed: 07/28/23   Present on Original Admission: Yes  Primary Wound Type: Surgical  Location: Abdomen  Wound Location Orientation: Mid       [REMOVED] Wound 07/15/22 Other (comment) Abdomen Right; Lower (Removed)   Resolved Date/Resolved Time: 10/14/22 0827  Date First Assessed/Time First Assessed: 07/15/22 1401   Primary Wound Type: Other (comment)  Location: Abdomen  Wound Location Orientation: Right; Lower  Wound Outcome: Other (Comment)       [REMOVED] Wound 09/15/22 Abdomen N/A (Removed)   Resolved Date/Resolved Time: 10/14/22 0826  Date First Assessed/Time First Assessed: 09/15/22 1359   Location: Abdomen  Wound Location Orientation: N/A  Wound Description (Comments): vac dressing-RLQ (1 black sponge, 1 white sponge), midline- surgical... [REMOVED] Wound 09/15/22 Abdomen Lower;Quadrant;Right (Removed)   Resolved Date/Resolved Time: 10/14/22 0827  Date First Assessed: 09/15/22   Location: Abdomen  Wound Location Orientation: Lower;Quadrant;Right  Wound Outcome: Other (Comment)       [REMOVED] Wound 10/11/22 Abdomen N/A (Removed)   Resolved Date/Resolved Time: 10/14/22 0827  Date First Assessed/Time First Assessed: 10/11/22 1545   Location: Abdomen  Wound Location Orientation: N/A  Wound Description (Comments): wound vac 1 incision black sponge 1 incision black sponge one black . .. [REMOVED] Wound 10/14/22 Surgical Abdomen Medial;Lower (Removed)   Resolved Date: 07/28/23  Date First Assessed/Time First Assessed: 10/14/22 0828   Primary Wound Type: Surgical  Location: Abdomen  Wound Location Orientation: Medial;Lower  Dressing Status: Other (Comment)       [REMOVED] Wound 10/14/22 Surgical Abdomen Right; Lower (Removed)   Resolved Date: 07/28/23  Date First Assessed/Time First Assessed: 10/14/22 0829   Primary Wound Type: Surgical  Location: Abdomen  Wound Location Orientation: Right; Lower  Wound Outcome: Other (Comment)       [REMOVED] Wound 11/15/22 Penis Right (Removed)   Resolved Date/Resolved Time: 11/19/22 0218  Date First Assessed/Time First Assessed: 11/15/22 2250   Location: Penis  Wound Location Orientation: Right  Wound Outcome: Healed       [REMOVED] Wound 01/03/23 Penis Right (Removed)   Resolved Date: 07/28/23  Date First Assessed/Time First Assessed: 01/03/23 1735   Location: Penis  Wound Location Orientation: Right  Wound Description (Comments): benzoin, tegaderm   Dressing Status: Other (Comment)       [REMOVED] Wound 01/03/23 Perineum Right (Removed)   Resolved Date: 01/06/23  Date First Assessed/Time First Assessed: 01/03/23 1735   Location: Perineum  Wound Location Orientation: Right  Wound Outcome: Other (Comment)       [REMOVED] Wound 01/11/23 Thigh Left (Removed)   Resolved Date: 07/28/23  Date First Assessed/Time First Assessed: 01/11/23 1152   Location: Thigh  Wound Location Orientation: Left  Wound Description (Comments): PROMOGRAN OSMIN 4.3 X 4.3IN X 9 DRESSINGS  Incision's 1st Dressing: DRESSING OPTILOCK 6... [REMOVED] Wound 01/11/23 Abdomen N/A (Removed)   Resolved Date: 01/27/23  Date First Assessed/Time First Assessed: 01/11/23 1232   Location: Abdomen  Wound Location Orientation: N/A  Wound Description (Comments): WOUND VAC APPLIED USING LARGE DRESSING  Incision's 1st Dressing: DRESSING XEROFORM 5 X . .. Subjective:      . Mr. Zeke Hughes is a 44-year-old gentleman here for follow-up from hospitalization for large abdominal wound. He he developed an incarcerated right lower quadrant hernia requiring excision debridement of the wound and bridging closure with absorbable mesh. He had a large retention sutures in the abdominal wall but developed necrosis of the abdominal wall. He was readmitted underwent excisional debridement and is here for evaluation. 10/21/2022. Overall doing okay. No significant pain. Having some issues with the VAC dressing but overall working    11/04/2022. Doing well.   Trying to increase his protein. Still having significant fatigue episodes. Still going through canisters quickly    11/25/2022. He was hospitalized last week for severe sepsis and hydronephrosis and acute kidney injury from an obstructing ureteral stone and underwent cystoscopy and stent placement. He is at home now and doing well. No other issues    12/9/2022. Doing well. Was scheduled for urology but got delayed January. Denies fevers or chills    1/6/2023. He has been doing well. He was seen by plastic surgery and is scheduled for skin grafting next week. He also underwent repeat cystoscopy and stent    7/28/2023 he returns now for reevaluation. After being seen here last in January he was seen by plastic surgery he underwent split-thickness skin grafting. The wound has healed since significantly except for a few corners that have not healed so returns for reevaluation. 8/11/2023 overall he feels there is been some improvement of the wounds. He does notice a new area on his left upper side. 8/25/2023. Doing well. Had some improvement in certain areas still some protruding mesh others    9/15/2023 doing well. No issues. Some wounds are improving some but not changing    10/13/2023. Some wounds have improved some wounds are about the same. Some areas with mesh exposed again. 11/10/2023. Things improved significantly. Only few small open areas remaining    12/1/2023. Noticed a few new areas that opened and drained a little bit. Other areas have healed.         The following portions of the patient's history were reviewed and updated as appropriate: allergies, current medications, past family history, past medical history, past social history, past surgical history and problem list.    Review of Systems      Objective:       Wound 07/28/23 Surgical Abdomen Lower;Right (Active)   Wound Image Images linked 12/01/23 1008   Wound Description Epithelialization;Pink;Brown;Eschar 12/01/23 1032   Lis-wound Assessment Scar Tissue 12/01/23 1032   Wound Length (cm) 0.5 cm 12/01/23 1032   Wound Width (cm) 2 cm 12/01/23 1032   Wound Depth (cm) 0 cm 12/01/23 1032   Wound Surface Area (cm^2) 1 cm^2 12/01/23 1032   Wound Volume (cm^3) 0 cm^3 12/01/23 1032   Calculated Wound Volume (cm^3) 0 cm^3 12/01/23 1032   Change in Wound Size % 100 12/01/23 1032   Drainage Amount None 12/01/23 1032   Dressing Status Other (Comment) (cheryl) 12/01/23 1032       Wound 07/28/23 Surgical Abdomen Left; Lower (Active)   Wound Image Images linked 12/01/23 1009   Wound Description Pink;Yellow;Slough; Beefy red 12/01/23 1014   Lis-wound Assessment Erythema;Scar Tissue 12/01/23 1014   Wound Length (cm) 0.6 cm 12/01/23 1014   Wound Width (cm) 2.1 cm 12/01/23 1014   Wound Depth (cm) 0.1 cm 12/01/23 1014   Wound Surface Area (cm^2) 1.26 cm^2 12/01/23 1014   Wound Volume (cm^3) 0.126 cm^3 12/01/23 1014   Calculated Wound Volume (cm^3) 0.13 cm^3 12/01/23 1014   Change in Wound Size % 93.12 12/01/23 1014   Drainage Amount Moderate 12/01/23 1014   Drainage Description Serosanguineous; Yellow;Green 12/01/23 1014   Non-staged Wound Description Full thickness 12/01/23 1014   Dressing Status Intact 12/01/23 1014       Wound 07/28/23 Surgical Abdomen Mid (Active)   Wound Image Images linked 12/01/23 1009   Wound Description Epithelialization;Slough; Yellow;Pink 12/01/23 1015   Lis-wound Assessment Erythema;Scar Tissue 12/01/23 1015   Wound Length (cm) 2 cm 12/01/23 1015   Wound Width (cm) 2.7 cm 12/01/23 1015   Wound Depth (cm) 0.2 cm 12/01/23 1015   Wound Surface Area (cm^2) 5.4 cm^2 12/01/23 1015   Wound Volume (cm^3) 1.08 cm^3 12/01/23 1015   Calculated Wound Volume (cm^3) 1.08 cm^3 12/01/23 1015   Change in Wound Size % -237.5 12/01/23 1015   Drainage Amount Moderate 12/01/23 1015   Drainage Description Serosanguineous; Yellow;Green 12/01/23 1015   Non-staged Wound Description Full thickness 12/01/23 1015   Dressing Status Intact 12/01/23 1015       /84 Pulse 60   Temp (!) 96.8 °F (36 °C)   Resp 18     Physical Exam      Wound Instructions:  Orders Placed This Encounter   Procedures    Wound cleansing and dressings     Wash your hands with soap and water. Remove old dressing, discard into plastic bag and place in trash. Cleanse the wound with mild soap(Dove) and water prior to applying a clean dressing. Do not use tissue or cotton balls. Do not scrub the wound. Pat dry using gauze. Shower yes, on the days you change the dressing. Abdominal Wounds:   Apply dermagran to the wound beds  Cover with bordered gauze. Change dressing three times a week. Above performed, covered with gauze and tape      Follow up at the wound center in 4 weeks. If wounds get worse, contact the wound center for an earlier appointment. Increase protein intake 3-4 servings daily     Standing Status:   Future     Standing Expiration Date:   12/1/2024    Debridement     This order was created via procedure documentation        Diagnosis ICD-10-CM Associated Orders   1.  Non-healing surgical wound, initial encounter  T81.89XA lidocaine (LMX) 4 % cream     Wound cleansing and dressings

## 2023-12-01 NOTE — ASSESSMENT & PLAN NOTE
Again a few areas were still open they were cleaned out and some residual piece of mesh were removed. Continue the same care. Follow-up in 1 month.

## 2023-12-29 ENCOUNTER — OFFICE VISIT (OUTPATIENT)
Dept: WOUND CARE | Facility: CLINIC | Age: 68
End: 2023-12-29
Payer: MEDICARE

## 2023-12-29 VITALS
TEMPERATURE: 98 F | RESPIRATION RATE: 18 BRPM | DIASTOLIC BLOOD PRESSURE: 70 MMHG | SYSTOLIC BLOOD PRESSURE: 122 MMHG | HEART RATE: 60 BPM

## 2023-12-29 DIAGNOSIS — T81.89XA NON-HEALING SURGICAL WOUND, INITIAL ENCOUNTER: Primary | ICD-10-CM

## 2023-12-29 DIAGNOSIS — E66.01 OBESITY, CLASS III, BMI 40-49.9 (MORBID OBESITY) (HCC): ICD-10-CM

## 2023-12-29 PROCEDURE — 97597 DBRDMT OPN WND 1ST 20 CM/<: CPT | Performed by: SURGERY

## 2023-12-29 RX ORDER — LIDOCAINE 40 MG/G
CREAM TOPICAL ONCE
Status: COMPLETED | OUTPATIENT
Start: 2023-12-29 | End: 2023-12-29

## 2023-12-29 RX ADMIN — LIDOCAINE: 40 CREAM TOPICAL at 08:58

## 2023-12-29 NOTE — ASSESSMENT & PLAN NOTE
Overall the wounds have improved.  Couple areas were cleaned up.  No significant mesh remaining.  Right lower quadrant so the epithelium was removed as it was a nonadherent skin bridge.  Continue the same care.  Follow-up in a month

## 2023-12-29 NOTE — PROGRESS NOTES
"Patient ID: Caterina Tam is a 68 y.o. male Date of Birth 1955     Chief Complaint  Chief Complaint   Patient presents with    Follow Up Wound Care Visit     Abdominal wound       Allergies  Codeine    Assessment:    Nonhealing surgical wound  Overall the wounds have improved.  Couple areas were cleaned up.  No significant mesh remaining.  Right lower quadrant so the epithelium was removed as it was a nonadherent skin bridge.  Continue the same care.  Follow-up in a month         Diagnoses and all orders for this visit:    Non-healing surgical wound, initial encounter  -     lidocaine (LMX) 4 % cream  -     Wound cleansing and dressings; Future    Obesity, Class III, BMI 40-49.9 (morbid obesity) ([facility])    Other orders  -     Debridement              Debridement   Wound 07/28/23 Surgical Abdomen Lower;Right    Universal Protocol:  Consent given by: patient  Time out: Immediately prior to procedure a \"time out\" was called to verify the correct patient, procedure, equipment, support staff and site/side marked as required.    Debridement Details  Performed by: physician  Debridement type: selective  Pain control: lidocaine 4%      Post-debridement measurements  Length (cm): 0.5  Width (cm): 2  Depth (cm): 0.1  Percent debrided: 100%  Surface Area (cm^2): 1  Area Debrided (cm^2): 1  Volume (cm^3): 0.1    Devitalized tissue debrided: biofilm and slough  Instrument(s) utilized: curette and forceps  Procedural pain (0-10): 1  Post-procedural pain: 1   Response to treatment: procedure was tolerated well        Plan:     Wound 07/28/23 Surgical Abdomen Lower;Right (Active)   Wound Image Images linked 12/29/23 0846   Wound Description Epithelialization;Hypergranulation;Pink 12/29/23 0851   Wound Length (cm) 0.5 cm 12/29/23 0851   Wound Width (cm) 2 cm 12/29/23 0851   Wound Depth (cm) 0.1 cm 12/29/23 0851   Wound Surface Area (cm^2) 1 cm^2 12/29/23 0851   Wound Volume (cm^3) 0.1 cm^3 12/29/23 0851   Calculated Wound " Volume (cm^3) 0.1 cm^3 12/29/23 0851   Change in Wound Size % -66.67 12/29/23 0851   Drainage Amount Moderate 12/29/23 0851   Drainage Description Serosanguineous;Yellow 12/29/23 0851   Non-staged Wound Description Full thickness 12/29/23 0851   Treatments Cleansed 12/29/23 0851       Wound 07/28/23 Surgical Abdomen Left;Lower (Active)   Wound Image Images linked 12/29/23 0848   Wound Description Epithelialization;Granulation tissue;Hypergranulation;Pink 12/29/23 0852   Lis-wound Assessment Erythema;Scar Tissue 12/29/23 0852   Wound Length (cm) 0.6 cm 12/29/23 0852   Wound Width (cm) 6 cm 12/29/23 0852   Wound Depth (cm) 0.1 cm 12/29/23 0852   Wound Surface Area (cm^2) 3.6 cm^2 12/29/23 0852   Wound Volume (cm^3) 0.36 cm^3 12/29/23 0852   Calculated Wound Volume (cm^3) 0.36 cm^3 12/29/23 0852   Change in Wound Size % 80.95 12/29/23 0852   Drainage Amount Moderate 12/29/23 0852   Drainage Description Serosanguineous;Yellow 12/29/23 0852   Non-staged Wound Description Full thickness 12/29/23 0852   Treatments Cleansed 12/29/23 0852       Wound 07/28/23 Surgical Abdomen Mid (Active)   Wound Image Images linked 12/29/23 0848   Wound Description Epithelialization;Pink;Granulation tissue 12/29/23 0853   Lis-wound Assessment Erythema;Scar Tissue 12/29/23 0853   Wound Length (cm) 0.3 cm 12/29/23 0853   Wound Width (cm) 1 cm 12/29/23 0853   Wound Depth (cm) 0.1 cm 12/29/23 0853   Wound Surface Area (cm^2) 0.3 cm^2 12/29/23 0853   Wound Volume (cm^3) 0.03 cm^3 12/29/23 0853   Calculated Wound Volume (cm^3) 0.03 cm^3 12/29/23 0853   Change in Wound Size % 90.63 12/29/23 0853   Drainage Amount Moderate 12/29/23 0853   Drainage Description Sanguineous;Yellow 12/29/23 0853   Non-staged Wound Description Full thickness 12/29/23 0853   Treatments Cleansed 12/29/23 0853       Wound 07/28/23 Surgical Abdomen Lower;Right (Active)   Date First Assessed: 07/28/23   Present on Original Admission: Yes  Primary Wound Type: Surgical   Location: Abdomen  Wound Location Orientation: Lower;Right       Wound 07/28/23 Surgical Abdomen Left;Lower (Active)   Date First Assessed: 07/28/23   Present on Original Admission: Yes  Primary Wound Type: Surgical  Location: Abdomen  Wound Location Orientation: Left;Lower       Wound 07/28/23 Surgical Abdomen Mid (Active)   Date First Assessed: 07/28/23   Present on Original Admission: Yes  Primary Wound Type: Surgical  Location: Abdomen  Wound Location Orientation: Mid       [REMOVED] Wound 07/15/22 Other (comment) Abdomen Right;Lower (Removed)   Resolved Date/Resolved Time: 10/14/22 0827  Date First Assessed/Time First Assessed: 07/15/22 1401   Primary Wound Type: Other (comment)  Location: Abdomen  Wound Location Orientation: Right;Lower  Wound Outcome: Other (Comment)       [REMOVED] Wound 09/15/22 Abdomen N/A (Removed)   Resolved Date/Resolved Time: 10/14/22 0826  Date First Assessed/Time First Assessed: 09/15/22 1359   Location: Abdomen  Wound Location Orientation: N/A  Wound Description (Comments): vac dressing-RLQ (1 black sponge, 1 white sponge), midline- surgical...       [REMOVED] Wound 09/15/22 Abdomen Lower;Quadrant;Right (Removed)   Resolved Date/Resolved Time: 10/14/22 0827  Date First Assessed: 09/15/22   Location: Abdomen  Wound Location Orientation: Lower;Quadrant;Right  Wound Outcome: Other (Comment)       [REMOVED] Wound 10/11/22 Abdomen N/A (Removed)   Resolved Date/Resolved Time: 10/14/22 0827  Date First Assessed/Time First Assessed: 10/11/22 1545   Location: Abdomen  Wound Location Orientation: N/A  Wound Description (Comments): wound vac 1 incision black sponge 1 incision black sponge one black ...       [REMOVED] Wound 10/14/22 Surgical Abdomen Medial;Lower (Removed)   Resolved Date: 07/28/23  Date First Assessed/Time First Assessed: 10/14/22 0828   Primary Wound Type: Surgical  Location: Abdomen  Wound Location Orientation: Medial;Lower  Dressing Status: Other (Comment)       [REMOVED]  Wound 10/14/22 Surgical Abdomen Right;Lower (Removed)   Resolved Date: 07/28/23  Date First Assessed/Time First Assessed: 10/14/22 0829   Primary Wound Type: Surgical  Location: Abdomen  Wound Location Orientation: Right;Lower  Wound Outcome: Other (Comment)       [REMOVED] Wound 11/15/22 Penis Right (Removed)   Resolved Date/Resolved Time: 11/19/22 0218  Date First Assessed/Time First Assessed: 11/15/22 2250   Location: Penis  Wound Location Orientation: Right  Wound Outcome: Healed       [REMOVED] Wound 01/03/23 Penis Right (Removed)   Resolved Date: 07/28/23  Date First Assessed/Time First Assessed: 01/03/23 1735   Location: Penis  Wound Location Orientation: Right  Wound Description (Comments): benzoin, tegaderm   Dressing Status: Other (Comment)       [REMOVED] Wound 01/03/23 Perineum Right (Removed)   Resolved Date: 01/06/23  Date First Assessed/Time First Assessed: 01/03/23 1735   Location: Perineum  Wound Location Orientation: Right  Wound Outcome: Other (Comment)       [REMOVED] Wound 01/11/23 Thigh Left (Removed)   Resolved Date: 07/28/23  Date First Assessed/Time First Assessed: 01/11/23 1152   Location: Thigh  Wound Location Orientation: Left  Wound Description (Comments): PROMOGRAN OSMIN 4.3 X 4.3IN X 9 DRESSINGS  Incision's 1st Dressing: DRESSING OPTILOCK 6...       [REMOVED] Wound 01/11/23 Abdomen N/A (Removed)   Resolved Date: 01/27/23  Date First Assessed/Time First Assessed: 01/11/23 1232   Location: Abdomen  Wound Location Orientation: N/A  Wound Description (Comments): WOUND VAC APPLIED USING LARGE DRESSING  Incision's 1st Dressing: DRESSING XEROFORM 5 X ...       Subjective:      .    Mr. Tam is a 67-year-old gentleman here for follow-up from hospitalization for large abdominal wound.  He he developed an incarcerated right lower quadrant hernia requiring excision debridement of the wound and bridging closure with absorbable mesh.  He had a large retention sutures in the abdominal wall but  developed necrosis of the abdominal wall.  He was readmitted underwent excisional debridement and is here for evaluation.    10/21/2022.  Overall doing okay.  No significant pain.  Having some issues with the VAC dressing but overall working    11/04/2022.  Doing well.  Trying to increase his protein.  Still having significant fatigue episodes.  Still going through canisters quickly    11/25/2022.  He was hospitalized last week for severe sepsis and hydronephrosis and acute kidney injury from an obstructing ureteral stone and underwent cystoscopy and stent placement.  He is at home now and doing well.  No other issues    12/9/2022.  Doing well.  Was scheduled for urology but got delayed January.  Denies fevers or chills    1/6/2023.  He has been doing well.  He was seen by plastic surgery and is scheduled for skin grafting next week.  He also underwent repeat cystoscopy and stent    7/28/2023 he returns now for reevaluation.  After being seen here last in January he was seen by plastic surgery he underwent split-thickness skin grafting.  The wound has healed since significantly except for a few corners that have not healed so returns for reevaluation.    8/11/2023 overall he feels there is been some improvement of the wounds.  He does notice a new area on his left upper side.    8/25/2023.  Doing well.  Had some improvement in certain areas still some protruding mesh others    9/15/2023 doing well.  No issues.  Some wounds are improving some but not changing    10/13/2023.  Some wounds have improved some wounds are about the same.  Some areas with mesh exposed again.    11/10/2023.  Things improved significantly.  Only few small open areas remaining    12/1/2023.  Noticed a few new areas that opened and drained a little bit.  Other areas have healed.    12/29/2023.  I again had improvement in some areas some areas still persistent.  No major issues        The following portions of the patient's history were reviewed  and updated as appropriate: allergies, current medications, past family history, past medical history, past social history, past surgical history and problem list.    Review of Systems      Objective:       Wound 07/28/23 Surgical Abdomen Lower;Right (Active)   Wound Image Images linked 12/29/23 0846   Wound Description Epithelialization;Hypergranulation;Pink 12/29/23 0851   Wound Length (cm) 0.5 cm 12/29/23 0851   Wound Width (cm) 2 cm 12/29/23 0851   Wound Depth (cm) 0.1 cm 12/29/23 0851   Wound Surface Area (cm^2) 1 cm^2 12/29/23 0851   Wound Volume (cm^3) 0.1 cm^3 12/29/23 0851   Calculated Wound Volume (cm^3) 0.1 cm^3 12/29/23 0851   Change in Wound Size % -66.67 12/29/23 0851   Drainage Amount Moderate 12/29/23 0851   Drainage Description Serosanguineous;Yellow 12/29/23 0851   Non-staged Wound Description Full thickness 12/29/23 0851   Treatments Cleansed 12/29/23 0851       Wound 07/28/23 Surgical Abdomen Left;Lower (Active)   Wound Image Images linked 12/29/23 0848   Wound Description Epithelialization;Granulation tissue;Hypergranulation;Pink 12/29/23 0852   Lis-wound Assessment Erythema;Scar Tissue 12/29/23 0852   Wound Length (cm) 0.6 cm 12/29/23 0852   Wound Width (cm) 6 cm 12/29/23 0852   Wound Depth (cm) 0.1 cm 12/29/23 0852   Wound Surface Area (cm^2) 3.6 cm^2 12/29/23 0852   Wound Volume (cm^3) 0.36 cm^3 12/29/23 0852   Calculated Wound Volume (cm^3) 0.36 cm^3 12/29/23 0852   Change in Wound Size % 80.95 12/29/23 0852   Drainage Amount Moderate 12/29/23 0852   Drainage Description Serosanguineous;Yellow 12/29/23 0852   Non-staged Wound Description Full thickness 12/29/23 0852   Treatments Cleansed 12/29/23 0852       Wound 07/28/23 Surgical Abdomen Mid (Active)   Wound Image Images linked 12/29/23 0848   Wound Description Epithelialization;Pink;Granulation tissue 12/29/23 0853   Lis-wound Assessment Erythema;Scar Tissue 12/29/23 0853   Wound Length (cm) 0.3 cm 12/29/23 0853   Wound Width (cm) 1 cm  12/29/23 0853   Wound Depth (cm) 0.1 cm 12/29/23 0853   Wound Surface Area (cm^2) 0.3 cm^2 12/29/23 0853   Wound Volume (cm^3) 0.03 cm^3 12/29/23 0853   Calculated Wound Volume (cm^3) 0.03 cm^3 12/29/23 0853   Change in Wound Size % 90.63 12/29/23 0853   Drainage Amount Moderate 12/29/23 0853   Drainage Description Sanguineous;Yellow 12/29/23 0853   Non-staged Wound Description Full thickness 12/29/23 0853   Treatments Cleansed 12/29/23 0853       /70   Pulse 60   Temp 98 °F (36.7 °C)   Resp 18     Physical Exam      Wound Instructions:  Orders Placed This Encounter   Procedures    Wound cleansing and dressings     Wash your hands with soap and water.  Remove old dressing, discard into plastic bag and place in trash.  Cleanse the wound with mild soap(Dove) and water prior to applying a clean dressing. Do not use tissue or cotton balls. Do not scrub the wound. Pat dry using gauze.  Shower yes, on the days you change the dressing.        Abdominal Wounds:   Apply dermagran to the wound beds  Cover with bordered gauze.   Change dressing three times a week.            Above performed, covered with gauze and tape      Follow up at the wound center in 4 weeks. If wounds get worse, contact the wound center for an earlier appointment.     Increase protein intake 3-4 servings daily     Standing Status:   Future     Standing Expiration Date:   12/29/2024    Debridement     This order was created via procedure documentation        Diagnosis ICD-10-CM Associated Orders   1. Non-healing surgical wound, initial encounter  T81.89XA lidocaine (LMX) 4 % cream     Wound cleansing and dressings      2. Obesity, Class III, BMI 40-49.9 (morbid obesity) ([facility])  E66.01

## 2023-12-29 NOTE — PATIENT INSTRUCTIONS
Orders Placed This Encounter   Procedures    Wound cleansing and dressings     Wash your hands with soap and water.  Remove old dressing, discard into plastic bag and place in trash.  Cleanse the wound with mild soap(Dove) and water prior to applying a clean dressing. Do not use tissue or cotton balls. Do not scrub the wound. Pat dry using gauze.  Shower yes, on the days you change the dressing.        Abdominal Wounds:   Apply dermagran to the wound beds  Cover with bordered gauze.   Change dressing three times a week.            Above performed, covered with gauze and tape      Follow up at the wound center in 4 weeks. If wounds get worse, contact the wound center for an earlier appointment.     Increase protein intake 3-4 servings daily     Standing Status:   Future     Standing Expiration Date:   12/29/2024

## 2024-01-26 ENCOUNTER — OFFICE VISIT (OUTPATIENT)
Dept: WOUND CARE | Facility: CLINIC | Age: 69
End: 2024-01-26
Payer: MEDICARE

## 2024-01-26 VITALS
TEMPERATURE: 97.6 F | SYSTOLIC BLOOD PRESSURE: 146 MMHG | DIASTOLIC BLOOD PRESSURE: 78 MMHG | HEART RATE: 68 BPM | RESPIRATION RATE: 18 BRPM

## 2024-01-26 DIAGNOSIS — T81.89XA NON-HEALING SURGICAL WOUND, INITIAL ENCOUNTER: Primary | ICD-10-CM

## 2024-01-26 DIAGNOSIS — T14.8XXA SURGICAL WOUND PRESENT: ICD-10-CM

## 2024-01-26 PROCEDURE — 11042 DBRDMT SUBQ TIS 1ST 20SQCM/<: CPT | Performed by: SURGERY

## 2024-01-26 RX ORDER — LIDOCAINE 40 MG/G
CREAM TOPICAL ONCE
Status: COMPLETED | OUTPATIENT
Start: 2024-01-26 | End: 2024-01-26

## 2024-01-26 RX ADMIN — LIDOCAINE: 40 CREAM TOPICAL at 09:51

## 2024-01-26 NOTE — PATIENT INSTRUCTIONS
Orders Placed This Encounter   Procedures    Wound cleansing and dressings     Wash your hands with soap and water.  Remove old dressing, discard into plastic bag and place in trash.    Cleanse the wound with mild soap(Dove) and water prior to applying a clean dressing.   Do not use tissue or cotton balls. Do not scrub the wound. Pat dry using gauze.  Shower yes, on the days you change the dressing.      Abdominal Wounds:   Stop dermagran for now  Apply Opticell to the wound beds  Cover with bordered gauze.   Change dressing three times a week.      Increase protein intake 3-4 servings daily    Follow up  in 3-4 weeks     Today's wound treatment note:  Cleansed with normal saline     Standing Status:   Future     Standing Expiration Date:   1/26/2025

## 2024-01-26 NOTE — PROGRESS NOTES
"Patient ID: Caterina Tam is a 68 y.o. male Date of Birth 1955     Chief Complaint  Chief Complaint   Patient presents with    Follow Up Wound Care Visit     Abdominal wounds.       Allergies  Codeine    Assessment:    Nonhealing surgical wound  The right side of his abdomen is basically healed at this time.  On the left side left upper quadrant has a slightly open area in the left lower open new area with some mesh exposed that was removed.  Will try using alginate for drainage.  Follow-up in 1 month           Diagnoses and all orders for this visit:    Non-healing surgical wound, initial encounter  -     lidocaine (LMX) 4 % cream  -     Wound cleansing and dressings; Future  -     Debridement Surgical Left;Lower Abdomen    Surgical wound present  -     lidocaine (LMX) 4 % cream  -     Wound cleansing and dressings; Future  -     Debridement Surgical Left;Lower Abdomen              Debridement   Wound 07/28/23 Surgical Abdomen Left;Lower    Universal Protocol:  Consent: Verbal consent obtained.  Consent given by: patient  Time out: Immediately prior to procedure a \"time out\" was called to verify the correct patient, procedure, equipment, support staff and site/side marked as required.    Debridement Details  Performed by: physician  Debridement type: surgical  Level of debridement: subcutaneous tissue  Pain control: lidocaine 4%      Post-debridement measurements  Length (cm): 3.2  Width (cm): 1  Depth (cm): 0.3  Percent debrided: 100%  Surface Area (cm^2): 3.2  Area Debrided (cm^2): 3.2  Volume (cm^3): 0.96    Tissue and other material debrided: subcutaneous tissue  Devitalized tissue debrided: biofilm and slough  Instrument(s) utilized: curette, forceps and scissors  Procedural pain (0-10): 1  Post-procedural pain: 1   Response to treatment: procedure was tolerated well        Plan:     Wound 07/28/23 Surgical Abdomen Lower;Right (Active)   Wound Image Images linked 01/26/24 0906   Wound Description " Epithelialization;Other (Comment) (covered with dry scab) 01/26/24 0912   Lis-wound Assessment Scar Tissue;Pink 01/26/24 0912   Wound Length (cm) 0.1 cm 01/26/24 0912   Wound Width (cm) 0.1 cm 01/26/24 0912   Wound Depth (cm) 0.1 cm 01/26/24 0912   Wound Surface Area (cm^2) 0.01 cm^2 01/26/24 0912   Wound Volume (cm^3) 0.001 cm^3 01/26/24 0912   Calculated Wound Volume (cm^3) 0 cm^3 01/26/24 0912   Change in Wound Size % 100 01/26/24 0912   Drainage Amount None 01/26/24 0912       Wound 07/28/23 Surgical Abdomen Left;Lower (Active)   Wound Image Images linked 01/26/24 0937   Wound Description Epithelialization;Other (Comment);Brown 01/26/24 0911   Lis-wound Assessment Scar Tissue;Pink 01/26/24 0911   Wound Length (cm) 0.7 cm 01/26/24 0911   Wound Width (cm) 0.5 cm 01/26/24 0911   Wound Depth (cm) 0.1 cm 01/26/24 0911   Wound Surface Area (cm^2) 0.35 cm^2 01/26/24 0911   Wound Volume (cm^3) 0.035 cm^3 01/26/24 0911   Calculated Wound Volume (cm^3) 0.04 cm^3 01/26/24 0911   Change in Wound Size % 97.88 01/26/24 0911   Drainage Amount Moderate 01/26/24 0911   Drainage Description Bloody 01/26/24 0911       Wound 07/28/23 Surgical Abdomen Mid (Active)   Wound Image Images linked 01/26/24 0936   Wound Description Epithelialization;Granulation tissue;Pink;Yellow 01/26/24 0913   Lis-wound Assessment Scar Tissue;Pink 01/26/24 0913   Wound Length (cm) 0.4 cm 01/26/24 0913   Wound Width (cm) 0.7 cm 01/26/24 0913   Wound Depth (cm) 0.1 cm 01/26/24 0913   Wound Surface Area (cm^2) 0.28 cm^2 01/26/24 0913   Wound Volume (cm^3) 0.028 cm^3 01/26/24 0913   Calculated Wound Volume (cm^3) 0.03 cm^3 01/26/24 0913   Change in Wound Size % 90.63 01/26/24 0913   Drainage Amount Moderate 01/26/24 0913   Drainage Description Serous;Brown;Bloody 01/26/24 0913   Non-staged Wound Description Full thickness 01/26/24 0913       Wound 07/28/23 Surgical Abdomen Lower;Right (Active)   Date First Assessed: 07/28/23   Present on Original  Admission: Yes  Primary Wound Type: Surgical  Location: Abdomen  Wound Location Orientation: Lower;Right       Wound 07/28/23 Surgical Abdomen Left;Lower (Active)   Date First Assessed: 07/28/23   Present on Original Admission: Yes  Primary Wound Type: Surgical  Location: Abdomen  Wound Location Orientation: Left;Lower       Wound 07/28/23 Surgical Abdomen Mid (Active)   Date First Assessed: 07/28/23   Present on Original Admission: Yes  Primary Wound Type: Surgical  Location: Abdomen  Wound Location Orientation: Mid       [REMOVED] Wound 07/15/22 Other (comment) Abdomen Right;Lower (Removed)   Resolved Date/Resolved Time: 10/14/22 0827  Date First Assessed/Time First Assessed: 07/15/22 1401   Primary Wound Type: Other (comment)  Location: Abdomen  Wound Location Orientation: Right;Lower  Wound Outcome: Other (Comment)       [REMOVED] Wound 09/15/22 Abdomen N/A (Removed)   Resolved Date/Resolved Time: 10/14/22 0826  Date First Assessed/Time First Assessed: 09/15/22 1359   Location: Abdomen  Wound Location Orientation: N/A  Wound Description (Comments): vac dressing-RLQ (1 black sponge, 1 white sponge), midline- surgical...       [REMOVED] Wound 09/15/22 Abdomen Lower;Quadrant;Right (Removed)   Resolved Date/Resolved Time: 10/14/22 0827  Date First Assessed: 09/15/22   Location: Abdomen  Wound Location Orientation: Lower;Quadrant;Right  Wound Outcome: Other (Comment)       [REMOVED] Wound 10/11/22 Abdomen N/A (Removed)   Resolved Date/Resolved Time: 10/14/22 0827  Date First Assessed/Time First Assessed: 10/11/22 1545   Location: Abdomen  Wound Location Orientation: N/A  Wound Description (Comments): wound vac 1 incision black sponge 1 incision black sponge one black ...       [REMOVED] Wound 10/14/22 Surgical Abdomen Medial;Lower (Removed)   Resolved Date: 07/28/23  Date First Assessed/Time First Assessed: 10/14/22 0828   Primary Wound Type: Surgical  Location: Abdomen  Wound Location Orientation: Medial;Lower   Dressing Status: Other (Comment)       [REMOVED] Wound 10/14/22 Surgical Abdomen Right;Lower (Removed)   Resolved Date: 07/28/23  Date First Assessed/Time First Assessed: 10/14/22 0829   Primary Wound Type: Surgical  Location: Abdomen  Wound Location Orientation: Right;Lower  Wound Outcome: Other (Comment)       [REMOVED] Wound 11/15/22 Penis Right (Removed)   Resolved Date/Resolved Time: 11/19/22 0218  Date First Assessed/Time First Assessed: 11/15/22 2250   Location: Penis  Wound Location Orientation: Right  Wound Outcome: Healed       [REMOVED] Wound 01/03/23 Penis Right (Removed)   Resolved Date: 07/28/23  Date First Assessed/Time First Assessed: 01/03/23 1735   Location: Penis  Wound Location Orientation: Right  Wound Description (Comments): benzoin, tegaderm   Dressing Status: Other (Comment)       [REMOVED] Wound 01/03/23 Perineum Right (Removed)   Resolved Date: 01/06/23  Date First Assessed/Time First Assessed: 01/03/23 1735   Location: Perineum  Wound Location Orientation: Right  Wound Outcome: Other (Comment)       [REMOVED] Wound 01/11/23 Thigh Left (Removed)   Resolved Date: 07/28/23  Date First Assessed/Time First Assessed: 01/11/23 1152   Location: Thigh  Wound Location Orientation: Left  Wound Description (Comments): PROMOGRAN OSMIN 4.3 X 4.3IN X 9 DRESSINGS  Incision's 1st Dressing: DRESSING OPTILOCK 6...       [REMOVED] Wound 01/11/23 Abdomen N/A (Removed)   Resolved Date: 01/27/23  Date First Assessed/Time First Assessed: 01/11/23 1232   Location: Abdomen  Wound Location Orientation: N/A  Wound Description (Comments): WOUND VAC APPLIED USING LARGE DRESSING  Incision's 1st Dressing: DRESSING XEROFORM 5 X ...       Subjective:      .    Mr. Tam is a 67-year-old gentleman here for follow-up from hospitalization for large abdominal wound.  He he developed an incarcerated right lower quadrant hernia requiring excision debridement of the wound and bridging closure with absorbable mesh.  He had a  large retention sutures in the abdominal wall but developed necrosis of the abdominal wall.  He was readmitted underwent excisional debridement and is here for evaluation.    10/21/2022.  Overall doing okay.  No significant pain.  Having some issues with the VAC dressing but overall working    11/04/2022.  Doing well.  Trying to increase his protein.  Still having significant fatigue episodes.  Still going through canisters quickly    11/25/2022.  He was hospitalized last week for severe sepsis and hydronephrosis and acute kidney injury from an obstructing ureteral stone and underwent cystoscopy and stent placement.  He is at home now and doing well.  No other issues    12/9/2022.  Doing well.  Was scheduled for urology but got delayed January.  Denies fevers or chills    1/6/2023.  He has been doing well.  He was seen by plastic surgery and is scheduled for skin grafting next week.  He also underwent repeat cystoscopy and stent    7/28/2023 he returns now for reevaluation.  After being seen here last in January he was seen by plastic surgery he underwent split-thickness skin grafting.  The wound has healed since significantly except for a few corners that have not healed so returns for reevaluation.    8/11/2023 overall he feels there is been some improvement of the wounds.  He does notice a new area on his left upper side.    8/25/2023.  Doing well.  Had some improvement in certain areas still some protruding mesh others    9/15/2023 doing well.  No issues.  Some wounds are improving some but not changing    10/13/2023.  Some wounds have improved some wounds are about the same.  Some areas with mesh exposed again.    11/10/2023.  Things improved significantly.  Only few small open areas remaining    12/1/2023.  Noticed a few new areas that opened and drained a little bit.  Other areas have healed.    12/29/2023.  I again had improvement in some areas some areas still persistent.  No major issues    1/26/2024.  He  noticed his left upper quadrant has a slightly larger area.  Otherwise he is doing with the dressings.          The following portions of the patient's history were reviewed and updated as appropriate: allergies, current medications, past family history, past medical history, past social history, past surgical history and problem list.    Review of Systems      Objective:       Wound 07/28/23 Surgical Abdomen Lower;Right (Active)   Wound Image Images linked 01/26/24 0906   Wound Description Epithelialization;Other (Comment) (covered with dry scab) 01/26/24 0912   Lis-wound Assessment Scar Tissue;Pink 01/26/24 0912   Wound Length (cm) 0.1 cm 01/26/24 0912   Wound Width (cm) 0.1 cm 01/26/24 0912   Wound Depth (cm) 0.1 cm 01/26/24 0912   Wound Surface Area (cm^2) 0.01 cm^2 01/26/24 0912   Wound Volume (cm^3) 0.001 cm^3 01/26/24 0912   Calculated Wound Volume (cm^3) 0 cm^3 01/26/24 0912   Change in Wound Size % 100 01/26/24 0912   Drainage Amount None 01/26/24 0912       Wound 07/28/23 Surgical Abdomen Left;Lower (Active)   Wound Image Images linked 01/26/24 0937   Wound Description Epithelialization;Other (Comment);Brown 01/26/24 0911   Lis-wound Assessment Scar Tissue;Pink 01/26/24 0911   Wound Length (cm) 0.7 cm 01/26/24 0911   Wound Width (cm) 0.5 cm 01/26/24 0911   Wound Depth (cm) 0.1 cm 01/26/24 0911   Wound Surface Area (cm^2) 0.35 cm^2 01/26/24 0911   Wound Volume (cm^3) 0.035 cm^3 01/26/24 0911   Calculated Wound Volume (cm^3) 0.04 cm^3 01/26/24 0911   Change in Wound Size % 97.88 01/26/24 0911   Drainage Amount Moderate 01/26/24 0911   Drainage Description Bloody 01/26/24 0911       Wound 07/28/23 Surgical Abdomen Mid (Active)   Wound Image Images linked 01/26/24 0936   Wound Description Epithelialization;Granulation tissue;Pink;Yellow 01/26/24 0913   Lis-wound Assessment Scar Tissue;Pink 01/26/24 0913   Wound Length (cm) 0.4 cm 01/26/24 0913   Wound Width (cm) 0.7 cm 01/26/24 0913   Wound Depth (cm) 0.1  cm 01/26/24 0913   Wound Surface Area (cm^2) 0.28 cm^2 01/26/24 0913   Wound Volume (cm^3) 0.028 cm^3 01/26/24 0913   Calculated Wound Volume (cm^3) 0.03 cm^3 01/26/24 0913   Change in Wound Size % 90.63 01/26/24 0913   Drainage Amount Moderate 01/26/24 0913   Drainage Description Serous;Brown;Bloody 01/26/24 0913   Non-staged Wound Description Full thickness 01/26/24 0913       /78   Pulse 68   Temp 97.6 °F (36.4 °C)   Resp 18     Physical Exam      Wound Instructions:  Orders Placed This Encounter   Procedures    Wound cleansing and dressings     Wash your hands with soap and water.  Remove old dressing, discard into plastic bag and place in trash.    Cleanse the wound with mild soap(Dove) and water prior to applying a clean dressing.   Do not use tissue or cotton balls. Do not scrub the wound. Pat dry using gauze.  Shower yes, on the days you change the dressing.      Abdominal Wounds:   Stop dermagran for now  Apply Opticell to the wound beds  Cover with bordered gauze.   Change dressing three times a week.      Increase protein intake 3-4 servings daily    Follow up  in 3-4 weeks     Today's wound treatment note:  Cleansed with normal saline     Standing Status:   Future     Standing Expiration Date:   1/26/2025    Debridement Surgical Left;Lower Abdomen     This order was created via procedure documentation        Diagnosis ICD-10-CM Associated Orders   1. Non-healing surgical wound, initial encounter  T81.89XA lidocaine (LMX) 4 % cream     Wound cleansing and dressings     Debridement Surgical Left;Lower Abdomen      2. Surgical wound present  T14.8XXA lidocaine (LMX) 4 % cream     Wound cleansing and dressings     Debridement Surgical Left;Lower Abdomen

## 2024-01-26 NOTE — ASSESSMENT & PLAN NOTE
The right side of his abdomen is basically healed at this time.  On the left side left upper quadrant has a slightly open area in the left lower open new area with some mesh exposed that was removed.  Will try using alginate for drainage.  Follow-up in 1 month

## 2024-02-06 ENCOUNTER — OFFICE VISIT (OUTPATIENT)
Dept: FAMILY MEDICINE CLINIC | Facility: CLINIC | Age: 69
End: 2024-02-06
Payer: MEDICARE

## 2024-02-06 VITALS
BODY MASS INDEX: 47.74 KG/M2 | OXYGEN SATURATION: 96 % | HEIGHT: 68 IN | HEART RATE: 66 BPM | WEIGHT: 315 LBS | SYSTOLIC BLOOD PRESSURE: 138 MMHG | DIASTOLIC BLOOD PRESSURE: 70 MMHG | TEMPERATURE: 98 F

## 2024-02-06 DIAGNOSIS — I89.0 LYMPHEDEMA OF BOTH LOWER EXTREMITIES: ICD-10-CM

## 2024-02-06 DIAGNOSIS — N17.9 ACUTE KIDNEY INJURY SUPERIMPOSED ON CHRONIC KIDNEY DISEASE: Primary | ICD-10-CM

## 2024-02-06 DIAGNOSIS — E53.8 B12 DEFICIENCY: Chronic | ICD-10-CM

## 2024-02-06 DIAGNOSIS — G47.33 OSA (OBSTRUCTIVE SLEEP APNEA): ICD-10-CM

## 2024-02-06 DIAGNOSIS — G57.93 NEUROPATHY OF BOTH FEET: Chronic | ICD-10-CM

## 2024-02-06 DIAGNOSIS — D53.9 NUTRITIONAL ANEMIA, UNSPECIFIED: ICD-10-CM

## 2024-02-06 DIAGNOSIS — N20.0 KIDNEY STONES: ICD-10-CM

## 2024-02-06 DIAGNOSIS — E66.01 OBESITY, CLASS III, BMI 40-49.9 (MORBID OBESITY) (HCC): ICD-10-CM

## 2024-02-06 DIAGNOSIS — M10.9 GOUT, UNSPECIFIED CAUSE, UNSPECIFIED CHRONICITY, UNSPECIFIED SITE: ICD-10-CM

## 2024-02-06 DIAGNOSIS — N18.9 ACUTE KIDNEY INJURY SUPERIMPOSED ON CHRONIC KIDNEY DISEASE: Primary | ICD-10-CM

## 2024-02-06 DIAGNOSIS — I26.99 PULMONARY EMBOLISM, UNSPECIFIED CHRONICITY, UNSPECIFIED PULMONARY EMBOLISM TYPE, UNSPECIFIED WHETHER ACUTE COR PULMONALE PRESENT (HCC): ICD-10-CM

## 2024-02-06 DIAGNOSIS — T14.8XXA SURGICAL WOUND PRESENT: ICD-10-CM

## 2024-02-06 DIAGNOSIS — D64.9 ANEMIA, UNSPECIFIED TYPE: ICD-10-CM

## 2024-02-06 PROCEDURE — 99214 OFFICE O/P EST MOD 30 MIN: CPT | Performed by: FAMILY MEDICINE

## 2024-02-06 NOTE — PROGRESS NOTES
"Assessment/Plan:    No problem-specific Assessment & Plan notes found for this encounter.             Subjective:      Patient ID: Caterina Tam is a 68 y.o. male.      PATIENT RETURNS FOR FOLLOW-UP OF CHRONIC MEDICAL CONDITIONS.  ANY HOSPITAL VISITS, EMERGENCY VISITS AND OTHER PROVIDER VISITS SINCE LAST TIME WERE REVIEWED.  MEDS WERE REVIEWED AND NO SIDE EFFECTS.  NO NEW ISSUES  UNLESS NOTED BELOW. NO NEW MEDICAL PROVIDER REPORTED. THE CHRONIC DISEASES LISTED ABOVE ARE STABLE AND UNCHANGED/ THE PLAN OF CARE FOR THOSE WILL REMAIN UNCHANGED UNLESS NOTED BELOW.      Lymphedema persisting :         The following portions of the patient's history were reviewed and updated as appropriate: allergies, current medications, past family history, past medical history, past social history, past surgical history and problem list.    Review of Systems   Constitutional:  Negative for activity change, appetite change, fatigue, fever and unexpected weight change.   HENT:  Negative for congestion, dental problem and sneezing.    Eyes:  Negative for discharge and visual disturbance.   Respiratory:  Negative for cough and wheezing.    Gastrointestinal:  Negative for abdominal pain, constipation, diarrhea, nausea and vomiting.   Endocrine: Negative for polydipsia and polyuria.   Genitourinary:  Negative for dysuria and frequency.   Musculoskeletal:  Negative for arthralgias.   Skin:  Negative for rash.   Allergic/Immunologic: Negative for environmental allergies and food allergies.   Neurological:  Negative for headaches.   Hematological:  Negative for adenopathy.   Psychiatric/Behavioral:  Negative for behavioral problems and sleep disturbance.          Objective:  Vitals:    02/06/24 0932   BP: 138/70   BP Location: Left arm   Patient Position: Sitting   Cuff Size: Thigh   Pulse: 66   Temp: 98 °F (36.7 °C)   TempSrc: Temporal   SpO2: 96%   Weight: (!) 181 kg (399 lb 3.2 oz)   Height: 5' 8\" (1.727 m)      Physical " Exam  Constitutional:       Appearance: Normal appearance.   HENT:      Head: Normocephalic and atraumatic.      Right Ear: Tympanic membrane and ear canal normal.      Left Ear: Tympanic membrane and ear canal normal.      Mouth/Throat:      Mouth: Mucous membranes are moist.      Pharynx: Oropharynx is clear.   Eyes:      Conjunctiva/sclera: Conjunctivae normal.   Cardiovascular:      Rate and Rhythm: Normal rate and regular rhythm.      Pulses: Normal pulses.      Heart sounds: Normal heart sounds. No murmur heard.  Pulmonary:      Effort: Pulmonary effort is normal. No respiratory distress.      Breath sounds: Normal breath sounds.   Musculoskeletal:      Cervical back: Neck supple.   Lymphadenopathy:      Cervical: No cervical adenopathy.   Neurological:      Mental Status: He is alert. Mental status is at baseline.   Psychiatric:         Mood and Affect: Mood normal.         Thought Content: Thought content normal.

## 2024-02-06 NOTE — PATIENT INSTRUCTIONS
You could attempt to taper down the gabapentin by 1 dose per week over the next several weeks to see if it is necessary.

## 2024-02-23 ENCOUNTER — OFFICE VISIT (OUTPATIENT)
Dept: WOUND CARE | Facility: CLINIC | Age: 69
End: 2024-02-23
Payer: MEDICARE

## 2024-02-23 VITALS
SYSTOLIC BLOOD PRESSURE: 110 MMHG | RESPIRATION RATE: 18 BRPM | HEART RATE: 60 BPM | TEMPERATURE: 97.7 F | DIASTOLIC BLOOD PRESSURE: 72 MMHG

## 2024-02-23 DIAGNOSIS — E66.01 MORBID OBESITY WITH BMI OF 50.0-59.9, ADULT (HCC): ICD-10-CM

## 2024-02-23 DIAGNOSIS — T81.89XA NON-HEALING SURGICAL WOUND, INITIAL ENCOUNTER: Primary | ICD-10-CM

## 2024-02-23 PROCEDURE — 11042 DBRDMT SUBQ TIS 1ST 20SQCM/<: CPT | Performed by: SURGERY

## 2024-02-23 RX ORDER — LIDOCAINE 40 MG/G
CREAM TOPICAL ONCE
Status: COMPLETED | OUTPATIENT
Start: 2024-02-23 | End: 2024-02-23

## 2024-02-23 RX ADMIN — LIDOCAINE: 40 CREAM TOPICAL at 09:01

## 2024-02-23 NOTE — ASSESSMENT & PLAN NOTE
The right side of abdominal wall has small little fragile area basically closed.  The middle portion has healed.  The left side still is a few open areas that were debrided.  Continue the same care follow-up in a month

## 2024-02-23 NOTE — PATIENT INSTRUCTIONS
Orders Placed This Encounter   Procedures    Wound cleansing and dressings     Wash your hands with soap and water.  Remove old dressing, discard into plastic bag and place in trash.    Cleanse the wound with mild soap(Dove) and water prior to applying a clean dressing.   Do not use tissue or cotton balls. Do not scrub the wound. Pat dry using gauze.  Shower yes, on the days you change the dressing.      Left lower and right abdominal wounds:   Apply Opticell to the wound beds  Cover with bordered gauze.   Change dressing three times a week.      Recommend protein intake 3-4 servings daily          Today's wound treatment note:  Cleansed with normal saline and dressed as above  Left abdominal wound was silver nitrated at wound care center today, wound may have a greyish appearance and/or drainage  Midline wound is healed. Continue good daily skin care and moisturize daily     Standing Status:   Future     Standing Expiration Date:   2/23/2025

## 2024-02-23 NOTE — PROGRESS NOTES
"Patient ID: Caterina Tam is a 69 y.o. male Date of Birth 1955     Chief Complaint  Chief Complaint   Patient presents with    Follow Up Wound Care Visit     Abdominal wounds        Allergies  Codeine    Assessment:    Nonhealing surgical wound  The right side of abdominal wall has small little fragile area basically closed.  The middle portion has healed.  The left side still is a few open areas that were debrided.  Continue the same care follow-up in a month             Diagnoses and all orders for this visit:    Non-healing surgical wound, initial encounter  -     lidocaine (LMX) 4 % cream  -     Wound cleansing and dressings; Future    Morbid obesity with BMI of 50.0-59.9, adult ([facility])    Other orders  -     Debridement Surgical Left;Lower Abdomen              Debridement   Wound 07/28/23 Surgical Abdomen Left;Lower    Universal Protocol:  Consent: Verbal consent obtained.  Consent given by: patient  Time out: Immediately prior to procedure a \"time out\" was called to verify the correct patient, procedure, equipment, support staff and site/side marked as required.    Debridement Details  Performed by: physician  Debridement type: surgical  Level of debridement: subcutaneous tissue  Pain control: lidocaine 4%      Post-debridement measurements  Length (cm): 2  Width (cm): 1  Depth (cm): 0.1  Percent debrided: 100%  Surface Area (cm^2): 2  Area Debrided (cm^2): 2  Volume (cm^3): 0.2    Tissue and other material debrided: subcutaneous tissue  Devitalized tissue debrided: biofilm and slough  Instrument(s) utilized: curette  Procedural pain (0-10): 1  Post-procedural pain: 1   Response to treatment: procedure was tolerated well        Plan:     Wound 07/28/23 Surgical Abdomen Lower;Right (Active)   Wound Image Images linked 02/23/24 0856   Wound Description Epithelialization;Pink 02/23/24 0858   Lis-wound Assessment Dry;Purple 02/23/24 0858   Wound Length (cm) 0.1 cm 02/23/24 0858   Wound Width (cm) 0.1 cm " 02/23/24 0858   Wound Depth (cm) 0.1 cm 02/23/24 0858   Wound Surface Area (cm^2) 0.01 cm^2 02/23/24 0858   Wound Volume (cm^3) 0.001 cm^3 02/23/24 0858   Calculated Wound Volume (cm^3) 0 cm^3 02/23/24 0858   Change in Wound Size % 100 02/23/24 0858   Drainage Amount Scant 02/23/24 0858   Drainage Description Bloody 02/23/24 0858   Dressing Status Other (Comment) (DEBBY) 02/23/24 0858       Wound 07/28/23 Surgical Abdomen Left;Lower (Active)   Wound Image Images linked 02/23/24 0937   Wound Description Pink;Epithelialization 02/23/24 0856   Lis-wound Assessment Scar Tissue;Pink 02/23/24 0856   Wound Length (cm) 2 cm 02/23/24 0856   Wound Width (cm) 1 cm 02/23/24 0856   Wound Depth (cm) 0.1 cm 02/23/24 0856   Wound Surface Area (cm^2) 2 cm^2 02/23/24 0856   Wound Volume (cm^3) 0.2 cm^3 02/23/24 0856   Calculated Wound Volume (cm^3) 0.2 cm^3 02/23/24 0856   Change in Wound Size % 89.42 02/23/24 0856   Drainage Amount Small 02/23/24 0856   Drainage Description Serosanguineous 02/23/24 0856   Non-staged Wound Description Full thickness 02/23/24 0856   Dressing Status Intact 02/23/24 0856       Wound 07/28/23 Surgical Abdomen Mid (Active)   Wound Image Images linked 02/23/24 0855   Wound Description Epithelialization 02/23/24 0900   Wound Length (cm) 0 cm 02/23/24 0900   Wound Width (cm) 0 cm 02/23/24 0900   Wound Depth (cm) 0 cm 02/23/24 0900   Wound Surface Area (cm^2) 0 cm^2 02/23/24 0900   Wound Volume (cm^3) 0 cm^3 02/23/24 0900   Calculated Wound Volume (cm^3) 0 cm^3 02/23/24 0900   Change in Wound Size % 100 02/23/24 0900   Drainage Amount None 02/23/24 0900   Dressing Status Other (Comment) (DEBBY) 02/23/24 0900       Wound 07/28/23 Surgical Abdomen Lower;Right (Active)   Date First Assessed: 07/28/23   Present on Original Admission: Yes  Primary Wound Type: Surgical  Location: Abdomen  Wound Location Orientation: Lower;Right       Wound 07/28/23 Surgical Abdomen Left;Lower (Active)   Date First Assessed: 07/28/23    Present on Original Admission: Yes  Primary Wound Type: Surgical  Location: Abdomen  Wound Location Orientation: Left;Lower       Wound 07/28/23 Surgical Abdomen Mid (Active)   Date First Assessed: 07/28/23   Present on Original Admission: Yes  Primary Wound Type: Surgical  Location: Abdomen  Wound Location Orientation: Mid  Wound Outcome: Healed       [REMOVED] Wound 07/15/22 Other (comment) Abdomen Right;Lower (Removed)   Resolved Date/Resolved Time: 10/14/22 0827  Date First Assessed/Time First Assessed: 07/15/22 1401   Primary Wound Type: Other (comment)  Location: Abdomen  Wound Location Orientation: Right;Lower  Wound Outcome: Other (Comment)       [REMOVED] Wound 09/15/22 Abdomen N/A (Removed)   Resolved Date/Resolved Time: 10/14/22 0826  Date First Assessed/Time First Assessed: 09/15/22 1359   Location: Abdomen  Wound Location Orientation: N/A  Wound Description (Comments): vac dressing-RLQ (1 black sponge, 1 white sponge), midline- surgical...       [REMOVED] Wound 09/15/22 Abdomen Lower;Quadrant;Right (Removed)   Resolved Date/Resolved Time: 10/14/22 0827  Date First Assessed: 09/15/22   Location: Abdomen  Wound Location Orientation: Lower;Quadrant;Right  Wound Outcome: Other (Comment)       [REMOVED] Wound 10/11/22 Abdomen N/A (Removed)   Resolved Date/Resolved Time: 10/14/22 0827  Date First Assessed/Time First Assessed: 10/11/22 1545   Location: Abdomen  Wound Location Orientation: N/A  Wound Description (Comments): wound vac 1 incision black sponge 1 incision black sponge one black ...       [REMOVED] Wound 10/14/22 Surgical Abdomen Medial;Lower (Removed)   Resolved Date: 07/28/23  Date First Assessed/Time First Assessed: 10/14/22 0828   Primary Wound Type: Surgical  Location: Abdomen  Wound Location Orientation: Medial;Lower  Dressing Status: Other (Comment)       [REMOVED] Wound 10/14/22 Surgical Abdomen Right;Lower (Removed)   Resolved Date: 07/28/23  Date First Assessed/Time First Assessed:  10/14/22 0829   Primary Wound Type: Surgical  Location: Abdomen  Wound Location Orientation: Right;Lower  Wound Outcome: Other (Comment)       [REMOVED] Wound 11/15/22 Penis Right (Removed)   Resolved Date/Resolved Time: 11/19/22 0218  Date First Assessed/Time First Assessed: 11/15/22 2250   Location: Penis  Wound Location Orientation: Right  Wound Outcome: Healed       [REMOVED] Wound 01/03/23 Penis Right (Removed)   Resolved Date: 07/28/23  Date First Assessed/Time First Assessed: 01/03/23 1735   Location: Penis  Wound Location Orientation: Right  Wound Description (Comments): benzoin, tegaderm   Dressing Status: Other (Comment)       [REMOVED] Wound 01/03/23 Perineum Right (Removed)   Resolved Date: 01/06/23  Date First Assessed/Time First Assessed: 01/03/23 1735   Location: Perineum  Wound Location Orientation: Right  Wound Outcome: Other (Comment)       [REMOVED] Wound 01/11/23 Thigh Left (Removed)   Resolved Date: 07/28/23  Date First Assessed/Time First Assessed: 01/11/23 1152   Location: Thigh  Wound Location Orientation: Left  Wound Description (Comments): PROMOGRAN OSMIN 4.3 X 4.3IN X 9 DRESSINGS  Incision's 1st Dressing: DRESSING OPTILOCK 6...       [REMOVED] Wound 01/11/23 Abdomen N/A (Removed)   Resolved Date: 01/27/23  Date First Assessed/Time First Assessed: 01/11/23 1232   Location: Abdomen  Wound Location Orientation: N/A  Wound Description (Comments): WOUND VAC APPLIED USING LARGE DRESSING  Incision's 1st Dressing: DRESSING XEROFORM 5 X ...       Subjective:      .    Mr. Tam is a 67-year-old gentleman here for follow-up from hospitalization for large abdominal wound.  He he developed an incarcerated right lower quadrant hernia requiring excision debridement of the wound and bridging closure with absorbable mesh.  He had a large retention sutures in the abdominal wall but developed necrosis of the abdominal wall.  He was readmitted underwent excisional debridement and is here for  evaluation.    10/21/2022.  Overall doing okay.  No significant pain.  Having some issues with the VAC dressing but overall working    11/04/2022.  Doing well.  Trying to increase his protein.  Still having significant fatigue episodes.  Still going through canisters quickly    11/25/2022.  He was hospitalized last week for severe sepsis and hydronephrosis and acute kidney injury from an obstructing ureteral stone and underwent cystoscopy and stent placement.  He is at home now and doing well.  No other issues    12/9/2022.  Doing well.  Was scheduled for urology but got delayed January.  Denies fevers or chills    1/6/2023.  He has been doing well.  He was seen by plastic surgery and is scheduled for skin grafting next week.  He also underwent repeat cystoscopy and stent    7/28/2023 he returns now for reevaluation.  After being seen here last in January he was seen by plastic surgery he underwent split-thickness skin grafting.  The wound has healed since significantly except for a few corners that have not healed so returns for reevaluation.    8/11/2023 overall he feels there is been some improvement of the wounds.  He does notice a new area on his left upper side.    8/25/2023.  Doing well.  Had some improvement in certain areas still some protruding mesh others    9/15/2023 doing well.  No issues.  Some wounds are improving some but not changing    10/13/2023.  Some wounds have improved some wounds are about the same.  Some areas with mesh exposed again.    11/10/2023.  Things improved significantly.  Only few small open areas remaining    12/1/2023.  Noticed a few new areas that opened and drained a little bit.  Other areas have healed.    12/29/2023.  I again had improvement in some areas some areas still persistent.  No major issues    1/26/2024.  He noticed his left upper quadrant has a slightly larger area.  Otherwise he is doing with the dressings.    2/23/2024.  Is been doing well.  He feels things are  improving but still some open areas              The following portions of the patient's history were reviewed and updated as appropriate: allergies, current medications, past family history, past medical history, past social history, past surgical history and problem list.    Review of Systems      Objective:       Wound 07/28/23 Surgical Abdomen Lower;Right (Active)   Wound Image Images linked 02/23/24 0856   Wound Description Epithelialization;Pink 02/23/24 0858   Lis-wound Assessment Dry;Purple 02/23/24 0858   Wound Length (cm) 0.1 cm 02/23/24 0858   Wound Width (cm) 0.1 cm 02/23/24 0858   Wound Depth (cm) 0.1 cm 02/23/24 0858   Wound Surface Area (cm^2) 0.01 cm^2 02/23/24 0858   Wound Volume (cm^3) 0.001 cm^3 02/23/24 0858   Calculated Wound Volume (cm^3) 0 cm^3 02/23/24 0858   Change in Wound Size % 100 02/23/24 0858   Drainage Amount Scant 02/23/24 0858   Drainage Description Bloody 02/23/24 0858   Dressing Status Other (Comment) (DEBBY) 02/23/24 0858       Wound 07/28/23 Surgical Abdomen Left;Lower (Active)   Wound Image Images linked 02/23/24 0937   Wound Description Pink;Epithelialization 02/23/24 0856   Lis-wound Assessment Scar Tissue;Pink 02/23/24 0856   Wound Length (cm) 2 cm 02/23/24 0856   Wound Width (cm) 1 cm 02/23/24 0856   Wound Depth (cm) 0.1 cm 02/23/24 0856   Wound Surface Area (cm^2) 2 cm^2 02/23/24 0856   Wound Volume (cm^3) 0.2 cm^3 02/23/24 0856   Calculated Wound Volume (cm^3) 0.2 cm^3 02/23/24 0856   Change in Wound Size % 89.42 02/23/24 0856   Drainage Amount Small 02/23/24 0856   Drainage Description Serosanguineous 02/23/24 0856   Non-staged Wound Description Full thickness 02/23/24 0856   Dressing Status Intact 02/23/24 0856       Wound 07/28/23 Surgical Abdomen Mid (Active)   Wound Image Images linked 02/23/24 0855   Wound Description Epithelialization 02/23/24 0900   Wound Length (cm) 0 cm 02/23/24 0900   Wound Width (cm) 0 cm 02/23/24 0900   Wound Depth (cm) 0 cm 02/23/24 0900    Wound Surface Area (cm^2) 0 cm^2 02/23/24 0900   Wound Volume (cm^3) 0 cm^3 02/23/24 0900   Calculated Wound Volume (cm^3) 0 cm^3 02/23/24 0900   Change in Wound Size % 100 02/23/24 0900   Drainage Amount None 02/23/24 0900   Dressing Status Other (Comment) (DEBBY) 02/23/24 0900       /72   Pulse 60   Temp 97.7 °F (36.5 °C)   Resp 18     Physical Exam      Wound Instructions:  Orders Placed This Encounter   Procedures    Wound cleansing and dressings     Wash your hands with soap and water.  Remove old dressing, discard into plastic bag and place in trash.    Cleanse the wound with mild soap(Dove) and water prior to applying a clean dressing.   Do not use tissue or cotton balls. Do not scrub the wound. Pat dry using gauze.  Shower yes, on the days you change the dressing.      Left lower and right abdominal wounds:   Apply Opticell to the wound beds  Cover with bordered gauze.   Change dressing three times a week.      Recommend protein intake 3-4 servings daily          Today's wound treatment note:  Cleansed with normal saline and dressed as above  Left abdominal wound was silver nitrated at wound care center today, wound may have a greyish appearance and/or drainage  Midline wound is healed. Continue good daily skin care and moisturize daily     Standing Status:   Future     Standing Expiration Date:   2/23/2025    Debridement Surgical Left;Lower Abdomen     This order was created via procedure documentation        Diagnosis ICD-10-CM Associated Orders   1. Non-healing surgical wound, initial encounter  T81.89XA lidocaine (LMX) 4 % cream     Wound cleansing and dressings      2. Morbid obesity with BMI of 50.0-59.9, adult ([facility])  E66.01     Z68.43

## 2024-03-22 ENCOUNTER — OFFICE VISIT (OUTPATIENT)
Dept: WOUND CARE | Facility: CLINIC | Age: 69
End: 2024-03-22
Payer: MEDICARE

## 2024-03-22 VITALS
RESPIRATION RATE: 16 BRPM | HEART RATE: 78 BPM | SYSTOLIC BLOOD PRESSURE: 156 MMHG | TEMPERATURE: 97.7 F | DIASTOLIC BLOOD PRESSURE: 66 MMHG

## 2024-03-22 DIAGNOSIS — T81.89XA NON-HEALING SURGICAL WOUND, INITIAL ENCOUNTER: Primary | ICD-10-CM

## 2024-03-22 PROCEDURE — 97597 DBRDMT OPN WND 1ST 20 CM/<: CPT | Performed by: SURGERY

## 2024-03-22 RX ORDER — LIDOCAINE 40 MG/G
CREAM TOPICAL ONCE
Status: COMPLETED | OUTPATIENT
Start: 2024-03-22 | End: 2024-03-22

## 2024-03-22 RX ADMIN — LIDOCAINE: 40 CREAM TOPICAL at 09:51

## 2024-03-22 NOTE — PATIENT INSTRUCTIONS
Orders Placed This Encounter   Procedures    Wound cleansing and dressings     Wound cleansing and dressings       Wash your hands with soap and water.  Remove old dressing, discard into plastic bag and place in trash.    Cleanse the wound with mild soap(Dove) and water prior to applying a clean dressing.   Do not use tissue or cotton balls. Do not scrub the wound. Pat dry using gauze.  Shower yes, on the days you change the dressing.      Left lower and right abdominal wounds:   Apply Opticell to the wound beds  Cover with bordered gauze.   Change dressing three times a week.      Recommend protein intake 3-4 servings daily    Follow up in wound center in four weeks.            Today's wound treatment note:  Cleansed with normal saline and dressed as above  Left abdominal wound was silver nitrated at wound care center today, wound may have a greyish appearance and/or drainage  Continue good daily skin care and moisturize daily     Standing Status:   Future     Standing Expiration Date:   3/22/2025

## 2024-03-22 NOTE — ASSESSMENT & PLAN NOTE
Still a few open areas.  Slightly cleaned out may be a small piece of mesh removed although uncertain if just scar tissue.  Continue the same care.  Follow-up in a month

## 2024-03-22 NOTE — PROGRESS NOTES
"Patient ID: Caterina Tam is a 69 y.o. male Date of Birth 1955     Chief Complaint  Chief Complaint   Patient presents with    Follow Up Wound Care Visit     Abdominal wound       Allergies  Codeine    Assessment:    Nonhealing surgical wound  Still a few open areas.  Slightly cleaned out may be a small piece of mesh removed although uncertain if just scar tissue.  Continue the same care.  Follow-up in a month               Diagnoses and all orders for this visit:    Non-healing surgical wound, initial encounter  -     Wound cleansing and dressings; Future  -     lidocaine (LMX) 4 % cream    Other orders  -     Debridement Surgical Left;Lower Abdomen              Debridement   Wound 07/28/23 Surgical Abdomen Left;Lower    Universal Protocol:  Consent given by: patient  Time out: Immediately prior to procedure a \"time out\" was called to verify the correct patient, procedure, equipment, support staff and site/side marked as required.    Debridement Details  Performed by: physician  Debridement type: selective  Pain control: lidocaine 4%      Post-debridement measurements  Length (cm): 1.5  Width (cm): 0.8  Depth (cm): 0.1  Percent debrided: 100%  Surface Area (cm^2): 1.2  Area Debrided (cm^2): 1.2  Volume (cm^3): 0.12    Devitalized tissue debrided: biofilm and slough  Instrument(s) utilized: curette and scissors  Procedural pain (0-10): 1  Post-procedural pain: 1   Response to treatment: procedure was tolerated well        Plan:     Wound 07/28/23 Surgical Abdomen Left;Lower (Active)   Wound Image Images linked 03/22/24 0928   Wound Description Pink;Epithelialization 03/22/24 0930   Lis-wound Assessment Scar Tissue;Pink 03/22/24 0930   Wound Length (cm) 1.5 cm 03/22/24 0930   Wound Width (cm) 0.8 cm 03/22/24 0930   Wound Depth (cm) 0.1 cm 03/22/24 0930   Wound Surface Area (cm^2) 1.2 cm^2 03/22/24 0930   Wound Volume (cm^3) 0.12 cm^3 03/22/24 0930   Calculated Wound Volume (cm^3) 0.12 cm^3 03/22/24 0930 "   Change in Wound Size % 93.65 03/22/24 0930   Drainage Amount Small 03/22/24 0930   Drainage Description Serosanguineous 03/22/24 0930   Non-staged Wound Description Full thickness 03/22/24 0930   Treatments Irrigation with NSS 03/22/24 0930   Dressing Status Removed 03/22/24 0930       Wound 07/28/23 Surgical Abdomen Mid (Active)   Wound Image Images linked 03/22/24 0929   Wound Description Epithelialization;Pink 03/22/24 0932   Lis-wound Assessment Scar Tissue;Pink 03/22/24 0932   Wound Length (cm) 0.1 cm 03/22/24 0932   Wound Width (cm) 0.1 cm 03/22/24 0932   Wound Depth (cm) 0.1 cm 03/22/24 0932   Wound Surface Area (cm^2) 0.01 cm^2 03/22/24 0932   Wound Volume (cm^3) 0.001 cm^3 03/22/24 0932   Calculated Wound Volume (cm^3) 0 cm^3 03/22/24 0932   Change in Wound Size % 100 03/22/24 0932   Drainage Amount Scant 03/22/24 0932   Drainage Description Bloody 03/22/24 0932       [REMOVED] Wound 07/28/23 Surgical Abdomen Lower;Right (Removed)   Wound Image Images linked 03/22/24 0928   Wound Description Intact 03/22/24 0930   Lis-wound Assessment Dry;Intact 03/22/24 0930   Wound Length (cm) 0 cm 03/22/24 0930   Wound Width (cm) 0 cm 03/22/24 0930   Wound Depth (cm) 0 cm 03/22/24 0930   Wound Surface Area (cm^2) 0 cm^2 03/22/24 0930   Wound Volume (cm^3) 0 cm^3 03/22/24 0930   Calculated Wound Volume (cm^3) 0 cm^3 03/22/24 0930   Change in Wound Size % 100 03/22/24 0930   Drainage Amount None 03/22/24 0930       Wound 07/28/23 Surgical Abdomen Left;Lower (Active)   Date First Assessed: 07/28/23   Present on Original Admission: Yes  Primary Wound Type: Surgical  Location: Abdomen  Wound Location Orientation: Left;Lower       Wound 07/28/23 Surgical Abdomen Mid (Active)   Date First Assessed: 07/28/23   Present on Original Admission: Yes  Primary Wound Type: Surgical  Location: Abdomen  Wound Location Orientation: Mid  Wound Outcome: Healed       [REMOVED] Wound 07/15/22 Other (comment) Abdomen Right;Lower (Removed)    Resolved Date/Resolved Time: 10/14/22 0827  Date First Assessed/Time First Assessed: 07/15/22 1401   Primary Wound Type: Other (comment)  Location: Abdomen  Wound Location Orientation: Right;Lower  Wound Outcome: Other (Comment)       [REMOVED] Wound 09/15/22 Abdomen N/A (Removed)   Resolved Date/Resolved Time: 10/14/22 0826  Date First Assessed/Time First Assessed: 09/15/22 1359   Location: Abdomen  Wound Location Orientation: N/A  Wound Description (Comments): vac dressing-RLQ (1 black sponge, 1 white sponge), midline- surgical...       [REMOVED] Wound 09/15/22 Abdomen Lower;Quadrant;Right (Removed)   Resolved Date/Resolved Time: 10/14/22 0827  Date First Assessed: 09/15/22   Location: Abdomen  Wound Location Orientation: Lower;Quadrant;Right  Wound Outcome: Other (Comment)       [REMOVED] Wound 10/11/22 Abdomen N/A (Removed)   Resolved Date/Resolved Time: 10/14/22 0827  Date First Assessed/Time First Assessed: 10/11/22 1545   Location: Abdomen  Wound Location Orientation: N/A  Wound Description (Comments): wound vac 1 incision black sponge 1 incision black sponge one black ...       [REMOVED] Wound 10/14/22 Surgical Abdomen Medial;Lower (Removed)   Resolved Date: 07/28/23  Date First Assessed/Time First Assessed: 10/14/22 0828   Primary Wound Type: Surgical  Location: Abdomen  Wound Location Orientation: Medial;Lower  Dressing Status: Other (Comment)       [REMOVED] Wound 10/14/22 Surgical Abdomen Right;Lower (Removed)   Resolved Date: 07/28/23  Date First Assessed/Time First Assessed: 10/14/22 0829   Primary Wound Type: Surgical  Location: Abdomen  Wound Location Orientation: Right;Lower  Wound Outcome: Other (Comment)       [REMOVED] Wound 11/15/22 Penis Right (Removed)   Resolved Date/Resolved Time: 11/19/22 0218  Date First Assessed/Time First Assessed: 11/15/22 2250   Location: Penis  Wound Location Orientation: Right  Wound Outcome: Healed       [REMOVED] Wound 01/03/23 Penis Right (Removed)   Resolved  Date: 07/28/23  Date First Assessed/Time First Assessed: 01/03/23 1735   Location: Penis  Wound Location Orientation: Right  Wound Description (Comments): benzoin, tegaderm   Dressing Status: Other (Comment)       [REMOVED] Wound 01/03/23 Perineum Right (Removed)   Resolved Date: 01/06/23  Date First Assessed/Time First Assessed: 01/03/23 1735   Location: Perineum  Wound Location Orientation: Right  Wound Outcome: Other (Comment)       [REMOVED] Wound 01/11/23 Thigh Left (Removed)   Resolved Date: 07/28/23  Date First Assessed/Time First Assessed: 01/11/23 1152   Location: Thigh  Wound Location Orientation: Left  Wound Description (Comments): PROMOGRAN OSMIN 4.3 X 4.3IN X 9 DRESSINGS  Incision's 1st Dressing: DRESSING OPTILOCK 6...       [REMOVED] Wound 01/11/23 Abdomen N/A (Removed)   Resolved Date: 01/27/23  Date First Assessed/Time First Assessed: 01/11/23 1232   Location: Abdomen  Wound Location Orientation: N/A  Wound Description (Comments): WOUND VAC APPLIED USING LARGE DRESSING  Incision's 1st Dressing: DRESSING XEROFORM 5 X ...       [REMOVED] Wound 07/28/23 Surgical Abdomen Lower;Right (Removed)   Resolved Date: 03/22/24  Date First Assessed: 07/28/23   Present on Original Admission: Yes  Primary Wound Type: Surgical  Location: Abdomen  Wound Location Orientation: Lower;Right  Wound Outcome: Healed       Subjective:      .    Mr. Tam is a 67-year-old gentleman here for follow-up from hospitalization for large abdominal wound.  He he developed an incarcerated right lower quadrant hernia requiring excision debridement of the wound and bridging closure with absorbable mesh.  He had a large retention sutures in the abdominal wall but developed necrosis of the abdominal wall.  He was readmitted underwent excisional debridement and is here for evaluation.    10/21/2022.  Overall doing okay.  No significant pain.  Having some issues with the VAC dressing but overall working    11/04/2022.  Doing well.   Trying to increase his protein.  Still having significant fatigue episodes.  Still going through canisters quickly    11/25/2022.  He was hospitalized last week for severe sepsis and hydronephrosis and acute kidney injury from an obstructing ureteral stone and underwent cystoscopy and stent placement.  He is at home now and doing well.  No other issues    12/9/2022.  Doing well.  Was scheduled for urology but got delayed January.  Denies fevers or chills    1/6/2023.  He has been doing well.  He was seen by plastic surgery and is scheduled for skin grafting next week.  He also underwent repeat cystoscopy and stent    7/28/2023 he returns now for reevaluation.  After being seen here last in January he was seen by plastic surgery he underwent split-thickness skin grafting.  The wound has healed since significantly except for a few corners that have not healed so returns for reevaluation.    8/11/2023 overall he feels there is been some improvement of the wounds.  He does notice a new area on his left upper side.    8/25/2023.  Doing well.  Had some improvement in certain areas still some protruding mesh others    9/15/2023 doing well.  No issues.  Some wounds are improving some but not changing    10/13/2023.  Some wounds have improved some wounds are about the same.  Some areas with mesh exposed again.    11/10/2023.  Things improved significantly.  Only few small open areas remaining    12/1/2023.  Noticed a few new areas that opened and drained a little bit.  Other areas have healed.    12/29/2023.  I again had improvement in some areas some areas still persistent.  No major issues    1/26/2024.  He noticed his left upper quadrant has a slightly larger area.  Otherwise he is doing with the dressings.    2/23/2024.  Is been doing well.  He feels things are improving but still some open areas    3/22/2024.  Doing well.  Still a few open areas.              The following portions of the patient's history were reviewed  and updated as appropriate: allergies, current medications, past family history, past medical history, past social history, past surgical history and problem list.    Review of Systems      Objective:       Wound 07/28/23 Surgical Abdomen Left;Lower (Active)   Wound Image Images linked 03/22/24 0928   Wound Description Pink;Epithelialization 03/22/24 0930   Lis-wound Assessment Scar Tissue;Pink 03/22/24 0930   Wound Length (cm) 1.5 cm 03/22/24 0930   Wound Width (cm) 0.8 cm 03/22/24 0930   Wound Depth (cm) 0.1 cm 03/22/24 0930   Wound Surface Area (cm^2) 1.2 cm^2 03/22/24 0930   Wound Volume (cm^3) 0.12 cm^3 03/22/24 0930   Calculated Wound Volume (cm^3) 0.12 cm^3 03/22/24 0930   Change in Wound Size % 93.65 03/22/24 0930   Drainage Amount Small 03/22/24 0930   Drainage Description Serosanguineous 03/22/24 0930   Non-staged Wound Description Full thickness 03/22/24 0930   Treatments Irrigation with NSS 03/22/24 0930   Dressing Status Removed 03/22/24 0930       Wound 07/28/23 Surgical Abdomen Mid (Active)   Wound Image Images linked 03/22/24 0929   Wound Description Epithelialization;Pink 03/22/24 0932   Lis-wound Assessment Scar Tissue;Pink 03/22/24 0932   Wound Length (cm) 0.1 cm 03/22/24 0932   Wound Width (cm) 0.1 cm 03/22/24 0932   Wound Depth (cm) 0.1 cm 03/22/24 0932   Wound Surface Area (cm^2) 0.01 cm^2 03/22/24 0932   Wound Volume (cm^3) 0.001 cm^3 03/22/24 0932   Calculated Wound Volume (cm^3) 0 cm^3 03/22/24 0932   Change in Wound Size % 100 03/22/24 0932   Drainage Amount Scant 03/22/24 0932   Drainage Description Bloody 03/22/24 0932       [REMOVED] Wound 07/28/23 Surgical Abdomen Lower;Right (Removed)   Wound Image Images linked 03/22/24 0928   Wound Description Intact 03/22/24 0930   Lis-wound Assessment Dry;Intact 03/22/24 0930   Wound Length (cm) 0 cm 03/22/24 0930   Wound Width (cm) 0 cm 03/22/24 0930   Wound Depth (cm) 0 cm 03/22/24 0930   Wound Surface Area (cm^2) 0 cm^2 03/22/24 0930   Wound  Volume (cm^3) 0 cm^3 03/22/24 0930   Calculated Wound Volume (cm^3) 0 cm^3 03/22/24 0930   Change in Wound Size % 100 03/22/24 0930   Drainage Amount None 03/22/24 0930       /66   Pulse 78   Temp 97.7 °F (36.5 °C)   Resp 16     Physical Exam      Wound Instructions:  Orders Placed This Encounter   Procedures    Wound cleansing and dressings     Wound cleansing and dressings       Wash your hands with soap and water.  Remove old dressing, discard into plastic bag and place in trash.    Cleanse the wound with mild soap(Dove) and water prior to applying a clean dressing.   Do not use tissue or cotton balls. Do not scrub the wound. Pat dry using gauze.  Shower yes, on the days you change the dressing.      Left lower and right abdominal wounds:   Apply Opticell to the wound beds  Cover with bordered gauze.   Change dressing three times a week.      Recommend protein intake 3-4 servings daily    Follow up in wound center in four weeks.            Today's wound treatment note:  Cleansed with normal saline and dressed as above  Left abdominal wound was silver nitrated at wound care center today, wound may have a greyish appearance and/or drainage  Continue good daily skin care and moisturize daily     Standing Status:   Future     Standing Expiration Date:   3/22/2025    Debridement Surgical Left;Lower Abdomen     This order was created via procedure documentation        Diagnosis ICD-10-CM Associated Orders   1. Non-healing surgical wound, initial encounter  T81.89XA Wound cleansing and dressings     lidocaine (LMX) 4 % cream

## 2024-04-19 ENCOUNTER — OFFICE VISIT (OUTPATIENT)
Dept: WOUND CARE | Facility: CLINIC | Age: 69
End: 2024-04-19
Payer: COMMERCIAL

## 2024-04-19 VITALS
RESPIRATION RATE: 18 BRPM | HEART RATE: 72 BPM | DIASTOLIC BLOOD PRESSURE: 68 MMHG | TEMPERATURE: 96.8 F | SYSTOLIC BLOOD PRESSURE: 120 MMHG

## 2024-04-19 DIAGNOSIS — E66.01 MORBID OBESITY WITH BMI OF 50.0-59.9, ADULT (HCC): ICD-10-CM

## 2024-04-19 DIAGNOSIS — T14.8XXA SURGICAL WOUND PRESENT: ICD-10-CM

## 2024-04-19 DIAGNOSIS — T81.89XA NON-HEALING SURGICAL WOUND, INITIAL ENCOUNTER: Primary | ICD-10-CM

## 2024-04-19 PROCEDURE — 99213 OFFICE O/P EST LOW 20 MIN: CPT | Performed by: SURGERY

## 2024-04-19 RX ORDER — LIDOCAINE 40 MG/G
CREAM TOPICAL ONCE
Status: COMPLETED | OUTPATIENT
Start: 2024-04-19 | End: 2024-04-19

## 2024-04-19 RX ADMIN — LIDOCAINE: 40 CREAM TOPICAL at 10:01

## 2024-04-19 NOTE — PROGRESS NOTES
Patient ID: Caterina Tam is a 69 y.o. male Date of Birth 1955     Chief Complaint  Chief Complaint   Patient presents with    Follow Up Wound Care Visit     Abdominal wounds       Allergies  Codeine    Assessment:    Nonhealing surgical wound  The wounds are smaller.  No foreign material palpated in the 2 small open areas.  The dressings are sticking to switch to Dermagran gauze.  Follow-up in 1 month                 Diagnoses and all orders for this visit:    Non-healing surgical wound, initial encounter  -     Wound cleansing and dressings; Future  -     lidocaine (LMX) 4 % cream    Morbid obesity with BMI of 50.0-59.9, adult (HCC)  -     Wound cleansing and dressings; Future    Surgical wound present  -     Wound cleansing and dressings; Future              Procedures    Plan:     Wound 07/28/23 Surgical Abdomen Left;Lower (Active)   Wound Image Images linked 04/19/24 0946   Wound Description Pink;Epithelialization 04/19/24 0948   Lis-wound Assessment Scar Tissue;Pink;Dry;Scaly 04/19/24 0948   Wound Length (cm) 0.2 cm 04/19/24 0948   Wound Width (cm) 0.4 cm 04/19/24 0948   Wound Depth (cm) 0.1 cm 04/19/24 0948   Wound Surface Area (cm^2) 0.08 cm^2 04/19/24 0948   Wound Volume (cm^3) 0.008 cm^3 04/19/24 0948   Calculated Wound Volume (cm^3) 0.01 cm^3 04/19/24 0948   Change in Wound Size % 99.47 04/19/24 0948   Drainage Amount Scant 04/19/24 0948   Drainage Description Serosanguineous 04/19/24 0948   Non-staged Wound Description Full thickness 04/19/24 0948   Treatments Irrigation with NSS 04/19/24 0948       Wound 07/28/23 Surgical Abdomen Mid (Active)   Wound Image Images linked 04/19/24 0947   Wound Description Epithelialization;Pink 04/19/24 0949   Lis-wound Assessment Pink;Scaly;Dry 04/19/24 0949   Wound Length (cm) 0.2 cm 04/19/24 0949   Wound Width (cm) 0.5 cm 04/19/24 0949   Wound Depth (cm) 0.1 cm 04/19/24 0949   Wound Surface Area (cm^2) 0.1 cm^2 04/19/24 0949   Wound Volume (cm^3) 0.01 cm^3  04/19/24 0949   Calculated Wound Volume (cm^3) 0.01 cm^3 04/19/24 0949   Change in Wound Size % 96.88 04/19/24 0949   Drainage Amount Scant 04/19/24 0949   Drainage Description Serosanguineous 04/19/24 0949   Non-staged Wound Description Full thickness 04/19/24 0949   Treatments Irrigation with NSS 04/19/24 0949       Wound 07/28/23 Surgical Abdomen Left;Lower (Active)   Date First Assessed: 07/28/23   Present on Original Admission: Yes  Primary Wound Type: Surgical  Location: Abdomen  Wound Location Orientation: Left;Lower       Wound 07/28/23 Surgical Abdomen Mid (Active)   Date First Assessed: 07/28/23   Present on Original Admission: Yes  Primary Wound Type: Surgical  Location: Abdomen  Wound Location Orientation: Mid  Wound Outcome: Healed       [REMOVED] Wound 07/15/22 Other (comment) Abdomen Right;Lower (Removed)   Resolved Date/Resolved Time: 10/14/22 0827  Date First Assessed/Time First Assessed: 07/15/22 1401   Primary Wound Type: Other (comment)  Location: Abdomen  Wound Location Orientation: Right;Lower  Wound Outcome: Other (Comment)       [REMOVED] Wound 09/15/22 Abdomen N/A (Removed)   Resolved Date/Resolved Time: 10/14/22 0826  Date First Assessed/Time First Assessed: 09/15/22 1359   Location: Abdomen  Wound Location Orientation: N/A  Wound Description (Comments): vac dressing-RLQ (1 black sponge, 1 white sponge), midline- surgical...       [REMOVED] Wound 09/15/22 Abdomen Lower;Quadrant;Right (Removed)   Resolved Date/Resolved Time: 10/14/22 0827  Date First Assessed: 09/15/22   Location: Abdomen  Wound Location Orientation: Lower;Quadrant;Right  Wound Outcome: Other (Comment)       [REMOVED] Wound 10/11/22 Abdomen N/A (Removed)   Resolved Date/Resolved Time: 10/14/22 0827  Date First Assessed/Time First Assessed: 10/11/22 1545   Location: Abdomen  Wound Location Orientation: N/A  Wound Description (Comments): wound vac 1 incision black sponge 1 incision black sponge one black ...       [REMOVED]  Wound 10/14/22 Surgical Abdomen Medial;Lower (Removed)   Resolved Date: 07/28/23  Date First Assessed/Time First Assessed: 10/14/22 0828   Primary Wound Type: Surgical  Location: Abdomen  Wound Location Orientation: Medial;Lower  Dressing Status: Other (Comment)       [REMOVED] Wound 10/14/22 Surgical Abdomen Right;Lower (Removed)   Resolved Date: 07/28/23  Date First Assessed/Time First Assessed: 10/14/22 0829   Primary Wound Type: Surgical  Location: Abdomen  Wound Location Orientation: Right;Lower  Wound Outcome: Other (Comment)       [REMOVED] Wound 11/15/22 Penis Right (Removed)   Resolved Date/Resolved Time: 11/19/22 0218  Date First Assessed/Time First Assessed: 11/15/22 2250   Location: Penis  Wound Location Orientation: Right  Wound Outcome: Healed       [REMOVED] Wound 01/03/23 Penis Right (Removed)   Resolved Date: 07/28/23  Date First Assessed/Time First Assessed: 01/03/23 1735   Location: Penis  Wound Location Orientation: Right  Wound Description (Comments): benzoin, tegaderm   Dressing Status: Other (Comment)       [REMOVED] Wound 01/03/23 Perineum Right (Removed)   Resolved Date: 01/06/23  Date First Assessed/Time First Assessed: 01/03/23 1735   Location: Perineum  Wound Location Orientation: Right  Wound Outcome: Other (Comment)       [REMOVED] Wound 01/11/23 Thigh Left (Removed)   Resolved Date: 07/28/23  Date First Assessed/Time First Assessed: 01/11/23 1152   Location: Thigh  Wound Location Orientation: Left  Wound Description (Comments): PROMOGRAN OSMIN 4.3 X 4.3IN X 9 DRESSINGS  Incision's 1st Dressing: DRESSING OPTILOCK 6...       [REMOVED] Wound 01/11/23 Abdomen N/A (Removed)   Resolved Date: 01/27/23  Date First Assessed/Time First Assessed: 01/11/23 1232   Location: Abdomen  Wound Location Orientation: N/A  Wound Description (Comments): WOUND VAC APPLIED USING LARGE DRESSING  Incision's 1st Dressing: DRESSING XEROFORM 5 X ...       [REMOVED] Wound 07/28/23 Surgical Abdomen Lower;Right  (Removed)   Resolved Date: 03/22/24  Date First Assessed: 07/28/23   Present on Original Admission: Yes  Primary Wound Type: Surgical  Location: Abdomen  Wound Location Orientation: Lower;Right  Wound Outcome: Healed       Subjective:      .    Mr. Tam is a 67-year-old gentleman here for follow-up from hospitalization for large abdominal wound.  He he developed an incarcerated right lower quadrant hernia requiring excision debridement of the wound and bridging closure with absorbable mesh.  He had a large retention sutures in the abdominal wall but developed necrosis of the abdominal wall.  He was readmitted underwent excisional debridement and is here for evaluation.    10/21/2022.  Overall doing okay.  No significant pain.  Having some issues with the VAC dressing but overall working    11/04/2022.  Doing well.  Trying to increase his protein.  Still having significant fatigue episodes.  Still going through canisters quickly    11/25/2022.  He was hospitalized last week for severe sepsis and hydronephrosis and acute kidney injury from an obstructing ureteral stone and underwent cystoscopy and stent placement.  He is at home now and doing well.  No other issues    12/9/2022.  Doing well.  Was scheduled for urology but got delayed January.  Denies fevers or chills    1/6/2023.  He has been doing well.  He was seen by plastic surgery and is scheduled for skin grafting next week.  He also underwent repeat cystoscopy and stent    7/28/2023 he returns now for reevaluation.  After being seen here last in January he was seen by plastic surgery he underwent split-thickness skin grafting.  The wound has healed since significantly except for a few corners that have not healed so returns for reevaluation.    8/11/2023 overall he feels there is been some improvement of the wounds.  He does notice a new area on his left upper side.    8/25/2023.  Doing well.  Had some improvement in certain areas still some protruding  mesh others    9/15/2023 doing well.  No issues.  Some wounds are improving some but not changing    10/13/2023.  Some wounds have improved some wounds are about the same.  Some areas with mesh exposed again.    11/10/2023.  Things improved significantly.  Only few small open areas remaining    12/1/2023.  Noticed a few new areas that opened and drained a little bit.  Other areas have healed.    12/29/2023.  I again had improvement in some areas some areas still persistent.  No major issues    1/26/2024.  He noticed his left upper quadrant has a slightly larger area.  Otherwise he is doing with the dressings.    2/23/2024.  Is been doing well.  He feels things are improving but still some open areas    3/22/2024.  Doing well.  Still a few open areas.    4/19/2024.  So the dressings are sticking a little bit as he is using alginate.  Otherwise no new areas              The following portions of the patient's history were reviewed and updated as appropriate: allergies, current medications, past family history, past medical history, past social history, past surgical history and problem list.    Review of Systems      Objective:       Wound 07/28/23 Surgical Abdomen Left;Lower (Active)   Wound Image Images linked 04/19/24 0946   Wound Description Pink;Epithelialization 04/19/24 0948   Lis-wound Assessment Scar Tissue;Pink;Dry;Scaly 04/19/24 0948   Wound Length (cm) 0.2 cm 04/19/24 0948   Wound Width (cm) 0.4 cm 04/19/24 0948   Wound Depth (cm) 0.1 cm 04/19/24 0948   Wound Surface Area (cm^2) 0.08 cm^2 04/19/24 0948   Wound Volume (cm^3) 0.008 cm^3 04/19/24 0948   Calculated Wound Volume (cm^3) 0.01 cm^3 04/19/24 0948   Change in Wound Size % 99.47 04/19/24 0948   Drainage Amount Scant 04/19/24 0948   Drainage Description Serosanguineous 04/19/24 0948   Non-staged Wound Description Full thickness 04/19/24 0948   Treatments Irrigation with NSS 04/19/24 0948       Wound 07/28/23 Surgical Abdomen Mid (Active)   Wound  Image Images linked 04/19/24 0947   Wound Description Epithelialization;Pink 04/19/24 0949   Lis-wound Assessment Pink;Scaly;Dry 04/19/24 0949   Wound Length (cm) 0.2 cm 04/19/24 0949   Wound Width (cm) 0.5 cm 04/19/24 0949   Wound Depth (cm) 0.1 cm 04/19/24 0949   Wound Surface Area (cm^2) 0.1 cm^2 04/19/24 0949   Wound Volume (cm^3) 0.01 cm^3 04/19/24 0949   Calculated Wound Volume (cm^3) 0.01 cm^3 04/19/24 0949   Change in Wound Size % 96.88 04/19/24 0949   Drainage Amount Scant 04/19/24 0949   Drainage Description Serosanguineous 04/19/24 0949   Non-staged Wound Description Full thickness 04/19/24 0949   Treatments Irrigation with NSS 04/19/24 0949       /68   Pulse 72   Temp (!) 96.8 °F (36 °C)   Resp 18     Physical Exam      Wound Instructions:  Orders Placed This Encounter   Procedures    Wound cleansing and dressings     Wash your hands with soap and water.  Remove old dressing, discard into plastic bag and place in trash.    Cleanse the wound with mild soap(Dove) and water prior to applying a clean dressing.   Do not use tissue or cotton balls. Do not scrub the wound. Pat dry using gauze.  Shower yes, on the days you change the dressing.      Left lower and right abdominal wounds:   STOP Opticell or alginate.  Apply dermagran to the wound beds.  Cover with bordered gauze.   Change dressing three times a week.      Recommend protein intake 3-4 servings daily     Follow up in wound center in four weeks.     Standing Status:   Future     Standing Expiration Date:   4/26/2024        Diagnosis ICD-10-CM Associated Orders   1. Non-healing surgical wound, initial encounter  T81.89XA Wound cleansing and dressings     lidocaine (LMX) 4 % cream      2. Morbid obesity with BMI of 50.0-59.9, adult (HCC)  E66.01 Wound cleansing and dressings    Z68.43       3. Surgical wound present  T14.8XXA Wound cleansing and dressings

## 2024-04-19 NOTE — PATIENT INSTRUCTIONS
Orders Placed This Encounter   Procedures    Wound cleansing and dressings     Wash your hands with soap and water.  Remove old dressing, discard into plastic bag and place in trash.    Cleanse the wound with mild soap(Dove) and water prior to applying a clean dressing.   Do not use tissue or cotton balls. Do not scrub the wound. Pat dry using gauze.  Shower yes, on the days you change the dressing.      Left lower and right abdominal wounds:   STOP Opticell or alginate.  Apply dermagran to the wound beds.  Cover with bordered gauze.   Change dressing three times a week.      Recommend protein intake 3-4 servings daily     Follow up in wound center in four weeks.     Standing Status:   Future     Standing Expiration Date:   4/26/2024

## 2024-04-19 NOTE — ASSESSMENT & PLAN NOTE
The wounds are smaller.  No foreign material palpated in the 2 small open areas.  The dressings are sticking to switch to Dermagran gauze.  Follow-up in 1 month

## 2024-04-26 NOTE — PATIENT INSTRUCTIONS
78y F w/ PMH HTN (uncontrolled on meds); no h/o DM.  Called pt to discuss most recent lab results; elevated cholesterol >200 and Vitamin D 28.    - ASCVD 21.6%; is not and has never been on a HD statin. Pt would benefit.  Discussed risk/benefit. Pt agreeable; sending rosuvastatin 20mg.    - Vitamin D 28; pt post-menopausal and at risk of osteoporosis. H/o CKD 3A (GFR 48).  Will send for Vit D suppl 2,000 units (50 mcg) daily for 6 weeks to achieve a serum 25(OH)D level >20 ng/mL; followed by a maintenance dose of 400 to 1,000 units (10 to 25 mcg) daily. . Pt agreeable.    - pt requesting refills on BP meds; [facility] [address] [facility]. Will send again.    Dolores Krishna D.O.  Internal Medicine, PGY-1  Pager #[phone]       Check the blood pressure once in a while at home to be sure it stays under 140/90  Continue 20 mg of lisinopril at this point  Resume the water pills as you were taking them before  Always try to keep the legs elevated above horizontal when sitting or sleeping  I do recommend consult with the lymphedema clinic and the podiatrist       Get the blood work at a [facility] in about 1 week  Start using the new cream on the legs for moisturizing affect    I would use a fine bristle scrub brush gently on the skin with a little soapy water twice a day followed by the cream

## 2024-05-17 ENCOUNTER — OFFICE VISIT (OUTPATIENT)
Dept: WOUND CARE | Facility: CLINIC | Age: 69
End: 2024-05-17
Payer: MEDICARE

## 2024-05-17 VITALS
DIASTOLIC BLOOD PRESSURE: 78 MMHG | TEMPERATURE: 98.1 F | SYSTOLIC BLOOD PRESSURE: 142 MMHG | RESPIRATION RATE: 18 BRPM | HEART RATE: 60 BPM

## 2024-05-17 DIAGNOSIS — T81.89XA NON-HEALING SURGICAL WOUND, INITIAL ENCOUNTER: Primary | ICD-10-CM

## 2024-05-17 DIAGNOSIS — E66.01 MORBID OBESITY WITH BMI OF 50.0-59.9, ADULT (HCC): ICD-10-CM

## 2024-05-17 PROCEDURE — 99212 OFFICE O/P EST SF 10 MIN: CPT | Performed by: SURGERY

## 2024-05-17 NOTE — PROGRESS NOTES
Patient ID: Caterina Tam is a 69 y.o. male Date of Birth 1955     Chief Complaint  Chief Complaint   Patient presents with    Follow Up Wound Care Visit     Abdominal wound       Allergies  Codeine    Assessment:    Nonhealing surgical wound  The wounds are healed at this time.  Instructions were provided that if he has any new wounds developing call at any time.  Otherwise follow-up as needed    Morbid obesity with BMI of 50.0-59.9, adult ([facility])  His wife asked some questions about weight loss and possible medically supervised weight loss.  I decreased the next idea and make sure he has referral to the [facility] weight and center                   Diagnoses and all orders for this visit:    Non-healing surgical wound, initial encounter  -     Wound cleansing and dressings; Future  -     Ambulatory Referral to Weight Management; Future    Morbid obesity with BMI of 50.0-59.9, adult (HCC)  -     Ambulatory Referral to Weight Management; Future              Procedures    Plan:     Wound 07/28/23 Surgical Abdomen Left;Lower (Active)   Wound Image Images linked 05/17/24 0948   Wound Description Epithelialization 05/17/24 0953   Lis-wound Assessment Scar Tissue;Pink;Dry;Scaly 05/17/24 0953   Wound Length (cm) 0 cm 05/17/24 0953   Wound Width (cm) 0 cm 05/17/24 0953   Wound Depth (cm) 0 cm 05/17/24 0953   Wound Surface Area (cm^2) 0 cm^2 05/17/24 0953   Wound Volume (cm^3) 0 cm^3 05/17/24 0953   Calculated Wound Volume (cm^3) 0 cm^3 05/17/24 0953   Change in Wound Size % 100 05/17/24 0953   Drainage Amount None 05/17/24 0953   Non-staged Wound Description Not applicable 05/17/24 0953       Wound 07/28/23 Surgical Abdomen Mid (Active)   Wound Image Images linked 05/17/24 0948   Wound Description Epithelialization 05/17/24 0953   Lis-wound Assessment Pink;Scaly;Dry 05/17/24 0953   Wound Length (cm) 0 cm 05/17/24 0953   Wound Width (cm) 0 cm 05/17/24 0953   Wound Depth (cm) 0 cm 05/17/24 0953   Wound Surface Area  (cm^2) 0 cm^2 05/17/24 0953   Wound Volume (cm^3) 0 cm^3 05/17/24 0953   Calculated Wound Volume (cm^3) 0 cm^3 05/17/24 0953   Change in Wound Size % 100 05/17/24 0953   Drainage Amount None 05/17/24 0953   Non-staged Wound Description Not applicable 05/17/24 0953       Wound 07/28/23 Surgical Abdomen Left;Lower (Active)   Date First Assessed: 07/28/23   Present on Original Admission: Yes  Primary Wound Type: Surgical  Location: Abdomen  Wound Location Orientation: Left;Lower  Wound Outcome: Healed       Wound 07/28/23 Surgical Abdomen Mid (Active)   Date First Assessed: 07/28/23   Present on Original Admission: Yes  Primary Wound Type: Surgical  Location: Abdomen  Wound Location Orientation: Mid  Wound Outcome: Healed       [REMOVED] Wound 07/15/22 Other (comment) Abdomen Right;Lower (Removed)   Resolved Date/Resolved Time: 10/14/22 0827  Date First Assessed/Time First Assessed: 07/15/22 1401   Primary Wound Type: Other (comment)  Location: Abdomen  Wound Location Orientation: Right;Lower  Wound Outcome: Other (Comment)       [REMOVED] Wound 09/15/22 Abdomen N/A (Removed)   Resolved Date/Resolved Time: 10/14/22 0826  Date First Assessed/Time First Assessed: 09/15/22 1359   Location: Abdomen  Wound Location Orientation: N/A  Wound Description (Comments): vac dressing-RLQ (1 black sponge, 1 white sponge), midline- surgical...       [REMOVED] Wound 09/15/22 Abdomen Lower;Quadrant;Right (Removed)   Resolved Date/Resolved Time: 10/14/22 0827  Date First Assessed: 09/15/22   Location: Abdomen  Wound Location Orientation: Lower;Quadrant;Right  Wound Outcome: Other (Comment)       [REMOVED] Wound 10/11/22 Abdomen N/A (Removed)   Resolved Date/Resolved Time: 10/14/22 0827  Date First Assessed/Time First Assessed: 10/11/22 1545   Location: Abdomen  Wound Location Orientation: N/A  Wound Description (Comments): wound vac 1 incision black sponge 1 incision black sponge one black ...       [REMOVED] Wound 10/14/22 Surgical  Abdomen Medial;Lower (Removed)   Resolved Date: 07/28/23  Date First Assessed/Time First Assessed: 10/14/22 0828   Primary Wound Type: Surgical  Location: Abdomen  Wound Location Orientation: Medial;Lower  Dressing Status: Other (Comment)       [REMOVED] Wound 10/14/22 Surgical Abdomen Right;Lower (Removed)   Resolved Date: 07/28/23  Date First Assessed/Time First Assessed: 10/14/22 0829   Primary Wound Type: Surgical  Location: Abdomen  Wound Location Orientation: Right;Lower  Wound Outcome: Other (Comment)       [REMOVED] Wound 11/15/22 Penis Right (Removed)   Resolved Date/Resolved Time: 11/19/22 0218  Date First Assessed/Time First Assessed: 11/15/22 2250   Location: Penis  Wound Location Orientation: Right  Wound Outcome: Healed       [REMOVED] Wound 01/03/23 Penis Right (Removed)   Resolved Date: 07/28/23  Date First Assessed/Time First Assessed: 01/03/23 1735   Location: Penis  Wound Location Orientation: Right  Wound Description (Comments): benzoin, tegaderm   Dressing Status: Other (Comment)       [REMOVED] Wound 01/03/23 Perineum Right (Removed)   Resolved Date: 01/06/23  Date First Assessed/Time First Assessed: 01/03/23 1735   Location: Perineum  Wound Location Orientation: Right  Wound Outcome: Other (Comment)       [REMOVED] Wound 01/11/23 Thigh Left (Removed)   Resolved Date: 07/28/23  Date First Assessed/Time First Assessed: 01/11/23 1152   Location: Thigh  Wound Location Orientation: Left  Wound Description (Comments): PROMOGRAN OSMIN 4.3 X 4.3IN X 9 DRESSINGS  Incision's 1st Dressing: DRESSING OPTILOCK 6...       [REMOVED] Wound 01/11/23 Abdomen N/A (Removed)   Resolved Date: 01/27/23  Date First Assessed/Time First Assessed: 01/11/23 1232   Location: Abdomen  Wound Location Orientation: N/A  Wound Description (Comments): WOUND VAC APPLIED USING LARGE DRESSING  Incision's 1st Dressing: DRESSING XEROFORM 5 X ...       [REMOVED] Wound 07/28/23 Surgical Abdomen Lower;Right (Removed)   Resolved Date:  03/22/24  Date First Assessed: 07/28/23   Present on Original Admission: Yes  Primary Wound Type: Surgical  Location: Abdomen  Wound Location Orientation: Lower;Right  Wound Outcome: Healed       Subjective:      .    Mr. Tam is a 67-year-old gentleman here for follow-up from hospitalization for large abdominal wound.  He he developed an incarcerated right lower quadrant hernia requiring excision debridement of the wound and bridging closure with absorbable mesh.  He had a large retention sutures in the abdominal wall but developed necrosis of the abdominal wall.  He was readmitted underwent excisional debridement and is here for evaluation.    10/21/2022.  Overall doing okay.  No significant pain.  Having some issues with the VAC dressing but overall working    11/04/2022.  Doing well.  Trying to increase his protein.  Still having significant fatigue episodes.  Still going through canisters quickly    11/25/2022.  He was hospitalized last week for severe sepsis and hydronephrosis and acute kidney injury from an obstructing ureteral stone and underwent cystoscopy and stent placement.  He is at home now and doing well.  No other issues    12/9/2022.  Doing well.  Was scheduled for urology but got delayed January.  Denies fevers or chills    1/6/2023.  He has been doing well.  He was seen by plastic surgery and is scheduled for skin grafting next week.  He also underwent repeat cystoscopy and stent    7/28/2023 he returns now for reevaluation.  After being seen here last in January he was seen by plastic surgery he underwent split-thickness skin grafting.  The wound has healed since significantly except for a few corners that have not healed so returns for reevaluation.    8/11/2023 overall he feels there is been some improvement of the wounds.  He does notice a new area on his left upper side.    8/25/2023.  Doing well.  Had some improvement in certain areas still some protruding mesh others    9/15/2023 doing  well.  No issues.  Some wounds are improving some but not changing    10/13/2023.  Some wounds have improved some wounds are about the same.  Some areas with mesh exposed again.    11/10/2023.  Things improved significantly.  Only few small open areas remaining    12/1/2023.  Noticed a few new areas that opened and drained a little bit.  Other areas have healed.    12/29/2023.  I again had improvement in some areas some areas still persistent.  No major issues    1/26/2024.  He noticed his left upper quadrant has a slightly larger area.  Otherwise he is doing with the dressings.    2/23/2024.  Is been doing well.  He feels things are improving but still some open areas    3/22/2024.  Doing well.  Still a few open areas.    4/19/2024.  So the dressings are sticking a little bit as he is using alginate.  Otherwise no new areas    5/17/2024.  He has not been using dressings for the last week and having no drainage doing very well.              The following portions of the patient's history were reviewed and updated as appropriate: allergies, current medications, past family history, past medical history, past social history, past surgical history and problem list.    Review of Systems      Objective:       Wound 07/28/23 Surgical Abdomen Left;Lower (Active)   Wound Image Images linked 05/17/24 0948   Wound Description Epithelialization 05/17/24 0953   Lis-wound Assessment Scar Tissue;Pink;Dry;Scaly 05/17/24 0953   Wound Length (cm) 0 cm 05/17/24 0953   Wound Width (cm) 0 cm 05/17/24 0953   Wound Depth (cm) 0 cm 05/17/24 0953   Wound Surface Area (cm^2) 0 cm^2 05/17/24 0953   Wound Volume (cm^3) 0 cm^3 05/17/24 0953   Calculated Wound Volume (cm^3) 0 cm^3 05/17/24 0953   Change in Wound Size % 100 05/17/24 0953   Drainage Amount None 05/17/24 0953   Non-staged Wound Description Not applicable 05/17/24 0953       Wound 07/28/23 Surgical Abdomen Mid (Active)   Wound Image Images linked 05/17/24 0948   Wound Description  Epithelialization 05/17/24 0953   Lis-wound Assessment Pink;Scaly;Dry 05/17/24 0953   Wound Length (cm) 0 cm 05/17/24 0953   Wound Width (cm) 0 cm 05/17/24 0953   Wound Depth (cm) 0 cm 05/17/24 0953   Wound Surface Area (cm^2) 0 cm^2 05/17/24 0953   Wound Volume (cm^3) 0 cm^3 05/17/24 0953   Calculated Wound Volume (cm^3) 0 cm^3 05/17/24 0953   Change in Wound Size % 100 05/17/24 0953   Drainage Amount None 05/17/24 0953   Non-staged Wound Description Not applicable 05/17/24 0953       /78   Pulse 60   Temp 98.1 °F (36.7 °C)   Resp 18     Physical Exam      Wound Instructions:  Orders Placed This Encounter   Procedures    Wound cleansing and dressings     Your wound is healed.    Apply moisturizing lotion daily to abdomen.     Standing Status:   Future     Standing Expiration Date:   5/24/2024    Ambulatory Referral to Weight Management     Standing Status:   Future     Standing Expiration Date:   5/17/2025     Referral Priority:   Routine     Referral Type:   Consult - AMB     Referral Reason:   Specialty Services Required     Requested Specialty:   Bariatrics     Number of Visits Requested:   1     Expiration Date:   5/17/2025        Diagnosis ICD-10-CM Associated Orders   1. Non-healing surgical wound, initial encounter  T81.89XA Wound cleansing and dressings     Ambulatory Referral to Weight Management      2. Morbid obesity with BMI of 50.0-59.9, adult ([facility])  E66.01 Ambulatory Referral to Weight Management    Z68.43

## 2024-05-17 NOTE — PATIENT INSTRUCTIONS
Orders Placed This Encounter   Procedures    Wound cleansing and dressings     Your wound is healed.    Apply moisturizing lotion daily to abdomen.     Standing Status:   Future     Standing Expiration Date:   5/24/2024      
3

## 2024-05-17 NOTE — ASSESSMENT & PLAN NOTE
The wounds are healed at this time.  Instructions were provided that if he has any new wounds developing call at any time.  Otherwise follow-up as needed

## 2024-05-17 NOTE — ASSESSMENT & PLAN NOTE
His wife asked some questions about weight loss and possible medically supervised weight loss.  I decreased the next idea and make sure he has referral to the [facility] weight and center

## 2024-07-12 ENCOUNTER — TELEPHONE (OUTPATIENT)
Age: 69
End: 2024-07-12

## 2024-07-12 ENCOUNTER — OFFICE VISIT (OUTPATIENT)
Dept: FAMILY MEDICINE CLINIC | Facility: CLINIC | Age: 69
End: 2024-07-12

## 2024-07-12 VITALS
WEIGHT: 315 LBS | HEART RATE: 80 BPM | OXYGEN SATURATION: 93 % | DIASTOLIC BLOOD PRESSURE: 82 MMHG | SYSTOLIC BLOOD PRESSURE: 124 MMHG | TEMPERATURE: 97.9 F | BODY MASS INDEX: 61.34 KG/M2 | RESPIRATION RATE: 22 BRPM

## 2024-07-12 DIAGNOSIS — L53.9 LEG ERYTHEMA: ICD-10-CM

## 2024-07-12 DIAGNOSIS — M79.661 PAIN IN RIGHT LOWER LEG: Primary | ICD-10-CM

## 2024-07-12 DIAGNOSIS — N18.31 STAGE 3A CHRONIC KIDNEY DISEASE (HCC): ICD-10-CM

## 2024-07-12 DIAGNOSIS — C64.2 CLEAR CELL CARCINOMA OF LEFT KIDNEY (HCC): ICD-10-CM

## 2024-07-12 RX ORDER — AMOXICILLIN AND CLAVULANATE POTASSIUM 875; 125 MG/1; MG/1
1 TABLET, FILM COATED ORAL EVERY 12 HOURS SCHEDULED
Qty: 20 TABLET | Refills: 0 | Status: SHIPPED | OUTPATIENT
Start: 2024-07-12 | End: 2024-07-22

## 2024-07-12 NOTE — ASSESSMENT & PLAN NOTE
Lab Results   Component Value Date    EGFR 59 10/13/2023    EGFR 60 07/28/2023    EGFR 70 04/05/2023    CREATININE 1.24 10/13/2023    CREATININE 1.22 07/28/2023    CREATININE 1.08 04/05/2023     
Recurrent pain of the right lower leg   Wound on the right big toe from an open callus   No concerns for infection   Erythema to the right calf that is warm and tender to touch   Outlined for surgical marker   Febrile today per patient report 102   Endorses rigors and recurrent elevated temps >100.4   Plan   CBC ordered   STAT VAS duplex RLE ordered   Antibiotics started   
negative - no pain, no limited range of motion

## 2024-07-12 NOTE — PROGRESS NOTES
Ambulatory Visit  Name: Caterina Tam      : 1955      MRN: [id number]  Encounter Provider: KELSEY Castellon  Encounter Date: 2024   Encounter department: [facility]    Assessment & Plan   1. Pain in right lower leg  Assessment & Plan:  Recurrent pain of the right lower leg   Wound on the right big toe from an open callus   No concerns for infection   Erythema to the right calf that is warm and tender to touch   Outlined for surgical marker   Febrile today per patient report 102   Endorses rigors and recurrent elevated temps >100.4   Plan   CBC ordered   STAT VAS duplex RLE ordered   Antibiotics started   Orders:  -     VAS VENOUS DUPLEX -LOWER LIMB UNILATERAL; Future; Expected date: 2024  2. Callus of foot  3. Leg erythema  -     CBC and differential; Future  -     amoxicillin-clavulanate (AUGMENTIN) 875-125 mg per tablet; Take 1 tablet by mouth every 12 (twelve) hours for 10 days  -     VAS VENOUS DUPLEX -LOWER LIMB UNILATERAL; Future; Expected date: 2024  4. Stage 3a chronic kidney disease (HCC)  Assessment & Plan:  Lab Results   Component Value Date    EGFR 59 10/13/2023    EGFR 60 2023    EGFR 70 2023    CREATININE 1.24 10/13/2023    CREATININE 1.22 2023    CREATININE 1.08 2023     5. 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 The incident occurred at home. There was no injury mechanism. The pain is present in the right toes and right leg. The quality of the pain is described as aching. The pain has been Intermittent since onset. Associated symptoms include an inability to bear weight. Pertinent negatives include no loss of motion, loss of sensation, muscle weakness, numbness or tingling. He reports no foreign bodies present. The symptoms are aggravated by movement. He has tried nothing for the symptoms. The treatment provided no relief.  "  Fever  This is a recurrent problem. The current episode started in the past 7 days. The problem occurs daily. The problem has been gradually worsening. Associated symptoms include fatigue, a fever and weakness. Pertinent negatives include no abdominal pain, anorexia, arthralgias, change in bowel habit, chest pain, chills, congestion, coughing, diaphoresis, headaches, joint swelling, myalgias, nausea, neck pain, numbness, rash, sore throat, swollen glands, urinary symptoms, vertigo, visual change or vomiting. The symptoms are aggravated by walking. He has tried acetaminophen for the symptoms. The treatment provided mild relief.     Review of Systems   Constitutional:  Positive for fatigue and fever. Negative for chills and diaphoresis.   HENT:  Negative for congestion and sore throat.    Respiratory:  Negative for cough.    Cardiovascular:  Negative for chest pain.   Gastrointestinal:  Negative for abdominal pain, anorexia, change in bowel habit, nausea and vomiting.   Musculoskeletal:  Negative for arthralgias, joint swelling, myalgias and neck pain.   Skin:  Negative for rash.   Neurological:  Positive for weakness. Negative for vertigo, tingling, numbness and headaches.     Past Medical History:   Diagnosis Date    Anemia 09/16/2022    pt unsure of this    Cataract     Chronic kidney disease     Colon polyp     1/9/23 Benign polyp    Dental crowns present     Encounter for management of wound VAC     1/9/23 wound vac in place    History of COVID-19 05/2022    per pt mild symptoms -recovered at home-    History of pneumonia as a child     Hypertension     Kidney stone     Morbid obesity (HCC)     Risk for falls     1/9/23 Uses walker    Sepsis ([facility])     pt unsure of this    Shortness of breath     per pt \"gets winded when walking with walker and wound vac around the house\"    Tooth missing     Urinary tract infection without hematuria 04/03/2020    Uses walker     Wears glasses     Weight loss     per pt since " 1/2022 approx 80lb weight loss-had been 410lbs    Wound of abdomen     wound vac in place-receives homecare 3x/week     Past Surgical History:   Procedure Laterality Date    ABDOMINAL ADHESION SURGERY N/A 09/15/2022    Procedure: LYSIS ADHESIONS;  Surgeon: Evette Muro MD;  [facility];  Service: General    APPENDECTOMY      COLONOSCOPY      CRYOABLATION Left     1/9/23 Left kidney - had cyroablation of left kidney    EXPLORATORY LAPAROTOMY W/ BOWEL RESECTION N/A 09/14/2022    Procedure: Exdploratory laparotomy, complex Lysis of adhesions, reduction strangulated hernia, debridement RLQ muscle with counter incision, abthera placement;  Surgeon: Evette Muro MD;  [facility];  Service: General    EXPLORATORY LAPAROTOMY W/ BOWEL RESECTION N/A 09/15/2022    Procedure: LAPAROTOMY EXPLORATORY  REPAIR RIGHT LQ HERNIA WITH MESH, REPAIR ANTERIOR WALL HERNIA;  Surgeon: Evette Muro MD;  [facility];  Service: General    FL RETROGRADE PYELOGRAM  11/15/2022    FL RETROGRADE PYELOGRAM  01/03/2023    HERNIA REPAIR      per pt there is Mesh implanted    NASAL SEPTUM SURGERY      MT CYSTO BLADDER W/URETERAL CATHETERIZATION Right 11/15/2022    Procedure: CYSTOSCOPY RETROGRADE PYELOGRAM WITH INSERTION STENT URETERAL;  Surgeon: Aristides Siddiqi MD;  [facility];  Service: Urology    MT CYSTO/URETERO W/LITHOTRIPSY &INDWELL STENT INSRT Right 01/03/2023    Procedure: CYSTO, U-SCOPE, LASER, RETROGRADE, STENT;  Surgeon: Shakeel Durbin MD;  [facility];  Service: Urology    MT SPLIT AGRFT T/A/L 1ST 100 CM/&/1% BDY INFT/CHLD N/A 1/11/2023    Procedure: SKIN GRAFT SPLIT THICKNESS ABDOMINAL WOUND WITH VAC PLACEMENT;  Surgeon: Richi Ibarra MD;  Location:  MAIN OR;  Service: Plastics    TONSILLECTOMY      WISDOM TOOTH EXTRACTION      WOUND DEBRIDEMENT N/A 10/11/2022    Procedure: Excisional debridement of abdominal wall nonviable tissue.  Skin and subcu tissues were  dissected.  The wound measured 23 x 18 cm x 4 cm deep.;  Surgeon: Flo Yancey MD;  [facility];  Service: General     Family History   Problem Relation Age of Onset    Diabetes Father     Prostate cancer Brother     Anesthesia problems Neg Hx      Social History     Tobacco Use    Smoking status: Never    Smokeless tobacco: Never    Tobacco comments:     no exposure to passive smoke-never a smoker or tobacco use    Vaping Use    Vaping status: Never Used   Substance and Sexual Activity    Alcohol use: Not Currently     Comment: Denies any alcohol use at this time    Drug use: Never     Comment: Denies any former or current drug use    Sexual activity: Not on file     Comment: defer-- not active at this time since Sept surgery     Current Outpatient Medications on File Prior to Visit   Medication Sig    acetaminophen (TYLENOL) 500 mg tablet Take 500 mg by mouth every 6 (six) hours as needed for mild pain    allopurinol (ZYLOPRIM) 100 mg tablet Take 100 mg by mouth daily 1/9/23 Per pt takes at 4pm in the afternoon.    cyanocobalamin (VITAMIN B-12) 1000 MCG tablet Take 1 tablet (1,000 mcg total) by mouth daily    furosemide (LASIX) 20 mg tablet TAKE 1 TABLET DAILY FOR SWELLING    gabapentin (NEURONTIN) 100 mg capsule Take 2 capsules (200 mg total) by mouth 3 (three) times a day    metoprolol succinate (TOPROL-XL) 100 mg 24 hr tablet Take 1 tablet (100 mg total) by mouth daily    nystatin (MYCOSTATIN) powder Use bid to rash    Potassium Citrate ER 15 MEQ (1620 MG) TBCR Take 1 tablet by mouth daily 1/9/23 Per pt - takes at 4pm daily    tamsulosin (FLOMAX) 0.4 mg TAKE 1 TO 2 CAPSULES DAILY AS DIRECTED    TRAVATAN Z 0.004 % ophthalmic solution Administer 1 drop to both eyes daily at bedtime      Allergies   Allergen Reactions    Codeine Swelling     Other reaction(s): SWELLING OF FACE    Swollen eyes/swelling--age 11     Immunization History   Administered Date(s) Administered    COVID-19 MODERNA VACC 0.5 ML  IM 02/03/2021, 02/28/2021, 12/20/2021    INFLUENZA 12/10/2014, 12/10/2015, 12/10/2015, 11/29/2016, 11/29/2016, 11/30/2017, 11/30/2017, 12/27/2018, 12/30/2019, 10/26/2020, 01/31/2023    Influenza Split 11/08/2010, 11/14/2011, 12/11/2012, 12/10/2013    Influenza, high dose seasonal 0.7 mL 12/20/2021, 01/31/2023    Influenza, injectable, quadrivalent, preservative free 0.5 mL 12/27/2018    Influenza, recombinant, quadrivalent,injectable, preservative free 12/30/2019    Influenza, seasonal, injectable 12/10/2014    Pneumococcal Conjugate 13-Valent 05/11/2021    Pneumococcal Conjugate Vaccine 20-valent (Pcv20), Polysace 07/07/2022     Objective     /82 (BP Location: Left arm, Patient Position: Sitting, Cuff Size: Large)   Pulse 80   Temp 97.9 °F (36.6 °C) (Temporal)   Resp 22   Wt (!) 183 kg (403 lb 6.4 oz)   SpO2 93%   BMI 61.34 kg/m²     Physical Exam  Vitals and nursing note reviewed.   Constitutional:       Appearance: Normal appearance. He is well-developed.   HENT:      Head: Normocephalic and atraumatic.      Right Ear: Tympanic membrane, ear canal and external ear normal. There is no impacted cerumen.      Left Ear: Tympanic membrane, ear canal and external ear normal. There is no impacted cerumen.      Nose: Nose normal.      Mouth/Throat:      Mouth: Mucous membranes are moist.      Pharynx: Oropharynx is clear.   Eyes:      Extraocular Movements: Extraocular movements intact.      Conjunctiva/sclera: Conjunctivae normal.      Pupils: Pupils are equal, round, and reactive to light.   Cardiovascular:      Rate and Rhythm: Normal rate and regular rhythm.      Pulses: Normal pulses.      Heart sounds: Normal heart sounds. No murmur heard.  Pulmonary:      Effort: Pulmonary effort is normal. No respiratory distress.      Breath sounds: Normal breath sounds.   Abdominal:      General: Bowel sounds are normal.      Palpations: Abdomen is soft.      Tenderness: There is no abdominal tenderness.    Musculoskeletal:         General: No swelling. Normal range of motion.      Cervical back: Normal range of motion and neck supple.      Right lower leg: Swelling and tenderness present. No edema.      Left lower leg: No edema.        Legs:         Feet:    Skin:     General: Skin is warm and dry.      Capillary Refill: Capillary refill takes less than 2 seconds.   Neurological:      General: No focal deficit present.      Mental Status: He is alert and oriented to person, place, and time. Mental status is at baseline.   Psychiatric:         Mood and Affect: Mood normal.         Behavior: Behavior normal.         Thought Content: Thought content normal.         Judgment: Judgment normal.       Administrative Statements   I have spent a total time of 40 minutes in caring for this patient on the day of the visit/encounter including Diagnostic results, Prognosis, Risks and benefits of tx options, Instructions for management, Patient and family education, Importance of tx compliance, Risk factor reductions, Impressions, Counseling / Coordination of care, Documenting in the medical record, Reviewing / ordering tests, medicine, procedures  , Obtaining or reviewing history  , and Communicating with other healthcare professionals .

## 2024-07-12 NOTE — TELEPHONE ENCOUNTER
Pt called stating that she gave the incorrect phone# to Yelena as a c/b# for central scheduling. Pt provided [phone].    Attempted to connect pt with Central Scheduling to update phone# for STAT request and pt dropped the call as Central Scheduling was calling her back.    Could hear pt speaking with Central Scheduling - disconnected the call.

## 2024-07-13 ENCOUNTER — HOSPITAL ENCOUNTER (OUTPATIENT)
Dept: NON INVASIVE DIAGNOSTICS | Facility: HOSPITAL | Age: 69
Discharge: HOME | End: 2024-07-13
Payer: MEDICARE

## 2024-07-13 ENCOUNTER — APPOINTMENT (OUTPATIENT)
Dept: LAB | Facility: HOSPITAL | Age: 69
End: 2024-07-13
Payer: MEDICARE

## 2024-07-13 DIAGNOSIS — G57.93 NEUROPATHY OF BOTH FEET: Chronic | ICD-10-CM

## 2024-07-13 DIAGNOSIS — L53.9 LEG ERYTHEMA: ICD-10-CM

## 2024-07-13 DIAGNOSIS — D53.9 NUTRITIONAL ANEMIA, UNSPECIFIED: ICD-10-CM

## 2024-07-13 DIAGNOSIS — M79.661 PAIN IN RIGHT LOWER LEG: ICD-10-CM

## 2024-07-13 LAB
BASOPHILS # BLD AUTO: 0.04 THOUSANDS/ÂΜL (ref 0–0.1)
BASOPHILS NFR BLD AUTO: 0 % (ref 0–1)
EOSINOPHIL # BLD AUTO: 0.03 THOUSAND/ÂΜL (ref 0–0.61)
EOSINOPHIL NFR BLD AUTO: 0 % (ref 0–6)
ERYTHROCYTE [DISTWIDTH] IN BLOOD BY AUTOMATED COUNT: 13.4 % (ref 11.6–15.1)
HCT VFR BLD AUTO: 41.7 % (ref 36.5–49.3)
HGB BLD-MCNC: 14.4 G/DL (ref 12–17)
IMM GRANULOCYTES # BLD AUTO: 0.06 THOUSAND/UL (ref 0–0.2)
IMM GRANULOCYTES NFR BLD AUTO: 1 % (ref 0–2)
LYMPHOCYTES # BLD AUTO: 1.25 THOUSANDS/ÂΜL (ref 0.6–4.47)
LYMPHOCYTES NFR BLD AUTO: 11 % (ref 14–44)
MCH RBC QN AUTO: 32.2 PG (ref 26.8–34.3)
MCHC RBC AUTO-ENTMCNC: 34.5 G/DL (ref 31.4–37.4)
MCV RBC AUTO: 93 FL (ref 82–98)
MONOCYTES # BLD AUTO: 0.68 THOUSAND/ÂΜL (ref 0.17–1.22)
MONOCYTES NFR BLD AUTO: 6 % (ref 4–12)
NEUTROPHILS # BLD AUTO: 9.86 THOUSANDS/ÂΜL (ref 1.85–7.62)
NEUTS SEG NFR BLD AUTO: 82 % (ref 43–75)
NRBC BLD AUTO-RTO: 0 /100 WBCS
PLATELET # BLD AUTO: 227 THOUSANDS/UL (ref 149–390)
PMV BLD AUTO: 9 FL (ref 8.9–12.7)
RBC # BLD AUTO: 4.47 MILLION/UL (ref 3.88–5.62)
VIT B12 SERPL-MCNC: 388 PG/ML (ref 180–914)
WBC # BLD AUTO: 11.92 THOUSAND/UL (ref 4.31–10.16)

## 2024-07-13 PROCEDURE — 82607 VITAMIN B-12: CPT

## 2024-07-13 PROCEDURE — 36415 COLL VENOUS BLD VENIPUNCTURE: CPT

## 2024-07-13 PROCEDURE — 93971 EXTREMITY STUDY: CPT | Performed by: SURGERY

## 2024-07-13 PROCEDURE — 85025 COMPLETE CBC W/AUTO DIFF WBC: CPT

## 2024-07-13 PROCEDURE — 93971 EXTREMITY STUDY: CPT

## 2024-08-06 ENCOUNTER — OFFICE VISIT (OUTPATIENT)
Dept: FAMILY MEDICINE CLINIC | Facility: CLINIC | Age: 69
End: 2024-08-06

## 2024-08-06 VITALS
OXYGEN SATURATION: 96 % | TEMPERATURE: 97.2 F | HEART RATE: 56 BPM | SYSTOLIC BLOOD PRESSURE: 130 MMHG | HEIGHT: 68 IN | BODY MASS INDEX: 47.74 KG/M2 | WEIGHT: 315 LBS | DIASTOLIC BLOOD PRESSURE: 100 MMHG

## 2024-08-06 DIAGNOSIS — Z13.6 SCREENING FOR CARDIOVASCULAR CONDITION: ICD-10-CM

## 2024-08-06 DIAGNOSIS — I26.99 PULMONARY EMBOLISM, UNSPECIFIED CHRONICITY, UNSPECIFIED PULMONARY EMBOLISM TYPE, UNSPECIFIED WHETHER ACUTE COR PULMONALE PRESENT (HCC): ICD-10-CM

## 2024-08-06 DIAGNOSIS — N18.31 STAGE 3A CHRONIC KIDNEY DISEASE (HCC): ICD-10-CM

## 2024-08-06 DIAGNOSIS — I89.0 LYMPHEDEMA OF BOTH LOWER EXTREMITIES: ICD-10-CM

## 2024-08-06 DIAGNOSIS — G57.93 NEUROPATHY OF BOTH FEET: Chronic | ICD-10-CM

## 2024-08-06 DIAGNOSIS — D64.9 ANEMIA, UNSPECIFIED TYPE: ICD-10-CM

## 2024-08-06 DIAGNOSIS — Z00.00 MEDICARE ANNUAL WELLNESS VISIT, SUBSEQUENT: Primary | ICD-10-CM

## 2024-08-06 DIAGNOSIS — L57.0 ACTINIC KERATOSES: ICD-10-CM

## 2024-08-06 DIAGNOSIS — Z12.5 SCREENING FOR PROSTATE CANCER: ICD-10-CM

## 2024-08-06 DIAGNOSIS — R73.9 HYPERGLYCEMIA: ICD-10-CM

## 2024-08-06 DIAGNOSIS — I10 ESSENTIAL HYPERTENSION: ICD-10-CM

## 2024-08-06 DIAGNOSIS — E53.8 B12 DEFICIENCY: Chronic | ICD-10-CM

## 2024-08-06 NOTE — PROGRESS NOTES
Ambulatory Visit  Name: Caterina Tam      : 1955      MRN: [id number]  Encounter Provider: Gerson Case DO  Encounter Date: 2024   Encounter department: [facility]    Assessment & Plan   1. Medicare annual wellness visit, subsequent  2. Essential hypertension  Assessment & Plan:  Stable    3. Pulmonary embolism, unspecified chronicity, unspecified pulmonary embolism type, unspecified whether acute cor pulmonale present (HCC)  Assessment & Plan:  No longer on Eliquis  4. Stage 3a chronic kidney disease (HCC)  Assessment & Plan:  Lab Results   Component Value Date    EGFR 59 10/13/2023    EGFR 60 2023    EGFR 70 2023    CREATININE 1.24 10/13/2023    CREATININE 1.22 2023    CREATININE 1.08 2023   Under the care of of nephrology  5. Anemia, unspecified type  Assessment & Plan:  Resolved  CBC on 2024 was normal  Likely in setting of B12 deficiency  Continue supplementation and monitoring  6. Lymphedema of both lower extremities  Assessment & Plan:  Chronic  Encouraged usage of wraps and emollients  Follows with wound care monthly as well as podiatry  Continue Lasix 20 mg daily  7. Neuropathy of both feet  Assessment & Plan:  Remains on B12 supplementation  Will recheck B12 levels  8. Hyperglycemia  -     Hemoglobin A1C; Future  9. B12 deficiency  Assessment & Plan:  Continue B12 supplementation  Orders:  -     Vitamin B12; Future  10. Screening for cardiovascular condition  -     Lipid panel; Future  11. Screening for prostate cancer  -     PSA, Total Screen; Future  12. Actinic keratoses  -     Ambulatory Referral to Dermatology; Future       Preventive health issues were discussed with patient, and age appropriate screening tests were ordered as noted in patient's After Visit Summary. Personalized health advice and appropriate referrals for health education or preventive services given if needed, as noted in patient's After Visit  Summary.    History of Present Illness     HPI   Patient Care Team:  Efren Bar MD as PCP - General (Family Medicine)    Review of Systems   Constitutional:  Negative for activity change, chills, diaphoresis and fever.   HENT:  Negative for ear pain, hearing loss, postnasal drip, rhinorrhea, sinus pressure, sinus pain, sneezing and sore throat.    Respiratory:  Negative for cough, chest tightness, shortness of breath and wheezing.    Cardiovascular:  Positive for leg swelling. Negative for chest pain and palpitations.   Gastrointestinal:  Negative for abdominal pain, blood in stool, constipation, diarrhea, nausea and vomiting.   Genitourinary:  Negative for dysuria, frequency, hematuria and urgency.   Musculoskeletal:  Negative for arthralgias and myalgias.   Neurological:  Negative for dizziness, syncope, weakness, light-headedness, numbness and headaches.     Medical History Reviewed by provider this encounter:       Annual Wellness Visit Questionnaire   Caterina is here for his Subsequent Wellness visit. Last Medicare Wellness visit information reviewed, patient interviewed and updates made to the record today.      Health Risk Assessment:   Patient rates overall health as fair. Patient feels that their physical health rating is same. Patient is satisfied with their life. Eyesight was rated as slightly worse. Hearing was rated as same. Patient feels that their emotional and mental health rating is same. Patients states they are never, rarely angry. Patient states they are often unusually tired/fatigued. Pain experienced in the last 7 days has been none. Patient states that he has experienced no weight loss or gain in last 6 months.     Depression Screening:   PHQ-2 Score: 0      Fall Risk Screening:   In the past year, patient has experienced: no history of falling in past year      Home Safety:  Patient does not have trouble with stairs inside or outside of their home. Patient has working smoke alarms and has  working carbon monoxide detector. Home safety hazards include: none.     Nutrition:   Current diet is Regular.     Medications:   Patient is not currently taking any over-the-counter supplements. Patient is able to manage medications.     Activities of Daily Living (ADLs)/Instrumental Activities of Daily Living (IADLs):   Walk and transfer into and out of bed and chair?: Yes  Dress and groom yourself?: Yes    Bathe or shower yourself?: Yes    Feed yourself? Yes  Do your laundry/housekeeping?: Yes  Manage your money, pay your bills and track your expenses?: Yes  Make your own meals?: Yes    Do your own shopping?: Yes    Previous Hospitalizations:   Any hospitalizations or ED visits within the last 12 months?: Yes    How many hospitalizations have you had in the last year?: 1-2    Advance Care Planning:   Living will: No    Advanced directive counseling given: Yes    End of Life Decisions reviewed with patient: Yes    Provider agrees with end of life decisions: Yes      Cognitive Screening:   Provider or family/friend/caregiver concerned regarding cognition?: No    PREVENTIVE SCREENINGS      Cardiovascular Screening:    General: Screening Current      Diabetes Screening:     General: Screening Current      Colorectal Cancer Screening:     General: Screening Current      Prostate Cancer Screening:    General: Risks and Benefits Discussed    Due for: PSA      Osteoporosis Screening:    General: Screening Not Indicated      Abdominal Aortic Aneurysm (AAA) Screening:    Risk factors include: age between 65-74 yo        General: Screening Not Indicated      Lung Cancer Screening:     General: Screening Not Indicated      Hepatitis C Screening:    General: Screening Current    Other Counseling Topics:   Car/seat belt/driving safety, skin self-exam, sunscreen and calcium and vitamin D intake and regular weightbearing exercise.     Social Determinants of Health     Financial Resource Strain: Low Risk  (8/3/2023)    Overall  "Financial Resource Strain (CARDIA)    • Difficulty of Paying Living Expenses: Not hard at all   Food Insecurity: No Food Insecurity (8/6/2024)    Hunger Vital Sign    • Worried About Running Out of Food in the Last Year: Never true    • Ran Out of Food in the Last Year: Never true   Transportation Needs: No Transportation Needs (8/6/2024)    PRAPARE - Transportation    • Lack of Transportation (Medical): No    • Lack of Transportation (Non-Medical): No   Housing Stability: Low Risk  (8/6/2024)    Housing Stability Vital Sign    • Unable to Pay for Housing in the Last Year: No    • Number of Times Moved in the Last Year: 0    • Homeless in the Last Year: No   Utilities: Not At Risk (8/6/2024)    [facility] Utilities    • Threatened with loss of utilities: No     No results found.    Objective     /100 (BP Location: Left arm, Patient Position: Sitting, Cuff Size: Standard)   Pulse 56   Temp (!) 97.2 °F (36.2 °C) (Temporal)   Ht 5' 8\" (1.727 m)   Wt (!) 185 kg (407 lb)   SpO2 96%   BMI 61.88 kg/m²     Physical Exam  Vitals reviewed.   Constitutional:       General: He is not in acute distress.     Appearance: He is well-developed. He is obese. He is not diaphoretic.   HENT:      Head: Normocephalic and atraumatic.      Right Ear: Tympanic membrane, ear canal and external ear normal. There is no impacted cerumen.      Left Ear: Tympanic membrane, ear canal and external ear normal. There is no impacted cerumen.      Nose: Nose normal. No congestion.      Mouth/Throat:      Mouth: Mucous membranes are moist.      Pharynx: Oropharynx is clear.   Eyes:      General: No scleral icterus.        Right eye: No discharge.         Left eye: No discharge.      Conjunctiva/sclera: Conjunctivae normal.      Pupils: Pupils are equal, round, and reactive to light.   Neck:      Vascular: No JVD.   Cardiovascular:      Rate and Rhythm: Normal rate and regular rhythm.      Heart sounds: Normal heart sounds. No murmur heard.     No " friction rub.   Pulmonary:      Effort: Pulmonary effort is normal. No respiratory distress.      Breath sounds: Normal breath sounds. No wheezing or rales.   Chest:      Chest wall: No tenderness.   Abdominal:      General: Bowel sounds are normal. There is no distension.      Palpations: Abdomen is soft. There is no mass.      Tenderness: There is no abdominal tenderness. There is no guarding or rebound.   Musculoskeletal:         General: No tenderness or deformity. Normal range of motion.      Cervical back: Normal range of motion and neck supple.   Skin:     General: Skin is warm and dry.      Findings: No erythema or rash.   Neurological:      Mental Status: He is alert and oriented to person, place, and time. Mental status is at baseline.      Cranial Nerves: No cranial nerve deficit.   Psychiatric:         Mood and Affect: Mood normal.

## 2024-08-06 NOTE — ASSESSMENT & PLAN NOTE
Resolved  CBC on 6/2024 was normal  Likely in setting of B12 deficiency  Continue supplementation and monitoring

## 2024-08-06 NOTE — PATIENT INSTRUCTIONS
Medicare Preventive Visit Patient Instructions  Thank you for completing your Welcome to Medicare Visit or Medicare Annual Wellness Visit today. Your next wellness visit will be due in one year (8/7/2025).  The screening/preventive services that you may require over the next 5-10 years are detailed below. Some tests may not apply to you based off risk factors and/or age. Screening tests ordered at today's visit but not completed yet may show as past due. Also, please note that scanned in results may not display below.  Preventive Screenings:  Service Recommendations Previous Testing/Comments   Colorectal Cancer Screening  Colonoscopy    Fecal Occult Blood Test (FOBT)/Fecal Immunochemical Test (FIT)  Fecal DNA/Cologuard Test  Flexible Sigmoidoscopy Age: 45-75 years old   Colonoscopy: every 10 years (May be performed more frequently if at higher risk)  OR  FOBT/FIT: every 1 year  OR  Cologuard: every 3 years  OR  Sigmoidoscopy: every 5 years  Screening may be recommended earlier than age 45 if at higher risk for colorectal cancer. Also, an individualized decision between you and your healthcare provider will decide whether screening between the ages of 76-85 would be appropriate. Colonoscopy: 03/04/2022  FOBT/FIT: Not on file  Cologuard: Not on file  Sigmoidoscopy: Not on file    Screening Current     Prostate Cancer Screening Individualized decision between patient and health care provider in men between ages of 55-69   Medicare will cover every 12 months beginning on the day after your 50th birthday PSA: No results in last 5 years           Hepatitis C Screening Once for adults born between 1945 and 1965  More frequently in patients at high risk for Hepatitis C Hep C Antibody: 05/11/2021    Screening Current   Diabetes Screening 1-2 times per year if you're at risk for diabetes or have pre-diabetes Fasting glucose: 88 mg/dL (10/13/2023)  A1C: 5.6 % (5/11/2021)  Screening Current   Cholesterol Screening Once every 5  years if you don't have a lipid disorder. May order more often based on risk factors. Lipid panel: 05/11/2021  Screening Current      Other Preventive Screenings Covered by Medicare:  Abdominal Aortic Aneurysm (AAA) Screening: covered once if your at risk. You're considered to be at risk if you have a family history of AAA or a male between the age of 65-75 who smoking at least 100 cigarettes in your lifetime.  Lung Cancer Screening: covers low dose CT scan once per year if you meet all of the following conditions: (1) Age 55-77; (2) No signs or symptoms of lung cancer; (3) Current smoker or have quit smoking within the last 15 years; (4) You have a tobacco smoking history of at least 20 pack years (packs per day x number of years you smoked); (5) You get a written order from a healthcare provider.  Glaucoma Screening: covered annually if you're considered high risk: (1) You have diabetes OR (2) Family history of glaucoma OR (3)  aged 50 and older OR (4)  American aged 65 and older  Osteoporosis Screening: covered every 2 years if you meet one of the following conditions: (1) Have a vertebral abnormality; (2) On glucocorticoid therapy for more than 3 months; (3) Have primary hyperparathyroidism; (4) On osteoporosis medications and need to assess response to drug therapy.  HIV Screening: covered annually if you're between the age of 15-65. Also covered annually if you are younger than 15 and older than 65 with risk factors for HIV infection. For pregnant patients, it is covered up to 3 times per pregnancy.    Immunizations:  Immunization Recommendations   Influenza Vaccine Annual influenza vaccination during flu season is recommended for all persons aged >= 6 months who do not have contraindications   Pneumococcal Vaccine   * Pneumococcal conjugate vaccine = PCV13 (Prevnar 13), PCV15 (Vaxneuvance), PCV20 (Prevnar 20)  * Pneumococcal polysaccharide vaccine = PPSV23 (Pneumovax) Adults 19-63 yo  with certain risk factors or if 65+ yo  If never received any pneumonia vaccine: recommend Prevnar 20 (PCV20)  Give PCV20 if previously received 1 dose of PCV13 or PPSV23   Hepatitis B Vaccine 3 dose series if at intermediate or high risk (ex: diabetes, end stage renal disease, liver disease)   Respiratory syncytial virus (RSV) Vaccine - COVERED BY MEDICARE PART D  * RSVPreF3 (Arexvy) CDC recommends that adults 60 years of age and older may receive a single dose of RSV vaccine using shared clinical decision-making (SCDM)   Tetanus (Td) Vaccine - COST NOT COVERED BY MEDICARE PART B Following completion of primary series, a booster dose should be given every 10 years to maintain immunity against tetanus. Td may also be given as tetanus wound prophylaxis.   Tdap Vaccine - COST NOT COVERED BY MEDICARE PART B Recommended at least once for all adults. For pregnant patients, recommended with each pregnancy.   Shingles Vaccine (Shingrix) - COST NOT COVERED BY MEDICARE PART B  2 shot series recommended in those 19 years and older who have or will have weakened immune systems or those 50 years and older     Health Maintenance Due:      Topic Date Due   • Colorectal Cancer Screening  03/04/2032   • Hepatitis C Screening  Completed     Immunizations Due:      Topic Date Due   • COVID-19 Vaccine (4 - 2023-24 season) 09/01/2023   • Influenza Vaccine (1) 09/01/2024     Advance Directives   What are advance directives?  Advance directives are legal documents that state your wishes and plans for medical care. These plans are made ahead of time in case you lose your ability to make decisions for yourself. Advance directives can apply to any medical decision, such as the treatments you want, and if you want to donate organs.   What are the types of advance directives?  There are many types of advance directives, and each state has rules about how to use them. You may choose a combination of any of the following:  Living will:  This is  a written record of the treatment you want. You can also choose which treatments you do not want, which to limit, and which to stop at a certain time. This includes surgery, medicine, IV fluid, and tube feedings.   Durable power of  for healthcare (DPAHC):  This is a written record that states who you want to make healthcare choices for you when you are unable to make them for yourself. This person, called a proxy, is usually a family member or a friend. You may choose more than 1 proxy.  Do not resuscitate (DNR) order:  A DNR order is used in case your heart stops beating or you stop breathing. It is a request not to have certain forms of treatment, such as CPR. A DNR order may be included in other types of advance directives.  Medical directive:  This covers the care that you want if you are in a coma, near death, or unable to make decisions for yourself. You can list the treatments you want for each condition. Treatment may include pain medicine, surgery, blood transfusions, dialysis, IV or tube feedings, and a ventilator (breathing machine).  Values history:  This document has questions about your views, beliefs, and how you feel and think about life. This information can help others choose the care that you would choose.  Why are advance directives important?  An advance directive helps you control your care. Although spoken wishes may be used, it is better to have your wishes written down. Spoken wishes can be misunderstood, or not followed. Treatments may be given even if you do not want them. An advance directive may make it easier for your family to make difficult choices about your care.   Weight Management   Why it is important to manage your weight:  Being overweight increases your risk of health conditions such as heart disease, high blood pressure, type 2 diabetes, and certain types of cancer. It can also increase your risk for osteoarthritis, sleep apnea, and other respiratory problems. Aim  for a slow, steady weight loss. Even a small amount of weight loss can lower your risk of health problems.  How to lose weight safely:  A safe and healthy way to lose weight is to eat fewer calories and get regular exercise. You can lose up about 1 pound a week by decreasing the number of calories you eat by 500 calories each day.   Healthy meal plan for weight management:  A healthy meal plan includes a variety of foods, contains fewer calories, and helps you stay healthy. A healthy meal plan includes the following:  Eat whole-grain foods more often.  A healthy meal plan should contain fiber. Fiber is the part of grains, fruits, and vegetables that is not broken down by your body. Whole-grain foods are healthy and provide extra fiber in your diet. Some examples of whole-grain foods are whole-wheat breads and pastas, oatmeal, brown rice, and bulgur.  Eat a variety of vegetables every day.  Include dark, leafy greens such as spinach, kale, kavya greens, and mustard greens. Eat yellow and orange vegetables such as carrots, sweet potatoes, and winter squash.   Eat a variety of fruits every day.  Choose fresh or canned fruit (canned in its own juice or light syrup) instead of juice. Fruit juice has very little or no fiber.  Eat low-fat dairy foods.  Drink fat-free (skim) milk or 1% milk. Eat fat-free yogurt and low-fat cottage cheese. Try low-fat cheeses such as mozzarella and other reduced-fat cheeses.  Choose meat and other protein foods that are low in fat.  Choose beans or other legumes such as split peas or lentils. Choose fish, skinless poultry (chicken or turkey), or lean cuts of red meat (beef or pork). Before you cook meat or poultry, cut off any visible fat.   Use less fat and oil.  Try baking foods instead of frying them. Add less fat, such as margarine, sour cream, regular salad dressing and mayonnaise to foods. Eat fewer high-fat foods. Some examples of high-fat foods include french fries, doughnuts, ice  cream, and cakes.  Eat fewer sweets.  Limit foods and drinks that are high in sugar. This includes candy, cookies, regular soda, and sweetened drinks.  Exercise:  Exercise at least 30 minutes per day on most days of the week. Some examples of exercise include walking, biking, dancing, and swimming. You can also fit in more physical activity by taking the stairs instead of the elevator or parking farther away from stores. Ask your healthcare provider about the best exercise plan for you.      © Copyright [employer] 2018 Information is for End User's use only and may not be sold, redistributed or otherwise used for commercial purposes. All illustrations and images included in CareNotes® are the copyrighted property of [employer] or [employer]

## 2024-08-06 NOTE — ASSESSMENT & PLAN NOTE
Lab Results   Component Value Date    EGFR 59 10/13/2023    EGFR 60 07/28/2023    EGFR 70 04/05/2023    CREATININE 1.24 10/13/2023    CREATININE 1.22 07/28/2023    CREATININE 1.08 04/05/2023   Under the care of of nephrology

## 2024-08-06 NOTE — ASSESSMENT & PLAN NOTE
Chronic  Encouraged usage of wraps and emollients  Follows with wound care monthly as well as podiatry  Continue Lasix 20 mg daily

## 2024-08-07 DIAGNOSIS — I89.0 LYMPHEDEMA OF BOTH LOWER EXTREMITIES: ICD-10-CM

## 2024-08-07 DIAGNOSIS — I10 ESSENTIAL HYPERTENSION: ICD-10-CM

## 2024-08-07 RX ORDER — FUROSEMIDE 20 MG/1
TABLET ORAL
Qty: 100 TABLET | Refills: 1 | Status: SHIPPED | OUTPATIENT
Start: 2024-08-07

## 2024-08-08 RX ORDER — NYSTATIN 100000 [USP'U]/G
POWDER TOPICAL
Qty: 180 G | Refills: 3 | Status: SHIPPED | OUTPATIENT
Start: 2024-08-08

## 2024-08-20 DIAGNOSIS — N18.9 CHRONIC RENAL FAILURE, UNSPECIFIED CKD STAGE: ICD-10-CM

## 2024-08-20 RX ORDER — TAMSULOSIN HYDROCHLORIDE 0.4 MG/1
CAPSULE ORAL
Qty: 180 CAPSULE | Refills: 1 | Status: SHIPPED | OUTPATIENT
Start: 2024-08-20

## 2024-09-01 DIAGNOSIS — E53.8 VITAMIN B 12 DEFICIENCY: ICD-10-CM

## 2024-09-03 RX ORDER — LANOLIN ALCOHOL/MO/W.PET/CERES
1000 CREAM (GRAM) TOPICAL DAILY
Qty: 100 TABLET | Refills: 2 | Status: SHIPPED | OUTPATIENT
Start: 2024-09-03

## 2024-10-04 DIAGNOSIS — I10 ESSENTIAL HYPERTENSION: ICD-10-CM

## 2024-10-04 RX ORDER — METOPROLOL SUCCINATE 100 MG/1
100 TABLET, EXTENDED RELEASE ORAL DAILY
Qty: 90 TABLET | Refills: 1 | Status: SHIPPED | OUTPATIENT
Start: 2024-10-04

## 2024-10-28 NOTE — PLAN OF CARE
Advise to keep appt on 11/4- for pap, can do labs in Dec.   RIVAS Agrawal CNP    Problem: PAIN - ADULT  Goal: Verbalizes/displays adequate comfort level or baseline comfort level  Description: Interventions:  - Encourage patient to monitor pain and request assistance  - Assess pain using appropriate pain scale  - Administer analgesics based on type and severity of pain and evaluate response  - Implement non-pharmacological measures as appropriate and evaluate response  - Consider cultural and social influences on pain and pain management  - Notify physician/advanced practitioner if interventions unsuccessful or patient reports new pain  Outcome: Progressing     Problem: INFECTION - ADULT  Goal: Absence or prevention of progression during hospitalization  Description: INTERVENTIONS:  - Assess and monitor for signs and symptoms of infection  - Monitor lab/diagnostic results  - Monitor all insertion sites, i e  indwelling lines, tubes, and drains  - Monitor endotracheal if appropriate and nasal secretions for changes in amount and color  - [place] appropriate cooling/warming therapies per order  - Administer medications as ordered  - Instruct and encourage patient and family to use good hand hygiene technique  - Identify and instruct in appropriate isolation precautions for identified infection/condition  Outcome: Progressing     Problem: SAFETY ADULT  Goal: Patient will remain free of falls  Description: INTERVENTIONS:  - Educate patient/family on patient safety including physical limitations  - Instruct patient to call for assistance with activity   - Consult OT/PT to assist with strengthening/mobility   - Keep Call bell within reach  - Keep bed low and locked with side rails adjusted as appropriate  - Keep care items and personal belongings within reach  - Initiate and maintain comfort rounds  - Make Fall Risk Sign visible to staff  - Apply yellow socks and bracelet for high fall risk patients  - Consider moving patient to room near nurses station  Outcome: Progressing  Goal: Maintain or return to baseline ADL function  Description: INTERVENTIONS:  -  Assess patient's ability to carry out ADLs; assess patient's baseline for ADL function and identify physical deficits which impact ability to perform ADLs (bathing, care of mouth/teeth, toileting, grooming, dressing, etc )  - Assess/evaluate cause of self-care deficits   - Assess range of motion  - Assess patient's mobility; develop plan if impaired  - Assess patient's need for assistive devices and provide as appropriate  - Encourage maximum independence but intervene and supervise when necessary  - Involve family in performance of ADLs  - Assess for home care needs following discharge   - Consider OT consult to assist with ADL evaluation and planning for discharge  - Provide patient education as appropriate  Outcome: Progressing  Goal: Maintains/Returns to pre admission functional level  Description: INTERVENTIONS:  - Perform BMAT or MOVE assessment daily    - Set and communicate daily mobility goal to care team and patient/family/caregiver     - Collaborate with rehabilitation services on mobility goals if consulted  - Out of bed for toileting  - Record patient progress and toleration of activity level   Outcome: Progressing     Problem: SKIN/TISSUE INTEGRITY - ADULT  Goal: Skin Integrity remains intact(Skin Breakdown Prevention)  Description: Assess:  -Perform Mic assessment every shift  -Inspect skin when repositioning, toileting, and assisting with ADLS  -Assess extremities for adequate circulation and sensation     Bed Management:  -Have minimal linens on bed & keep smooth, unwrinkled  -Change linens as needed when moist or perspiring    Toileting:  -Offer bedside commode    Activity:  -Encourage activity and walks on unit  -Encourage or provide ROM exercises   -Use appropriate equipment to lift or move patient in bed    Skin Care:  -Avoid use of baby powder, tape, friction and shearing, hot water or constrictive clothing  -Do not massage red bony areas      Outcome: Progressing  Goal: Incision(s), wounds(s) or drain site(s) healing without S/S of infection  Description: INTERVENTIONS  - Assess and document dressing, incision, wound bed, drain sites and surrounding tissue  - Provide patient and family education  - Perform skin care/dressing changes every shift  Outcome: Progressing     Problem: MOBILITY - ADULT  Goal: Maintain or return to baseline ADL function  Description: INTERVENTIONS:  -  Assess patient's ability to carry out ADLs; assess patient's baseline for ADL function and identify physical deficits which impact ability to perform ADLs (bathing, care of mouth/teeth, toileting, grooming, dressing, etc )  - Assess/evaluate cause of self-care deficits   - Assess range of motion  - Assess patient's mobility; develop plan if impaired  - Assess patient's need for assistive devices and provide as appropriate  - Encourage maximum independence but intervene and supervise when necessary  - Involve family in performance of ADLs  - Assess for home care needs following discharge   - Consider OT consult to assist with ADL evaluation and planning for discharge  - Provide patient education as appropriate  Outcome: Progressing  Goal: Maintains/Returns to pre admission functional level  Description: INTERVENTIONS:  - Perform BMAT or MOVE assessment daily    - Set and communicate daily mobility goal to care team and patient/family/caregiver     - Collaborate with rehabilitation services on mobility goals if consulted  - Out of bed for toileting  - Record patient progress and toleration of activity level   Outcome: Progressing     Problem: Potential for Falls  Goal: Patient will remain free of falls  Description: INTERVENTIONS:  - Educate patient/family on patient safety including physical limitations  - Instruct patient to call for assistance with activity   - Consult OT/PT to assist with strengthening/mobility   - Keep Call bell within reach  - Keep bed low and locked with side rails adjusted as appropriate  - Keep care items and personal belongings within reach  - Initiate and maintain comfort rounds  - Make Fall Risk Sign visible to staff  - Apply yellow socks and bracelet for high fall risk patients  - Consider moving patient to room near nurses station  Outcome: Progressing

## 2024-12-30 DIAGNOSIS — T14.8XXA SURGICAL WOUND PRESENT: ICD-10-CM

## 2024-12-31 RX ORDER — GABAPENTIN 100 MG/1
200 CAPSULE ORAL 3 TIMES DAILY
Qty: 180 CAPSULE | Refills: 0 | Status: SHIPPED | OUTPATIENT
Start: 2024-12-31

## 2025-02-04 DIAGNOSIS — I10 ESSENTIAL HYPERTENSION: ICD-10-CM

## 2025-02-05 ENCOUNTER — TELEPHONE (OUTPATIENT)
Age: 70
End: 2025-02-05

## 2025-02-05 DIAGNOSIS — I10 ESSENTIAL HYPERTENSION: ICD-10-CM

## 2025-02-05 RX ORDER — FUROSEMIDE 20 MG/1
TABLET ORAL
Qty: 30 TABLET | Refills: 0 | Status: SHIPPED | OUTPATIENT
Start: 2025-02-05 | End: 2025-02-05 | Stop reason: SDUPTHER

## 2025-02-05 RX ORDER — FUROSEMIDE 20 MG/1
20 TABLET ORAL DAILY
Qty: 90 TABLET | Refills: 0 | Status: SHIPPED | OUTPATIENT
Start: 2025-02-05

## 2025-02-05 NOTE — TELEPHONE ENCOUNTER
Patient called in regards to wanting his labs to faxed to [id number] at [phone], labs have been faxed.

## 2025-02-06 LAB
HBA1C MFR BLD: 5.8 % OF TOTAL HGB
PSA SERPL-MCNC: 0.94 NG/ML
VIT B12 SERPL-MCNC: 832 PG/ML (ref 200–1100)

## 2025-02-12 ENCOUNTER — OFFICE VISIT (OUTPATIENT)
Dept: FAMILY MEDICINE CLINIC | Facility: CLINIC | Age: 70
End: 2025-02-12
Payer: MEDICARE

## 2025-02-12 VITALS
HEIGHT: 68 IN | WEIGHT: 315 LBS | TEMPERATURE: 97.9 F | SYSTOLIC BLOOD PRESSURE: 130 MMHG | DIASTOLIC BLOOD PRESSURE: 80 MMHG | BODY MASS INDEX: 47.74 KG/M2 | OXYGEN SATURATION: 96 % | HEART RATE: 65 BPM

## 2025-02-12 DIAGNOSIS — T14.8XXA SURGICAL WOUND PRESENT: ICD-10-CM

## 2025-02-12 DIAGNOSIS — N18.31 STAGE 3A CHRONIC KIDNEY DISEASE (HCC): ICD-10-CM

## 2025-02-12 DIAGNOSIS — I26.99 PULMONARY EMBOLISM, UNSPECIFIED CHRONICITY, UNSPECIFIED PULMONARY EMBOLISM TYPE, UNSPECIFIED WHETHER ACUTE COR PULMONALE PRESENT (HCC): ICD-10-CM

## 2025-02-12 DIAGNOSIS — N17.9 ACUTE KIDNEY INJURY SUPERIMPOSED ON CHRONIC KIDNEY DISEASE  (HCC): ICD-10-CM

## 2025-02-12 DIAGNOSIS — C64.2 CLEAR CELL CARCINOMA OF LEFT KIDNEY (HCC): ICD-10-CM

## 2025-02-12 DIAGNOSIS — E66.01 MORBID OBESITY WITH BMI OF 50.0-59.9, ADULT (HCC): ICD-10-CM

## 2025-02-12 DIAGNOSIS — N18.9 ACUTE KIDNEY INJURY SUPERIMPOSED ON CHRONIC KIDNEY DISEASE  (HCC): ICD-10-CM

## 2025-02-12 DIAGNOSIS — R73.9 HYPERGLYCEMIA: ICD-10-CM

## 2025-02-12 DIAGNOSIS — I89.0 LYMPHEDEMA OF BOTH LOWER EXTREMITIES: ICD-10-CM

## 2025-02-12 DIAGNOSIS — D64.9 ANEMIA, UNSPECIFIED TYPE: ICD-10-CM

## 2025-02-12 DIAGNOSIS — I10 ESSENTIAL HYPERTENSION: Primary | ICD-10-CM

## 2025-02-12 DIAGNOSIS — E78.2 MIXED HYPERLIPIDEMIA: ICD-10-CM

## 2025-02-12 LAB
CHOLEST SERPL-MCNC: 130 MG/DL
CHOLEST/HDLC SERPL: 4.1 (CALC)
HDLC SERPL-MCNC: 32 MG/DL
LDLC SERPL CALC-MCNC: 71 MG/DL (CALC)
NONHDLC SERPL-MCNC: 98 MG/DL (CALC)
TRIGL SERPL-MCNC: 207 MG/DL

## 2025-02-12 PROCEDURE — 99214 OFFICE O/P EST MOD 30 MIN: CPT | Performed by: FAMILY MEDICINE

## 2025-02-12 PROCEDURE — G2211 COMPLEX E/M VISIT ADD ON: HCPCS | Performed by: FAMILY MEDICINE

## 2025-02-12 RX ORDER — GABAPENTIN 100 MG/1
200 CAPSULE ORAL
Start: 2025-02-12

## 2025-02-12 NOTE — PATIENT INSTRUCTIONS
Call Dr. [facility] to schedule an evaluation. There is a long waitlist. May take 6-12 months. See anyone at their office.

## 2025-02-12 NOTE — ASSESSMENT & PLAN NOTE
Admission medication history interview status for this patient is complete. See EPIC admission navigator for allergy information, prior to admission medications and immunization status.     Medication history interview done via telephone during Covid-19 pandemic, indicate source(s): Patient  Medication history resources (including written lists, pill bottles, clinic record): SureScripts and Care Everywhere  Pharmacy: [facility] for discharge. [facility] [place]    Changes made to PTA medication list:  Added: none  Deleted: tramadol  Changed: oxycodone 5-10mg every 3 hours prn --> 5mg every 6 hours prn    Actions taken by pharmacist (provider contacted, etc): Called pt to verify home med list     Additional medication history information:None    Medication reconciliation/reorder completed by provider prior to medication history?  Y   (Y/N)       Prior to Admission medications    Medication Sig Last Dose Taking? Auth Provider   Acetaminophen (TYLENOL PO) Take 500-1,000 mg by mouth every 6 hours as needed for mild pain  Past Week at Unknown time Yes Reported, Patient   alendronate (FOSAMAX) 70 MG tablet Take 70 mg by mouth every 7 days On Sundays 12/13/2020 Yes Unknown, Entered By History   aspirin (ASA) 325 MG EC tablet Take 1 tablet (325 mg) by mouth 2 times daily 12/16/2020 at am Yes Dragan Muse MD   dorzolamide-timolol (COSOPT) 2-0.5 % ophthalmic solution Place 1 drop into both eyes 2 times daily 12/16/2020 at am Yes Unknown, Entered By History   famotidine (PEPCID) 10 MG tablet Take 1 tablet (10 mg) by mouth 2 times daily 12/16/2020 at am Yes Rachel Miller MD   fluticasone (FLONASE) 50 MCG/ACT nasal spray Spray 1-2 sprays into both nostrils daily 12/16/2020 at am Yes Rachel Miller MD   furosemide (LASIX) 20 MG tablet Take 1 tablet (20 mg) by mouth daily 12/16/2020 at am Yes Quincy Anglin MD   hydrochlorothiazide (HYDRODIURIL) 25 MG tablet Take 1 tablet (25 mg) by mouth  Lab Results   Component Value Date    EGFR 59 10/13/2023    EGFR 60 07/28/2023    EGFR 70 04/05/2023    CREATININE 1.24 10/13/2023    CREATININE 1.22 07/28/2023    CREATININE 1.08 04/05/2023        stable  Renal function is at baseline  Continue strict bp control daily 12/16/2020 at am Yes Quincy Anglin MD   ibuprofen (ADVIL/MOTRIN) 600 MG tablet Take 1 tablet (600 mg) by mouth every 6 hours as needed for moderate pain Past Week at Unknown time Yes Dragan Muse MD   latanoprost (XALATAN) 0.005 % ophthalmic solution Place 1 drop into the right eye At Bedtime  12/15/2020 at pm Yes Reported, Patient   levothyroxine (SYNTHROID/LEVOTHROID) 88 MCG tablet Take 1 tablet (88 mcg) by mouth daily 12/16/2020 at am Yes Rachel Miller MD   metoprolol tartrate (LOPRESSOR) 25 MG tablet Take 1 tablet (25 mg) by mouth 2 times daily 12/16/2020 at am Yes Rachel Miller MD   Multiple Vitamins-Minerals (MULTIVITAMIN ADULT PO) Take 1 tablet by mouth daily 12/16/2020 at am Yes Reported, Patient   oxyCODONE (ROXICODONE) 5 MG tablet Take 5 mg by mouth every 6 hours as needed for severe pain 12/15/2020 at pm Yes Unknown, Entered By History   potassium chloride ER (KLOR-CON M) 10 MEQ CR tablet Take 1 tablet (10 mEq) by mouth daily 12/16/2020 at am Yes Quincy Anglin MD   pravastatin (PRAVACHOL) 40 MG tablet Take 1 tablet (40 mg) by mouth At Bedtime 12/15/2020 at pm Yes Rachel Miller MD   senna-docusate (SENOKOT-S/PERICOLACE) 8.6-50 MG tablet Take 1-2 tablets by mouth 2 times daily Take while on oral narcotics to prevent or treat constipation. 12/16/2020 at am Yes Dragan Muse MD

## 2025-02-12 NOTE — PROGRESS NOTES
Name: Caterina Tam      : 1955      MRN: [id number]  Encounter Provider: Gerson Case DO  Encounter Date: 2025   Encounter department: [facility]  :  Assessment & Plan  Essential hypertension  130/80 today  At jnc8 goal <140/90  Continue lasix 20mg  Orders:  •  Comprehensive metabolic panel; Future    Pulmonary embolism, unspecified chronicity, unspecified pulmonary embolism type, unspecified whether acute cor pulmonale present (HCC)  Remote history  No longer on anticoagulation       Acute kidney injury superimposed on chronic kidney disease  (HCC)  Lab Results   Component Value Date    EGFR 59 10/13/2023    EGFR 60 2023    EGFR 70 2023    CREATININE 1.24 10/13/2023    CREATININE 1.22 2023    CREATININE 1.08 2023        stable  Renal function is at baseline  Continue strict bp control  Anemia, unspecified type  Lab Results   Component Value Date    WBC 11.92 (H) 2024    HGB 14.4 2024    HCT 41.7 2024    MCV 93 2024     2024     Stable  Denies blood in stool or urine  Continue to monitor       Surgical wound present  Stable on gabapentin  Orders:  •  gabapentin (NEURONTIN) 100 mg capsule; Take 2 capsules (200 mg total) by mouth daily at bedtime    Lymphedema of both lower extremities  Stable  Has lasix to use as needed for acute lower extremity edema       Morbid obesity with BMI of 50.0-59.9, adult (HCC)         Stage 3a chronic kidney disease (HCC)  Lab Results   Component Value Date    EGFR 59 10/13/2023    EGFR 60 2023    EGFR 70 2023    CREATININE 1.24 10/13/2023    CREATININE 1.22 2023    CREATININE 1.08 2023     Orders:  •  Comprehensive metabolic panel; Future    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  Orders:  •  Lipid panel; Future           History of Present Illness   HPI presents today for routine follow up. He is doing well and has no concerns.  Review of Systems   Constitutional:  Negative for activity change, chills, diaphoresis and fever.   HENT:  Negative for ear pain, hearing loss, postnasal drip, rhinorrhea, sinus pressure, sinus pain, sneezing and sore throat.    Respiratory:  Negative for cough, chest tightness, shortness of breath and wheezing.    Cardiovascular:  Negative for chest pain, palpitations and leg swelling.   Gastrointestinal:  Negative for abdominal pain, blood in stool, constipation, diarrhea, nausea and vomiting.   Genitourinary:  Negative for dysuria, frequency, hematuria and urgency.   Musculoskeletal:  Negative for arthralgias and myalgias.   Neurological:  Negative for dizziness, syncope, weakness, light-headedness, numbness and headaches.       Objective   /80 (BP Location: Left arm, Patient Position: Sitting, Cuff Size: Large)   Pulse 65   Temp 97.9 °F (36.6 °C)   Ht 5' 8\" (1.727 m)   Wt (!) 182 kg (401 lb)   SpO2 96%   BMI 60.97 kg/m²      Physical Exam  Vitals reviewed.   Constitutional:       General: He is not in acute distress.     Appearance: He is well-developed. He is not diaphoretic.   HENT:      Head: Normocephalic and atraumatic.      Right Ear: Tympanic membrane, ear canal and external ear normal. There is no impacted cerumen.      Left Ear: Tympanic membrane, ear canal and external ear normal. There is no impacted cerumen.      Nose: Nose normal. No congestion.      Mouth/Throat:      Mouth: Mucous membranes are moist.      Pharynx: Oropharynx is clear.   Eyes:      General: No scleral icterus.        Right eye: No discharge.         Left eye: No discharge.      " Conjunctiva/sclera: Conjunctivae normal.      Pupils: Pupils are equal, round, and reactive to light.   Neck:      Vascular: No JVD.   Cardiovascular:      Rate and Rhythm: Normal rate and regular rhythm.      Heart sounds: Normal heart sounds. No murmur heard.     No friction rub.   Pulmonary:      Effort: Pulmonary effort is normal. No respiratory distress.      Breath sounds: Normal breath sounds. No wheezing or rales.   Chest:      Chest wall: No tenderness.   Abdominal:      General: Bowel sounds are normal. There is no distension.      Palpations: Abdomen is soft. There is no mass.      Tenderness: There is no abdominal tenderness. There is no guarding or rebound.   Musculoskeletal:         General: No tenderness or deformity. Normal range of motion.      Cervical back: Normal range of motion and neck supple.      Right lower leg: Edema present.      Left lower leg: Edema present.   Skin:     General: Skin is warm and dry.      Findings: No erythema or rash.   Neurological:      Mental Status: He is alert and oriented to person, place, and time. Mental status is at baseline.      Cranial Nerves: No cranial nerve deficit.   Psychiatric:         Mood and Affect: Mood normal.

## 2025-02-12 NOTE — ASSESSMENT & PLAN NOTE
Lab Results   Component Value Date    EGFR 59 10/13/2023    EGFR 60 07/28/2023    EGFR 70 04/05/2023    CREATININE 1.24 10/13/2023    CREATININE 1.22 07/28/2023    CREATININE 1.08 04/05/2023     Orders:  •  Comprehensive metabolic panel; Future

## 2025-02-12 NOTE — ASSESSMENT & PLAN NOTE
Lab Results   Component Value Date    WBC 11.92 (H) 07/13/2024    HGB 14.4 07/13/2024    HCT 41.7 07/13/2024    MCV 93 07/13/2024     07/13/2024     Stable  Denies blood in stool or urine  Continue to monitor

## 2025-02-12 NOTE — ASSESSMENT & PLAN NOTE
Stable on gabapentin  Orders:  •  gabapentin (NEURONTIN) 100 mg capsule; Take 2 capsules (200 mg total) by mouth daily at bedtime

## 2025-02-12 NOTE — ASSESSMENT & PLAN NOTE
130/80 today  At jnc8 goal <140/90  Continue lasix 20mg  Orders:  •  Comprehensive metabolic panel; Future

## 2025-02-26 NOTE — PROGRESS NOTES
Assessment & Plan  Class 3 Obesity    Current weight: 401    Recommend surgical evaluation given the patient has a history of significant lymphedema, history of Strangulated hernia of abdominal wall s/p emergent lap with lysis adhesions in 2022, BMI 61.  Patient is always has an increased risk for recurrence of hernia given mesh repair and weakening of the abdominal tissue.      Patient has tried multiple times to lose weight and I believe he would benefit from a surgical evaluation as well as nutritional consult to help with meal planning.  Recommend that he also get a sleep study done as he reports loud snoring and low energy.  Denies any daytime fatigue or headaches.  Stressed importance of ruling out sleep apnea as it can lead to pulm hypertension, arrhythmias and increased hunger due to poor sleep as ghrelin can be elevated due to lack of sleep (studies show that < 5 hours can increase hunger)     Recommend to avoid skipping any meals. Recommend that he have minimum of at least half his body weight in ounces in fluids     Meal frequency (3 meals 2-3 snacks in between) can potentially improve metabolism and allow for better portion control by decreasing Ghrelin (hunger hormone).       Mindful Eating and Drinking is necessary to promote healthy behaviors that can lead to decreased adipose tissue which can be curative and preventative for comorbidities such as hypertension, diabetes, anxiety, depression, osteoarthritis, dyslipidemia, asthma, liver disease, cardiovascular disease, stroke, sleep apnea and cancers.       Macronutrients: (Nutrients that the body needs for energy and support vital functions)    Protein is necessary to promote satiety, metabolism, and muscle growth/repair. Increased energy expenditure is used for the processes of breaking down and rebuilding proteins within the body   Carbohydrates are essential as it is the body's main fuel source for daily activities.  Recommend that carbohydrates  be consumed mostly during the times you are more active rather than high amounts before bedtime  Fats: Essential vitamins like A, D, and E, protect your organs, support cell growth and contribute to maintaining healthy cholesterol levels      Fluid intake which is at least half your body weight in ounces is necessary to help control cravings (decreasing confusion for appetite vs water deprivation) as the human body is made up of 50-70% of Fluids. If there is a diversion for water alone, would recommend flavored water (example-splash of lemonade or ice tea) to help promote compliance. Fluids include Teas, water, flavored water, seltzer water, coffee, shakes    Metabolism:    Metabolism can also be promoted by meal frequency, protein intake and increased muscle mass     Daily Calorie Needs: are individualized and will need to take into account any fluid losses, increased activity levels which may need to be adjusted daily. Recommended to not skip any meals even if there is a lack of appetite as it can be suppressed by caffeine or any prior heavy meal due to Leptin (satiety hormone).  Calorie and fluid intake will need to be increased if there is any increased activity during the day compared to previous days.  With proper fluid and macronutrient intake throughout the day, portion control and decreased cravings will improve     Weight check: BMI and weight serve as only guidelines as it is not factor in muscle mass, fat, water. Weights can fluctuate depending on fluid shifts vs what foods are consumed prior to checking your weight       Follow-up:  Scheduled with Dr. Bahndari [Bariatric Surgeon] 4/9/25 for consultation to discuss surgical options     Recent notes, labs and records were reviewed. Total time with chart review and with the patient: 1 hour         ______________________________________________________________________        Subjective:     Chief Complaint   Patient presents with    Consult     MWM Consult;  "Waist 63in; Patient has sleep apnea        HPI: 70-year-old male past medical history hypertension, hyperlipidemia, prediabetes, Strangulated hernia of abdominal wall s/p emergent lap with lysis adhesions, pulmonary embolism (likely due to immobilization while in the hospital and was previously on Eliquis), cataracts,  BPH, left kidney renal mass cryoablation,  presents to the weight management clinic for consultation due to difficulty with weight management. Patient has tried to lose weight for the past several years    Wt Loss Attempts:  Self Created Diets: Healthy diet  Patient has seen dietitians while inpatient for wound care given history of strangulated hernia and reports that he has lost weight when he was in the hospital as his meals were more regimen with breakfast lunch and dinner    Lifestyle   Occupation: Volunteer at a youth center             Dietary Regimen:  B- skipped  L- toast 3 eggs   D- Meat rice vegetables              Cravings:  sweets/salty items more severe at night  Fluids: Water- 4 16 oz bottles, orange vitamin water x1, 1 can of coke a day, chocolate milk     Review Of Systems:  General: No pallor, no weakness   Pulmonary: Negative for shortness of breath  Chest: negative for chest pain  Gastrointestinal:  Negative for abdominal pain, diarrhea   Psychiatric/Behavioral:  Negative for behavioral problems, confusion, dysphoric mood and hallucinations.    All other systems reviewed and are negative.     Objective:  /82 (BP Location: Left arm, Patient Position: Sitting)   Pulse 82   Temp 98.2 °F (36.8 °C) (Tympanic)   Resp 17   Ht 5' 8\" (1.727 m)   Wt (!) 182 kg (401 lb 9.6 oz)   BMI 61.06 kg/m²     Wt Readings from Last 30 Encounters:   02/27/25 (!) 182 kg (401 lb 9.6 oz)   02/12/25 (!) 182 kg (401 lb)   08/06/24 (!) 185 kg (407 lb)   07/12/24 (!) 183 kg (403 lb 6.4 oz)   02/06/24 (!) 181 kg (399 lb 3.2 oz)   08/03/23 (!) 154 kg (340 lb)   05/03/23 (!) 157 kg (345 lb 12.8 oz) "   04/17/23 (!) 156 kg (344 lb 3.2 oz)   04/14/23 (!) 155 kg (342 lb)   02/28/23 (!) 153 kg (336 lb 12.8 oz)   02/21/23 (!) 152 kg (335 lb 12.8 oz)   01/31/23 (!) 153 kg (337 lb 6.4 oz)   01/09/23 (!) 148 kg (327 lb)   01/05/23 (!) 150 kg (330 lb 6.4 oz)   01/03/23 (!) 147 kg (324 lb 8.3 oz)   12/22/22 (!) 149 kg (328 lb)   12/15/22 (!) 149 kg (328 lb)   12/09/22 (!) 150 kg (331 lb 5.6 oz)   11/30/22 (!) 150 kg (331 lb 11.2 oz)   11/22/22 (!) 153 kg (337 lb 9.6 oz)   11/18/22 (!) 154 kg (340 lb)   10/18/22 (!) 157 kg (346 lb 3.2 oz)   10/05/22 (!) 165 kg (364 lb 3.2 oz)   10/03/22 (!) 166 kg (365 lb 3.2 oz)   09/29/22 (!) 171 kg (376 lb)   09/28/22 (!) 140 kg (309 lb 1.4 oz)   07/07/22 (!) 173 kg (381 lb)   06/27/22 (!) 175 kg (385 lb 6.4 oz)   06/13/22 (!) 177 kg (389 lb 12.8 oz)   03/04/22 (!) 178 kg (393 lb)       Physical Exam  Constitutional:       General: No acute distress.  Well-nourished  HENT:      Head: Normocephalic and atraumatic.   Eyes:      Extraocular Movements: Extraocular movements intact.      Conjunctiva/pupils: Conjunctivae normal. Pupils are equal, round  Pulmonary:      Effort: Pulmonary effort is normal. No labored breathing   Neurological:      General: No focal deficit present.  AO x 3     Mental Status: Alert and oriented to person, place, and time. Mental status is at baseline.   Psychiatric:         Mood and Affect: Mood normal.         Behavior: Behavior normal.     Labs and Imaging  Recent labs and imaging have been personally reviewed.

## 2025-02-27 ENCOUNTER — OFFICE VISIT (OUTPATIENT)
Dept: BARIATRICS | Facility: CLINIC | Age: 70
End: 2025-02-27
Payer: MEDICARE

## 2025-02-27 VITALS
HEIGHT: 68 IN | HEART RATE: 82 BPM | BODY MASS INDEX: 47.74 KG/M2 | DIASTOLIC BLOOD PRESSURE: 82 MMHG | RESPIRATION RATE: 17 BRPM | TEMPERATURE: 98.2 F | SYSTOLIC BLOOD PRESSURE: 132 MMHG | WEIGHT: 315 LBS

## 2025-02-27 DIAGNOSIS — E66.01 CLASS 3 SEVERE OBESITY WITH SERIOUS COMORBIDITY AND BODY MASS INDEX (BMI) OF 40.0 TO 44.9 IN ADULT, UNSPECIFIED OBESITY TYPE (HCC): Primary | ICD-10-CM

## 2025-02-27 DIAGNOSIS — E66.813 CLASS 3 SEVERE OBESITY WITH SERIOUS COMORBIDITY AND BODY MASS INDEX (BMI) OF 40.0 TO 44.9 IN ADULT, UNSPECIFIED OBESITY TYPE (HCC): Primary | ICD-10-CM

## 2025-02-27 PROCEDURE — 99204 OFFICE O/P NEW MOD 45 MIN: CPT | Performed by: STUDENT IN AN ORGANIZED HEALTH CARE EDUCATION/TRAINING PROGRAM

## 2025-02-27 NOTE — LETTER
Assessment & Plan  Class 3 Obesity    Current weight: 401    Recommend surgical evaluation given the patient has a history of significant lymphedema, history of Strangulated hernia of abdominal wall s/p emergent lap with lysis adhesions in 2022, BMI 61.  Patient is always has an increased risk for recurrence of hernia given mesh repair and weakening of the abdominal tissue.      Patient has tried multiple times to lose weight and I believe he would benefit from a surgical evaluation as well as nutritional consult to help with meal planning.  Recommend that he also get a sleep study done as he reports loud snoring and low energy.  Denies any daytime fatigue or headaches.  Stressed importance of ruling out sleep apnea as it can lead to pulm hypertension, arrhythmias and increased hunger due to poor sleep as ghrelin can be elevated due to lack of sleep (studies show that < 5 hours can increase hunger)     Recommend to avoid skipping any meals. Recommend that he have minimum of at least half his body weight in ounces in fluids     Meal frequency (3 meals 2-3 snacks in between) can potentially improve metabolism and allow for better portion control by decreasing Ghrelin (hunger hormone).       Mindful Eating and Drinking is necessary to promote healthy behaviors that can lead to decreased adipose tissue which can be curative and preventative for comorbidities such as hypertension, diabetes, anxiety, depression, osteoarthritis, dyslipidemia, asthma, liver disease, cardiovascular disease, stroke, sleep apnea and cancers.       Macronutrients: (Nutrients that the body needs for energy and support vital functions)    Protein is necessary to promote satiety, metabolism, and muscle growth/repair. Increased energy expenditure is used for the processes of breaking down and rebuilding proteins within the body   Carbohydrates are essential as it is the body's main fuel source for daily activities.  Recommend that carbohydrates  be consumed mostly during the times you are more active rather than high amounts before bedtime  Fats: Essential vitamins like A, D, and E, protect your organs, support cell growth and contribute to maintaining healthy cholesterol levels      Fluid intake which is at least half your body weight in ounces is necessary to help control cravings (decreasing confusion for appetite vs water deprivation) as the human body is made up of 50-70% of Fluids. If there is a diversion for water alone, would recommend flavored water (example-splash of lemonade or ice tea) to help promote compliance. Fluids include Teas, water, flavored water, seltzer water, coffee, shakes    Metabolism:    Metabolism can also be promoted by meal frequency, protein intake and increased muscle mass     Daily Calorie Needs: are individualized and will need to take into account any fluid losses, increased activity levels which may need to be adjusted daily. Recommended to not skip any meals even if there is a lack of appetite as it can be suppressed by caffeine or any prior heavy meal due to Leptin (satiety hormone).  Calorie and fluid intake will need to be increased if there is any increased activity during the day compared to previous days.  With proper fluid and macronutrient intake throughout the day, portion control and decreased cravings will improve     Weight check: BMI and weight serve as only guidelines as it is not factor in muscle mass, fat, water. Weights can fluctuate depending on fluid shifts vs what foods are consumed prior to checking your weight       Follow-up:  Scheduled with Dr. Bhandari [Bariatric Surgeon] 4/9/25 for consultation to discuss surgical options     Recent notes, labs and records were reviewed. Total time with chart review and with the patient: 1 hour         ______________________________________________________________________        Subjective:     Chief Complaint   Patient presents with    Consult     MWM Consult;  "Waist 63in; Patient has sleep apnea        HPI: 70-year-old male past medical history hypertension, hyperlipidemia, prediabetes, Strangulated hernia of abdominal wall s/p emergent lap with lysis adhesions, pulmonary embolism (likely due to immobilization while in the hospital and was previously on Eliquis), cataracts,  BPH, left kidney renal mass cryoablation,  presents to the weight management clinic for consultation due to difficulty with weight management. Patient has tried to lose weight for the past several years    Wt Loss Attempts:  Self Created Diets: Healthy diet  Patient has seen dietitians while inpatient for wound care given history of strangulated hernia and reports that he has lost weight when he was in the hospital as his meals were more regimen with breakfast lunch and dinner    Lifestyle   Occupation: Volunteer at a youth center             Dietary Regimen:  B- skipped  L- toast 3 eggs   D- Meat rice vegetables              Cravings:  sweets/salty items more severe at night  Fluids: Water- 4 16 oz bottles, orange vitamin water x1, 1 can of coke a day, chocolate milk     Review Of Systems:  General: No pallor, no weakness   Pulmonary: Negative for shortness of breath  Chest: negative for chest pain  Gastrointestinal:  Negative for abdominal pain, diarrhea   Psychiatric/Behavioral:  Negative for behavioral problems, confusion, dysphoric mood and hallucinations.    All other systems reviewed and are negative.     Objective:  /82 (BP Location: Left arm, Patient Position: Sitting)   Pulse 82   Temp 98.2 °F (36.8 °C) (Tympanic)   Resp 17   Ht 5' 8\" (1.727 m)   Wt (!) 182 kg (401 lb 9.6 oz)   BMI 61.06 kg/m²     Wt Readings from Last 30 Encounters:   02/27/25 (!) 182 kg (401 lb 9.6 oz)   02/12/25 (!) 182 kg (401 lb)   08/06/24 (!) 185 kg (407 lb)   07/12/24 (!) 183 kg (403 lb 6.4 oz)   02/06/24 (!) 181 kg (399 lb 3.2 oz)   08/03/23 (!) 154 kg (340 lb)   05/03/23 (!) 157 kg (345 lb 12.8 oz) "   04/17/23 (!) 156 kg (344 lb 3.2 oz)   04/14/23 (!) 155 kg (342 lb)   02/28/23 (!) 153 kg (336 lb 12.8 oz)   02/21/23 (!) 152 kg (335 lb 12.8 oz)   01/31/23 (!) 153 kg (337 lb 6.4 oz)   01/09/23 (!) 148 kg (327 lb)   01/05/23 (!) 150 kg (330 lb 6.4 oz)   01/03/23 (!) 147 kg (324 lb 8.3 oz)   12/22/22 (!) 149 kg (328 lb)   12/15/22 (!) 149 kg (328 lb)   12/09/22 (!) 150 kg (331 lb 5.6 oz)   11/30/22 (!) 150 kg (331 lb 11.2 oz)   11/22/22 (!) 153 kg (337 lb 9.6 oz)   11/18/22 (!) 154 kg (340 lb)   10/18/22 (!) 157 kg (346 lb 3.2 oz)   10/05/22 (!) 165 kg (364 lb 3.2 oz)   10/03/22 (!) 166 kg (365 lb 3.2 oz)   09/29/22 (!) 171 kg (376 lb)   09/28/22 (!) 140 kg (309 lb 1.4 oz)   07/07/22 (!) 173 kg (381 lb)   06/27/22 (!) 175 kg (385 lb 6.4 oz)   06/13/22 (!) 177 kg (389 lb 12.8 oz)   03/04/22 (!) 178 kg (393 lb)       Physical Exam  Constitutional:       General: No acute distress.  Well-nourished  HENT:      Head: Normocephalic and atraumatic.   Eyes:      Extraocular Movements: Extraocular movements intact.      Conjunctiva/pupils: Conjunctivae normal. Pupils are equal, round  Pulmonary:      Effort: Pulmonary effort is normal. No labored breathing   Neurological:      General: No focal deficit present.  AO x 3     Mental Status: Alert and oriented to person, place, and time. Mental status is at baseline.   Psychiatric:         Mood and Affect: Mood normal.         Behavior: Behavior normal.     Labs and Imaging  Recent labs and imaging have been personally reviewed.

## 2025-03-25 DIAGNOSIS — T14.8XXA SURGICAL WOUND PRESENT: ICD-10-CM

## 2025-03-27 RX ORDER — GABAPENTIN 100 MG/1
200 CAPSULE ORAL 3 TIMES DAILY
Qty: 180 CAPSULE | Refills: 1 | Status: SHIPPED | OUTPATIENT
Start: 2025-03-27

## 2025-04-08 DIAGNOSIS — I10 ESSENTIAL HYPERTENSION: ICD-10-CM

## 2025-04-09 ENCOUNTER — OFFICE VISIT (OUTPATIENT)
Dept: BARIATRICS | Facility: CLINIC | Age: 70
End: 2025-04-09
Payer: MEDICARE

## 2025-04-09 VITALS
HEART RATE: 76 BPM | BODY MASS INDEX: 47.74 KG/M2 | SYSTOLIC BLOOD PRESSURE: 124 MMHG | DIASTOLIC BLOOD PRESSURE: 80 MMHG | WEIGHT: 315 LBS | TEMPERATURE: 100.7 F | HEIGHT: 68 IN

## 2025-04-09 DIAGNOSIS — N18.31 STAGE 3A CHRONIC KIDNEY DISEASE (HCC): ICD-10-CM

## 2025-04-09 DIAGNOSIS — E66.01 MORBID OBESITY WITH BMI OF 50.0-59.9, ADULT (HCC): Primary | ICD-10-CM

## 2025-04-09 DIAGNOSIS — K43.6 STRANGULATED HERNIA OF ABDOMINAL WALL: ICD-10-CM

## 2025-04-09 DIAGNOSIS — I89.0 LYMPHEDEMA OF BOTH LOWER EXTREMITIES: ICD-10-CM

## 2025-04-09 DIAGNOSIS — I10 ESSENTIAL HYPERTENSION: ICD-10-CM

## 2025-04-09 DIAGNOSIS — C64.2 CLEAR CELL CARCINOMA OF LEFT KIDNEY (HCC): ICD-10-CM

## 2025-04-09 PROCEDURE — 99204 OFFICE O/P NEW MOD 45 MIN: CPT | Performed by: SURGERY

## 2025-04-09 RX ORDER — METOPROLOL SUCCINATE 100 MG/1
100 TABLET, EXTENDED RELEASE ORAL DAILY
Qty: 90 TABLET | Refills: 1 | Status: SHIPPED | OUTPATIENT
Start: 2025-04-09

## 2025-04-09 NOTE — PROGRESS NOTES
"    BARIATRIC INITIAL CONSULT - BARIATRIC SURGERY    Caterina Tam 70 y.o. male MRN: [id number]  Unit/Bed#:  Encounter: [id number]      HPI:  Caterina Tam is a 70 y.o. male who presents with a longstanding history of morbid obesity and inability to sustain a meaningful weight loss.    Here today to discuss bariatric options.    Visit type: initial visit    Symptoms: excess weight and inability to loss weight    Associated Symptoms: depressed mood and anxiety    Associated Conditions: sleep apnea and abdominal obesity  Disease Complications: hypertension, sleep apnea, and lymphedema and stage IIIa chronic kidney disease  Weight Loss Interest: high  Previous Diet Trials: low calorie     Exercise Frequency:infrequency  Types of Exercise: walking        Review of Systems   Musculoskeletal:  Positive for arthralgias and gait problem.   All other systems reviewed and are negative.      Historical Information   Past Medical History:   Diagnosis Date    Anemia 09/16/2022    pt unsure of this    Cataract     Chronic kidney disease     Colon polyp     1/9/23 Benign polyp    Dental crowns present     Encounter for management of wound VAC     1/9/23 wound vac in place    History of COVID-19 05/2022    per pt mild symptoms -recovered at home-    History of pneumonia as a child     Hypertension     Kidney stone     Morbid obesity (HCC)     Risk for falls     1/9/23 Uses walker    Sepsis (HCC)     pt unsure of this    Shortness of breath     per pt \"gets winded when walking with walker and wound vac around the house\"    Tooth missing     Urinary tract infection without hematuria 04/03/2020    Uses walker     Wears glasses     Weight loss     per pt since 1/2022 approx 80lb weight loss-had been 410lbs    Wound of abdomen     wound vac in place-receives homecare 3x/week     Past Surgical History:   Procedure Laterality Date    ABDOMINAL ADHESION SURGERY N/A 09/15/2022    Procedure: LYSIS ADHESIONS;  Surgeon: Evette Muro, " MD;  Location: [facility];  Service: General    APPENDECTOMY      COLONOSCOPY      CRYOABLATION Left     1/9/23 Left kidney - had cyroablation of left kidney    EXPLORATORY LAPAROTOMY W/ BOWEL RESECTION N/A 09/14/2022    Procedure: Exdploratory laparotomy, complex Lysis of adhesions, reduction strangulated hernia, debridement RLQ muscle with counter incision, abthera placement;  Surgeon: Evette Muro MD;  Location[facility];  Service: General    EXPLORATORY LAPAROTOMY W/ BOWEL RESECTION N/A 09/15/2022    Procedure: LAPAROTOMY EXPLORATORY  REPAIR RIGHT LQ HERNIA WITH MESH, REPAIR ANTERIOR WALL HERNIA;  Surgeon: Evette Muro MD;  Location[facility];  Service: General    FL RETROGRADE PYELOGRAM  11/15/2022    FL RETROGRADE PYELOGRAM  01/03/2023    HERNIA REPAIR      per pt there is Mesh implanted    NASAL SEPTUM SURGERY      DC CYSTO/URETERO W/LITHOTRIPSY &INDWELL STENT INSRT Right 01/03/2023    Procedure: CYSTO, U-SCOPE, LASER, RETROGRADE, STENT;  Surgeon: Shakeel Durbin MD;  Location[facility];  Service: Urology    DC CYSTOURETHROSCOPY W/URETERAL CATHETERIZATION Right 11/15/2022    Procedure: CYSTOSCOPY RETROGRADE PYELOGRAM WITH INSERTION STENT URETERAL;  Surgeon: Aristides Siddiqi MD;  [facility];  Service: Urology    DC SPLT AGRFT T/A/L 1ST 100 SQCM/</1% BDY INFT/CHLD N/A 1/11/2023    Procedure: SKIN GRAFT SPLIT THICKNESS ABDOMINAL WOUND WITH VAC PLACEMENT;  Surgeon: Richi Ibarra MD;  Location:  [facility];  Service: Plastics    TONSILLECTOMY      WISDOM TOOTH EXTRACTION      WOUND DEBRIDEMENT N/A 10/11/2022    Procedure: Excisional debridement of abdominal wall nonviable tissue.  Skin and subcu tissues were dissected.  The wound measured 23 x 18 cm x 4 cm deep.;  Surgeon: Flo Yancey MD;  Location[facility];  Service: General     Social History   Social History     Substance and Sexual Activity   Alcohol Use Not Currently    Comment: Denies any alcohol use at this time  "    Social History     Substance and Sexual Activity   Drug Use Never    Comment: Denies any former or current drug use     Social History     Tobacco Use   Smoking Status Never    Passive exposure: Never   Smokeless Tobacco Never   Tobacco Comments    no exposure to passive smoke-never a smoker or tobacco use      Family History: Family history non-contributory    Meds/Allergies   all medications and allergies reviewed  Allergies   Allergen Reactions    Codeine Swelling     Other reaction(s): SWELLING OF FACE    Swollen eyes/swelling--age 11       Objective       Current Vitals:   Blood Pressure: 124/80 (04/09/25 1052)  Pulse: 76 (04/09/25 1052)  Temperature: (!) 100.7 °F (38.2 °C) (04/09/25 1052)  Temp Source: Tympanic (04/09/25 1052)  Height: 5' 8\" (172.7 cm) (04/09/25 1052)  Weight - Scale: (!) 183 kg (403 lb) (04/09/25 1052)        Invasive Devices       None                   Physical Exam  Constitutional:       General: He is not in acute distress.     Appearance: He is well-developed.   Abdominal:      Comments: Abdomen is obese.  He has a large mid line hernia in the lower part of his abdomen covered with a skin graft.  He also has another large hernia on the right lower quadrant.  Multiple well-healed incisions from the operations as described on my note.   Neurological:      Mental Status: He is alert and oriented to person, place, and time.   Psychiatric:         Behavior: Behavior normal.         Thought Content: Thought content normal.         Judgment: Judgment normal.         Lab Results: I have personally reviewed pertinent lab results.    Imaging: Results Review Statement: No pertinent imaging studies reviewed.  EKG, Pathology, and Other Studies: Results Review Statement: No pertinent imaging studies reviewed.      Assessment/PLAN:    Strangulated hernia of abdominal wall  History of a laparotomy for a obstructed and strangulated hernia back in 2022 by Dr. Muro.  She required a second look " exploration day later due to loss of domain and a subsequent complex hernia repair with mesh.  She then underwent 20 other surgical debridements and wound care management with a wound VAC mostly done by Dr Yancey.  Eventually requiring a Split-thickness skin graft to abdomen (25x13 cm, 325 cm2) in January of 2023    Essential hypertension  He is on Toprol and furosemide.  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  Continue management as per primary team    Lymphedema of both lower extremities  I have discussed with the patient that there is a significant chance of improvement or even resolution of this condition after weight loss and or metabolic/bariatric surgery.  Continue management as per primary care team.      Morbid obesity with BMI of 50.0-59.9, adult (HCC)  70 y.o. male with a long standing h/o of obesity and inability to sustain any meaningful weight loss on her own despite several attempts.    He is interested in learning about a Laparoscopic bariatric procedure originally came to the medical arm as he is not really interested in surgery.  He has an extensive past surgical history that required an emergency laparotomy in 2022 with a second look the next day due to the loss of domain.  Eventually he was repaired with a mesh but had wound complications that required 20 additional wound debridements mostly done by Dr. Becerra and eventually a split skin graft closure done in January 2023 by Dr. Ibarra.  To say that his abdomen is most likely extremely hostile and we might be possibly dealing with sort of a frozen abdomen is an understatement.  For this reason before we even entertain performing a laparoscopic bariatric surgery which I believe in his case the safest and probably only option might be a sleeve gastrectomy, he needs to lose a significant amount of weight.  He will enroll  in our medical weight management program for she will work with our bariatric shins, social workers and dietitians with a goal of losing at least 10% of her weight (ideally 50 lbs) before we attempt the laparoscopic sleeve gastrectomy.  Both him and his wife are extremely reluctant of a surgical option but I have told him that if we optimize him and we get him to a certain point once again, it might not be unreasonable to attempt a laparoscopic approach.    Another possibility if the laparoscopic approach is not feasible might be an endoscopic sleeve.    AI have discussed and educated the patient with regards to the components of our multidisciplinary program and the importance of compliance and follow up in the post operative period. The patient was also instructed with regards to the importance of behavior modification, nutritional counseling, support meeting attendance and lifestyle changes that are important to ensure success.     Although there is a great statistical chance of improvement or even resolution of most of her associated comorbidities, the results vary from patient to patient and they largely depend on her commitment and compliance.     I have reviewed instructions for stopping or tapering anti-obesity medications prior to surgery.      I have told him that he needs to lose about 50 pounds he will work with our bariatric shins and dietitians.  To follow-up with me in 6 months to reassess.      Mark Bhandari MD  4/9/2025  10:59 AM

## 2025-04-09 NOTE — ASSESSMENT & PLAN NOTE
He is on Toprol and furosemide.  He is managed by nephrology and he will continue treatment as per the recommendations

## 2025-04-09 NOTE — ASSESSMENT & PLAN NOTE
I have discussed with the patient that there is a significant chance of improvement or even resolution of this condition after weight loss and or metabolic/bariatric surgery.  Continue management as per primary care team.

## 2025-04-09 NOTE — ASSESSMENT & PLAN NOTE
History of a laparotomy for a obstructed and strangulated hernia back in 2022 by Dr. Muro.  She required a second look exploration day later due to loss of domain and a subsequent complex hernia repair with mesh.  She then underwent 20 other surgical debridements and wound care management with a wound VAC mostly done by Dr Yancey.  Eventually requiring a Split-thickness skin graft to abdomen (25x13 cm, 325 cm2) in January of 2023

## 2025-04-09 NOTE — ASSESSMENT & PLAN NOTE
70 y.o. male with a long standing h/o of obesity and inability to sustain any meaningful weight loss on her own despite several attempts.    He is interested in learning about a Laparoscopic bariatric procedure originally came to the medical arm as he is not really interested in surgery.  He has an extensive past surgical history that required an emergency laparotomy in 2022 with a second look the next day due to the loss of domain.  Eventually he was repaired with a mesh but had wound complications that required 20 additional wound debridements mostly done by Dr. Becerra and eventually a split skin graft closure done in January 2023 by Dr. Ibarra.  To say that his abdomen is most likely extremely hostile and we might be possibly dealing with sort of a frozen abdomen is an understatement.  For this reason before we even entertain performing a laparoscopic bariatric surgery which I believe in his case the safest and probably only option might be a sleeve gastrectomy, he needs to lose a significant amount of weight.  He will enroll in our medical weight management program for she will work with our bariatric shins, social workers and dietitians with a goal of losing at least 10% of her weight (ideally 50 lbs) before we attempt the laparoscopic sleeve gastrectomy.  Both him and his wife are extremely reluctant of a surgical option but I have told him that if we optimize him and we get him to a certain point once again, it might not be unreasonable to attempt a laparoscopic approach.    Another possibility if the laparoscopic approach is not feasible might be an endoscopic sleeve.    AI have discussed and educated the patient with regards to the components of our multidisciplinary program and the importance of compliance and follow up in the post operative period. The patient was also instructed with regards to the importance of behavior modification, nutritional counseling, support meeting attendance and lifestyle changes  that are important to ensure success.     Although there is a great statistical chance of improvement or even resolution of most of her associated comorbidities, the results vary from patient to patient and they largely depend on her commitment and compliance.     I have reviewed instructions for stopping or tapering anti-obesity medications prior to surgery.      I have told him that he needs to lose about 50 pounds he will work with our bariatric shins and dietitians.  To follow-up with me in 6 months to reassess.

## 2025-04-09 NOTE — PROGRESS NOTES
- Height/Weight:  68 in , 403 lb  - BMI:  61.3   - Medical conditions related to obesity:  Hypertension on meds   - Does the patient smoke/vape (nicotine, marijuana, hookah, etc): no  - StopBAN/8  - Does the patient currently take a weight loss medication: no

## 2025-04-17 ENCOUNTER — OFFICE VISIT (OUTPATIENT)
Dept: FAMILY MEDICINE CLINIC | Facility: CLINIC | Age: 70
End: 2025-04-17
Payer: MEDICARE

## 2025-04-17 VITALS
BODY MASS INDEX: 47.74 KG/M2 | WEIGHT: 315 LBS | HEIGHT: 68 IN | HEART RATE: 92 BPM | TEMPERATURE: 98.7 F | OXYGEN SATURATION: 99 %

## 2025-04-17 DIAGNOSIS — R30.0 DYSURIA: Primary | ICD-10-CM

## 2025-04-17 LAB
AMORPH URATE CRY URNS QL MICRO: ABNORMAL
BACTERIA UR QL AUTO: ABNORMAL /HPF
BILIRUB UR QL STRIP: NEGATIVE
CLARITY UR: ABNORMAL
COLOR UR: YELLOW
GLUCOSE UR STRIP-MCNC: NEGATIVE MG/DL
HGB UR QL STRIP.AUTO: NEGATIVE
KETONES UR STRIP-MCNC: NEGATIVE MG/DL
LEUKOCYTE ESTERASE UR QL STRIP: ABNORMAL
MUCOUS THREADS UR QL AUTO: ABNORMAL
NITRITE UR QL STRIP: POSITIVE
NON-SQ EPI CELLS URNS QL MICRO: ABNORMAL /HPF
PH UR STRIP.AUTO: 8.5 [PH]
PROT UR STRIP-MCNC: ABNORMAL MG/DL
RBC #/AREA URNS AUTO: ABNORMAL /HPF
SL AMB  POCT GLUCOSE, UA: ABNORMAL
SL AMB LEUKOCYTE ESTERASE,UA: 125
SL AMB POCT BILIRUBIN,UA: ABNORMAL
SL AMB POCT BLOOD,UA: ABNORMAL
SL AMB POCT CLARITY,UA: ABNORMAL
SL AMB POCT COLOR,UA: YELLOW
SL AMB POCT KETONES,UA: ABNORMAL
SL AMB POCT NITRITE,UA: ABNORMAL
SL AMB POCT PH,UA: 9
SL AMB POCT SPECIFIC GRAVITY,UA: 1
SL AMB POCT URINE PROTEIN: ABNORMAL
SL AMB POCT UROBILINOGEN: 1
SP GR UR STRIP.AUTO: 1.02 (ref 1–1.03)
TRI-PHOS CRY URNS QL MICRO: ABNORMAL /HPF
UROBILINOGEN UR STRIP-ACNC: 4 MG/DL
WBC #/AREA URNS AUTO: ABNORMAL /HPF

## 2025-04-17 PROCEDURE — 87086 URINE CULTURE/COLONY COUNT: CPT | Performed by: FAMILY MEDICINE

## 2025-04-17 PROCEDURE — 87186 SC STD MICRODIL/AGAR DIL: CPT | Performed by: FAMILY MEDICINE

## 2025-04-17 PROCEDURE — G2211 COMPLEX E/M VISIT ADD ON: HCPCS | Performed by: FAMILY MEDICINE

## 2025-04-17 PROCEDURE — 87077 CULTURE AEROBIC IDENTIFY: CPT | Performed by: FAMILY MEDICINE

## 2025-04-17 PROCEDURE — 99213 OFFICE O/P EST LOW 20 MIN: CPT | Performed by: FAMILY MEDICINE

## 2025-04-17 PROCEDURE — 81001 URINALYSIS AUTO W/SCOPE: CPT | Performed by: FAMILY MEDICINE

## 2025-04-17 PROCEDURE — 81002 URINALYSIS NONAUTO W/O SCOPE: CPT | Performed by: FAMILY MEDICINE

## 2025-04-17 RX ORDER — SULFAMETHOXAZOLE AND TRIMETHOPRIM 800; 160 MG/1; MG/1
1 TABLET ORAL EVERY 12 HOURS SCHEDULED
Qty: 20 TABLET | Refills: 0 | Status: SHIPPED | OUTPATIENT
Start: 2025-04-17 | End: 2025-04-27

## 2025-04-17 NOTE — PROGRESS NOTES
"Name: Caterina Tam      : 1955      MRN: [id number]  Encounter Provider: Gerson Case DO  Encounter Date: 2025   Encounter department: [facility]  :  Assessment & Plan  Dysuria  Acute cystitis  Urine dip is positive with nitrites, leukocytes, and protein  Start bactrim  Discussed ED precautions if he is not feeling any better by     Orders:  •  POCT urine dip  •  UA w Reflex to Microscopic w Reflex to Culture  •  sulfamethoxazole-trimethoprim (BACTRIM DS) 800-160 mg per tablet; Take 1 tablet by mouth every 12 (twelve) hours for 10 days           History of Present Illness   HPI presents today today for persistent fever and dysuria. Ongoing for the past 3 weeks.   Review of Systems   Constitutional:  Positive for fatigue and fever. Negative for diaphoresis.   HENT:  Negative for congestion, facial swelling, hearing loss, mouth sores, nosebleeds and postnasal drip.    Respiratory:  Negative for cough, choking and shortness of breath.    Gastrointestinal:  Negative for blood in stool, constipation and vomiting.   Genitourinary:  Positive for dysuria and enuresis.       Objective   Pulse 92   Temp 98.7 °F (37.1 °C) (Temporal)   Ht 5' 8\" (1.727 m)   Wt (!) 183 kg (403 lb)   SpO2 99%   BMI 61.28 kg/m²      Physical Exam  Vitals reviewed.   Constitutional:       General: He is not in acute distress.     Appearance: He is well-developed. He is ill-appearing. He is not diaphoretic.   HENT:      Head: Normocephalic and atraumatic.      Right Ear: Tympanic membrane, ear canal and external ear normal. There is no impacted cerumen.      Left Ear: Tympanic membrane, ear canal and external ear normal. There is no impacted cerumen.      Nose: Nose normal. No congestion.      Mouth/Throat:      Mouth: Mucous membranes are moist.      Pharynx: Oropharynx is clear.   Eyes:      General: No scleral icterus.        Right eye: No discharge.         Left eye: No " discharge.      Conjunctiva/sclera: Conjunctivae normal.      Pupils: Pupils are equal, round, and reactive to light.   Neck:      Vascular: No JVD.   Cardiovascular:      Rate and Rhythm: Normal rate and regular rhythm.      Heart sounds: Normal heart sounds. No murmur heard.     No friction rub.   Pulmonary:      Effort: Pulmonary effort is normal. No respiratory distress.      Breath sounds: Normal breath sounds. No wheezing or rales.   Chest:      Chest wall: No tenderness.   Abdominal:      General: Bowel sounds are normal. There is no distension.      Palpations: Abdomen is soft. There is no mass.      Tenderness: There is no abdominal tenderness. There is no right CVA tenderness, left CVA tenderness, guarding or rebound.   Musculoskeletal:         General: No tenderness or deformity. Normal range of motion.      Cervical back: Normal range of motion and neck supple.   Skin:     General: Skin is warm and dry.      Findings: No erythema or rash.   Neurological:      Mental Status: He is alert and oriented to person, place, and time. Mental status is at baseline.      Cranial Nerves: No cranial nerve deficit.   Psychiatric:         Mood and Affect: Mood normal.

## 2025-04-19 LAB — BACTERIA UR CULT: ABNORMAL

## 2025-04-21 ENCOUNTER — TELEPHONE (OUTPATIENT)
Age: 70
End: 2025-04-21

## 2025-04-21 NOTE — TELEPHONE ENCOUNTER
Brenda wife called, stated Caterina is still having trouble voided completed slow urine output. No longer has pain, fever and other symptoms. He took an xtra Rx: Lasix to see if that would help void, but stated it did not.    Please  review and  advice  Thank you

## 2025-04-21 NOTE — TELEPHONE ENCOUNTER
Patients wife called stating they were still confused with instructions and wanted to speak with someone further, warm transferred to Mandie in office .

## 2025-04-22 ENCOUNTER — OFFICE VISIT (OUTPATIENT)
Dept: UROLOGY | Facility: CLINIC | Age: 70
End: 2025-04-22
Payer: MEDICARE

## 2025-04-22 VITALS
HEIGHT: 68 IN | OXYGEN SATURATION: 98 % | WEIGHT: 315 LBS | HEART RATE: 60 BPM | SYSTOLIC BLOOD PRESSURE: 140 MMHG | DIASTOLIC BLOOD PRESSURE: 80 MMHG | BODY MASS INDEX: 47.74 KG/M2

## 2025-04-22 DIAGNOSIS — R39.9 UTI SYMPTOMS: Primary | ICD-10-CM

## 2025-04-22 DIAGNOSIS — N20.0 NEPHROLITHIASIS: ICD-10-CM

## 2025-04-22 PROCEDURE — 51798 US URINE CAPACITY MEASURE: CPT

## 2025-04-22 PROCEDURE — 99213 OFFICE O/P EST LOW 20 MIN: CPT

## 2025-04-22 RX ORDER — FLUOROURACIL 50 MG/G
CREAM TOPICAL
COMMUNITY
Start: 2025-04-18

## 2025-04-22 NOTE — ASSESSMENT & PLAN NOTE
Urine culture positive for infection   Patient also having difficulty with urination   Tx with bactrim- continues on abx   Patient currently on flomax 0.8 mg   Plan to continue with double dose flomax   PVR today- 0   Plan to get formal renal US with PVR given difficulty with PVR today and patient's body habitus  Reports improvement with urinary stream and UTI sysmpotms since abx   Does have a hx of kidney stones- last intervention in 2023  Will call with US result and next steps

## 2025-04-22 NOTE — PROGRESS NOTES
4/22/2025      No chief complaint on file.    Assessment and Plan    70 y.o. male     1. UTI symptoms  Assessment & Plan:  Urine culture positive for infection   Patient also having difficulty with urination   Tx with bactrim- continues on abx   Patient currently on flomax 0.8 mg   Plan to continue with double dose flomax   PVR today- 0   Plan to get formal renal US with PVR given difficulty with PVR today and patient's body habitus  Reports improvement with urinary stream and UTI sysmpotms since abx   Does have a hx of kidney stones- last intervention in 2023  Will call with US result and next steps   Orders:  -     POCT Measure PVR  -     US kidney and bladder with pvr; Future; Expected date: 04/22/2025  2. Nephrolithiasis  -     US kidney and bladder with pvr; Future; Expected date: 04/22/2025          History of Present Illness  Caterina Tam is a 70 y.o. male here for evaluation of UTI symptoms and difficulty with urination.  Patient was seen by his PCP Who did urine testing for weakened urinary stream along with dysuria.  Patient also reports intermittent fevers.  Urine culture was positive for infection.  He was started on Bactrim for 10 days.  He denies flank pain or gross hematuria.  Last UTI on file that was positive was from 2022.  Patient does additionally have a history of nephrolithiasis.  He required surgical intervention in 2023.  No repeat upper tract imaging on file.  Difficult PVR today due to body habitus.    CT 2023-  Nonobstructing right nephrolithiasis with the largest measuring 0.9 cm within   the right renal pelvis.     Last psa stable at 0.94    Kidney function stable at 1.09 and GFR- 74     Patient does follow with bariatrics    Review of Systems   Constitutional:  Negative for chills and fever.   HENT:  Negative for ear pain and sore throat.    Eyes:  Negative for pain and visual disturbance.   Respiratory:  Negative for cough and shortness of breath.    Cardiovascular:  Negative for  "chest pain and palpitations.   Gastrointestinal:  Negative for abdominal pain and vomiting.   Genitourinary:  Negative for decreased urine volume, difficulty urinating, dysuria, flank pain, frequency, hematuria and urgency.   Musculoskeletal:  Negative for arthralgias and back pain.   Skin:  Negative for color change and rash.   Neurological:  Negative for seizures and syncope.   All other systems reviewed and are negative.               Vitals  Vitals:    04/22/25 1040   BP: 140/80   BP Location: Other (Comment)   Patient Position: Sitting   Cuff Size: Adult   Pulse: 60   SpO2: 98%   Weight: (!) 183 kg (404 lb)   Height: 5' 8\" (1.727 m)       Physical Exam  Vitals reviewed.   Constitutional:       Appearance: Normal appearance. He is obese.   HENT:      Head: Normocephalic and atraumatic.   Eyes:      Conjunctiva/sclera: Conjunctivae normal.   Pulmonary:      Effort: Pulmonary effort is normal.   Musculoskeletal:         General: Normal range of motion.      Cervical back: Normal range of motion.   Skin:     General: Skin is warm and dry.   Neurological:      General: No focal deficit present.      Mental Status: He is alert and oriented to person, place, and time.   Psychiatric:         Mood and Affect: Mood normal.         Behavior: Behavior normal.         Thought Content: Thought content normal.         Judgment: Judgment normal.           Past History  Past Medical History:   Diagnosis Date   • Anemia 09/16/2022    pt unsure of this   • Cataract    • Chronic kidney disease    • Colon polyp     1/9/23 Benign polyp   • Dental crowns present    • Encounter for management of wound VAC     1/9/23 wound vac in place   • History of COVID-19 05/2022    per pt mild symptoms -recovered at home-   • History of pneumonia as a child    • Hypertension    • Kidney stone    • Morbid obesity (HCC)    • Risk for falls     1/9/23 Uses walker   • Sepsis (HCC)     pt unsure of this   • Shortness of breath     per pt \"gets winded " "when walking with walker and wound vac around the house\"   • Tooth missing    • Urinary tract infection without hematuria 04/03/2020   • Uses walker    • Wears glasses    • Weight loss     per pt since 1/2022 approx 80lb weight loss-had been 410lbs   • Wound of abdomen     wound vac in place-receives homecare 3x/week     Social History     Socioeconomic History   • Marital status: /Civil Union     Spouse name: None   • Number of children: None   • Years of education: None   • Highest education level: None   Occupational History   • None   Tobacco Use   • Smoking status: Never     Passive exposure: Never   • Smokeless tobacco: Never   • Tobacco comments:     no exposure to passive smoke-never a smoker or tobacco use    Vaping Use   • Vaping status: Never Used   Substance and Sexual Activity   • Alcohol use: Not Currently     Comment: Denies any alcohol use at this time   • Drug use: Never     Comment: Denies any former or current drug use   • Sexual activity: Not Currently     Comment: defer-- not active at this time since Sept surgery   Other Topics Concern   • None   Social History Narrative   • None     Social Drivers of Health     Financial Resource Strain: Low Risk  (8/3/2023)    Overall Financial Resource Strain (CARDIA)    • Difficulty of Paying Living Expenses: Not hard at all   Food Insecurity: No Food Insecurity (8/6/2024)    Nursing - Inadequate Food Risk Classification    • Worried About Running Out of Food in the Last Year: Never true    • Ran Out of Food in the Last Year: Never true    • Ran Out of Food in the Last Year: Not on file   Transportation Needs: No Transportation Needs (8/6/2024)    PRAPARE - Transportation    • Lack of Transportation (Medical): No    • Lack of Transportation (Non-Medical): No   Physical Activity: Not on file   Stress: Not on file   Social Connections: Not on file   Intimate Partner Violence: Not on file   Housing Stability: Low Risk  (8/6/2024)    Housing Stability " Vital Sign    • Unable to Pay for Housing in the Last Year: No    • Number of Times Moved in the Last Year: 0    • Homeless in the Last Year: No     Social History     Tobacco Use   Smoking Status Never   • Passive exposure: Never   Smokeless Tobacco Never   Tobacco Comments    no exposure to passive smoke-never a smoker or tobacco use      Family History   Problem Relation Age of Onset   • Diabetes Father    • Prostate cancer Brother    • Anesthesia problems Neg Hx        The following portions of the patient's history were reviewed and updated as appropriate: allergies, current medications, past medical history, past social history, past surgical history and problem list.    Results  No results found for this or any previous visit (from the past hour).]  Lab Results   Component Value Date    PSA 0.94 02/05/2025     Lab Results   Component Value Date    GLUCOSE 129 09/14/2022    CALCIUM 9.3 10/13/2023    K 4.4 10/13/2023    CO2 29 10/13/2023     10/13/2023    BUN 18 10/13/2023    CREATININE 1.24 10/13/2023     Lab Results   Component Value Date    WBC 11.92 (H) 07/13/2024    HGB 14.4 07/13/2024    HCT 41.7 07/13/2024    MCV 93 07/13/2024     07/13/2024

## 2025-04-23 ENCOUNTER — HOSPITAL ENCOUNTER (OUTPATIENT)
Dept: ULTRASOUND IMAGING | Facility: HOSPITAL | Age: 70
Discharge: HOME | End: 2025-04-23
Payer: MEDICARE

## 2025-04-23 DIAGNOSIS — R39.9 UTI SYMPTOMS: ICD-10-CM

## 2025-04-23 DIAGNOSIS — N20.0 NEPHROLITHIASIS: ICD-10-CM

## 2025-04-23 PROCEDURE — 76770 US EXAM ABDO BACK WALL COMP: CPT

## 2025-04-30 ENCOUNTER — RESULTS FOLLOW-UP (OUTPATIENT)
Dept: UROLOGY | Facility: CLINIC | Age: 70
End: 2025-04-30

## 2025-04-30 NOTE — TELEPHONE ENCOUNTER
----- Message from KELSEY Harley sent at 4/30/2025  8:31 AM EDT -----  Pvr was only 10 mls which is great. Emptying bladder well. Can continue with flomax. Non obstructing stones in the right kidney noted. No swelling noted. Should continue to monitor stone burden with annual imaging. No intervention indicated as of now. Okay for 1 year follow up for nephrolithiasis.

## 2025-05-06 DIAGNOSIS — I10 ESSENTIAL HYPERTENSION: ICD-10-CM

## 2025-05-07 RX ORDER — FUROSEMIDE 20 MG/1
20 TABLET ORAL DAILY
Qty: 90 TABLET | Refills: 3 | Status: SHIPPED | OUTPATIENT
Start: 2025-05-07

## 2025-05-24 ENCOUNTER — NURSE TRIAGE (OUTPATIENT)
Dept: OTHER | Facility: OTHER | Age: 70
End: 2025-05-24

## 2025-05-24 ENCOUNTER — DOCUMENTATION (OUTPATIENT)
Dept: FAMILY MEDICINE CLINIC | Facility: CLINIC | Age: 70
End: 2025-05-24

## 2025-05-24 DIAGNOSIS — R30.0 DYSURIA: Primary | ICD-10-CM

## 2025-05-24 RX ORDER — SULFAMETHOXAZOLE AND TRIMETHOPRIM 800; 160 MG/1; MG/1
1 TABLET ORAL 2 TIMES DAILY
Qty: 14 TABLET | Refills: 0 | Status: SHIPPED | OUTPATIENT
Start: 2025-05-24 | End: 2025-05-31

## 2025-05-24 NOTE — TELEPHONE ENCOUNTER
"Regarding: concern for UTI  ----- Message from Sabrina RAGLAND sent at 5/24/2025  8:54 AM EDT -----  \"My  was running a fever last night and today we got a UTI test kit and it says there are trace amounts. He had a severe UTI a couple of weeks ago and I don't want it to get that bad again so can he just be started on the bactrim\"      No communication form on file, Caterina provided verbal consent. I did advise that antibiotics and not ordered on the nights and weekends over the phone per our policy.    "

## 2025-05-24 NOTE — TELEPHONE ENCOUNTER
"FOLLOW UP: None needed at this time. For informational purposes only    REASON FOR CONVERSATION: Possible UTI    SYMPTOMS: fever of 102.5F with recent history of nephrolithiasis in April.     OTHER: Wife requesting antibiotics. On call Provider paged and sent prescriptions. Provider stated patient will need a UA and culture if there is no improvement.     DISPOSITION: PCP Triage      Answer Assessment - Initial Assessment Questions  1. SYMPTOM: \"What's the main symptom you're concerned about?\" (e.g., frequency, incontinence)        Incontinence     2. ONSET: \"When did the  fever  start?\"        Last night with 102.5 F    3. PAIN: \"Is there any pain?\" If Yes, ask: \"How bad is it?\" (Scale: 1-10; mild, moderate, severe)        Feels discomfort from the fever    4. CAUSE: \"What do you think is causing the symptoms?\"        Wife is thinking it could be the nephrolithiasis from a few weeks ago because the patients symptoms started the same way.    Protocols used: Urinary Symptoms-Adult-AH    "

## 2025-05-28 ENCOUNTER — OFFICE VISIT (OUTPATIENT)
Dept: BARIATRICS | Facility: CLINIC | Age: 70
End: 2025-05-28
Payer: MEDICARE

## 2025-05-28 VITALS
BODY MASS INDEX: 47.74 KG/M2 | HEIGHT: 68 IN | TEMPERATURE: 98.3 F | HEART RATE: 60 BPM | SYSTOLIC BLOOD PRESSURE: 134 MMHG | RESPIRATION RATE: 16 BRPM | WEIGHT: 315 LBS | DIASTOLIC BLOOD PRESSURE: 84 MMHG

## 2025-05-28 DIAGNOSIS — I89.0 LYMPHEDEMA OF BOTH LOWER EXTREMITIES: ICD-10-CM

## 2025-05-28 DIAGNOSIS — E66.813 CLASS 3 OBESITY: Primary | ICD-10-CM

## 2025-05-28 DIAGNOSIS — I26.99 PULMONARY EMBOLISM (HCC): ICD-10-CM

## 2025-05-28 DIAGNOSIS — I10 ESSENTIAL HYPERTENSION: ICD-10-CM

## 2025-05-28 PROCEDURE — 99213 OFFICE O/P EST LOW 20 MIN: CPT | Performed by: STUDENT IN AN ORGANIZED HEALTH CARE EDUCATION/TRAINING PROGRAM

## 2025-05-28 RX ORDER — TIRZEPATIDE 2.5 MG/.5ML
2.5 INJECTION, SOLUTION SUBCUTANEOUS WEEKLY
Qty: 2 ML | Refills: 0 | Status: SHIPPED | OUTPATIENT
Start: 2025-05-28 | End: 2025-06-25

## 2025-05-28 NOTE — PROGRESS NOTES
Assessment & Plan  Class 3 obesity    Initial weight: 411    Medication: Prescribed  Zepbound 2.5mg     Patient has tried to maintain healthy dietary changes for greater than 1 year.     Patient has a BMI greater than 30 associated with co-morbidities such as hypertension, hyperlipidemia, prediabetes, Strangulated hernia of abdominal wall s/p emergent lap with lysis adhesions, pulmonary embolism (likely due to immobilization while in the hospital and was previously on Eliquis), cataracts,  BPH, left kidney renal mass cryoablation     Patient does not qualify for surgery due to his medical history. Zepbound is medically necessary to help promote weight loss given his medical history and risk of complications if he does not lose weight     Patient understands the importance of making lifestyle changes as recommended below to aid in weight loss.      Dietary Recommendations:  Recommend to avoid skipping any meals. Adequate amount of Macronutrients (minimal 3 meals a day) is necessary to help improve metabolism, satiety and allow for better portion control by decreasing Ghrelin (hunger hormone). Lack of hunger can be suppressed by a hormone called Leptin (full hormone) which can occur from a previous meal or caffeine intake    Protein intake throughout the day can help promote satiety and is necessary for muscle growth/repair    Carbohydrates are essential as it is the vital source of fuel for daily activities. energy, cell function, nutrient absorption, and hormone production.     Fats: Essential vitamins like A, D, and E, support cell growth, function and are necessary for nutrient absorption to support your organs     Fluid intake which is at least half your body weight in ounces is necessary to help control cravings (decreasing confusion for appetite vs water deprivation) as the human body is made up of 50-70% of Fluids. If there is a diversion for water alone, would recommend flavored water (example-splash of  lemonade or ice tea) to help promote compliance. Fluids include Teas, water, flavored water, seltzer water, coffee, shakes    Metabolism:    Metabolism can also be promoted by macronutrient intake and increased muscle weight via thermogenesis      Daily Calorie Needs: Recommend to take into account any fluid losses and calories burned via increased activity levels as daily calories may need to be adjusted     Weight check: Weights can fluctuate depending on fluid shifts vs what foods are consumed prior to checking your weight          Return in about 3 months (around 8/28/2025) for followup .     Most recent notes, labs and previous medical records were reviewed. Total time with chart review and with the patient: 35 min      ______________________________________________________________________        Subjective:     Chief Complaint   Patient presents with    Follow-up     Mwm f/u3m: waist: 63in       HPI: 70 y.o. male with pmh of  hypertension, hyperlipidemia, prediabetes, Strangulated hernia of abdominal wall s/p emergent lap with lysis adhesions, pulmonary embolism (likely due to immobilization while in the hospital and was previously on Eliquis), cataracts,  BPH, left kidney renal mass cryoablation presents to the weight management clinic for consultation due to difficulty with weight management.     Patient has tried to lose weight for the past several years.      Wt Loss history:  Self Created Diets: Healthy diet  Patient has seen dietitians while inpatient for wound care given history of strangulated hernia and reports that he has lost weight when he was in the hospital as his meals were more regimen with breakfast lunch and dinner    -unfortunately he does not qualify for surgery at this time unless he loses about 40-50 lbs       Dietary Regimen:  B- started protein shakes   L- toast 3 eggs   D- Meat rice vegetables              Cravings:  sweets/salty items more severe at night  Fluids: Water- 4 16 oz  "bottles, orange vitamin water x1, 1 can of coke a day, chocolate milk     Lifestyle   Occupation: Volunteer at a youth center               Review Of Systems:  General: No pallor  Pulmonary: Negative for shortness of breath  Chest: negative for chest pain  Gastrointestinal:  Negative for vomiting   Psychiatric/Behavioral:  Negative for behavioral problems, confusion, dysphoric mood and hallucinations.    All other systems reviewed and are negative.     Objective:  /84   Pulse 60   Temp 98.3 °F (36.8 °C)   Resp 16   Ht 5' 8\" (1.727 m)   Wt (!) 186 kg (411 lb)   BMI 62.49 kg/m²     Wt Readings from Last 30 Encounters:   05/28/25 (!) 186 kg (411 lb)   04/22/25 (!) 183 kg (404 lb)   04/17/25 (!) 183 kg (403 lb)   04/09/25 (!) 183 kg (403 lb)   02/27/25 (!) 182 kg (401 lb 9.6 oz)   02/12/25 (!) 182 kg (401 lb)   08/06/24 (!) 185 kg (407 lb)   07/12/24 (!) 183 kg (403 lb 6.4 oz)   02/06/24 (!) 181 kg (399 lb 3.2 oz)   08/03/23 (!) 154 kg (340 lb)   05/03/23 (!) 157 kg (345 lb 12.8 oz)   04/17/23 (!) 156 kg (344 lb 3.2 oz)   04/14/23 (!) 155 kg (342 lb)   02/28/23 (!) 153 kg (336 lb 12.8 oz)   02/21/23 (!) 152 kg (335 lb 12.8 oz)   01/31/23 (!) 153 kg (337 lb 6.4 oz)   01/09/23 (!) 148 kg (327 lb)   01/05/23 (!) 150 kg (330 lb 6.4 oz)   01/03/23 (!) 147 kg (324 lb 8.3 oz)   12/22/22 (!) 149 kg (328 lb)   12/15/22 (!) 149 kg (328 lb)   12/09/22 (!) 150 kg (331 lb 5.6 oz)   11/30/22 (!) 150 kg (331 lb 11.2 oz)   11/22/22 (!) 153 kg (337 lb 9.6 oz)   11/18/22 (!) 154 kg (340 lb)   10/18/22 (!) 157 kg (346 lb 3.2 oz)   10/05/22 (!) 165 kg (364 lb 3.2 oz)   10/03/22 (!) 166 kg (365 lb 3.2 oz)   09/29/22 (!) 171 kg (376 lb)   09/28/22 (!) 140 kg (309 lb 1.4 oz)       Physical Exam  Constitutional:       General: No acute distress.  Well-nourished  HENT:      Head: Normocephalic and atraumatic.   Eyes:      Extraocular Movements: Extraocular movements intact.      Conjunctiva/pupils: Conjunctivae normal. Pupils are " equal, round  Pulmonary:      Effort: Pulmonary effort is normal. No labored breathing   Neurological:      General: No focal deficit present.  AO x 3     Mental Status: Alert and oriented to person, place, and time. Mental status is at baseline.   Psychiatric:         Mood and Affect: Mood normal.         Behavior: Behavior normal.     Labs and Imaging  Recent labs and imaging have been personally reviewed.

## 2025-06-02 ENCOUNTER — TELEPHONE (OUTPATIENT)
Dept: BARIATRICS | Facility: CLINIC | Age: 70
End: 2025-06-02

## 2025-06-02 NOTE — TELEPHONE ENCOUNTER
PA for zepbound 2.5mg SUBMITTED to [facility]     via    []CMM-KEY: [id number]  []Surescripts-Case ID #   []Availity-Auth ID # NDC #   []Faxed to plan   []Other website   []Phone call Case ID #     []PA sent as URGENT    All office notes, labs and other pertaining documents and studies sent. Clinical questions answered. Awaiting determination from insurance company.     Turnaround time for your insurance to make a decision on your Prior Authorization can take 7-21 business days.

## 2025-06-03 NOTE — TELEPHONE ENCOUNTER
PA for zepbound 2.5mg DENIED    Reason:(Screenshot if applicable)  Pt's secondary is also a medicare plan , states it can be appealed would need exercise log, dietary log, and the reason why zepbound was the choice of meds       Message sent to office clinical pool Yes    Denial letter scanned into Media Yes    We can gladly do an appeal but the process can take about 30-60 days to provide determination. Please have the office staff schedule a Peer to Peer at phone [phone] . If an appeal is truly warranted please have Provider send clinical documentation to the PA department to support the appeal.     **Please follow up with your patient regarding denial and next steps**

## 2025-06-05 NOTE — TELEPHONE ENCOUNTER
Pt and wife called in to let us know they called the ins and they found away for the ins to cover the Zepbound and ws able to get the medication mailed to him.  Pt was concerned that they told him to call us after the 2nd shot for a new script.  I explained yes that he should call a day or two after his 2nd shot and we can determine if he needs to go up a dose and that give enough time for us to [facility] the [facility] to [facility] and for them to mail it to him

## 2025-06-09 DIAGNOSIS — R30.0 DYSURIA: Primary | ICD-10-CM

## 2025-06-09 RX ORDER — SULFAMETHOXAZOLE AND TRIMETHOPRIM 800; 160 MG/1; MG/1
1 TABLET ORAL EVERY 12 HOURS SCHEDULED
Qty: 14 TABLET | Refills: 0 | Status: SHIPPED | OUTPATIENT
Start: 2025-06-09 | End: 2025-06-16

## 2025-06-10 DIAGNOSIS — I10 ESSENTIAL HYPERTENSION: ICD-10-CM

## 2025-06-10 DIAGNOSIS — I89.0 LYMPHEDEMA OF BOTH LOWER EXTREMITIES: ICD-10-CM

## 2025-06-10 DIAGNOSIS — N18.31 STAGE 3A CHRONIC KIDNEY DISEASE (HCC): ICD-10-CM

## 2025-06-10 DIAGNOSIS — E66.813 CLASS 3 OBESITY: ICD-10-CM

## 2025-06-10 DIAGNOSIS — I26.99 PULMONARY EMBOLISM (HCC): Primary | ICD-10-CM

## 2025-06-10 RX ORDER — TIRZEPATIDE 5 MG/.5ML
5 INJECTION, SOLUTION SUBCUTANEOUS WEEKLY
Qty: 6 ML | Refills: 3 | Status: SHIPPED | OUTPATIENT
Start: 2025-06-10 | End: 2025-06-18 | Stop reason: SDUPTHER

## 2025-06-12 ENCOUNTER — TELEPHONE (OUTPATIENT)
Age: 70
End: 2025-06-12

## 2025-06-12 NOTE — TELEPHONE ENCOUNTER
Pt called regarding the script renewal. Pt has some questions because the new script was sent for 3 months. Pt is concerned that will remain on the same dosage for 90 days. Pt was told that after his 2nd shot he needs to call to complete a questionnaire about how is he doing with the medication before calling in the next refill. The provider is who decide for how long the pt will remain with the same dose. Pt's wife said she understands but still, pt is under the impression that will have to pay for 3 dosage of 5 mg and probably won't take all of them because the dose will increase before the 90 days. Please, call pt to provide more clarification.

## 2025-06-17 ENCOUNTER — PATIENT MESSAGE (OUTPATIENT)
Dept: FAMILY MEDICINE CLINIC | Facility: CLINIC | Age: 70
End: 2025-06-17

## 2025-06-17 DIAGNOSIS — R39.9 UTI SYMPTOMS: Primary | ICD-10-CM

## 2025-06-18 DIAGNOSIS — N18.31 STAGE 3A CHRONIC KIDNEY DISEASE (HCC): ICD-10-CM

## 2025-06-18 DIAGNOSIS — I26.99 PULMONARY EMBOLISM (HCC): ICD-10-CM

## 2025-06-18 DIAGNOSIS — I89.0 LYMPHEDEMA OF BOTH LOWER EXTREMITIES: ICD-10-CM

## 2025-06-18 DIAGNOSIS — I10 ESSENTIAL HYPERTENSION: ICD-10-CM

## 2025-06-18 DIAGNOSIS — E66.813 CLASS 3 OBESITY: ICD-10-CM

## 2025-06-18 RX ORDER — SULFAMETHOXAZOLE AND TRIMETHOPRIM 800; 160 MG/1; MG/1
1 TABLET ORAL EVERY 12 HOURS SCHEDULED
Qty: 14 TABLET | Refills: 0 | Status: SHIPPED | OUTPATIENT
Start: 2025-06-18 | End: 2025-06-18

## 2025-06-18 RX ORDER — TIRZEPATIDE 5 MG/.5ML
5 INJECTION, SOLUTION SUBCUTANEOUS WEEKLY
Qty: 2 ML | Refills: 3 | Status: SHIPPED | OUTPATIENT
Start: 2025-06-18

## 2025-06-18 RX ORDER — SULFAMETHOXAZOLE AND TRIMETHOPRIM 800; 160 MG/1; MG/1
1 TABLET ORAL EVERY 12 HOURS SCHEDULED
Qty: 14 TABLET | Refills: 0 | Status: SHIPPED | OUTPATIENT
Start: 2025-06-18 | End: 2025-06-25

## 2025-07-01 DIAGNOSIS — E53.8 VITAMIN B 12 DEFICIENCY: ICD-10-CM

## 2025-07-01 RX ORDER — LANOLIN ALCOHOL/MO/W.PET/CERES
1000 CREAM (GRAM) TOPICAL DAILY
Qty: 100 TABLET | Refills: 1 | Status: SHIPPED | OUTPATIENT
Start: 2025-07-01

## 2025-07-08 ENCOUNTER — TELEPHONE (OUTPATIENT)
Dept: BARIATRICS | Facility: CLINIC | Age: 70
End: 2025-07-08

## 2025-07-08 NOTE — TELEPHONE ENCOUNTER
Patient is calling back. Is still experiencing bloating, abdominal distension and diarrhea.    Started 3 days ago after taking the increased dose of Zepbound. Wife is concerned as  has a history of bowel obstruction.    Please advise.

## 2025-07-17 DIAGNOSIS — I26.99 PULMONARY EMBOLISM (HCC): ICD-10-CM

## 2025-07-17 DIAGNOSIS — E66.813 CLASS 3 OBESITY: ICD-10-CM

## 2025-07-17 DIAGNOSIS — I10 ESSENTIAL HYPERTENSION: ICD-10-CM

## 2025-07-17 DIAGNOSIS — I89.0 LYMPHEDEMA OF BOTH LOWER EXTREMITIES: ICD-10-CM

## 2025-07-17 DIAGNOSIS — I89.0 LYMPHEDEMA OF BOTH LOWER EXTREMITIES: Primary | ICD-10-CM

## 2025-07-17 DIAGNOSIS — N18.31 STAGE 3A CHRONIC KIDNEY DISEASE (HCC): ICD-10-CM

## 2025-07-17 RX ORDER — TIRZEPATIDE 7.5 MG/.5ML
7.5 INJECTION, SOLUTION SUBCUTANEOUS WEEKLY
Qty: 2 ML | Refills: 3 | Status: SHIPPED | OUTPATIENT
Start: 2025-07-17

## 2025-07-17 RX ORDER — TIRZEPATIDE 5 MG/.5ML
5 INJECTION, SOLUTION SUBCUTANEOUS WEEKLY
Qty: 2 ML | Refills: 0 | OUTPATIENT
Start: 2025-07-17

## 2025-08-05 DIAGNOSIS — N18.9 CHRONIC RENAL FAILURE, UNSPECIFIED CKD STAGE: ICD-10-CM

## 2025-08-07 RX ORDER — TAMSULOSIN HYDROCHLORIDE 0.4 MG/1
0.4-0.8 CAPSULE ORAL DAILY
Qty: 180 CAPSULE | Refills: 0 | Status: SHIPPED | OUTPATIENT
Start: 2025-08-07

## 2025-08-12 ENCOUNTER — APPOINTMENT (OUTPATIENT)
Dept: LAB | Facility: HOSPITAL | Age: 70
End: 2025-08-12
Payer: MEDICARE

## 2025-08-13 ENCOUNTER — OFFICE VISIT (OUTPATIENT)
Dept: FAMILY MEDICINE CLINIC | Facility: CLINIC | Age: 70
End: 2025-08-13
Payer: MEDICARE

## 2025-08-13 PROBLEM — U07.1 COVID-19: Chronic | Status: RESOLVED | Noted: 2022-07-07 | Resolved: 2025-08-13

## 2025-08-13 PROBLEM — N17.9 ACUTE KIDNEY INJURY (HCC): Status: RESOLVED | Noted: 2022-09-16 | Resolved: 2025-08-13

## 2025-08-14 ENCOUNTER — TELEPHONE (OUTPATIENT)
Age: 70
End: 2025-08-14

## 2025-08-14 PROBLEM — L97.511 ULCER OF RIGHT FOOT, LIMITED TO BREAKDOWN OF SKIN (HCC): Status: ACTIVE | Noted: 2025-08-14

## (undated) DEVICE — SUT SILK 3-0 SH CR/8 18 IN C013D

## (undated) DEVICE — SPONGE LAP 18 X 18 IN STRL RFD

## (undated) DEVICE — 3M™ TEGADERM™ TRANSPARENT FILM DRESSING FRAME STYLE, 1628, 6 IN X 8 IN (15 CM X 20 CM), 10/CT 8CT/CASE: Brand: 3M™ TEGADERM™

## (undated) DEVICE — BINDER ABDOMINAL 63-74 IN

## (undated) DEVICE — SPECIMEN CONTAINER STERILE PEEL PACK

## (undated) DEVICE — SCD SEQUENTIAL COMPRESSION COMFORT SLEEVE MEDIUM KNEE LENGTH: Brand: KENDALL SCD

## (undated) DEVICE — NEEDLE 25G X 1 1/2

## (undated) DEVICE — VAC DRESSING SENSATRAC LRG

## (undated) DEVICE — V.A.C. DRAPE: Brand: V.A.C.®

## (undated) DEVICE — 3M™ V.A.C.® GRANUFOAM™ DRESSING KIT, M8275052, MEDIUM: Brand: 3M™ V.A.C.® GRANUFOAM™

## (undated) DEVICE — DRESSING OPTILOCK 6.5 X 10IN

## (undated) DEVICE — CATH URETERAL 5FR X 70 CM FLEX TIP POLYUR BARD

## (undated) DEVICE — 3000CC GUARDIAN II: Brand: GUARDIAN

## (undated) DEVICE — GLOVE SRG BIOGEL 8

## (undated) DEVICE — GLOVE SRG BIOGEL ECLIPSE 7.5

## (undated) DEVICE — 3M™ STERI-STRIP™ REINFORCED ADHESIVE SKIN CLOSURES, R1547, 1/2 IN X 4 IN (12 MM X 100 MM), 6 STRIPS/ENVELOPE: Brand: 3M™ STERI-STRIP™

## (undated) DEVICE — KENDALL SCD EXPRESS SLEEVES, KNEE LENGTH, X-LARGE: Brand: KENDALL SCD

## (undated) DEVICE — PROXIMATE SKIN STAPLERS (35 WIDE) CONTAINS 35 STAINLESS STEEL STAPLES (FIXED HEAD): Brand: PROXIMATE

## (undated) DEVICE — TRAY FOLEY 16FR URIMETER SURESTEP

## (undated) DEVICE — SMOKE EVACUATION TUBING WITH 7/8 IN TO 1/4 IN REDUCER: Brand: BUFFALO FILTER

## (undated) DEVICE — SUT ETHILON 2-0 LR 20 IN 470G

## (undated) DEVICE — BETHLEHEM UNIVERSAL LAPAROTOMY: Brand: CARDINAL HEALTH

## (undated) DEVICE — WET SKIN PREP TRAY: Brand: MEDLINE INDUSTRIES, INC.

## (undated) DEVICE — SYRINGE 20ML LL

## (undated) DEVICE — UNDYED BRAIDED (POLYGLACTIN 910), SYNTHETIC ABSORBABLE SUTURE: Brand: COATED VICRYL

## (undated) DEVICE — ACE WRAP 6 IN UNSTERILE

## (undated) DEVICE — INTENDED FOR TISSUE SEPARATION, AND OTHER PROCEDURES THAT REQUIRE A SHARP SURGICAL BLADE TO PUNCTURE OR CUT.: Brand: BARD-PARKER SAFETY BLADES SIZE 10, STERILE

## (undated) DEVICE — GLOVE SRG BIOGEL 6.5

## (undated) DEVICE — INTENDED FOR TISSUE SEPARATION, AND OTHER PROCEDURES THAT REQUIRE A SHARP SURGICAL BLADE TO PUNCTURE OR CUT.: Brand: BARD-PARKER SAFETY BLADES SIZE 15, STERILE

## (undated) DEVICE — Device

## (undated) DEVICE — SUT SILK 2-0 SH 30 IN K833H

## (undated) DEVICE — GLOVE SRG BIOGEL 7

## (undated) DEVICE — SUT VICRYL 0 CT-1 18 IN J740D

## (undated) DEVICE — PLASTIC ADHESIVE BANDAGE: Brand: CURITY

## (undated) DEVICE — DRAPE LAPAROTOMY W/POUCHES

## (undated) DEVICE — HEAVY DRAINAGE PACK: Brand: CURITY

## (undated) DEVICE — MEDI-VAC YANKAUER SUCTION HANDLE W/BULBOUS AND CONTROL VENT: Brand: CARDINAL HEALTH

## (undated) DEVICE — SCD SEQUENTIAL COMPRESSION COMFORT SLEEVE LARGE KNEE LENGTH: Brand: KENDALL SCD

## (undated) DEVICE — BETHLEHEM UNIVERSAL MINOR GEN: Brand: CARDINAL HEALTH

## (undated) DEVICE — 3 TO 1 DERMACARRIER: Brand: MESHGRAFTTM II TISSUE EXPANSION SYSTEM

## (undated) DEVICE — ABTHERA OPEN ABDOMEN DRESSING WITH SENSATRAC PAD: Brand: ABTHERA™ SENSAT.R.A.C.™

## (undated) DEVICE — TUBING SUCTION 5MM X 12 FT

## (undated) DEVICE — DRESSING XEROFORM 5 X 9

## (undated) DEVICE — GUIDEWIRE STRGHT TIP 0.035 IN  SOLO PLUS

## (undated) DEVICE — SUT SILK 0 30 IN A306H

## (undated) DEVICE — INVIEW CLEAR LEGGINGS: Brand: CONVERTORS

## (undated) DEVICE — SUT VICRYL 2-0 CT-2 27 IN J269H

## (undated) DEVICE — SUT PDS II 0 CT-1 36 IN Z346H

## (undated) DEVICE — TRAY FOLEY 16FR URIMETER SILICONE SURESTEP

## (undated) DEVICE — NEEDLE BLUNT 18 G X 1 1/2IN

## (undated) DEVICE — DISPOSABLE OR TOWEL: Brand: CARDINAL HEALTH

## (undated) DEVICE — SUT SILK 3-0 30 IN A304H

## (undated) DEVICE — ENSEAL 20 CM SHAFT, LARGE JAW: Brand: ENSEAL X1

## (undated) DEVICE — CHLORAPREP HI-LITE 26ML ORANGE

## (undated) DEVICE — ASTOUND STANDARD SURGICAL GOWN, XL: Brand: CONVERTORS

## (undated) DEVICE — INTENDED FOR TISSUE SEPARATION, AND OTHER PROCEDURES THAT REQUIRE A SHARP SURGICAL BLADE TO PUNCTURE OR CUT.: Brand: BARD-PARKER ® SAFETYLOCK CARBON RIB-BACK BLADES

## (undated) DEVICE — VAC CANISTER 500ML

## (undated) DEVICE — [HIGH FLOW INSUFFLATOR,  DO NOT USE IF PACKAGE IS DAMAGED,  KEEP DRY,  KEEP AWAY FROM SUNLIGHT,  PROTECT FROM HEAT AND RADIOACTIVE SOURCES.]: Brand: PNEUMOSURE

## (undated) DEVICE — GLOVE INDICATOR PI UNDERGLOVE SZ 6.5 BLUE

## (undated) DEVICE — URETERAL CATHETER 5 FR CONE TIP

## (undated) DEVICE — 2963 MEDIPORE SOFT CLOTH TAPE 3 IN X 10 YD 12 RLS/CS: Brand: 3M™ MEDIPORE™

## (undated) DEVICE — PACK TUR

## (undated) DEVICE — EXIDINE 4 PCT

## (undated) DEVICE — FIBER STD QUANTA 365 MICRON

## (undated) DEVICE — VAC WHITEFOAM DRESSING LARGE: Brand: V.A.C. WHITEFOAM™

## (undated) DEVICE — ALLENTOWN LAP CHOLE APP PACK: Brand: CARDINAL HEALTH

## (undated) DEVICE — SUPER SPONGES,MEDIUM: Brand: KERLIX

## (undated) DEVICE — POOLE SUCTION HANDLE: Brand: CARDINAL HEALTH

## (undated) DEVICE — PREMIUM DRY TRAY LF: Brand: MEDLINE INDUSTRIES, INC.

## (undated) DEVICE — GLOVE PI ULTRA TOUCH SZ.6.5

## (undated) DEVICE — DRESSING PRISMA MATRIX 4.3IN X 4.3IN

## (undated) DEVICE — BLADE ELECTRO MODEL B DISP

## (undated) DEVICE — SYRINGE 30ML LL

## (undated) DEVICE — DRESSING MAXORB EXTRA AG 4 IN X 4.75 IN

## (undated) DEVICE — PENCIL ELECTROSURG E-Z CLEAN -0035H

## (undated) DEVICE — SUT VICRYL 3-0 REEL 54 IN J285G

## (undated) DEVICE — MAYO STAND COVER: Brand: CONVERTORS

## (undated) DEVICE — GLOVE INDICATOR PI UNDERGLOVE SZ 7 BLUE

## (undated) DEVICE — UROCATCH BAG

## (undated) DEVICE — NEEDLE HYPO 22G X 1-1/2 IN

## (undated) DEVICE — SUT VICRYL 3-0 SH 27 IN J416H

## (undated) DEVICE — SUT VICRYL 2-0 REEL 54 IN J286G

## (undated) DEVICE — 3M™ TEGADERM™ TRANSPARENT FILM DRESSING FRAME STYLE, 1624W, 2-3/8 IN X 2-3/4 IN (6 CM X 7 CM), 100/CT 4CT/CASE: Brand: 3M™ TEGADERM™

## (undated) DEVICE — SYRINGE 10ML LL

## (undated) DEVICE — GAUZE SPONGES,16 PLY: Brand: CURITY

## (undated) DEVICE — DRAPE FLUID WARMER (BIRD BATH)

## (undated) DEVICE — SUT MONOCRYL 4-0 PS-2 27 IN Y426H

## (undated) DEVICE — SUT SILK 2-0 30 IN A305H

## (undated) DEVICE — GLOVE SRG LF STRL BGL SKNSNS 6.5 PF

## (undated) DEVICE — BASKET SPEC RETRVL 3FR 90CM SS

## (undated) DEVICE — PLUMEPEN PRO 10FT

## (undated) DEVICE — 3M™ STERI-STRIP™ COMPOUND BENZOIN TINCTURE 40 BAGS/CARTON 4 CARTONS/CASE C1544: Brand: 3M™ STERI-STRIP™

## (undated) DEVICE — LIGHT GLOVE GREEN

## (undated) DEVICE — SUT VICRYL 2-0 SH 27 IN UNDYED J417H

## (undated) DEVICE — ENDOSCOPIC VALVE WITH ADAPTER.: Brand: SURSEAL® II

## (undated) DEVICE — SUT PDS II 1 TP-1 96 IN Z880G

## (undated) DEVICE — DRAPE EQUIPMENT RF WAND

## (undated) DEVICE — SUT PDS II 1 CT-1 27 IN Z347H

## (undated) DEVICE — BAG URINE DRAINAGE 2000ML ANTI RFLX LF

## (undated) DEVICE — GLOVE INDICATOR PI UNDERGLOVE SZ 8 BLUE